# Patient Record
Sex: FEMALE | Race: BLACK OR AFRICAN AMERICAN | Employment: OTHER | URBAN - METROPOLITAN AREA
[De-identification: names, ages, dates, MRNs, and addresses within clinical notes are randomized per-mention and may not be internally consistent; named-entity substitution may affect disease eponyms.]

---

## 2017-12-18 ENCOUNTER — HOSPITAL ENCOUNTER (OUTPATIENT)
Dept: MAMMOGRAPHY | Age: 67
Discharge: HOME | End: 2017-12-18
Attending: FAMILY MEDICINE
Payer: MEDICARE

## 2017-12-18 DIAGNOSIS — Z12.31 VISIT FOR SCREENING MAMMOGRAM: ICD-10-CM

## 2017-12-18 PROCEDURE — 77063 BREAST TOMOSYNTHESIS BI: CPT

## 2018-11-27 ENCOUNTER — HOSPITAL ENCOUNTER (OUTPATIENT)
Age: 68
Discharge: HOME | End: 2018-11-27
Attending: FAMILY MEDICINE
Payer: MEDICARE

## 2018-11-27 DIAGNOSIS — R43.2 AGEUSIA: ICD-10-CM

## 2018-11-27 PROCEDURE — 70551 MRI BRAIN STEM W/O DYE: CPT

## 2019-01-09 ENCOUNTER — HOSPITAL ENCOUNTER (OUTPATIENT)
Dept: BONE DENSITY | Age: 69
Discharge: HOME | End: 2019-01-09
Attending: FAMILY MEDICINE
Payer: MEDICARE

## 2019-01-09 ENCOUNTER — HOSPITAL ENCOUNTER (OUTPATIENT)
Dept: MAMMOGRAPHY | Age: 69
Discharge: HOME | End: 2019-01-09
Attending: FAMILY MEDICINE
Payer: MEDICARE

## 2019-01-09 DIAGNOSIS — Z12.31 ENCOUNTER FOR SCREENING MAMMOGRAM FOR BREAST CANCER: ICD-10-CM

## 2019-01-09 DIAGNOSIS — E21.3 HYPERPARATHYROIDISM (HCC): ICD-10-CM

## 2019-01-09 PROCEDURE — 77080 DXA BONE DENSITY AXIAL: CPT

## 2019-01-09 PROCEDURE — 77063 BREAST TOMOSYNTHESIS BI: CPT

## 2020-01-10 ENCOUNTER — HOSPITAL ENCOUNTER (OUTPATIENT)
Dept: MAMMOGRAPHY | Age: 70
Discharge: HOME | End: 2020-01-10
Attending: FAMILY MEDICINE
Payer: MEDICARE

## 2020-01-10 DIAGNOSIS — Z12.31 VISIT FOR SCREENING MAMMOGRAM: ICD-10-CM

## 2020-01-10 PROCEDURE — 77063 BREAST TOMOSYNTHESIS BI: CPT

## 2020-10-23 ENCOUNTER — OFFICE VISIT (OUTPATIENT)
Dept: ORTHOPEDIC SURGERY | Age: 70
End: 2020-10-23
Payer: MEDICARE

## 2020-10-23 VITALS
BODY MASS INDEX: 30.7 KG/M2 | OXYGEN SATURATION: 99 % | TEMPERATURE: 97.2 F | DIASTOLIC BLOOD PRESSURE: 76 MMHG | WEIGHT: 191 LBS | HEIGHT: 66 IN | SYSTOLIC BLOOD PRESSURE: 145 MMHG | HEART RATE: 94 BPM | RESPIRATION RATE: 16 BRPM

## 2020-10-23 DIAGNOSIS — M25.652 DECREASED RANGE OF LEFT HIP MOVEMENT: ICD-10-CM

## 2020-10-23 DIAGNOSIS — M79.605 PAIN OF LEFT LOWER EXTREMITY: Primary | ICD-10-CM

## 2020-10-23 DIAGNOSIS — M16.12 ARTHRITIS OF LEFT HIP: ICD-10-CM

## 2020-10-23 DIAGNOSIS — M70.62 TROCHANTERIC BURSITIS, LEFT HIP: ICD-10-CM

## 2020-10-23 PROCEDURE — G8417 CALC BMI ABV UP PARAM F/U: HCPCS | Performed by: PHYSICIAN ASSISTANT

## 2020-10-23 PROCEDURE — 73502 X-RAY EXAM HIP UNI 2-3 VIEWS: CPT | Performed by: PHYSICIAN ASSISTANT

## 2020-10-23 PROCEDURE — G8536 NO DOC ELDER MAL SCRN: HCPCS | Performed by: PHYSICIAN ASSISTANT

## 2020-10-23 PROCEDURE — 1101F PT FALLS ASSESS-DOCD LE1/YR: CPT | Performed by: PHYSICIAN ASSISTANT

## 2020-10-23 PROCEDURE — 99203 OFFICE O/P NEW LOW 30 MIN: CPT | Performed by: PHYSICIAN ASSISTANT

## 2020-10-23 PROCEDURE — 1090F PRES/ABSN URINE INCON ASSESS: CPT | Performed by: PHYSICIAN ASSISTANT

## 2020-10-23 PROCEDURE — G8427 DOCREV CUR MEDS BY ELIG CLIN: HCPCS | Performed by: PHYSICIAN ASSISTANT

## 2020-10-23 PROCEDURE — G9711 PT HX TOT COL OR COLON CA: HCPCS | Performed by: PHYSICIAN ASSISTANT

## 2020-10-23 PROCEDURE — G8432 DEP SCR NOT DOC, RNG: HCPCS | Performed by: PHYSICIAN ASSISTANT

## 2020-10-23 PROCEDURE — G9899 SCRN MAM PERF RSLTS DOC: HCPCS | Performed by: PHYSICIAN ASSISTANT

## 2020-10-23 PROCEDURE — 20610 DRAIN/INJ JOINT/BURSA W/O US: CPT | Performed by: PHYSICIAN ASSISTANT

## 2020-10-23 PROCEDURE — G8399 PT W/DXA RESULTS DOCUMENT: HCPCS | Performed by: PHYSICIAN ASSISTANT

## 2020-10-23 RX ORDER — METFORMIN HYDROCHLORIDE 500 MG/1
500 TABLET, EXTENDED RELEASE ORAL
COMMUNITY
Start: 2020-10-05

## 2020-10-23 RX ORDER — TRIAMCINOLONE ACETONIDE 40 MG/ML
40 INJECTION, SUSPENSION INTRA-ARTICULAR; INTRAMUSCULAR ONCE
Qty: 1 ML | Refills: 0
Start: 2020-10-23 | End: 2020-10-23

## 2020-10-23 RX ORDER — LOSARTAN POTASSIUM 100 MG/1
TABLET ORAL
COMMUNITY
Start: 2020-09-28 | End: 2021-01-24

## 2020-11-04 ENCOUNTER — HOSPITAL ENCOUNTER (OUTPATIENT)
Dept: GENERAL RADIOLOGY | Age: 70
Discharge: HOME | End: 2020-11-04
Attending: PHYSICIAN ASSISTANT
Payer: MEDICARE

## 2020-11-04 PROCEDURE — 74011000636 HC RX REV CODE- 636: Performed by: PHYSICIAN ASSISTANT

## 2020-11-04 PROCEDURE — 74011250636 HC RX REV CODE- 250/636: Performed by: PHYSICIAN ASSISTANT

## 2020-11-04 PROCEDURE — 77002 NEEDLE LOCALIZATION BY XRAY: CPT

## 2020-11-04 PROCEDURE — 74011000250 HC RX REV CODE- 250: Performed by: PHYSICIAN ASSISTANT

## 2020-11-04 RX ORDER — METHYLPREDNISOLONE ACETATE 40 MG/ML
40 INJECTION, SUSPENSION INTRA-ARTICULAR; INTRALESIONAL; INTRAMUSCULAR; SOFT TISSUE
Status: COMPLETED | OUTPATIENT
Start: 2020-11-04 | End: 2020-11-04

## 2020-11-04 RX ORDER — LIDOCAINE HYDROCHLORIDE 10 MG/ML
5 INJECTION, SOLUTION EPIDURAL; INFILTRATION; INTRACAUDAL; PERINEURAL
Status: COMPLETED | OUTPATIENT
Start: 2020-11-04 | End: 2020-11-04

## 2020-11-04 RX ADMIN — METHYLPREDNISOLONE ACETATE 40 MG: 40 INJECTION, SUSPENSION INTRA-ARTICULAR; INTRALESIONAL; INTRAMUSCULAR; SOFT TISSUE at 10:00

## 2020-11-04 RX ADMIN — IOPAMIDOL 2 ML: 408 INJECTION, SOLUTION INTRATHECAL at 10:00

## 2020-11-04 RX ADMIN — LIDOCAINE HYDROCHLORIDE 5 ML: 10 INJECTION, SOLUTION EPIDURAL; INFILTRATION; INTRACAUDAL; PERINEURAL at 10:00

## 2020-11-04 NOTE — PROCEDURES
RADIOLOGY POST PROCEDURE NOTE     November 4, 2020       10:06 AM     Preoperative Diagnosis:  Left hip pain    Postoperative Diagnosis:  Same. :  Melvin Borges PA-C under direct supervision    Assistant:  Eyad Hairston PA-C    Type of Anesthesia: 1% plain lidocaine    Procedure/Description:  Left hip injection    Findings: Successful left hip injection     Estimated blood Loss:  Minimal    Specimen Removed:   None. Blood transfusions:  None. Implants:  None.     Complications: None    Condition: Stable    Discharge Plan:  discharge home     Melvin Borges PA-C

## 2020-11-11 ENCOUNTER — OFFICE VISIT (OUTPATIENT)
Dept: ORTHOPEDIC SURGERY | Age: 70
End: 2020-11-11
Payer: MEDICARE

## 2020-11-11 VITALS
DIASTOLIC BLOOD PRESSURE: 73 MMHG | BODY MASS INDEX: 30.7 KG/M2 | OXYGEN SATURATION: 96 % | HEIGHT: 66 IN | SYSTOLIC BLOOD PRESSURE: 105 MMHG | TEMPERATURE: 97.4 F | WEIGHT: 191 LBS | HEART RATE: 113 BPM | RESPIRATION RATE: 16 BRPM

## 2020-11-11 DIAGNOSIS — M79.605 PAIN OF LEFT LOWER EXTREMITY: Primary | ICD-10-CM

## 2020-11-11 DIAGNOSIS — M25.652 DECREASED RANGE OF LEFT HIP MOVEMENT: ICD-10-CM

## 2020-11-11 DIAGNOSIS — M70.62 TROCHANTERIC BURSITIS, LEFT HIP: ICD-10-CM

## 2020-11-11 DIAGNOSIS — M16.12 ARTHRITIS OF LEFT HIP: ICD-10-CM

## 2020-11-11 PROCEDURE — G9899 SCRN MAM PERF RSLTS DOC: HCPCS | Performed by: PHYSICIAN ASSISTANT

## 2020-11-11 PROCEDURE — G8417 CALC BMI ABV UP PARAM F/U: HCPCS | Performed by: PHYSICIAN ASSISTANT

## 2020-11-11 PROCEDURE — 1090F PRES/ABSN URINE INCON ASSESS: CPT | Performed by: PHYSICIAN ASSISTANT

## 2020-11-11 PROCEDURE — G9711 PT HX TOT COL OR COLON CA: HCPCS | Performed by: PHYSICIAN ASSISTANT

## 2020-11-11 PROCEDURE — 1101F PT FALLS ASSESS-DOCD LE1/YR: CPT | Performed by: PHYSICIAN ASSISTANT

## 2020-11-11 PROCEDURE — G8432 DEP SCR NOT DOC, RNG: HCPCS | Performed by: PHYSICIAN ASSISTANT

## 2020-11-11 PROCEDURE — G8399 PT W/DXA RESULTS DOCUMENT: HCPCS | Performed by: PHYSICIAN ASSISTANT

## 2020-11-11 PROCEDURE — 99212 OFFICE O/P EST SF 10 MIN: CPT | Performed by: PHYSICIAN ASSISTANT

## 2020-11-11 PROCEDURE — G8536 NO DOC ELDER MAL SCRN: HCPCS | Performed by: PHYSICIAN ASSISTANT

## 2020-11-11 PROCEDURE — G8427 DOCREV CUR MEDS BY ELIG CLIN: HCPCS | Performed by: PHYSICIAN ASSISTANT

## 2020-11-11 NOTE — PROGRESS NOTES
Ms. Lisa Love returned to the office with her daughter today following for left hip pain. She was treated outpatient in the [facility] at [facility] for trochanteric bursitis and also found to have organic findings of severe osteoarthritis of the left hip. She was ordered a intra-articular cortisone injection which was completed late in October 2020. Unfortunately she received no relief from the injection. She did however receive generally good relief from the trochanteric injection performed in the office. She would like to proceed with a total hip replacement soon as possible. Her weight is stable at 191 pounds carrying a BMI of 30.83. Unfortunately she is an insulin-dependent diabetic and currently carries an HG A1c value of 8.4. She has been trying to work on her values regarding the insulin and diet aggressively with her PCP. Unfortunately she does not meet criteria for safely performing a hip replacement under the guidelines administered by our total joint replacement surgeons. She was counseled on the importance of lowering her A1c for general cardiovascular and health benefits. Today all of her questions answered to her satisfaction. She declined a referral to a orthopedic surgeon in a different area [facility] or [place]. She will therefore return to our office if and when she can reduce her A1c to below 7.5.

## 2021-01-19 ENCOUNTER — HOSPITAL ENCOUNTER (INPATIENT)
Age: 71
LOS: 5 days | Discharge: HOME HEALTH | DRG: 637 | End: 2021-01-24
Attending: EMERGENCY MEDICINE | Admitting: INTERNAL MEDICINE
Payer: MEDICARE

## 2021-01-19 ENCOUNTER — APPOINTMENT (OUTPATIENT)
Dept: GENERAL RADIOLOGY | Age: 71
DRG: 637 | End: 2021-01-19
Attending: EMERGENCY MEDICINE
Payer: MEDICARE

## 2021-01-19 ENCOUNTER — APPOINTMENT (OUTPATIENT)
Dept: CT IMAGING | Age: 71
DRG: 637 | End: 2021-01-19
Attending: EMERGENCY MEDICINE
Payer: MEDICARE

## 2021-01-19 DIAGNOSIS — N17.9 AKI (ACUTE KIDNEY INJURY) (HCC): ICD-10-CM

## 2021-01-19 DIAGNOSIS — E11.10 DIABETIC KETOACIDOSIS WITHOUT COMA ASSOCIATED WITH TYPE 2 DIABETES MELLITUS (HCC): ICD-10-CM

## 2021-01-19 DIAGNOSIS — E87.20 LACTIC ACIDOSIS: ICD-10-CM

## 2021-01-19 DIAGNOSIS — R77.8 ELEVATED TROPONIN: ICD-10-CM

## 2021-01-19 DIAGNOSIS — E87.5 HYPERKALEMIA: ICD-10-CM

## 2021-01-19 DIAGNOSIS — E11.65 POORLY CONTROLLED TYPE 2 DIABETES MELLITUS (HCC): ICD-10-CM

## 2021-01-19 LAB
ADMINISTERED INITIALS, ADMINIT: NORMAL
ALBUMIN SERPL-MCNC: 3.7 G/DL (ref 3.4–5)
ALBUMIN/GLOB SERPL: 0.9 {RATIO} (ref 0.8–1.7)
ALP SERPL-CCNC: 111 U/L (ref 45–117)
ALT SERPL-CCNC: 21 U/L (ref 13–56)
ANION GAP SERPL CALC-SCNC: 12 MMOL/L (ref 3–18)
ANION GAP SERPL CALC-SCNC: 25 MMOL/L (ref 3–18)
ANION GAP SERPL CALC-SCNC: 25 MMOL/L (ref 3–18)
APPEARANCE UR: ABNORMAL
ARTERIAL PATENCY WRIST A: ABNORMAL
AST SERPL-CCNC: 8 U/L (ref 10–38)
ATRIAL RATE: 100 BPM
B PERT DNA SPEC QL NAA+PROBE: NOT DETECTED
BACTERIA URNS QL MICRO: ABNORMAL /HPF
BASE DEFICIT BLDV-SCNC: 5 MMOL/L
BASOPHILS # BLD: 0 K/UL (ref 0–0.06)
BASOPHILS NFR BLD: 0 % (ref 0–3)
BDY SITE: ABNORMAL
BILIRUB SERPL-MCNC: 0.5 MG/DL (ref 0.2–1)
BILIRUB UR QL: NEGATIVE
BODY TEMPERATURE: 37
BORDETELLA PARAPERTUSSIS PCR, BORPAR: NOT DETECTED
BUN SERPL-MCNC: 39 MG/DL (ref 7–18)
BUN SERPL-MCNC: 43 MG/DL (ref 7–18)
BUN SERPL-MCNC: 49 MG/DL (ref 7–18)
BUN/CREAT SERPL: 25 (ref 12–20)
C PNEUM DNA SPEC QL NAA+PROBE: NOT DETECTED
CA-I BLD-MCNC: 1.2 MMOL/L (ref 1.12–1.32)
CALCIUM SERPL-MCNC: 8.3 MG/DL (ref 8.5–10.1)
CALCIUM SERPL-MCNC: 8.5 MG/DL (ref 8.5–10.1)
CALCIUM SERPL-MCNC: 9.2 MG/DL (ref 8.5–10.1)
CALCULATED P AXIS, ECG09: 85 DEGREES
CALCULATED R AXIS, ECG10: 93 DEGREES
CALCULATED T AXIS, ECG11: 84 DEGREES
CHLORIDE SERPL-SCNC: 103 MMOL/L (ref 100–111)
CHLORIDE SERPL-SCNC: 108 MMOL/L (ref 100–111)
CHLORIDE SERPL-SCNC: 92 MMOL/L (ref 100–111)
CK MB CFR SERPL CALC: 2.3 % (ref 0–4)
CK MB SERPL-MCNC: 1.5 NG/ML (ref 5–25)
CK SERPL-CCNC: 64 U/L (ref 26–192)
CO2 SERPL-SCNC: 20 MMOL/L (ref 21–32)
CO2 SERPL-SCNC: 7 MMOL/L (ref 21–32)
CO2 SERPL-SCNC: 9 MMOL/L (ref 21–32)
COLOR UR: YELLOW
CREAT SERPL-MCNC: 1.54 MG/DL (ref 0.6–1.3)
CREAT SERPL-MCNC: 1.71 MG/DL (ref 0.6–1.3)
CREAT SERPL-MCNC: 1.95 MG/DL (ref 0.6–1.3)
D50 ADMINISTERED, D50ADM: 0 ML
D50 ORDER, D50ORD: 0 ML
DIAGNOSIS, 93000: NORMAL
DIFFERENTIAL METHOD BLD: ABNORMAL
EOSINOPHIL # BLD: 0.2 K/UL (ref 0–0.4)
EOSINOPHIL NFR BLD: 1 % (ref 0–5)
EPITH CASTS URNS QL MICRO: ABNORMAL /LPF (ref 0–5)
ERYTHROCYTE [DISTWIDTH] IN BLOOD BY AUTOMATED COUNT: 15.5 % (ref 11.6–14.5)
EST. AVERAGE GLUCOSE BLD GHB EST-MCNC: 235 MG/DL
FLUAV H1 2009 PAND RNA SPEC QL NAA+PROBE: NOT DETECTED
FLUAV H1 RNA SPEC QL NAA+PROBE: NOT DETECTED
FLUAV H3 RNA SPEC QL NAA+PROBE: NOT DETECTED
FLUAV SUBTYP SPEC NAA+PROBE: NOT DETECTED
FLUBV RNA SPEC QL NAA+PROBE: NOT DETECTED
GAS FLOW.O2 O2 DELIVERY SYS: ABNORMAL L/MIN
GLOBULIN SER CALC-MCNC: 4.2 G/DL (ref 2–4)
GLUCOSE BLD STRIP.AUTO-MCNC: 136 MG/DL (ref 70–110)
GLUCOSE BLD STRIP.AUTO-MCNC: 147 MG/DL (ref 70–110)
GLUCOSE BLD STRIP.AUTO-MCNC: 195 MG/DL (ref 74–106)
GLUCOSE BLD STRIP.AUTO-MCNC: 259 MG/DL (ref 70–110)
GLUCOSE BLD STRIP.AUTO-MCNC: 313 MG/DL (ref 70–110)
GLUCOSE BLD STRIP.AUTO-MCNC: 415 MG/DL (ref 70–110)
GLUCOSE BLD STRIP.AUTO-MCNC: 515 MG/DL (ref 70–110)
GLUCOSE BLD STRIP.AUTO-MCNC: 575 MG/DL (ref 70–110)
GLUCOSE BLD STRIP.AUTO-MCNC: >600 MG/DL (ref 70–110)
GLUCOSE BLD STRIP.AUTO-MCNC: >600 MG/DL (ref 70–110)
GLUCOSE SERPL-MCNC: 189 MG/DL (ref 74–99)
GLUCOSE SERPL-MCNC: 604 MG/DL (ref 74–99)
GLUCOSE SERPL-MCNC: 964 MG/DL (ref 74–99)
GLUCOSE UR STRIP.AUTO-MCNC: >1000 MG/DL
GLUCOSE, GLC: 136 MG/DL
GLUCOSE, GLC: 147 MG/DL
GLUCOSE, GLC: 195 MG/DL
GLUCOSE, GLC: 259 MG/DL
GLUCOSE, GLC: 313 MG/DL
GLUCOSE, GLC: 415 MG/DL
GLUCOSE, GLC: 515 MG/DL
GLUCOSE, GLC: 575 MG/DL
GLUCOSE, GLC: 601 MG/DL
HADV DNA SPEC QL NAA+PROBE: NOT DETECTED
HBA1C MFR BLD: 9.8 % (ref 4.2–5.6)
HCO3 BLDV-SCNC: 20.8 MMOL/L (ref 23–28)
HCOV 229E RNA SPEC QL NAA+PROBE: NOT DETECTED
HCOV HKU1 RNA SPEC QL NAA+PROBE: NOT DETECTED
HCOV NL63 RNA SPEC QL NAA+PROBE: NOT DETECTED
HCOV OC43 RNA SPEC QL NAA+PROBE: NOT DETECTED
HCT VFR BLD AUTO: 36.4 % (ref 35–45)
HCT VFR BLD CALC: 27 % (ref 36–49)
HGB BLD-MCNC: 11 G/DL (ref 12–16)
HGB BLD-MCNC: 9.2 G/DL (ref 12–16)
HGB UR QL STRIP: ABNORMAL
HIGH TARGET, HITG: 180 MG/DL
HMPV RNA SPEC QL NAA+PROBE: NOT DETECTED
HPIV1 RNA SPEC QL NAA+PROBE: NOT DETECTED
HPIV2 RNA SPEC QL NAA+PROBE: NOT DETECTED
HPIV3 RNA SPEC QL NAA+PROBE: NOT DETECTED
HPIV4 RNA SPEC QL NAA+PROBE: NOT DETECTED
INSULIN ADMINSTERED, INSADM: 10.7 UNITS/HOUR
INSULIN ADMINSTERED, INSADM: 10.8 UNITS/HOUR
INSULIN ADMINSTERED, INSADM: 15.5 UNITS/HOUR
INSULIN ADMINSTERED, INSADM: 18.2 UNITS/HOUR
INSULIN ADMINSTERED, INSADM: 2.3 UNITS/HOUR
INSULIN ADMINSTERED, INSADM: 3.5 UNITS/HOUR
INSULIN ADMINSTERED, INSADM: 5.1 UNITS/HOUR
INSULIN ADMINSTERED, INSADM: 5.4 UNITS/HOUR
INSULIN ADMINSTERED, INSADM: 6 UNITS/HOUR
INSULIN ORDER, INSORD: 10.7 UNITS/HOUR
INSULIN ORDER, INSORD: 10.8 UNITS/HOUR
INSULIN ORDER, INSORD: 15.5 UNITS/HOUR
INSULIN ORDER, INSORD: 18.2 UNITS/HOUR
INSULIN ORDER, INSORD: 2.3 UNITS/HOUR
INSULIN ORDER, INSORD: 3.5 UNITS/HOUR
INSULIN ORDER, INSORD: 5.1 UNITS/HOUR
INSULIN ORDER, INSORD: 5.4 UNITS/HOUR
INSULIN ORDER, INSORD: 6 UNITS/HOUR
KETONES UR QL STRIP.AUTO: 80 MG/DL
LACTATE BLD-SCNC: 4.64 MMOL/L (ref 0.4–2)
LACTATE BLD-SCNC: 4.64 MMOL/L (ref 0.4–2)
LACTATE SERPL-SCNC: 2.7 MMOL/L (ref 0.4–2)
LEUKOCYTE ESTERASE UR QL STRIP.AUTO: NEGATIVE
LIPASE SERPL-CCNC: 1000 U/L (ref 73–393)
LOW TARGET, LOT: 140 MG/DL
LYMPHOCYTES # BLD: 0.9 K/UL (ref 0.8–3.5)
LYMPHOCYTES NFR BLD: 5 % (ref 20–51)
M PNEUMO DNA SPEC QL NAA+PROBE: NOT DETECTED
MAGNESIUM SERPL-MCNC: 1.6 MG/DL (ref 1.6–2.6)
MAGNESIUM SERPL-MCNC: 2.1 MG/DL (ref 1.6–2.6)
MCH RBC QN AUTO: 27.4 PG (ref 24–34)
MCHC RBC AUTO-ENTMCNC: 30.2 G/DL (ref 31–37)
MCV RBC AUTO: 90.5 FL (ref 74–97)
MINUTES UNTIL NEXT BG, NBG: 60 MIN
MONOCYTES # BLD: 0.4 K/UL (ref 0–1)
MONOCYTES NFR BLD: 2 % (ref 2–9)
MULTIPLIER, MUL: 0.02
MULTIPLIER, MUL: 0.02
MULTIPLIER, MUL: 0.03
MULTIPLIER, MUL: 0.04
NEUTS SEG # BLD: 16.4 K/UL (ref 1.8–8)
NEUTS SEG NFR BLD: 92 % (ref 42–75)
NITRITE UR QL STRIP.AUTO: NEGATIVE
ORDER INITIALS, ORDINIT: NORMAL
P-R INTERVAL, ECG05: 170 MS
PCO2 BLDV: 38.4 MMHG (ref 41–51)
PH BLDV: 6.99 [PH] (ref 7.32–7.42)
PH BLDV: 7.34 [PH] (ref 7.32–7.42)
PH UR STRIP: 5 [PH] (ref 5–8)
PHOSPHATE SERPL-MCNC: 2.4 MG/DL (ref 2.5–4.9)
PHOSPHATE SERPL-MCNC: 6.3 MG/DL (ref 2.5–4.9)
PLATELET # BLD AUTO: 430 K/UL (ref 135–420)
PLATELET COMMENTS,PCOM: ABNORMAL
PMV BLD AUTO: 9.7 FL (ref 9.2–11.8)
PO2 BLDV: 18 MMHG (ref 25–40)
POTASSIUM BLD-SCNC: 5.1 MMOL/L (ref 3.5–5.5)
POTASSIUM SERPL-SCNC: 5.1 MMOL/L (ref 3.5–5.5)
POTASSIUM SERPL-SCNC: 5.1 MMOL/L (ref 3.5–5.5)
POTASSIUM SERPL-SCNC: 8 MMOL/L (ref 3.5–5.5)
PROCALCITONIN SERPL-MCNC: 4.36 NG/ML
PROT SERPL-MCNC: 7.9 G/DL (ref 6.4–8.2)
PROT UR STRIP-MCNC: ABNORMAL MG/DL
Q-T INTERVAL, ECG07: 368 MS
QRS DURATION, ECG06: 114 MS
QTC CALCULATION (BEZET), ECG08: 474 MS
RBC # BLD AUTO: 4.02 M/UL (ref 4.2–5.3)
RBC #/AREA URNS HPF: ABNORMAL /HPF (ref 0–5)
RBC MORPH BLD: ABNORMAL
RBC MORPH BLD: ABNORMAL
RSV RNA SPEC QL NAA+PROBE: NOT DETECTED
RV+EV RNA SPEC QL NAA+PROBE: NOT DETECTED
SAO2 % BLDV: 24 % (ref 65–88)
SARS-COV-2 PCR, COVPCR: NOT DETECTED
SERVICE CMNT-IMP: ABNORMAL
SODIUM BLD-SCNC: 137 MMOL/L (ref 136–145)
SODIUM SERPL-SCNC: 124 MMOL/L (ref 136–145)
SODIUM SERPL-SCNC: 137 MMOL/L (ref 136–145)
SODIUM SERPL-SCNC: 140 MMOL/L (ref 136–145)
SP GR UR REFRACTOMETRY: 1.02 (ref 1–1.03)
SPECIMEN TYPE: ABNORMAL
TROPONIN I SERPL-MCNC: 0.18 NG/ML (ref 0–0.04)
TSH SERPL DL<=0.05 MIU/L-ACNC: 3.91 UIU/ML (ref 0.36–3.74)
UROBILINOGEN UR QL STRIP.AUTO: 0.2 EU/DL (ref 0.2–1)
VENTRICULAR RATE, ECG03: 100 BPM
WBC # BLD AUTO: 17.9 K/UL (ref 4.6–13.2)
WBC URNS QL MICRO: ABNORMAL /HPF (ref 0–4)

## 2021-01-19 PROCEDURE — 83690 ASSAY OF LIPASE: CPT

## 2021-01-19 PROCEDURE — 93005 ELECTROCARDIOGRAM TRACING: CPT

## 2021-01-19 PROCEDURE — 81001 URINALYSIS AUTO W/SCOPE: CPT

## 2021-01-19 PROCEDURE — 65610000006 HC RM INTENSIVE CARE

## 2021-01-19 PROCEDURE — 96375 TX/PRO/DX INJ NEW DRUG ADDON: CPT

## 2021-01-19 PROCEDURE — 85025 COMPLETE CBC W/AUTO DIFF WBC: CPT

## 2021-01-19 PROCEDURE — 80048 BASIC METABOLIC PNL TOTAL CA: CPT

## 2021-01-19 PROCEDURE — 82962 GLUCOSE BLOOD TEST: CPT

## 2021-01-19 PROCEDURE — 71045 X-RAY EXAM CHEST 1 VIEW: CPT

## 2021-01-19 PROCEDURE — 82553 CREATINE MB FRACTION: CPT

## 2021-01-19 PROCEDURE — 74011250636 HC RX REV CODE- 250/636

## 2021-01-19 PROCEDURE — 82800 BLOOD PH: CPT

## 2021-01-19 PROCEDURE — 74011636637 HC RX REV CODE- 636/637: Performed by: EMERGENCY MEDICINE

## 2021-01-19 PROCEDURE — 83605 ASSAY OF LACTIC ACID: CPT

## 2021-01-19 PROCEDURE — 87040 BLOOD CULTURE FOR BACTERIA: CPT

## 2021-01-19 PROCEDURE — 96368 THER/DIAG CONCURRENT INF: CPT

## 2021-01-19 PROCEDURE — 83735 ASSAY OF MAGNESIUM: CPT

## 2021-01-19 PROCEDURE — 74011000258 HC RX REV CODE- 258: Performed by: EMERGENCY MEDICINE

## 2021-01-19 PROCEDURE — 87426 SARSCOV CORONAVIRUS AG IA: CPT

## 2021-01-19 PROCEDURE — 74011000250 HC RX REV CODE- 250: Performed by: EMERGENCY MEDICINE

## 2021-01-19 PROCEDURE — 84145 PROCALCITONIN (PCT): CPT

## 2021-01-19 PROCEDURE — 84100 ASSAY OF PHOSPHORUS: CPT

## 2021-01-19 PROCEDURE — 96365 THER/PROPH/DIAG IV INF INIT: CPT

## 2021-01-19 PROCEDURE — 99291 CRITICAL CARE FIRST HOUR: CPT | Performed by: INTERNAL MEDICINE

## 2021-01-19 PROCEDURE — 96376 TX/PRO/DX INJ SAME DRUG ADON: CPT

## 2021-01-19 PROCEDURE — 74011250637 HC RX REV CODE- 250/637: Performed by: STUDENT IN AN ORGANIZED HEALTH CARE EDUCATION/TRAINING PROGRAM

## 2021-01-19 PROCEDURE — 84295 ASSAY OF SERUM SODIUM: CPT

## 2021-01-19 PROCEDURE — 83036 HEMOGLOBIN GLYCOSYLATED A1C: CPT

## 2021-01-19 PROCEDURE — 99285 EMERGENCY DEPT VISIT HI MDM: CPT

## 2021-01-19 PROCEDURE — 74011250636 HC RX REV CODE- 250/636: Performed by: PHYSICIAN ASSISTANT

## 2021-01-19 PROCEDURE — 74011250636 HC RX REV CODE- 250/636: Performed by: EMERGENCY MEDICINE

## 2021-01-19 PROCEDURE — 82803 BLOOD GASES ANY COMBINATION: CPT

## 2021-01-19 PROCEDURE — 84443 ASSAY THYROID STIM HORMONE: CPT

## 2021-01-19 PROCEDURE — 80053 COMPREHEN METABOLIC PANEL: CPT

## 2021-01-19 PROCEDURE — 71250 CT THORAX DX C-: CPT

## 2021-01-19 PROCEDURE — U0003 INFECTIOUS AGENT DETECTION BY NUCLEIC ACID (DNA OR RNA); SEVERE ACUTE RESPIRATORY SYNDROME CORONAVIRUS 2 (SARS-COV-2) (CORONAVIRUS DISEASE [COVID-19]), AMPLIFIED PROBE TECHNIQUE, MAKING USE OF HIGH THROUGHPUT TECHNOLOGIES AS DESCRIBED BY CMS-2020-01-R: HCPCS

## 2021-01-19 RX ORDER — ONDANSETRON 2 MG/ML
4 INJECTION INTRAMUSCULAR; INTRAVENOUS
Status: COMPLETED | OUTPATIENT
Start: 2021-01-19 | End: 2021-01-19

## 2021-01-19 RX ORDER — SODIUM CHLORIDE 0.9 % (FLUSH) 0.9 %
5-40 SYRINGE (ML) INJECTION AS NEEDED
Status: DISCONTINUED | OUTPATIENT
Start: 2021-01-19 | End: 2021-01-24 | Stop reason: HOSPADM

## 2021-01-19 RX ORDER — DEXTROSE 50 % IN WATER (D50W) INTRAVENOUS SYRINGE
25-50 AS NEEDED
Status: DISCONTINUED | OUTPATIENT
Start: 2021-01-19 | End: 2021-01-24 | Stop reason: HOSPADM

## 2021-01-19 RX ORDER — SODIUM CHLORIDE 0.9 % (FLUSH) 0.9 %
5-40 SYRINGE (ML) INJECTION EVERY 8 HOURS
Status: DISCONTINUED | OUTPATIENT
Start: 2021-01-19 | End: 2021-01-24 | Stop reason: HOSPADM

## 2021-01-19 RX ORDER — MAGNESIUM SULFATE HEPTAHYDRATE 40 MG/ML
2 INJECTION, SOLUTION INTRAVENOUS ONCE
Status: COMPLETED | OUTPATIENT
Start: 2021-01-19 | End: 2021-01-19

## 2021-01-19 RX ORDER — PROMETHAZINE HYDROCHLORIDE 25 MG/1
12.5 TABLET ORAL
Status: DISCONTINUED | OUTPATIENT
Start: 2021-01-19 | End: 2021-01-24 | Stop reason: HOSPADM

## 2021-01-19 RX ORDER — LEVOTHYROXINE SODIUM 100 UG/1
100 TABLET ORAL
Status: DISCONTINUED | OUTPATIENT
Start: 2021-01-20 | End: 2021-01-24 | Stop reason: HOSPADM

## 2021-01-19 RX ORDER — ONDANSETRON 2 MG/ML
4 INJECTION INTRAMUSCULAR; INTRAVENOUS
Status: DISCONTINUED | OUTPATIENT
Start: 2021-01-19 | End: 2021-01-24 | Stop reason: HOSPADM

## 2021-01-19 RX ORDER — POLYETHYLENE GLYCOL 3350 17 G/17G
17 POWDER, FOR SOLUTION ORAL DAILY PRN
Status: DISCONTINUED | OUTPATIENT
Start: 2021-01-19 | End: 2021-01-24 | Stop reason: HOSPADM

## 2021-01-19 RX ORDER — CALCIUM GLUCONATE 94 MG/ML
INJECTION, SOLUTION INTRAVENOUS
Status: COMPLETED
Start: 2021-01-19 | End: 2021-01-19

## 2021-01-19 RX ORDER — CALCIUM GLUCONATE 94 MG/ML
1 INJECTION, SOLUTION INTRAVENOUS ONCE
Status: COMPLETED | OUTPATIENT
Start: 2021-01-19 | End: 2021-01-19

## 2021-01-19 RX ORDER — ACETAMINOPHEN 325 MG/1
650 TABLET ORAL
Status: DISCONTINUED | OUTPATIENT
Start: 2021-01-19 | End: 2021-01-21

## 2021-01-19 RX ORDER — ENOXAPARIN SODIUM 100 MG/ML
30 INJECTION SUBCUTANEOUS DAILY
Status: DISCONTINUED | OUTPATIENT
Start: 2021-01-20 | End: 2021-01-20

## 2021-01-19 RX ORDER — SODIUM BICARBONATE 1 MEQ/ML
50 SYRINGE (ML) INTRAVENOUS
Status: COMPLETED | OUTPATIENT
Start: 2021-01-19 | End: 2021-01-19

## 2021-01-19 RX ORDER — VANCOMYCIN 1.75 GRAM/500 ML IN 0.9 % SODIUM CHLORIDE INTRAVENOUS
1750 ONCE
Status: COMPLETED | OUTPATIENT
Start: 2021-01-19 | End: 2021-01-19

## 2021-01-19 RX ORDER — AMLODIPINE BESYLATE 5 MG/1
5 TABLET ORAL DAILY
Status: DISCONTINUED | OUTPATIENT
Start: 2021-01-20 | End: 2021-01-24 | Stop reason: HOSPADM

## 2021-01-19 RX ORDER — ACETAMINOPHEN 650 MG/1
650 SUPPOSITORY RECTAL
Status: DISCONTINUED | OUTPATIENT
Start: 2021-01-19 | End: 2021-01-21

## 2021-01-19 RX ORDER — MAGNESIUM SULFATE 100 %
4 CRYSTALS MISCELLANEOUS AS NEEDED
Status: DISCONTINUED | OUTPATIENT
Start: 2021-01-19 | End: 2021-01-24 | Stop reason: HOSPADM

## 2021-01-19 RX ORDER — AZITHROMYCIN 250 MG/1
500 TABLET, FILM COATED ORAL DAILY
Status: DISCONTINUED | OUTPATIENT
Start: 2021-01-19 | End: 2021-01-23

## 2021-01-19 RX ADMIN — VANCOMYCIN HYDROCHLORIDE 1750 MG: 10 INJECTION, POWDER, LYOPHILIZED, FOR SOLUTION INTRAVENOUS at 14:32

## 2021-01-19 RX ADMIN — SODIUM CHLORIDE 1000 ML: 900 INJECTION, SOLUTION INTRAVENOUS at 14:13

## 2021-01-19 RX ADMIN — SODIUM CHLORIDE 1000 ML: 9 INJECTION, SOLUTION INTRAVENOUS at 13:43

## 2021-01-19 RX ADMIN — SODIUM BICARBONATE 50 MEQ: 84 INJECTION INTRAVENOUS at 14:31

## 2021-01-19 RX ADMIN — ONDANSETRON 4 MG: 2 INJECTION INTRAMUSCULAR; INTRAVENOUS at 13:36

## 2021-01-19 RX ADMIN — MAGNESIUM SULFATE HEPTAHYDRATE 2 G: 40 INJECTION, SOLUTION INTRAVENOUS at 20:25

## 2021-01-19 RX ADMIN — SODIUM CHLORIDE 10.8 UNITS/HR: 9 INJECTION, SOLUTION INTRAVENOUS at 15:36

## 2021-01-19 RX ADMIN — INSULIN HUMAN 10 UNITS: 100 INJECTION, SOLUTION PARENTERAL at 14:30

## 2021-01-19 RX ADMIN — CALCIUM GLUCONATE 1 G: 98 INJECTION, SOLUTION INTRAVENOUS at 14:54

## 2021-01-19 RX ADMIN — AZITHROMYCIN MONOHYDRATE 500 MG: 250 TABLET ORAL at 17:49

## 2021-01-19 RX ADMIN — Medication 20 ML: at 22:00

## 2021-01-19 RX ADMIN — SODIUM BICARBONATE: 84 INJECTION, SOLUTION INTRAVENOUS at 16:04

## 2021-01-19 RX ADMIN — SODIUM CHLORIDE 1000 ML: 900 INJECTION, SOLUTION INTRAVENOUS at 16:06

## 2021-01-19 RX ADMIN — CALCIUM GLUCONATE 1 G: 98 INJECTION, SOLUTION INTRAVENOUS at 14:29

## 2021-01-19 RX ADMIN — SODIUM BICARBONATE 50 MEQ: 84 INJECTION INTRAVENOUS at 14:32

## 2021-01-19 RX ADMIN — CALCIUM GLUCONATE 1 G: 98 INJECTION, SOLUTION INTRAVENOUS at 14:36

## 2021-01-19 RX ADMIN — PIPERACILLIN SODIUM AND TAZOBACTAM SODIUM 4.5 G: 4; .5 INJECTION, POWDER, LYOPHILIZED, FOR SOLUTION INTRAVENOUS at 14:30

## 2021-01-19 NOTE — H&P
[facility]  Pulmonary, Critical Care, and Sleep Medicine    Name: Mona Rubalcava MRN: [id number]   : 1950 Hospital: [facility]   Date: 2021        Critical Care History and Physical       IMPRESSION:   · DKA, pH 6.99  · Elevated Lipase  · Abnormal Imaging of Pancreas on CT: MRI recommended- Cystic process- noted on prior imaging  · Hyperkalemia (K 8)  · Lactic acidosis (POC 4.64)  · BRENDA (creatinine 1.95)  · DM- Insulin requiring  · HTN  · Hx of Colon Cancer, colon resection 2 years prior  · Chronic hip pain- Left Osteonecrosis with secondary oseoarthritis  · Old, L4 Compression Fracture     Patient Active Problem List   Diagnosis Code    Hyperkalemia E87.5    DKA (diabetic ketoacidoses) ([facility] [address] E11.10        RECOMMENDATIONS:   Neuro: Monitor mental status. Pulm: Supplemental O2 via NC, titrate flow for goal SPO2> 90%. aspiration precautions, Keep HOB >30 degrees. Start azithromycin daily. Testing for Covid. CVS continue home amlodipine. Hold losartan. Monitor CVP, Check cardiac panel  Fluids: Given 4L NS, switch to 250mL/hr until glucose <200 then switch to D5 0.45 NS  GI: SUP, Trend LFTs, Zofran PRN for N/V, Diet/NPO. CT Chest/Abd/Pelvis ordered due to hx of Colon cancer  Renal:  Trend Renal indices, Strict Is/Os, Brown placed. Renal consulted. Hem/Onc: Daily CBC. Monitor for s/o active bleeding. I/D:Trend WBCs and temperature curve. Endocrine: Continue home levothyroxine. DKA Glucose hourly monitoring, started insulin drip, switch to SC Lantus when glucose <285  Metabolic: Every 4 hours BMP, mag, phos. Trend lytes, replace as needed. Musc/Skin: no acute issues  DNR  Discussed w/ attending physician      Best practice :  Glycemic control  IHI ICU bundles: Brown Bundle Followed    Stress ulcer prophylaxis. None   DVT prophylaxis. Lovenox  Need for Lines, brown assessed. Palliative care evaluation pending. Subjective/History:      This patient has been seen and evaluated at the request of Dr. Valorie Ruano for DKA. 01/19/21    Patient is a 65 y.o. female with a past medical history of diabetes, hypertension, hyperlipidemia, Hypothyroid, colon cancer status post chemo 2 years ago presenting with nausea cough yesterday. She reports cough began yesterday, nonproductive. She has also been feeling nauseous and vomited 2 times today, nonbloody, nonbilious. She denies abdominal pain. No fevers, no chills. She also reports she has been peeing more often. She reports taking her insulin regimen as instructed, but does not remember the doses. She reports no other issues at this time. In the ER, she presented with a pH of 6.99 and potassium of 8. POC lactic acid 4.64. Her glucose was 964, anion gap of 25. Her urinalysis showed glucose greater than 1000, ketones of 80. She received 4 L normal saline, calcium gluconate, and 1 dose of vancomycin. Initial EKG showed peaked T waves, resolved on repeat EKG. chest x-ray showed no acute abnormalities. Point of contact: Jamshid Cuellar (daughter): [phone]      Past Medical History:   Diagnosis Date    Colon cancer ([facility] [address]     Diabetes ([facility] [address]     Hypertension     Hypothyroidism         Past Surgical History:   Procedure Laterality Date    COLONOSCOPY N/A 12/30/2016    COLONOSCOPY. SURVEILLANCE /c Hot Snared Polypectomy /c EndoClip x3 performed by Yvrose Angeles MD at [address] [place] [address] HX COLECTOMY      HX HYSTERECTOMY          Prior to Admission medications    Medication Sig Start Date End Date Taking? Authorizing Provider   losartan (COZAAR) 100 mg tablet  9/28/20   Provider, Historical   metFORMIN ER (GLUCOPHAGE XR) 500 mg tablet  10/5/20   Provider, Historical   gabapentin (NEURONTIN) 300 mg capsule Take 300 mg by mouth daily. Provider, Historical   Cholecalciferol, Vitamin D3, 50,000 unit cap Take 1 Cap by mouth every seven (7) days.     Provider, Historical   insulin regular (NOVOLIN R, HUMULIN R) 100 unit/mL injection by SubCUTAneous route Before breakfast, lunch, and dinner. Sliding scale    Vaughn Billings MD   insulin glargine (LANTUS) 100 unit/mL injection 30 Units by SubCUTAneous route nightly. Vaughn Billings MD   amLODIPine (NORVASC) 5 mg tablet Take 5 mg by mouth daily. Vaughn Billings MD   alendronate (FOSAMAX) 10 mg tablet Take 70 mg by mouth every seven (7) days. Vaugnh Billings MD   aspirin 81 mg chewable tablet Take 81 mg by mouth daily. Vaughn Billings MD   levothyroxine (SYNTHROID) 112 mcg tablet Take 100 mcg by mouth Daily (before breakfast). Vaughn Billings MD       Current Facility-Administered Medications   Medication Dose Route Frequency    vancomycin (VANCOCIN) 1750 mg in  ml infusion  1,750 mg IntraVENous ONCE    insulin regular (MYXREDLIN, NOVOLIN, HUMULIN) 100 units/100 ml NS infusion (premix)  0-50 Units/hr IntraVENous TITRATE    sodium bicarbonate (8.4%) 150 mEq in sterile water 1,000 mL infusion   IntraVENous CONTINUOUS    calcium gluconate 100 mg/mL (10%) injection        sodium chloride (NS) flush 5-40 mL  5-40 mL IntraVENous Q8H    [START ON 1/20/2021] enoxaparin (LOVENOX) injection 40 mg  40 mg SubCUTAneous DAILY    <<Enter Weight into [facility] for Renal Dosing and Monitoring of Medications>>  1 Each Other ONCE       No Known Allergies     Social History     Tobacco Use    Smoking status: Former Smoker    Smokeless tobacco: Never Used   Substance Use Topics    Alcohol use: Yes     Comment: occasionally        Family History   Problem Relation Age of Onset    Diabetes Mother     Cancer Father         colon          Review of Systems:  No fevers or chills  No sob or wheezing  No chest pain or palpitations  No abd pain. No headaches or numbness or tingling.       Objective:   Vital Signs:    Visit Vitals  BP (!) 158/46 (BP 1 Location: Right arm, BP Patient Position: At rest)   Pulse (!) 112   Temp 98.1 °F (36.7 °C)   Resp 27   Ht 5' 5.98\" (1.676 m)   Wt 78 kg (172 lb)   SpO2 100%   BMI 27.77 kg/m²       O2 Device: Room air       Temp (24hrs), Av.1 °F (36.7 °C), Min:98.1 °F (36.7 °C), Max:98.1 °F (36.7 °C)       Intake/Output:   Last shift:      No intake/output data recorded. Last 3 shifts: No intake/output data recorded. No intake or output data in the 24 hours ending 21 1554    Ventilator Settings:  Mode Rate Tidal Volume Pressure FiO2 PEEP                    Peak airway pressure:      Minute ventilation:        ARDS network Guidelines:   Lung protective strategy and Plateau  Pressure goal < 30 cm H2O goals  Oxygenation Goals PaO2 55-80 mm Hg or SaO2 88-95%  PH goal 7.30-7.45    VAP bundle:  Reviewed. Webb tube to suction at 20-30 cm Hg. Maintain Webb tube with 5-10ml air every 4 hours  Routine oral care every 4 hours  Elevation of head > 45 degree  Daily sedation holiday and SBT evaluation starting at 6.00am.    Physical Exam:     General:  Alert, cooperative, moderate distress. Head:  Normocephalic, without obvious abnormality, atraumatic. Eyes:  Conjunctivae/corneas clear. PERRL,   Nose: Nares normal. Septum midline. Mucosa normal. No drainage or sinus tenderness. Throat:  Dry mucous membranes. Neck: Supple, symmetrical, trachea midline, no adenopathy, thyroid: no enlargment/tenderness/nodules, no carotid bruit and no JVD. Back:   Symmetric, no curvature. ROM normal.   Lungs:   Clear to auscultation bilaterally. Chest wall:  No tenderness or deformity. Heart:  Regular rate and rhythm, S1, S2 normal, no murmur, click, rub or gallop. Abdomen:   Soft, non-tender. Bowel sounds normal. No masses,  No organomegaly. Extremities: Extremities normal, atraumatic, no cyanosis or edema. +/- restraints    Pulses: 2+ and symmetric all extremities.    Skin: Skin color, texture, turgor normal. No rashes or lesions   Lymph nodes:  Cervical, supraclavicular, and axillary nodes normal.   Neurologic: Grossly nonfocal     Devices:  · Lines: 2 peripheral IV  · Chandler: Chandler catheter    Data:     Recent Results (from the past 24 hour(s))   GLUCOSE, POC    Collection Time: 01/19/21  1:16 PM   Result Value Ref Range    Glucose (POC) >600 (HH) 70 - 110 mg/dL   GLUCOSE, POC    Collection Time: 01/19/21  1:16 PM   Result Value Ref Range    Glucose (POC) >600 (HH) 70 - 110 mg/dL   CBC WITH AUTOMATED DIFF    Collection Time: 01/19/21  1:17 PM   Result Value Ref Range    WBC 17.9 (H) 4.6 - 13.2 K/uL    RBC 4.02 (L) 4.20 - 5.30 M/uL    HGB 11.0 (L) 12.0 - 16.0 g/dL    HCT 36.4 35.0 - 45.0 %    MCV 90.5 74.0 - 97.0 FL    MCH 27.4 24.0 - 34.0 PG    MCHC 30.2 (L) 31.0 - 37.0 g/dL    RDW 15.5 (H) 11.6 - 14.5 %    PLATELET 533 (H) 171 - 420 K/uL    MPV 9.7 9.2 - 11.8 FL    NEUTROPHILS 92 (H) 42 - 75 %    LYMPHOCYTES 5 (L) 20 - 51 %    MONOCYTES 2 2 - 9 %    EOSINOPHILS 1 0 - 5 %    BASOPHILS 0 0 - 3 %    ABS. NEUTROPHILS 16.4 (H) 1.8 - 8.0 K/UL    ABS. LYMPHOCYTES 0.9 0.8 - 3.5 K/UL    ABS. MONOCYTES 0.4 0 - 1.0 K/UL    ABS. EOSINOPHILS 0.2 0.0 - 0.4 K/UL    ABS. BASOPHILS 0.0 0.0 - 0.06 K/UL    DF MANUAL      PLATELET COMMENTS Increased Platelets      RBC COMMENTS ANISOCYTOSIS  1+        RBC COMMENTS GUSTAVO CELLS  1+       METABOLIC PANEL, COMPREHENSIVE    Collection Time: 01/19/21  1:17 PM   Result Value Ref Range    Sodium 124 (L) 136 - 145 mmol/L    Potassium 8.0 (HH) 3.5 - 5.5 mmol/L    Chloride 92 (L) 100 - 111 mmol/L    CO2 7 (LL) 21 - 32 mmol/L    Anion gap 25 (H) 3.0 - 18 mmol/L    Glucose 964 (HH) 74 - 99 mg/dL    BUN 49 (H) 7.0 - 18 MG/DL    Creatinine 1.95 (H) 0.6 - 1.3 MG/DL    BUN/Creatinine ratio 25 (H) 12 - 20      GFR est AA 31 (L) >60 ml/min/1.73m2    GFR est non-AA 25 (L) >60 ml/min/1.73m2    Calcium 9.2 8.5 - 10.1 MG/DL    Bilirubin, total 0.5 0.2 - 1.0 MG/DL    ALT (SGPT) 21 13 - 56 U/L    AST (SGOT) 8 (L) 10 - 38 U/L    Alk.  phosphatase 111 45 - 117 U/L    Protein, total 7.9 6.4 - 8.2 g/dL    Albumin 3.7 3.4 - 5.0 g/dL    Globulin 4.2 (H) 2.0 - 4.0 g/dL    A-G Ratio 0.9 0.8 - 1.7     PH, VENOUS    Collection Time: 01/19/21  1:17 PM   Result Value Ref Range    VENOUS PH 6.99 (LL) 7.32 - 7.42     HEMOGLOBIN A1C WITH EAG    Collection Time: 01/19/21  1:17 PM   Result Value Ref Range    Hemoglobin A1c 9.8 (H) 4.2 - 5.6 %    Est. average glucose 235 mg/dL   EKG, 12 LEAD, INITIAL    Collection Time: 01/19/21  1:57 PM   Result Value Ref Range    Ventricular Rate 100 BPM    Atrial Rate 100 BPM    P-R Interval 170 ms    QRS Duration 114 ms    Q-T Interval 368 ms    QTC Calculation (Bezet) 474 ms    Calculated P Axis 85 degrees    Calculated R Axis 93 degrees    Calculated T Axis 84 degrees    Diagnosis       Normal sinus rhythm  Rightward axis  Borderline ECG  No previous ECGs available  Confirmed by Sherren Che MD, --- ([id number]) on 1/19/2021 3:28:51 PM     POC LACTIC ACID    Collection Time: 01/19/21  2:26 PM   Result Value Ref Range    Lactic Acid (POC) 4.64 (HH) 0.40 - 2.00 mmol/L   URINALYSIS W/ RFLX MICROSCOPIC    Collection Time: 01/19/21  3:09 PM   Result Value Ref Range    Color YELLOW      Appearance CLOUDY      Specific gravity 1.024 1.005 - 1.030      pH (UA) 5.0 5.0 - 8.0      Protein TRACE (A) NEG mg/dL    Glucose >1,000 (A) NEG mg/dL    Ketone 80 (A) NEG mg/dL    Bilirubin Negative NEG      Blood TRACE (A) NEG      Urobilinogen 0.2 0.2 - 1.0 EU/dL    Nitrites Negative NEG      Leukocyte Esterase Negative NEG     URINE MICROSCOPIC ONLY    Collection Time: 01/19/21  3:09 PM   Result Value Ref Range    WBC 1 to 3 0 - 4 /hpf    RBC 0 to 2 0 - 5 /hpf    Epithelial cells 2+ 0 - 5 /lpf    Bacteria 1+ (A) NEG /hpf   GLUCOSTABILIZER    Collection Time: 01/19/21  3:35 PM   Result Value Ref Range    Glucose 601 mg/dL    Insulin order 10.8 units/hour    Insulin adminstered 10.8 units/hour    Multiplier 0.020     Low target 140 mg/dL    High target 180 mg/dL    D50 order 0.0 ml    D50 administered 0.00 ml    Minutes until next BG 60 min    Order initials cmb     Administered initials cmb            No results for input(s): FIO2I, IFO2, HCO3I, IHCO3, HCOPOC, PCO2I, PCOPOC, IPHI, PHI, PHPOC, PO2I, PO2POC in the last 72 hours. No lab exists for component: IPOC2     Lab Results   Component Value Date/Time    Hemoglobin A1c 9.8 (H) 01/19/2021 01:17 PM         Telemetry:normal sinus rhythm    Imaging:  I have personally reviewed the patients radiographs and have reviewed the reports:    XR Results (most recent):  Results from Hospital Encounter encounter on 01/19/21   XR CHEST PORT    Narrative EXAM: XR CHEST PORT    INDICATION: 65 years Female. cough. ADDITIONAL HISTORY: None. TECHNIQUE: Frontal view of the chest.    COMPARISON: Chest radiograph 2/19/2013    FINDINGS:    Multiple lines overlie the chest.    The cardiac silhouette is within normal limits in size. No confluent consolidation is appreciated. There is no evidence of pleural effusion. There is no evidence of pneumothorax. No acute osseous abnormality appreciated. Impression No radiographic evidence of acute cardiopulmonary process. CT Results (most recent):  Results from Hospital Encounter encounter on 06/08/15   CT CHEST ABD PELV W CONT    Narrative CT CHEST,  ABDOMEN AND PELVIS WITH ENHANCEMENT    CPT CODE: 20893, [id number] & [id number]    INDICATION: Elevated CEA. TECHNIQUE: 5 mm collimation axial images obtained from the thoracic inlet to the  level of the pubic symphysis following the uneventful administration of oral and  80 cc Isovue 300 nonionic intravenous contrast. Coronal and sagittal  reconstructions were obtained for additional assessment of regional anatomy and  to decrease radiation dose. COMPARISON: No prior CT chest. CT abdomen and pelvis with contrast 11/3/14. FINDINGS:    CHEST FINDINGS:  No mass or acute infiltrate. Mild atelectasis or scar at the lingula segment  left upper lobe and right middle lobe. Small bleb or pneumatocele in the lingula  segment of left upper lobe.     No mediastinal, axillary or hilar adenopathy. No pleural or pericardial  effusion. No aortic aneurysm or dissection. Thyroid is unremarkable. ABDOMEN FINDINGS:     No liver mass or ductal dilation. Gallbladder, spleen, adrenal glands and  kidneys are within normal limits. Small hypodensity at the pancreatic neck  measures 5-6 mm and is unchanged, probably a small cyst. The pancreas is  otherwise unremarkable. No aortic aneurysm. No aortic aneurysm or dissection. Portal vein is patent. No  adenopathy or free fluid. Small umbilical stable fatty ventral wall hernia with  new adjacent supraumbilical incisional fatty hernia. PELVIS FINDINGS:     Bladder is unremarkable. Hysterectomy. No free fluid or adenopathy. Oral  contrast reaches the rectum without obstruction. Left hemicolectomy. Anastomotic  region is within normal limits. No bowel distention or definable mass. No suspicious bone lesions in the chest, abdomen or pelvis. Stable L4 Schmorl's  node and other mild degenerative change in the spine and hips. Impression IMPRESSION:  1. Status post left hemicolectomy. No current CT evidence of metastases. 2. Stable subcentimeter pancreatic neck cystic lesion with differential  including small epithelial cyst, small indolent cystic neoplasm such a  sidebranch IPMN, or residual pseudocyst. Continued attention on followup. Currently no suspicious features. 3. New small fatty supraumbilical incisional hernia, with pre-existing small  fatty umbilical hernia. Total of     min critical care time spent at bedside during the course of care providing evaluation,management and care decisions and ordering appropriate treatment related to critical care problems exclusive of procedures.   The reason for providing this level of medical care for this critically ill patient was due a critical illness that impaired one or more vital organ systems such that there was a high probability of imminent or life threatening deterioration in the patients condition. This care involved high complexity decision making to assess, manipulate, and support vital system functions, to treat this degree vital organ system failure and to prevent further life threatening deterioration of the patients condition. Anais Vasquez  PGY-1   [facility]   Critical Care Rotation   January 19, 2021, 3:54 PM   Pulmonary, Critical Care Medicine  [facility]         [facility] Staff Addendum     I have independently evaluated the patient and reviewed the patient's chart. I have discussed the findings and care plan with ICU Care Team. Care Plan reviewed on ICU milti-D rounds. I agree with the above evaluation, assessment and recommendations along with the following comments and observations. Please also review my orders. Patient seen and evaluated in the ICU upon arrival. Patient admitted for DKA with presenting pH of 6.9. and hyperkalemia 8. Peak T-waves on initial EKG in ED. Patient given Bicarb: 2 amps, Insulin- 10 units and 2L NS- IV fluids in the ED and 1 amp of Calcium. Patient is making urine. Has brown in place. Currently on Bicarb and insulin drip. Potassium improved to 5.1. Repeat EKG in ED noted decreased t-wave amplitude. Troponin slightly elevated. Will need to trend. Infectious workup ongoing  BioFire negative., COVID-19 negative. Currently on broad spectrum antibiotics. OK to D/C droplet plus isolation. No clear source for trigger. She does have what appears to be a pancreatic cystic process. Lipase is elevated (which could be from DKA) and she does not have abdominal pain with palpation. A1C nearly 10     Lactic acid elevated at 4.64 in the ED - will need to trend    Patient is not a good historian. She states she lives alone and that her daughter comes over to help her. She is slow with answers and word finding but her answers are appropriate.  She states her PCP recently changed her insulin and that she takes insulin 3 times daily. She is unable to tell me how she measures or how many units she takes. It looks like she is also taking metformin. She denies subjective or objective fevers. She does endorse nausea with emesis. No hemoptysis or hematemesis. She states that she has chronic constipation and has not had a BM for 3-4 days and that she has a colonoscopy 3 years ago. She does have a reported history of colon cancer. Clinical workup ongoing. No IVP requirement at this time. Complex decision making was made in the evaluation and management plans during this consultation. More than 50% of time was spent in counseling and coordination of care including review of data and discussion with other team members. Further recommendations and therapies pending clinical course.     Critical Care and time spent coordinating care: physical exam, chart review, documentation, discussion with care team,  minus procedure time: 55 min    Nery Beck DO, [facility]  Pulmonary, Sleep and Critical Care Medicine  7:21 PM

## 2021-01-19 NOTE — ED PROVIDER NOTES
EMERGENCY DEPARTMENT HISTORY AND PHYSICAL EXAM    1:13 PM  Date: 1/19/2021  Patient Name: Elena Jordan    History of Presenting Illness     Chief Complaint   Patient presents with    Blood sugar problem        History Provided By: patient     HPI: Elena Jordan is a 65 y.o. female with history of diabetes, hypertension, hyperlipidemia colon cancer status post chemo 2 years ago presents with weakness, nausea some episodes of vomiting since yesterday. Patient also has some cough but no shortness of breath. Denies any fever or chills. Patient took Tylenol this morning. Patient denies any dysuria. Denies any abdominal pain, denies any chest pain. Glucometer was reading high in route. Patient states that she has been taking her insulin intermittently did not take it yesterday. Анна Beckman PCP: Mounika Chapman MD    Past History     Past Medical History:  Past Medical History:   Diagnosis Date    [facility] ([facility] [address] [facility]     Diabetes ([facility] [facility]     Hypertension     Hypothyroidism        Past Surgical History:  Past Surgical History:   Procedure Laterality Date    COLONOSCOPY N/A 12/30/2016    COLONOSCOPY. SURVEILLANCE /c Hot Snared Polypectomy /c EndoClip x3 performed by Tiana Denney MD at [facility] ENDOSCOPY    HX COLECTOMY      HX HYSTERECTOMY         Family History:  Family History   Problem Relation Age of Onset    Diabetes Mother     Cancer Father         colon       Social History:  Social History     Tobacco Use    Smoking status: Former Smoker    Smokeless tobacco: Never Used   Substance Use Topics    Alcohol use: Yes     Comment: occasionally    Drug use: No       Allergies:  No Known Allergies    Review of Systems   Review of Systems   Constitutional: Negative for activity change, appetite change and chills. HENT: Negative for congestion, ear discharge, ear pain and sore throat. Eyes: Negative for photophobia and pain. Respiratory: Negative for cough and choking.     Cardiovascular: Negative for palpitations and leg swelling. Gastrointestinal: Positive for nausea and vomiting. Negative for anal bleeding and rectal pain. Endocrine: Negative for polydipsia and polyuria. Genitourinary: Negative for genital sores and urgency. Musculoskeletal: Negative for arthralgias and myalgias. Neurological: Negative for dizziness, seizures and speech difficulty. Psychiatric/Behavioral: Negative for hallucinations, self-injury and suicidal ideas. Physical Exam     Patient Vitals for the past 12 hrs:   Temp Pulse Resp BP SpO2   01/19/21 1430 -- (!) 112 27 -- 100 %   01/19/21 1425 -- (!) 113 25 -- 100 %   01/19/21 1420 -- (!) 107 26 -- 100 %   01/19/21 1415 -- (!) 103 23 -- 100 %   01/19/21 1306 98.1 °F (36.7 °C) (!) 106 24 (!) 158/46 100 %       Physical Exam  Vitals signs and nursing note reviewed. Constitutional:       Appearance: She is well-developed. HENT:      Head: Normocephalic and atraumatic. Eyes:      General:         Right eye: No discharge. Left eye: No discharge. Neck:      Musculoskeletal: Normal range of motion and neck supple. Cardiovascular:      Rate and Rhythm: Normal rate and regular rhythm. Heart sounds: Normal heart sounds. No murmur. Pulmonary:      Effort: Pulmonary effort is normal. No respiratory distress. Breath sounds: Normal breath sounds. No stridor. No wheezing or rales. Chest:      Chest wall: No tenderness. Abdominal:      General: Bowel sounds are normal. There is no distension. Palpations: Abdomen is soft. Tenderness: There is no abdominal tenderness. There is no guarding or rebound. Musculoskeletal: Normal range of motion. Skin:     General: Skin is warm and dry. Neurological:      Mental Status: She is alert and oriented to person, place, and time.          Diagnostic Study Results     Labs -  Recent Results (from the past 12 hour(s))   GLUCOSE, POC    Collection Time: 01/19/21  1:16 PM   Result Value Ref Range    Glucose (POC) >600 (HH) 70 - 110 mg/dL   GLUCOSE, POC    Collection Time: 01/19/21  1:16 PM   Result Value Ref Range    Glucose (POC) >600 (HH) 70 - 110 mg/dL   CBC WITH AUTOMATED DIFF    Collection Time: 01/19/21  1:17 PM   Result Value Ref Range    WBC 17.9 (H) 4.6 - 13.2 K/uL    RBC 4.02 (L) 4.20 - 5.30 M/uL    HGB 11.0 (L) 12.0 - 16.0 g/dL    HCT 36.4 35.0 - 45.0 %    MCV 90.5 74.0 - 97.0 FL    MCH 27.4 24.0 - 34.0 PG    MCHC 30.2 (L) 31.0 - 37.0 g/dL    RDW 15.5 (H) 11.6 - 14.5 %    PLATELET 170 (H) 741 - 420 K/uL    MPV 9.7 9.2 - 11.8 FL    NEUTROPHILS 92 (H) 42 - 75 %    LYMPHOCYTES 5 (L) 20 - 51 %    MONOCYTES 2 2 - 9 %    EOSINOPHILS 1 0 - 5 %    BASOPHILS 0 0 - 3 %    ABS. NEUTROPHILS 16.4 (H) 1.8 - 8.0 K/UL    ABS. LYMPHOCYTES 0.9 0.8 - 3.5 K/UL    ABS. MONOCYTES 0.4 0 - 1.0 K/UL    ABS. EOSINOPHILS 0.2 0.0 - 0.4 K/UL    ABS. BASOPHILS 0.0 0.0 - 0.06 K/UL    DF MANUAL      PLATELET COMMENTS Increased Platelets      RBC COMMENTS ANISOCYTOSIS  1+        RBC COMMENTS GUSTAVO CELLS  1+       METABOLIC PANEL, COMPREHENSIVE    Collection Time: 01/19/21  1:17 PM   Result Value Ref Range    Sodium 124 (L) 136 - 145 mmol/L    Potassium 8.0 (HH) 3.5 - 5.5 mmol/L    Chloride 92 (L) 100 - 111 mmol/L    CO2 7 (LL) 21 - 32 mmol/L    Anion gap 25 (H) 3.0 - 18 mmol/L    Glucose 964 (HH) 74 - 99 mg/dL    BUN 49 (H) 7.0 - 18 MG/DL    Creatinine 1.95 (H) 0.6 - 1.3 MG/DL    BUN/Creatinine ratio 25 (H) 12 - 20      GFR est AA 31 (L) >60 ml/min/1.73m2    GFR est non-AA 25 (L) >60 ml/min/1.73m2    Calcium 9.2 8.5 - 10.1 MG/DL    Bilirubin, total 0.5 0.2 - 1.0 MG/DL    ALT (SGPT) 21 13 - 56 U/L    AST (SGOT) 8 (L) 10 - 38 U/L    Alk.  phosphatase 111 45 - 117 U/L    Protein, total 7.9 6.4 - 8.2 g/dL    Albumin 3.7 3.4 - 5.0 g/dL    Globulin 4.2 (H) 2.0 - 4.0 g/dL    A-G Ratio 0.9 0.8 - 1.7     PH, VENOUS    Collection Time: 01/19/21  1:17 PM   Result Value Ref Range    VENOUS PH 6.99 (LL) 7.32 - 7.42     HEMOGLOBIN A1C WITH EAG Collection Time: 01/19/21  1:17 PM   Result Value Ref Range    Hemoglobin A1c 9.8 (H) 4.2 - 5.6 %    Est. average glucose 235 mg/dL   EKG, 12 LEAD, INITIAL    Collection Time: 01/19/21  1:57 PM   Result Value Ref Range    Ventricular Rate 100 BPM    Atrial Rate 100 BPM    P-R Interval 170 ms    QRS Duration 114 ms    Q-T Interval 368 ms    QTC Calculation (Bezet) 474 ms    Calculated P Axis 85 degrees    Calculated R Axis 93 degrees    Calculated T Axis 84 degrees    Diagnosis       Normal sinus rhythm  Rightward axis  Borderline ECG  No previous ECGs available  Confirmed by Mariia Daniels MD, --- ([id number]) on 1/19/2021 3:28:51 PM     POC LACTIC ACID    Collection Time: 01/19/21  2:26 PM   Result Value Ref Range    Lactic Acid (POC) 4.64 (HH) 0.40 - 2.00 mmol/L   URINALYSIS W/ RFLX MICROSCOPIC    Collection Time: 01/19/21  3:09 PM   Result Value Ref Range    Color YELLOW      Appearance CLOUDY      Specific gravity 1.024 1.005 - 1.030      pH (UA) 5.0 5.0 - 8.0      Protein TRACE (A) NEG mg/dL    Glucose >1,000 (A) NEG mg/dL    Ketone 80 (A) NEG mg/dL    Bilirubin Negative NEG      Blood TRACE (A) NEG      Urobilinogen 0.2 0.2 - 1.0 EU/dL    Nitrites Negative NEG      Leukocyte Esterase Negative NEG     URINE MICROSCOPIC ONLY    Collection Time: 01/19/21  3:09 PM   Result Value Ref Range    WBC 1 to 3 0 - 4 /hpf    RBC 0 to 2 0 - 5 /hpf    Epithelial cells 2+ 0 - 5 /lpf    Bacteria 1+ (A) NEG /hpf   GLUCOSTABILIZER    Collection Time: 01/19/21  3:35 PM   Result Value Ref Range    Glucose 601 mg/dL    Insulin order 10.8 units/hour    Insulin adminstered 10.8 units/hour    Multiplier 0.020     Low target 140 mg/dL    High target 180 mg/dL    D50 order 0.0 ml    D50 administered 0.00 ml    Minutes until next BG 60 min    Order initials cmb     Administered initials cmb        Radiologic Studies -   Xr Chest Port    Result Date: 1/19/2021  No radiographic evidence of acute cardiopulmonary process.         Medical Decision Making     ED Course: Progress Notes, Reevaluation, and Consults:    1:13 PM Initial assessment performed. The patients presenting problems have been discussed, and they/their family are in agreement with the care plan formulated and outlined with them. I have encouraged them to ask questions as they arise throughout their visit. Provider Notes (Medical Decision Making):   Patient noncompliant with insulin treatment presents in DKA  Patient started on hydration on arrival.  Concern for DKA  We will check venous PH  Performed an infectious work-up  Anion gap 25  PH: 6.99  Potassium:8  Already receiving 1 L of IV fluids from EMS. Ordered 2 more L of fluids  Patient received calcium gluconate, sodium bicarb, 10 units of insulin for hyperkalemia. Patient will Continue receiving IV fluids and will be put on the glucose stabilizer  Spoke to Dr Deborah Abreu who also recommends bicarbonate drip  Dr. Coby Key will see patient   Discussed with ICU attending Dr. Nitesh Walker  Recommended respiratory panel, Chandler catheter, repeat EKG  Once patient finishes second liter of fluids we will start insulin infusion  Repeat EKG no hyperacute T waves, QRS 80, 122 sinus tachycardia  Patient has elevated lactic and white cell count, received 1 dose of empiric antibiotics  However. Lactic and white cell count could be secondary to stress reaction as well as vomiting  Patient to get CT chest abdomen pelvis to check for any source of infection  Patient will be admitted to ICU    Critical care:  CRITICAL CARE:    I have spent 70 minutes of critical care time involved in lab review, consultations with specialist, family decision-making, and documentation. This time does not include separately billable procedures. During this entire length of time I was immediately available to the patient. Critical Care:   The reason for providing this level of medical care for this critically ill patient was due a critical illness that impaired one or more vital organ systems such that there was a high probability of imminent or life threatening deterioration in the patients condition. This care involved high complexity decision making to assess, manipulate, and support vital system functions, to treat this degreee vital organ system failure and to prevent further life threatening deterioration of the patients condition. Vital Signs-Reviewed the patient's vital signs. Reviewed pt's pulse ox reading. EKG: Interpreted by the EP. Rate: 100   Rhythm: NSR   Interpretation:no ST changes   QRS: 114, peaked T waves    Records Reviewed:  old medical records (Time of Review: 1:13 PM)  -I am the first provider for this patient.  -I reviewed the vital signs, available nursing notes, past medical history, past surgical history, family history and social history.     Current Facility-Administered Medications   Medication Dose Route Frequency Provider Last Rate Last Admin    vancomycin (VANCOCIN) 1750 mg in  ml infusion  1,750 mg IntraVENous Sangeeta Kee  mL/hr at 01/19/21 1432 1,750 mg at 01/19/21 1432    glucose chewable tablet 16 g  4 Tab Oral PRN Sierra Bear MD        glucagon (GLUCAGEN) injection 1 mg  1 mg IntraMUSCular PRN Sierra Bear MD        dextrose (D50W) injection syrg 12.5-25 g  25-50 mL IntraVENous PRN Sierra Bear MD        insulin regular (Jena Apley, HUMULIN) 100 units/100 ml NS infusion (premix)  0-50 Units/hr IntraVENous TITRATE Sierra Bear MD 10.8 mL/hr at 01/19/21 1536 10.8 Units/hr at 01/19/21 1536    sodium bicarbonate (8.4%) 150 mEq in sterile water 1,000 mL infusion   IntraVENous CONTINUOUS Sierra Bear MD        calcium gluconate 100 mg/mL (10%) injection             sodium chloride (NS) flush 5-40 mL  5-40 mL IntraVENous Q8H Aydee Colbert H, DO        sodium chloride (NS) flush 5-40 mL  5-40 mL IntraVENous PRN Aydee Colbert H, DO        acetaminophen (TYLENOL) tablet 650 mg  650 mg Oral Q6H PRN Verla Keep H, DO        Or    acetaminophen (TYLENOL) suppository 650 mg  650 mg Rectal Q6H PRN Filemon, Bajwa H, DO        polyethylene glycol (MIRALAX) packet 17 g  17 g Oral DAILY PRN Filemon, Bajwa H, DO        promethazine (PHENERGAN) tablet 12.5 mg  12.5 mg Oral Q6H PRN Filemon, Bajwa H, DO        Or    ondansetron [facility]) injection 4 mg  4 mg IntraVENous Q6H PRN Filemon, Bajwa H, DO        [START ON 1/20/2021] enoxaparin (LOVENOX) injection 40 mg  40 mg SubCUTAneous DAILY Filemon, Bajwa H, DO        <<Enter Weight into [facility] for Renal Dosing and Monitoring of Medications>>  1 Each Other ONCE Zo Portillo,          Current Outpatient Medications   Medication Sig Dispense Refill    losartan (COZAAR) 100 mg tablet       metFORMIN ER (GLUCOPHAGE XR) 500 mg tablet       gabapentin (NEURONTIN) 300 mg capsule Take 300 mg by mouth daily.  Cholecalciferol, Vitamin D3, 50,000 unit cap Take 1 Cap by mouth every seven (7) days.  insulin regular (NOVOLIN R, HUMULIN R) 100 unit/mL injection by SubCUTAneous route Before breakfast, lunch, and dinner. Sliding scale      insulin glargine (LANTUS) 100 unit/mL injection 30 Units by SubCUTAneous route nightly.  amLODIPine (NORVASC) 5 mg tablet Take 5 mg by mouth daily.  alendronate (FOSAMAX) 10 mg tablet Take 70 mg by mouth every seven (7) days.  aspirin 81 mg chewable tablet Take 81 mg by mouth daily.  levothyroxine (SYNTHROID) 112 mcg tablet Take 100 mcg by mouth Daily (before breakfast). Clinical Impression     Clinical Impression: No diagnosis found. Disposition: admit    This note was dictated utilizing voice recognition software which may lead to typographical errors. I apologize in advance if the situation occurs. If questions arise please do not hesitate to contact me or call our department.     Malik Nathan MD  1:13 PM

## 2021-01-19 NOTE — PROGRESS NOTES
Consult received from Dr. Tari Guerra, chart reviewed. Agree with management of hyperkalemia and dka. Will repeat K, it typically improved with insulin drip and improvement in acidosis. Will see in am, please call if any questions.

## 2021-01-19 NOTE — DIABETES MGMT
Glycemic Control Plan of Care    T2DM current A1c of 9.8% (1/19/2021). 1/19: Pending assessment of home diabetes management and educational needs. Patient is currently in Droplet Plus isolation. Home diabetes medications: Unverified  Metformin  mg  Novolog scale insulin 3 times daily before meals  Lantus insulin 30 units daily at bedtime    Patient was admitted on 1/19/2021 with report of weakness, vomiting, and elevated blood sugars. Noted the following assessment:  DKA  Hyperkalemia    1/19: Placed on regular insulin drip via GlucoStabilizer. IVF: NaBicarb in sterile water at 125 ml/hr cont infusion    Glycemic recommendation(s):  1.) follow GlucoStabilzer protocol  2.) follow criteria for transition to SC insulin:  Patient should NOT be transitioned off insulin drip until the following criteria are met:  1. No increase greater than 0.02 in the insulin sensitivity factor (i.e. multiplier) for at least 4 hours  2. Blood glucose within the target range for at least 4 hours  3. For DKA patient - Anion gap is less than or equal to 12 mmol/L for 2 consecutive BMP's  4. For DKA patient - CO2 within normal limits (21-32 mmol/L) within 8 hours  5. Do NOT discontinue drip until physician has evaluated patient and given order for basal insulin. 6. Basal, meal time, and/or correctional insulin to be ordered from [address] [place] [address] SET. Continue insulin drip for two hours after first basal insulin dose given. Glycemic assessment:    Results for Can Madison (MRN [id number]) as of 1/19/2021 15:49   Ref.  Range 1/19/2021 13:17   Potassium Latest Ref Range: 3.5 - 5.5 mmol/L 8.0 (HH)   Chloride Latest Ref Range: 100 - 111 mmol/L 92 (L)   CO2 Latest Ref Range: 21 - 32 mmol/L 7 (LL)   Anion gap Latest Ref Range: 3.0 - 18 mmol/L 25 (H)   Glucose Latest Ref Range: 74 - 99 mg/dL 964 (HH)       POC BG 1/19 at time of review: > 600, >600  Lab BG 1/19: 964        Current A1C: 9.8% (1/19/2021) which is equivalent to estimated average blood glucose of 235 mg/dL during the past 2-3 months    Current hospital diabetes medications:  Regular insulin drip via GlucoStabilizer. Drip rate at time of review: 10.8 units/hr    Total daily dose insulin requirement previous day: N/A. Patient admitted on 1/19/2021    Diet: NPO    Goals:  Blood glucose will be within target range of  mg/dL by 1/22/2021    Education:  1/19: Pending assessment of home diabetes management and educational needs.   ___  Refer to Diabetes Education Record  ___  Education not indicated at this time    Stevie Dillard RN [facility]  Pager: [phone]

## 2021-01-20 LAB
ADMINISTERED INITIALS, ADMINIT: NORMAL
ANION GAP SERPL CALC-SCNC: 10 MMOL/L (ref 3–18)
ANION GAP SERPL CALC-SCNC: 8 MMOL/L (ref 3–18)
ANION GAP SERPL CALC-SCNC: 9 MMOL/L (ref 3–18)
ANION GAP SERPL CALC-SCNC: 9 MMOL/L (ref 3–18)
ATRIAL RATE: 122 BPM
BASOPHILS # BLD: 0 K/UL (ref 0–0.06)
BASOPHILS NFR BLD: 0 % (ref 0–3)
BUN SERPL-MCNC: 22 MG/DL (ref 7–18)
BUN SERPL-MCNC: 28 MG/DL (ref 7–18)
BUN SERPL-MCNC: 28 MG/DL (ref 7–18)
BUN SERPL-MCNC: 33 MG/DL (ref 7–18)
BUN/CREAT SERPL: 17 (ref 12–20)
BUN/CREAT SERPL: 23 (ref 12–20)
BUN/CREAT SERPL: 23 (ref 12–20)
BUN/CREAT SERPL: 24 (ref 12–20)
CALCIUM SERPL-MCNC: 8.1 MG/DL (ref 8.5–10.1)
CALCIUM SERPL-MCNC: 8.2 MG/DL (ref 8.5–10.1)
CALCIUM SERPL-MCNC: 8.2 MG/DL (ref 8.5–10.1)
CALCIUM SERPL-MCNC: 8.5 MG/DL (ref 8.5–10.1)
CALCULATED P AXIS, ECG09: 85 DEGREES
CALCULATED R AXIS, ECG10: 91 DEGREES
CALCULATED T AXIS, ECG11: 59 DEGREES
CHLORIDE SERPL-SCNC: 106 MMOL/L (ref 100–111)
CHLORIDE SERPL-SCNC: 106 MMOL/L (ref 100–111)
CHLORIDE SERPL-SCNC: 107 MMOL/L (ref 100–111)
CHLORIDE SERPL-SCNC: 108 MMOL/L (ref 100–111)
CK MB CFR SERPL CALC: ABNORMAL % (ref 0–4)
CK MB SERPL-MCNC: <1 NG/ML (ref 5–25)
CK SERPL-CCNC: 96 U/L (ref 26–192)
CO2 SERPL-SCNC: 21 MMOL/L (ref 21–32)
CO2 SERPL-SCNC: 22 MMOL/L (ref 21–32)
CO2 SERPL-SCNC: 24 MMOL/L (ref 21–32)
CO2 SERPL-SCNC: 24 MMOL/L (ref 21–32)
CREAT SERPL-MCNC: 1.21 MG/DL (ref 0.6–1.3)
CREAT SERPL-MCNC: 1.23 MG/DL (ref 0.6–1.3)
CREAT SERPL-MCNC: 1.27 MG/DL (ref 0.6–1.3)
CREAT SERPL-MCNC: 1.37 MG/DL (ref 0.6–1.3)
CREAT UR-MCNC: 63 MG/DL (ref 30–125)
D50 ADMINISTERED, D50ADM: 0 ML
D50 ORDER, D50ORD: 0 ML
DIAGNOSIS, 93000: NORMAL
DIFFERENTIAL METHOD BLD: ABNORMAL
EOSINOPHIL # BLD: 0 K/UL (ref 0–0.4)
EOSINOPHIL NFR BLD: 0 % (ref 0–5)
ERYTHROCYTE [DISTWIDTH] IN BLOOD BY AUTOMATED COUNT: 14.8 % (ref 11.6–14.5)
EST. AVERAGE GLUCOSE BLD GHB EST-MCNC: 226 MG/DL
GLUCOSE BLD STRIP.AUTO-MCNC: 108 MG/DL (ref 70–110)
GLUCOSE BLD STRIP.AUTO-MCNC: 126 MG/DL (ref 70–110)
GLUCOSE BLD STRIP.AUTO-MCNC: 134 MG/DL (ref 70–110)
GLUCOSE BLD STRIP.AUTO-MCNC: 137 MG/DL (ref 70–110)
GLUCOSE BLD STRIP.AUTO-MCNC: 145 MG/DL (ref 70–110)
GLUCOSE BLD STRIP.AUTO-MCNC: 147 MG/DL (ref 70–110)
GLUCOSE BLD STRIP.AUTO-MCNC: 178 MG/DL (ref 70–110)
GLUCOSE BLD STRIP.AUTO-MCNC: 184 MG/DL (ref 70–110)
GLUCOSE BLD STRIP.AUTO-MCNC: 184 MG/DL (ref 70–110)
GLUCOSE BLD STRIP.AUTO-MCNC: 193 MG/DL (ref 70–110)
GLUCOSE BLD STRIP.AUTO-MCNC: 197 MG/DL (ref 70–110)
GLUCOSE BLD STRIP.AUTO-MCNC: 228 MG/DL (ref 70–110)
GLUCOSE BLD STRIP.AUTO-MCNC: 236 MG/DL (ref 70–110)
GLUCOSE BLD STRIP.AUTO-MCNC: 294 MG/DL (ref 70–110)
GLUCOSE SERPL-MCNC: 102 MG/DL (ref 74–99)
GLUCOSE SERPL-MCNC: 115 MG/DL (ref 74–99)
GLUCOSE SERPL-MCNC: 147 MG/DL (ref 74–99)
GLUCOSE SERPL-MCNC: 234 MG/DL (ref 74–99)
GLUCOSE, GLC: 108 MG/DL
GLUCOSE, GLC: 126 MG/DL
GLUCOSE, GLC: 134 MG/DL
GLUCOSE, GLC: 137 MG/DL
GLUCOSE, GLC: 145 MG/DL
GLUCOSE, GLC: 147 MG/DL
GLUCOSE, GLC: 178 MG/DL
GLUCOSE, GLC: 184 MG/DL
GLUCOSE, GLC: 184 MG/DL
GLUCOSE, GLC: 193 MG/DL
GLUCOSE, GLC: 228 MG/DL
GLUCOSE, GLC: 294 MG/DL
HBA1C MFR BLD: 9.5 % (ref 4.2–5.6)
HCT VFR BLD AUTO: 27.7 % (ref 35–45)
HGB BLD-MCNC: 9.2 G/DL (ref 12–16)
HIGH TARGET, HITG: 180 MG/DL
INSULIN ADMINSTERED, INSADM: 0.7 UNITS/HOUR
INSULIN ADMINSTERED, INSADM: 1 UNITS/HOUR
INSULIN ADMINSTERED, INSADM: 1.2 UNITS/HOUR
INSULIN ADMINSTERED, INSADM: 1.5 UNITS/HOUR
INSULIN ADMINSTERED, INSADM: 1.7 UNITS/HOUR
INSULIN ADMINSTERED, INSADM: 2.2 UNITS/HOUR
INSULIN ADMINSTERED, INSADM: 2.6 UNITS/HOUR
INSULIN ADMINSTERED, INSADM: 3.4 UNITS/HOUR
INSULIN ADMINSTERED, INSADM: 3.7 UNITS/HOUR
INSULIN ADMINSTERED, INSADM: 3.7 UNITS/HOUR
INSULIN ADMINSTERED, INSADM: 5.3 UNITS/HOUR
INSULIN ADMINSTERED, INSADM: 9.4 UNITS/HOUR
INSULIN ORDER, INSORD: 0.7 UNITS/HOUR
INSULIN ORDER, INSORD: 1 UNITS/HOUR
INSULIN ORDER, INSORD: 1.2 UNITS/HOUR
INSULIN ORDER, INSORD: 1.5 UNITS/HOUR
INSULIN ORDER, INSORD: 1.7 UNITS/HOUR
INSULIN ORDER, INSORD: 2.2 UNITS/HOUR
INSULIN ORDER, INSORD: 2.6 UNITS/HOUR
INSULIN ORDER, INSORD: 3.4 UNITS/HOUR
INSULIN ORDER, INSORD: 3.7 UNITS/HOUR
INSULIN ORDER, INSORD: 3.7 UNITS/HOUR
INSULIN ORDER, INSORD: 5.3 UNITS/HOUR
INSULIN ORDER, INSORD: 9.4 UNITS/HOUR
LACTATE SERPL-SCNC: 0.8 MMOL/L (ref 0.4–2)
LIPASE SERPL-CCNC: 1118 U/L (ref 73–393)
LOW TARGET, LOT: 140 MG/DL
LYMPHOCYTES # BLD: 1.7 K/UL (ref 0.8–3.5)
LYMPHOCYTES NFR BLD: 10 % (ref 20–51)
MAGNESIUM SERPL-MCNC: 1.9 MG/DL (ref 1.6–2.6)
MAGNESIUM SERPL-MCNC: 2 MG/DL (ref 1.6–2.6)
MAGNESIUM SERPL-MCNC: 2.4 MG/DL (ref 1.6–2.6)
MAGNESIUM SERPL-MCNC: 2.4 MG/DL (ref 1.6–2.6)
MCH RBC QN AUTO: 26.9 PG (ref 24–34)
MCHC RBC AUTO-ENTMCNC: 33.2 G/DL (ref 31–37)
MCV RBC AUTO: 81 FL (ref 74–97)
MINUTES UNTIL NEXT BG, NBG: 60 MIN
MONOCYTES # BLD: 1.9 K/UL (ref 0–1)
MONOCYTES NFR BLD: 11 % (ref 2–9)
MULTIPLIER, MUL: 0.01
MULTIPLIER, MUL: 0.01
MULTIPLIER, MUL: 0.02
MULTIPLIER, MUL: 0.03
MULTIPLIER, MUL: 0.04
MULTIPLIER, MUL: 0.04
NEUTS SEG # BLD: 13.6 K/UL (ref 1.8–8)
NEUTS SEG NFR BLD: 79 % (ref 42–75)
ORDER INITIALS, ORDINIT: NORMAL
P-R INTERVAL, ECG05: 142 MS
PHOSPHATE SERPL-MCNC: 2 MG/DL (ref 2.5–4.9)
PHOSPHATE SERPL-MCNC: 2.2 MG/DL (ref 2.5–4.9)
PHOSPHATE SERPL-MCNC: 2.2 MG/DL (ref 2.5–4.9)
PHOSPHATE SERPL-MCNC: 2.4 MG/DL (ref 2.5–4.9)
PHOSPHATE SERPL-MCNC: 2.5 MG/DL (ref 2.5–4.9)
PLATELET # BLD AUTO: 307 K/UL (ref 135–420)
PLATELET COMMENTS,PCOM: ABNORMAL
PMV BLD AUTO: 9.5 FL (ref 9.2–11.8)
POTASSIUM SERPL-SCNC: 3.9 MMOL/L (ref 3.5–5.5)
POTASSIUM SERPL-SCNC: 4.1 MMOL/L (ref 3.5–5.5)
POTASSIUM SERPL-SCNC: 5.6 MMOL/L (ref 3.5–5.5)
POTASSIUM SERPL-SCNC: 5.9 MMOL/L (ref 3.5–5.5)
PROT UR-MCNC: 35 MG/DL
Q-T INTERVAL, ECG07: 320 MS
QRS DURATION, ECG06: 80 MS
QTC CALCULATION (BEZET), ECG08: 456 MS
RBC # BLD AUTO: 3.42 M/UL (ref 4.2–5.3)
RBC MORPH BLD: ABNORMAL
SARS-COV-2, COV2NT: NOT DETECTED
SODIUM SERPL-SCNC: 136 MMOL/L (ref 136–145)
SODIUM SERPL-SCNC: 138 MMOL/L (ref 136–145)
SODIUM SERPL-SCNC: 138 MMOL/L (ref 136–145)
SODIUM SERPL-SCNC: 142 MMOL/L (ref 136–145)
TROPONIN I SERPL-MCNC: 0.15 NG/ML (ref 0–0.04)
VENTRICULAR RATE, ECG03: 122 BPM
WBC # BLD AUTO: 17.2 K/UL (ref 4.6–13.2)

## 2021-01-20 PROCEDURE — 83735 ASSAY OF MAGNESIUM: CPT

## 2021-01-20 PROCEDURE — 85025 COMPLETE CBC W/AUTO DIFF WBC: CPT

## 2021-01-20 PROCEDURE — 74011000258 HC RX REV CODE- 258: Performed by: PHYSICIAN ASSISTANT

## 2021-01-20 PROCEDURE — 74011250636 HC RX REV CODE- 250/636: Performed by: STUDENT IN AN ORGANIZED HEALTH CARE EDUCATION/TRAINING PROGRAM

## 2021-01-20 PROCEDURE — 99291 CRITICAL CARE FIRST HOUR: CPT | Performed by: INTERNAL MEDICINE

## 2021-01-20 PROCEDURE — 74011250636 HC RX REV CODE- 250/636: Performed by: PHYSICIAN ASSISTANT

## 2021-01-20 PROCEDURE — 84100 ASSAY OF PHOSPHORUS: CPT

## 2021-01-20 PROCEDURE — 83605 ASSAY OF LACTIC ACID: CPT

## 2021-01-20 PROCEDURE — 65270000029 HC RM PRIVATE

## 2021-01-20 PROCEDURE — 74011250637 HC RX REV CODE- 250/637: Performed by: STUDENT IN AN ORGANIZED HEALTH CARE EDUCATION/TRAINING PROGRAM

## 2021-01-20 PROCEDURE — 74011636637 HC RX REV CODE- 636/637: Performed by: EMERGENCY MEDICINE

## 2021-01-20 PROCEDURE — 74011000258 HC RX REV CODE- 258: Performed by: EMERGENCY MEDICINE

## 2021-01-20 PROCEDURE — 2709999900 HC NON-CHARGEABLE SUPPLY

## 2021-01-20 PROCEDURE — 83036 HEMOGLOBIN GLYCOSYLATED A1C: CPT

## 2021-01-20 PROCEDURE — 99232 SBSQ HOSP IP/OBS MODERATE 35: CPT | Performed by: HOSPITALIST

## 2021-01-20 PROCEDURE — 80048 BASIC METABOLIC PNL TOTAL CA: CPT

## 2021-01-20 PROCEDURE — 74011000250 HC RX REV CODE- 250: Performed by: STUDENT IN AN ORGANIZED HEALTH CARE EDUCATION/TRAINING PROGRAM

## 2021-01-20 PROCEDURE — 36415 COLL VENOUS BLD VENIPUNCTURE: CPT

## 2021-01-20 PROCEDURE — 83690 ASSAY OF LIPASE: CPT

## 2021-01-20 PROCEDURE — 82570 ASSAY OF URINE CREATININE: CPT

## 2021-01-20 PROCEDURE — 82550 ASSAY OF CK (CPK): CPT

## 2021-01-20 PROCEDURE — 74011636637 HC RX REV CODE- 636/637: Performed by: PHYSICIAN ASSISTANT

## 2021-01-20 PROCEDURE — 82962 GLUCOSE BLOOD TEST: CPT

## 2021-01-20 PROCEDURE — 84156 ASSAY OF PROTEIN URINE: CPT

## 2021-01-20 RX ORDER — INSULIN GLARGINE 100 [IU]/ML
30 INJECTION, SOLUTION SUBCUTANEOUS DAILY
Status: DISCONTINUED | OUTPATIENT
Start: 2021-01-21 | End: 2021-01-20

## 2021-01-20 RX ORDER — DEXTROSE MONOHYDRATE AND SODIUM CHLORIDE 5; .45 G/100ML; G/100ML
100 INJECTION, SOLUTION INTRAVENOUS CONTINUOUS
Status: DISCONTINUED | OUTPATIENT
Start: 2021-01-20 | End: 2021-01-20

## 2021-01-20 RX ORDER — ENOXAPARIN SODIUM 100 MG/ML
40 INJECTION SUBCUTANEOUS DAILY
Status: DISCONTINUED | OUTPATIENT
Start: 2021-01-21 | End: 2021-01-24 | Stop reason: HOSPADM

## 2021-01-20 RX ORDER — FAMOTIDINE 20 MG/1
20 TABLET, FILM COATED ORAL DAILY
Status: DISCONTINUED | OUTPATIENT
Start: 2021-01-21 | End: 2021-01-24 | Stop reason: HOSPADM

## 2021-01-20 RX ORDER — MAGNESIUM SULFATE HEPTAHYDRATE 40 MG/ML
2 INJECTION, SOLUTION INTRAVENOUS ONCE
Status: COMPLETED | OUTPATIENT
Start: 2021-01-20 | End: 2021-01-20

## 2021-01-20 RX ORDER — INSULIN GLARGINE 100 [IU]/ML
30 INJECTION, SOLUTION SUBCUTANEOUS DAILY
Status: DISCONTINUED | OUTPATIENT
Start: 2021-01-20 | End: 2021-01-22

## 2021-01-20 RX ORDER — INSULIN LISPRO 100 [IU]/ML
INJECTION, SOLUTION INTRAVENOUS; SUBCUTANEOUS
Status: DISCONTINUED | OUTPATIENT
Start: 2021-01-20 | End: 2021-01-22

## 2021-01-20 RX ORDER — DEXTROSE 50 % IN WATER (D50W) INTRAVENOUS SYRINGE
25-50 AS NEEDED
Status: DISCONTINUED | OUTPATIENT
Start: 2021-01-20 | End: 2021-01-20

## 2021-01-20 RX ORDER — MAGNESIUM SULFATE 100 %
4 CRYSTALS MISCELLANEOUS AS NEEDED
Status: DISCONTINUED | OUTPATIENT
Start: 2021-01-20 | End: 2021-01-20

## 2021-01-20 RX ADMIN — INSULIN LISPRO 4 UNITS: 100 INJECTION, SOLUTION INTRAVENOUS; SUBCUTANEOUS at 16:52

## 2021-01-20 RX ADMIN — Medication 20 ML: at 06:00

## 2021-01-20 RX ADMIN — MAGNESIUM SULFATE HEPTAHYDRATE 2 G: 40 INJECTION, SOLUTION INTRAVENOUS at 03:01

## 2021-01-20 RX ADMIN — AZITHROMYCIN MONOHYDRATE 500 MG: 250 TABLET ORAL at 08:48

## 2021-01-20 RX ADMIN — INSULIN LISPRO 2 UNITS: 100 INJECTION, SOLUTION INTRAVENOUS; SUBCUTANEOUS at 23:23

## 2021-01-20 RX ADMIN — SODIUM CHLORIDE 5.3 UNITS/HR: 9 INJECTION, SOLUTION INTRAVENOUS at 05:53

## 2021-01-20 RX ADMIN — SODIUM PHOSPHATE, MONOBASIC, MONOHYDRATE 20 MMOL: 276; 142 INJECTION, SOLUTION INTRAVENOUS at 12:41

## 2021-01-20 RX ADMIN — Medication 10 ML: at 23:19

## 2021-01-20 RX ADMIN — AMLODIPINE BESYLATE 5 MG: 5 TABLET ORAL at 08:48

## 2021-01-20 RX ADMIN — INSULIN GLARGINE 30 UNITS: 100 INJECTION, SOLUTION SUBCUTANEOUS at 12:34

## 2021-01-20 RX ADMIN — Medication 10 ML: at 14:00

## 2021-01-20 RX ADMIN — LEVOTHYROXINE SODIUM 100 MCG: 100 TABLET ORAL at 08:48

## 2021-01-20 RX ADMIN — ENOXAPARIN SODIUM 30 MG: 30 INJECTION SUBCUTANEOUS at 08:49

## 2021-01-20 RX ADMIN — DEXTROSE MONOHYDRATE AND SODIUM CHLORIDE 100 ML/HR: 5; .45 INJECTION, SOLUTION INTRAVENOUS at 00:27

## 2021-01-20 NOTE — PROGRESS NOTES
PCCM Update:    Discussed patient's code status to verify her choices. She states she would not want CPR or ACLS if her heart were to stop. She states that she also would not want to be intubated. Decisions reflected in the chart w/ DNR order.     Peyman Huizar PA-C   01/19/21   Pulmonary, Critical Care Medicine  [facility]      ICU Staff  Above reviewed and noted    Doug Pope DO, [facility]    [facility]  Pulmonary, Critical Care, and Sleep Medicine

## 2021-01-20 NOTE — DIABETES MGMT
GLYCEMIC CONTROL PLAN OF CARE     Assessment/Recommendations:  Blood glucose currently 178 mg/dl  GlucoStabilizer insulin gtt infusing at 1.2 units/hr  Receiving D5 1/2 NS at 100 ml/hr  Drip has been stable for only 2 hours. Anion gap 8. CO2 24. When ready to transition off insulin gtt, recommend starting lantus insulin at 20 units daily along with corrective insulin coverage and lowering or discontinuing IVF with dextrose when pt eating. Will continue inpatient monitoring. Most recent blood glucose values:      Results for Miles Fair (MRN [id number]) as of 1/20/2021 10:15   Ref. Range 1/20/2021 05:48 1/20/2021 06:50 1/20/2021 07:59 1/20/2021 08:55 1/20/2021 10:00   GLUCOSE,FAST - POC Latest Ref Range: 70 - 110 mg/dL 193 (H) 134 (H) 137 (H) 126 (H) 178 (H)     Current A1C of 9.8 % is equivalent to average blood glucose of 235 mg/dl over the past 2-3 months. Current hospital diabetes medications:     GlucoStabilizer insulin gtt      Previous day's insulin requirements:   GlucoStabilizer insulin gtt - approx. 78 units    Home diabetes medications:  Per pta med list, need to verify with pt. Metformin  mg  Novolog scale insulin 3 times daily before meals  Lantus insulin 30 units daily at bedtime  Diet:    Currently NPO  Education:  ____Refer to Diabetes Education Record             ____Education not indicated at this time  Covid-19 rule out. Decreased mental status currently.     [place] [employer] [facility]

## 2021-01-20 NOTE — PROGRESS NOTES
Reason for Admission:  DKA (diabetic ketoacidoses) ([facility] [address] [E11.10]  Hyperkalemia [E87.5]                 RUR Score:   15           Plan for utilizing home health:   No orders at this time. Likelihood of Readmission:   LOW                         Transition of Care Plan:              Initial assessment completed with aime Isabel ([phone]) via phone. Cognitive status of patient:  Patient is alert oriented but slow to respond. Face sheet information confirmed:  yes. The patient designates Brigette Clark, daughter ([phone]) to participate in her discharge plan and to receive any needed information. This patient lives alone in a [facility] and daughter lives nearby. Patient is not  able to navigate steps as needed. Prior to hospitalization, patient was considered to be independent with ADLs/IADLS : no . If not independent,  patient needs assist with : dressing, bathing, food preparation, cooking, toileting and grooming. Patient's daughter is patient's caregiver. Patient has a current ACP document on file: no     The patient's daughter, Magali Olivares,  will be available to transport patient home upon discharge. The patient already has [facility] chair, and [facility] medical equipment available in the home. Patient is not currently active with home health. Patient has not stayed in a skilled nursing facility or rehab. This patient is on dialysis :no     Freedom of choice signed: no,     Currently, the discharge plan is home with family assist vs Home with [address] [place] [address] [facility]    Patient would benefit from PT/OT evaluation to assist with discharge planning. Patient is with [facility]. Per Brigette Clark, daughter, they are working with [facility] for personal care services. CM will continue monitor and assist with discharge planning.     The patient's daughter stated that she can obtain her medications from the pharmacy, and take her medications as directed with assistance. Patient's current insurance is [facility]       Care Management Interventions  PCP Verified by CM: Yes  Mode of Transport at Discharge: Self  Transition of Care Consult (CM Consult): Discharge Planning  Discharge Durable Medical Equipment: No  Physical Therapy Consult: No  Occupational Therapy Consult: No  Speech Therapy Consult: No  Current Support Network: Lives Alone  Confirm Follow Up Transport: Family  Discharge Location  Discharge Placement: (home with family assistance vs Home with home health)        MELISSA SalazarN, RN  Pager # [phone]  Care Manager

## 2021-01-20 NOTE — PROGRESS NOTES
[facility]. Pulmonary, Critical Care, and Sleep Medicine    Name: Daxa Ballard MRN: [id number]   : 1950 Hospital: [address] [place] [address] Dr   Date: 2021  Admission Date: 2021     Chart and notes reviewed. Data reviewed. I have evaluated all findings. [x]I have reviewed the flowsheet and previous days notes. []The patient is unable to give any meaningful history or review of systems because the patient is:  []Intubated []Sedated   []Unresponsive      []The patient is critically ill on      []Mechanical ventilation []Pressors   []BiPAP []         Interval HPI:  Patient is a 65 y.o. female with a past medical history of diabetes, hypertension, hyperlipidemia, Hypothyroid, colon cancer status post chemo 2 years ago presenting with nausea and cough. She reports cough began , nonproductive. She has also been feeling nauseous and vomited 2 times on , nonbloody, nonbilious. She reports taking her insulin regimen as instructed, but does not remember the doses. She reports no other issues at this time. Patient seen and evaluated in the ICU upon arrival. Patient admitted for DKA with presenting pH of 6.9. and hyperkalemia 8. Peak T-waves on initial EKG in ED. Patient given Bicarb: 2 amps, Insulin- 10 units and 2L NS- IV fluids in the ED and 1 amp of Calcium. Patient is making urine, with brown in place. Started on Bicarb and insulin drip. Potassium improved to 5.1. Repeat EKG in ED noted decreased t-wave amplitude. Troponin slightly elevated to 0.18, waiting for repeat.     Infectious workup ongoing  BioFire negative., COVID-19 negative. Currently on broad spectrum antibiotics. No clear source for trigger. She does have what appears to be a pancreatic cystic process. Lipase is elevated to 1000 (which could be from DKA) and she does not have abdominal pain with palpation.   A1C nearly 10.      Lactic acid elevated at 4.64 in the ED, trended down to 0. 8    Anion gap decreased from 25 to 8. Potassium at 4.1 this AM.         Point of contact: Gris Jordan (daughter): [phone]    Subjective 21  Hospital Day: 2  Overnight events: No acute events overnight  Mentation/Activity: Alert  Respiratory/ Secretions: Stable  Hemodynamics: Stable  Urine output, bowel: Stable  Diet: NPO                Review of Systems:  No fevers or chills  No sob or wheezing  No chest pain or palpitations  No abd pain. No headaches or numbness or tingling.       Events and notes from last 24 hours reviewed. Care plan discussed on multidisciplinary rounds. Patient Active Problem List   Diagnosis Code    Hyperkalemia E87.5    DKA (diabetic ketoacidoses) ([facility] [facility] E11.10       Vital Signs:  Visit Vitals  /64   Pulse 85   Temp 99.3 °F (37.4 °C)   Resp 18   Ht 5' 6\" (1.676 m)   Wt 82.1 kg (181 lb)   SpO2 98%   BMI 29.21 kg/m²       O2 Device: Room air       Temp (24hrs), Av.4 °F (36.9 °C), Min:97.8 °F (36.6 °C), Max:99.3 °F (37.4 °C)       Intake/Output:   Last shift:      No intake/output data recorded.   Last 3 shifts: 1901 -  0700  In: 1918.2 [I.V.:1918.2]  Out: 1300 [Urine:1300]    Intake/Output Summary (Last 24 hours) at 2021 0738  Last data filed at 2021 0700  Gross per 24 hour   Intake 1918.22 ml   Output 1300 ml   Net 618.22 ml        Ventilator Settings:                Current Facility-Administered Medications   Medication Dose Route Frequency    dextrose 5 % - 0.45% NaCl infusion  100 mL/hr IntraVENous CONTINUOUS    insulin regular (MYXREDLIN, NOVOLIN, HUMULIN) 100 units/100 ml NS infusion (premix)  0-50 Units/hr IntraVENous TITRATE    sodium chloride (NS) flush 5-40 mL  5-40 mL IntraVENous Q8H    enoxaparin (LOVENOX) injection 30 mg  30 mg SubCUTAneous DAILY    amLODIPine (NORVASC) tablet 5 mg  5 mg Oral DAILY    levothyroxine (SYNTHROID) tablet 100 mcg  100 mcg Oral ACB    azithromycin (ZITHROMAX) tablet 500 mg  500 mg Oral DAILY Telemetry: []Sinus []A-flutter []Paced    []A-fib []Multiple PVCs                  Physical Exam:       General: No acute distress  HEENT:  Anicteric sclerae; pink palpebral conjunctivae; mucosa moist  Resp:  Symmetrical chest expansion, no accessory muscle use; good airway entry; no rales/ wheezing/ rhonchi noted  CV:  S1, S2 present; regular rate and rhythm  GI:  Abdomen soft, non-tender; (+) active bowel sounds  Extremities:  +2 pulses on all extremities; no edema/ cyanosis   Skin:  Warm; no rashes/ lesions noted, normal turgor/cap refill   Neurologic:  Non-focal  Devices:  PIV      DATA:  MAR reviewed and pertinent medications noted or modified as needed    Labs:  Recent Labs     01/19/21  1317   WBC 17.9*   HGB 11.0*   HCT 36.4   *     Recent Labs     01/20/21  0615 01/20/21  0150 01/19/21  2156 01/19/21  1317 01/19/21  1317    138 140   < > 124*   K 5.9* 5.6* 5.1   < > 8.0*    107 108   < > 92*   CO2 21 22 20*   < > 7*   * 234* 189*   < > 964*   BUN 28* 33* 39*   < > 49*   CREA 1.23 1.37* 1.54*   < > 1.95*   CA 8.1* 8.2* 8.5   < > 9.2   MG 2.4 1.9 2.1   < >  --    PHOS 2.4* 2.2* 2.4*   < >  --    ALB  --   --   --   --  3.7   ALT  --   --   --   --  21    < > = values in this interval not displayed. No results for input(s): PH, PCO2, PO2, HCO3, FIO2 in the last 72 hours. No results for input(s): FIO2I, IFO2, HCO3I, IHCO3, HCOPOC, PCO2I, PCOPOC, IPHI, PHI, PHPOC, PO2I, PO2POC in the last 72 hours. No lab exists for component: IPOC2    Imaging:  [x]   I have personally reviewed the patients radiographs and reports  XR Results (most recent):  CXR Results  (Last 48 hours)               01/19/21 1330  XR CHEST PORT Final result    Impression:  No radiographic evidence of acute cardiopulmonary process. Narrative:  EXAM: XR CHEST PORT       INDICATION: 65 years Female. cough. ADDITIONAL HISTORY: None.        TECHNIQUE: Frontal view of the chest.       COMPARISON: Chest radiograph 2/19/2013       FINDINGS:       Multiple lines overlie the chest.       The cardiac silhouette is within normal limits in size. No confluent consolidation is appreciated. There is no evidence of pleural effusion. There is no evidence of pneumothorax. No acute osseous abnormality appreciated. CXR 1/19  IMPRESSION  No radiographic evidence of acute cardiopulmonary process. CT Results (most recent):  CT CHEST ABD PELVIS 1/19  IMPRESSION  1. No acute pathology appreciated. 2.  Cystic process in the pancreas better appreciated on prior imaging. However,  follow-up MRI with and without contrast is recommended for better delineation. 3.  Left hip osteonecrosis with severe secondary osteoarthritis. 4.  Old L4 compression fracture. 5.  Small umbilical fat hernia      IMPRESSION:   · DKA, pH 6.99- improved  · Elevated Lipase- improved  · Abnormal Imaging of Pancreas on CT: MRI recommended- Cystic process- noted on prior imaging  · Hyperkalemia (K :8 upon admisison)- resolved  · Lactic acidosis (POC 4.64)- resolved  · BRENDA (creatinine 1.95)  · DM- Insulin requiring  · HTN  · Hx of Colon Cancer, colon resection 2 years prior  · Chronic hip pain- Left Osteonecrosis with secondary oseoarthritis  · Old, L4 Compression Fracture     Patient Active Problem List   Diagnosis Code    Hyperkalemia E87.5    DKA (diabetic ketoacidoses) ([address] E11.10        RECOMMENDATIONS:   Neuro: Monitor mental status. Pulm: Supplemental O2 via NC, titrate flow for goal SPO2> 90%. aspiration precautions, Keep HOB >30 degrees. Start azithromycin daily. Testing for Covid. CVS continue home amlodipine. Hold losartan. Monitor CVP, Check cardiac panel  Fluids: Now on D5 0.45 NS at 100ml/hr  GI: SUP, Trend LFTs, Zofran PRN for N/V, Diet/NPO. CT Chest/Abd/Pelvis ordered due to hx of Colon cancer  Renal: Trend Renal indices, Strict Is/Os, Chandler placed. Renal consulted. Hem/Onc: Daily CBC. Monitor for s/o active bleeding. I/D: Trend WBCs and temperature curve. Endocrine: Continue home levothyroxine. On insulin drip, switched to SC Lantus 30U daily. Previous notes indicate patient was on lantus 30 at bedtime and 5 with meals. Will re-evaluate with help of diabetic educator. Metabolic: Every 4 hours BMP, mag, phos. Trend lytes, replace as needed. Phos at 2, repleted this AM  Musc/Skin: No acute issues   DNR  Discussed in interdisciplinary rounds     Best practice :    Glycemic control  IHI ICU bundles: Brown Bundle Followed    Stress ulcer prophylaxis. None   DVT prophylaxis. Lovenox  Need for Lines, brown assessed. Palliative care evaluation pending. High complexity decision making was performed during this consultation and evaluation. [x]       Pt is at high risk for further organ failure and dysfunction. Critical care time spent:    minutes with patient exclusive of procedures. Shae Hathaway DO PGY-1   [facility]   Critical Care Rotation   January 20, 2021, 7:38 AM   Pulmonary, Critical Care Medicine  [facility]         [facility] Staff Addendum     I have independently evaluated the patient and reviewed the patient's chart. I have discussed the findings and care plan with ICU Care Team. Care Plan reviewed on ICU milti-D rounds. I agree with the above evaluation, assessment and recommendations along with the following comments and observations. Please also review my orders. The patient is sitting up in bed in ICU at time of my evaluation. She is calm and appears more alert and interactive. She is able to speak more fluidly today. She is pleasant and answers questions appropriately. She reports that she feels much better but is not back to her baseline. No underlying precipitating cause other than poor compliance noted. She does have some possible cystic process on her pancreas which was noted on CT imaging. Unclear if this has clinical significance but further imaging workup recommended. AG closed overnight. Severe hyperkalemia and elevated lactate resolved. Elevated lipase improving and no clinical findings to suggest pancreatitits    The patient was started on Lantus earlier today and she is tolerating PO. Insulin drip stopped several hours ago. Patient denies any nausea, emesis, abdominal pain, chest pain, dyspnea. She does report feeling weekl    She states that she is able to obtain her insulin but she did miss some doses but did not elaborate why. She states she does use a sliding scale. At this time the patient does not require ongoing ICU care. Will transfer off ICU service to Hospitalist Service      Complex decision making was made in the evaluation and management plans during this consultation. More than 50% of time was spent in counseling and coordination of care including review of data and discussion with other team members. Further recommendations and therapies pending clinical course.     Critical Care and time spent coordinating care: physical exam, chart review, documentation, discussion with care team,  minus procedure time: 30 min    Doreen Holliday DO, FCCP  Pulmonary, Sleep and Critical Care Medicine  8:21 PM

## 2021-01-20 NOTE — ROUTINE PROCESS
Spoke to daughter again. Updated on patient's mental status which appears improved from last night. Gave recent vitals and labs. She was grateful.

## 2021-01-20 NOTE — PROGRESS NOTES
Consult Note    Consult requested by: Sebastián DO Steffanie    ADMIT DATE: 1/19/2021    CONSULT DATE: January 20, 2021           Admission diagnosis: DKA  Reason for Nephrology Consultation: BRENDA      Assessment and plan:    #1 acute kidney injury on chronic kidney disease stage III (baseline around 1.1) etiology prerenal azotemia in the setting of DKA   #2 Severe symptomatic hyperkalemia, with EKG changes, improved, continue to hold losartan  #3 acidemia with anion gap metabolic acidosis due to DKA  #4 lactic acidosis   #5 CKD 3 with proteinuria due to diabetes mellitus which is poorly controlled, hemoglobin A1c of 9.5  #6 hypertension, hold losartan  #7 elevated lipase, CT evidence of cystic disease in pancreas, further evaluation recommended  #8 history of colon cancer    Plan:    #1 agree with continuing D5 half NS  until tolerating diet   #2 anion gap appears to be closed and acidosis resolved  #3 renal functions appear to have improved back to her baseline  #4 patient counseled regarding ischemic control also chronic kidney disease with proteinuria, quantify proteinuria  #5 continue to hold losartan  #6 avoid nephrotoxins IV contrast    Please call with questions,    Bryan Alicea MD FASN  Cell [phone]  Pager: [phone]    HPI:   Patient is a 64-year-old female with past medical history of diabetes, hypertension, dyslipidemia, hypothyroidism, history of colon cancer s/p chemotherapy couple of years ago who presented with cough, nausea vomiting over the last few days. On presentation patient was noted to be normotensive, tachycardic, afebrile, saturating 99% on room air. Patient was noted to be in DKA with a pH of 6.9, hyperkalemia with potassium of 8, noted to have peaked T waves on the EKG. Was given 2 A of bicarb in the ED started on bicarb drip due to severe hyperkalemia in the setting of DKA. Patient also received calcium gluconate. Chandler was placed and urine output has been good. Volume resuscitation was done and patient's potassium improved and EKG changes improved. Overnight patient appeared to make very good urine output, creatinine down to 1.2. Also patient's acidosis appears to have resolved and anion gap is closed. Now on D5 half NS insulin drip per PCCM. Past Medical History:   Diagnosis Date    Colon cancer ([facility]     Diabetes ([facility]     Hypertension     Hypothyroidism       Past Surgical History:   Procedure Laterality Date    COLONOSCOPY N/A 12/30/2016    COLONOSCOPY.  SURVEILLANCE /c Hot Snared Polypectomy /c EndoClip x3 performed by Remigio Klinefelter, [facility] at [facility] ENDOSCOPY    HX COLECTOMY      HX HYSTERECTOMY         Social History     Socioeconomic History    Marital status: SINGLE     Spouse name: Not on file    Number of children: Not on file    Years of education: Not on file    Highest education level: Not on file   Occupational History    Not on file   Social Needs    Financial resource strain: Not on file    Food insecurity     Worry: Not on file     Inability: Not on file    Transportation needs     Medical: Not on file     Non-medical: Not on file   Tobacco Use    Smoking status: Former Smoker    Smokeless tobacco: Never Used   Substance and Sexual Activity    Alcohol use: Yes     Comment: occasionally    Drug use: No    Sexual activity: Not on file   Lifestyle    Physical activity     Days per week: Not on file     Minutes per session: Not on file    Stress: Not on file   Relationships    Social connections     Talks on phone: Not on file     Gets together: Not on file     Attends Sikh service: Not on file     Active member of club or organization: Not on file     Attends meetings of clubs or organizations: Not on file     Relationship status: Not on file    Intimate partner violence     Fear of current or ex partner: Not on file     Emotionally abused: Not on file     Physically abused: Not on file     Forced sexual activity: Not on file Other Topics Concern    Not on file   Social History Narrative    Not on file       Family History   Problem Relation Age of Onset    Diabetes Mother     Cancer Father         colon     No Known Allergies     Home Medications:     Medications Prior to Admission   Medication Sig    losartan (COZAAR) 100 mg tablet     metFORMIN ER (GLUCOPHAGE XR) 500 mg tablet     gabapentin (NEURONTIN) 300 mg capsule Take 300 mg by mouth daily.  Cholecalciferol, Vitamin D3, 50,000 unit cap Take 1 Cap by mouth every seven (7) days.  insulin regular (NOVOLIN R, HUMULIN R) 100 unit/mL injection by SubCUTAneous route Before breakfast, lunch, and dinner. Sliding scale    insulin glargine (LANTUS) 100 unit/mL injection 30 Units by SubCUTAneous route nightly.  amLODIPine (NORVASC) 5 mg tablet Take 5 mg by mouth daily.  alendronate (FOSAMAX) 10 mg tablet Take 70 mg by mouth every seven (7) days.  aspirin 81 mg chewable tablet Take 81 mg by mouth daily.  levothyroxine (SYNTHROID) 112 mcg tablet Take 100 mcg by mouth Daily (before breakfast).        Current Inpatient Medications:     Current Facility-Administered Medications   Medication Dose Route Frequency    dextrose 5 % - 0.45% NaCl infusion  100 mL/hr IntraVENous CONTINUOUS    sodium phosphate 20 mmol in 0.9% sodium chloride 250 mL infusion  20 mmol IntraVENous ONCE    glucose chewable tablet 16 g  4 Tab Oral PRN    glucagon (GLUCAGEN) injection 1 mg  1 mg IntraMUSCular PRN    dextrose (D50W) injection syrg 12.5-25 g  25-50 mL IntraVENous PRN    insulin regular (MYXREDLIN, NOVOLIN, HUMULIN) 100 units/100 ml NS infusion (premix)  0-50 Units/hr IntraVENous TITRATE    sodium chloride (NS) flush 5-40 mL  5-40 mL IntraVENous Q8H    sodium chloride (NS) flush 5-40 mL  5-40 mL IntraVENous PRN    acetaminophen (TYLENOL) tablet 650 mg  650 mg Oral Q6H PRN    Or    acetaminophen (TYLENOL) suppository 650 mg  650 mg Rectal Q6H PRN    polyethylene glycol (MIRALAX) packet 17 g  17 g Oral DAILY PRN    promethazine (PHENERGAN) tablet 12.5 mg  12.5 mg Oral Q6H PRN    Or    ondansetron (ZOFRAN) injection 4 mg  4 mg IntraVENous Q6H PRN    enoxaparin (LOVENOX) injection 30 mg  30 mg SubCUTAneous DAILY    amLODIPine (NORVASC) tablet 5 mg  5 mg Oral DAILY    levothyroxine (SYNTHROID) tablet 100 mcg  100 mcg Oral ACB    azithromycin (ZITHROMAX) tablet 500 mg  500 mg Oral DAILY       Review of Systems:   No fever or chills. No sore throat. No cough or hemoptysis. No shortness of breath or chest pain. No orthopnea or paroxysmal nocturnal dyspnea. No constipation or diarrhea. No dysuria,No ankle swelling, no joint paints. No muscle aches. No skin changes. No dizziness or lightheadedness. No headaches. Physical Assessment:     Vitals:    01/20/21 0500 01/20/21 0600 01/20/21 0700 01/20/21 0800   BP: (!) 132/56 139/81 136/64 (!) 136/59   Pulse: 88 99 85 81   Resp: 19 22 18 18   Temp:    98.9 °F (37.2 °C)   SpO2: 98% 96% 98% 99%   Weight:       Height:         Last 3 Recorded Weights in this Encounter    01/19/21 1306 01/19/21 1830   Weight: 78 kg (172 lb) 82.1 kg (181 lb)     Admission weight: Weight: 78 kg (172 lb) (01/19/21 1306)      Intake/Output Summary (Last 24 hours) at 1/20/2021 1055  Last data filed at 1/20/2021 0700  Gross per 24 hour   Intake 1918.22 ml   Output 1300 ml   Net 618.22 ml       Patient is in no apparent distress. HEENT: mmm  Lungs: good air entry, clear to auscultation bilaterally  Cardiovascular system: S1, S2, regular rate and rhythm. Abdomen: soft, non tender, non distended. Positive bowel sounds. Extremities: no clubbing, cyanosis or edema. Neurologic: Alert, oriented time three.     Data Review:    Labs: Results:       Chemistry Recent Labs     01/20/21  0722 01/20/21  0615 01/20/21  0150 01/19/21  1317 01/19/21  1317   * 147* 234*   < > 964*    136 138   < > 124*   K 4.1 5.9* 5.6*   < > 8.0*    106 107   < > 92* CO2 24 21 22   < > 7*   BUN 28* 28* 33*   < > 49*   CREA 1.21 1.23 1.37*   < > 1.95*   CA 8.5 8.1* 8.2*   < > 9.2   AGAP 8 9 9   < > 25*   BUCR 23* 23* 24*   < > 25*   AP  --   --   --   --  111   TP  --   --   --   --  7.9   ALB  --   --   --   --  3.7   GLOB  --   --   --   --  4.2*   AGRAT  --   --   --   --  0.9   PHOS 2.0* 2.4* 2.2*   < >  --     < > = values in this interval not displayed. CBC w/Diff Recent Labs     01/20/21  0722 01/19/21  1317   WBC 17.2* 17.9*   RBC 3.42* 4.02*   HGB 9.2* 11.0*   HCT 27.7* 36.4    430*   GRANS 79* 92*   LYMPH 10* 5*   EOS 0 1         Iron/Ferritin No results for input(s): IRON in the last 72 hours. No lab exists for component: TIBCCALC   PTH/VIT D No results for input(s): PTH in the last 72 hours.     No lab exists for component: VITD           Illene Brunner, MD  1/20/2021  10:55 AM      January 20, 2021

## 2021-01-20 NOTE — PROGRESS NOTES
Spoke to daughter, she is very concerned of patient's ability to care for self at home due to decrease in mental status and mobility. Gave update on vitals and blood sugar and lab results.

## 2021-01-20 NOTE — PROGRESS NOTES
Bedside and Verbal shift change report given to Otilio Marie RN (oncoming nurse) by Marshall Hood RN (offgoing nurse). Report included the following information SBAR, Kardex, Intake/Output, MAR, Recent Results and Cardiac Rhythm NSR.

## 2021-01-20 NOTE — PROGRESS NOTES
[facility] Hospitalist Group  Progress Note    Patient: Amara Maddox Age: 65 y.o. : 1950 MR#: [id number] SSN: [id number]  Date: 2021   ICU admission  DKA, elevated lipase, pH 6.99, hyperkalemia 8, BRENDA, lactic acidosis. Nephrology consulted in ER with recommendation to manage hyperkalemia and DKA. Insulin infusion. Monitor potassium and renal function. Diabetes management consulted for medication recommendations and education. BioFire negative and COVID-19 negative  Subjective:   Alert and oriented 2-3. No complaints at this time. Aware of why she is here in the hospital for \"high blood sugars. \"       Assessment/Plan:   1. DKA  2. Hyperkalemia. Improved    3. Acidemia with anion gap metabolic acidosis due to DKA  4. Lactic acidosis  5. BRENDA on CKD III with proteinuria due to DMT2. Etiology prerenal azotemia in setting of DKA. Improved. 6. Elevated lipase. 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 Leukocytosis   8. NSTEMI  9. HTN  10. DMT2. A1c 9.5  11. OA   12. Chronic hip pain, osteonecrosis of the left femoral head via CT A/P. Old L4 compression fracture. 13. Hypothyroid  14. History of colon cancer s/p chemo    Plan  1. Transfer to telemetry  2. Cardiology consulted from ICU for elevated troponin. Monitor troponin. 3. Continue PO azithromycin for leukocytosis. Antibiotic therapy initiated in ICU. Follow cultures. Monitor WBC  4. Nephrology recommendations IVF, monitor renal function and metabolic panel, hold losartan. Avoid nephrotoxins and IV contrast.   5. Monitor lipase  6. GI consult? Added to the treatment team. Please follow up in am. Cystic process of pancrease on CT A/P? Need for MRI? 7. Pepcid and Lovenox  8.  POC glucose, SSI, lantus    Additional Notes:       Case discussed with:  [x]Patient  []Family  [x]Nursing  []Case Management  DVT Prophylaxis:  []Lovenox  []Hep SQ  []SCDs  []Coumadin   []On Heparin gtt    Objective:   VS:   Visit Vitals  BP (!) 140/60 (BP 1 Location: Left arm, BP Patient Position: At rest)   Pulse 92   Temp 98.6 °F (37 °C)   Resp 20   Ht 5' 6\" (1.676 m)   Wt 82.1 kg (181 lb)   SpO2 98%   BMI 29.21 kg/m²      Tmax/24hrs: Temp (24hrs), Av.6 °F (37 °C), Min:97.8 °F (36.6 °C), Max:99.3 °F (37.4 °C)      Intake/Output Summary (Last 24 hours) at 2021 1657  Last data filed at 2021 1600  Gross per 24 hour   Intake 1918.22 ml   Output 2000 ml   Net -81.78 ml       General:  Alert, NAD  Cardiovascular:  RRR  Pulmonary:  LSC throughout; respiratory effort WNL  GI:  +BS in all four quadrants, soft, non-tender  Extremities:  No edema; 2+ dorsalis pedis pulses bilaterally  Neuro: alert and oriented 2-3        Labs:    Recent Results (from the past 24 hour(s))   GLUCOSE, POC    Collection Time: 21  5:54 PM   Result Value Ref Range    Glucose (POC) 515 (HH) 70 - 110 mg/dL   GLUCOSTABILIZER    Collection Time: 21  5:54 PM   Result Value Ref Range    Glucose 515 mg/dL    Insulin order 18.2 units/hour    Insulin adminstered 18.2 units/hour    Multiplier 0.040     Low target 140 mg/dL    High target 180 mg/dL    D50 order 0.0 ml    D50 administered 0.00 ml    Minutes until next BG 60 min    Order initials cmb     Administered initials cmb    RESPIRATORY VIRUS PANEL W/COVID-19, PCR    Collection Time: 21  5:59 PM    Specimen: Nasopharyngeal   Result Value Ref Range    Adenovirus Not detected NOTD      Coronavirus 229E Not detected NOTD      Coronavirus HKU1 Not detected NOTD      Coronavirus CVNL63 Not detected NOTD      Coronavirus OC43 Not detected NOTD      Metapneumovirus Not detected NOTD      Rhinovirus and Enterovirus Not detected NOTD      Influenza A Not detected NOTD      Influenza A, subtype H1 Not detected NOTD      Influenza A, subtype H3 Not detected NOTD      INFLUENZA A H1N1 PCR Not detected NOTD      Influenza B Not detected NOTD      Parainfluenza 1 Not detected NOTD      Parainfluenza 2 Not detected NOTD    Parainfluenza 3 Not detected NOTD      Parainfluenza virus 4 Not detected NOTD      RSV by PCR Not detected NOTD      B. parapertussis, PCR Not detected NOTD      Bordetella pertussis - PCR Not detected NOTD      Chlamydophila pneumoniae DNA, QL, PCR Not detected NOTD      Mycoplasma pneumoniae DNA, QL, PCR Not detected NOTD      SARS-CoV-2, PCR Not detected NOTD     GLUCOSE, POC    Collection Time: 01/19/21  6:31 PM   Result Value Ref Range    Glucose (POC) 415 (HH) 70 - 110 mg/dL   GLUCOSTABILIZER    Collection Time: 01/19/21  6:32 PM   Result Value Ref Range    Glucose 415 mg/dL    Insulin order 10.7 units/hour    Insulin adminstered 10.7 units/hour    Multiplier 0.030     Low target 140 mg/dL    High target 180 mg/dL    D50 order 0.0 ml    D50 administered 0.00 ml    Minutes until next BG 60 min    Order initials CEB     Administered initials CEB    GLUCOSE, POC    Collection Time: 01/19/21  7:36 PM   Result Value Ref Range    Glucose (POC) 313 (H) 70 - 110 mg/dL   GLUCOSTABILIZER    Collection Time: 01/19/21  7:36 PM   Result Value Ref Range    Glucose 313 mg/dL    Insulin order 5.1 units/hour    Insulin adminstered 5.1 units/hour    Multiplier 0.020     Low target 140 mg/dL    High target 180 mg/dL    D50 order 0.0 ml    D50 administered 0.00 ml    Minutes until next BG 60 min    Order initials ceb     Administered initials ceb    GLUCOSE, POC    Collection Time: 01/19/21  8:30 PM   Result Value Ref Range    Glucose (POC) 259 (H) 70 - 110 mg/dL   GLUCOSTABILIZER    Collection Time: 01/19/21  8:31 PM   Result Value Ref Range    Glucose 259 mg/dL    Insulin order 6.0 units/hour    Insulin adminstered 6.0 units/hour    Multiplier 0.030     Low target 140 mg/dL    High target 180 mg/dL    D50 order 0.0 ml    D50 administered 0.00 ml    Minutes until next BG 60 min    Order initials cm     Administered initials cm    POC CG8I ISTAT, VENOUS    Collection Time: 01/19/21  9:26 PM   Result Value Ref Range    Device: ROOM AIR      pH, venous (POC) 7.34 7.32 - 7.42      pCO2, venous (POC) 38.4 (L) 41 - 51 MMHG    pO2, venous (POC) 18 (L) 25 - 40 mmHg    HCO3, venous (POC) 20.8 (L) 23.0 - 28.0 MMOL/L    sO2, venous (POC) 24 (L) 65 - 88 %    Base deficit, venous (POC) 5 mmol/L    Allens test (POC) N/A      Site OTHER      Patient temp. 37.0      Specimen type (POC) VENOUS BLOOD      Sodium,  136 - 145 MMOL/L    Potassium, POC 5.1 3.5 - 5.5 MMOL/L    Glucose,  (H) 74 - 106 MG/DL    Calcium, ionized (POC) 1.20 1. 12 - 1.32 mmol/L    Hematocrit, POC 27 (L) 36 - 49 %    Hemoglobin, POC 9.2 (L) 12 - 16 G/DL    Performed by Montserrat Jones    Collection Time: 01/19/21  9:32 PM   Result Value Ref Range    Glucose 195 mg/dL    Insulin order 5.4 units/hour    Insulin adminstered 5.4 units/hour    Multiplier 0.040     Low target 140 mg/dL    High target 180 mg/dL    D50 order 0.0 ml    D50 administered 0.00 ml    Minutes until next BG 60 min    Order initials cm     Administered initials cm    LACTIC ACID    Collection Time: 01/19/21  9:56 PM   Result Value Ref Range    Lactic acid 2.7 (HH) 0.4 - 2.0 MMOL/L   PHOSPHORUS    Collection Time: 01/19/21  9:56 PM   Result Value Ref Range    Phosphorus 2.4 (L) 2.5 - 4.9 MG/DL   METABOLIC PANEL, BASIC    Collection Time: 01/19/21  9:56 PM   Result Value Ref Range    Sodium 140 136 - 145 mmol/L    Potassium 5.1 3.5 - 5.5 mmol/L    Chloride 108 100 - 111 mmol/L    CO2 20 (L) 21 - 32 mmol/L    Anion gap 12 3.0 - 18 mmol/L    Glucose 189 (H) 74 - 99 mg/dL    BUN 39 (H) 7.0 - 18 MG/DL    Creatinine 1.54 (H) 0.6 - 1.3 MG/DL    BUN/Creatinine ratio 25 (H) 12 - 20      GFR est AA 40 (L) >60 ml/min/1.73m2    GFR est non-AA 33 (L) >60 ml/min/1.73m2    Calcium 8.5 8.5 - 10.1 MG/DL   MAGNESIUM    Collection Time: 01/19/21  9:56 PM   Result Value Ref Range    Magnesium 2.1 1.6 - 2.6 mg/dL   GLUCOSE, POC    Collection Time: 01/19/21 10:32 PM   Result Value Ref Range    Glucose (POC) 147 (H) 70 - 110 mg/dL   GLUCOSTABILIZER    Collection Time: 01/19/21 10:34 PM   Result Value Ref Range    Glucose 147 mg/dL    Insulin order 3.5 units/hour    Insulin adminstered 3.5 units/hour    Multiplier 0.040     Low target 140 mg/dL    High target 180 mg/dL    D50 order 0.0 ml    D50 administered 0.00 ml    Minutes until next BG 60 min    Order initials cm     Administered initials cm    GLUCOSE, POC    Collection Time: 01/19/21 11:43 PM   Result Value Ref Range    Glucose (POC) 136 (H) 70 - 110 mg/dL   GLUCOSTABILIZER    Collection Time: 01/19/21 11:45 PM   Result Value Ref Range    Glucose 136 mg/dL    Insulin order 2.3 units/hour    Insulin adminstered 2.3 units/hour    Multiplier 0.030     Low target 140 mg/dL    High target 180 mg/dL    D50 order 0.0 ml    D50 administered 0.00 ml    Minutes until next BG 60 min    Order initials cm     Administered initials cm    GLUCOSE, POC    Collection Time: 01/20/21 12:45 AM   Result Value Ref Range    Glucose (POC) 108 70 - 110 mg/dL   GLUCOSTABILIZER    Collection Time: 01/20/21 12:46 AM   Result Value Ref Range    Glucose 108 mg/dL    Insulin order 1.0 units/hour    Insulin adminstered 1.0 units/hour    Multiplier 0.020     Low target 140 mg/dL    High target 180 mg/dL    D50 order 0.0 ml    D50 administered 0.00 ml    Minutes until next BG 60 min    Order initials cm     Administered initials cm    LACTIC ACID    Collection Time: 01/20/21  1:50 AM   Result Value Ref Range    Lactic acid 0.8 0.4 - 2.0 MMOL/L   PHOSPHORUS    Collection Time: 01/20/21  1:50 AM   Result Value Ref Range    Phosphorus 2.2 (L) 2.5 - 4.9 MG/DL   METABOLIC PANEL, BASIC    Collection Time: 01/20/21  1:50 AM   Result Value Ref Range    Sodium 138 136 - 145 mmol/L    Potassium 5.6 (H) 3.5 - 5.5 mmol/L    Chloride 107 100 - 111 mmol/L    CO2 22 21 - 32 mmol/L    Anion gap 9 3.0 - 18 mmol/L    Glucose 234 (H) 74 - 99 mg/dL    BUN 33 (H) 7.0 - 18 MG/DL    Creatinine 1.37 (H) 0.6 - 1.3 MG/DL BUN/Creatinine ratio 24 (H) 12 - 20      GFR est AA 46 (L) >60 ml/min/1.73m2    GFR est non-AA 38 (L) >60 ml/min/1.73m2    Calcium 8.2 (L) 8.5 - 10.1 MG/DL   MAGNESIUM    Collection Time: 01/20/21  1:50 AM   Result Value Ref Range    Magnesium 1.9 1.6 - 2.6 mg/dL   GLUCOSE, POC    Collection Time: 01/20/21  1:51 AM   Result Value Ref Range    Glucose (POC) 147 (H) 70 - 110 mg/dL   GLUCOSTABILIZER    Collection Time: 01/20/21  1:53 AM   Result Value Ref Range    Glucose 147 mg/dL    Insulin order 1.7 units/hour    Insulin adminstered 1.7 units/hour    Multiplier 0.020     Low target 140 mg/dL    High target 180 mg/dL    D50 order 0.0 ml    D50 administered 0.00 ml    Minutes until next BG 60 min    Order initials cm     Administered initials cm    GLUCOSE, POC    Collection Time: 01/20/21  2:55 AM   Result Value Ref Range    Glucose (POC) 184 (H) 70 - 110 mg/dL   GLUCOSTABILIZER    Collection Time: 01/20/21  2:56 AM   Result Value Ref Range    Glucose 184 mg/dL    Insulin order 3.7 units/hour    Insulin adminstered 3.7 units/hour    Multiplier 0.030     Low target 140 mg/dL    High target 180 mg/dL    D50 order 0.0 ml    D50 administered 0.00 ml    Minutes until next BG 60 min    Order initials cm     Administered initials cm    GLUCOSE, POC    Collection Time: 01/20/21  4:18 AM   Result Value Ref Range    Glucose (POC) 145 (H) 70 - 110 mg/dL   GLUCOSTABILIZER    Collection Time: 01/20/21  4:18 AM   Result Value Ref Range    Glucose 145 mg/dL    Insulin order 2.6 units/hour    Insulin adminstered 2.6 units/hour    Multiplier 0.030     Low target 140 mg/dL    High target 180 mg/dL    D50 order 0.0 ml    D50 administered 0.00 ml    Minutes until next BG 60 min    Order initials cm     Administered initials cm    GLUCOSE, POC    Collection Time: 01/20/21  5:48 AM   Result Value Ref Range    Glucose (POC) 193 (H) 70 - 110 mg/dL   GLUCOSTABILIZER    Collection Time: 01/20/21  5:48 AM   Result Value Ref Range    Glucose 193 mg/dL    Insulin order 5.3 units/hour    Insulin adminstered 5.3 units/hour    Multiplier 0.040     Low target 140 mg/dL    High target 180 mg/dL    D50 order 0.0 ml    D50 administered 0.00 ml    Minutes until next BG 60 min    Order initials cm     Administered initials cm    PHOSPHORUS    Collection Time: 01/20/21  6:15 AM   Result Value Ref Range    Phosphorus 2.4 (L) 2.5 - 4.9 MG/DL   METABOLIC PANEL, BASIC    Collection Time: 01/20/21  6:15 AM   Result Value Ref Range    Sodium 136 136 - 145 mmol/L    Potassium 5.9 (H) 3.5 - 5.5 mmol/L    Chloride 106 100 - 111 mmol/L    CO2 21 21 - 32 mmol/L    Anion gap 9 3.0 - 18 mmol/L    Glucose 147 (H) 74 - 99 mg/dL    BUN 28 (H) 7.0 - 18 MG/DL    Creatinine 1.23 0.6 - 1.3 MG/DL    BUN/Creatinine ratio 23 (H) 12 - 20      GFR est AA 52 (L) >60 ml/min/1.73m2    GFR est non-AA 43 (L) >60 ml/min/1.73m2    Calcium 8.1 (L) 8.5 - 10.1 MG/DL   MAGNESIUM    Collection Time: 01/20/21  6:15 AM   Result Value Ref Range    Magnesium 2.4 1.6 - 2.6 mg/dL   GLUCOSE, POC    Collection Time: 01/20/21  6:50 AM   Result Value Ref Range    Glucose (POC) 134 (H) 70 - 110 mg/dL   GLUCOSTABILIZER    Collection Time: 01/20/21  6:51 AM   Result Value Ref Range    Glucose 134 mg/dL    Insulin order 2.2 units/hour    Insulin adminstered 2.2 units/hour    Multiplier 0.030     Low target 140 mg/dL    High target 180 mg/dL    D50 order 0.0 ml    D50 administered 0.00 ml    Minutes until next BG 60 min    Order initials cm     Administered initials cm    CBC WITH AUTOMATED DIFF    Collection Time: 01/20/21  7:22 AM   Result Value Ref Range    WBC 17.2 (H) 4.6 - 13.2 K/uL    RBC 3.42 (L) 4.20 - 5.30 M/uL    HGB 9.2 (L) 12.0 - 16.0 g/dL    HCT 27.7 (L) 35.0 - 45.0 %    MCV 81.0 74.0 - 97.0 FL    MCH 26.9 24.0 - 34.0 PG    MCHC 33.2 31.0 - 37.0 g/dL    RDW 14.8 (H) 11.6 - 14.5 %    PLATELET 830 100 - 905 K/uL    MPV 9.5 9.2 - 11.8 FL    NEUTROPHILS 79 (H) 42 - 75 %    LYMPHOCYTES 10 (L) 20 - 51 % MONOCYTES 11 (H) 2 - 9 %    EOSINOPHILS 0 0 - 5 %    BASOPHILS 0 0 - 3 %    ABS. NEUTROPHILS 13.6 (H) 1.8 - 8.0 K/UL    ABS. LYMPHOCYTES 1.7 0.8 - 3.5 K/UL    ABS. MONOCYTES 1.9 (H) 0 - 1.0 K/UL    ABS. EOSINOPHILS 0.0 0.0 - 0.4 K/UL    ABS.  BASOPHILS 0.0 0.0 - 0.06 K/UL    DF MANUAL      PLATELET COMMENTS ADEQUATE PLATELETS      RBC COMMENTS ANISOCYTOSIS  1+        RBC COMMENTS HYPOCHROMIA  2+        RBC COMMENTS ELLIPTOCYTES 1+  TEARDROP CELLS  1+      PHOSPHORUS    Collection Time: 01/20/21  7:22 AM   Result Value Ref Range    Phosphorus 2.0 (L) 2.5 - 4.9 MG/DL   METABOLIC PANEL, BASIC    Collection Time: 01/20/21  7:22 AM   Result Value Ref Range    Sodium 138 136 - 145 mmol/L    Potassium 4.1 3.5 - 5.5 mmol/L    Chloride 106 100 - 111 mmol/L    CO2 24 21 - 32 mmol/L    Anion gap 8 3.0 - 18 mmol/L    Glucose 115 (H) 74 - 99 mg/dL    BUN 28 (H) 7.0 - 18 MG/DL    Creatinine 1.21 0.6 - 1.3 MG/DL    BUN/Creatinine ratio 23 (H) 12 - 20      GFR est AA 53 (L) >60 ml/min/1.73m2    GFR est non-AA 44 (L) >60 ml/min/1.73m2    Calcium 8.5 8.5 - 10.1 MG/DL   MAGNESIUM    Collection Time: 01/20/21  7:22 AM   Result Value Ref Range    Magnesium 2.4 1.6 - 2.6 mg/dL   HEMOGLOBIN A1C WITH EAG    Collection Time: 01/20/21  7:22 AM   Result Value Ref Range    Hemoglobin A1c 9.5 (H) 4.2 - 5.6 %    Est. average glucose 226 mg/dL   GLUCOSE, POC    Collection Time: 01/20/21  7:59 AM   Result Value Ref Range    Glucose (POC) 137 (H) 70 - 110 mg/dL   GLUCOSTABILIZER    Collection Time: 01/20/21  8:00 AM   Result Value Ref Range    Glucose 137 mg/dL    Insulin order 1.5 units/hour    Insulin adminstered 1.5 units/hour    Multiplier 0.020     Low target 140 mg/dL    High target 180 mg/dL    D50 order 0.0 ml    D50 administered 0.00 ml    Minutes until next BG 60 min    Order initials ceb     Administered initials ceb    GLUCOSE, POC    Collection Time: 01/20/21  8:55 AM   Result Value Ref Range    Glucose (POC) 126 (H) 70 - 110 mg/dL GLUCOSTABILIZER    Collection Time: 01/20/21  8:56 AM   Result Value Ref Range    Glucose 126 mg/dL    Insulin order 0.7 units/hour    Insulin adminstered 0.7 units/hour    Multiplier 0.010     Low target 140 mg/dL    High target 180 mg/dL    D50 order 0.0 ml    D50 administered 0.00 ml    Minutes until next BG 60 min    Order initials ceb     Administered initials ceb    GLUCOSE, POC    Collection Time: 01/20/21 10:00 AM   Result Value Ref Range    Glucose (POC) 178 (H) 70 - 110 mg/dL   GLUCOSTABILIZER    Collection Time: 01/20/21 10:00 AM   Result Value Ref Range    Glucose 178 mg/dL    Insulin order 1.2 units/hour    Insulin adminstered 1.2 units/hour    Multiplier 0.010     Low target 140 mg/dL    High target 180 mg/dL    D50 order 0.0 ml    D50 administered 0.00 ml    Minutes until next BG 60 min    Order initials ah     Administered initials ah    GLUCOSE, POC    Collection Time: 01/20/21 11:28 AM   Result Value Ref Range    Glucose (POC) 228 (H) 70 - 110 mg/dL   GLUCOSTABILIZER    Collection Time: 01/20/21 11:28 AM   Result Value Ref Range    Glucose 228 mg/dL    Insulin order 3.4 units/hour    Insulin adminstered 3.4 units/hour    Multiplier 0.020     Low target 140 mg/dL    High target 180 mg/dL    D50 order 0.0 ml    D50 administered 0.00 ml    Minutes until next BG 60 min    Order initials ceb     Administered initials ceb    GLUCOSE, POC    Collection Time: 01/20/21 12:27 PM   Result Value Ref Range    Glucose (POC) 184 (H) 70 - 110 mg/dL   GLUCOSTABILIZER    Collection Time: 01/20/21 12:33 PM   Result Value Ref Range    Glucose 184 mg/dL    Insulin order 3.7 units/hour    Insulin adminstered 3.7 units/hour    Multiplier 0.030     Low target 140 mg/dL    High target 180 mg/dL    D50 order 0.0 ml    D50 administered 0.00 ml    Minutes until next BG 60 min    Order initials ceb     Administered initials ceb    GLUCOSE, POC    Collection Time: 01/20/21  1:29 PM   Result Value Ref Range    Glucose (POC) 294 (H) 70 - 110 mg/dL   GLUCOSTABILIZER    Collection Time: 01/20/21  1:30 PM   Result Value Ref Range    Glucose 294 mg/dL    Insulin order 9.4 units/hour    Insulin adminstered 9.4 units/hour    Multiplier 0.040     Low target 140 mg/dL    High target 180 mg/dL    D50 order 0.0 ml    D50 administered 0.00 ml    Minutes until next BG 60 min    Order initials ceb     Administered initials ceb    CARDIAC PANEL,(CK, CKMB & TROPONIN)    Collection Time: 01/20/21  2:47 PM   Result Value Ref Range    CK - MB <1.0 <3.6 ng/ml    CK-MB Index  0.0 - 4.0 %     CALCULATION NOT PERFORMED WHEN RESULT IS BELOW LINEAR LIMIT    CK 96 26 - 192 U/L    Troponin-I, QT 0.15 (H) 0.0 - 0.045 NG/ML   PHOSPHORUS    Collection Time: 01/20/21  2:47 PM   Result Value Ref Range    Phosphorus 2.5 2.5 - 4.9 MG/DL   METABOLIC PANEL, BASIC    Collection Time: 01/20/21  2:47 PM   Result Value Ref Range    Sodium 142 136 - 145 mmol/L    Potassium 3.9 3.5 - 5.5 mmol/L    Chloride 108 100 - 111 mmol/L    CO2 24 21 - 32 mmol/L    Anion gap 10 3.0 - 18 mmol/L    Glucose 102 (H) 74 - 99 mg/dL    BUN 22 (H) 7.0 - 18 MG/DL    Creatinine 1.27 0.6 - 1.3 MG/DL    BUN/Creatinine ratio 17 12 - 20      GFR est AA 50 (L) >60 ml/min/1.73m2    GFR est non-AA 41 (L) >60 ml/min/1.73m2    Calcium 8.2 (L) 8.5 - 10.1 MG/DL   MAGNESIUM    Collection Time: 01/20/21  2:47 PM   Result Value Ref Range    Magnesium 2.0 1.6 - 2.6 mg/dL   GLUCOSE, POC    Collection Time: 01/20/21  4:48 PM   Result Value Ref Range    Glucose (POC) 236 (H) 70 - 110 mg/dL       Signed By: Josh Ruff NP     January 20, 2021

## 2021-01-21 ENCOUNTER — APPOINTMENT (OUTPATIENT)
Dept: NON INVASIVE DIAGNOSTICS | Age: 71
DRG: 637 | End: 2021-01-21
Attending: NURSE PRACTITIONER
Payer: MEDICARE

## 2021-01-21 LAB
ANION GAP SERPL CALC-SCNC: 8 MMOL/L (ref 3–18)
ATRIAL RATE: 89 BPM
BASOPHILS # BLD: 0 K/UL (ref 0–0.1)
BASOPHILS NFR BLD: 0 % (ref 0–2)
BUN SERPL-MCNC: 13 MG/DL (ref 7–18)
BUN/CREAT SERPL: 15 (ref 12–20)
CALCIUM SERPL-MCNC: 8.4 MG/DL (ref 8.5–10.1)
CALCULATED P AXIS, ECG09: 67 DEGREES
CALCULATED R AXIS, ECG10: 57 DEGREES
CALCULATED T AXIS, ECG11: 55 DEGREES
CHLORIDE SERPL-SCNC: 106 MMOL/L (ref 100–111)
CK MB CFR SERPL CALC: 1.1 % (ref 0–4)
CK MB CFR SERPL CALC: ABNORMAL % (ref 0–4)
CK MB CFR SERPL CALC: ABNORMAL % (ref 0–4)
CK MB SERPL-MCNC: 1.1 NG/ML (ref 5–25)
CK MB SERPL-MCNC: <1 NG/ML (ref 5–25)
CK MB SERPL-MCNC: <1 NG/ML (ref 5–25)
CK SERPL-CCNC: 84 U/L (ref 26–192)
CK SERPL-CCNC: 94 U/L (ref 26–192)
CK SERPL-CCNC: 99 U/L (ref 26–192)
CO2 SERPL-SCNC: 24 MMOL/L (ref 21–32)
CREAT SERPL-MCNC: 0.89 MG/DL (ref 0.6–1.3)
DIAGNOSIS, 93000: NORMAL
DIFFERENTIAL METHOD BLD: ABNORMAL
EOSINOPHIL # BLD: 0 K/UL (ref 0–0.4)
EOSINOPHIL NFR BLD: 0 % (ref 0–5)
ERYTHROCYTE [DISTWIDTH] IN BLOOD BY AUTOMATED COUNT: 15.5 % (ref 11.6–14.5)
GLUCOSE BLD STRIP.AUTO-MCNC: 241 MG/DL (ref 70–110)
GLUCOSE BLD STRIP.AUTO-MCNC: 297 MG/DL (ref 70–110)
GLUCOSE BLD STRIP.AUTO-MCNC: 334 MG/DL (ref 70–110)
GLUCOSE BLD STRIP.AUTO-MCNC: 400 MG/DL (ref 70–110)
GLUCOSE BLD STRIP.AUTO-MCNC: 405 MG/DL (ref 70–110)
GLUCOSE BLD STRIP.AUTO-MCNC: 84 MG/DL (ref 70–110)
GLUCOSE SERPL-MCNC: 180 MG/DL (ref 74–99)
HCT VFR BLD AUTO: 28.2 % (ref 35–45)
HGB BLD-MCNC: 9.5 G/DL (ref 12–16)
LIPASE SERPL-CCNC: 664 U/L (ref 73–393)
LYMPHOCYTES # BLD: 1.4 K/UL (ref 0.9–3.6)
LYMPHOCYTES NFR BLD: 16 % (ref 21–52)
MAGNESIUM SERPL-MCNC: 1.8 MG/DL (ref 1.6–2.6)
MCH RBC QN AUTO: 27.9 PG (ref 24–34)
MCHC RBC AUTO-ENTMCNC: 33.7 G/DL (ref 31–37)
MCV RBC AUTO: 82.9 FL (ref 74–97)
MONOCYTES # BLD: 0.7 K/UL (ref 0.05–1.2)
MONOCYTES NFR BLD: 9 % (ref 3–10)
NEUTS SEG # BLD: 6.5 K/UL (ref 1.8–8)
NEUTS SEG NFR BLD: 75 % (ref 40–73)
P-R INTERVAL, ECG05: 134 MS
PHOSPHATE SERPL-MCNC: 2 MG/DL (ref 2.5–4.9)
PLATELET # BLD AUTO: 272 K/UL (ref 135–420)
PMV BLD AUTO: 9.2 FL (ref 9.2–11.8)
POTASSIUM SERPL-SCNC: 3.8 MMOL/L (ref 3.5–5.5)
Q-T INTERVAL, ECG07: 380 MS
QRS DURATION, ECG06: 80 MS
QTC CALCULATION (BEZET), ECG08: 462 MS
RBC # BLD AUTO: 3.4 M/UL (ref 4.2–5.3)
SODIUM SERPL-SCNC: 138 MMOL/L (ref 136–145)
TROPONIN I SERPL-MCNC: 0.07 NG/ML (ref 0–0.04)
TROPONIN I SERPL-MCNC: 0.08 NG/ML (ref 0–0.04)
TROPONIN I SERPL-MCNC: 0.11 NG/ML (ref 0–0.04)
VENTRICULAR RATE, ECG03: 89 BPM
WBC # BLD AUTO: 8.7 K/UL (ref 4.6–13.2)

## 2021-01-21 PROCEDURE — 93306 TTE W/DOPPLER COMPLETE: CPT | Performed by: INTERNAL MEDICINE

## 2021-01-21 PROCEDURE — 93005 ELECTROCARDIOGRAM TRACING: CPT

## 2021-01-21 PROCEDURE — 80048 BASIC METABOLIC PNL TOTAL CA: CPT

## 2021-01-21 PROCEDURE — 99223 1ST HOSP IP/OBS HIGH 75: CPT | Performed by: INTERNAL MEDICINE

## 2021-01-21 PROCEDURE — 65270000029 HC RM PRIVATE

## 2021-01-21 PROCEDURE — 82550 ASSAY OF CK (CPK): CPT

## 2021-01-21 PROCEDURE — 77030038269 HC DRN EXT URIN PURWCK BARD -A

## 2021-01-21 PROCEDURE — 85025 COMPLETE CBC W/AUTO DIFF WBC: CPT

## 2021-01-21 PROCEDURE — 74011250637 HC RX REV CODE- 250/637: Performed by: NURSE PRACTITIONER

## 2021-01-21 PROCEDURE — 99232 SBSQ HOSP IP/OBS MODERATE 35: CPT | Performed by: INTERNAL MEDICINE

## 2021-01-21 PROCEDURE — 74011250636 HC RX REV CODE- 250/636: Performed by: INTERNAL MEDICINE

## 2021-01-21 PROCEDURE — 82962 GLUCOSE BLOOD TEST: CPT

## 2021-01-21 PROCEDURE — 93306 TTE W/DOPPLER COMPLETE: CPT

## 2021-01-21 PROCEDURE — 36415 COLL VENOUS BLD VENIPUNCTURE: CPT

## 2021-01-21 PROCEDURE — 83690 ASSAY OF LIPASE: CPT

## 2021-01-21 PROCEDURE — 74011250637 HC RX REV CODE- 250/637: Performed by: STUDENT IN AN ORGANIZED HEALTH CARE EDUCATION/TRAINING PROGRAM

## 2021-01-21 PROCEDURE — 74011636637 HC RX REV CODE- 636/637: Performed by: PHYSICIAN ASSISTANT

## 2021-01-21 PROCEDURE — 83735 ASSAY OF MAGNESIUM: CPT

## 2021-01-21 PROCEDURE — 2709999900 HC NON-CHARGEABLE SUPPLY

## 2021-01-21 PROCEDURE — 84100 ASSAY OF PHOSPHORUS: CPT

## 2021-01-21 PROCEDURE — 74011636637 HC RX REV CODE- 636/637: Performed by: INTERNAL MEDICINE

## 2021-01-21 RX ORDER — ACETAMINOPHEN 500 MG
500 TABLET ORAL
Status: DISCONTINUED | OUTPATIENT
Start: 2021-01-21 | End: 2021-01-24 | Stop reason: HOSPADM

## 2021-01-21 RX ADMIN — INSULIN GLARGINE 30 UNITS: 100 INJECTION, SOLUTION SUBCUTANEOUS at 09:07

## 2021-01-21 RX ADMIN — ENOXAPARIN SODIUM 40 MG: 40 INJECTION SUBCUTANEOUS at 09:07

## 2021-01-21 RX ADMIN — LEVOTHYROXINE SODIUM 100 MCG: 100 TABLET ORAL at 09:07

## 2021-01-21 RX ADMIN — Medication 10 ML: at 14:00

## 2021-01-21 RX ADMIN — AZITHROMYCIN MONOHYDRATE 500 MG: 250 TABLET ORAL at 09:07

## 2021-01-21 RX ADMIN — Medication 10 ML: at 06:17

## 2021-01-21 RX ADMIN — INSULIN LISPRO 15 UNITS: 100 INJECTION, SOLUTION INTRAVENOUS; SUBCUTANEOUS at 11:54

## 2021-01-21 RX ADMIN — AMLODIPINE BESYLATE 5 MG: 5 TABLET ORAL at 09:07

## 2021-01-21 RX ADMIN — INSULIN LISPRO 12 UNITS: 100 INJECTION, SOLUTION INTRAVENOUS; SUBCUTANEOUS at 23:21

## 2021-01-21 RX ADMIN — FAMOTIDINE 20 MG: 20 TABLET, FILM COATED ORAL at 09:07

## 2021-01-21 RX ADMIN — Medication 10 ML: at 23:20

## 2021-01-21 NOTE — PROGRESS NOTES
Informed Dr. Vilma Ernandez pt will need pt/ot consult.       MELISSA BritoN RN  Care Management  Pager: [phone]

## 2021-01-21 NOTE — PROGRESS NOTES
Received report from GIOVANNA Sol. Assuming care of patient. Made aware during report that patient is transferring to another unit in the next half hour.

## 2021-01-21 NOTE — PROGRESS NOTES
conducted an initial consultation and Spiritual Assessment for July Nugent, who is a 65 y.o.,female. Patient's Primary Language is: [place]. According to the patient's EMR Nondenominational Affiliation is: Clay Mcknight.     The reason the Patient came to the hospital is:   Patient Active Problem List    Diagnosis Date Noted    Hyperkalemia 01/19/2021    [facility] 01/19/[facility]21        The  provided the following Interventions:  Initiated a relationship of care and support. The following outcomes where achieved:  Patient expressed gratitude for 's visit. Assessment:  There are no spiritual or Moravian issues which require intervention at this time. Plan:  Chaplains will continue to follow and will provide pastoral care on an as needed/requested basis.     [facility]   [phone]

## 2021-01-21 NOTE — PROGRESS NOTES
Notified by MRI dept that patient is unable to have MRI today due to the machine issues.  Since order is not stat, patient will have MRI completed tomorrow when machine is functional.

## 2021-01-21 NOTE — DIABETES MGMT
Diabetes Patient/Family Education Record  Factors That  May Influence Patients Ability  to Learn or  Comply with Recommendations   []   Language barrier    []   Cultural needs   []   Motivation    []   Cognitive limitation    []   Physical   [x]   Education    []   Physiological factors   []   Hearing/vision/speaking impairment   []   Episcopal beliefs    []   Financial factors   []  Other:   []  No factors identified at this time. Person Instructed:   [x]   Patient   []   Family   []  Other     Preference for Learning:   [x]   Verbal   []   Written   []  Demonstration     Level of Comprehension & Competence:    []  Good                                      [x] Fair                                     []  Poor                             []  Needs Reinforcement   [x]  Teachback completed    Education Component:   [x]  Medication management, including how to administer insulin (if appropriate) and potential medication interactions   · At first states she takes lantus 10 units at bedtime, but later said her doctor changed it to 2 units at bedtime. · She also takes regular insulin 10 units with meals. · Uses an insulin pen for her lantus, and vial/syringe method for her mealtime insulin. · Reviewed hospital insulin protocol with pt and pt states understanding. []  Nutritional management -obtain usual meal pattern   []  Exercise   [x]  Signs, symptoms, and treatment of hyperglycemia and hypoglycemia  · Complains of morning lows \"65\" mg/dl. Encouraged pt to eat a bedtime snack. She says she informed her physician and he adjusted her bedtime insulin. [x] Prevention, recognition and treatment of hyperglycemia and hypoglycemia   [x]  Importance of blood glucose monitoring and how to obtain a blood glucose meter   · Has a glucometer and supplies at home.   · Checks her BG 4-5 times daily   []  Instruction on use of the blood glucose meter   [x]  Discuss the importance of HbA1C monitoring · A1C of 9.5% equivalent to an average blood glucose of 226 mg/dl over the past 2-3 months. · Pt states it was 8 something at her last doctors visit. [x]  Sick day guidelines   [x]  Proper use and disposal of lancets, needles, syringes or insulin pens (if appropriate)   [x]  Potential long-term complications (retinopathy, kidney disease, neuropathy, foot care)   [x] Information about whom to contact in case of emergency or for more information    [x]  Goal:  Patient/family will demonstrate understanding of Diabetes Self Management Skills by: (date) __1/26/2021_____  Plan for post-discharge education or self-management support:    [] Outpatient class schedule provided            [] Patient Declined    [] Scheduled for outpatient classes (date) _______  Verify:  Does patient understand how diabetes medications work? yes  Does patient know what their most recent A1c is? Yes. Reviewed with pt. Does patient monitor glucose at home? Yes. Does patient have difficulty obtaining diabetes medications or testing supplies? No issues voiced.

## 2021-01-21 NOTE — PROGRESS NOTES
[facility] Hospitalist Group  Progress Note    Patient: Trey Johnson Age: 65 y.o. : 1950 MR#: [id number] SSN: [id number]  Date: 2021   ICU admission  DKA, elevated lipase, pH 6.99, hyperkalemia 8, BRENDA, lactic acidosis. Nephrology consulted in ER with recommendation to manage hyperkalemia and DKA. Insulin infusion. Monitor potassium and renal function. Diabetes management consulted for medication recommendations and education. BioFire negative and COVID-19 negative  Subjective:   Alert and oriented 2-3. No complaints at this time. Aware of why she is here in the hospital for \"high blood sugars. \"       Assessment/Plan:   1. DKA  2. Hyperkalemia. Improved    3. Acidemia with anion gap metabolic acidosis due to DKA  4. Lactic acidosis  5. BRENDA on CKD III with proteinuria due to DMT2. Etiology prerenal azotemia in setting of DKA. Improved. 6. Elevated lipase. 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 Leukocytosis   8. NSTEMI  9. HTN  10. DMT2. A1c 9.5  11. OA   12. Chronic hip pain, osteonecrosis of the left femoral head via CT A/P. Old L4 compression fracture. 13. Hypothyroid  14.  History of colon cancer s/p chemo    Plan  Patient since transfer out of ICU is doing very well except for chronic pain of left hip area which she needs a surgery according to her for arthritis    BRENDA prerenal with hyperkalemia resolved continue to monitor follow renal recommendation    Pancreatic cyst with elevation of lipase-seen by gastroenterology probably need more evaluation    DNR and DNI        Case discussed with:  [x]Patient  []Family  [x]Nursing  []Case Management  DVT Prophylaxis:  []Lovenox  []Hep SQ  []SCDs  []Coumadin   []On Heparin gtt    Objective:   VS:   Visit Vitals  /78   Pulse 91   Temp 98.4 °F (36.9 °C)   Resp 20   Ht 5' 6\" (1.676 m)   Wt 82.1 kg (181 lb)   SpO2 98%   BMI 29.21 kg/m²      Tmax/24hrs: Temp (24hrs), Av.2 °F (36.8 °C), Min:96.8 °F (36 °C), Max:99.3 °F (37.4 °C)      Intake/Output Summary (Last 24 hours) at 1/21/2021 1734  Last data filed at 1/21/2021 0617  Gross per 24 hour   Intake --   Output 1200 ml   Net -1200 ml       General:  Alert, NAD  Cardiovascular:  RRR  Pulmonary:  LSC throughout; respiratory effort WNL  GI:  +BS in all four quadrants, soft, non-tender  Extremities:  No edema; 2+ dorsalis pedis pulses bilaterally  Neuro: alert and oriented 2-3        Labs:    Recent Results (from the past 24 hour(s))   PHOSPHORUS    Collection Time: 01/20/21  8:07 PM   Result Value Ref Range    Phosphorus 2.2 (L) 2.5 - 4.9 MG/DL   LIPASE    Collection Time: 01/20/21  8:27 PM   Result Value Ref Range    Lipase 1,118 (H) 73 - 393 U/L   GLUCOSE, POC    Collection Time: 01/20/21 11:22 PM   Result Value Ref Range    Glucose (POC) 197 (H) 70 - 110 mg/dL   CARDIAC PANEL,(CK, CKMB & TROPONIN)    Collection Time: 01/20/21 11:37 PM   Result Value Ref Range    CK - MB 1.1 <3.6 ng/ml    CK-MB Index 1.1 0.0 - 4.0 %    CK 99 26 - 192 U/L    Troponin-I, QT 0.11 (H) 0.0 - 0.045 NG/ML   CBC WITH AUTOMATED DIFF    Collection Time: 01/21/21  5:12 AM   Result Value Ref Range    WBC 8.7 4.6 - 13.2 K/uL    RBC 3.40 (L) 4.20 - 5.30 M/uL    HGB 9.5 (L) 12.0 - 16.0 g/dL    HCT 28.2 (L) 35.0 - 45.0 %    MCV 82.9 74.0 - 97.0 FL    MCH 27.9 24.0 - 34.0 PG    MCHC 33.7 31.0 - 37.0 g/dL    RDW 15.5 (H) 11.6 - 14.5 %    PLATELET 956 279 - 446 K/uL    MPV 9.2 9.2 - 11.8 FL    NEUTROPHILS 75 (H) 40 - 73 %    LYMPHOCYTES 16 (L) 21 - 52 %    MONOCYTES 9 3 - 10 %    EOSINOPHILS 0 0 - 5 %    BASOPHILS 0 0 - 2 %    ABS. NEUTROPHILS 6.5 1.8 - 8.0 K/UL    ABS. LYMPHOCYTES 1.4 0.9 - 3.6 K/UL    ABS. MONOCYTES 0.7 0.05 - 1.2 K/UL    ABS. EOSINOPHILS 0.0 0.0 - 0.4 K/UL    ABS.  BASOPHILS 0.0 0.0 - 0.1 K/UL    DF AUTOMATED     METABOLIC PANEL, BASIC    Collection Time: 01/21/21  5:12 AM   Result Value Ref Range    Sodium 138 136 - 145 mmol/L    Potassium 3.8 3.5 - 5.5 mmol/L    Chloride 106 100 - 111 mmol/L    CO2 24 21 - 32 mmol/L    Anion gap 8 3.0 - 18 mmol/L    Glucose 180 (H) 74 - 99 mg/dL    BUN 13 7.0 - 18 MG/DL    Creatinine 0.89 0.6 - 1.3 MG/DL    BUN/Creatinine ratio 15 12 - 20      GFR est AA >60 >60 ml/min/1.73m2    GFR est non-AA >60 >60 ml/min/1.73m2    Calcium 8.4 (L) 8.5 - 10.1 MG/DL   MAGNESIUM    Collection Time: 01/21/21  5:12 AM   Result Value Ref Range    Magnesium 1.8 1.6 - 2.6 mg/dL   PHOSPHORUS    Collection Time: 01/21/21  5:12 AM   Result Value Ref Range    Phosphorus 2.0 (L) 2.5 - 4.9 MG/DL   LIPASE    Collection Time: 01/21/21  5:12 AM   Result Value Ref Range    Lipase 664 (H) 73 - 393 U/L   CARDIAC PANEL,(CK, CKMB & TROPONIN)    Collection Time: 01/21/21  5:12 AM   Result Value Ref Range    CK - MB <1.0 <3.6 ng/ml    CK-MB Index  0.0 - 4.0 %     CALCULATION NOT PERFORMED WHEN RESULT IS BELOW LINEAR LIMIT    CK 94 26 - 192 U/L    Troponin-I, QT 0.08 (H) 0.0 - 0.045 NG/ML   GLUCOSE, POC    Collection Time: 01/21/21  8:33 AM   Result Value Ref Range    Glucose (POC) 297 (H) 70 - 110 mg/dL   CARDIAC PANEL,(CK, CKMB & TROPONIN)    Collection Time: 01/21/21 10:24 AM   Result Value Ref Range    CK - MB <1.0 <3.6 ng/ml    CK-MB Index  0.0 - 4.0 %     CALCULATION NOT PERFORMED WHEN RESULT IS BELOW LINEAR LIMIT    CK 84 26 - 192 U/L    Troponin-I, QT 0.07 (H) 0.0 - 0.045 NG/ML   GLUCOSE, POC    Collection Time: 01/21/21 11:31 AM   Result Value Ref Range    Glucose (POC) 405 (HH) 70 - 110 mg/dL   GLUCOSE, POC    Collection Time: 01/21/21 11:33 AM   Result Value Ref Range    Glucose (POC) 400 (H) 70 - 110 mg/dL   GLUCOSE, POC    Collection Time: 01/21/21  1:33 PM   Result Value Ref Range    Glucose (POC) 241 (H) 70 - 110 mg/dL   ECHO ADULT COMPLETE    Collection Time: 01/21/21  3:15 PM   Result Value Ref Range    IVSd 1.04 (A) 0.6 - 0.9 cm    LVIDd 4.37 3.9 - 5.3 cm    LVIDs 2.90 cm    LVOT d 2.12 cm    LVPWd 0.95 (A) 0.6 - 0.9 cm    LA Volume 45.51 22 - 52 mL    LA Area 4C 14.45 cm2    LA Vol 2C 49.87 22 - 52 mL    LA Vol 4C 29.06 22 - 52 mL    Ao Root D 3.14 cm    LV Mass .2 67 - 162 g    LV Mass AL Index 75.7 43 - 95 g/m2    LA Vol Index 23.74 16 - 28 ml/m2    LA Vol Index 26.01 16 - 28 ml/m2    LA Vol Index 15.16 16 - 28 ml/m2   GLUCOSE, POC    Collection Time: 01/21/21  3:28 PM   Result Value Ref Range    Glucose (POC) 84 70 - 110 mg/dL   EKG, 12 LEAD, SUBSEQUENT    Collection Time: 01/21/21  3:34 PM   Result Value Ref Range    Ventricular Rate 89 BPM    Atrial Rate 89 BPM    P-R Interval 134 ms    QRS Duration 80 ms    Q-T Interval 380 ms    QTC Calculation (Bezet) 462 ms    Calculated P Axis 67 degrees    Calculated R Axis 57 degrees    Calculated T Axis 55 degrees    Diagnosis       Normal sinus rhythm  Normal ECG  When compared with ECG of 19-JAN-2021 15:13,  Nonspecific T wave abnormality no longer evident in Lateral leads         Signed By: Tony Stringer MD     January 21, 2021

## 2021-01-21 NOTE — PROGRESS NOTES
In Patient Progress note      Admit Date: 1/19/2021    Impression:     #1 Acute kidney injury on chronic kidney disease stage III (baseline around 1.1) etiology prerenal azotemia  Resolved   #2 Severe symptomatic hyperkalemia,with EKG changes, improved, continue to hold losartan  #3 acidemia with anion gap metabolic acidosis due to DKA  #4 lactic acidosis resolved   #5 CKD 3 with proteinuria due to diabetes mellitus which is poorly controlled, hemoglobin A1c of 9.5  #6 hypertension, hold losartan  #7 elevated lipase, CT evidence of cystic disease in pancreas, further evaluation recommended  #8 history of colon cancer     Plan:     #1 ok to d/c IVF  #2 diabetic management per IM  #3 subnephrotic proteinuria , needs renal follow up  #4 patient counseled regarding chronic kidney disease with proteinuria  #5 continue to hold losartan for now , initiate outpatient   #6 avoid nephrotoxins IV contrast    Nephrology will sign off      Please call with questions,     Izabel Ellington MD FASN  Cell [phone]  Pager: [phone]      Subjective:     - No acute over night events. - respiratory - stable  - hemodynamics - stable, no pressrs  - UOP-good   - Nutrition - ok    Objective:     Visit Vitals  BP (!) 143/86   Pulse 87   Temp 96.8 °F (36 °C)   Resp 20   Ht 5' 6\" (1.676 m)   Wt 82.1 kg (181 lb)   SpO2 98%   BMI 29.21 kg/m²         Intake/Output Summary (Last 24 hours) at 1/21/2021 1049  Last data filed at 1/21/2021 0617  Gross per 24 hour   Intake 279.84 ml   Output 1900 ml   Net -1620.16 ml       Physical Exam:     Patient is in no apparent distress. HEENT: mmm  Lungs: good air entry, clear to auscultation bilaterally  Cardiovascular system: S1, S2, regular rate and rhythm. Abdomen: soft, non tender, non distended. Positive bowel sounds. Extremities: no clubbing, cyanosis or edema. Neurologic: Alert, oriented time three.       Data Review:    Recent Labs     01/21/21  0512   WBC 8.7   RBC 3.40*   HCT 28.2*   MCV 82.9   MCH 27.9   MCHC 33.7   RDW 15.5*     Recent Labs     01/21/21  0512 01/20/21 2007 01/20/21  1447 01/20/21  0722 01/20/21  0615 01/20/21  0150 01/19/21  2156 01/19/21  1635 01/19/21  1317   BUN 13  --  22* 28* 28* 33* 39* 43* 49*   CREA 0.89  --  1.27 1.21 1.23 1.37* 1.54* 1.71* 1.95*   CA 8.4*  --  8.2* 8.5 8.1* 8.2* 8.5 8.3* 9.2   ALB  --   --   --   --   --   --   --   --  3.7   K 3.8  --  3.9 4.1 5.9* 5.6* 5.1 5.1 8.0*     --  142 138 136 138 140 137 124*     --  108 106 106 107 108 103 92*   CO2 24  --  24 24 21 22 20* 9* 7*   PHOS 2.0* 2.2* 2.5 2.0* 2.4* 2.2* 2.4* 6.3*  --    *  --  102* 115* 147* 234* 189* 604* 964*       Mohit Bloom MD

## 2021-01-21 NOTE — DIABETES MGMT
GLYCEMIC CONTROL PLAN OF CARE     Assessment/Recommendations:  Fasting lab glucose this am 180 mg/dl. Could be better, but pt states she has a history of morning hypoglycemia at home and her doctor recently decreased her bedtime lantus to 2 units. Noted pt having elevated postprandial blood sugars. Takes meal time insulin at home. Recommend adding lispro 5 units 3 times daily with meals. Continue corrective insulin coverage as needed  Advanced to very insulin resistant dosing. Will continue inpatient monitoring. Most recent blood glucose values:      Results for Ester Pedersen (MRN [id number]) as of 1/21/2021 13:26   Ref. Range 1/20/2021 16:48 1/20/2021 23:22 1/21/2021 08:33 1/21/2021 11:31 1/21/2021 11:33   GLUCOSE,FAST - POC Latest Ref Range: 70 - 110 mg/dL 236 (H) 197 (H) 297 (H) 405 (HH) 400 (H)     Current A1C of 9.8 % is equivalent to average blood glucose of 235 mg/dl over the past 2-3 months. Current hospital diabetes medications:   Lantus 30 units daily  Lispro corrective insulin coverage AC&HS    Previous day's insulin requirements:   Lantus 30 units  Lispro 6 units corrective insulin coverage  GlucoStabilizer insulin gtt approx. 38 units.   Home diabetes medications:    Metformin  mg  Novolog scale insulin 3 times daily before meals  Lantus insulin 30 units daily at bedtime  Diet:    DIET DIABETIC CONSISTENT CARB Regular  Education:  __X_Refer to Diabetes Education Record             ____Education not indicated at this time    Bharathi Tyler [facility] CDE  Ext [phone]

## 2021-01-21 NOTE — CONSULTS
[facility] - Consult Note    Date of  Admission: 1/19/2021  1:01 PM     Primary Care Physician:  Dain Clark MD     Plan:       1. Mildly elevated troponin- no chest pain no chest pressure no CHF symptoms. MI has been r/o. Recent ekg shows Nonspecific ST abnormality- obtain echo to assess lvf, rvf. Further recommendations per Dr. Devika Mcknight  2. DKA- on admission resolved  3. Hypertension- fairly controlled on present medications- monitor   4. Hyperkalemia- likely due to #2  with K+ of 8 on admission- improved  5. Hx of Colon cancer (2014) s/p hemicolectomy and chemo  6. Chronic Hip pain-Left Osteonecrosis with secondary oseoarthritis  7. BRENDA- renal indices improved. Renal following    8. Elevated lipase. 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 Will consider stress test based on echo findings. '       XR Results (most recent):  Results from Hospital Encounter encounter on 01/19/21   XR CHEST PORT    Narrative EXAM: XR CHEST PORT    INDICATION: 65 years Female. cough. ADDITIONAL HISTORY: None. TECHNIQUE: Frontal view of the chest.    COMPARISON: Chest radiograph 2/19/2013    FINDINGS:    Multiple lines overlie the chest.    The cardiac silhouette is within normal limits in size. No confluent consolidation is appreciated. There is no evidence of pleural effusion. There is no evidence of pneumothorax. No acute osseous abnormality appreciated. Impression No radiographic evidence of acute cardiopulmonary process. Assessment:     Hospital Problems  Date Reviewed: 12/30/2016          Codes Class Noted POA    Hyperkalemia ICD-10-CM: E87.5  ICD-9-CM: 276.7  1/19/2021 Unknown        DKA (diabetic ketoacidoses) ([facility] ICD-10-CM: E11.10  ICD-9-CM: 250.12  1/19/2021 Unknown                   History of Present Illness:          This is a 65 y.o. female admitted for DKA (diabetic ketoacidoses) ([facility] [E11.10] Hyperkalemia [E87.5]. Patient with PMHx of hypertension, diabetes,  hyperlipidemia, Hypothyroid, colon cancer status post chemo 2 years ago. Patient presented to the ED with nausea cough. She reported  cough was nonproductive. She has also been feeling nauseous and vomited 2 times, nonbloody, nonbilious. She denies abdominal pain. No fevers, no chills. She also reports she has been peeing more often. She reports taking her insulin regimen as instructed, but does not remember the doses. She reports no other issues at this time. In the ER, she presented with a pH of 6.99 and potassium of 8. POC lactic acid 4.64. Her glucose was 964, anion gap of 25. Her urinalysis showed glucose greater than 1000, ketones of 80. She received 4 L normal saline, calcium gluconate, and 1 dose of vancomycin. Initial EKG showed peaked T waves, resolved on repeat EKG. chest x-ray showed no acute abnormalities. Patient seen today she is resting comfortable  no distress noted no c/o voiced except for hip pain.  Denies any prior CHF or CAD history            Past Medical History:     Past Medical History:   Diagnosis Date    Colon cancer ([facility]     Diabetes ([facility]     Hypertension     Hypothyroidism          Social History:     Social History     Socioeconomic History    Marital status: SINGLE     Spouse name: Not on file    Number of children: Not on file    Years of education: Not on file    Highest education level: Not on file   Tobacco Use    Smoking status: Former Smoker    Smokeless tobacco: Never Used   Substance and Sexual Activity    Alcohol use: Yes     Comment: occasionally    Drug use: No        Family History:     Family History   Problem Relation Age of Onset    Diabetes Mother     Cancer Father         colon        Medications:   No Known Allergies     Current Facility-Administered Medications   Medication Dose Route Frequency    insulin lispro (HUMALOG) injection SubCUTAneous AC&HS    insulin glargine (LANTUS) injection 30 Units  30 Units SubCUTAneous DAILY    enoxaparin (LOVENOX) injection 40 mg  40 mg SubCUTAneous DAILY    famotidine (PEPCID) tablet 20 mg  20 mg Oral DAILY    glucose chewable tablet 16 g  4 Tab Oral PRN    glucagon (GLUCAGEN) injection 1 mg  1 mg IntraMUSCular PRN    dextrose (D50W) injection syrg 12.5-25 g  25-50 mL IntraVENous PRN    sodium chloride (NS) flush 5-40 mL  5-40 mL IntraVENous Q8H    sodium chloride (NS) flush 5-40 mL  5-40 mL IntraVENous PRN    acetaminophen (TYLENOL) tablet 650 mg  650 mg Oral Q6H PRN    Or    acetaminophen (TYLENOL) suppository 650 mg  650 mg Rectal Q6H PRN    polyethylene glycol (MIRALAX) packet 17 g  17 g Oral DAILY PRN    promethazine (PHENERGAN) tablet 12.5 mg  12.5 mg Oral Q6H PRN    Or    ondansetron (ZOFRAN) injection 4 mg  4 mg IntraVENous Q6H PRN    amLODIPine (NORVASC) tablet 5 mg  5 mg Oral DAILY    levothyroxine (SYNTHROID) tablet 100 mcg  100 mcg Oral ACB    azithromycin (ZITHROMAX) tablet 500 mg  500 mg Oral DAILY        Review Of Systems:         Constitutional: No fever, no chills, no weight loss, no night sweats   HEENT: No epistaxis, no nasal drainage, no difficulty in swallowing, no redness in eyes  Respiratory:  negative for cough, sputum, hemoptysis, pleurisy/chest pain, wheezing, dyspnea on exertion or emphysema  Cardiovascular: no chest pain, no chest pressure, no dyspnea, no pnd, no claudication no palpitations, no chronic leg edema, no syncope  Gastrointestinal: abd pain,  vomiting,  nausea  Genitourinary: No urinary symptoms or hematuria  Integument/breast: No ulcers or rashes  Musculoskeletal: hip pain  Neurological: No focal weakness, no seizures, no headaches  Behvioral/Psych: No anxiety, no depression         Physical Exam:     Visit Vitals  BP (!) 143/86   Pulse 87   Temp 96.8 °F (36 °C)   Resp 20   Ht 5' 6\" (1.676 m)   Wt 82.1 kg (181 lb)   SpO2 98%   BMI 29.21 kg/m²     BP Readings from Last 3 Encounters:   01/21/21 (!) 143/86   11/11/20 105/73   10/23/20 (!) 145/76     Pulse Readings from Last 3 Encounters:   01/21/21 87   11/11/20 (!) 113   10/23/20 94     Wt Readings from Last 3 Encounters:   01/19/21 82.1 kg (181 lb)   11/11/20 86.6 kg (191 lb)   10/23/20 86.6 kg (191 lb)       General:  alert, cooperative, no distress, appears stated age, moderately obese  Skin: Warm and dry, acyanotic, normal color. Head: Normocephalic, atraumatic. Eyes: Sclerae anicteric, conjunctivae without injection. Neck:  nontender, no nuchal rigidity, no masses, no stridor, no carotid bruit, no JVD  Lungs:  clear to auscultation bilaterally  Heart:  regular rate and rhythm, S1, S2 normal, no murmur, click, rub or gallop  Abdomen:  abdomen is soft without significant tenderness, masses, organomegaly or guarding  Extremities:  extremities normal, atraumatic, no cyanosis or edema. Peripheral pulses present    Neurological: grossly intact. No focal abnormalities, moves all extremities well. Psychiatric Affect: The patient is awake, alert and oriented x3. Yanira Pandey is interactive and appropriate.    Data Review:     Recent Results (from the past 48 hour(s))   GLUCOSE, POC    Collection Time: 01/19/21  1:16 PM   Result Value Ref Range    Glucose (POC) >600 (HH) 70 - 110 mg/dL   GLUCOSE, POC    Collection Time: 01/19/21  1:16 PM   Result Value Ref Range    Glucose (POC) >600 (HH) 70 - 110 mg/dL   CBC WITH AUTOMATED DIFF    Collection Time: 01/19/21  1:17 PM   Result Value Ref Range    WBC 17.9 (H) 4.6 - 13.2 K/uL    RBC 4.02 (L) 4.20 - 5.30 M/uL    HGB 11.0 (L) 12.0 - 16.0 g/dL    HCT 36.4 35.0 - 45.0 %    MCV 90.5 74.0 - 97.0 FL    MCH 27.4 24.0 - 34.0 PG    MCHC 30.2 (L) 31.0 - 37.0 g/dL    RDW 15.5 (H) 11.6 - 14.5 %    PLATELET 947 (H) 851 - 420 K/uL    MPV 9.7 9.2 - 11.8 FL    NEUTROPHILS 92 (H) 42 - 75 %    LYMPHOCYTES 5 (L) 20 - 51 %    MONOCYTES 2 2 - 9 %    EOSINOPHILS 1 0 - 5 %    BASOPHILS 0 0 - 3 %    ABS. NEUTROPHILS 16.4 (H) 1.8 - 8.0 K/UL    ABS. LYMPHOCYTES 0.9 0.8 - 3.5 K/UL    ABS. MONOCYTES 0.4 0 - 1.0 K/UL    ABS. EOSINOPHILS 0.2 0.0 - 0.4 K/UL    ABS. BASOPHILS 0.0 0.0 - 0.06 K/UL    DF MANUAL      PLATELET COMMENTS Increased Platelets      RBC COMMENTS ANISOCYTOSIS  1+        RBC COMMENTS GUSTAVO CELLS  1+       METABOLIC PANEL, COMPREHENSIVE    Collection Time: 01/19/21  1:17 PM   Result Value Ref Range    Sodium 124 (L) 136 - 145 mmol/L    Potassium 8.0 (HH) 3.5 - 5.5 mmol/L    Chloride 92 (L) 100 - 111 mmol/L    CO2 7 (LL) 21 - 32 mmol/L    Anion gap 25 (H) 3.0 - 18 mmol/L    Glucose 964 (HH) 74 - 99 mg/dL    BUN 49 (H) 7.0 - 18 MG/DL    Creatinine 1.95 (H) 0.6 - 1.3 MG/DL    BUN/Creatinine ratio 25 (H) 12 - 20      GFR est AA 31 (L) >60 ml/min/1.73m2    GFR est non-AA 25 (L) >60 ml/min/1.73m2    Calcium 9.2 8.5 - 10.1 MG/DL    Bilirubin, total 0.5 0.2 - 1.0 MG/DL    ALT (SGPT) 21 13 - 56 U/L    AST (SGOT) 8 (L) 10 - 38 U/L    Alk.  phosphatase 111 45 - 117 U/L    Protein, total 7.9 6.4 - 8.2 g/dL    Albumin 3.7 3.4 - 5.0 g/dL    Globulin 4.2 (H) 2.0 - 4.0 g/dL    A-G Ratio 0.9 0.8 - 1.7     PH, VENOUS    Collection Time: 01/19/21  1:17 PM   Result Value Ref Range    VENOUS PH 6.99 (LL) 7.32 - 7.42     HEMOGLOBIN A1C WITH EAG    Collection Time: 01/19/21  1:17 PM   Result Value Ref Range    Hemoglobin A1c 9.8 (H) 4.2 - 5.6 %    Est. average glucose 235 mg/dL   PROCALCITONIN    Collection Time: 01/19/21  1:17 PM   Result Value Ref Range    Procalcitonin 4.36 ng/mL   EKG, 12 LEAD, INITIAL    Collection Time: 01/19/21  1:57 PM   Result Value Ref Range    Ventricular Rate 100 BPM    Atrial Rate 100 BPM    P-R Interval 170 ms    QRS Duration 114 ms    Q-T Interval 368 ms    QTC Calculation (Bezet) 474 ms    Calculated P Axis 85 degrees    Calculated R Axis 93 degrees    Calculated T Axis 84 degrees    Diagnosis       Normal sinus rhythm  Rightward axis  Borderline ECG  No previous ECGs available  Confirmed by Jimmie Salinas MD, --- ([id number]) on 1/19/2021 3:28:51 PM     NOVEL CORONAVIRUS (COVID-19)    Collection Time: 01/19/21  2:00 PM   Result Value Ref Range    SARS-CoV-2 Not Detected Not Detected     CULTURE, BLOOD    Collection Time: 01/19/21  2:00 PM    Specimen: Blood   Result Value Ref Range    Special Requests: NO SPECIAL REQUESTS      Culture result: NO GROWTH 2 DAYS     CULTURE, BLOOD    Collection Time: 01/19/21  2:16 PM    Specimen: Blood   Result Value Ref Range    Special Requests: NO SPECIAL REQUESTS      Culture result: NO GROWTH 2 DAYS     POC LACTIC ACID    Collection Time: 01/19/21  2:26 PM   Result Value Ref Range    Lactic Acid (POC) 4.64 (HH) 0.40 - 2.00 mmol/L   URINALYSIS W/ RFLX MICROSCOPIC    Collection Time: 01/19/21  3:09 PM   Result Value Ref Range    Color YELLOW      Appearance CLOUDY      Specific gravity 1.024 1.005 - 1.030      pH (UA) 5.0 5.0 - 8.0      Protein TRACE (A) NEG mg/dL    Glucose >1,000 (A) NEG mg/dL    Ketone 80 (A) NEG mg/dL    Bilirubin Negative NEG      Blood TRACE (A) NEG      Urobilinogen 0.2 0.2 - 1.0 EU/dL    Nitrites Negative NEG      Leukocyte Esterase Negative NEG     URINE MICROSCOPIC ONLY    Collection Time: 01/19/21  3:09 PM   Result Value Ref Range    WBC 1 to 3 0 - 4 /hpf    RBC 0 to 2 0 - 5 /hpf    Epithelial cells 2+ 0 - 5 /lpf    Bacteria 1+ (A) NEG /hpf   EKG, 12 LEAD, SUBSEQUENT    Collection Time: 01/19/21  3:13 PM   Result Value Ref Range    Ventricular Rate 122 BPM    Atrial Rate 122 BPM    P-R Interval 142 ms    QRS Duration 80 ms    Q-T Interval 320 ms    QTC Calculation (Bezet) 456 ms    Calculated P Axis 85 degrees    Calculated R Axis 91 degrees    Calculated T Axis 59 degrees    Diagnosis       Sinus tachycardia  Rightward axis  Nonspecific ST abnormality  Abnormal ECG  When compared with ECG of 19-JAN-2021 13:57,  ST now depressed in Lateral leads    Nonspecific T wave abnormality now evident in Lateral leads  Confirmed by Kendall Vaughn M.D., [id number] ([id number]) on 1/20/2021 8:34:08 AM     GLUCOSTABILIZER    Collection Time: 01/19/21  3:35 PM   Result Value Ref Range    Glucose 601 mg/dL    Insulin order 10.8 units/hour    Insulin adminstered 10.8 units/hour    Multiplier 0.020     Low target 140 mg/dL    High target 180 mg/dL    D50 order 0.0 ml    D50 administered 0.00 ml    Minutes until next BG 60 min    Order initials cmb     Administered initials cmb    CARDIAC PANEL,(CK, CKMB & TROPONIN)    Collection Time: 01/19/21  4:35 PM   Result Value Ref Range    CK - MB 1.5 <3.6 ng/ml    CK-MB Index 2.3 0.0 - 4.0 %    CK 64 26 - 192 U/L    Troponin-I, QT 0.18 (H) 0.0 - 2.827 NG/ML   METABOLIC PANEL, BASIC    Collection Time: 01/19/21  4:35 PM   Result Value Ref Range    Sodium 137 136 - 145 mmol/L    Potassium 5.1 3.5 - 5.5 mmol/L    Chloride 103 100 - 111 mmol/L    CO2 9 (LL) 21 - 32 mmol/L    Anion gap 25 (H) 3.0 - 18 mmol/L    Glucose 604 (HH) 74 - 99 mg/dL    BUN 43 (H) 7.0 - 18 MG/DL    Creatinine 1.71 (H) 0.6 - 1.3 MG/DL    BUN/Creatinine ratio 25 (H) 12 - 20      GFR est AA 36 (L) >60 ml/min/1.73m2    GFR est non-AA 29 (L) >60 ml/min/1.73m2    Calcium 8.3 (L) 8.5 - 10.1 MG/DL   LIPASE    Collection Time: 01/19/21  4:35 PM   Result Value Ref Range    Lipase 1,000 (H) 73 - 393 U/L   MAGNESIUM    Collection Time: 01/19/21  4:35 PM   Result Value Ref Range    Magnesium 1.6 1.6 - 2.6 mg/dL   PHOSPHORUS    Collection Time: 01/19/21  4:35 PM   Result Value Ref Range    Phosphorus 6.3 (H) 2.5 - 4.9 MG/DL   TSH 3RD GENERATION    Collection Time: 01/19/21  4:35 PM   Result Value Ref Range    TSH 3.91 (H) 0.36 - 3.74 uIU/mL   GLUCOSE, POC    Collection Time: 01/19/21  4:39 PM   Result Value Ref Range    Glucose (POC) 575 (HH) 70 - 110 mg/dL   POC LACTIC ACID    Collection Time: 01/19/21  4:41 PM   Result Value Ref Range    Lactic Acid (POC) 4.64 (HH) 0.40 - 2.00 mmol/L   GLUCOSTABILIZER    Collection Time: 01/19/21  4:41 PM   Result Value Ref Range Glucose 575 mg/dL    Insulin order 15.5 units/hour    Insulin adminstered 15.5 units/hour    Multiplier 0.030     Low target 140 mg/dL    High target 180 mg/dL    D50 order 0.0 ml    D50 administered 0.00 ml    Minutes until next BG 60 min    Order initials cmb     Administered initials cmb    GLUCOSE, POC    Collection Time: 01/19/21  5:54 PM   Result Value Ref Range    Glucose (POC) 515 (HH) 70 - 110 mg/dL   GLUCOSTABILIZER    Collection Time: 01/19/21  5:54 PM   Result Value Ref Range    Glucose 515 mg/dL    Insulin order 18.2 units/hour    Insulin adminstered 18.2 units/hour    Multiplier 0.040     Low target 140 mg/dL    High target 180 mg/dL    D50 order 0.0 ml    D50 administered 0.00 ml    Minutes until next BG 60 min    Order initials cmb     Administered initials cmb    RESPIRATORY VIRUS PANEL W/COVID-19, PCR    Collection Time: 01/19/21  5:59 PM    Specimen: Nasopharyngeal   Result Value Ref Range    Adenovirus Not detected NOTD      Coronavirus 229E Not detected NOTD      Coronavirus HKU1 Not detected NOTD      Coronavirus CVNL63 Not detected NOTD      Coronavirus OC43 Not detected NOTD      Metapneumovirus Not detected NOTD      Rhinovirus and Enterovirus Not detected NOTD      Influenza A Not detected NOTD      Influenza A, subtype H1 Not detected NOTD      Influenza A, subtype H3 Not detected NOTD      INFLUENZA A H1N1 PCR Not detected NOTD      Influenza B Not detected NOTD      Parainfluenza 1 Not detected NOTD      Parainfluenza 2 Not detected NOTD      Parainfluenza 3 Not detected NOTD      Parainfluenza virus 4 Not detected NOTD      RSV by PCR Not detected NOTD      B. parapertussis, PCR Not detected NOTD      Bordetella pertussis - PCR Not detected NOTD      Chlamydophila pneumoniae DNA, QL, PCR Not detected NOTD      Mycoplasma pneumoniae DNA, QL, PCR Not detected NOTD      SARS-CoV-2, PCR Not detected NOTD     GLUCOSE, POC    Collection Time: 01/19/21  6:31 PM   Result Value Ref Range     Glucose (POC) 415 (HH) 70 - 110 mg/dL   GLUCOSTABILIZER    Collection Time: 01/19/21  6:32 PM   Result Value Ref Range    Glucose 415 mg/dL    Insulin order 10.7 units/hour    Insulin adminstered 10.7 units/hour    Multiplier 0.030     Low target 140 mg/dL    High target 180 mg/dL    D50 order 0.0 ml    D50 administered 0.00 ml    Minutes until next BG 60 min    Order initials CEB     Administered initials CEB    GLUCOSE, POC    Collection Time: 01/19/21  7:36 PM   Result Value Ref Range    Glucose (POC) 313 (H) 70 - 110 mg/dL   GLUCOSTABILIZER    Collection Time: 01/19/21  7:36 PM   Result Value Ref Range    Glucose 313 mg/dL    Insulin order 5.1 units/hour    Insulin adminstered 5.1 units/hour    Multiplier 0.020     Low target 140 mg/dL    High target 180 mg/dL    D50 order 0.0 ml    D50 administered 0.00 ml    Minutes until next BG 60 min    Order initials ceb     Administered initials ceb    GLUCOSE, POC    Collection Time: 01/19/21  8:30 PM   Result Value Ref Range    Glucose (POC) 259 (H) 70 - 110 mg/dL   GLUCOSTABILIZER    Collection Time: 01/19/21  8:31 PM   Result Value Ref Range    Glucose 259 mg/dL    Insulin order 6.0 units/hour    Insulin adminstered 6.0 units/hour    Multiplier 0.030     Low target 140 mg/dL    High target 180 mg/dL    D50 order 0.0 ml    D50 administered 0.00 ml    Minutes until next BG 60 min    Order initials cm     Administered initials cm    POC CG8I ISTAT, VENOUS    Collection Time: 01/19/21  9:26 PM   Result Value Ref Range    Device: ROOM AIR      pH, venous (POC) 7.34 7.32 - 7.42      pCO2, venous (POC) 38.4 (L) 41 - 51 MMHG    pO2, venous (POC) 18 (L) 25 - 40 mmHg    HCO3, venous (POC) 20.8 (L) 23.0 - 28.0 MMOL/L    sO2, venous (POC) 24 (L) 65 - 88 %    Base deficit, venous (POC) 5 mmol/L    Allens test (POC) N/A      Site OTHER      Patient temp.  37.0      Specimen type (POC) VENOUS BLOOD      Sodium,  136 - 145 MMOL/L    Potassium, POC 5.1 3.5 - 5.5 MMOL/L Glucose,  (H) 74 - 106 MG/DL    Calcium, ionized (POC) 1.20 1. 12 - 1.32 mmol/L    Hematocrit, POC 27 (L) 36 - 49 %    Hemoglobin, POC 9.2 (L) 12 - 16 G/DL    Performed by Montserrat Jones    Collection Time: 01/19/21  9:32 PM   Result Value Ref Range    Glucose 195 mg/dL    Insulin order 5.4 units/hour    Insulin adminstered 5.4 units/hour    Multiplier 0.040     Low target 140 mg/dL    High target 180 mg/dL    D50 order 0.0 ml    D50 administered 0.00 ml    Minutes until next BG 60 min    Order initials cm     Administered initials cm    LACTIC ACID    Collection Time: 01/19/21  9:56 PM   Result Value Ref Range    Lactic acid 2.7 (HH) 0.4 - 2.0 MMOL/L   PHOSPHORUS    Collection Time: 01/19/21  9:56 PM   Result Value Ref Range    Phosphorus 2.4 (L) 2.5 - 4.9 MG/DL   METABOLIC PANEL, BASIC    Collection Time: 01/19/21  9:56 PM   Result Value Ref Range    Sodium 140 136 - 145 mmol/L    Potassium 5.1 3.5 - 5.5 mmol/L    Chloride 108 100 - 111 mmol/L    CO2 20 (L) 21 - 32 mmol/L    Anion gap 12 3.0 - 18 mmol/L    Glucose 189 (H) 74 - 99 mg/dL    BUN 39 (H) 7.0 - 18 MG/DL    Creatinine 1.54 (H) 0.6 - 1.3 MG/DL    BUN/Creatinine ratio 25 (H) 12 - 20      GFR est AA 40 (L) >60 ml/min/1.73m2    GFR est non-AA 33 (L) >60 ml/min/1.73m2    Calcium 8.5 8.5 - 10.1 MG/DL   MAGNESIUM    Collection Time: 01/19/21  9:56 PM   Result Value Ref Range    Magnesium 2.1 1.6 - 2.6 mg/dL   GLUCOSE, POC    Collection Time: 01/19/21 10:32 PM   Result Value Ref Range    Glucose (POC) 147 (H) 70 - 110 mg/dL   GLUCOSTABILIZER    Collection Time: 01/19/21 10:34 PM   Result Value Ref Range    Glucose 147 mg/dL    Insulin order 3.5 units/hour    Insulin adminstered 3.5 units/hour    Multiplier 0.040     Low target 140 mg/dL    High target 180 mg/dL    D50 order 0.0 ml    D50 administered 0.00 ml    Minutes until next BG 60 min    Order initials cm     Administered initials cm    GLUCOSE, POC    Collection Time: 01/19/21 11:43 PM   Result Value Ref Range    Glucose (POC) 136 (H) 70 - 110 mg/dL   GLUCOSTABILIZER    Collection Time: 01/19/21 11:45 PM   Result Value Ref Range    Glucose 136 mg/dL    Insulin order 2.3 units/hour    Insulin adminstered 2.3 units/hour    Multiplier 0.030     Low target 140 mg/dL    High target 180 mg/dL    D50 order 0.0 ml    D50 administered 0.00 ml    Minutes until next BG 60 min    Order initials cm     Administered initials cm    GLUCOSE, POC    Collection Time: 01/20/21 12:45 AM   Result Value Ref Range    Glucose (POC) 108 70 - 110 mg/dL   GLUCOSTABILIZER    Collection Time: 01/20/21 12:46 AM   Result Value Ref Range    Glucose 108 mg/dL    Insulin order 1.0 units/hour    Insulin adminstered 1.0 units/hour    Multiplier 0.020     Low target 140 mg/dL    High target 180 mg/dL    D50 order 0.0 ml    D50 administered 0.00 ml    Minutes until next BG 60 min    Order initials cm     Administered initials cm    LACTIC ACID    Collection Time: 01/20/21  1:50 AM   Result Value Ref Range    Lactic acid 0.8 0.4 - 2.0 MMOL/L   PHOSPHORUS    Collection Time: 01/20/21  1:50 AM   Result Value Ref Range    Phosphorus 2.2 (L) 2.5 - 4.9 MG/DL   METABOLIC PANEL, BASIC    Collection Time: 01/20/21  1:50 AM   Result Value Ref Range    Sodium 138 136 - 145 mmol/L    Potassium 5.6 (H) 3.5 - 5.5 mmol/L    Chloride 107 100 - 111 mmol/L    CO2 22 21 - 32 mmol/L    Anion gap 9 3.0 - 18 mmol/L    Glucose 234 (H) 74 - 99 mg/dL    BUN 33 (H) 7.0 - 18 MG/DL    Creatinine 1.37 (H) 0.6 - 1.3 MG/DL    BUN/Creatinine ratio 24 (H) 12 - 20      GFR est AA 46 (L) >60 ml/min/1.73m2    GFR est non-AA 38 (L) >60 ml/min/1.73m2    Calcium 8.2 (L) 8.5 - 10.1 MG/DL   MAGNESIUM    Collection Time: 01/20/21  1:50 AM   Result Value Ref Range    Magnesium 1.9 1.6 - 2.6 mg/dL   GLUCOSE, POC    Collection Time: 01/20/21  1:51 AM   Result Value Ref Range    Glucose (POC) 147 (H) 70 - 110 mg/dL   GLUCOSTABILIZER    Collection Time: 01/20/21  1:53 AM Result Value Ref Range    Glucose 147 mg/dL    Insulin order 1.7 units/hour    Insulin adminstered 1.7 units/hour    Multiplier 0.020     Low target 140 mg/dL    High target 180 mg/dL    D50 order 0.0 ml    D50 administered 0.00 ml    Minutes until next BG 60 min    Order initials cm     Administered initials cm    GLUCOSE, POC    Collection Time: 01/20/21  2:55 AM   Result Value Ref Range    Glucose (POC) 184 (H) 70 - 110 mg/dL   GLUCOSTABILIZER    Collection Time: 01/20/21  2:56 AM   Result Value Ref Range    Glucose 184 mg/dL    Insulin order 3.7 units/hour    Insulin adminstered 3.7 units/hour    Multiplier 0.030     Low target 140 mg/dL    High target 180 mg/dL    D50 order 0.0 ml    D50 administered 0.00 ml    Minutes until next BG 60 min    Order initials cm     Administered initials cm    GLUCOSE, POC    Collection Time: 01/20/21  4:18 AM   Result Value Ref Range    Glucose (POC) 145 (H) 70 - 110 mg/dL   GLUCOSTABILIZER    Collection Time: 01/20/21  4:18 AM   Result Value Ref Range    Glucose 145 mg/dL    Insulin order 2.6 units/hour    Insulin adminstered 2.6 units/hour    Multiplier 0.030     Low target 140 mg/dL    High target 180 mg/dL    D50 order 0.0 ml    D50 administered 0.00 ml    Minutes until next BG 60 min    Order initials cm     Administered initials cm    GLUCOSE, POC    Collection Time: 01/20/21  5:48 AM   Result Value Ref Range    Glucose (POC) 193 (H) 70 - 110 mg/dL   GLUCOSTABILIZER    Collection Time: 01/20/21  5:48 AM   Result Value Ref Range    Glucose 193 mg/dL    Insulin order 5.3 units/hour    Insulin adminstered 5.3 units/hour    Multiplier 0.040     Low target 140 mg/dL    High target 180 mg/dL    D50 order 0.0 ml    D50 administered 0.00 ml    Minutes until next BG 60 min    Order initials cm     Administered initials cm    PHOSPHORUS    Collection Time: 01/20/21  6:15 AM   Result Value Ref Range    Phosphorus 2.4 (L) 2.5 - 4.9 MG/DL   METABOLIC PANEL, BASIC    Collection Time: 01/20/21  6:15 AM   Result Value Ref Range    Sodium 136 136 - 145 mmol/L    Potassium 5.9 (H) 3.5 - 5.5 mmol/L    Chloride 106 100 - 111 mmol/L    CO2 21 21 - 32 mmol/L    Anion gap 9 3.0 - 18 mmol/L    Glucose 147 (H) 74 - 99 mg/dL    BUN 28 (H) 7.0 - 18 MG/DL    Creatinine 1.23 0.6 - 1.3 MG/DL    BUN/Creatinine ratio 23 (H) 12 - 20      GFR est AA 52 (L) >60 ml/min/1.73m2    GFR est non-AA 43 (L) >60 ml/min/1.73m2    Calcium 8.1 (L) 8.5 - 10.1 MG/DL   MAGNESIUM    Collection Time: 01/20/21  6:15 AM   Result Value Ref Range    Magnesium 2.4 1.6 - 2.6 mg/dL   GLUCOSE, POC    Collection Time: 01/20/21  6:50 AM   Result Value Ref Range    Glucose (POC) 134 (H) 70 - 110 mg/dL   GLUCOSTABILIZER    Collection Time: 01/20/21  6:51 AM   Result Value Ref Range    Glucose 134 mg/dL    Insulin order 2.2 units/hour    Insulin adminstered 2.2 units/hour    Multiplier 0.030     Low target 140 mg/dL    High target 180 mg/dL    D50 order 0.0 ml    D50 administered 0.00 ml    Minutes until next BG 60 min    Order initials cm     Administered initials cm    CBC WITH AUTOMATED DIFF    Collection Time: 01/20/21  7:22 AM   Result Value Ref Range    WBC 17.2 (H) 4.6 - 13.2 K/uL    RBC 3.42 (L) 4.20 - 5.30 M/uL    HGB 9.2 (L) 12.0 - 16.0 g/dL    HCT 27.7 (L) 35.0 - 45.0 %    MCV 81.0 74.0 - 97.0 FL    MCH 26.9 24.0 - 34.0 PG    MCHC 33.2 31.0 - 37.0 g/dL    RDW 14.8 (H) 11.6 - 14.5 %    PLATELET 786 043 - 155 K/uL    MPV 9.5 9.2 - 11.8 FL    NEUTROPHILS 79 (H) 42 - 75 %    LYMPHOCYTES 10 (L) 20 - 51 %    MONOCYTES 11 (H) 2 - 9 %    EOSINOPHILS 0 0 - 5 %    BASOPHILS 0 0 - 3 %    ABS. NEUTROPHILS 13.6 (H) 1.8 - 8.0 K/UL    ABS. LYMPHOCYTES 1.7 0.8 - 3.5 K/UL    ABS. MONOCYTES 1.9 (H) 0 - 1.0 K/UL    ABS. EOSINOPHILS 0.0 0.0 - 0.4 K/UL    ABS.  BASOPHILS 0.0 0.0 - 0.06 K/UL    DF MANUAL      PLATELET COMMENTS ADEQUATE PLATELETS      RBC COMMENTS ANISOCYTOSIS  1+        RBC COMMENTS HYPOCHROMIA  2+        RBC COMMENTS ELLIPTOCYTES 1+  TEARDROP CELLS  1+      PHOSPHORUS    Collection Time: 01/20/21  7:22 AM   Result Value Ref Range    Phosphorus 2.0 (L) 2.5 - 4.9 MG/DL   METABOLIC PANEL, BASIC    Collection Time: 01/20/21  7:22 AM   Result Value Ref Range    Sodium 138 136 - 145 mmol/L    Potassium 4.1 3.5 - 5.5 mmol/L    Chloride 106 100 - 111 mmol/L    CO2 24 21 - 32 mmol/L    Anion gap 8 3.0 - 18 mmol/L    Glucose 115 (H) 74 - 99 mg/dL    BUN 28 (H) 7.0 - 18 MG/DL    Creatinine 1.21 0.6 - 1.3 MG/DL    BUN/Creatinine ratio 23 (H) 12 - 20      GFR est AA 53 (L) >60 ml/min/1.73m2    GFR est non-AA 44 (L) >60 ml/min/1.73m2    Calcium 8.5 8.5 - 10.1 MG/DL   MAGNESIUM    Collection Time: 01/20/21  7:22 AM   Result Value Ref Range    Magnesium 2.4 1.6 - 2.6 mg/dL   HEMOGLOBIN A1C WITH EAG    Collection Time: 01/20/21  7:22 AM   Result Value Ref Range    Hemoglobin A1c 9.5 (H) 4.2 - 5.6 %    Est. average glucose 226 mg/dL   GLUCOSE, POC    Collection Time: 01/20/21  7:59 AM   Result Value Ref Range    Glucose (POC) 137 (H) 70 - 110 mg/dL   GLUCOSTABILIZER    Collection Time: 01/20/21  8:00 AM   Result Value Ref Range    Glucose 137 mg/dL    Insulin order 1.5 units/hour    Insulin adminstered 1.5 units/hour    Multiplier 0.020     Low target 140 mg/dL    High target 180 mg/dL    D50 order 0.0 ml    D50 administered 0.00 ml    Minutes until next BG 60 min    Order initials ceb     Administered initials ceb    GLUCOSE, POC    Collection Time: 01/20/21  8:55 AM   Result Value Ref Range    Glucose (POC) 126 (H) 70 - 110 mg/dL   GLUCOSTABILIZER    Collection Time: 01/20/21  8:56 AM   Result Value Ref Range    Glucose 126 mg/dL    Insulin order 0.7 units/hour    Insulin adminstered 0.7 units/hour    Multiplier 0.010     Low target 140 mg/dL    High target 180 mg/dL    D50 order 0.0 ml    D50 administered 0.00 ml    Minutes until next BG 60 min    Order initials ceb     Administered initials ceb    GLUCOSE, POC    Collection Time: 01/20/21 10:00 AM Result Value Ref Range    Glucose (POC) 178 (H) 70 - 110 mg/dL   GLUCOSTABILIZER    Collection Time: 01/20/21 10:00 AM   Result Value Ref Range    Glucose 178 mg/dL    Insulin order 1.2 units/hour    Insulin adminstered 1.2 units/hour    Multiplier 0.010     Low target 140 mg/dL    High target 180 mg/dL    D50 order 0.0 ml    D50 administered 0.00 ml    Minutes until next BG 60 min    Order initials ah     Administered initials     GLUCOSE, POC    Collection Time: 01/20/21 11:28 AM   Result Value Ref Range    Glucose (POC) 228 (H) 70 - 110 mg/dL   GLUCOSTABILIZER    Collection Time: 01/20/21 11:28 AM   Result Value Ref Range    Glucose 228 mg/dL    Insulin order 3.4 units/hour    Insulin adminstered 3.4 units/hour    Multiplier 0.020     Low target 140 mg/dL    High target 180 mg/dL    D50 order 0.0 ml    D50 administered 0.00 ml    Minutes until next BG 60 min    Order initials ceb     Administered initials ceb    GLUCOSE, POC    Collection Time: 01/20/21 12:27 PM   Result Value Ref Range    Glucose (POC) 184 (H) 70 - 110 mg/dL   GLUCOSTABILIZER    Collection Time: 01/20/21 12:33 PM   Result Value Ref Range    Glucose 184 mg/dL    Insulin order 3.7 units/hour    Insulin adminstered 3.7 units/hour    Multiplier 0.030     Low target 140 mg/dL    High target 180 mg/dL    D50 order 0.0 ml    D50 administered 0.00 ml    Minutes until next BG 60 min    Order initials ceb     Administered initials ceb    GLUCOSE, POC    Collection Time: 01/20/21  1:29 PM   Result Value Ref Range    Glucose (POC) 294 (H) 70 - 110 mg/dL   GLUCOSTABILIZER    Collection Time: 01/20/21  1:30 PM   Result Value Ref Range    Glucose 294 mg/dL    Insulin order 9.4 units/hour    Insulin adminstered 9.4 units/hour    Multiplier 0.040     Low target 140 mg/dL    High target 180 mg/dL    D50 order 0.0 ml    D50 administered 0.00 ml    Minutes until next BG 60 min    Order initials ceb     Administered initials ceb    CARDIAC PANEL,(CK, CKMB & TROPONIN)    Collection Time: 01/20/21  2:47 PM   Result Value Ref Range    CK - MB <1.0 <3.6 ng/ml    CK-MB Index  0.0 - 4.0 %     CALCULATION NOT PERFORMED WHEN RESULT IS BELOW LINEAR LIMIT    CK 96 26 - 192 U/L    Troponin-I, QT 0.15 (H) 0.0 - 0.045 NG/ML   PHOSPHORUS    Collection Time: 01/20/21  2:47 PM   Result Value Ref Range    Phosphorus 2.5 2.5 - 4.9 MG/DL   METABOLIC PANEL, BASIC    Collection Time: 01/20/21  2:47 PM   Result Value Ref Range    Sodium 142 136 - 145 mmol/L    Potassium 3.9 3.5 - 5.5 mmol/L    Chloride 108 100 - 111 mmol/L    CO2 24 21 - 32 mmol/L    Anion gap 10 3.0 - 18 mmol/L    Glucose 102 (H) 74 - 99 mg/dL    BUN 22 (H) 7.0 - 18 MG/DL    Creatinine 1.27 0.6 - 1.3 MG/DL    BUN/Creatinine ratio 17 12 - 20      GFR est AA 50 (L) >60 ml/min/1.73m2    GFR est non-AA 41 (L) >60 ml/min/1.73m2    Calcium 8.2 (L) 8.5 - 10.1 MG/DL   MAGNESIUM    Collection Time: 01/20/21  2:47 PM   Result Value Ref Range    Magnesium 2.0 1.6 - 2.6 mg/dL   GLUCOSE, POC    Collection Time: 01/20/21  4:48 PM   Result Value Ref Range    Glucose (POC) 236 (H) 70 - 110 mg/dL   PROTEIN URINE, RANDOM    Collection Time: 01/20/21  5:20 PM   Result Value Ref Range    Protein, urine random 35 (H) <11.9 mg/dL   CREATININE, UR, RANDOM    Collection Time: 01/20/21  5:20 PM   Result Value Ref Range    Creatinine, urine 63.00 30 - 125 mg/dL   PHOSPHORUS    Collection Time: 01/20/21  8:07 PM   Result Value Ref Range    Phosphorus 2.2 (L) 2.5 - 4.9 MG/DL   LIPASE    Collection Time: 01/20/21  8:27 PM   Result Value Ref Range    Lipase 1,118 (H) 73 - 393 U/L   GLUCOSE, POC    Collection Time: 01/20/21 11:22 PM   Result Value Ref Range    Glucose (POC) 197 (H) 70 - 110 mg/dL   CARDIAC PANEL,(CK, CKMB & TROPONIN)    Collection Time: 01/20/21 11:37 PM   Result Value Ref Range    CK - MB 1.1 <3.6 ng/ml    CK-MB Index 1.1 0.0 - 4.0 %    CK 99 26 - 192 U/L    Troponin-I, QT 0.11 (H) 0.0 - 0.045 NG/ML   CBC WITH AUTOMATED DIFF Collection Time: 01/21/21  5:12 AM   Result Value Ref Range    WBC 8.7 4.6 - 13.2 K/uL    RBC 3.40 (L) 4.20 - 5.30 M/uL    HGB 9.5 (L) 12.0 - 16.0 g/dL    HCT 28.2 (L) 35.0 - 45.0 %    MCV 82.9 74.0 - 97.0 FL    MCH 27.9 24.0 - 34.0 PG    MCHC 33.7 31.0 - 37.0 g/dL    RDW 15.5 (H) 11.6 - 14.5 %    PLATELET 676 817 - 677 K/uL    MPV 9.2 9.2 - 11.8 FL    NEUTROPHILS 75 (H) 40 - 73 %    LYMPHOCYTES 16 (L) 21 - 52 %    MONOCYTES 9 3 - 10 %    EOSINOPHILS 0 0 - 5 %    BASOPHILS 0 0 - 2 %    ABS. NEUTROPHILS 6.5 1.8 - 8.0 K/UL    ABS. LYMPHOCYTES 1.4 0.9 - 3.6 K/UL    ABS. MONOCYTES 0.7 0.05 - 1.2 K/UL    ABS. EOSINOPHILS 0.0 0.0 - 0.4 K/UL    ABS.  BASOPHILS 0.0 0.0 - 0.1 K/UL    DF AUTOMATED     METABOLIC PANEL, BASIC    Collection Time: 01/21/21  5:12 AM   Result Value Ref Range    Sodium 138 136 - 145 mmol/L    Potassium 3.8 3.5 - 5.5 mmol/L    Chloride 106 100 - 111 mmol/L    CO2 24 21 - 32 mmol/L    Anion gap 8 3.0 - 18 mmol/L    Glucose 180 (H) 74 - 99 mg/dL    BUN 13 7.0 - 18 MG/DL    Creatinine 0.89 0.6 - 1.3 MG/DL    BUN/Creatinine ratio 15 12 - 20      GFR est AA >60 >60 ml/min/1.73m2    GFR est non-AA >60 >60 ml/min/1.73m2    Calcium 8.4 (L) 8.5 - 10.1 MG/DL   MAGNESIUM    Collection Time: 01/21/21  5:12 AM   Result Value Ref Range    Magnesium 1.8 1.6 - 2.6 mg/dL   PHOSPHORUS    Collection Time: 01/21/21  5:12 AM   Result Value Ref Range    Phosphorus 2.0 (L) 2.5 - 4.9 MG/DL   LIPASE    Collection Time: 01/21/21  5:12 AM   Result Value Ref Range    Lipase 664 (H) 73 - 393 U/L   CARDIAC PANEL,(CK, CKMB & TROPONIN)    Collection Time: 01/21/21  5:12 AM   Result Value Ref Range    CK - MB <1.0 <3.6 ng/ml    CK-MB Index  0.0 - 4.0 %     CALCULATION NOT PERFORMED WHEN RESULT IS BELOW LINEAR LIMIT    CK 94 26 - 192 U/L    Troponin-I, QT 0.08 (H) 0.0 - 0.045 NG/ML   GLUCOSE, POC    Collection Time: 01/21/21  8:33 AM   Result Value Ref Range    Glucose (POC) 297 (H) 70 - 110 mg/dL         Intake/Output Summary (Last 24 hours) at 1/21/2021 1037  Last data filed at 1/21/2021 0617  Gross per 24 hour   Intake 279.84 ml   Output 1900 ml   Net -1620.16 ml       Cardiographics:       EKG Results     Procedure 720 Value Units Date/Time    EKG, 12 LEAD, SUBSEQUENT [[id number]] Collected: 01/19/21 1513    Order Status: Completed Updated: 01/20/21 0834     Ventricular Rate 122 BPM      Atrial Rate 122 BPM      P-R Interval 142 ms      QRS Duration 80 ms      Q-T Interval 320 ms      QTC Calculation (Bezet) 456 ms      Calculated P Axis 85 degrees      Calculated R Axis 91 degrees      Calculated T Axis 59 degrees      Diagnosis --     Sinus tachycardia  Rightward axis  Nonspecific ST abnormality  Abnormal ECG  When compared with ECG of 19-JAN-2021 13:57,  ST now depressed in Lateral leads    Nonspecific T wave abnormality now evident in Lateral leads  Confirmed by Anabela Hernandez M.D., [address] [place] [address] ([id number]) on 1/20/2021 8:34:08 AM      EKG, 12 LEAD, INITIAL [[id number]] Collected: 01/19/21 1357    Order Status: Completed Updated: 01/19/21 1529     Ventricular Rate 100 BPM      Atrial Rate 100 BPM      P-R Interval 170 ms      QRS Duration 114 ms      Q-T Interval 368 ms      QTC Calculation (Bezet) 474 ms      Calculated P Axis 85 degrees      Calculated R Axis 93 degrees      Calculated T Axis 84 degrees      Diagnosis --     Normal sinus rhythm  Rightward axis  Borderline ECG  No previous ECGs available  Confirmed by Thu Singleton MD, --- ([id number]) on 1/19/2021 3:28:51 PM      EKG, 12 LEAD, INITIAL [[id number]]     Order Status: Canceled                Signed By: Fady KUMAR Phone [phone] supervised    January 21, 2021      I have independently evaluated and examined the patient. All relevant labs and testing data's are reviewed. Care plan discussed and updated after review.     Cooper Bueno MD

## 2021-01-21 NOTE — PROGRESS NOTES
Problem: Diabetes Self-Management  Goal: *Disease process and treatment process  Description: Define diabetes and identify own type of diabetes; list 3 options for treating diabetes. 1/21/2021 0212 by Bijan Crum RN  Outcome: Progressing Towards Goal  1/21/2021 0212 by Bijan Crum RN  Outcome: Progressing Towards Goal  Goal: *Incorporating nutritional management into lifestyle  Description: Describe effect of type, amount and timing of food on blood glucose; list 3 methods for planning meals. 1/21/2021 0212 by Bijan Crum RN  Outcome: Progressing Towards Goal  1/21/2021 0212 by Bijan Crum RN  Outcome: Progressing Towards Goal  Goal: *Incorporating physical activity into lifestyle  Description: State effect of exercise on blood glucose levels. 1/21/2021 0212 by Bijan Crum RN  Outcome: Progressing Towards Goal  1/21/2021 0212 by Bijan Crum RN  Outcome: Progressing Towards Goal  Goal: *Developing strategies to promote health/change behavior  Description: Define the ABC's of diabetes; identify appropriate screenings, schedule and personal plan for screenings. 1/21/2021 0212 by Bijan Crum RN  Outcome: Progressing Towards Goal  1/21/2021 0212 by Bijan Crum RN  Outcome: Progressing Towards Goal  Goal: *Using medications safely  Description: State effect of diabetes medications on diabetes; name diabetes medication taking, action and side effects. 1/21/2021 0212 by Bijan Crum RN  Outcome: Progressing Towards Goal  1/21/2021 0212 by Bijan Crum RN  Outcome: Progressing Towards Goal  Goal: *Monitoring blood glucose, interpreting and using results  Description: Identify recommended blood glucose targets  and personal targets.   1/21/2021 0212 by Bijan Crum RN  Outcome: Progressing Towards Goal  1/21/2021 0212 by Bijan Crum RN  Outcome: Progressing Towards Goal  Goal: *Prevention, detection, treatment of acute complications  Description: List symptoms of hyper- and hypoglycemia; describe how to treat low blood sugar and actions for lowering  high blood glucose level.  1/21/2021 0212 by Dain Burr RN  Outcome: Progressing Towards Goal  1/21/2021 0212 by Dain Burr RN  Outcome: Progressing Towards Goal  Goal: *Prevention, detection and treatment of chronic complications  Description: Define the natural course of diabetes and describe the relationship of blood glucose levels to long term complications of diabetes.   1/21/2021 0212 by Dain Burr RN  Outcome: Progressing Towards Goal  1/21/2021 0212 by Dain Burr RN  Outcome: Progressing Towards Goal  Goal: *Developing strategies to address psychosocial issues  Description: Describe feelings about living with diabetes; identify support needed and support network  1/21/2021 0212 by Dain Burr RN  Outcome: Progressing Towards Goal  1/21/2021 0212 by Dain Burr RN  Outcome: Progressing Towards Goal  Goal: *Insulin pump training  1/21/2021 0212 by Dain Burr RN  Outcome: Progressing Towards Goal  1/21/2021 0212 by Dain Burr RN  Outcome: Progressing Towards Goal  Goal: *Sick day guidelines  1/21/2021 0212 by Dain Burr RN  Outcome: Progressing Towards Goal  1/21/2021 0212 by Dain Burr RN  Outcome: Progressing Towards Goal  Goal: *Patient Specific Goal (EDIT GOAL, INSERT TEXT)  1/21/2021 0212 by Dain Burr RN  Outcome: Progressing Towards Goal  1/21/2021 0212 by Dain Burr RN  Outcome: Progressing Towards Goal

## 2021-01-21 NOTE — PROGRESS NOTES
Patient is Aox4 with complaints of L hip pain. Patient refused PRN Tylenol and requested Tylenol Arthritis instead. Hospitalist notified. AM assessment completed. Bed in lowest locked position, call light within reach, side rails x3. Problem: Diabetes Self-Management  Goal: *Disease process and treatment process  Description: Define diabetes and identify own type of diabetes; list 3 options for treating diabetes. Outcome: Progressing Towards Goal  Goal: *Incorporating nutritional management into lifestyle  Description: Describe effect of type, amount and timing of food on blood glucose; list 3 methods for planning meals. Outcome: Progressing Towards Goal  Goal: *Incorporating physical activity into lifestyle  Description: State effect of exercise on blood glucose levels. Outcome: Progressing Towards Goal  Goal: *Developing strategies to promote health/change behavior  Description: Define the ABC's of diabetes; identify appropriate screenings, schedule and personal plan for screenings. Outcome: Progressing Towards Goal  Goal: *Using medications safely  Description: State effect of diabetes medications on diabetes; name diabetes medication taking, action and side effects. Outcome: Progressing Towards Goal  Goal: *Monitoring blood glucose, interpreting and using results  Description: Identify recommended blood glucose targets  and personal targets. Outcome: Progressing Towards Goal  Goal: *Prevention, detection, treatment of acute complications  Description: List symptoms of hyper- and hypoglycemia; describe how to treat low blood sugar and actions for lowering  high blood glucose level. Outcome: Progressing Towards Goal  Goal: *Prevention, detection and treatment of chronic complications  Description: Define the natural course of diabetes and describe the relationship of blood glucose levels to long term complications of diabetes.   Outcome: Progressing Towards Goal  Goal: *Developing strategies to address psychosocial issues  Description: Describe feelings about living with diabetes; identify support needed and support network  Outcome: Progressing Towards Goal  Goal: *Insulin pump training  Outcome: Progressing Towards Goal  Goal: *Sick day guidelines  Outcome: Progressing Towards Goal  Goal: *Patient Specific Goal (EDIT GOAL, INSERT TEXT)  Outcome: Progressing Towards Goal     Problem: Patient Education: Go to Patient Education Activity  Goal: Patient/Family Education  Outcome: Progressing Towards Goal     Problem: Falls - Risk of  Goal: *Absence of Falls  Description: Document Lu Fall Risk and appropriate interventions in the flowsheet. Outcome: Progressing Towards Goal  Note: Fall Risk Interventions:  Mobility Interventions: Utilize walker, cane, or other assistive device, Patient to call before getting OOB    Mentation Interventions: Adequate sleep, hydration, pain control, Bed/chair exit alarm    Medication Interventions: Patient to call before getting OOB, Teach patient to arise slowly, Bed/chair exit alarm    Elimination Interventions: Call light in reach, Bed/chair exit alarm, Patient to call for help with toileting needs              Problem: Patient Education: Go to Patient Education Activity  Goal: Patient/Family Education  Outcome: Progressing Towards Goal     Problem: Pressure Injury - Risk of  Goal: *Prevention of pressure injury  Description: Document Sid Scale and appropriate interventions in the flowsheet. Outcome: Progressing Towards Goal  Note: Pressure Injury Interventions:       Moisture Interventions: Absorbent underpads, Internal/External urinary devices, Apply protective barrier, creams and emollients    Activity Interventions: Increase time out of bed, PT/OT evaluation    Mobility Interventions: Assess need for specialty bed, Turn and reposition approx.  every two hours(pillow and wedges), Float heels    Nutrition Interventions: Document food/fluid/supplement intake, Offer support with meals,snacks and hydration    Friction and Shear Interventions: Apply protective barrier, creams and emollients, Foam dressings/transparent film/skin sealants, Lift sheet, Transferring/repositioning devices                Problem: Patient Education: Go to Patient Education Activity  Goal: Patient/Family Education  Outcome: Progressing Towards Goal

## 2021-01-21 NOTE — PROGRESS NOTES
MRI Screening form needs to be filled out and faxed to [phone] MRI can be scheduled. If unable to obtain information from pt, [facility] needs to be contacted.  If pt is claustro or will need pain meds, please have ordered in advance in order to facilitate exam.

## 2021-01-21 NOTE — CONSULTS
[email or web address]  [phone]    GASTROENTEROLOGY CONSULT      Impression:   1. Pancreatic cyst- 5-6mm cyst noted on CT in 2015- it was apparently unchanged from a previous imaging study that we do not have access to. No suspicious features at that time. She has not had f/u imaging until now- recent CT was unable to fully evaluate the area due to motion/artifact. Will get MRI abd/pancreas  2. H/O pancreatitis (2015)- per patient reports- states the CT done at that time was during hospitalization for pancreatitis though no associated hospital stay is noted with the dates of that imaging- it appears the imaging was performed for her colon cancer evaluation. States she has had one previous episode of pancreatitis in 2015.   3. Elevated lipase- improving, no epigastric pain or pancreatic inflammation noted on imaging- which if imaging at onset of symptoms can be normal- doubtful this is acute pancreatitis. 4. DKA, improved  5. Nausea and vomiting; resolved  6. H/o Colon cancer (2014) s/p hemicolectomy and chemo- last colonoscopy for surveillance was in 2019 by Dr. Anamaria Napoles  7. CKD stage III  8. HTN  9. Chronic anemia, likely multifactorial; no evidence of GI bleeding  10. Pancreatic atrophy- incidental finding on imaging- will test for exocrine pancreatic insufficiency. Plan:     1. Given the time frame of the presence of the pancreatic cyst this is likely benign. Since current imaging cannot fully evaluate we will get MRI and determine if she requires EUS or future surveillance. 2. Will get fecal elastase and fecal fats to check for EPI  3. Medical management per primary team    Chief Complaint: pancreatic cyst      HPI:  Sagrario Bar is a 65 y.o. female who I am being asked to see in consultation for an opinion regarding the above. Patient was admitted for N/V, diabetic ketoacidosis and elevated lipase. Imaging noted pancreatic cyst which was not fully evaluated due to motion/artifact.    She is noted have a pancreatic cyst (5-6mm) with suspicious findings on a 2015 CT scan. She had not had f/u imaging until this hospital admission. Patient denies previous knowledge of pancreatic cyst.  She reports one episode and hospitalization for pancreatitis in 2015 but I cannot find the records of this in [facility] or care everywhere. She states with this hospitalization she was noted to have AMS prior to arrival and had 2-3 days of intermittent N/V prior to admission- she states there was no hematemesis and she would be able to eat well and have 2-3 episodes per day randomly. She denies abdominal pain, heartburn/dysphagia. She is constipated at baseline and has one BM every few days while on stool softeners TID. Denies bloating or oily stools. She has h/o colon cancer in 2014/2015 and required surgery and chemotherapy. Her last colonoscopy was in 2019 by Dr. Stephen Leonard with normal findings- repeat recommended in 5 years (2024). She denies previous EGD- was scheduled for one a few years ago with Dr. Chica Elias- she cannot recall reason but this was ultimately cancelled. CT scan 1/19/21:   MPRESSION  1. No acute pathology appreciated.     2. Cystic process in the pancreas better appreciated on prior imaging. However, follow-up MRI with and without contrast is recommended for better delineation.     3.  Left hip osteonecrosis with severe secondary osteoarthritis.     4. Old L4 compression fracture.     5.  Small umbilical fat hernia    PMH:   Past Medical History:   Diagnosis Date    [facility]     Diabetes [facility]     Hypertension     Hypothyroidism        PSH:   Past Surgical History:   Procedure Laterality Date    COLONOSCOPY N/A 12/30/2016    COLONOSCOPY.  SURVEILLANCE /c Hot Snared Polypectomy /c EndoClip x3 performed by Tana Villa MD at [facility] ENDOSCOPY    HX COLECTOMY      HX HYSTERECTOMY         Social HX:   Social History     Socioeconomic History    Marital status: SINGLE     Spouse name: Not on file    Number of children: Not on file    Years of education: Not on file    Highest education level: Not on file   Occupational History    Not on file   Social Needs    Financial resource strain: Not on file    Food insecurity     Worry: Not on file     Inability: Not on file    Transportation needs     Medical: Not on file     Non-medical: Not on file   Tobacco Use    Smoking status: Former Smoker    Smokeless tobacco: Never Used   Substance and Sexual Activity    Alcohol use: Yes     Comment: occasionally    Drug use: No    Sexual activity: Not on file   Lifestyle    Physical activity     Days per week: Not on file     Minutes per session: Not on file    Stress: Not on file   Relationships    Social connections     Talks on phone: Not on file     Gets together: Not on file     Attends Sabianist service: Not on file     Active member of club or organization: Not on file     Attends meetings of clubs or organizations: Not on file     Relationship status: Not on file    Intimate partner violence     Fear of current or ex partner: Not on file     Emotionally abused: Not on file     Physically abused: Not on file     Forced sexual activity: Not on file   Other Topics Concern    Not on file   Social History Narrative    Not on file       FHX:   Family History   Problem Relation Age of Onset    Diabetes Mother     Cancer Father         colon       Allergy:   No Known Allergies    Patient Active Problem List   Diagnosis Code    Hyperkalemia E87.5    [facility]acidoses) [facility] E11.10       Home Medications:     Medications Prior to Admission   Medication Sig    losartan (COZAAR) 100 mg tablet     metFORMIN ER (GLUCOPHAGE XR) 500 mg tablet     gabapentin (NEURONTIN) 100 mg capsule Take 100 mg by mouth three (3) times daily.  Cholecalciferol, Vitamin D3, 50,000 unit cap Take 1 Cap by mouth every seven (7) days.     insulin regular (NOVOLIN R, HUMULIN R) 100 unit/mL injection by SubCUTAneous route Before breakfast, lunch, and dinner. Sliding scale    insulin glargine (LANTUS) 100 unit/mL injection 30 Units by SubCUTAneous route nightly.  amLODIPine (NORVASC) 5 mg tablet Take 5 mg by mouth daily.  alendronate (FOSAMAX) 10 mg tablet Take 70 mg by mouth every seven (7) days.  aspirin 81 mg chewable tablet Take 81 mg by mouth daily.  levothyroxine (SYNTHROID) 112 mcg tablet Take 100 mcg by mouth Daily (before breakfast). Review of Systems:     Constitutional: No fevers, chills, weight loss, fatigue. Skin: No rashes, pruritis, jaundice, ulcerations, erythema. HENT: No headaches, nosebleeds, sinus pressure, rhinorrhea, sore throat. Eyes: No visual changes, blurred vision, eye pain, photophobia, jaundice. Cardiovascular: No chest pain, heart palpitations. Respiratory: No cough, SOB, wheezing, chest discomfort, orthopnea. Gastrointestinal: See HPI   Genitourinary: No dysuria, bleeding, discharge, pyuria. Musculoskeletal: No weakness, arthralgias, wasting. Endo: No sweats. Heme: No bruising, easy bleeding. Allergies: As noted. Neurological: Cranial nerves intact. Alert and oriented. Gait not assessed. Psychiatric:  No anxiety, depression, hallucinations. Visit Vitals  BP (!) 143/86   Pulse 87   Temp 96.8 °F (36 °C)   Resp 20   Ht 5' 6\" (1.676 m)   Wt 82.1 kg (181 lb)   SpO2 98%   BMI 29.21 kg/m²       Physical Assessment:     constitutional: appearance: well developed, well nourished, normal habitus, no deformities, in no acute distress. skin: inspection: no rashes, ulcers, icterus or other lesions; no clubbing or telangiectasias. palpation: no induration or subcutaneos nodules. eyes: inspection: normal conjunctivae and lids; no jaundice pupils: normal  ENMT: mouth: normal oral mucosa,lips and gums; good dentition. oropharynx: normal tongue, hard and soft palate; posterior pharynx without erithema, exudate or lesions.    neck: thyroid: normal size, consistency and position; no masses or tenderness. respiratory: effort: normal chest excursion; no intercostal retraction or accessory muscle use. cardiovascular: abdominal aorta: normal size and position; no bruits. palpation: PMI of normal size and position; normal rhythm; no thrill or murmurs. abdominal: abdomen: normal consistency; no tenderness or masses. hernias: no hernias appreciated. liver: not palpable. spleen: not palpable. rectal: hemoccult/guaiac: not performed. musculoskeletal: digits and nails: no clubbing, cyanosis, petechiae or other inflammatory conditions. head and neck: normal range of motion; no pain, crepitation or contracture. spine/ribs/pelvis: normal range of motion; no pain, deformity or contracture. neurologic: cranial nerves: II-XII normal. Pupils intact. psychiatric: judgement/insight: within normal limits. memory: within normal limits for recent and remote events. mood and affect: no evidence of depression, anxiety or agitation. orientation: oriented to time, space and person. Basic Metabolic Profile   Recent Labs     01/21/21  0512      K 3.8      CO2 24   BUN 13   *   CA 8.4*   MG 1.8   PHOS 2.0*         CBC w/Diff    Recent Labs     01/21/21  0512   WBC 8.7   RBC 3.40*   HGB 9.5*   HCT 28.2*   MCV 82.9   MCH 27.9   MCHC 33.7   RDW 15.5*       Recent Labs     01/21/21  0512   GRANS 75*   LYMPH 16*   EOS 0        Hepatic Function   Recent Labs     01/21/21  0512 01/19/21  1317 01/19/21  1317   ALB  --   --  3.7   TP  --   --  7.9   TBILI  --   --  0.5   AP  --   --  111   LPSE 664*   < >  --     < > = values in this interval not displayed. Coags   No results for input(s): PTP, INR, APTT, INREXT in the last 72 hours. Marco A Pompa NP. [facility] 1949, [address] [facility]  Cell: [phone]  [email or web address]

## 2021-01-21 NOTE — PROGRESS NOTES
Bedside and Verbal shift change report given to [id number] [facility] (oncoming nurse) by Lizzeth Gasca (offgoing nurse). Report included the following information SBAR, Kardex, Intake/Output, MAR and Recent Results.

## 2021-01-22 ENCOUNTER — APPOINTMENT (OUTPATIENT)
Dept: MRI IMAGING | Age: 71
DRG: 637 | End: 2021-01-22
Attending: NURSE PRACTITIONER
Payer: MEDICARE

## 2021-01-22 ENCOUNTER — APPOINTMENT (OUTPATIENT)
Dept: NON INVASIVE DIAGNOSTICS | Age: 71
DRG: 637 | End: 2021-01-22
Attending: NURSE PRACTITIONER
Payer: MEDICARE

## 2021-01-22 LAB
ANION GAP SERPL CALC-SCNC: 8 MMOL/L (ref 3–18)
BASOPHILS # BLD: 0 K/UL (ref 0–0.1)
BASOPHILS NFR BLD: 0 % (ref 0–2)
BUN SERPL-MCNC: 14 MG/DL (ref 7–18)
BUN/CREAT SERPL: 16 (ref 12–20)
CALCIUM SERPL-MCNC: 8.6 MG/DL (ref 8.5–10.1)
CHLORIDE SERPL-SCNC: 107 MMOL/L (ref 100–111)
CO2 SERPL-SCNC: 27 MMOL/L (ref 21–32)
CREAT SERPL-MCNC: 0.9 MG/DL (ref 0.6–1.3)
DIFFERENTIAL METHOD BLD: ABNORMAL
ECHO AO ROOT DIAM: 3.14 CM
ECHO LA AREA 4C: 14.45 CM2
ECHO LA VOL 2C: 49.87 ML (ref 22–52)
ECHO LA VOL 4C: 29.06 ML (ref 22–52)
ECHO LA VOL BP: 45.51 ML (ref 22–52)
ECHO LA VOL/BSA BIPLANE: 23.74 ML/M2 (ref 16–28)
ECHO LA VOLUME INDEX A2C: 26.01 ML/M2 (ref 16–28)
ECHO LA VOLUME INDEX A4C: 15.16 ML/M2 (ref 16–28)
ECHO LV INTERNAL DIMENSION DIASTOLIC: 4.37 CM (ref 3.9–5.3)
ECHO LV INTERNAL DIMENSION SYSTOLIC: 2.9 CM
ECHO LV IVSD: 1.04 CM (ref 0.6–0.9)
ECHO LV MASS 2D: 145.2 G (ref 67–162)
ECHO LV MASS INDEX 2D: 75.7 G/M2 (ref 43–95)
ECHO LV POSTERIOR WALL DIASTOLIC: 0.95 CM (ref 0.6–0.9)
ECHO LVOT DIAM: 2.12 CM
EOSINOPHIL # BLD: 0 K/UL (ref 0–0.4)
EOSINOPHIL NFR BLD: 1 % (ref 0–5)
ERYTHROCYTE [DISTWIDTH] IN BLOOD BY AUTOMATED COUNT: 15.3 % (ref 11.6–14.5)
GLUCOSE BLD STRIP.AUTO-MCNC: 222 MG/DL (ref 70–110)
GLUCOSE BLD STRIP.AUTO-MCNC: 239 MG/DL (ref 70–110)
GLUCOSE BLD STRIP.AUTO-MCNC: 246 MG/DL (ref 70–110)
GLUCOSE BLD STRIP.AUTO-MCNC: 263 MG/DL (ref 70–110)
GLUCOSE BLD STRIP.AUTO-MCNC: 374 MG/DL (ref 70–110)
GLUCOSE BLD STRIP.AUTO-MCNC: 401 MG/DL (ref 70–110)
GLUCOSE BLD STRIP.AUTO-MCNC: 412 MG/DL (ref 70–110)
GLUCOSE BLD STRIP.AUTO-MCNC: 412 MG/DL (ref 70–110)
GLUCOSE BLD STRIP.AUTO-MCNC: 61 MG/DL (ref 70–110)
GLUCOSE SERPL-MCNC: 40 MG/DL (ref 74–99)
HCT VFR BLD AUTO: 30.6 % (ref 35–45)
HGB BLD-MCNC: 9.6 G/DL (ref 12–16)
LIPASE SERPL-CCNC: 426 U/L (ref 73–393)
LYMPHOCYTES # BLD: 1.8 K/UL (ref 0.9–3.6)
LYMPHOCYTES NFR BLD: 30 % (ref 21–52)
MAGNESIUM SERPL-MCNC: 1.7 MG/DL (ref 1.6–2.6)
MCH RBC QN AUTO: 26 PG (ref 24–34)
MCHC RBC AUTO-ENTMCNC: 31.4 G/DL (ref 31–37)
MCV RBC AUTO: 82.9 FL (ref 74–97)
MONOCYTES # BLD: 0.6 K/UL (ref 0.05–1.2)
MONOCYTES NFR BLD: 10 % (ref 3–10)
NEUTS SEG # BLD: 3.6 K/UL (ref 1.8–8)
NEUTS SEG NFR BLD: 59 % (ref 40–73)
PHOSPHATE SERPL-MCNC: 2 MG/DL (ref 2.5–4.9)
PLATELET # BLD AUTO: 263 K/UL (ref 135–420)
PMV BLD AUTO: 9.4 FL (ref 9.2–11.8)
POTASSIUM SERPL-SCNC: 3.5 MMOL/L (ref 3.5–5.5)
RBC # BLD AUTO: 3.69 M/UL (ref 4.2–5.3)
SODIUM SERPL-SCNC: 142 MMOL/L (ref 136–145)
STRESS BASELINE DIAS BP: 85 MMHG
STRESS BASELINE HR: 71 BPM
STRESS BASELINE SYS BP: 138 MMHG
STRESS ESTIMATED WORKLOAD: 1 METS
STRESS EXERCISE DUR MIN: NORMAL
STRESS PEAK DIAS BP: 85 MMHG
STRESS PEAK SYS BP: 138 MMHG
STRESS PERCENT HR ACHIEVED: 70 %
STRESS POST PEAK HR: 104 BPM
STRESS RATE PRESSURE PRODUCT: NORMAL BPM*MMHG
STRESS ST DEPRESSION: 0 MM
STRESS ST ELEVATION: 0 MM
STRESS TARGET HR: 148 BPM
WBC # BLD AUTO: 6.1 K/UL (ref 4.6–13.2)

## 2021-01-22 PROCEDURE — 74011250636 HC RX REV CODE- 250/636: Performed by: HOSPITALIST

## 2021-01-22 PROCEDURE — 93016 CV STRESS TEST SUPVJ ONLY: CPT | Performed by: INTERNAL MEDICINE

## 2021-01-22 PROCEDURE — 74011250637 HC RX REV CODE- 250/637: Performed by: STUDENT IN AN ORGANIZED HEALTH CARE EDUCATION/TRAINING PROGRAM

## 2021-01-22 PROCEDURE — 74183 MRI ABD W/O CNTR FLWD CNTR: CPT

## 2021-01-22 PROCEDURE — 74011250637 HC RX REV CODE- 250/637: Performed by: NURSE PRACTITIONER

## 2021-01-22 PROCEDURE — 83735 ASSAY OF MAGNESIUM: CPT

## 2021-01-22 PROCEDURE — 99232 SBSQ HOSP IP/OBS MODERATE 35: CPT | Performed by: INTERNAL MEDICINE

## 2021-01-22 PROCEDURE — 78452 HT MUSCLE IMAGE SPECT MULT: CPT | Performed by: INTERNAL MEDICINE

## 2021-01-22 PROCEDURE — 93018 CV STRESS TEST I&R ONLY: CPT | Performed by: INTERNAL MEDICINE

## 2021-01-22 PROCEDURE — 74011250636 HC RX REV CODE- 250/636: Performed by: NURSE PRACTITIONER

## 2021-01-22 PROCEDURE — 74011250636 HC RX REV CODE- 250/636: Performed by: INTERNAL MEDICINE

## 2021-01-22 PROCEDURE — 74011250637 HC RX REV CODE- 250/637: Performed by: INTERNAL MEDICINE

## 2021-01-22 PROCEDURE — 74011000250 HC RX REV CODE- 250: Performed by: EMERGENCY MEDICINE

## 2021-01-22 PROCEDURE — A9577 INJ MULTIHANCE: HCPCS | Performed by: HOSPITALIST

## 2021-01-22 PROCEDURE — 99232 SBSQ HOSP IP/OBS MODERATE 35: CPT | Performed by: HOSPITALIST

## 2021-01-22 PROCEDURE — 83690 ASSAY OF LIPASE: CPT

## 2021-01-22 PROCEDURE — 36415 COLL VENOUS BLD VENIPUNCTURE: CPT

## 2021-01-22 PROCEDURE — 77030038269 HC DRN EXT URIN PURWCK BARD -A

## 2021-01-22 PROCEDURE — 80048 BASIC METABOLIC PNL TOTAL CA: CPT

## 2021-01-22 PROCEDURE — 65270000029 HC RM PRIVATE

## 2021-01-22 PROCEDURE — 85025 COMPLETE CBC W/AUTO DIFF WBC: CPT

## 2021-01-22 PROCEDURE — 74011636637 HC RX REV CODE- 636/637: Performed by: HOSPITALIST

## 2021-01-22 PROCEDURE — 84100 ASSAY OF PHOSPHORUS: CPT

## 2021-01-22 PROCEDURE — 2709999900 HC NON-CHARGEABLE SUPPLY

## 2021-01-22 PROCEDURE — 82962 GLUCOSE BLOOD TEST: CPT

## 2021-01-22 RX ORDER — INSULIN GLARGINE 100 [IU]/ML
20 INJECTION, SOLUTION SUBCUTANEOUS
Status: DISCONTINUED | OUTPATIENT
Start: 2021-01-23 | End: 2021-01-23

## 2021-01-22 RX ORDER — SODIUM CHLORIDE 9 MG/ML
250 INJECTION, SOLUTION INTRAVENOUS ONCE
Status: COMPLETED | OUTPATIENT
Start: 2021-01-22 | End: 2021-01-22

## 2021-01-22 RX ORDER — INSULIN LISPRO 100 [IU]/ML
5 INJECTION, SOLUTION INTRAVENOUS; SUBCUTANEOUS ONCE
Status: COMPLETED | OUTPATIENT
Start: 2021-01-22 | End: 2021-01-22

## 2021-01-22 RX ORDER — INSULIN GLARGINE 100 [IU]/ML
20 INJECTION, SOLUTION SUBCUTANEOUS DAILY
Status: DISCONTINUED | OUTPATIENT
Start: 2021-01-22 | End: 2021-01-22

## 2021-01-22 RX ORDER — INSULIN LISPRO 100 [IU]/ML
INJECTION, SOLUTION INTRAVENOUS; SUBCUTANEOUS
Status: DISCONTINUED | OUTPATIENT
Start: 2021-01-22 | End: 2021-01-24 | Stop reason: HOSPADM

## 2021-01-22 RX ADMIN — Medication 10 ML: at 22:32

## 2021-01-22 RX ADMIN — DEXTROSE MONOHYDRATE 25 G: 25 INJECTION, SOLUTION INTRAVENOUS at 04:57

## 2021-01-22 RX ADMIN — SODIUM CHLORIDE 250 ML: 900 INJECTION, SOLUTION INTRAVENOUS at 10:45

## 2021-01-22 RX ADMIN — REGADENOSON 0.4 MG: 0.08 INJECTION, SOLUTION INTRAVENOUS at 10:45

## 2021-01-22 RX ADMIN — Medication 10 ML: at 15:24

## 2021-01-22 RX ADMIN — FAMOTIDINE 20 MG: 20 TABLET, FILM COATED ORAL at 09:20

## 2021-01-22 RX ADMIN — Medication 10 ML: at 06:03

## 2021-01-22 RX ADMIN — ACETAMINOPHEN 500 MG: 500 TABLET ORAL at 05:29

## 2021-01-22 RX ADMIN — ACETAMINOPHEN 500 MG: 500 TABLET ORAL at 18:33

## 2021-01-22 RX ADMIN — AMLODIPINE BESYLATE 5 MG: 5 TABLET ORAL at 09:20

## 2021-01-22 RX ADMIN — INSULIN LISPRO 5 UNITS: 100 INJECTION, SOLUTION INTRAVENOUS; SUBCUTANEOUS at 15:42

## 2021-01-22 RX ADMIN — AZITHROMYCIN MONOHYDRATE 500 MG: 250 TABLET ORAL at 09:20

## 2021-01-22 RX ADMIN — ENOXAPARIN SODIUM 40 MG: 40 INJECTION SUBCUTANEOUS at 09:20

## 2021-01-22 RX ADMIN — GADOBENATE DIMEGLUMINE 16 ML: 529 INJECTION, SOLUTION INTRAVENOUS at 17:08

## 2021-01-22 RX ADMIN — LEVOTHYROXINE SODIUM 100 MCG: 100 TABLET ORAL at 07:46

## 2021-01-22 NOTE — PROGRESS NOTES
Spoke with pt concerning dispo and she stated she prefers to return home with home health. She stated her daughter Pepe Caban is living with her. Called pt's daughter Ms Jenna Torres and she confirmed that she will be with pt at home. She stated she is waiting to hear from [address] [place] [address] to approve personal care aid for pt. She verbally signed Freedom of Choice for [place] [address]. [id number]:   Per pt's daughter Juan Luis Voss, pt will need Medicaid application.   Sent email to Bernard Brown of [facility] to contact pt's daughter    McculloughMELISSA SladeN RN  Care Management  Pager: [phone]

## 2021-01-22 NOTE — ROUTINE PROCESS
Critical serum glucose value called of 40. Accucheck  61. 1 amp D50 pushed. Juice provided. Pt asymptomatic, alert and verbal.    0524-Accucheck 222. Provider notified. Humalog sliding scale changed to standard normal sensitivity.

## 2021-01-22 NOTE — PROGRESS NOTES
Bedside shift change report given to Eloisa May (oncoming nurse) by Fernanda Thompson RN (offgoing nurse). Report included the following information SBAR, Kardex, MAR and Recent Results.

## 2021-01-22 NOTE — PROGRESS NOTES
*ATTENTION:  This note has been created by a medical student for educational purposes only. Please do not refer to the content of this note for clinical decision-making, billing, or other purposes. Please see attending physicians note to obtain clinical information on this patient. *       Student Progress Note  Please refer to attendings daily rounding note for full details      Patient: Brian Gil MRN: [id number]  CSN: [id number]    YOB: 1950  Age: 65 y.o. Sex: female    DOA: 1/19/2021 LOS:  LOS: 3 days          Subjective:   Patient is feeling fine this morning with no new complaints. She was experiencing left hip pain this morning, but received Tylenol which relieved that pain. She has an occasional dry cough. She denies chest pain, SOB, or abdominal pain. Objective:      Visit Vitals  BP (!) 142/79   Pulse 73   Temp 97.8 °F (36.6 °C)   Resp 18   Ht 5' 6\" (1.676 m)   Wt 82.1 kg (181 lb)   SpO2 98%   BMI 29.21 kg/m²         Physical Exam:  Gen: Patient is sitting up in bed in no acute distress. HEENT: Normocephalic, atraumatic. EOMI, anicteric sclera. Neck supple, no JVD. CV: RRR, normal S1, S2. No murmurs, rubs or gallops. Resp: CTA, no wheezing or rhonchi. Abd: Soft, non-distended. Normoactive bowel sounds. No tenderness to palpation. Extrem: No pedal edema. Full ROM. Skin: Warm, dry. No erythema. I have reviewed the patient's Labs and Radiology studies. CXR 1/19: No radiographic evidence of acute cardiopulmonary process. CT Chest/ABD/Pelvis 1/19:  No acute pathology appreciated. Cystic process in the pancreas better appreciated on prior imaging. However, follow-up MRI with and without contrast is recommended for better delineation. Left hip osteonecrosis with severe secondary osteoarthritis. Old L4 compression fracture.   Small umbilical fat hernia    EKG 1/21:   Normal sinus rhythm   Normal ECG   When compared with ECG of 19-JAN-2021 15:13, Nonspecific T wave abnormality no longer evident in Lateral leads     ECHO: LV: Estimated LVEF is 55 - 60%. Visually measured ejection fraction. Normal cavity size, wall thickness and systolic function (ejection fraction normal). Wall motion: normal. Inconclusive left ventricular diastolic function. Nuclear Stress test: pending     Assessment:   DKA on admission - resolved    Hyperkalemia - resolved. K 8 on admission, now 3.5    Acidemia with AG metabolic acidosis due to DKA   pH 6.99, anion gap 25 on admission  Anion gap closed, now 8. Lactic acidosis - resolved   2.7 on admission. 0.8 on 1/20     BRENDA on CKD III - resolved   Proteinuria due to T2DM  Prerenal due to DKA   Cr now 0.9; 1.95 on admission     Elevated Lipase  CT evidence of cystic process in the pancreas     Leukocytosis - resolved   WBC now 6.1     Elevated troponin - likely due to demand ischemia. MI ruled out by cardiology. Echo LVEF 55-60%, normal cavity size, wall thickness and systolic function. NST pending. HTN - controlled on current medications   T2DM, A1c 9.5  Chronic anemia, multifactorial   Osteoarthritis  Chronic hip pain - osteonecrosis of L femoral head   Hypothyroid     H/o colon cancer s/p hemicolectomy and chemo   Last colonoscopy 2019        Plan:   Cardiology, gastroenterology and nephrology on board. Continue azithromycin 500 mg tablet PO daily. Continue to hold losartan per nephrology. Avoid nephrotoxins. MRI abdomen/pancreas today. Test for exocrine pancreatic insufficieny per GI - waiting for fecal elastase/fat results. Nuclear stress test today, pending. Continue Norvasc 5 mg tablet PO daily. Continue Lantus 20 unit SQ injection daily. Continue Humalog SQ injection before meals and nightly. Continue diabetic diet, monitor blood glucose. Continue synthroid 100 mcg tablet PO daily before breakfast.   DVT prophylaxis - Lovenox 40 mg SQ daily.        Merritt Fore  1/22/2021  9:52 AM  *ATTENTION:  This note has been created by a medical student for educational purposes only. Please do not refer to the content of this note for clinical decision-making, billing, or other purposes. Please see attending physicians note to obtain clinical information on this patient. *

## 2021-01-22 NOTE — PROGRESS NOTES
[facility]      CARDIOLOGY PROGRESS NOTE  RECS:      1. Mildly elevated troponin-likely demand ischemia-  Recent ekg shows Nonspecific ST abnormality- will obtain NST today to r/o CAD. Follow up echo to assess lfv,rvf. 2. DKA- on admission resolved  3. Hypertension- fairly controlled on present medications- monitor   4. Hyperkalemia- likely due to #2  with K+ of 8 on admission- improved  5. Hx of Colon cancer (2014) s/p hemicolectomy and chemo  6. Chronic Hip pain-Left Osteonecrosis with secondary oseoarthritis  7. BRENDA- renal indices improved. Renal following    8. Elevated lipase. CT evidence of cystic disease in pancrease- levels improving- treatment per GI and medical team       No ischemia on stress test.  Continue supportive care  Can proceed to hip replacement if needed with low cardiac risk. ASSESSMENT:  Hospital Problems  Date Reviewed: 12/30/2016          Codes Class Noted POA    Hyperkalemia ICD-10-CM: E87.5  ICD-9-CM: 276.7  1/19/2021 Unknown        [facility]acidoses) [facility] ICD-10-CM: E11.10  ICD-9-CM: 250.12  1/19/2021 Unknown                SUBJECTIVE:  No CP  No SOB  No c/o voiced    OBJECTIVE:    VS:   Visit Vitals  /85   Pulse 73   Temp 97.8 °F (36.6 °C)   Resp 18   Ht 5' 6\" (1.676 m)   Wt 82.1 kg (181 lb)   SpO2 98%   BMI 29.21 kg/m²       No intake or output data in the 24 hours ending 01/22/21 1115  TELE: normal sinus rhythm    General: alert, well developed, pleasant and in no apparent distress  HENT: Normocephalic, atraumatic. Normal external eye.   Neck :  no bruit, no JVD  Cardiac:  regular rate and rhythm, S1, S2 normal, no murmur, click, rub or gallop  Lungs: clear to auscultation bilaterally  Abdomen: Soft, nontender, no masses  Extremities:  No c/c/e, peripheral pulses present      Labs: Results:       Chemistry Recent Labs     01/22/21  0211 01/21/21  0512 01/20/21  1447 01/19/21  1317 01/19/21  1317   GLU 40* 180* 102*   < > 964*    138 142   < > 124*   K 3.5 3.8 3.9   < > 8.0*    106 108   < > 92*   CO2 27 24 24   < > 7*   BUN 14 13 22*   < > 49*   CREA 0.90 0.89 1.27   < > 1.95*   CA 8.6 8.4* 8.2*   < > 9.2   AGAP 8 8 10   < > 25*   BUCR 16 15 17   < > 25*   AP  --   --   --   --  111   TP  --   --   --   --  7.9   ALB  --   --   --   --  3.7   GLOB  --   --   --   --  4.2*   AGRAT  --   --   --   --  0.9    < > = values in this interval not displayed. CBC w/Diff Recent Labs     01/22/21  0211 01/21/21  0512 01/20/21  0722   WBC 6.1 8.7 17.2*   RBC 3.69* 3.40* 3.42*   HGB 9.6* 9.5* 9.2*   HCT 30.6* 28.2* 27.7*    272 307   GRANS 59 75* 79*   LYMPH 30 16* 10*   EOS 1 0 0      Cardiac Enzymes Recent Labs     01/21/21  1024 01/21/21  0512   CPK 84 94   CKND1 CALCULATION NOT PERFORMED WHEN RESULT IS BELOW LINEAR LIMIT CALCULATION NOT PERFORMED WHEN RESULT IS BELOW LINEAR LIMIT      Coagulation No results for input(s): PTP, INR, APTT, INREXT in the last 72 hours. Lipid Panel Lab Results   Component Value Date/Time    Cholesterol, total 299 (H) 11/03/2011 09:26 AM    HDL Cholesterol 204 (H) 11/03/2011 09:26 AM    LDL, calculated 86.6 11/03/2011 09:26 AM    VLDL, calculated 8.4 11/03/2011 09:26 AM    Triglyceride 42 11/03/2011 09:26 AM    CHOL/HDL Ratio 1.5 11/03/2011 09:26 AM      BNP No results for input(s): BNPP in the last 72 hours. Liver Enzymes Recent Labs     01/19/21  1317   TP 7.9   ALB 3.7         Thyroid Studies Lab Results   Component Value Date/Time    T4, Total 2.6 (L) 11/03/2011 09:26 AM    T3 Uptake 27 08/18/2011 02:11 PM    TSH 3.91 (H) 01/19/2021 04:35 PM              Sydnie KUMAR supervised Carol:[phone]    I have independently evaluated and examined the patient. All relevant labs and testing data's are reviewed. Care plan discussed and updated after review.     Cooper Bueno MD    1

## 2021-01-22 NOTE — PROGRESS NOTES
[facility] Hospitalist Group  Progress Note    Patient: Turner Vargas Age: 65 y.o. : 1950 MR#: [id number] [id number]: [id number]  Date: 2021   ICU admission  DKA, elevated lipase, pH 6.99, hyperkalemia 8, BRENDA, lactic acidosis. Nephrology consulted in ER with recommendation to manage hyperkalemia and DKA. Insulin infusion. Monitor potassium and renal function. Diabetes management consulted for medication recommendations and education. BioFire negative and COVID-19 negative    Subjective:   Pt seen & evaluated , lying in bed , NAD   Underwent Nuc stress test today , awaiting reports     Assessment/Plan:   1. DKA  2. Hyperkalemia. Improved    3. Acidemia with anion gap metabolic acidosis due to DKA  4. Lactic acidosis  5. BRENDA on CKD III with proteinuria due to DMT2. Etiology prerenal azotemia in setting of DKA. Improved. 6. Elevated lipase. CT evidence of cystic disease in pancreas  7. Leukocytosis   8. NSTEMI  9. HTN  10. DMT2. A1c 9.5  11. OA   12. Chronic hip pain, osteonecrosis of the left femoral head via CT A/P. Old L4 compression fracture. 13. Hypothyroid  14. History of colon cancer s/p chemo    Plan  DKA - resolved   T2DM - episode of low BS noted yesterday - Diabetic management on board   Pseudocyst pancreas - MRI Abd per GI pending   Elevated Trop - underwent Nuc stress test , Normal per cardiology.   Continue current management , will follow   DNR and DNI        Case discussed with:  [x]Patient  []Family  [x]Nursing  []Case Management  DVT Prophylaxis:  []Lovenox  []Hep SQ  []SCDs  []Coumadin   []On Heparin gtt    Objective:   VS:   Visit Vitals  /84   Pulse (!) 103   Temp 97.4 °F (36.3 °C)   Resp 18   Ht 5' 6\" (1.676 m)   Wt 82.1 kg (181 lb)   SpO2 98%   BMI 29.21 kg/m²      Tmax/24hrs: Temp (24hrs), Av.9 °F (36.6 °C), Min:97.4 °F (36.3 °C), Max:98.4 °F (36.9 °C)    No intake or output data in the 24 hours ending 21 1444    General:  Alert, NAD  Cardiovascular:  RRR  Pulmonary:  LSC throughout; respiratory effort WNL  GI:  +BS in all four quadrants, soft, non-tender  Extremities:  No edema; 2+ dorsalis pedis pulses bilaterally  Neuro: alert and oriented 2-3        Labs:    Recent Results (from the past 24 hour(s))   ECHO ADULT COMPLETE    Collection Time: 01/21/21  3:15 PM   Result Value Ref Range    IVSd 1.04 (A) 0.6 - 0.9 cm    LVIDd 4.37 3.9 - 5.3 cm    LVIDs 2.90 cm    LVOT d 2.12 cm    LVPWd 0.95 (A) 0.6 - 0.9 cm    LA Volume 45.51 22 - 52 mL    LA Area 4C 14.45 cm2    LA Vol 2C 49.87 22 - 52 mL    LA Vol 4C 29.06 22 - 52 mL    Ao Root D 3.14 cm    LV Mass .2 67 - 162 g    LV Mass AL Index 75.7 43 - 95 g/m2    LA Vol Index 23.74 16 - 28 ml/m2    LA Vol Index 26.01 16 - 28 ml/m2    LA Vol Index 15.16 16 - 28 ml/m2   GLUCOSE, POC    Collection Time: 01/21/21  3:28 PM   Result Value Ref Range    Glucose (POC) 84 70 - 110 mg/dL   EKG, 12 LEAD, SUBSEQUENT    Collection Time: 01/21/21  3:34 PM   Result Value Ref Range    Ventricular Rate 89 BPM    Atrial Rate 89 BPM    P-R Interval 134 ms    QRS Duration 80 ms    Q-T Interval 380 ms    QTC Calculation (Bezet) 462 ms    Calculated P Axis 67 degrees    Calculated R Axis 57 degrees    Calculated T Axis 55 degrees    Diagnosis       Normal sinus rhythm  Normal ECG  When compared with ECG of 19-JAN-2021 15:13,  Nonspecific T wave abnormality no longer evident in Lateral leads  Confirmed by Roz Kocher, MD, Delmer Shepard ([id number]) on 1/21/2021 5:57:10 PM     GLUCOSE, POC    Collection Time: 01/21/21  9:18 PM   Result Value Ref Range    Glucose (POC) 334 (H) 70 - 110 mg/dL   CBC WITH AUTOMATED DIFF    Collection Time: 01/22/21  2:11 AM   Result Value Ref Range    WBC 6.1 4.6 - 13.2 K/uL    RBC 3.69 (L) 4.20 - 5.30 M/uL    HGB 9.6 (L) 12.0 - 16.0 g/dL    HCT 30.6 (L) 35.0 - 45.0 %    MCV 82.9 74.0 - 97.0 FL    MCH 26.0 24.0 - 34.0 PG    MCHC 31.4 31.0 - 37.0 g/dL    RDW 15.3 (H) 11.6 - 14.5 %    PLATELET 471 558 - 114 K/uL    MPV 9.4 9.2 - 11.8 FL    NEUTROPHILS 59 40 - 73 %    LYMPHOCYTES 30 21 - 52 %    MONOCYTES 10 3 - 10 %    EOSINOPHILS 1 0 - 5 %    BASOPHILS 0 0 - 2 %    ABS. NEUTROPHILS 3.6 1.8 - 8.0 K/UL    ABS. LYMPHOCYTES 1.8 0.9 - 3.6 K/UL    ABS. MONOCYTES 0.6 0.05 - 1.2 K/UL    ABS. EOSINOPHILS 0.0 0.0 - 0.4 K/UL    ABS.  BASOPHILS 0.0 0.0 - 0.1 K/UL    DF AUTOMATED     METABOLIC PANEL, BASIC    Collection Time: 01/22/21  2:11 AM   Result Value Ref Range    Sodium 142 136 - 145 mmol/L    Potassium 3.5 3.5 - 5.5 mmol/L    Chloride 107 100 - 111 mmol/L    CO2 27 21 - 32 mmol/L    Anion gap 8 3.0 - 18 mmol/L    Glucose 40 (LL) 74 - 99 mg/dL    BUN 14 7.0 - 18 MG/DL    Creatinine 0.90 0.6 - 1.3 MG/DL    BUN/Creatinine ratio 16 12 - 20      GFR est AA >60 >60 ml/min/1.73m2    GFR est non-AA >60 >60 ml/min/1.73m2    Calcium 8.6 8.5 - 10.1 MG/DL   MAGNESIUM    Collection Time: 01/22/21  2:11 AM   Result Value Ref Range    Magnesium 1.7 1.6 - 2.6 mg/dL   PHOSPHORUS    Collection Time: 01/22/21  2:11 AM   Result Value Ref Range    Phosphorus 2.0 (L) 2.5 - 4.9 MG/DL   LIPASE    Collection Time: 01/22/21  2:11 AM   Result Value Ref Range    Lipase 426 (H) 73 - 393 U/L   GLUCOSE, POC    Collection Time: 01/22/21  4:52 AM   Result Value Ref Range    Glucose (POC) 61 (L) 70 - 110 mg/dL   GLUCOSE, POC    Collection Time: 01/22/21  5:23 AM   Result Value Ref Range    Glucose (POC) 222 (H) 70 - 110 mg/dL   GLUCOSE, POC    Collection Time: 01/22/21  7:48 AM   Result Value Ref Range    Glucose (POC) 239 (H) 70 - 110 mg/dL   GLUCOSE, POC    Collection Time: 01/22/21  8:12 AM   Result Value Ref Range    Glucose (POC) 246 (H) 70 - 110 mg/dL   NUCLEAR CARDIAC STRESS TEST    Collection Time: 01/22/21 12:20 PM   Result Value Ref Range    Target  bpm    Exercise duration time 00:04:00     Stress Base Systolic  mmHg    Stress Base Diastolic BP 85 mmHg    Post peak  BPM    Baseline HR 71 BPM    Estimated workload 1.0 METS Baseline  mmHg    Percent HR 70 %    Stress Base Diastolic BP 85 mmHg    Stress Rate Pressure Product 14,352 BPM*mmHg   GLUCOSE, POC    Collection Time: 01/22/21  1:06 PM   Result Value Ref Range    Glucose (POC) 412 (HH) 70 - 110 mg/dL   GLUCOSE, POC    Collection Time: 01/22/21  1:08 PM   Result Value Ref Range    Glucose (POC) 374 (H) 70 - 110 mg/dL       Signed By: Marcia Norman MD     January 22, 2021

## 2021-01-22 NOTE — PROGRESS NOTES
Patient was given 10.0 milliCuries of 99mTc-Sestamibi for the resting images. Patient was also given 30.0 milliCuries of 99mTc-Sestamibi for the stress images. Injected with 0.4mg Lexiscan.         Patient's armband was left on and sent back to room

## 2021-01-22 NOTE — PROGRESS NOTES
[email or web address]  [phone]    Gastroenterology follow up-Progress note    Impression:  1. Pancreatic cyst- 5-6mm cyst noted on CT in 2015- it was apparently unchanged from a previous imaging study that we do not have access to. No suspicious features at that time. She has not had f/u imaging until now- recent CT was unable to fully evaluate the area due to motion/artifact. Will get MRI abd/pancreas  2. H/O pancreatitis (2015)- per patient reports- states the CT done at that time was during hospitalization for pancreatitis though no associated hospital stay is noted with the dates of that imaging- it appears the imaging was performed for her colon cancer evaluation. States she has had one previous episode of pancreatitis in 2015.   3. Elevated lipase- improving, no epigastric pain or pancreatic inflammation noted on imaging- which if imaging at onset of symptoms can be normal- doubtful this is acute pancreatitis. 4. DKA, improved  5. Nausea and vomiting; resolved  6. H/o Colon cancer (2014) s/p hemicolectomy and chemo- last colonoscopy for surveillance was in 2019 by Dr. Estephania Pereira  7. CKD stage III  8. HTN  9. Chronic anemia, likely multifactorial; no evidence of GI bleeding  10. Pancreatic atrophy- incidental finding on imaging- will test for exocrine pancreatic insufficiency. Plan:  1. Given the time frame of the presence of the pancreatic cyst this is likely benign. Since current imaging cannot fully evaluate we will get MRI and determine if she requires EUS or future surveillance. Await MRI- has not yet been performed  2. Await fecal elastase/fecal fat results; if these are not obtained as inpatient this can be easily obtained as an outpatient  3. Medical management per primary team    Chief Complaint: pancreatic cyst      Subjective:  No complaints. Feels great. No abd pain, N/V. No BMs yet. ROS: Denies any fevers, chills, rash.      Eyes: conjunctiva normal, EOM normal   Neck: ROM normal, supple and trachea normal   Cardiovascular: heart normal, intact distal pulses, normal rate and regular rhythm   Pulmonary/Chest Wall: respiratory effort normal   Abdominal: appearance normal, bowel sounds normal and soft, non-acute, non-tender     Patient Active Problem List   Diagnosis Code    Hyperkalemia E87.5    DKA (diabetic ketoacidoses) (HCC) E11.10         Visit Vitals  BP (!) 142/79   Pulse 73   Temp 97.8 °F (36.6 °C)   Resp 18   Ht 5' 6\" (1.676 m)   Wt 82.1 kg (181 lb)   SpO2 98%   BMI 29.21 kg/m²         No intake or output data in the 24 hours ending 01/22/21 0931    CBC w/Diff    Lab Results   Component Value Date/Time    WBC 6.1 01/22/2021 02:11 AM    RBC 3.69 (L) 01/22/2021 02:11 AM    HGB 9.6 (L) 01/22/2021 02:11 AM    HCT 30.6 (L) 01/22/2021 02:11 AM    MCV 82.9 01/22/2021 02:11 AM    MCH 26.0 01/22/2021 02:11 AM    MCHC 31.4 01/22/2021 02:11 AM    RDW 15.3 (H) 01/22/2021 02:11 AM     01/22/2021 02:11 AM    Lab Results   Component Value Date/Time    GRANS 59 01/22/2021 02:11 AM    LYMPH 30 01/22/2021 02:11 AM    EOS 1 01/22/2021 02:11 AM    BANDS 0 08/18/2011 01:54 PM    BASOS 0 01/22/2021 02:11 AM    MYELO 0 08/18/2011 01:54 PM    METAS 0 08/18/2011 01:54 PM    PRO 0 08/18/2011 01:54 PM    BLAST 0 08/18/2011 01:54 PM      Basic Metabolic Profile   Recent Labs     01/22/21  0211      K 3.5      CO2 27   BUN 14   CA 8.6   MG 1.7   PHOS 2.0*        Hepatic Function    Lab Results   Component Value Date/Time    ALB 3.7 01/19/2021 01:17 PM    TP 7.9 01/19/2021 01:17 PM     01/19/2021 01:17 PM    No results found for: TBIL       Coags   No results for input(s): PTP, INR, APTT, INREXT in the last 72 hours. Jaqueline Santos NP    [facility]. [email or web address]  Phone: [phone]  Pager: [phone]

## 2021-01-22 NOTE — DIABETES MGMT
Glycemic Control Plan of Care    Recommend decreasing lantus to 20 units daily - order obtained     Currently NPO for stress test -  Had overnight low BG -  Lab 40 mg/dl - she is prone to morning hypoglycemia  BG currently 246 mg/dl   Will give lantus 20 units today and follow   When diet resumes, may need additional mealtime lispro - add HS snack      Your A1C  was   Lab Results   Component Value Date/Time    Hemoglobin A1c 9.5 (H) 01/20/2021 07:22 AM     Current hospital diabetes medications:   Lantus 20 units daily  corrective lispro, very insulin resistant, 4 times daily   TDD previous day =  57 units  Lantus 30 units  Lispro 27 units   Home diabetes medications:    Metformin  mg  Novolog scale insulin 3 times daily before meals  Lantus insulin 30 units daily at bedtime      June Jon MPH RN CDE  Pager [phone]

## 2021-01-23 LAB
ANION GAP SERPL CALC-SCNC: 6 MMOL/L (ref 3–18)
BASOPHILS # BLD: 0 K/UL (ref 0–0.1)
BASOPHILS NFR BLD: 1 % (ref 0–2)
BUN SERPL-MCNC: 12 MG/DL (ref 7–18)
BUN/CREAT SERPL: 13 (ref 12–20)
CALCIUM SERPL-MCNC: 8.7 MG/DL (ref 8.5–10.1)
CHLORIDE SERPL-SCNC: 104 MMOL/L (ref 100–111)
CO2 SERPL-SCNC: 28 MMOL/L (ref 21–32)
CREAT SERPL-MCNC: 0.94 MG/DL (ref 0.6–1.3)
DIFFERENTIAL METHOD BLD: ABNORMAL
EOSINOPHIL # BLD: 0.1 K/UL (ref 0–0.4)
EOSINOPHIL NFR BLD: 2 % (ref 0–5)
ERYTHROCYTE [DISTWIDTH] IN BLOOD BY AUTOMATED COUNT: 15.1 % (ref 11.6–14.5)
GLUCOSE BLD STRIP.AUTO-MCNC: 116 MG/DL (ref 70–110)
GLUCOSE BLD STRIP.AUTO-MCNC: 199 MG/DL (ref 70–110)
GLUCOSE BLD STRIP.AUTO-MCNC: 269 MG/DL (ref 70–110)
GLUCOSE BLD STRIP.AUTO-MCNC: 385 MG/DL (ref 70–110)
GLUCOSE BLD STRIP.AUTO-MCNC: 401 MG/DL (ref 70–110)
GLUCOSE BLD STRIP.AUTO-MCNC: 426 MG/DL (ref 70–110)
GLUCOSE BLD STRIP.AUTO-MCNC: 467 MG/DL (ref 70–110)
GLUCOSE SERPL-MCNC: 262 MG/DL (ref 74–99)
HCT VFR BLD AUTO: 31.4 % (ref 35–45)
HGB BLD-MCNC: 10 G/DL (ref 12–16)
LIPASE SERPL-CCNC: 289 U/L (ref 73–393)
LYMPHOCYTES # BLD: 1.5 K/UL (ref 0.9–3.6)
LYMPHOCYTES NFR BLD: 30 % (ref 21–52)
MAGNESIUM SERPL-MCNC: 1.6 MG/DL (ref 1.6–2.6)
MCH RBC QN AUTO: 26.4 PG (ref 24–34)
MCHC RBC AUTO-ENTMCNC: 31.8 G/DL (ref 31–37)
MCV RBC AUTO: 82.8 FL (ref 74–97)
MONOCYTES # BLD: 0.4 K/UL (ref 0.05–1.2)
MONOCYTES NFR BLD: 8 % (ref 3–10)
NEUTS SEG # BLD: 2.9 K/UL (ref 1.8–8)
NEUTS SEG NFR BLD: 59 % (ref 40–73)
PHOSPHATE SERPL-MCNC: 2.3 MG/DL (ref 2.5–4.9)
PLATELET # BLD AUTO: 245 K/UL (ref 135–420)
PMV BLD AUTO: 9.4 FL (ref 9.2–11.8)
POTASSIUM SERPL-SCNC: 4 MMOL/L (ref 3.5–5.5)
RBC # BLD AUTO: 3.79 M/UL (ref 4.2–5.3)
SODIUM SERPL-SCNC: 138 MMOL/L (ref 136–145)
WBC # BLD AUTO: 4.9 K/UL (ref 4.6–13.2)

## 2021-01-23 PROCEDURE — 82962 GLUCOSE BLOOD TEST: CPT

## 2021-01-23 PROCEDURE — 83735 ASSAY OF MAGNESIUM: CPT

## 2021-01-23 PROCEDURE — 36415 COLL VENOUS BLD VENIPUNCTURE: CPT

## 2021-01-23 PROCEDURE — 74011250637 HC RX REV CODE- 250/637: Performed by: INTERNAL MEDICINE

## 2021-01-23 PROCEDURE — 74011636637 HC RX REV CODE- 636/637: Performed by: HOSPITALIST

## 2021-01-23 PROCEDURE — 83690 ASSAY OF LIPASE: CPT

## 2021-01-23 PROCEDURE — 84100 ASSAY OF PHOSPHORUS: CPT

## 2021-01-23 PROCEDURE — 74011250637 HC RX REV CODE- 250/637: Performed by: STUDENT IN AN ORGANIZED HEALTH CARE EDUCATION/TRAINING PROGRAM

## 2021-01-23 PROCEDURE — 85025 COMPLETE CBC W/AUTO DIFF WBC: CPT

## 2021-01-23 PROCEDURE — 74011250636 HC RX REV CODE- 250/636: Performed by: INTERNAL MEDICINE

## 2021-01-23 PROCEDURE — 80048 BASIC METABOLIC PNL TOTAL CA: CPT

## 2021-01-23 PROCEDURE — 99232 SBSQ HOSP IP/OBS MODERATE 35: CPT | Performed by: HOSPITALIST

## 2021-01-23 PROCEDURE — 74011250637 HC RX REV CODE- 250/637: Performed by: NURSE PRACTITIONER

## 2021-01-23 PROCEDURE — 65270000029 HC RM PRIVATE

## 2021-01-23 RX ORDER — INSULIN LISPRO 100 [IU]/ML
10 INJECTION, SOLUTION INTRAVENOUS; SUBCUTANEOUS ONCE
Status: COMPLETED | OUTPATIENT
Start: 2021-01-23 | End: 2021-01-23

## 2021-01-23 RX ORDER — INSULIN GLARGINE 100 [IU]/ML
10 INJECTION, SOLUTION SUBCUTANEOUS EVERY 12 HOURS
Status: DISCONTINUED | OUTPATIENT
Start: 2021-01-23 | End: 2021-01-24

## 2021-01-23 RX ORDER — INSULIN GLARGINE 100 [IU]/ML
10 INJECTION, SOLUTION SUBCUTANEOUS
Status: COMPLETED | OUTPATIENT
Start: 2021-01-23 | End: 2021-01-23

## 2021-01-23 RX ADMIN — Medication 10 ML: at 06:35

## 2021-01-23 RX ADMIN — INSULIN LISPRO 10 UNITS: 100 INJECTION, SOLUTION INTRAVENOUS; SUBCUTANEOUS at 10:37

## 2021-01-23 RX ADMIN — Medication 10 ML: at 15:30

## 2021-01-23 RX ADMIN — AZITHROMYCIN MONOHYDRATE 500 MG: 250 TABLET ORAL at 09:57

## 2021-01-23 RX ADMIN — LEVOTHYROXINE SODIUM 100 MCG: 100 TABLET ORAL at 09:57

## 2021-01-23 RX ADMIN — AMLODIPINE BESYLATE 5 MG: 5 TABLET ORAL at 09:57

## 2021-01-23 RX ADMIN — Medication 10 ML: at 21:46

## 2021-01-23 RX ADMIN — ACETAMINOPHEN 500 MG: 500 TABLET ORAL at 10:05

## 2021-01-23 RX ADMIN — ENOXAPARIN SODIUM 40 MG: 40 INJECTION SUBCUTANEOUS at 09:57

## 2021-01-23 RX ADMIN — INSULIN LISPRO 15 UNITS: 100 INJECTION, SOLUTION INTRAVENOUS; SUBCUTANEOUS at 21:45

## 2021-01-23 RX ADMIN — INSULIN LISPRO 9 UNITS: 100 INJECTION, SOLUTION INTRAVENOUS; SUBCUTANEOUS at 12:07

## 2021-01-23 RX ADMIN — INSULIN GLARGINE 10 UNITS: 100 INJECTION, SOLUTION SUBCUTANEOUS at 21:45

## 2021-01-23 RX ADMIN — INSULIN LISPRO 15 UNITS: 100 INJECTION, SOLUTION INTRAVENOUS; SUBCUTANEOUS at 08:37

## 2021-01-23 RX ADMIN — INSULIN GLARGINE 10 UNITS: 100 INJECTION, SOLUTION SUBCUTANEOUS at 08:37

## 2021-01-23 RX ADMIN — FAMOTIDINE 20 MG: 20 TABLET, FILM COATED ORAL at 09:57

## 2021-01-23 NOTE — PROGRESS NOTES
Bedside shift change report given to King Renteria (oncoming nurse) by [facility], RN (offgoing nurse). Report included the following information SBAR, Kardex, MAR and Recent Results.

## 2021-01-23 NOTE — PROGRESS NOTES
[facility] Hospitalist Group  Progress Note    Patient: Radha Benson Age: 65 y.o. : 1950 MR#: [id number] SSN: [id number]  Date: 2021   ICU admission  DKA, elevated lipase, pH 6.99, hyperkalemia 8, BRENDA, lactic acidosis. Nephrology consulted in ER with recommendation to manage hyperkalemia and DKA. Insulin infusion. Monitor potassium and renal function. Diabetes management consulted for medication recommendations and education. BioFire negative and COVID-19 negative    Subjective:   Pt seen & evaluated , lying in bed , NAD   Underwent Nuc stress test today , awaiting reports     Assessment/Plan:   1. DKA  2. Hyperkalemia. Improved    3. Acidemia with anion gap metabolic acidosis due to DKA  4. Lactic acidosis  5. BRENDA on CKD III with proteinuria due to DMT2. Etiology prerenal azotemia in setting of DKA. Improved. 6. Elevated lipase. CT evidence of cystic disease in pancreas  7. Leukocytosis   8. NSTEMI  9. HTN  10. DMT2. A1c 9.5  11. OA   12. Chronic hip pain, osteonecrosis of the left femoral head via CT A/P. Old L4 compression fracture. 13. Hypothyroid  14. History of colon cancer s/p chemo    Plan  DKA - resolved   T2DM - episode of low BS noted yesterday - Diabetic management on board - BS elevated today , will switch to Lantus BID & continue to monitor BS   Pseudocyst pancreas - MRI Abd per GI pending results   D/c Zithromax   Elevated Trop - underwent Nuc stress test , Normal per cardiology.   Continue current management , will follow   DNR and DNI    Anticipate discharge home on Monday if able to control sugars better     Case discussed with:  [x]Patient  []Family  [x]Nursing  []Case Management  DVT Prophylaxis:  []Lovenox  []Hep SQ  []SCDs  []Coumadin   []On Heparin gtt    Objective:   VS:   Visit Vitals  BP (!) 147/78   Pulse 93   Temp 97.5 °F (36.4 °C)   Resp 20   Ht 5' 6\" (1.676 m)   Wt 82.1 kg (181 lb)   SpO2 97%   BMI 29.21 kg/m²      Tmax/24hrs: Temp (24hrs), Av.8 °F (36.6 °C), Min:97.4 °F (36.3 °C), Max:98.3 °F (36.8 °C)    No intake or output data in the 24 hours ending 21 1115    General:  Alert, NAD  Cardiovascular:  RRR  Pulmonary:  LSC throughout; respiratory effort WNL  GI:  +BS in all four quadrants, soft, non-tender  Extremities:  No edema; 2+ dorsalis pedis pulses bilaterally  Neuro: alert and oriented 2-3        Labs:    Recent Results (from the past 24 hour(s))   NUCLEAR CARDIAC STRESS TEST    Collection Time: 21 12:20 PM   Result Value Ref Range    Target  bpm    Exercise duration time 00:04:00     Stress Base Systolic  mmHg    Stress Base Diastolic BP 85 mmHg    Post peak  BPM    Baseline HR 71 BPM    Estimated workload 1.0 METS    Baseline  mmHg    Percent HR 70 %    Stress Base Diastolic BP 85 mmHg    Stress Rate Pressure Product 14,352 BPM*mmHg    ST Elevation (mm) 0 mm    ST Depression (mm) 0 mm   GLUCOSE, POC    Collection Time: 21  1:06 PM   Result Value Ref Range    Glucose (POC) 412 (HH) 70 - 110 mg/dL   GLUCOSE, POC    Collection Time: 21  1:08 PM   Result Value Ref Range    Glucose (POC) 374 (H) 70 - 110 mg/dL   GLUCOSE, POC    Collection Time: 21  3:13 PM   Result Value Ref Range    Glucose (POC) 401 (HH) 70 - 110 mg/dL   GLUCOSE, POC    Collection Time: 21  3:15 PM   Result Value Ref Range    Glucose (POC) 412 (HH) 70 - 110 mg/dL   GLUCOSE, POC    Collection Time: 21  8:56 PM   Result Value Ref Range    Glucose (POC) 263 (H) 70 - 110 mg/dL   CBC WITH AUTOMATED DIFF    Collection Time: 21  3:20 AM   Result Value Ref Range    WBC 4.9 4.6 - 13.2 K/uL    RBC 3.79 (L) 4.20 - 5.30 M/uL    HGB 10.0 (L) 12.0 - 16.0 g/dL    HCT 31.4 (L) 35.0 - 45.0 %    MCV 82.8 74.0 - 97.0 FL    MCH 26.4 24.0 - 34.0 PG    MCHC 31.8 31.0 - 37.0 g/dL    RDW 15.1 (H) 11.6 - 14.5 %    PLATELET 108 567 - 182 K/uL    MPV 9.4 9.2 - 11.8 FL    NEUTROPHILS 59 40 - 73 %    LYMPHOCYTES 30 21 - 52 %    MONOCYTES 8 3 - 10 %    EOSINOPHILS 2 0 - 5 %    BASOPHILS 1 0 - 2 %    ABS. NEUTROPHILS 2.9 1.8 - 8.0 K/UL    ABS. LYMPHOCYTES 1.5 0.9 - 3.6 K/UL    ABS. MONOCYTES 0.4 0.05 - 1.2 K/UL    ABS. EOSINOPHILS 0.1 0.0 - 0.4 K/UL    ABS.  BASOPHILS 0.0 0.0 - 0.1 K/UL    DF AUTOMATED     METABOLIC PANEL, BASIC    Collection Time: 01/23/21  3:20 AM   Result Value Ref Range    Sodium 138 136 - 145 mmol/L    Potassium 4.0 3.5 - 5.5 mmol/L    Chloride 104 100 - 111 mmol/L    CO2 28 21 - 32 mmol/L    Anion gap 6 3.0 - 18 mmol/L    Glucose 262 (H) 74 - 99 mg/dL    BUN 12 7.0 - 18 MG/DL    Creatinine 0.94 0.6 - 1.3 MG/DL    BUN/Creatinine ratio 13 12 - 20      GFR est AA >60 >60 ml/min/1.73m2    GFR est non-AA 59 (L) >60 ml/min/1.73m2    Calcium 8.7 8.5 - 10.1 MG/DL   MAGNESIUM    Collection Time: 01/23/21  3:20 AM   Result Value Ref Range    Magnesium 1.6 1.6 - 2.6 mg/dL   PHOSPHORUS    Collection Time: 01/23/21  3:20 AM   Result Value Ref Range    Phosphorus 2.3 (L) 2.5 - 4.9 MG/DL   LIPASE    Collection Time: 01/23/21  3:20 AM   Result Value Ref Range    Lipase 289 73 - 393 U/L   GLUCOSE, POC    Collection Time: 01/23/21  8:08 AM   Result Value Ref Range    Glucose (POC) 467 (HH) 70 - 110 mg/dL   GLUCOSE, POC    Collection Time: 01/23/21  8:09 AM   Result Value Ref Range    Glucose (POC) 426 (HH) 70 - 110 mg/dL   GLUCOSE, POC    Collection Time: 01/23/21  9:59 AM   Result Value Ref Range    Glucose (POC) 401 (HH) 70 - 110 mg/dL       Signed By: Claudio Madrid MD     January 23, 2021

## 2021-01-23 NOTE — PROGRESS NOTES
Paged attending provider in regards to pt /[phone] rechk. Advised provider tht per pt did not want to take insulin last night due to fear of bottoming out. Per provider will give patient 15 humalog now and also 10 units lantus now. Will continue to monitor bg .

## 2021-01-24 VITALS
DIASTOLIC BLOOD PRESSURE: 84 MMHG | RESPIRATION RATE: 18 BRPM | HEART RATE: 100 BPM | TEMPERATURE: 97.8 F | OXYGEN SATURATION: 99 % | HEIGHT: 66 IN | SYSTOLIC BLOOD PRESSURE: 137 MMHG | BODY MASS INDEX: 29.09 KG/M2 | WEIGHT: 181 LBS

## 2021-01-24 LAB
ANION GAP SERPL CALC-SCNC: 8 MMOL/L (ref 3–18)
BASOPHILS # BLD: 0 K/UL (ref 0–0.1)
BASOPHILS NFR BLD: 0 % (ref 0–2)
BUN SERPL-MCNC: 19 MG/DL (ref 7–18)
BUN/CREAT SERPL: 18 (ref 12–20)
CALCIUM SERPL-MCNC: 8.6 MG/DL (ref 8.5–10.1)
CHLORIDE SERPL-SCNC: 107 MMOL/L (ref 100–111)
CO2 SERPL-SCNC: 25 MMOL/L (ref 21–32)
CREAT SERPL-MCNC: 1.03 MG/DL (ref 0.6–1.3)
DIFFERENTIAL METHOD BLD: ABNORMAL
EOSINOPHIL # BLD: 0.1 K/UL (ref 0–0.4)
EOSINOPHIL NFR BLD: 2 % (ref 0–5)
ERYTHROCYTE [DISTWIDTH] IN BLOOD BY AUTOMATED COUNT: 15.3 % (ref 11.6–14.5)
GLUCOSE BLD STRIP.AUTO-MCNC: 150 MG/DL (ref 70–110)
GLUCOSE BLD STRIP.AUTO-MCNC: 155 MG/DL (ref 70–110)
GLUCOSE BLD STRIP.AUTO-MCNC: 313 MG/DL (ref 70–110)
GLUCOSE SERPL-MCNC: 70 MG/DL (ref 74–99)
HCT VFR BLD AUTO: 31.1 % (ref 35–45)
HGB BLD-MCNC: 10.2 G/DL (ref 12–16)
LIPASE SERPL-CCNC: 315 U/L (ref 73–393)
LYMPHOCYTES # BLD: 1.3 K/UL (ref 0.9–3.6)
LYMPHOCYTES NFR BLD: 25 % (ref 21–52)
MAGNESIUM SERPL-MCNC: 1.7 MG/DL (ref 1.6–2.6)
MCH RBC QN AUTO: 27.1 PG (ref 24–34)
MCHC RBC AUTO-ENTMCNC: 32.8 G/DL (ref 31–37)
MCV RBC AUTO: 82.7 FL (ref 74–97)
MONOCYTES # BLD: 0.5 K/UL (ref 0.05–1.2)
MONOCYTES NFR BLD: 10 % (ref 3–10)
NEUTS SEG # BLD: 3.4 K/UL (ref 1.8–8)
NEUTS SEG NFR BLD: 63 % (ref 40–73)
PHOSPHATE SERPL-MCNC: 3.4 MG/DL (ref 2.5–4.9)
PLATELET # BLD AUTO: 278 K/UL (ref 135–420)
PMV BLD AUTO: 10 FL (ref 9.2–11.8)
POTASSIUM SERPL-SCNC: 4 MMOL/L (ref 3.5–5.5)
RBC # BLD AUTO: 3.76 M/UL (ref 4.2–5.3)
SODIUM SERPL-SCNC: 140 MMOL/L (ref 136–145)
WBC # BLD AUTO: 5.4 K/UL (ref 4.6–13.2)

## 2021-01-24 PROCEDURE — 74011250637 HC RX REV CODE- 250/637: Performed by: STUDENT IN AN ORGANIZED HEALTH CARE EDUCATION/TRAINING PROGRAM

## 2021-01-24 PROCEDURE — 85025 COMPLETE CBC W/AUTO DIFF WBC: CPT

## 2021-01-24 PROCEDURE — 82962 GLUCOSE BLOOD TEST: CPT

## 2021-01-24 PROCEDURE — 83690 ASSAY OF LIPASE: CPT

## 2021-01-24 PROCEDURE — 84100 ASSAY OF PHOSPHORUS: CPT

## 2021-01-24 PROCEDURE — 83735 ASSAY OF MAGNESIUM: CPT

## 2021-01-24 PROCEDURE — 74011250637 HC RX REV CODE- 250/637: Performed by: NURSE PRACTITIONER

## 2021-01-24 PROCEDURE — 99239 HOSP IP/OBS DSCHRG MGMT >30: CPT | Performed by: HOSPITALIST

## 2021-01-24 PROCEDURE — 74011636637 HC RX REV CODE- 636/637: Performed by: HOSPITALIST

## 2021-01-24 PROCEDURE — 36415 COLL VENOUS BLD VENIPUNCTURE: CPT

## 2021-01-24 PROCEDURE — 80048 BASIC METABOLIC PNL TOTAL CA: CPT

## 2021-01-24 PROCEDURE — 74011250636 HC RX REV CODE- 250/636: Performed by: INTERNAL MEDICINE

## 2021-01-24 PROCEDURE — 74011250637 HC RX REV CODE- 250/637: Performed by: INTERNAL MEDICINE

## 2021-01-24 RX ORDER — INSULIN GLARGINE 100 [IU]/ML
15 INJECTION, SOLUTION SUBCUTANEOUS EVERY 12 HOURS
Status: DISCONTINUED | OUTPATIENT
Start: 2021-01-24 | End: 2021-01-24 | Stop reason: HOSPADM

## 2021-01-24 RX ORDER — INSULIN GLARGINE 100 [IU]/ML
INJECTION, SOLUTION SUBCUTANEOUS
Qty: 1 VIAL | Refills: 0 | Status: ON HOLD | OUTPATIENT
Start: 2021-01-24 | End: 2022-06-20 | Stop reason: SDUPTHER

## 2021-01-24 RX ADMIN — ACETAMINOPHEN 500 MG: 500 TABLET ORAL at 09:41

## 2021-01-24 RX ADMIN — INSULIN LISPRO 3 UNITS: 100 INJECTION, SOLUTION INTRAVENOUS; SUBCUTANEOUS at 09:22

## 2021-01-24 RX ADMIN — ENOXAPARIN SODIUM 40 MG: 40 INJECTION SUBCUTANEOUS at 09:21

## 2021-01-24 RX ADMIN — INSULIN GLARGINE 10 UNITS: 100 INJECTION, SOLUTION SUBCUTANEOUS at 09:26

## 2021-01-24 RX ADMIN — LEVOTHYROXINE SODIUM 100 MCG: 100 TABLET ORAL at 09:21

## 2021-01-24 RX ADMIN — INSULIN LISPRO 12 UNITS: 100 INJECTION, SOLUTION INTRAVENOUS; SUBCUTANEOUS at 12:44

## 2021-01-24 RX ADMIN — AMLODIPINE BESYLATE 5 MG: 5 TABLET ORAL at 09:21

## 2021-01-24 RX ADMIN — INSULIN LISPRO 3 UNITS: 100 INJECTION, SOLUTION INTRAVENOUS; SUBCUTANEOUS at 16:20

## 2021-01-24 RX ADMIN — FAMOTIDINE 20 MG: 20 TABLET, FILM COATED ORAL at 09:21

## 2021-01-24 RX ADMIN — Medication 10 ML: at 06:16

## 2021-01-24 NOTE — DISCHARGE SUMMARY
Discharge Summary    Patient: Elissa Aguirre MRN: [id number]  CSN: [id number]    YOB: 1950  Age: 65 y.o. Sex: female    DOA: 1/19/2021 LOS:  LOS: 5 days   Discharge Date:      Admission Diagnoses: DKA (diabetic ketoacidoses) ([facility] [E11.10]  Hyperkalemia [E87.5]    Discharge Diagnoses:    DKA  Acute pancreatitis  BRENDA on CKD 3  Elevated troponin-likely demand ischemia  History of type 2 diabetes  History of hypertension  History of hypothyroidism  History of chronic hip pain with osteonecrosis of the left femoral head      Discharge Condition: Stable    Consults: Cardiology and Gastroenterology    PHYSICAL EXAM  Visit Vitals  /73   Pulse 97   Temp 97.4 °F (36.3 °C)   Resp 20   Ht 5' 6\" (1.676 m)   Wt 82.1 kg (181 lb)   SpO2 98%   BMI 29.21 kg/m²       General: Alert, cooperative, no acute distress    HEENT: PERRLA, EOMI. Anicteric sclerae. Lungs:  CTA Bilaterally. No Wheezing/Rhonchi/Rales. Heart:  Regular rate and Rhythm. Abdomen: Soft, Non distended, Non tender. + Bowel sounds. Extremities: No edema/ cyanosis/ clubbing  Psych:   Good insight. Not anxious or agitated. Neurologic:  AA oriented X 3. Moves all extremities. HPI:  This patient has been seen and evaluated at the request of Dr. Vanesa Bhatti for DKA. 01/19/21     Patient is a 65 y.o. female with a past medical history of diabetes, hypertension, hyperlipidemia, Hypothyroid, colon cancer status post chemo 2 years ago presenting with nausea cough yesterday. She reports cough began yesterday, nonproductive. She has also been feeling nauseous and vomited 2 times today, nonbloody, nonbilious. She denies abdominal pain. No fevers, no chills. She also reports she has been peeing more often. She reports taking her insulin regimen as instructed, but does not remember the doses. She reports no other issues at this time. In the ER, she presented with a pH of 6.99 and potassium of 8.   POC lactic acid 4.64. Her glucose was 964, anion gap of 25. Her urinalysis showed glucose greater than 1000, ketones of 80. She received 4 L normal saline, calcium gluconate, and 1 dose of vancomycin. Initial EKG showed peaked T waves, resolved on repeat EKG. chest x-ray showed no acute abnormalities.     Point of contact: Scott Boyce (daughter): [phone]          Hospital Course:   64-year-old female with a past medical history of diabetes, hypertension hyperlipidemia, hypothyroidism, colon cancer status post chemotherapy 2 years ago presents to the emergency room secondary to nausea vomiting and was noted to be in DKA. Patient admitted to the ICU started on IV insulin GTT protocol, IVF, blood sugars closely monitored. Patient quickly improved in the ICU and was soon taken off the insulin drip and IVF. She was started on a diet which she has tolerated well. She was also noted to have elevated lipase with CT evidence of cystic disease in the pancreas.,  GI consulted and patient underwent an MRI-results attached below. Patient was also noted to have an elevated troponin-cardiology consulted, patient underwent a nuclear stress test which was noted to be negative. Patient is a brittle diabetic and has had fluctuating blood sugars while she was on the telemetry unit, diabetic management involved with her care. Patient is currently being discharged on 15 units of Lantus twice a day with insulin sliding scale splint advised to closely follow-up with her primary care physician and GI within 2 to 3 weeks. I spoke with the patient's daughter prior to discharge and she verbalized understanding of the current discharge plan. Imaging:  XR Results (most recent):  Results from Hospital Encounter encounter on 01/19/21   XR CHEST PORT    Narrative EXAM: XR CHEST PORT    INDICATION: 65 years Female. cough. ADDITIONAL HISTORY: None.     TECHNIQUE: Frontal view of the chest.    COMPARISON: Chest radiograph 2/19/2013    FINDINGS:    Multiple lines overlie the chest.    The cardiac silhouette is within normal limits in size. No confluent consolidation is appreciated. There is no evidence of pleural effusion. There is no evidence of pneumothorax. No acute osseous abnormality appreciated. Impression No radiographic evidence of acute cardiopulmonary process. CT Results (most recent):  Results from Hospital Encounter encounter on 01/19/21   CT CHEST ABD PELV WO CONT    Narrative EXAM: CT of the Chest, Abdomen and Pelvis without contrast    INDICATION:  sepsis of unknown origin . Weakness, vomiting, hyperglycemia. TECHNIQUE: CT of the chest, abdomen and pelvis without contrast. Sagittal and  coronal reformations obtained. All CT scans at this facility are performed using dose optimization technique as  appropriate to a performed exam, to include automated exposure control,  adjustment of the mA and/or kV according to patient size (including appropriate  matching for site specific examination) or use of iterative reconstruction  technique. IV contrast: None. Enteric contrast: None    COMPARISON: 6/8/2015. FINDINGS:  Limitations: Evaluation of the solid organs and pulmonary arteries are limited  due to the lack of IV contrast.    Thyroid: Unremarkable. Mediastinum: No mediastinal adenopathy. No central fluid collection. No  mediastinal mass. Heart: Unremarkable. Pericardium: Unremarkable. Coronary arteries: Mild coronary artery disease is noted predominantly involving  the LAD. Thoracic aorta: No evidence of aneurysm. Pulmonary arteries: Due to the lack of IV contrast, evaluation of the pulmonary  arteries is limited. Trachea and bronchi: Unremarkable    Pleura: No pneumothorax. No pleural effusion identified. Lungs: There is minimal linear atelectasis in the lingula.  A 2 mm density is  noted in the right upper lobe near the major fissure in the likely anterior  segment. No consolidation identified. Axilla/Chest Wall: The axilla are unremarkable. Peritoneum: No free air identified. No free fluid appreciated. No fluid  collections identified. Liver: Unremarkable    Biliary/gallbladder: No intrahepatic or extra hepatic biliary duct dilatation  identified. The gallbladder is unremarkable. No pericholecystic inflammation. Spleen: Unremarkable    Pancreas: The pancreas is mildly atrophic. A previously described hypodensity in  the neck is present but due to adjacent motion and structures, further  evaluation is limited. : The adrenal glands are unremarkable. No urolithiasis appreciated. No  perinephric fat stranding identified. No evidence of hydroureteronephrosis. The bladder is decompressed by Chandler catheter. The uterus is surgically absent. GI: The stomach is distended. No inflammatory changes appreciated. The small  bowel is without evidence of obstruction. No dilated loops appreciated. No small  bowel wall thickening identified. The mesentery is without evidence of  adenopathy or inflammation. The appendix is likely surgically absent. There is  a ileocolonic anastomosis. Changes consistent with prior partial colectomy. No  acute process. Abdominal aorta and retroperitoneum: The abdominal aorta is without evidence of  aneurysm. No periaortic adenopathy identified. No retroperitoneal fluid  collection appreciated. Abdominal wall/Inguinal region: Small umbilical fat hernia is noted. No  complication identified. Musculoskeletal: Moderate compression fracture of L4 is noted this is unchanged. Mild multilevel degenerative changes are present. Osteonecrosis of the left  femoral head with severe secondary osteoarthritis is noted. This is new since  2015. Moderate degenerative changes of the right hip are noted. Impression 1. No acute pathology appreciated. 2.  Cystic process in the pancreas better appreciated on prior imaging. However,  follow-up MRI with and without contrast is recommended for better delineation. 3.  Left hip osteonecrosis with severe secondary osteoarthritis. 4.  Old L4 compression fracture. 5.  Small umbilical fat hernia       01/19/21   ECHO ADULT COMPLETE 01/22/2021 1/22/2021    Narrative · LV: Estimated LVEF is 55 - 60%. Visually measured ejection fraction. Normal cavity size, wall thickness and systolic function (ejection   fraction normal). Wall motion: normal. Inconclusive left ventricular   diastolic function. Signed by: Courtney Vaughn MD        MRI abdomen without contrast  IMPRESSION     . Cystic structure in the pancreas as described most consistent with a benign  serous cystadenoma. Differential would include a simple pancreatic cyst or  pseudocyst from prior pancreatitis, these are felt to be less likely.     Bilateral benign renal cysts     Small hiatal hernia    Procedures:   None        Discharge Medications:     Current Discharge Medication List      CONTINUE these medications which have CHANGED    Details   insulin glargine (LANTUS) 100 unit/mL injection Inject 15 units SQ BID  Qty: 1 Vial, Refills: 0         CONTINUE these medications which have NOT CHANGED    Details   metFORMIN ER (GLUCOPHAGE XR) 500 mg tablet       gabapentin (NEURONTIN) 100 mg capsule Take 100 mg by mouth three (3) times daily. Cholecalciferol, Vitamin D3, 50,000 unit cap Take 1 Cap by mouth every seven (7) days. insulin regular (NOVOLIN R, HUMULIN R) 100 unit/mL injection by SubCUTAneous route Before breakfast, lunch, and dinner. Sliding scale      amLODIPine (NORVASC) 5 mg tablet Take 5 mg by mouth daily. alendronate (FOSAMAX) 10 mg tablet Take 70 mg by mouth every seven (7) days. aspirin 81 mg chewable tablet Take 81 mg by mouth daily. levothyroxine (SYNTHROID) 112 mcg tablet Take 100 mcg by mouth Daily (before breakfast).          STOP taking these medications       losartan (COZAAR) 100 mg tablet Comments:   Reason for Stopping:              Current Facility-Administered Medications:     insulin glargine (LANTUS) injection 15 Units, 15 Units, SubCUTAneous, Q12H, Maryana Allan MD    insulin lispro (HUMALOG) injection, , SubCUTAneous, AC&HS, Maryana Allan MD, 12 Units at 01/24/21 1244    acetaminophen (TYLENOL) tablet 500 mg, 500 mg, Oral, Q6H PRN, Araceli Manning MD, 500 mg at 01/24/21 0941    enoxaparin (LOVENOX) injection 40 mg, 40 mg, SubCUTAneous, DAILY, Emelia Portillo, DO, 40 mg at 01/24/21 8905    famotidine (PEPCID) tablet 20 mg, 20 mg, Oral, DAILY, Isabel Cat NP, 20 mg at 01/24/21 1054    glucose chewable tablet 16 g, 4 Tab, Oral, PRN, Louana Koyanagi, MD    glucagon (GLUCAGEN) injection 1 mg, 1 mg, IntraMUSCular, PRN, Louana Koyanagi, MD    dextrose (D50W) injection syrg 12.5-25 g, 25-50 mL, IntraVENous, PRN, Louana Koyanagi, MD, 25 g at 01/22/21 0457    sodium chloride (NS) flush 5-40 mL, 5-40 mL, IntraVENous, Q8H, Filemon, Bajwa H, DO, 10 mL at 01/24/21 0616    sodium chloride (NS) flush 5-40 mL, 5-40 mL, IntraVENous, PRN, Filemon, Bajwa H, DO    polyethylene glycol (MIRALAX) packet 17 g, 17 g, Oral, DAILY PRN, Filemon, Bajwa H, DO    promethazine (PHENERGAN) tablet 12.5 mg, 12.5 mg, Oral, Q6H PRN **OR** ondansetron (ZOFRAN) injection 4 mg, 4 mg, IntraVENous, Q6H PRN, Filemon, Bajwa H, DO    amLODIPine (NORVASC) tablet 5 mg, 5 mg, Oral, DAILY, Filemon, Bajwa H, DO, 5 mg at 01/24/21 2391    levothyroxine (SYNTHROID) tablet 100 mcg, 100 mcg, Oral, JOSHUA, Aydee Colbert, , 100 mcg at 01/24/21 3062  · It is important that you take the medication exactly as they are prescribed. · Keep your medication in the bottles provided by the pharmacist and keep a list of the medication names, dosages, and times to be taken in your wallet. · Do not take other medications without consulting your doctor. Discharge To:  Home health    Minutes spent on discharge: 55 minutes spent coordinating this discharge (review instructions/follow-up, prescriptions, preparing report for sign off)    Gentry Ridley MD  1/24/2021 1:37 PM

## 2021-01-24 NOTE — ROUTINE PROCESS
Bedside and Verbal shift change report given to Radha Dhillon RN (oncoming nurse) by Rj Olvera (offgoing nurse). Report included the following information SBAR, Kardex, MAR and Recent Results.     SITUATION:  Code Status: DNR  Reason for Admission: DKA (diabetic ketoacidoses) ([facility] [E11.10]  Hyperkalemia [E87.5]  Hospital day: 5  Problem List:       Hospital Problems  Date Reviewed: 12/30/2016          Codes Class Noted POA    Hyperkalemia ICD-10-CM: E87.5  ICD-9-CM: 276.7  1/19/2021 Unknown        DKA (diabetic ketoacidoses) [facility]) ICD-10-CM: E11.10  ICD-9-CM: 250.12  1/19/2021 Unknown              BACKGROUND:   Past Medical History:   Past Medical History:   Diagnosis Date    Colon cancer ([facility]     Diabetes ([facility]     Hypertension     Hypothyroidism       Patient taking anticoagulants yes    Patient has a defibrillator: no    History of shots YES for example, flu, pneumonia, tetanus   Isolation History NO for example, MRSA, CDiff    ASSESSMENT:  Changes in Assessment Throughout Shift: None  Significant Changes in 24 hours (for example, RR/code, fall)  Patient has Central Line: no   Patient has Chandler Cath: no   Mobility Issues  PT  IV Patency  OR Checklist  Pending Tests    Last Vitals:  Vitals w/ MEWS Score (last day)     Date/Time MEWS Score Pulse Resp Temp BP Level of Consciousness SpO2    01/24/21 0412  1  90  20  97 °F (36.1 °C)  139/83  Alert  99 %    01/24/21 0029  1  93  20  97.9 °F (36.6 °C)  119/74  Alert  97 %    01/23/21 2016  1  88  20  96.8 °F (36 °C)  104/62  Alert  97 %    01/23/21 1525  2  91  20  97.5 °F (36.4 °C)  96/65  Alert  98 %    01/23/21 1139  1  87  18  97.9 °F (36.6 °C)  116/71  Alert  98 %    01/23/21 0801  1  93  20  97.5 °F (36.4 °C)  (!) 147/78  Alert  97 %    01/23/21 0404  1  69  18  98.3 °F (36.8 °C)  131/88  Alert  97 %            PAIN    Pain Assessment    Pain Intensity 1: 0 (01/24/21 6816)    Pain Location 1: Hip    Pain Intervention(s) 1: Declines    Patient Stated Pain Goal: 0  Intervention effective: N/A  Time of last intervention: N/A Reassessment Completed: yes   Other actions taken for pain: Distraction    Last 3 Weights:  Last 3 Recorded Weights in this Encounter    01/21/21 1438 01/22/21 0904 01/22/21 1107   Weight: 82.1 kg (181 lb) 82.1 kg (181 lb) 82.1 kg (181 lb)   Weight change:     INTAKE/OUPUT    Current Shift: No intake/output data recorded. Last three shifts: 01/22 1901 - 01/24 0700  In: -   Out: 200 [Urine:200]    RECOMMENDATIONS AND DISCHARGE PLANNING  Patient needs and requests: Education    Pending tests/procedures: Labs     Discharge plan for patient: Home    Discharge planning Needs or Barriers: None    Estimated Discharge Date: 1/29/2021 Posted on Whiteboard in Patients Room: yes       \"HEALS\" SAFETY CHECK  A safety check occurred in the patient's room between off going nurse and oncoming nurse listed above. The safety check included the below items:    H  High Alert Medications Verify all high alert medication drips (heparin, PCA, etc.)  E  Equipment Suction is set up for ALL patients (with osiel)  Red plugs utilized for all equipment (IV pumps, etc.)  WOWs wiped down at end of shift. Room stocked with oxygen, suction, and other unit-specific supplies  A  Alarms Bed alarm is set for fall risk patients  Ensure chair alarm is in place and activated if patient is up in a chair  L  Lines Check IV for any infiltration  Chandler bag is empty if patient has a Chandler   Tubing and IV bags are labeled  S  Safety  Room is clean, patient is clean, and equipment is clean. Hallways are clear from equipment besides carts. Fall bracelet on for fall risk patients  Ensure room is clear and free of clutter  Suction is set up for ALL patients (with osiel)  Hallways are clear from equipment besides carts.    Isolation precautions followed, supplies available outside room, sign posted    Mariam Gomes

## 2021-01-24 NOTE — PROGRESS NOTES
Discharge order noted for today. Pt has been accepted to [facility]. Spoke with patient via telephone and is agreeable to the transition plan today. Transport has been arranged through patient's daughter. Patient's  home health  orders have been forwarded to [facility] via [place].   Discharge information has been documented on the AVS.       Matthew Duran RN  Case Management [phone]

## 2021-01-24 NOTE — PROGRESS NOTES
Problem: Diabetes Self-Management  Goal: *Disease process and treatment process  Description: Define diabetes and identify own type of diabetes; list 3 options for treating diabetes. Outcome: Progressing Towards Goal  Goal: *Incorporating nutritional management into lifestyle  Description: Describe effect of type, amount and timing of food on blood glucose; list 3 methods for planning meals. Outcome: Progressing Towards Goal  Goal: *Incorporating physical activity into lifestyle  Description: State effect of exercise on blood glucose levels. Outcome: Progressing Towards Goal  Goal: *Developing strategies to promote health/change behavior  Description: Define the ABC's of diabetes; identify appropriate screenings, schedule and personal plan for screenings. Outcome: Progressing Towards Goal  Goal: *Using medications safely  Description: State effect of diabetes medications on diabetes; name diabetes medication taking, action and side effects. Outcome: Progressing Towards Goal  Goal: *Monitoring blood glucose, interpreting and using results  Description: Identify recommended blood glucose targets  and personal targets. Outcome: Progressing Towards Goal  Goal: *Prevention, detection, treatment of acute complications  Description: List symptoms of hyper- and hypoglycemia; describe how to treat low blood sugar and actions for lowering  high blood glucose level. Outcome: Progressing Towards Goal  Goal: *Prevention, detection and treatment of chronic complications  Description: Define the natural course of diabetes and describe the relationship of blood glucose levels to long term complications of diabetes.   Outcome: Progressing Towards Goal  Goal: *Developing strategies to address psychosocial issues  Description: Describe feelings about living with diabetes; identify support needed and support network  Outcome: Progressing Towards Goal  Goal: *Sick day guidelines  Outcome: Progressing Towards Goal  Goal: *Patient Specific Goal (EDIT GOAL, INSERT TEXT)  Outcome: Progressing Towards Goal     Problem: Patient Education: Go to Patient Education Activity  Goal: Patient/Family Education  Outcome: Progressing Towards Goal     Problem: Falls - Risk of  Goal: *Absence of Falls  Description: Document Hal Sterling Fall Risk and appropriate interventions in the flowsheet. Outcome: Progressing Towards Goal  Note: Fall Risk Interventions:  Mobility Interventions: Bed/chair exit alarm, Patient to call before getting OOB, Utilize walker, cane, or other assistive device    Mentation Interventions: Adequate sleep, hydration, pain control, Bed/chair exit alarm    Medication Interventions: Bed/chair exit alarm, Patient to call before getting OOB, Teach patient to arise slowly    Elimination Interventions: Bed/chair exit alarm, Call light in reach, Elevated toilet seat, Patient to call for help with toileting needs, Stay With Me (per policy), Toilet paper/wipes in reach, Toileting schedule/hourly rounds              Problem: Patient Education: Go to Patient Education Activity  Goal: Patient/Family Education  Outcome: Progressing Towards Goal     Problem: Pressure Injury - Risk of  Goal: *Prevention of pressure injury  Description: Document Sid Scale and appropriate interventions in the flowsheet.   Outcome: Progressing Towards Goal  Note: Pressure Injury Interventions:       Moisture Interventions: Absorbent underpads, Assess need for specialty bed, Maintain skin hydration (lotion/cream), Minimize layers, Moisture barrier, Offer toileting Q_hr    Activity Interventions: Assess need for specialty bed, Increase time out of bed, Pressure redistribution bed/mattress(bed type)    Mobility Interventions: Assess need for specialty bed, Float heels, HOB 30 degrees or less, Pressure redistribution bed/mattress (bed type)    Nutrition Interventions: Document food/fluid/supplement intake    Friction and Shear Interventions: Apply protective barrier, creams and emollients, HOB 30 degrees or less, Minimize layers                Problem: Patient Education: Go to Patient Education Activity  Goal: Patient/Family Education  Outcome: Progressing Towards Goal     Problem: General Medical Care Plan  Goal: *Vital signs within specified parameters  Outcome: Progressing Towards Goal  Goal: *Labs within defined limits  Outcome: Progressing Towards Goal  Goal: *Absence of infection signs and symptoms  Outcome: Progressing Towards Goal  Goal: *Optimal pain control at patient's stated goal  Outcome: Progressing Towards Goal  Goal: *Skin integrity maintained  Outcome: Progressing Towards Goal  Goal: *Fluid volume balance  Outcome: Progressing Towards Goal  Goal: *Optimize nutritional status  Outcome: Progressing Towards Goal  Goal: *Anxiety reduced or absent  Outcome: Progressing Towards Goal  Goal: *Progressive mobility and function (eg: ADL's)  Outcome: Progressing Towards Goal     Problem: Patient Education: Go to Patient Education Activity  Goal: Patient/Family Education  Outcome: Progressing Towards Goal     Problem: Hypertension  Goal: *Blood pressure within specified parameters  Outcome: Progressing Towards Goal  Goal: *Fluid volume balance  Outcome: Progressing Towards Goal  Goal: *Labs within defined limits  Outcome: Progressing Towards Goal     Problem: Patient Education: Go to Patient Education Activity  Goal: Patient/Family Education  Outcome: Progressing Towards Goal

## 2021-01-24 NOTE — PROGRESS NOTES
CM uploaded Home health order and clinicals to [facility] and sent booking request to [facility], patient is a [facility] patient. CM called and spoke to Lester Bone answering service for [facility], and patient information given, with CM phone number for a return call.      ABHISHEK received a phone call back from Annel Dong with [facility], she said patient information was given to Intake Nurse, they will follow up in the am.         Can Neil, RN  Case Management [phone]

## 2021-01-25 LAB
BACTERIA SPEC CULT: NORMAL
BACTERIA SPEC CULT: NORMAL
SERVICE CMNT-IMP: NORMAL
SERVICE CMNT-IMP: NORMAL

## 2021-01-25 NOTE — PROGRESS NOTES
Patient has transitional care follow up with [facility] Dr. Ericka Philip on 1/25/2021 at 9:50 am, [facility]  will call patient with  time.

## 2021-01-28 NOTE — PROGRESS NOTES
Physician Progress Note      PATIENT:               Amrita Estevez  CSN #:                  [id number]  :                       1950  ADMIT DATE:       2021 1:01 PM  [address] [place] DATE:        2021 6:23 PM  RESPONDING  PROVIDER #:        Kalpana Lr MD          QUERY TEXT:    Patient admitted with DKA, noted to have acute pancreatitis on discharge summary with CT evidence of cystic disease in pancreas, per GI doubt acute pancreatitis. If possible, please document in progress notes and discharge summary if you are evaluating and/or treating any of the following: The medical record reflects the following:  Risk Factors: 65 y.o. female  Clinical Indicators: Per  progress note-Elevated lipase. CT evidence of cystic disease in pancreas  Per  GI-no epigastric pain or pancreatic inflammation noted on imaging- which if imaging at onset of symptoms can be normal- doubtful this is acute pancreatitis, pancreatic cyst per MRI is benign and possibly unrelated to her current problems-She possibly also has exocrine pancreatic [id number] can be done as an outpatient if needed. Treatment: GI consulted, MRI    Thank you,  Destiny Fried RN, [facility]  Options provided:  -- Acute pancreatitis  -- Acute pancreatitis ruled out  -- Other - I will add my own diagnosis  -- Disagree - Not applicable / Not valid  -- Disagree - Clinically unable to determine / Unknown  -- Refer to Clinical Documentation Reviewer    PROVIDER RESPONSE TEXT:    This patient has acute pancreatitis.     Query created by: Viji Marrero on 2021 1:32 PM      Electronically signed by:  Kalpana Lr MD 2021 8:10 AM

## 2021-03-24 ENCOUNTER — OFFICE VISIT (OUTPATIENT)
Dept: ORTHOPEDIC SURGERY | Age: 71
End: 2021-03-24
Payer: MEDICARE

## 2021-03-24 VITALS — HEART RATE: 100 BPM | OXYGEN SATURATION: 99 % | TEMPERATURE: 100 F | RESPIRATION RATE: 16 BRPM

## 2021-03-24 DIAGNOSIS — M25.652 DECREASED RANGE OF LEFT HIP MOVEMENT: ICD-10-CM

## 2021-03-24 DIAGNOSIS — M16.12 ARTHRITIS OF LEFT HIP: Primary | ICD-10-CM

## 2021-03-24 DIAGNOSIS — M70.62 TROCHANTERIC BURSITIS, LEFT HIP: ICD-10-CM

## 2021-03-24 DIAGNOSIS — M79.605 PAIN OF LEFT LOWER EXTREMITY: ICD-10-CM

## 2021-03-24 PROCEDURE — G8536 NO DOC ELDER MAL SCRN: HCPCS | Performed by: PHYSICIAN ASSISTANT

## 2021-03-24 PROCEDURE — G9899 SCRN MAM PERF RSLTS DOC: HCPCS | Performed by: PHYSICIAN ASSISTANT

## 2021-03-24 PROCEDURE — 1101F PT FALLS ASSESS-DOCD LE1/YR: CPT | Performed by: PHYSICIAN ASSISTANT

## 2021-03-24 PROCEDURE — G8417 CALC BMI ABV UP PARAM F/U: HCPCS | Performed by: PHYSICIAN ASSISTANT

## 2021-03-24 PROCEDURE — G8428 CUR MEDS NOT DOCUMENT: HCPCS | Performed by: PHYSICIAN ASSISTANT

## 2021-03-24 PROCEDURE — G8399 PT W/DXA RESULTS DOCUMENT: HCPCS | Performed by: PHYSICIAN ASSISTANT

## 2021-03-24 PROCEDURE — G8432 DEP SCR NOT DOC, RNG: HCPCS | Performed by: PHYSICIAN ASSISTANT

## 2021-03-24 PROCEDURE — 1090F PRES/ABSN URINE INCON ASSESS: CPT | Performed by: PHYSICIAN ASSISTANT

## 2021-03-24 PROCEDURE — G9711 PT HX TOT COL OR COLON CA: HCPCS | Performed by: PHYSICIAN ASSISTANT

## 2021-03-24 PROCEDURE — 99212 OFFICE O/P EST SF 10 MIN: CPT | Performed by: PHYSICIAN ASSISTANT

## 2021-03-24 NOTE — PROGRESS NOTES
María Elena Olvera returns the office complaining of worsening left hip pain. She has osteoarthritis underlying of the left hip and would benefit with a primary hip replacement. We have attempted to treat her symptoms of trochanteric bursitis and left hip intra-articular collapse with cortisone to no avail. She has been working on her A1c and despite a previous finding of 8.9 she is recently had numbers that ranged from 7.5-8.4. She is home testing her A1c as well as having a nurse draw. She has a test scheduled today with [facility] care for a serum A1c level. Today we discussed alternatives to care to include but not limited to continued conservative management of her hip arthritis versus a referral outside [place] [address] system. Criteria for [facility] regarding A1c presurgical decision making varies from surgeon to surgeon, however the baseline number that [facility] is recommending is 7.5 A1c. Patient today indicated she would like a referral to a physician outside of the current [facility]) system. I recommended Dr. Jorge Mchugh from [facility]. Formal referral made for Dr. Jorge Mchugh. Today all of Mrs. Andres's questions were answered to her satisfaction. Follow-up as needed.

## 2021-07-08 LAB
CREATININE, EXTERNAL: 1.01
HBA1C MFR BLD HPLC: 9.5 %
LDL-C, EXTERNAL: 59

## 2022-02-04 ENCOUNTER — TRANSCRIBE ORDER (OUTPATIENT)
Dept: SCHEDULING | Age: 72
End: 2022-02-04

## 2022-02-04 DIAGNOSIS — Z12.31 VISIT FOR SCREENING MAMMOGRAM: Primary | ICD-10-CM

## 2022-03-20 PROBLEM — E87.5 HYPERKALEMIA: Status: ACTIVE | Noted: 2021-01-19

## 2022-06-11 ENCOUNTER — HOSPITAL ENCOUNTER (INPATIENT)
Age: 72
LOS: 19 days | Discharge: HOME HEALTH | DRG: 871 | End: 2022-06-30
Attending: STUDENT IN AN ORGANIZED HEALTH CARE EDUCATION/TRAINING PROGRAM | Admitting: STUDENT IN AN ORGANIZED HEALTH CARE EDUCATION/TRAINING PROGRAM
Payer: MEDICARE

## 2022-06-11 ENCOUNTER — APPOINTMENT (OUTPATIENT)
Dept: GENERAL RADIOLOGY | Age: 72
DRG: 871 | End: 2022-06-11
Attending: STUDENT IN AN ORGANIZED HEALTH CARE EDUCATION/TRAINING PROGRAM
Payer: MEDICARE

## 2022-06-11 ENCOUNTER — APPOINTMENT (OUTPATIENT)
Dept: CT IMAGING | Age: 72
DRG: 871 | End: 2022-06-11
Attending: STUDENT IN AN ORGANIZED HEALTH CARE EDUCATION/TRAINING PROGRAM
Payer: MEDICARE

## 2022-06-11 DIAGNOSIS — G93.41 ACUTE METABOLIC ENCEPHALOPATHY: ICD-10-CM

## 2022-06-11 DIAGNOSIS — E87.29 HIGH ANION GAP METABOLIC ACIDOSIS: ICD-10-CM

## 2022-06-11 DIAGNOSIS — R77.8 ELEVATED TROPONIN: ICD-10-CM

## 2022-06-11 DIAGNOSIS — E11.00 HYPEROSMOLAR HYPERGLYCEMIC STATE (HHS) (HCC): Primary | ICD-10-CM

## 2022-06-11 DIAGNOSIS — E11.11 DIABETIC KETOACIDOSIS WITH COMA ASSOCIATED WITH TYPE 2 DIABETES MELLITUS (HCC): ICD-10-CM

## 2022-06-11 DIAGNOSIS — R57.1 HYPOVOLEMIC SHOCK (HCC): ICD-10-CM

## 2022-06-11 DIAGNOSIS — N17.9 ACUTE RENAL FAILURE, UNSPECIFIED ACUTE RENAL FAILURE TYPE (HCC): ICD-10-CM

## 2022-06-11 DIAGNOSIS — R57.0 CARDIOGENIC SHOCK (HCC): ICD-10-CM

## 2022-06-11 DIAGNOSIS — E87.5 ACUTE HYPERKALEMIA: ICD-10-CM

## 2022-06-11 DIAGNOSIS — I42.9 CARDIOMYOPATHY, UNSPECIFIED TYPE (HCC): ICD-10-CM

## 2022-06-11 DIAGNOSIS — N17.9 AKI (ACUTE KIDNEY INJURY) (HCC): ICD-10-CM

## 2022-06-11 PROBLEM — E11.10 DKA (DIABETIC KETOACIDOSIS) (HCC): Status: ACTIVE | Noted: 2022-06-11

## 2022-06-11 LAB
ADMINISTERED INITIALS, ADMINIT: NORMAL
ALBUMIN SERPL-MCNC: 3 G/DL (ref 3.4–5)
ALBUMIN/GLOB SERPL: 0.9 {RATIO} (ref 0.8–1.7)
ALP SERPL-CCNC: 78 U/L (ref 45–117)
ALT SERPL-CCNC: 23 U/L (ref 13–56)
AMPHET UR QL SCN: NEGATIVE
ANION GAP SERPL CALC-SCNC: 28 MMOL/L (ref 3–18)
ANION GAP SERPL CALC-SCNC: 33 MMOL/L (ref 3–18)
APPEARANCE UR: CLEAR
ARTERIAL PATENCY WRIST A: ABNORMAL
AST SERPL-CCNC: 29 U/L (ref 10–38)
B PERT DNA SPEC QL NAA+PROBE: NOT DETECTED
BACTERIA URNS QL MICRO: NEGATIVE /HPF
BARBITURATES UR QL SCN: NEGATIVE
BASE DEFICIT BLD-SCNC: 30 MMOL/L
BASE DEFICIT BLDV-SCNC: 25.3 MMOL/L
BASOPHILS # BLD: 0 K/UL (ref 0–0.1)
BASOPHILS NFR BLD: 0 % (ref 0–2)
BDY SITE: ABNORMAL
BENZODIAZ UR QL: NEGATIVE
BILIRUB SERPL-MCNC: 0.5 MG/DL (ref 0.2–1)
BILIRUB UR QL: NEGATIVE
BNP SERPL-MCNC: 2244 PG/ML (ref 0–900)
BORDETELLA PARAPERTUSSIS PCR, BORPAR: NOT DETECTED
BUN SERPL-MCNC: 60 MG/DL (ref 7–18)
BUN SERPL-MCNC: 73 MG/DL (ref 7–18)
BUN/CREAT SERPL: 19 (ref 12–20)
BUN/CREAT SERPL: 19 (ref 12–20)
C PNEUM DNA SPEC QL NAA+PROBE: NOT DETECTED
CA-I BLD-MCNC: 1.11 MMOL/L (ref 1.12–1.32)
CALCIUM SERPL-MCNC: 8.3 MG/DL (ref 8.5–10.1)
CALCIUM SERPL-MCNC: 8.4 MG/DL (ref 8.5–10.1)
CANNABINOIDS UR QL SCN: NEGATIVE
CHLORIDE BLD-SCNC: 107 MMOL/L (ref 98–107)
CHLORIDE SERPL-SCNC: 105 MMOL/L (ref 100–111)
CHLORIDE SERPL-SCNC: 95 MMOL/L (ref 100–111)
CO2 BLD-SCNC: <5 MMOL/L (ref 19–24)
CO2 SERPL-SCNC: 4 MMOL/L (ref 21–32)
CO2 SERPL-SCNC: 7 MMOL/L (ref 21–32)
COCAINE UR QL SCN: NEGATIVE
COLOR UR: YELLOW
CREAT BLD-MCNC: 4.22 MG/DL (ref 0.6–1.3)
CREAT SERPL-MCNC: 3.13 MG/DL (ref 0.6–1.3)
CREAT SERPL-MCNC: 3.89 MG/DL (ref 0.6–1.3)
D50 ADMINISTERED, D50ADM: 0 ML
D50 ORDER, D50ORD: 0 ML
DIFFERENTIAL METHOD BLD: ABNORMAL
EOSINOPHIL # BLD: 0 K/UL (ref 0–0.4)
EOSINOPHIL NFR BLD: 0 % (ref 0–5)
EPITH CASTS URNS QL MICRO: NORMAL /LPF (ref 0–5)
ERYTHROCYTE [DISTWIDTH] IN BLOOD BY AUTOMATED COUNT: 14.3 % (ref 11.6–14.5)
EST. AVERAGE GLUCOSE BLD GHB EST-MCNC: 212 MG/DL
ETHANOL SERPL-MCNC: <3 MG/DL (ref 0–3)
FLUAV H1 2009 PAND RNA SPEC QL NAA+PROBE: NOT DETECTED
FLUAV H1 RNA SPEC QL NAA+PROBE: NOT DETECTED
FLUAV H3 RNA SPEC QL NAA+PROBE: NOT DETECTED
FLUAV SUBTYP SPEC NAA+PROBE: NOT DETECTED
FLUBV RNA SPEC QL NAA+PROBE: NOT DETECTED
GAS FLOW.O2 O2 DELIVERY SYS: ABNORMAL L/MIN
GAS FLOW.O2 SETTING OXYMISER: 8 BPM
GLOBULIN SER CALC-MCNC: 3.4 G/DL (ref 2–4)
GLUCOSE BLD STRIP.AUTO-MCNC: >600 MG/DL (ref 70–110)
GLUCOSE BLD-MCNC: >700 MG/DL (ref 65–100)
GLUCOSE SERPL-MCNC: 1263 MG/DL (ref 74–99)
GLUCOSE SERPL-MCNC: 796 MG/DL (ref 74–99)
GLUCOSE UR STRIP.AUTO-MCNC: >1000 MG/DL
GLUCOSE, GLC: 601 MG/DL
GLUCOSE, GLC: 602 MG/DL
GLUCOSE, GLC: 602 MG/DL
HADV DNA SPEC QL NAA+PROBE: NOT DETECTED
HBA1C MFR BLD: 9 % (ref 4.2–5.6)
HCO3 BLD-SCNC: 4 MMOL/L (ref 22–26)
HCO3 BLDV-SCNC: 5.4 MMOL/L (ref 23–28)
HCOV 229E RNA SPEC QL NAA+PROBE: NOT DETECTED
HCOV HKU1 RNA SPEC QL NAA+PROBE: NOT DETECTED
HCOV NL63 RNA SPEC QL NAA+PROBE: NOT DETECTED
HCOV OC43 RNA SPEC QL NAA+PROBE: NOT DETECTED
HCT VFR BLD AUTO: 36.6 % (ref 35–45)
HDSCOM,HDSCOM: NORMAL
HGB BLD-MCNC: 9.8 G/DL (ref 12–16)
HGB UR QL STRIP: NEGATIVE
HIGH TARGET, HITG: 180 MG/DL
HMPV RNA SPEC QL NAA+PROBE: NOT DETECTED
HPIV1 RNA SPEC QL NAA+PROBE: NOT DETECTED
HPIV2 RNA SPEC QL NAA+PROBE: NOT DETECTED
HPIV3 RNA SPEC QL NAA+PROBE: NOT DETECTED
HPIV4 RNA SPEC QL NAA+PROBE: NOT DETECTED
IMM GRANULOCYTES # BLD AUTO: 0 K/UL
IMM GRANULOCYTES NFR BLD AUTO: 0 %
INSULIN ADMINSTERED, INSADM: 10.8 UNITS/HOUR
INSULIN ADMINSTERED, INSADM: 16.2 UNITS/HOUR
INSULIN ADMINSTERED, INSADM: 21.7 UNITS/HOUR
INSULIN ADMINSTERED, INSADM: 27.1 UNITS/HOUR
INSULIN ADMINSTERED, INSADM: 32.5 UNITS/HOUR
INSULIN ADMINSTERED, INSADM: 37.9 UNITS/HOUR
INSULIN ADMINSTERED, INSADM: 43.3 UNITS/HOUR
INSULIN ADMINSTERED, INSADM: 48.7 UNITS/HOUR
INSULIN ORDER, INSORD: 10.8 UNITS/HOUR
INSULIN ORDER, INSORD: 16.2 UNITS/HOUR
INSULIN ORDER, INSORD: 21.7 UNITS/HOUR
INSULIN ORDER, INSORD: 27.1 UNITS/HOUR
INSULIN ORDER, INSORD: 32.5 UNITS/HOUR
INSULIN ORDER, INSORD: 37.9 UNITS/HOUR
INSULIN ORDER, INSORD: 43.3 UNITS/HOUR
INSULIN ORDER, INSORD: 48.7 UNITS/HOUR
KETONES UR QL STRIP.AUTO: 80 MG/DL
LACTATE BLD-SCNC: 3.7 MMOL/L (ref 0.4–2)
LACTATE SERPL-SCNC: 2.7 MMOL/L (ref 0.4–2)
LEUKOCYTE ESTERASE UR QL STRIP.AUTO: NEGATIVE
LIPASE SERPL-CCNC: 235 U/L (ref 73–393)
LOW TARGET, LOT: 140 MG/DL
LYMPHOCYTES # BLD: 2.6 K/UL (ref 0.9–3.6)
LYMPHOCYTES NFR BLD: 7 % (ref 21–52)
M PNEUMO DNA SPEC QL NAA+PROBE: NOT DETECTED
MAGNESIUM SERPL-MCNC: 1.3 MG/DL (ref 1.6–2.6)
MAGNESIUM SERPL-MCNC: 2.4 MG/DL (ref 1.6–2.6)
MCH RBC QN AUTO: 26.7 PG (ref 24–34)
MCHC RBC AUTO-ENTMCNC: 26.8 G/DL (ref 31–37)
MCV RBC AUTO: 99.7 FL (ref 78–100)
METHADONE UR QL: NEGATIVE
MINUTES UNTIL NEXT BG, NBG: 60 MIN
MONOCYTES # BLD: 2.2 K/UL (ref 0.05–1.2)
MONOCYTES NFR BLD: 6 % (ref 3–10)
MULTIPLIER, MUL: 0.02
MULTIPLIER, MUL: 0.03
MULTIPLIER, MUL: 0.04
MULTIPLIER, MUL: 0.05
MULTIPLIER, MUL: 0.06
MULTIPLIER, MUL: 0.07
MULTIPLIER, MUL: 0.08
MULTIPLIER, MUL: 0.09
NEUTS SEG # BLD: 32.5 K/UL (ref 1.8–8)
NEUTS SEG NFR BLD: 87 % (ref 40–73)
NITRITE UR QL STRIP.AUTO: NEGATIVE
NRBC # BLD: 0 K/UL (ref 0–0.01)
NRBC BLD-RTO: 0 PER 100 WBC
O2/TOTAL GAS SETTING VFR VENT: 40 %
OPIATES UR QL: NEGATIVE
ORDER INITIALS, ORDINIT: NORMAL
PCO2 BLDV: 24.9 MMHG (ref 41–51)
PCO2 BLDV: 26.9 MMHG (ref 41–51)
PCP UR QL: NEGATIVE
PEEP RESPIRATORY: 5 CMH2O
PH BLDV: 6.78 [PH] (ref 7.32–7.42)
PH BLDV: 6.94 [PH] (ref 7.32–7.42)
PH UR STRIP: 5 [PH] (ref 5–8)
PHOSPHATE SERPL-MCNC: 12.9 MG/DL (ref 2.5–4.9)
PHOSPHATE SERPL-MCNC: 6.6 MG/DL (ref 2.5–4.9)
PLATELET # BLD AUTO: 365 K/UL (ref 135–420)
PLATELET COMMENTS,PCOM: ABNORMAL
PMV BLD AUTO: 10.9 FL (ref 9.2–11.8)
PO2 BLDV: 156 MMHG (ref 25–40)
PO2 BLDV: 38 MMHG (ref 25–40)
POTASSIUM BLD-SCNC: 8.1 MMOL/L (ref 3.5–5.1)
POTASSIUM SERPL-SCNC: 4.1 MMOL/L (ref 3.5–5.5)
POTASSIUM SERPL-SCNC: 7.9 MMOL/L (ref 3.5–5.5)
PRESSURE SUPPORT SETTING VENT: 10 CMH2O
PROCALCITONIN SERPL-MCNC: 16.82 NG/ML
PROT SERPL-MCNC: 6.4 G/DL (ref 6.4–8.2)
PROT UR STRIP-MCNC: 30 MG/DL
RBC # BLD AUTO: 3.67 M/UL (ref 4.2–5.3)
RBC #/AREA URNS HPF: NORMAL /HPF (ref 0–5)
RBC MORPH BLD: ABNORMAL
RBC MORPH BLD: ABNORMAL
RSV RNA SPEC QL NAA+PROBE: NOT DETECTED
RV+EV RNA SPEC QL NAA+PROBE: NOT DETECTED
SAO2 % BLD: 96 %
SAO2 % BLDV: 42.4 % (ref 65–88)
SARS-COV-2 PCR, COVPCR: NOT DETECTED
SERVICE CMNT-IMP: ABNORMAL
SODIUM BLD-SCNC: 127 MMOL/L (ref 136–145)
SODIUM SERPL-SCNC: 132 MMOL/L (ref 136–145)
SODIUM SERPL-SCNC: 140 MMOL/L (ref 136–145)
SP GR UR REFRACTOMETRY: 1.02 (ref 1–1.03)
SPECIMEN SITE: ABNORMAL
SPECIMEN TYPE: ABNORMAL
TOTAL RESP. RATE, ITRR: 25
TROPONIN-HIGH SENSITIVITY: 305 NG/L (ref 0–54)
TSH SERPL DL<=0.05 MIU/L-ACNC: 0.17 UIU/ML (ref 0.36–3.74)
UROBILINOGEN UR QL STRIP.AUTO: 0.2 EU/DL (ref 0.2–1)
VENTILATION MODE VENT: ABNORMAL
VT SETTING VENT: 895 ML
WBC # BLD AUTO: 37.3 K/UL (ref 4.6–13.2)
WBC URNS QL MICRO: NORMAL /HPF (ref 0–4)

## 2022-06-11 PROCEDURE — 84484 ASSAY OF TROPONIN QUANT: CPT

## 2022-06-11 PROCEDURE — 36556 INSERT NON-TUNNEL CV CATH: CPT | Performed by: PHYSICIAN ASSISTANT

## 2022-06-11 PROCEDURE — 80307 DRUG TEST PRSMV CHEM ANLYZR: CPT

## 2022-06-11 PROCEDURE — 80053 COMPREHEN METABOLIC PANEL: CPT

## 2022-06-11 PROCEDURE — 83735 ASSAY OF MAGNESIUM: CPT

## 2022-06-11 PROCEDURE — 84145 PROCALCITONIN (PCT): CPT

## 2022-06-11 PROCEDURE — 74011000258 HC RX REV CODE- 258: Performed by: STUDENT IN AN ORGANIZED HEALTH CARE EDUCATION/TRAINING PROGRAM

## 2022-06-11 PROCEDURE — 83036 HEMOGLOBIN GLYCOSYLATED A1C: CPT

## 2022-06-11 PROCEDURE — 94660 CPAP INITIATION&MGMT: CPT

## 2022-06-11 PROCEDURE — 74011636637 HC RX REV CODE- 636/637: Performed by: STUDENT IN AN ORGANIZED HEALTH CARE EDUCATION/TRAINING PROGRAM

## 2022-06-11 PROCEDURE — 99291 CRITICAL CARE FIRST HOUR: CPT | Performed by: STUDENT IN AN ORGANIZED HEALTH CARE EDUCATION/TRAINING PROGRAM

## 2022-06-11 PROCEDURE — 74011000250 HC RX REV CODE- 250

## 2022-06-11 PROCEDURE — 83880 ASSAY OF NATRIURETIC PEPTIDE: CPT

## 2022-06-11 PROCEDURE — 85025 COMPLETE CBC W/AUTO DIFF WBC: CPT

## 2022-06-11 PROCEDURE — 83605 ASSAY OF LACTIC ACID: CPT

## 2022-06-11 PROCEDURE — 74011250636 HC RX REV CODE- 250/636: Performed by: STUDENT IN AN ORGANIZED HEALTH CARE EDUCATION/TRAINING PROGRAM

## 2022-06-11 PROCEDURE — 74011250636 HC RX REV CODE- 250/636: Performed by: REGISTERED NURSE

## 2022-06-11 PROCEDURE — 82077 ASSAY SPEC XCP UR&BREATH IA: CPT

## 2022-06-11 PROCEDURE — 02HV33Z INSERTION OF INFUSION DEVICE INTO SUPERIOR VENA CAVA, PERCUTANEOUS APPROACH: ICD-10-PCS | Performed by: PHYSICIAN ASSISTANT

## 2022-06-11 PROCEDURE — 99292 CRITICAL CARE ADDL 30 MIN: CPT | Performed by: STUDENT IN AN ORGANIZED HEALTH CARE EDUCATION/TRAINING PROGRAM

## 2022-06-11 PROCEDURE — 5A09357 ASSISTANCE WITH RESPIRATORY VENTILATION, LESS THAN 24 CONSECUTIVE HOURS, CONTINUOUS POSITIVE AIRWAY PRESSURE: ICD-10-PCS | Performed by: PHYSICIAN ASSISTANT

## 2022-06-11 PROCEDURE — 77010033678 HC OXYGEN DAILY

## 2022-06-11 PROCEDURE — 82947 ASSAY GLUCOSE BLOOD QUANT: CPT

## 2022-06-11 PROCEDURE — 82803 BLOOD GASES ANY COMBINATION: CPT

## 2022-06-11 PROCEDURE — 74011000258 HC RX REV CODE- 258

## 2022-06-11 PROCEDURE — 82962 GLUCOSE BLOOD TEST: CPT

## 2022-06-11 PROCEDURE — 99285 EMERGENCY DEPT VISIT HI MDM: CPT

## 2022-06-11 PROCEDURE — 94762 N-INVAS EAR/PLS OXIMTRY CONT: CPT

## 2022-06-11 PROCEDURE — 96375 TX/PRO/DX INJ NEW DRUG ADDON: CPT

## 2022-06-11 PROCEDURE — 96374 THER/PROPH/DIAG INJ IV PUSH: CPT

## 2022-06-11 PROCEDURE — 84443 ASSAY THYROID STIM HORMONE: CPT

## 2022-06-11 PROCEDURE — 36415 COLL VENOUS BLD VENIPUNCTURE: CPT

## 2022-06-11 PROCEDURE — 84100 ASSAY OF PHOSPHORUS: CPT

## 2022-06-11 PROCEDURE — 87040 BLOOD CULTURE FOR BACTERIA: CPT

## 2022-06-11 PROCEDURE — 0202U NFCT DS 22 TRGT SARS-COV-2: CPT

## 2022-06-11 PROCEDURE — 74011000250 HC RX REV CODE- 250: Performed by: REGISTERED NURSE

## 2022-06-11 PROCEDURE — 83690 ASSAY OF LIPASE: CPT

## 2022-06-11 PROCEDURE — 65610000006 HC RM INTENSIVE CARE

## 2022-06-11 PROCEDURE — 93005 ELECTROCARDIOGRAM TRACING: CPT

## 2022-06-11 PROCEDURE — 74011000250 HC RX REV CODE- 250: Performed by: STUDENT IN AN ORGANIZED HEALTH CARE EDUCATION/TRAINING PROGRAM

## 2022-06-11 PROCEDURE — 51702 INSERT TEMP BLADDER CATH: CPT

## 2022-06-11 PROCEDURE — 71045 X-RAY EXAM CHEST 1 VIEW: CPT

## 2022-06-11 PROCEDURE — 81001 URINALYSIS AUTO W/SCOPE: CPT

## 2022-06-11 RX ORDER — METRONIDAZOLE 500 MG/100ML
500 INJECTION, SOLUTION INTRAVENOUS EVERY 8 HOURS
Status: DISCONTINUED | OUTPATIENT
Start: 2022-06-11 | End: 2022-06-13 | Stop reason: ALTCHOICE

## 2022-06-11 RX ORDER — VANCOMYCIN 1.75 GRAM/500 ML IN 0.9 % SODIUM CHLORIDE INTRAVENOUS
1750
Status: DISCONTINUED | OUTPATIENT
Start: 2022-06-11 | End: 2022-06-11

## 2022-06-11 RX ORDER — NOREPINEPHRINE BITARTRATE/D5W 8 MG/250ML
.5-5 PLASTIC BAG, INJECTION (ML) INTRAVENOUS
Status: DISCONTINUED | OUTPATIENT
Start: 2022-06-11 | End: 2022-06-15

## 2022-06-11 RX ORDER — SODIUM BICARBONATE 1 MEQ/ML
50 SYRINGE (ML) INTRAVENOUS ONCE
Status: COMPLETED | OUTPATIENT
Start: 2022-06-11 | End: 2022-06-11

## 2022-06-11 RX ORDER — MAGNESIUM SULFATE 100 %
4 CRYSTALS MISCELLANEOUS AS NEEDED
Status: DISCONTINUED | OUTPATIENT
Start: 2022-06-11 | End: 2022-06-13

## 2022-06-11 RX ORDER — CALCIUM GLUCONATE 94 MG/ML
2 INJECTION, SOLUTION INTRAVENOUS ONCE
Status: COMPLETED | OUTPATIENT
Start: 2022-06-11 | End: 2022-06-11

## 2022-06-11 RX ORDER — ONDANSETRON 2 MG/ML
4 INJECTION INTRAMUSCULAR; INTRAVENOUS
Status: DISCONTINUED | OUTPATIENT
Start: 2022-06-11 | End: 2022-06-30 | Stop reason: HOSPADM

## 2022-06-11 RX ORDER — SODIUM CHLORIDE 0.9 % (FLUSH) 0.9 %
5-40 SYRINGE (ML) INJECTION AS NEEDED
Status: DISCONTINUED | OUTPATIENT
Start: 2022-06-11 | End: 2022-06-30 | Stop reason: HOSPADM

## 2022-06-11 RX ORDER — VANCOMYCIN 2 GRAM/500 ML IN 0.9 % SODIUM CHLORIDE INTRAVENOUS
2000
Status: COMPLETED | OUTPATIENT
Start: 2022-06-11 | End: 2022-06-11

## 2022-06-11 RX ORDER — MAGNESIUM SULFATE HEPTAHYDRATE 40 MG/ML
4 INJECTION, SOLUTION INTRAVENOUS ONCE
Status: COMPLETED | OUTPATIENT
Start: 2022-06-11 | End: 2022-06-12

## 2022-06-11 RX ORDER — DEXTROSE MONOHYDRATE 100 MG/ML
0-250 INJECTION, SOLUTION INTRAVENOUS AS NEEDED
Status: DISCONTINUED | OUTPATIENT
Start: 2022-06-11 | End: 2022-06-13

## 2022-06-11 RX ORDER — ACETAMINOPHEN 650 MG/1
650 SUPPOSITORY RECTAL
Status: DISCONTINUED | OUTPATIENT
Start: 2022-06-11 | End: 2022-06-30 | Stop reason: HOSPADM

## 2022-06-11 RX ORDER — DEXMEDETOMIDINE HYDROCHLORIDE 4 UG/ML
.1-1.5 INJECTION, SOLUTION INTRAVENOUS
Status: DISCONTINUED | OUTPATIENT
Start: 2022-06-11 | End: 2022-06-13

## 2022-06-11 RX ORDER — NOREPINEPHRINE BITARTRATE/D5W 8 MG/250ML
PLASTIC BAG, INJECTION (ML) INTRAVENOUS
Status: COMPLETED
Start: 2022-06-11 | End: 2022-06-11

## 2022-06-11 RX ORDER — SODIUM CHLORIDE 0.9 % (FLUSH) 0.9 %
5-40 SYRINGE (ML) INJECTION EVERY 8 HOURS
Status: DISCONTINUED | OUTPATIENT
Start: 2022-06-11 | End: 2022-06-30 | Stop reason: HOSPADM

## 2022-06-11 RX ORDER — ACETAMINOPHEN 325 MG/1
650 TABLET ORAL
Status: DISCONTINUED | OUTPATIENT
Start: 2022-06-11 | End: 2022-06-30 | Stop reason: HOSPADM

## 2022-06-11 RX ORDER — HEPARIN SODIUM 5000 [USP'U]/ML
5000 INJECTION, SOLUTION INTRAVENOUS; SUBCUTANEOUS EVERY 8 HOURS
Status: DISCONTINUED | OUTPATIENT
Start: 2022-06-11 | End: 2022-06-12

## 2022-06-11 RX ORDER — ONDANSETRON 4 MG/1
4 TABLET, ORALLY DISINTEGRATING ORAL
Status: DISCONTINUED | OUTPATIENT
Start: 2022-06-11 | End: 2022-06-30 | Stop reason: HOSPADM

## 2022-06-11 RX ADMIN — NOREPINEPHRINE BITARTRATE 10 MCG/MIN: 1 INJECTION, SOLUTION, CONCENTRATE INTRAVENOUS at 14:57

## 2022-06-11 RX ADMIN — Medication 37.9 UNITS/HR: at 21:35

## 2022-06-11 RX ADMIN — CALCIUM GLUCONATE 2 G: 98 INJECTION, SOLUTION INTRAVENOUS at 15:09

## 2022-06-11 RX ADMIN — SODIUM CHLORIDE, SODIUM LACTATE, POTASSIUM CHLORIDE, AND CALCIUM CHLORIDE 1000 ML: 600; 310; 30; 20 INJECTION, SOLUTION INTRAVENOUS at 14:55

## 2022-06-11 RX ADMIN — CEFEPIME HYDROCHLORIDE 2 G: 2 INJECTION, POWDER, FOR SOLUTION INTRAVENOUS at 15:09

## 2022-06-11 RX ADMIN — SODIUM CHLORIDE, SODIUM LACTATE, POTASSIUM CHLORIDE, AND CALCIUM CHLORIDE 1000 ML: 600; 310; 30; 20 INJECTION, SOLUTION INTRAVENOUS at 16:15

## 2022-06-11 RX ADMIN — DEXMEDETOMIDINE HYDROCHLORIDE 0.5 MCG/KG/HR: 4 INJECTION, SOLUTION INTRAVENOUS at 22:52

## 2022-06-11 RX ADMIN — METRONIDAZOLE 500 MG: 500 INJECTION, SOLUTION INTRAVENOUS at 17:06

## 2022-06-11 RX ADMIN — HEPARIN SODIUM 5000 UNITS: 5000 INJECTION INTRAVENOUS; SUBCUTANEOUS at 22:09

## 2022-06-11 RX ADMIN — SODIUM CHLORIDE, SODIUM LACTATE, POTASSIUM CHLORIDE, AND CALCIUM CHLORIDE 1000 ML: 600; 310; 30; 20 INJECTION, SOLUTION INTRAVENOUS at 21:56

## 2022-06-11 RX ADMIN — MAGNESIUM SULFATE IN WATER 4 G: 40 INJECTION, SOLUTION INTRAVENOUS at 22:09

## 2022-06-11 RX ADMIN — Medication 48.7 UNITS/HR: at 23:40

## 2022-06-11 RX ADMIN — FAMOTIDINE 20 MG: 10 INJECTION INTRAVENOUS at 18:15

## 2022-06-11 RX ADMIN — SODIUM CHLORIDE, PRESERVATIVE FREE 10 ML: 5 INJECTION INTRAVENOUS at 22:00

## 2022-06-11 RX ADMIN — HEPARIN SODIUM 5000 UNITS: 5000 INJECTION INTRAVENOUS; SUBCUTANEOUS at 18:15

## 2022-06-11 RX ADMIN — SODIUM CHLORIDE, SODIUM LACTATE, POTASSIUM CHLORIDE, AND CALCIUM CHLORIDE 1000 ML: 600; 310; 30; 20 INJECTION, SOLUTION INTRAVENOUS at 16:41

## 2022-06-11 RX ADMIN — SODIUM BICARBONATE 50 MEQ: 84 INJECTION, SOLUTION INTRAVENOUS at 14:53

## 2022-06-11 RX ADMIN — SODIUM CHLORIDE, PRESERVATIVE FREE 10 ML: 5 INJECTION INTRAVENOUS at 18:17

## 2022-06-11 RX ADMIN — SODIUM BICARBONATE: 84 INJECTION, SOLUTION INTRAVENOUS at 18:08

## 2022-06-11 RX ADMIN — Medication 10.8 UNITS/HR: at 16:08

## 2022-06-11 RX ADMIN — VANCOMYCIN HYDROCHLORIDE 2000 MG: 10 INJECTION, POWDER, LYOPHILIZED, FOR SOLUTION INTRAVENOUS at 18:17

## 2022-06-11 NOTE — PROCEDURES
[employer] Pulmonary Specialist  [employer] Procedure Note With Ultrasound Guidance    Indication: Inadequate venous access    Risks, benefits, alternatives explained and consent obtained. Time out performed. Patient positioned in Trendelenburg. Central line Bundle:  Full sterile barrier precautions used. 7-Step Sterility Protocol followed. (cap, mask sterile gown, sterile gloves, large sterile sheet, hand hygiene, 2% chlorhexidine for cutaneous antisepsis)  5 mL 1% Lidocaine placed at insertion site. Using ultrasound guidance,   Right femoral vein cannulated x 1 attempt(s) utilizing the modified Seldinger technique. Position of guidewire confirmed in vein using ultrasound prior to dilating. Dilation completed once successfully. Guidewire was removed. Central venous catheter was placed without difficulty. no immediate complications encountered. Good blood return on all 3 ports. Catheter secured & Biopatch applied. Sterile Tegaderm placed. CXR pending. First assist: None.     Racquel Cohen PA-C  06/11/22  Pulmonary, Critical Care Medicine  [facility]

## 2022-06-11 NOTE — PROGRESS NOTES
[facility] Pharmacokinetic Monitoring Service - Vancomycin    Smitha Roy is a 66 y.o. female starting on vancomycin therapy for Sepsis. Pharmacy consulted by Dr Shashi Meza DO for monitoring and adjustment. Target Concentration: Dose by levels based on renal insufficiency   Additional Antimicrobials: Cefepime, Flagyl    Pertinent Laboratory Values: Wt Readings from Last 1 Encounters:   06/11/22 82.1 kg (181 lb)     Temp Readings from Last 1 Encounters:   06/11/22 97.5 °F (36.4 °C)     No components found for: PROCAL  Recent Labs     06/11/22  1415   WBC 37.3*       Pertinent Cultures:  Culture Date Source Results   06/11 Blood In progress   MRSA Nasal Swab: not ordered. Order placed by pharmacy. .    Plan:  Concentration-guided dosing due to renal impairment   Start vancomycin 2000 mg x 1  Vancomycin concentration ordered for 06/12 @ 0400  Pharmacy will continue to monitor patient and adjust therapy as indicated    Thank you for the consult,  SVETLANA Carson  6/11/2022 7:23 PM

## 2022-06-11 NOTE — ED PROVIDER NOTES
Patient 64-year-old female history of poorly controlled diabetes, hypertension, hypothyroidism, and colon cancer. Patient presents today with primary complaint of altered mental status, patient has a last known well yesterday evening no socially found by family members unresponsive at home. EMS was summoned to the scene upon their arrival patient was breathing rapidly but unresponsive with a blood sugar that read high on their meter. Family informed EMS that patient was rarely compliant with her diabetic regimen. Patient had BVM assisted ventilations in route to the emergency department but did not require intubation. Patient was noted to be hypotensive upon EMS arrival with a systolic blood pressure of 81 mmHg and patient had an IV and was receiving IV fluids. Due to patient's altered mental status and critical presentation history and review of systems is limited what was provided by EMS and available on chart review. Past Medical History:   Diagnosis Date    [facility]     Diabetes ([facility]     Hypertension     Hypothyroidism        Past Surgical History:   Procedure Laterality Date    COLONOSCOPY N/A 12/30/2016    COLONOSCOPY.  SURVEILLANCE /c Hot Snared Polypectomy /c EndoClip x3 performed by Yvrose Angeles [facility] at [facility] ENDOSCOPY    HX HYSTERECTOMY      HX TOTAL COLECTOMY           Family History:   Problem Relation Age of Onset    Diabetes Mother     Cancer Father         colon       Social History     Socioeconomic History    Marital status: SINGLE     Spouse name: Not on file    Number of children: Not on file    Years of education: Not on file    Highest education level: Not on file   Occupational History    Not on file   Tobacco Use    Smoking status: Former Smoker    Smokeless tobacco: Never Used   Substance and Sexual Activity    Alcohol use: Yes     Comment: occasionally    Drug use: No    Sexual activity: Not on file   Other Topics Concern    Not on file   Social History Narrative    Not on file     Social Determinants of Health     Financial Resource Strain:     Difficulty of Paying Living Expenses: Not on file   Food Insecurity:     Worried About Running Out of Food in the Last Year: Not on file    Albino of Food in the Last Year: Not on file   Transportation Needs:     Lack of Transportation (Medical): Not on file    Lack of Transportation (Non-Medical): Not on file   Physical Activity:     Days of Exercise per Week: Not on file    Minutes of Exercise per Session: Not on file   Stress:     Feeling of Stress : Not on file   Social Connections:     Frequency of Communication with Friends and Family: Not on file    Frequency of Social Gatherings with Friends and Family: Not on file    Attends Synagogue Services: Not on file    Active Member of [address] [place] [address] [employer] or Organizations: Not on file    Attends Club or Organization Meetings: Not on file    Marital Status: Not on file   Intimate Partner Violence:     Fear of Current or Ex-Partner: Not on file    Emotionally Abused: Not on file    Physically Abused: Not on file    Sexually Abused: Not on file   Housing Stability:     Unable to Pay for Housing in the Last Year: Not on file    Number of [place] in the Last Year: Not on file    Unstable Housing in the Last Year: Not on file         ALLERGIES: Patient has no known allergies. Review of Systems   Unable to perform ROS: Unstable vital signs       Vitals:    06/11/22 1415 06/11/22 1440   BP: (!) 81/46    Pulse: 96    Resp: 14    Temp: 97.5 °F (36.4 °C)    SpO2: 98% 100%   Weight: 82.1 kg (181 lb)    Height: 5' 6\" (1.676 m)             Physical Exam  Constitutional:       General: She is in acute distress. Appearance: She is not diaphoretic. HENT:      Head: Normocephalic. Eyes:      General: No scleral icterus. Pupils: Pupils are equal, round, and reactive to light.  Pupils are equal.   Cardiovascular:      Rate and Rhythm: Normal rate and regular rhythm. Pulmonary:      Effort: Respiratory distress present. Comments: Demonstrates Kussmaul respirations  Abdominal:      General: There is no distension. Tenderness: There is no abdominal tenderness. There is no guarding. Musculoskeletal:         General: No swelling or tenderness. Cervical back: No rigidity. Lymphadenopathy:      Cervical: No cervical adenopathy. Skin:     Capillary Refill: Capillary refill takes less than 2 seconds. Neurological:      Mental Status: She is unresponsive. GCS: GCS eye subscore is 3. GCS verbal subscore is 1. GCS motor subscore is 1. Comments: Opens eyes to voice, otherwise unresponsive          MDM  Number of Diagnoses or Management Options  Diagnosis management comments: Patient presents in extremis with signs and symptoms consistent with severe DKA versus HHS other considerations include sepsis, intracranial abnormality to include intracranial hemorrhage, other metabolic derangement,'s other electrolyte abnormality, or overdose. Patient required significant fluid resuscitation at presentation and starting of vasopressors which improved patient's blood pressure and also improved her mental status. Patient placed on BiPAP to aid her breathing as the risk of intubating a patient with a severe metabolic acidosis is very high and will attempt to stabilize her without mechanical ventilation. We will start patient on insulin, start broad-spectrum antibiotics and anticipate admission to the ICU. Given patient's profound metabolic acidosis we will also start patient on bicarb infusion and bolus. Patient also noted to be significantly hyperkalemic with an elevated creatinine so we will also start treatment for presumed hyperkalemia in the form of calcium.        Amount and/or Complexity of Data Reviewed  Clinical lab tests: reviewed  Tests in the radiology section of CPT®: reviewed  Tests in the medicine section of CPT®: reviewed           Critical Care  Performed by: Zhanna Albarran MD  Authorized by: Zhanna Albarran MD     Critical care provider statement:     Critical care time (minutes):  90    Critical care time was exclusive of:  Separately billable procedures and treating other patients and teaching time    Critical care was necessary to treat or prevent imminent or life-threatening deterioration of the following conditions:  CNS failure or compromise, dehydration, respiratory failure, renal failure and shock    Critical care was time spent personally by me on the following activities:  Development of treatment plan with patient or surrogate, discussions with consultants, evaluation of patient's response to treatment, examination of patient, interpretation of cardiac output measurements, obtaining history from patient or surrogate, review of old charts, re-evaluation of patient's condition, pulse oximetry, ordering and review of radiographic studies, ordering and review of laboratory studies and ordering and performing treatments and interventions    I assumed direction of critical care for this patient from another provider in my specialty: no

## 2022-06-11 NOTE — PROGRESS NOTES
Pharmacy Note     Cefepime 1 gm q12h x 3 mins ordered for treatment of Sepsis. Per [address] [place] [address], this order will be changed to 1 gm q12h infused over 4 hours. Estimated Creatinine Clearance: Estimated Creatinine Clearance: 13.9 mL/min (A) (based on SCr of 3.89 mg/dL (H)). Dialysis Status, BRENDA, CKD: BRENDA   BMI:  Body mass index is 29.21 kg/m². Rationale for Adjustment:  [facility] B-Lactam extended infusion policy    Pharmacy will continue to monitor and adjust dose as necessary. Please call with any questions.     Thank you,  Nemo Santamaria, PHARMD

## 2022-06-11 NOTE — ED TRIAGE NOTES
Patient came  in via rescue, patient responds to voice, skin warm and dry, patient placed on monitor, SR noted at this time, Dr Say Moralez at bedside, blood pressure low, 2L NS started on pressure bag, per rescue blood sugar is reading HIGH

## 2022-06-11 NOTE — H&P
[address] [place] [address] [facility]  Pulmonary, Critical Care, and Sleep Medicine    Name: Jameson Fulton MRN: [id number]   : 1950 Hospital: [facility]   Date: 2022        Critical Care History and Physical      IMPRESSION:   · Diabetic ketoacidosis with pH of 6.78  · High anion gap metabolic acidosis without appropriate respiratory compensation (without secondary metabolic disturbance) - likely secondary to DKA and lactic acidosis +/- azotemia  · Hyperkalemia of 7.9 without EKG changes  · Mixed shock, likely combination of septic + hypovolemic shock  · Acute toxic metabolic encephalopathy  · Acute hypoxemic/hypercapneic respiratory failure on BiPAP secondary to encephalopathy  · Acute kidney injury (baseline SCr previously around 1.0)   · Profound leukocytosis of 37.3, pending peripheral smear, likely secondary to hemoconcentration + likely sepsis, unclear source, UA without e/o infection, pending CT C/A/P  · Uncontrolled diabetes mellitus (A1c 9.0%)  · History of colon cancer s/p total colectomy and prior chemotherapy  · Chronic normocytic anemia (baseline around 10)     Patient Active Problem List   Diagnosis Code    Hyperkalemia E87.5    DKA (diabetic ketoacidoses) E11.10    DKA (diabetic ketoacidosis) [facility] E11.10        RECOMMENDATIONS:   · Resp: BiPAP support for now, monitoring closely for aspiration (patietnt DNI). Titrate FiO2/supplemental O2 for SpO2 >92%. · I/D: Afebrile, but with significant leukocytosis. Sepsis bundle per hospital protocol. F/u BCx, UCx, Sputum Cx. Lactic acid ordered to trend Q4hrs till normalized. ABX: Vancomycin, cefepime, Flagyl. Deescalate ABX once cultures finalize. Obtain CT C/A/P to assess for sepsis source. · Hem/Onc: Daily CBC. SQH for VTE ppx. · CVS: Actively titrate vasopressors for MAP >65mmHg. Check troponin and trend q4h to peak.     · Metabolic: BMP Y2H for now; deescalate as able; monitor e-lytes; bicarb gtt pending improvement in pH, HCO3  · Renal: Trend renal indices, Brown for strict I/O   · Endocrine: DKA protocol with glucose q1h. Check TSH level. · GI: SUP. Trend LFTs. Zofran PRN for N/V   · Musc/Skin: No acute issues, wound care PRN  · Neuro: Obtain CT head. Minimize sedating medications. · Fluids: IVF resuscitation with IVFs per DKA protocol and bicarb gtt  · Procedures: R femoral triple lumen placed for vasopressors  · Code Status: DNR/DNI - Patient's daughter Darin Morgan states that her mom has expressed to her that she would not want resuscitation with CPR or to be placed on the ventilator. She believes her mom has an advanced directive stating this but is unsure where this is located. Best Practice:  · Sepsis Bundle per Hospital Protocol  · Glycemic control; avoid Hypoglycemia  · IHI ICU Bundles:  ·  Central Line Bundle Followed     · Mech Vent patients/ Pulmonary pts:   · VAP bundle, Aim to keep peak plateau pressure 53-74WP H2O  · Aspiration Precautions - HOB >30'  · Daily sedation holiday as indicated  · SBT as tolerated/appropriate  · Stress ulcer prophylaxis: Pepcid   · DVT prophylaxis: SQH  · Need for Lines, brown assessed. · Restraints not needed at this time. Subjective/History: This patient has been seen and evaluated at the request of Dr. Farshad Moya for DKA. 06/11/22    Patient is a 66 y.o. female with pmh of diabetes mellitus, hypertension, hyperlipidemia, hypothyroidism, and colon cancer s/p total colectomy and chemotherapy who presented today via EMS for altered mental status. Per patient's daughter Darin Morgan, patient has been nonadherent to checking her blood glucose levels, and noticed on review of her mother's glucometer that there were several high and low values. She states that her mother did complain of feeling unwell yesterday with nausea and vomiting. She visits her mom for a couple of hours daily, but patient currently lives alone.      On arrival to the ER, patient was unresponsive and hypotensive. Glucose was found to be >1200; ABG showed a pH of 6.78, pCO2 26.4; BMP bicarb of 4; K 7.9. She was given 6L IVFs, 1 amp of bicarb, 2g calcium gluconate. She remained hypotensive with MAPs in the 50s despite IVFs, and so she was started on Levophed gtt via peripheral IV. UOP in the ER approx 500mL. The patient is critically ill and can not provide additional history due to encephalopathy. Past Medical History:   Diagnosis Date    [facility]     Diabetes ([facility]     Hypertension     Hypothyroidism         Past Surgical History:   Procedure Laterality Date    COLONOSCOPY N/A 12/30/2016    COLONOSCOPY. SURVEILLANCE /c Hot Snared Polypectomy /c EndoClip x3 performed by Solange Barrera MD at [address] [place] [address] HX HYSTERECTOMY      HX TOTAL COLECTOMY          Prior to Admission medications    Medication Sig Start Date End Date Taking? Authorizing Provider   insulin glargine (LANTUS) 100 unit/mL injection Inject 15 units SQ BID 1/24/21   Gretta Griffin MD   metFORMIN ER (GLUCOPHAGE XR) 500 mg tablet  10/5/20   Provider, Historical   gabapentin (NEURONTIN) 100 mg capsule Take 100 mg by mouth three (3) times daily. Provider, Historical   Cholecalciferol, Vitamin D3, 50,000 unit cap Take 1 Cap by mouth every seven (7) days. Provider, Historical   insulin regular (NOVOLIN R, HUMULIN R) 100 unit/mL injection by SubCUTAneous route Before breakfast, lunch, and dinner. Sliding scale    Vaughn Billings MD   amLODIPine (NORVASC) 5 mg tablet Take 5 mg by mouth daily. Vaughn Billings MD   alendronate (FOSAMAX) 10 mg tablet Take 70 mg by mouth every seven (7) days. Vaughn Billings MD   aspirin 81 mg chewable tablet Take 81 mg by mouth daily. Vaughn Billings MD   levothyroxine (SYNTHROID) 112 mcg tablet Take 100 mcg by mouth Daily (before breakfast).     Vaughn Billings MD       Current Facility-Administered Medications   Medication Dose Route Frequency    NOREPINephrine (LEVOPHED) 8 mg in 5% dextrose 250mL (32 mcg/mL) infusion  0.5-50 mcg/min IntraVENous TITRATE    sodium bicarbonate (8.4%) 150 mEq in sterile water 1,000 mL infusion   IntraVENous CONTINUOUS    lactated ringers bolus infusion 1,000 mL  1,000 mL IntraVENous ONCE    vancomycin (VANCOCIN) 2000 mg in  ml infusion  2,000 mg IntraVENous NOW    insulin regular (MYXREDLIN, NOVOLIN, HUMULIN) 100 units/100 ml NS infusion (premix)  0-50 Units/hr IntraVENous TITRATE    sodium chloride (NS) flush 5-40 mL  5-40 mL IntraVENous Q8H    heparin (porcine) injection 5,000 Units  5,000 Units SubCUTAneous Q8H    famotidine (PF) (PEPCID) 20 mg in 0.9% sodium chloride 10 mL injection  20 mg IntraVENous Q24H    cefepime (MAXIPIME) 1 g in sterile water (preservative free) 10 mL IV syringe  1 g IntraVENous Q12H    metroNIDAZOLE (FLAGYL) IVPB premix 500 mg  500 mg IntraVENous Q8H       No Known Allergies     Social History     Tobacco Use    Smoking status: Former Smoker    Smokeless tobacco: Never Used   Substance Use Topics    Alcohol use: Yes     Comment: occasionally        Family History   Problem Relation Age of Onset    Diabetes Mother     Cancer Father         colon          Review of Systems:  Review of systems not obtained due to patient factors. Objective:   Vital Signs:    Visit Vitals  BP (!) 81/46 (BP 1 Location: Left upper arm, BP Patient Position: Lying)   Pulse 96   Temp 97.5 °F (36.4 °C)   Resp 14   Ht 5' 6\" (1.676 m)   Wt 82.1 kg (181 lb)   SpO2 100%   BMI 29.21 kg/m²       O2 Device: BIPAP       Temp (24hrs), Av.5 °F (36.4 °C), Min:97.5 °F (36.4 °C), Max:97.5 °F (36.4 °C)       Intake/Output:   Last shift:      No intake/output data recorded. Last 3 shifts: No intake/output data recorded. No intake or output data in the 24 hours ending 22 1708    Physical Exam:     General:  BiPAP in place, responds \"no\" to all questioning   Head:  Normocephalic, without obvious abnormality, atraumatic.    Eyes: Conjunctivae/corneas clear. Tightly closes eyes when trying to obtain pupillary exam   Nose: Nares appear normal through mask. Septum midline. Mucosa dry. Throat: BiPAP in place. Trachea midline. Neck: Supple, symmetrical, no adenopathy, no JVD. Lungs:   Symmetrical chest rise; CTAB though decreased in the bases; no wheezes/rhonchi/rales noted. Heart:  RRR, S1, S2 normal, no m/r/g. POCUS with hyperdynamic LV, normal RV size and grossly normal function, IVC with >50% collapse with resp variation despite BiPAP in place   Abdomen:   Soft, non-distended. Hypoactive bowel sounds. Grimaces to deep palpation in all quadrants. No hepatomegaly. Extremities: Extremities normal, atraumatic, no cyanosis or edema. Pulses: 2+ and symmetric all extremities. Skin: Skin dry. No rashes or lesions   Neurologic: Grossly nonfocal   Devices: Chandler in place       Data:     Recent Results (from the past 24 hour(s))   CBC WITH AUTOMATED DIFF    Collection Time: 06/11/22  2:15 PM   Result Value Ref Range    WBC 37.3 (H) 4.6 - 13.2 K/uL    RBC 3.67 (L) 4.20 - 5.30 M/uL    HGB 9.8 (L) 12.0 - 16.0 g/dL    HCT 36.6 35.0 - 45.0 %    MCV 99.7 78.0 - 100.0 FL    MCH 26.7 24.0 - 34.0 PG    MCHC 26.8 (L) 31.0 - 37.0 g/dL    RDW 14.3 11.6 - 14.5 %    PLATELET 478 293 - 458 K/uL    MPV 10.9 9.2 - 11.8 FL    NRBC 0.0 0  WBC    ABSOLUTE NRBC 0.00 0.00 - 0.01 K/uL    NEUTROPHILS 87 (H) 40 - 73 %    LYMPHOCYTES 7 (L) 21 - 52 %    MONOCYTES 6 3 - 10 %    EOSINOPHILS 0 0 - 5 %    BASOPHILS 0 0 - 2 %    IMMATURE GRANULOCYTES 0 %    ABS. NEUTROPHILS 32.5 (H) 1.8 - 8.0 K/UL    ABS. LYMPHOCYTES 2.6 0.9 - 3.6 K/UL    ABS. MONOCYTES 2.2 (H) 0.05 - 1.2 K/UL    ABS. EOSINOPHILS 0.0 0.0 - 0.4 K/UL    ABS. BASOPHILS 0.0 0.0 - 0.1 K/UL    ABS. IMM.  GRANS. 0.0 K/UL    DF MANUAL      PLATELET COMMENTS ADEQUATE PLATELETS      RBC COMMENTS GUSTAVO CELLS  1+        RBC COMMENTS SCHISTOCYTES  FEW       METABOLIC PANEL, COMPREHENSIVE    Collection Time: 06/11/22  2:15 PM   Result Value Ref Range    Sodium 132 (L) 136 - 145 mmol/L    Potassium 7.9 (HH) 3.5 - 5.5 mmol/L    Chloride 95 (L) 100 - 111 mmol/L    CO2 4 (LL) 21 - 32 mmol/L    Anion gap 33 (H) 3.0 - 18 mmol/L    Glucose 1,263 (HH) 74 - 99 mg/dL    BUN 73 (H) 7.0 - 18 MG/DL    Creatinine 3.89 (H) 0.6 - 1.3 MG/DL    BUN/Creatinine ratio 19 12 - 20      GFR est AA 14 (L) >60 ml/min/1.73m2    GFR est non-AA 11 (L) >60 ml/min/1.73m2    Calcium 8.4 (L) 8.5 - 10.1 MG/DL    Bilirubin, total 0.5 0.2 - 1.0 MG/DL    ALT (SGPT) 23 13 - 56 U/L    AST (SGOT) 29 10 - 38 U/L    Alk.  phosphatase 78 45 - 117 U/L    Protein, total 6.4 6.4 - 8.2 g/dL    Albumin 3.0 (L) 3.4 - 5.0 g/dL    Globulin 3.4 2.0 - 4.0 g/dL    A-G Ratio 0.9 0.8 - 1.7     MAGNESIUM    Collection Time: 06/11/22  2:15 PM   Result Value Ref Range    Magnesium 2.4 1.6 - 2.6 mg/dL   PHOSPHORUS    Collection Time: 06/11/22  2:15 PM   Result Value Ref Range    Phosphorus 12.9 (H) 2.5 - 4.9 MG/DL   ETHYL ALCOHOL    Collection Time: 06/11/22  2:15 PM   Result Value Ref Range    ALCOHOL(ETHYL),SERUM <3 0 - 3 MG/DL   HEMOGLOBIN A1C WITH EAG    Collection Time: 06/11/22  2:15 PM   Result Value Ref Range    Hemoglobin A1c 9.0 (H) 4.2 - 5.6 %    Est. average glucose 212 mg/dL   LIPASE    Collection Time: 06/11/22  2:15 PM   Result Value Ref Range    Lipase 235 73 - 393 U/L   URINALYSIS W/ RFLX MICROSCOPIC    Collection Time: 06/11/22  2:20 PM   Result Value Ref Range    Color YELLOW      Appearance CLEAR      Specific gravity 1.019 1.005 - 1.030      pH (UA) 5.0 5.0 - 8.0      Protein 30 (A) NEG mg/dL    Glucose >1,000 (A) NEG mg/dL    Ketone 80 (A) NEG mg/dL    Bilirubin Negative NEG      Blood Negative NEG      Urobilinogen 0.2 0.2 - 1.0 EU/dL    Nitrites Negative NEG      Leukocyte Esterase Negative NEG     URINE MICROSCOPIC ONLY    Collection Time: 06/11/22  2:20 PM   Result Value Ref Range    WBC 0 to 1 0 - 4 /hpf    RBC NONE 0 - 5 /hpf    Epithelial cells 2+ 0 - 5 /lpf    Bacteria Negative NEG /hpf   BLOOD GAS,CHEM8,LACTIC ACID POC    Collection Time: 06/11/22  2:33 PM   Result Value Ref Range    Calcium, ionized (POC) 1.11 (L) 1.12 - 1.32 mmol/L    Base deficit (POC) 30.0 mmol/L    HCO3 (POC) 4.0 (L) 22 - 26 MMOL/L    CO2, POC <5 (LL) 19 - 24 MMOL/L    O2 SAT 96 %    Sample source VENOUS BLOOD      Performed by Marcela Brittny Traveler     Sodium (POC) 127 (L) 136 - 145 mmol/L    Potassium (POC) 8.1 (HH) 3.5 - 5.1 mmol/L    Glucose (POC) >700 (HH) 65 - 100 mg/dL    Creatinine (POC) 4.22 (H) 0.6 - 1.3 mg/dL    Lactic Acid (POC) 3.70 (HH) 0.40 - 2.00 mmol/L    Chloride (POC) 107 98 - 107 mmol/L    GFRAA, POC 13 (L) >60 ml/min/1.73m2    GFRNA, POC 10 (L) >60 ml/min/1.73m2    pH, venous (POC) 6.78 (LL) 7.32 - 7.42      pCO2, venous (POC) 26.9 (L) 41 - 51 MMHG    pO2, venous (POC) 156 (H) 25 - 40 mmHg   EKG, 12 LEAD, INITIAL    Collection Time: 06/11/22  2:50 PM   Result Value Ref Range    Ventricular Rate 102 BPM    Atrial Rate 102 BPM    P-R Interval 144 ms    QRS Duration 78 ms    Q-T Interval 348 ms    QTC Calculation (Bezet) 453 ms    Calculated P Axis 77 degrees    Calculated R Axis 68 degrees    Calculated T Axis 75 degrees    Diagnosis       Sinus tachycardia  Otherwise normal ECG  When compared with ECG of 22-JAN-2021 10:32,  No significant change was found     GLUCOSTABILIZER    Collection Time: 06/11/22  3:46 PM   Result Value Ref Range    Glucose 601 mg/dL    Insulin order 10.8 units/hour    Insulin adminstered 10.8 units/hour    Multiplier 0.020     Low target 140 mg/dL    High target 180 mg/dL    D50 order 0.0 ml    D50 administered 0.00 ml    Minutes until next BG 60 min    Order initials AL     Administered initials AL    GLUCOSE, POC    Collection Time: 06/11/22  5:01 PM   Result Value Ref Range    Glucose (POC) >600 (HH) 70 - 110 mg/dL   GLUCOSTABILIZER    Collection Time: 06/11/22  5:03 PM   Result Value Ref Range    Glucose 601 mg/dL    Insulin order 16.2 units/hour    Insulin adminstered 16.2 units/hour    Multiplier 0.030     Low target 140 mg/dL    High target 180 mg/dL    D50 order 0.0 ml    D50 administered 0.00 ml    Minutes until next BG 60 min    Order initials AL     Administered initials AL            Recent Labs     06/11/22  1433   HCO3I 4.0*       Telemetry:normal sinus rhythm    Imaging:  I have personally reviewed the patients radiographs and have reviewed the reports:    CXR [date]:  Results from Hospital Encounter encounter on 06/11/22    XR CHEST PORT    Narrative  CHEST PORTABLE 1455 hours    COMPARISON: 19 January 2021. INDICATIONS: Altered mental status. FINDINGS:    Portable single view chest demonstrates:    Lungs: Clear. Cardiac Silhouette And Mediastinal Contours: Normal.    Pleural Spaces: No pneumothorax or pleural effusion evident. Bones And Soft Tissues: Unremarkable for age. Impression  No active or acute cardiopulmonary process is evident. CT CHEST/ABD/PELV/HEAD [date]:  Results from Hospital Encounter encounter on 01/19/21    CT CHEST ABD PELV WO CONT    Narrative  EXAM: CT of the Chest, Abdomen and Pelvis without contrast    INDICATION:  sepsis of unknown origin . Weakness, vomiting, hyperglycemia. TECHNIQUE: CT of the chest, abdomen and pelvis without contrast. Sagittal and  coronal reformations obtained. All CT scans at this facility are performed using dose optimization technique as  appropriate to a performed exam, to include automated exposure control,  adjustment of the mA and/or kV according to patient size (including appropriate  matching for site specific examination) or use of iterative reconstruction  technique. IV contrast: None. Enteric contrast: None    COMPARISON: 6/8/2015. FINDINGS:  Limitations: Evaluation of the solid organs and pulmonary arteries are limited  due to the lack of IV contrast.    Thyroid: Unremarkable. Mediastinum: No mediastinal adenopathy.  No central fluid collection. No  mediastinal mass. Heart: Unremarkable. Pericardium: Unremarkable. Coronary arteries: Mild coronary artery disease is noted predominantly involving  the LAD. Thoracic aorta: No evidence of aneurysm. Pulmonary arteries: Due to the lack of IV contrast, evaluation of the pulmonary  arteries is limited. Trachea and bronchi: Unremarkable    Pleura: No pneumothorax. No pleural effusion identified. Lungs: There is minimal linear atelectasis in the lingula. A 2 mm density is  noted in the right upper lobe near the major fissure in the likely anterior  segment. No consolidation identified. Axilla/Chest Wall: The axilla are unremarkable. Peritoneum: No free air identified. No free fluid appreciated. No fluid  collections identified. Liver: Unremarkable    Biliary/gallbladder: No intrahepatic or extra hepatic biliary duct dilatation  identified. The gallbladder is unremarkable. No pericholecystic inflammation. Spleen: Unremarkable    Pancreas: The pancreas is mildly atrophic. A previously described hypodensity in  the neck is present but due to adjacent motion and structures, further  evaluation is limited. : The adrenal glands are unremarkable. No urolithiasis appreciated. No  perinephric fat stranding identified. No evidence of hydroureteronephrosis. The bladder is decompressed by Chandler catheter. The uterus is surgically absent. GI: The stomach is distended. No inflammatory changes appreciated. The small  bowel is without evidence of obstruction. No dilated loops appreciated. No small  bowel wall thickening identified. The mesentery is without evidence of  adenopathy or inflammation. The appendix is likely surgically absent. There is  a ileocolonic anastomosis. Changes consistent with prior partial colectomy. No  acute process. Abdominal aorta and retroperitoneum: The abdominal aorta is without evidence of  aneurysm. No periaortic adenopathy identified.  No retroperitoneal fluid  collection appreciated. Abdominal wall/Inguinal region: Small umbilical fat hernia is noted. No  complication identified. Musculoskeletal: Moderate compression fracture of L4 is noted this is unchanged. Mild multilevel degenerative changes are present. Osteonecrosis of the left  femoral head with severe secondary osteoarthritis is noted. This is new since  2015. Moderate degenerative changes of the right hip are noted. Impression  1. No acute pathology appreciated. 2.  Cystic process in the pancreas better appreciated on prior imaging. However,  follow-up MRI with and without contrast is recommended for better delineation. 3.  Left hip osteonecrosis with severe secondary osteoarthritis. 4.  Old L4 compression fracture. 5.  Small umbilical fat hernia     01/19/21    ECHO ADULT COMPLETE 01/22/2021 1/22/2021    Interpretation Summary  · LV: Estimated LVEF is 55 - 60%. Visually measured ejection fraction. Normal cavity size, wall thickness and systolic function (ejection fraction normal). Wall motion: normal. Inconclusive left ventricular diastolic function. Signed by: Rosie Miller MD on 1/22/2021 11:32 AM                 Total of  75   min critical care time spent at bedside during the course of care providing evaluation,management and care decisions and ordering appropriate treatment related to critical care problems exclusive of procedures. The reason for providing this level of medical care for this critically ill patient was due a critical illness that impaired one or more vital organ systems such that there was a high probability of imminent or life threatening deterioration in the patients condition. This care involved high complexity decision making to assess, manipulate, and support vital system functions, to treat this degree vital organ system failure and to prevent further life threatening deterioration of the patients condition.     James Millard DO [place]    06/11/22    Pulmonary [address] [facility]

## 2022-06-11 NOTE — PROGRESS NOTES
Reason for Renal Dosing:  Per Renal Dosing Policy    Patient clinical status and labs ordered/reviewed. Pt Weight Weight: 82.1 kg (181 lb)   Serum Creatinine Lab Results   Component Value Date/Time    Creatinine 3.89 (H) 06/11/2022 02:15 PM    Creatinine, POC 1.3 06/08/2015 11:01 AM    Creatinine (POC) 4.22 (H) 06/11/2022 02:33 PM       Creatinine Clearance Estimated Creatinine Clearance: 13.9 mL/min (A) (based on SCr of 3.89 mg/dL (H)). BUN Lab Results   Component Value Date/Time    BUN 73 (H) 06/11/2022 02:15 PM       WBC Lab Results   Component Value Date/Time    WBC 37.3 (H) 06/11/2022 02:15 PM      Temperature Temp: 97.5 °F (36.4 °C)   HR Pulse (Heart Rate): 96     BP BP: (!) 81/46           Drug type: Non-Antibiotic      Drug/dose: for naïve patient was initiated at: Famotidine 20 mg IV daily    Continue to monitor.     Signed Sindhu Ogden PHARMD  Date 6/11/2022  Time 4:24 PM

## 2022-06-11 NOTE — ED NOTES
TRANSFER - OUT REPORT:    Verbal report given to [facility] RN on Emmett Zamora  being transferred to ICU 3 for routine progression of care       Report consisted of patients Situation, Background, Assessment and   Recommendations(SBAR). Information from the following report(s) SBAR, Kardex, Intake/Output, MAR and Cardiac Rhythm NSR was reviewed with the receiving nurse. Lines:   Triple Lumen 06/11/22 Proximal;Right Femoral (Active)       Peripheral IV 06/11/22 Right Antecubital (Active)   Site Assessment Clean, dry, & intact 06/11/22 1415   Phlebitis Assessment 0 06/11/22 1415   Infiltration Assessment 0 06/11/22 1415   Dressing Status Clean, dry, & intact 06/11/22 1415   Dressing Type Transparent 06/11/22 1415       Peripheral IV 06/11/22 Left Antecubital (Active)   Site Assessment Clean, dry, & intact 06/11/22 1400   Phlebitis Assessment 0 06/11/22 1400   Infiltration Assessment 0 06/11/22 1400   Dressing Status Clean, dry, & intact 06/11/22 1400   Dressing Type Transparent 06/11/22 1400        Opportunity for questions and clarification was provided.       Patient transported with:   Monitor  O2 @ 15 liters  Registered Nurse

## 2022-06-11 NOTE — CONSULTS
Consult Note  . Consult requested by: Delfino Shirley DO    ADMIT DATE: 6/11/2022    CONSULT DATE: June 11, 2022           Admission diagnosis:  AMS   Reason for Nephrology Consultation: BRENDA    Assessment and plan:    #1 acute kidney injury, baseline creatinine 1.03.,  Etiology secondary to prerenal azotemia versus ischemic ATN in the setting of hypotension/septic shock/DKA  #2 severe hyperkalemia without EKG changes, secondary to DKA and BRENDA  #3 acidemia with severe metabolic acidosis secondary to DKA  #4 diabetic ketoacidosis  #5 altered mental status due to metabolic encephalopathy and DKA  #6 hypotension/shock secondary to sepsis? PNA   #7 leukocytosis    Plan:    #1 strict intake output, Chandler catheter in place  #2 medically manage hyperkalemia with calcium gluconate, insulin drip, Lokelma able to give through the NG, bicarb gtt  #3 check renal panel every 4 hours  #4 fluid resuscitate with LR per DKA protocol  #5 urine studies ordered  #6 check CK levels  #7 check renal ultrasound  #8 renally dose antibiotics for EGFR of less than 30  #9 avoid IV contrast    Please call with questions,    Monique Bangura MD [facility]  Cell [phone]  Pager: [phone]    HPI:   Patient is a 75-year-old female with history of hypertension, diabetes mellitus, hypothyroidism and history of colon cancer presented with altered mental status. She was found unresponsive by family members at home. EMS was called blood sugars read high, patient appeared to be breathing rapidly, unresponsive. On presentation patient was hypotensive into 68G systolic, labs were done which showed a white count of 30 7K, platelets of 693L, sodium of 132, potassium 7.9, BUN of 73 creatinine of 3.89, CO2 of 4 albumin of 3.0. Blood gas showed a pH of 6.78, PCO2 of 26 0.9, PO2 of 156, bicarb of 4.0.  UA showed 80 of ketones, > 1000 glucose.   Nephrology consulted for hyper kalemia and BRENDA       Past Medical History:   Diagnosis Date    Colon cancer ([facility]     Diabetes ([facility]     Hypertension     Hypothyroidism       Past Surgical History:   Procedure Laterality Date    COLONOSCOPY N/A 12/30/2016    COLONOSCOPY. SURVEILLANCE /c Hot Snared Polypectomy /c EndoClip x3 performed by Harish Lowe MD at [facility] ENDOSCOPY    HX HYSTERECTOMY      HX TOTAL COLECTOMY         Social History     Socioeconomic History    Marital status: SINGLE     Spouse name: Not on file    Number of children: Not on file    Years of education: Not on file    Highest education level: Not on file   Occupational History    Not on file   Tobacco Use    Smoking status: Former Smoker    Smokeless tobacco: Never Used   Substance and Sexual Activity    Alcohol use: Yes     Comment: occasionally    Drug use: No    Sexual activity: Not on file   Other Topics Concern    Not on file   Social History Narrative    Not on file     Social Determinants of Health     Financial Resource Strain:     Difficulty of Paying Living Expenses: Not on file   Food Insecurity:     Worried About [address] [employer] [address] [employer] in the Last Year: Not on file    Albino of Food in the Last Year: Not on file   Transportation Needs:     Lack of Transportation (Medical): Not on file    Lack of Transportation (Non-Medical):  Not on file   Physical Activity:     Days of Exercise per Week: Not on file    Minutes of Exercise per Session: Not on file   Stress:     Feeling of Stress : Not on file   Social Connections:     Frequency of Communication with Friends and Family: Not on file    Frequency of Social Gatherings with Friends and Family: Not on file    Attends Christian Services: Not on file    Active Member of Clubs or Organizations: Not on file    Attends Club or Organization Meetings: Not on file    Marital Status: Not on file   Intimate Partner Violence:     Fear of Current or Ex-Partner: Not on file    Emotionally Abused: Not on file    Physically Abused: Not on file    Sexually Abused: Not on file   Housing Stability:     Unable to Pay for Housing in the Last Year: Not on file    Number of Places Lived in the Last Year: Not on file    Unstable Housing in the Last Year: Not on file       Family History   Problem Relation Age of Onset    Diabetes Mother     Cancer Father         colon     No Known Allergies     Home Medications:   (Not in a hospital admission)      Current Inpatient Medications:     Current Facility-Administered Medications   Medication Dose Route Frequency    glucose chewable tablet 16 g  4 Tablet Oral PRN    glucagon (GLUCAGEN) injection 1 mg  1 mg IntraMUSCular PRN    dextrose 10% infusion 0-250 mL  0-250 mL IntraVENous PRN    NOREPINephrine (LEVOPHED) 8 mg in 5% dextrose 250mL (32 mcg/mL) infusion  0.5-50 mcg/min IntraVENous TITRATE    sodium bicarbonate (8.4%) 150 mEq in sterile water 1,000 mL infusion   IntraVENous CONTINUOUS    lactated ringers bolus infusion 1,000 mL  1,000 mL IntraVENous ONCE    vancomycin (VANCOCIN) 2000 mg in  ml infusion  2,000 mg IntraVENous NOW    insulin regular (MYXREDLIN, NOVOLIN, HUMULIN) 100 units/100 ml NS infusion (premix)  0-50 Units/hr IntraVENous TITRATE    sodium chloride (NS) flush 5-40 mL  5-40 mL IntraVENous Q8H    sodium chloride (NS) flush 5-40 mL  5-40 mL IntraVENous PRN    acetaminophen (TYLENOL) tablet 650 mg  650 mg Oral Q6H PRN    Or    acetaminophen (TYLENOL) suppository 650 mg  650 mg Rectal Q6H PRN    ondansetron (ZOFRAN ODT) tablet 4 mg  4 mg Oral Q8H PRN    Or    ondansetron (ZOFRAN) injection 4 mg  4 mg IntraVENous Q6H PRN    heparin (porcine) injection 5,000 Units  5,000 Units SubCUTAneous Q8H    famotidine (PF) (PEPCID) 20 mg in 0.9% sodium chloride 10 mL injection  20 mg IntraVENous Q24H     Current Outpatient Medications   Medication Sig    insulin glargine (LANTUS) 100 unit/mL injection Inject 15 units SQ BID    metFORMIN ER (GLUCOPHAGE XR) 500 mg tablet     gabapentin (NEURONTIN) 100 mg capsule Take 100 mg by mouth three (3) times daily.  Cholecalciferol, Vitamin D3, 50,000 unit cap Take 1 Cap by mouth every seven (7) days.  insulin regular (NOVOLIN R, HUMULIN R) 100 unit/mL injection by SubCUTAneous route Before breakfast, lunch, and dinner. Sliding scale    amLODIPine (NORVASC) 5 mg tablet Take 5 mg by mouth daily.  alendronate (FOSAMAX) 10 mg tablet Take 70 mg by mouth every seven (7) days.  aspirin 81 mg chewable tablet Take 81 mg by mouth daily.  levothyroxine (SYNTHROID) 112 mcg tablet Take 100 mcg by mouth Daily (before breakfast). Review of Systems:   Unable to obtain as on BPAP    Physical Assessment:     Vitals:    06/11/22 1415 06/11/22 1440   BP: (!) 81/46    Pulse: 96    Resp: 14    Temp: 97.5 °F (36.4 °C)    SpO2: 98% 100%   Weight: 82.1 kg (181 lb)    Height: 5' 6\" (1.676 m)      Last 3 Recorded Weights in this Encounter    06/11/22 1415   Weight: 82.1 kg (181 lb)     Admission weight: Weight: 82.1 kg (181 lb) (06/11/22 1415)    No intake or output data in the 24 hours ending 06/11/22 1647    On BIPAP  HEENT:mmm  Neck: no cervical lymphadenopathy or thyromegaly. Lungs: good air entry, clear to auscultation bilaterally. Cardiovascular system: S1, S2, regular rate and rhythm. Abdomen: soft, non tender, non distended. Extremities: no clubbing, cyanosis or edema. Integumentary: skin is grossly intact. Data Review:    Labs: Results:       Chemistry Recent Labs     06/11/22 1415   GLU 1,263*   *   K 7.9*   CL 95*   CO2 4*   BUN 73*   CREA 3.89*   CA 8.4*   AGAP 33*   BUCR 19   AP 78   TP 6.4   ALB 3.0*   GLOB 3.4   AGRAT 0.9   PHOS 12.9*         CBC w/Diff Recent Labs     06/11/22 1415   WBC 37.3*   RBC 3.67*   HGB 9.8*   HCT 36.6      GRANS 87*   LYMPH 7*   EOS 0         Iron/Ferritin No results for input(s): IRON in the last 72 hours. No lab exists for component: TIBCCALC   PTH/VIT D No results for input(s): PTH in the last 72 hours.     No lab exists for component: MORGAN Cantu MD  6/11/2022  4:47 PM      June 11, 2022

## 2022-06-12 ENCOUNTER — APPOINTMENT (OUTPATIENT)
Dept: CT IMAGING | Age: 72
DRG: 871 | End: 2022-06-12
Attending: STUDENT IN AN ORGANIZED HEALTH CARE EDUCATION/TRAINING PROGRAM
Payer: MEDICARE

## 2022-06-12 ENCOUNTER — APPOINTMENT (OUTPATIENT)
Dept: NON INVASIVE DIAGNOSTICS | Age: 72
DRG: 871 | End: 2022-06-12
Attending: PHYSICIAN ASSISTANT
Payer: MEDICARE

## 2022-06-12 ENCOUNTER — APPOINTMENT (OUTPATIENT)
Dept: ULTRASOUND IMAGING | Age: 72
DRG: 871 | End: 2022-06-12
Attending: REGISTERED NURSE
Payer: MEDICARE

## 2022-06-12 PROBLEM — E03.9 ACQUIRED HYPOTHYROIDISM: Status: ACTIVE | Noted: 2022-06-12

## 2022-06-12 PROBLEM — R77.8 ELEVATED TROPONIN: Status: ACTIVE | Noted: 2022-06-12

## 2022-06-12 PROBLEM — Z85.038 HISTORY OF COLON CANCER: Status: ACTIVE | Noted: 2022-06-12

## 2022-06-12 PROBLEM — R79.89 ELEVATED TROPONIN: Status: ACTIVE | Noted: 2022-06-12

## 2022-06-12 PROBLEM — N17.9 AKI (ACUTE KIDNEY INJURY) (HCC): Status: ACTIVE | Noted: 2022-06-12

## 2022-06-12 PROBLEM — I10 PRIMARY HYPERTENSION: Status: ACTIVE | Noted: 2022-06-12

## 2022-06-12 LAB
ADMINISTERED INITIALS, ADMINIT: NORMAL
ANION GAP SERPL CALC-SCNC: 13 MMOL/L (ref 3–18)
ANION GAP SERPL CALC-SCNC: 22 MMOL/L (ref 3–18)
ANION GAP SERPL CALC-SCNC: 8 MMOL/L (ref 3–18)
ANION GAP SERPL CALC-SCNC: 8 MMOL/L (ref 3–18)
ANION GAP SERPL CALC-SCNC: 9 MMOL/L (ref 3–18)
APTT PPP: 40.9 SEC (ref 23–36.4)
APTT PPP: 71.1 SEC (ref 23–36.4)
ARTERIAL PATENCY WRIST A: ABNORMAL
BASE DEFICIT BLDV-SCNC: 10.6 MMOL/L
BASOPHILS # BLD: 0 K/UL (ref 0–0.1)
BASOPHILS NFR BLD: 0 % (ref 0–2)
BDY SITE: ABNORMAL
BUN SERPL-MCNC: 52 MG/DL (ref 7–18)
BUN SERPL-MCNC: 53 MG/DL (ref 7–18)
BUN SERPL-MCNC: 53 MG/DL (ref 7–18)
BUN SERPL-MCNC: 59 MG/DL (ref 7–18)
BUN SERPL-MCNC: 60 MG/DL (ref 7–18)
BUN/CREAT SERPL: 19 (ref 12–20)
BUN/CREAT SERPL: 20 (ref 12–20)
BUN/CREAT SERPL: 20 (ref 12–20)
BUN/CREAT SERPL: 22 (ref 12–20)
BUN/CREAT SERPL: 22 (ref 12–20)
CALCIUM SERPL-MCNC: 7.8 MG/DL (ref 8.5–10.1)
CALCIUM SERPL-MCNC: 7.9 MG/DL (ref 8.5–10.1)
CALCIUM SERPL-MCNC: 7.9 MG/DL (ref 8.5–10.1)
CALCIUM SERPL-MCNC: 8 MG/DL (ref 8.5–10.1)
CALCIUM SERPL-MCNC: 8.5 MG/DL (ref 8.5–10.1)
CHLORIDE SERPL-SCNC: 105 MMOL/L (ref 100–111)
CHLORIDE SERPL-SCNC: 111 MMOL/L (ref 100–111)
CHLORIDE SERPL-SCNC: 111 MMOL/L (ref 100–111)
CHLORIDE SERPL-SCNC: 112 MMOL/L (ref 100–111)
CHLORIDE SERPL-SCNC: 113 MMOL/L (ref 100–111)
CK SERPL-CCNC: 902 U/L (ref 26–192)
CO2 SERPL-SCNC: 14 MMOL/L (ref 21–32)
CO2 SERPL-SCNC: 20 MMOL/L (ref 21–32)
CO2 SERPL-SCNC: 22 MMOL/L (ref 21–32)
CO2 SERPL-SCNC: 23 MMOL/L (ref 21–32)
CO2 SERPL-SCNC: 23 MMOL/L (ref 21–32)
CREAT SERPL-MCNC: 2.35 MG/DL (ref 0.6–1.3)
CREAT SERPL-MCNC: 2.4 MG/DL (ref 0.6–1.3)
CREAT SERPL-MCNC: 2.71 MG/DL (ref 0.6–1.3)
CREAT SERPL-MCNC: 3 MG/DL (ref 0.6–1.3)
CREAT SERPL-MCNC: 3.19 MG/DL (ref 0.6–1.3)
D50 ADMINISTERED, D50ADM: 0 ML
D50 ADMINISTERED, D50ADM: 14 ML
D50 ADMINISTERED, D50ADM: 22 ML
D50 ORDER, D50ORD: 0 ML
D50 ORDER, D50ORD: 14 ML
D50 ORDER, D50ORD: 22 ML
DIFFERENTIAL METHOD BLD: ABNORMAL
ECHO EST RA PRESSURE: 15 MMHG
ECHO LV FRACTIONAL SHORTENING: 28 % (ref 28–44)
ECHO LV INTERNAL DIMENSION DIASTOLE INDEX: 2.03 CM/M2
ECHO LV INTERNAL DIMENSION DIASTOLIC: 3.9 CM (ref 3.9–5.3)
ECHO LV INTERNAL DIMENSION SYSTOLIC INDEX: 1.46 CM/M2
ECHO LV INTERNAL DIMENSION SYSTOLIC: 2.8 CM
ECHO LV IVSD: 1.2 CM (ref 0.6–0.9)
ECHO LV MASS 2D: 149.5 G (ref 67–162)
ECHO LV MASS INDEX 2D: 77.9 G/M2 (ref 43–95)
ECHO LV POSTERIOR WALL DIASTOLIC: 1.1 CM (ref 0.6–0.9)
ECHO LV RELATIVE WALL THICKNESS RATIO: 0.56
ECHO RIGHT VENTRICULAR SYSTOLIC PRESSURE (RVSP): 30 MMHG
ECHO RV TAPSE: 1.3 CM (ref 1.7–?)
ECHO TV REGURGITANT MAX VELOCITY: 1.96 M/S
ECHO TV REGURGITANT PEAK GRADIENT: 15 MMHG
EOSINOPHIL # BLD: 0 K/UL (ref 0–0.4)
EOSINOPHIL NFR BLD: 0 % (ref 0–5)
ERYTHROCYTE [DISTWIDTH] IN BLOOD BY AUTOMATED COUNT: 13.9 % (ref 11.6–14.5)
GAS FLOW.O2 O2 DELIVERY SYS: ABNORMAL L/MIN
GAS FLOW.O2 SETTING OXYMISER: 8 BPM
GLUCOSE BLD STRIP.AUTO-MCNC: 110 MG/DL (ref 70–110)
GLUCOSE BLD STRIP.AUTO-MCNC: 114 MG/DL (ref 70–110)
GLUCOSE BLD STRIP.AUTO-MCNC: 123 MG/DL (ref 70–110)
GLUCOSE BLD STRIP.AUTO-MCNC: 161 MG/DL (ref 70–110)
GLUCOSE BLD STRIP.AUTO-MCNC: 194 MG/DL (ref 70–110)
GLUCOSE BLD STRIP.AUTO-MCNC: 203 MG/DL (ref 70–110)
GLUCOSE BLD STRIP.AUTO-MCNC: 232 MG/DL (ref 70–110)
GLUCOSE BLD STRIP.AUTO-MCNC: 268 MG/DL (ref 70–110)
GLUCOSE BLD STRIP.AUTO-MCNC: 310 MG/DL (ref 70–110)
GLUCOSE BLD STRIP.AUTO-MCNC: 339 MG/DL (ref 70–110)
GLUCOSE BLD STRIP.AUTO-MCNC: 372 MG/DL (ref 70–110)
GLUCOSE BLD STRIP.AUTO-MCNC: 431 MG/DL (ref 70–110)
GLUCOSE BLD STRIP.AUTO-MCNC: 45 MG/DL (ref 70–110)
GLUCOSE BLD STRIP.AUTO-MCNC: 460 MG/DL (ref 70–110)
GLUCOSE BLD STRIP.AUTO-MCNC: 509 MG/DL (ref 70–110)
GLUCOSE BLD STRIP.AUTO-MCNC: 57 MG/DL (ref 70–110)
GLUCOSE BLD STRIP.AUTO-MCNC: 64 MG/DL (ref 70–110)
GLUCOSE BLD STRIP.AUTO-MCNC: 84 MG/DL (ref 70–110)
GLUCOSE BLD STRIP.AUTO-MCNC: 90 MG/DL (ref 70–110)
GLUCOSE BLD STRIP.AUTO-MCNC: 92 MG/DL (ref 70–110)
GLUCOSE BLD STRIP.AUTO-MCNC: 97 MG/DL (ref 70–110)
GLUCOSE BLD STRIP.AUTO-MCNC: >600 MG/DL (ref 70–110)
GLUCOSE BLD STRIP.AUTO-MCNC: >600 MG/DL (ref 70–110)
GLUCOSE SERPL-MCNC: 101 MG/DL (ref 74–99)
GLUCOSE SERPL-MCNC: 189 MG/DL (ref 74–99)
GLUCOSE SERPL-MCNC: 196 MG/DL (ref 74–99)
GLUCOSE SERPL-MCNC: 552 MG/DL (ref 74–99)
GLUCOSE SERPL-MCNC: 56 MG/DL (ref 74–99)
GLUCOSE, GLC: 110 MG/DL
GLUCOSE, GLC: 114 MG/DL
GLUCOSE, GLC: 123 MG/DL
GLUCOSE, GLC: 161 MG/DL
GLUCOSE, GLC: 194 MG/DL
GLUCOSE, GLC: 203 MG/DL
GLUCOSE, GLC: 232 MG/DL
GLUCOSE, GLC: 268 MG/DL
GLUCOSE, GLC: 310 MG/DL
GLUCOSE, GLC: 339 MG/DL
GLUCOSE, GLC: 372 MG/DL
GLUCOSE, GLC: 431 MG/DL
GLUCOSE, GLC: 45 MG/DL
GLUCOSE, GLC: 460 MG/DL
GLUCOSE, GLC: 509 MG/DL
GLUCOSE, GLC: 601 MG/DL
GLUCOSE, GLC: 601 MG/DL
GLUCOSE, GLC: 64 MG/DL
GLUCOSE, GLC: 84 MG/DL
GLUCOSE, GLC: 90 MG/DL
GLUCOSE, GLC: 92 MG/DL
GLUCOSE, GLC: 97 MG/DL
HCO3 BLDV-SCNC: 15.8 MMOL/L (ref 23–28)
HCT VFR BLD AUTO: 24.5 % (ref 35–45)
HGB BLD-MCNC: 7.9 G/DL (ref 12–16)
HIGH TARGET, HITG: 180 MG/DL
IMM GRANULOCYTES # BLD AUTO: 0 K/UL
IMM GRANULOCYTES NFR BLD AUTO: 0 %
INR PPP: 1.2 (ref 0.8–1.2)
INSULIN ADMINSTERED, INSADM: 0 UNITS/HOUR
INSULIN ADMINSTERED, INSADM: 0.4 UNITS/HOUR
INSULIN ADMINSTERED, INSADM: 0.5 UNITS/HOUR
INSULIN ADMINSTERED, INSADM: 1.4 UNITS/HOUR
INSULIN ADMINSTERED, INSADM: 1.5 UNITS/HOUR
INSULIN ADMINSTERED, INSADM: 18.2 UNITS/HOUR
INSULIN ADMINSTERED, INSADM: 2.7 UNITS/HOUR
INSULIN ADMINSTERED, INSADM: 2.8 UNITS/HOUR
INSULIN ADMINSTERED, INSADM: 24.1 UNITS/HOUR
INSULIN ADMINSTERED, INSADM: 29.2 UNITS/HOUR
INSULIN ADMINSTERED, INSADM: 33.3 UNITS/HOUR
INSULIN ADMINSTERED, INSADM: 37.5 UNITS/HOUR
INSULIN ADMINSTERED, INSADM: 39.1 UNITS/HOUR
INSULIN ADMINSTERED, INSADM: 4.6 UNITS/HOUR
INSULIN ADMINSTERED, INSADM: 40.6 UNITS/HOUR
INSULIN ADMINSTERED, INSADM: 44 UNITS/HOUR
INSULIN ADMINSTERED, INSADM: 44.5 UNITS/HOUR
INSULIN ADMINSTERED, INSADM: 44.9 UNITS/HOUR
INSULIN ADMINSTERED, INSADM: 54.1 UNITS/HOUR
INSULIN ADMINSTERED, INSADM: 59.5 UNITS/HOUR
INSULIN ORDER, INSORD: 0 UNITS/HOUR
INSULIN ORDER, INSORD: 0.4 UNITS/HOUR
INSULIN ORDER, INSORD: 0.5 UNITS/HOUR
INSULIN ORDER, INSORD: 1.4 UNITS/HOUR
INSULIN ORDER, INSORD: 1.5 UNITS/HOUR
INSULIN ORDER, INSORD: 18.2 UNITS/HOUR
INSULIN ORDER, INSORD: 2.7 UNITS/HOUR
INSULIN ORDER, INSORD: 2.8 UNITS/HOUR
INSULIN ORDER, INSORD: 24.1 UNITS/HOUR
INSULIN ORDER, INSORD: 29.2 UNITS/HOUR
INSULIN ORDER, INSORD: 33.3 UNITS/HOUR
INSULIN ORDER, INSORD: 37.5 UNITS/HOUR
INSULIN ORDER, INSORD: 39.1 UNITS/HOUR
INSULIN ORDER, INSORD: 4.6 UNITS/HOUR
INSULIN ORDER, INSORD: 40.6 UNITS/HOUR
INSULIN ORDER, INSORD: 44 UNITS/HOUR
INSULIN ORDER, INSORD: 44.5 UNITS/HOUR
INSULIN ORDER, INSORD: 44.9 UNITS/HOUR
INSULIN ORDER, INSORD: 54.1 UNITS/HOUR
INSULIN ORDER, INSORD: 59.5 UNITS/HOUR
LACTATE SERPL-SCNC: 1.7 MMOL/L (ref 0.4–2)
LACTATE SERPL-SCNC: 2.4 MMOL/L (ref 0.4–2)
LACTATE SERPL-SCNC: 3.3 MMOL/L (ref 0.4–2)
LACTATE SERPL-SCNC: 3.4 MMOL/L (ref 0.4–2)
LACTATE SERPL-SCNC: 4.1 MMOL/L (ref 0.4–2)
LOW TARGET, LOT: 140 MG/DL
LYMPHOCYTES # BLD: 1.7 K/UL (ref 0.9–3.6)
LYMPHOCYTES NFR BLD: 6 % (ref 21–52)
MAGNESIUM SERPL-MCNC: 1.8 MG/DL (ref 1.6–2.6)
MAGNESIUM SERPL-MCNC: 1.8 MG/DL (ref 1.6–2.6)
MAGNESIUM SERPL-MCNC: 1.9 MG/DL (ref 1.6–2.6)
MAGNESIUM SERPL-MCNC: 2 MG/DL (ref 1.6–2.6)
MAGNESIUM SERPL-MCNC: 2.6 MG/DL (ref 1.6–2.6)
MCH RBC QN AUTO: 26.5 PG (ref 24–34)
MCHC RBC AUTO-ENTMCNC: 32.2 G/DL (ref 31–37)
MCV RBC AUTO: 82.2 FL (ref 78–100)
MINUTES UNTIL NEXT BG, NBG: 15 MIN
MINUTES UNTIL NEXT BG, NBG: 15 MIN
MINUTES UNTIL NEXT BG, NBG: 60 MIN
MONOCYTES # BLD: 0.9 K/UL (ref 0.05–1.2)
MONOCYTES NFR BLD: 3 % (ref 3–10)
MULTIPLIER, MUL: 0
MULTIPLIER, MUL: 0.01
MULTIPLIER, MUL: 0.01
MULTIPLIER, MUL: 0.02
MULTIPLIER, MUL: 0.03
MULTIPLIER, MUL: 0.04
MULTIPLIER, MUL: 0.07
MULTIPLIER, MUL: 0.09
MULTIPLIER, MUL: 0.1
MULTIPLIER, MUL: 0.1
MULTIPLIER, MUL: 0.11
MULTIPLIER, MUL: 0.11
MULTIPLIER, MUL: 0.12
MULTIPLIER, MUL: 0.12
MULTIPLIER, MUL: 0.13
MULTIPLIER, MUL: 0.14
MULTIPLIER, MUL: 0.14
MULTIPLIER, MUL: 0.15
MULTIPLIER, MUL: 0.16
MULTIPLIER, MUL: 0.17
MULTIPLIER, MUL: 0.18
MULTIPLIER, MUL: 0.18
NEUTS SEG # BLD: 26.3 K/UL (ref 1.8–8)
NEUTS SEG NFR BLD: 91 % (ref 40–73)
NRBC # BLD: 0 K/UL (ref 0–0.01)
NRBC BLD-RTO: 0 PER 100 WBC
O2/TOTAL GAS SETTING VFR VENT: 30 %
ORDER INITIALS, ORDINIT: NORMAL
PCO2 BLDV: 36.1 MMHG (ref 41–51)
PEEP RESPIRATORY: 5 CMH2O
PH BLDV: 7.25 [PH] (ref 7.32–7.42)
PHOSPHATE SERPL-MCNC: 0.8 MG/DL (ref 2.5–4.9)
PHOSPHATE SERPL-MCNC: 1.3 MG/DL (ref 2.5–4.9)
PHOSPHATE SERPL-MCNC: 2.3 MG/DL (ref 2.5–4.9)
PHOSPHATE SERPL-MCNC: 3.3 MG/DL (ref 2.5–4.9)
PHOSPHATE SERPL-MCNC: 3.6 MG/DL (ref 2.5–4.9)
PLATELET # BLD AUTO: 213 K/UL (ref 135–420)
PLATELET COMMENTS,PCOM: ABNORMAL
PMV BLD AUTO: 11.1 FL (ref 9.2–11.8)
PO2 BLDV: 37 MMHG (ref 25–40)
POTASSIUM SERPL-SCNC: 3.1 MMOL/L (ref 3.5–5.5)
POTASSIUM SERPL-SCNC: 3.6 MMOL/L (ref 3.5–5.5)
POTASSIUM SERPL-SCNC: 3.7 MMOL/L (ref 3.5–5.5)
POTASSIUM SERPL-SCNC: 4.4 MMOL/L (ref 3.5–5.5)
POTASSIUM SERPL-SCNC: 4.7 MMOL/L (ref 3.5–5.5)
PRESSURE SUPPORT SETTING VENT: 10 CMH2O
PROTHROMBIN TIME: 15.7 SEC (ref 11.5–15.2)
RBC # BLD AUTO: 2.98 M/UL (ref 4.2–5.3)
RBC MORPH BLD: ABNORMAL
RBC MORPH BLD: ABNORMAL
SAO2 % BLDV: 61.9 % (ref 65–88)
SERVICE CMNT-IMP: ABNORMAL
SODIUM SERPL-SCNC: 141 MMOL/L (ref 136–145)
SODIUM SERPL-SCNC: 141 MMOL/L (ref 136–145)
SODIUM SERPL-SCNC: 143 MMOL/L (ref 136–145)
SODIUM SERPL-SCNC: 144 MMOL/L (ref 136–145)
SODIUM SERPL-SCNC: 145 MMOL/L (ref 136–145)
SPECIMEN TYPE: ABNORMAL
T3FREE SERPL-MCNC: 1.1 PG/ML (ref 2.18–3.98)
T4 FREE SERPL-MCNC: 1.3 NG/DL (ref 0.7–1.5)
TOTAL RESP. RATE, ITRR: 23
TROPONIN-HIGH SENSITIVITY: 493 NG/L (ref 0–54)
TROPONIN-HIGH SENSITIVITY: 5410 NG/L (ref 0–54)
VANCOMYCIN SERPL-MCNC: 2.5 UG/ML (ref 5–40)
VANCOMYCIN SERPL-MCNC: 25.2 UG/ML (ref 5–40)
VENTILATION MODE VENT: ABNORMAL
VT SETTING VENT: 487 ML
WBC # BLD AUTO: 28.9 K/UL (ref 4.6–13.2)

## 2022-06-12 PROCEDURE — 84481 FREE ASSAY (FT-3): CPT

## 2022-06-12 PROCEDURE — 74011000250 HC RX REV CODE- 250: Performed by: PHYSICIAN ASSISTANT

## 2022-06-12 PROCEDURE — 94660 CPAP INITIATION&MGMT: CPT

## 2022-06-12 PROCEDURE — 84484 ASSAY OF TROPONIN QUANT: CPT

## 2022-06-12 PROCEDURE — 82803 BLOOD GASES ANY COMBINATION: CPT

## 2022-06-12 PROCEDURE — 74011000250 HC RX REV CODE- 250: Performed by: STUDENT IN AN ORGANIZED HEALTH CARE EDUCATION/TRAINING PROGRAM

## 2022-06-12 PROCEDURE — 99223 1ST HOSP IP/OBS HIGH 75: CPT | Performed by: INTERNAL MEDICINE

## 2022-06-12 PROCEDURE — 85025 COMPLETE CBC W/AUTO DIFF WBC: CPT

## 2022-06-12 PROCEDURE — 84100 ASSAY OF PHOSPHORUS: CPT

## 2022-06-12 PROCEDURE — 71250 CT THORAX DX C-: CPT

## 2022-06-12 PROCEDURE — 74011000258 HC RX REV CODE- 258: Performed by: STUDENT IN AN ORGANIZED HEALTH CARE EDUCATION/TRAINING PROGRAM

## 2022-06-12 PROCEDURE — 82550 ASSAY OF CK (CPK): CPT

## 2022-06-12 PROCEDURE — 80048 BASIC METABOLIC PNL TOTAL CA: CPT

## 2022-06-12 PROCEDURE — 83735 ASSAY OF MAGNESIUM: CPT

## 2022-06-12 PROCEDURE — 65610000006 HC RM INTENSIVE CARE

## 2022-06-12 PROCEDURE — 94762 N-INVAS EAR/PLS OXIMTRY CONT: CPT

## 2022-06-12 PROCEDURE — 76770 US EXAM ABDO BACK WALL COMP: CPT

## 2022-06-12 PROCEDURE — 85610 PROTHROMBIN TIME: CPT

## 2022-06-12 PROCEDURE — 82330 ASSAY OF CALCIUM: CPT

## 2022-06-12 PROCEDURE — 74011250636 HC RX REV CODE- 250/636: Performed by: STUDENT IN AN ORGANIZED HEALTH CARE EDUCATION/TRAINING PROGRAM

## 2022-06-12 PROCEDURE — 77010033678 HC OXYGEN DAILY

## 2022-06-12 PROCEDURE — 80202 ASSAY OF VANCOMYCIN: CPT

## 2022-06-12 PROCEDURE — P9045 ALBUMIN (HUMAN), 5%, 250 ML: HCPCS | Performed by: PHYSICIAN ASSISTANT

## 2022-06-12 PROCEDURE — APPSS30 APP SPLIT SHARED TIME 16-30 MINUTES: Performed by: PHYSICIAN ASSISTANT

## 2022-06-12 PROCEDURE — 85730 THROMBOPLASTIN TIME PARTIAL: CPT

## 2022-06-12 PROCEDURE — 70450 CT HEAD/BRAIN W/O DYE: CPT

## 2022-06-12 PROCEDURE — 84439 ASSAY OF FREE THYROXINE: CPT

## 2022-06-12 PROCEDURE — 83605 ASSAY OF LACTIC ACID: CPT

## 2022-06-12 PROCEDURE — 74011636637 HC RX REV CODE- 636/637: Performed by: STUDENT IN AN ORGANIZED HEALTH CARE EDUCATION/TRAINING PROGRAM

## 2022-06-12 PROCEDURE — 74011250636 HC RX REV CODE- 250/636: Performed by: REGISTERED NURSE

## 2022-06-12 PROCEDURE — 74011000258 HC RX REV CODE- 258: Performed by: PHYSICIAN ASSISTANT

## 2022-06-12 PROCEDURE — 74011250636 HC RX REV CODE- 250/636: Performed by: PHYSICIAN ASSISTANT

## 2022-06-12 PROCEDURE — 74011000250 HC RX REV CODE- 250: Performed by: REGISTERED NURSE

## 2022-06-12 PROCEDURE — 93005 ELECTROCARDIOGRAM TRACING: CPT

## 2022-06-12 PROCEDURE — 93308 TTE F-UP OR LMTD: CPT

## 2022-06-12 PROCEDURE — 82962 GLUCOSE BLOOD TEST: CPT

## 2022-06-12 RX ORDER — SODIUM CHLORIDE 450 MG/100ML
125 INJECTION, SOLUTION INTRAVENOUS CONTINUOUS
Status: DISCONTINUED | OUTPATIENT
Start: 2022-06-12 | End: 2022-06-12

## 2022-06-12 RX ORDER — DEXTROSE MONOHYDRATE 100 MG/ML
1000 INJECTION, SOLUTION INTRAVENOUS CONTINUOUS
Status: DISCONTINUED | OUTPATIENT
Start: 2022-06-12 | End: 2022-06-12

## 2022-06-12 RX ORDER — CALCIUM GLUCONATE 94 MG/ML
1 INJECTION, SOLUTION INTRAVENOUS ONCE
Status: DISCONTINUED | OUTPATIENT
Start: 2022-06-12 | End: 2022-06-12

## 2022-06-12 RX ORDER — POTASSIUM CHLORIDE 29.8 MG/ML
20 INJECTION INTRAVENOUS
Status: COMPLETED | OUTPATIENT
Start: 2022-06-12 | End: 2022-06-12

## 2022-06-12 RX ORDER — HYDROCORTISONE SODIUM SUCCINATE 100 MG/2ML
50 INJECTION, POWDER, FOR SOLUTION INTRAMUSCULAR; INTRAVENOUS EVERY 6 HOURS
Status: DISCONTINUED | OUTPATIENT
Start: 2022-06-12 | End: 2022-06-14

## 2022-06-12 RX ORDER — HEPARIN SODIUM 1000 [USP'U]/ML
3000 INJECTION, SOLUTION INTRAVENOUS; SUBCUTANEOUS ONCE
Status: COMPLETED | OUTPATIENT
Start: 2022-06-12 | End: 2022-06-12

## 2022-06-12 RX ORDER — DEXTROSE MONOHYDRATE 50 MG/ML
25 INJECTION, SOLUTION INTRAVENOUS CONTINUOUS
Status: DISCONTINUED | OUTPATIENT
Start: 2022-06-12 | End: 2022-06-12

## 2022-06-12 RX ORDER — VANCOMYCIN 2 GRAM/500 ML IN 0.9 % SODIUM CHLORIDE INTRAVENOUS
2000 ONCE
Status: COMPLETED | OUTPATIENT
Start: 2022-06-12 | End: 2022-06-12

## 2022-06-12 RX ORDER — CALCIUM GLUCONATE 20 MG/ML
1 INJECTION, SOLUTION INTRAVENOUS ONCE
Status: COMPLETED | OUTPATIENT
Start: 2022-06-12 | End: 2022-06-12

## 2022-06-12 RX ORDER — INSULIN GLARGINE 100 [IU]/ML
10 INJECTION, SOLUTION SUBCUTANEOUS ONCE
Status: COMPLETED | OUTPATIENT
Start: 2022-06-12 | End: 2022-06-13

## 2022-06-12 RX ORDER — MAGNESIUM SULFATE 1 G/100ML
1 INJECTION INTRAVENOUS
Status: COMPLETED | OUTPATIENT
Start: 2022-06-13 | End: 2022-06-13

## 2022-06-12 RX ORDER — ALBUMIN HUMAN 50 G/1000ML
25 SOLUTION INTRAVENOUS ONCE
Status: COMPLETED | OUTPATIENT
Start: 2022-06-12 | End: 2022-06-12

## 2022-06-12 RX ADMIN — METRONIDAZOLE 500 MG: 500 INJECTION, SOLUTION INTRAVENOUS at 09:20

## 2022-06-12 RX ADMIN — Medication 40.6 UNITS/HR: at 06:34

## 2022-06-12 RX ADMIN — SODIUM CHLORIDE, PRESERVATIVE FREE 10 ML: 5 INJECTION INTRAVENOUS at 13:42

## 2022-06-12 RX ADMIN — DEXTROSE MONOHYDRATE 100 ML: 100 INJECTION, SOLUTION INTRAVENOUS at 17:55

## 2022-06-12 RX ADMIN — Medication 59.5 UNITS/HR: at 02:04

## 2022-06-12 RX ADMIN — CEFEPIME HYDROCHLORIDE 1 G: 1 INJECTION, POWDER, FOR SOLUTION INTRAMUSCULAR; INTRAVENOUS at 14:29

## 2022-06-12 RX ADMIN — POTASSIUM CHLORIDE 20 MEQ: 29.8 INJECTION INTRAVENOUS at 04:19

## 2022-06-12 RX ADMIN — HEPARIN SODIUM 3000 UNITS: 1000 INJECTION INTRAVENOUS; SUBCUTANEOUS at 12:36

## 2022-06-12 RX ADMIN — NOREPINEPHRINE BITARTRATE 18 MCG/MIN: 1 INJECTION, SOLUTION, CONCENTRATE INTRAVENOUS at 13:42

## 2022-06-12 RX ADMIN — POTASSIUM CHLORIDE 20 MEQ: 29.8 INJECTION INTRAVENOUS at 08:40

## 2022-06-12 RX ADMIN — HYDROCORTISONE SODIUM SUCCINATE 50 MG: 100 INJECTION, POWDER, FOR SOLUTION INTRAMUSCULAR; INTRAVENOUS at 19:07

## 2022-06-12 RX ADMIN — NOREPINEPHRINE BITARTRATE 16 MCG/MIN: 1 INJECTION, SOLUTION, CONCENTRATE INTRAVENOUS at 23:09

## 2022-06-12 RX ADMIN — POTASSIUM CHLORIDE 20 MEQ: 29.8 INJECTION INTRAVENOUS at 07:07

## 2022-06-12 RX ADMIN — POTASSIUM PHOSPHATE, MONOBASIC AND POTASSIUM PHOSPHATE, DIBASIC: 224; 236 INJECTION, SOLUTION, CONCENTRATE INTRAVENOUS at 14:30

## 2022-06-12 RX ADMIN — NOREPINEPHRINE BITARTRATE 1 MCG/MIN: 1 INJECTION, SOLUTION, CONCENTRATE INTRAVENOUS at 04:18

## 2022-06-12 RX ADMIN — CALCIUM GLUCONATE 1000 MG: 20 INJECTION, SOLUTION INTRAVENOUS at 13:58

## 2022-06-12 RX ADMIN — ALBUMIN (HUMAN) 25 G: 12.5 INJECTION, SOLUTION INTRAVENOUS at 12:52

## 2022-06-12 RX ADMIN — VANCOMYCIN HYDROCHLORIDE 2000 MG: 10 INJECTION, POWDER, LYOPHILIZED, FOR SOLUTION INTRAVENOUS at 09:21

## 2022-06-12 RX ADMIN — DEXTROSE MONOHYDRATE 75 ML: 100 INJECTION, SOLUTION INTRAVENOUS at 16:26

## 2022-06-12 RX ADMIN — Medication 33.3 UNITS/HR: at 09:17

## 2022-06-12 RX ADMIN — HYDROCORTISONE SODIUM SUCCINATE 50 MG: 100 INJECTION, POWDER, FOR SOLUTION INTRAMUSCULAR; INTRAVENOUS at 11:06

## 2022-06-12 RX ADMIN — DEXTROSE MONOHYDRATE 25 ML/HR: 50 INJECTION, SOLUTION INTRAVENOUS at 16:48

## 2022-06-12 RX ADMIN — SODIUM CHLORIDE, PRESERVATIVE FREE 10 ML: 5 INJECTION INTRAVENOUS at 06:00

## 2022-06-12 RX ADMIN — SODIUM CHLORIDE, PRESERVATIVE FREE 10 ML: 5 INJECTION INTRAVENOUS at 22:00

## 2022-06-12 RX ADMIN — METRONIDAZOLE 500 MG: 500 INJECTION, SOLUTION INTRAVENOUS at 17:06

## 2022-06-12 RX ADMIN — VASOPRESSIN 0.03 UNITS/MIN: 20 INJECTION INTRAVENOUS at 11:00

## 2022-06-12 RX ADMIN — SODIUM PHOSPHATE, MONOBASIC, MONOHYDRATE AND SODIUM PHOSPHATE, DIBASIC, ANHYDROUS 3 MMOL: 276; 142 INJECTION, SOLUTION INTRAVENOUS at 09:20

## 2022-06-12 RX ADMIN — POTASSIUM CHLORIDE 20 MEQ: 29.8 INJECTION INTRAVENOUS at 06:05

## 2022-06-12 RX ADMIN — CEFEPIME HYDROCHLORIDE 1 G: 1 INJECTION, POWDER, FOR SOLUTION INTRAMUSCULAR; INTRAVENOUS at 02:14

## 2022-06-12 RX ADMIN — FAMOTIDINE 20 MG: 10 INJECTION INTRAVENOUS at 19:07

## 2022-06-12 RX ADMIN — METRONIDAZOLE 500 MG: 500 INJECTION, SOLUTION INTRAVENOUS at 01:49

## 2022-06-12 RX ADMIN — DEXTROSE MONOHYDRATE 1000 ML: 100 INJECTION, SOLUTION INTRAVENOUS at 18:05

## 2022-06-12 RX ADMIN — Medication 44 UNITS/HR: at 03:59

## 2022-06-12 RX ADMIN — DEXMEDETOMIDINE HYDROCHLORIDE 0.4 MCG/KG/HR: 4 INJECTION, SOLUTION INTRAVENOUS at 11:30

## 2022-06-12 RX ADMIN — SODIUM CHLORIDE 125 ML/HR: 4.5 INJECTION, SOLUTION INTRAVENOUS at 04:46

## 2022-06-12 RX ADMIN — HEPARIN SODIUM 12 UNITS/KG/HR: 1000 INJECTION INTRAVENOUS; SUBCUTANEOUS at 04:23

## 2022-06-12 RX ADMIN — SODIUM CHLORIDE 125 ML/HR: 4.5 INJECTION, SOLUTION INTRAVENOUS at 14:30

## 2022-06-12 NOTE — CONSULTS
CARDIOLOGY CONSULT    Patient: Lj Elena  MR #: [id number]      Reason for consult: Elevated troponin    Assessment/Plan:     Hospital Problems  Date Reviewed: 6/12/2022          Codes Class Noted POA    Elevated troponin, troponins have been 305 followed by  493. Additional values pending. Electrocardiogram demonstrates only sinus tachycardia and is otherwise a normal tracing. The patient has several noncardiac possibilities for elevated troponin including DKA and possible rhabdo versus. (CK pending). Further recommendations to follow. Will order echocardiogram.  Echo done 1/21 demonstrated EF of 55 to 60%. ICD-10-CM: R77.8  ICD-9-CM: 790.6  6/12/2022 Yes        Primary hypertension, present /60 mmHg ICD-10-CM: I10  ICD-9-CM: 401.9  6/12/2022 Unknown        Acquired hypothyroidism, TSH 0.17, free T3 pending ICD-10-CM: E03.9  ICD-9-CM: 244.9  6/12/2022 Unknown        History of colon cancer ICD-10-CM: Z85.038  ICD-9-CM: V10.05  6/12/2022 Unknown        BRENDA (acute kidney injury) ([facility] ICD-10-CM: N17.9  ICD-9-CM: 584.9  6/12/2022 Yes        DKA (diabetic ketoacidosis) ([facility] ICD-10-CM: E11.10  ICD-9-CM: 250.12  6/11/2022 Yes        Hyperkalemia, actively being treated. ICD-10-CM: E87.5  ICD-9-CM: 276.7  1/19/2021 Yes              History of Present Illness  Lj Elena is a 66 y.o. hypertensive diabetic woman admitted after she was found to be unresponsive at home. Patient's daughter realized that she had not heard from the patient after an expected period of time and found her in her home unresponsive. CPR was started. Since that time, the patient has been found to have DKA with severe metabolic acidosis. She also has BRENDA and is being seen by nephrology. The patient had a nuclear stress test and echocardiogram done in January 2021. Echocardiogram demonstrate ejection fraction of 58%. There was no significant valvular pathology.   The nuclear stress test was normal with no evidence of prior scarring or ongoing ischemia. The ejection fraction was 58%. The patient does not alert to voice. She does alert to pain. No further history is available at this point. Past Medical History  Past Medical History:   Diagnosis Date    [facility] ([facility]     Diabetes ([facility]     Hypertension     Hypothyroidism        Past Surgical History  Social History     Socioeconomic History    Marital status: SINGLE     Spouse name: Not on file    Number of children: Not on file    Years of education: Not on file    Highest education level: Not on file   Occupational History    Not on file   Tobacco Use    Smoking status: Former Smoker    Smokeless tobacco: Never Used   Substance and Sexual Activity    Alcohol use: Yes     Comment: occasionally    Drug use: No    Sexual activity: Not on file   Other Topics Concern    Not on file   Social History Narrative    Not on file     Social Determinants of Health     Financial Resource Strain:     Difficulty of Paying Living Expenses: Not on file   Food Insecurity:     Worried About [address] in the Last Year: Not on file    [address] in the Last Year: Not on file   Transportation Needs:     Lack of Transportation (Medical): Not on file    Lack of Transportation (Non-Medical):  Not on file   Physical Activity:     Days of Exercise per Week: Not on file    Minutes of Exercise per Session: Not on file   Stress:     Feeling of Stress : Not on file   Social Connections:     Frequency of Communication with Friends and Family: Not on file    Frequency of Social Gatherings with Friends and Family: Not on file    Attends Restorationist Services: Not on file    Active Member of Clubs or Organizations: Not on file    Attends Club or Organization Meetings: Not on file    Marital Status: Not on file   Intimate Partner Violence:     Fear of Current or Ex-Partner: Not on file    Emotionally Abused: Not on file    Physically Abused: Not on file  Sexually Abused: Not on file   Housing Stability:     Unable to Pay for Housing in the Last Year: Not on file    Number of Places Lived in the Last Year: Not on file    Unstable Housing in the Last Year: Not on file         Meds  Current Facility-Administered Medications   Medication Dose Route Frequency    heparin 25,000 units in  mL infusion  12-25 Units/kg/hr IntraVENous TITRATE    0.45% sodium chloride infusion  125 mL/hr IntraVENous CONTINUOUS    hydrocortisone Sod Succ (PF) (SOLU-CORTEF) injection 50 mg  50 mg IntraVENous Q6H    vasopressin (VASOSTRICT) 20 Units in 0.9% sodium chloride 100 mL infusion  0-0.03 Units/min IntraVENous TITRATE    glucose chewable tablet 16 g  4 Tablet Oral PRN    glucagon (GLUCAGEN) injection 1 mg  1 mg IntraMUSCular PRN    dextrose 10% infusion 0-250 mL  0-250 mL IntraVENous PRN    NOREPINephrine (LEVOPHED) 8 mg in 5% dextrose 250mL (32 mcg/mL) infusion  0.5-50 mcg/min IntraVENous TITRATE    insulin regular (MYXREDLIN, NOVOLIN, HUMULIN) 100 units/100 ml NS infusion (premix)  0-50 Units/hr IntraVENous TITRATE    sodium chloride (NS) flush 5-40 mL  5-40 mL IntraVENous Q8H    sodium chloride (NS) flush 5-40 mL  5-40 mL IntraVENous PRN    acetaminophen (TYLENOL) tablet 650 mg  650 mg Oral Q6H PRN    Or    acetaminophen (TYLENOL) suppository 650 mg  650 mg Rectal Q6H PRN    ondansetron (ZOFRAN ODT) tablet 4 mg  4 mg Oral Q8H PRN    Or    ondansetron (ZOFRAN) injection 4 mg  4 mg IntraVENous Q6H PRN    famotidine (PF) (PEPCID) 20 mg in 0.9% sodium chloride 10 mL injection  20 mg IntraVENous Q24H    metroNIDAZOLE (FLAGYL) IVPB premix 500 mg  500 mg IntraVENous Q8H    cefepime (MAXIPIME) 1 g in sterile water (preservative free) 10 mL IV syringe  1 g IntraVENous Q12H    VANCOMYCIN INFORMATION NOTE   Other Rx Dosing/Monitoring    dexmedeTOMidine in 0.9 % NaCl (PRECEDEX) 400 mcg/100 mL (4 mcg/mL) infusion soln  0.1-1.5 mcg/kg/hr IntraVENous TITRATE Allergies  No Known Allergies    Social History  Social History     Socioeconomic History    Marital status: SINGLE     Spouse name: Not on file    Number of children: Not on file    Years of education: Not on file    Highest education level: Not on file   Occupational History    Not on file   Tobacco Use    Smoking status: Former Smoker    Smokeless tobacco: Never Used   Substance and Sexual Activity    Alcohol use: Yes     Comment: occasionally    Drug use: No    Sexual activity: Not on file   Other Topics Concern    Not on file   Social History Narrative    Not on file     Social Determinants of Health     Financial Resource Strain:     Difficulty of Paying Living Expenses: Not on file   Food Insecurity:     Worried About [address] [employer] in the Last Year: Not on file    Alb[employer] Food in the Last Year: Not on file   Transportation Needs:     Lack of Transportation (Medical): Not on file    Lack of Transportation (Non-Medical):  Not on file   Physical Activity:     Days of Exercise per Week: Not on file    Minutes of Exercise per Session: Not on file   Stress:     Feeling of Stress : Not on file   Social Connections:     Frequency of Communication with Friends and Family: Not on file    Frequency of Social Gatherings with Friends and Family: Not on file    Attends Confucianist Services: Not on file    Active Member of [address] [place] [address] [employer] or Organizations: Not on file    Attends Club or Organization Meetings: Not on file    Marital Status: Not on file   Intimate Partner Violence:     Fear of Current or Ex-Partner: Not on file    Emotionally Abused: Not on file    Physically Abused: Not on file    Sexually Abused: Not on file   Housing Stability:     Unable to Pay for Housing in the Last Year: Not on file    Number of [place] in the Last Year: Not on file    Unstable Housing in the Last Year: Not on file       Family History     Family History   Problem Relation Age of Onset    Diabetes Mother     Cancer Father         colon         Review of systems    Unobtainable      Physical Exam  Visit Vitals  /60   Pulse 91   Temp 97.1 °F (36.2 °C)   Resp 18   Ht 5' 6\" (1.676 m)   Wt 82.1 kg (181 lb)   SpO2 100%   BMI 29.21 kg/m²       Turner Vargas is who is evaluated painful stimuli but not to command. She is moaning at times per nursing staff. Head is normocephalic and atraumatic. . Trachea appears midline with no noted thyromegaly. Carotids are full without definite bruits. There is no JVD. Tori Graven Chest is clear to auscultation bilaterally. Cardiac auscultation reveals regular rate and rhythm without significant murmur. Abdomen is soft. Extremities are without significant edema. Dorsalis pedis and posterior tibial pulses are  palpable. . Skin is warm and dry. Diagnostic Tests EKG: Sinus tachycardia. Otherwise normal ECG. Labs:   Recent Labs     06/12/22 0226 06/11/22  1415   WBC 28.9* 37.3*   HGB 7.9* 9.8*   HCT 24.5* 36.6    365     Recent Labs     06/12/22 0226 06/11/22 2051 06/11/22  1415    140 132*   K 3.1* 4.1 7.9*    105 95*   CO2 14* 7* 4*   * 796* 1,263*   BUN 60* 60* 73*   CREA 3.19* 3.13* 3.89*   CA 8.0* 8.3* 8.4*   MG 2.6 1.3* 2.4   PHOS 2.3* 6.6* 12.9*   ALB  --   --  3.0*   ALT  --   --  23   INR 1.2  --   --        No results for input(s): TROIQ, CPK, CKMB in the last 72 hours. Last Lipid:    Lab Results   Component Value Date/Time    Cholesterol, total 299 (H) 11/03/2011 09:26 AM    HDL Cholesterol 204 (H) 11/03/2011 09:26 AM    LDL, calculated 86.6 11/03/2011 09:26 AM    Triglyceride 42 11/03/2011 09:26 AM    CHOL/HDL Ratio 1.5 11/03/2011 09:26 AM           We appreciate the opportunity to see Turner Vargas with you. We will follow along.     Santos Reza MD  6/12/2022

## 2022-06-12 NOTE — PROGRESS NOTES
06/12/22 0028   Oxygen Therapy   O2 Sat (%) 100 %   Pulse via Oximetry 87 beats per minute   O2 Device BIPAP   Skin Assessment Clean, dry, & intact   Skin Protection for O2 Device No   FIO2 (%) 30 %  (Decreased from 40%)

## 2022-06-12 NOTE — PROGRESS NOTES
Patient arrived via stretcher from ER. RT and RNs at bedside. Placed patient on charted Bipap settings.

## 2022-06-12 NOTE — PROGRESS NOTES
2048: VBG ran            pH  6.94/ pCO2 24.9/ pO2 37.5/ cHCO3 5.4/ BE -25.3    Results reported to [facility]

## 2022-06-12 NOTE — PROGRESS NOTES
In Patient Progress note      Admit Date: 6/11/2022    Impression:     #1 acute kidney injury, baseline creatinine 1.03.,  Etiology secondary to  ischemic ATN in the setting of hypotension/septic shock/DKA --> slowly improving  #2 severe hyperkalemia without EKG changes, secondary to DKA and BRENDA--> resolved  #3 acidemia with severe metabolic acidosis secondary to DKA  #4 diabetic ketoacidosis and lactic acidoses  #5 altered mental status due to metabolic encephalopathy and DKA  #6 hypotension/shock secondary to sepsis? PNA   #7 leukocytosis     Plan:     #1 strict intake output, Chandler catheter in place  #2 keep MAP > 65   #3 check renal panel every 6 hours  #4 fluid resuscitate per DKA protocol  #5 sepsis evaluation per primary team   #6 renally dose antibiotics for EGFR of less than 30  #9 avoid IV contrast     Discussed with Dr Juliano Rosenbaum and nursing     Please call with questions,     Jeison Jaquez MD [facility]  Cell [phone]  Pager: [phone]  Subjective:     - respiratory - stable  - hemodynamics - stable, no pressrs  - UOP-improving  - Nutrition -poor    Objective:     Visit Vitals  BP (!) 115/57   Pulse 89   Temp 97.3 °F (36.3 °C)   Resp 22   Ht 5' 6\" (1.676 m)   Wt 82.1 kg (181 lb)   SpO2 100%   BMI 29.21 kg/m²         Intake/Output Summary (Last 24 hours) at 6/12/2022 1529  Last data filed at 6/12/2022 1135  Gross per 24 hour   Intake --   Output 1825 ml   Net -1825 ml       Physical Exam:     HEENT:mmm  Neck: no cervical lymphadenopathy or thyromegaly. Lungs: good air entry, clear to auscultation bilaterally. Cardiovascular system: S1, S2, regular rate and rhythm. Abdomen: soft, non tender, non distended. Extremities: no clubbing, cyanosis or edema. Integumentary: skin is grossly intact.        Data Review:    Recent Labs     06/12/22 0226   WBC 28.9*   RBC 2.98*   HCT 24.5*   MCV 82.2   MCH 26.5   MCHC 32.2   RDW 13.9     Recent Labs     06/12/22  1110 06/12/22  0226 06/11/22 2051 06/11/22  1415   BUN 59* 60* 60* 73*   CREA 3.00* 3.19* 3.13* 3.89*   CA 7.9* 8.0* 8.3* 8.4*   ALB  --   --   --  3.0*   K 3.6 3.1* 4.1 7.9*    141 140 132*   * 105 105 95*   CO2 23 14* 7* 4*   PHOS 0.8* 2.3* 6.6* 12.9*   * 552* 796* 1,263*       Katt Georges MD

## 2022-06-12 NOTE — PROGRESS NOTES
visited with the family of Lj Elena, who is a 66 y.o.,female who were visiting her bedside. The  provided the following Interventions:  Initiated a relationship of care and support through supportive listening and prayers offered on (the patient's) their mother's behalf. Also relayed their question to Ms. Pastoroper's nurse. Plan:  Chaplains will continue to follow and will provide pastoral care on an as needed/requested basis.  recommends bedside caregivers page  on duty if patient shows signs of acute spiritual or emotional distress.       [address] [place]   [phone]

## 2022-06-12 NOTE — PROGRESS NOTES
[facility] Pharmacokinetic Monitoring Service - Vancomycin    Consulting Provider: Aster Crawley DO   Indication: Sepsis of Unknown Etiology  Target Concentration: Dosing based on anticipated concentration <15 mg/L due to renal impairment/insufficiency  Day of Therapy: 2  Additional Antimicrobials: Cefepime, Metronidazole    Pertinent Laboratory Values:   Temp: 97.1 °F (36.2 °C)  Weight: 82.1 kg (181 lb)  Recent Labs     06/12/22  0226 06/11/22  2051 06/11/22  1415 06/11/22  1415   CREA 3.19* 3.13*   < > 3.89*   BUN 60* 60*   < > 73*   WBC 28.9*  --   --  37.3*   PCT  --  16.82  --   --     < > = values in this interval not displayed. Estimated Creatinine Clearance: 17 mL/min (A) (based on SCr of 3.19 mg/dL (H)). Pertinent Cultures:  Culture Date Source Results   6/11 blood  NGTD   MRSA Nasal Swab: N/A.  Non-respiratory infection    Assessment:  Date/Time Current Dose Concentration Timing of Concentration (h) AUC   6/12 @ 0226 2000 mg x 1 2.5 8 hour post dose    Note: Serum concentrations collected for AUC dosing may appear elevated if collected in close proximity to the dose administered, this is not necessarily an indication of toxicity    Plan:  Concentration-guided dosing due to renal impairment  2000 mg x 1 today  Renal labs as indicated   Vancomycin concentration ordered for  6/12 @ 1800  Pharmacy will continue to monitor patient and adjust therapy as indicated    Thank you for the consult,  SVETLANA Izquierdo  6/12/2022

## 2022-06-12 NOTE — PROGRESS NOTES
[address] [place] [address] [facility]. Pulmonary, Critical Care, and Sleep Medicine    Name: Radha Benson MRN: [id number]   : 1950 Hospital: [address] [place] [address] Dr   Date: 2022  Admission Date: 2022     Chart and notes reviewed. Data reviewed. I have evaluated all findings. [x]I have reviewed the flowsheet and previous days notes. []The patient is unable to give any meaningful history or review of systems because the patient is:  []Intubated []Sedated   []Unresponsive      [x]The patient is critically ill on      []Mechanical ventilation [x]Pressors   [x]BiPAP []         Interval HPI:  This patient has been seen and evaluated at the request of Dr. Pat Brown for DKA. 22     Patient is a 66 y.o. female with pmh of diabetes mellitus, hypertension, hyperlipidemia, hypothyroidism, and colon cancer s/p total colectomy and chemotherapy who presented today via EMS for altered mental status. Per patient's daughter Skyla Lara, patient has been nonadherent to checking her blood glucose levels, and noticed on review of her mother's glucometer that there were several high and low values. She states that her mother did complain of feeling unwell yesterday with nausea and vomiting. She visits her mom for a couple of hours daily, but patient currently lives alone.      On arrival to the ER, patient was unresponsive and hypotensive. Glucose was found to be >1200; ABG showed a pH of 6.78, pCO2 26.4; BMP bicarb of 4; K 7.9. She was given 6L IVFs, 1 amp of bicarb, 2g calcium gluconate. She remained hypotensive with MAPs in the 50s despite IVFs, and so she was started on Levophed gtt via peripheral IV. UOP in the ER approx 500mL.        Subjective 22  Hospital Day:2  Vent Day:0  Overnight events: Elevated BNP and troponins on heparin drip pressor requirements increasing lactic acidosis increasing adding stress dose steroids and vasopressin  Mentation/Activity: Lethargic  Respiratory/ Secretions: Stable  Hemodynamics: Stable  Urine output, bowel: Urine output adequate no bowel movement  Diet: N.p.o.   need for procedures: CT scan              ROS:Review of systems not obtained due to patient factors. Events and notes from last 24 hours reviewed. Patient Active Problem List   Diagnosis Code    Hyperkalemia E87.5    DKA (diabetic ketoacidoses) E11.10    DKA (diabetic ketoacidosis) ([facility]) E11.10       Vital Signs:  Visit Vitals  /60   Pulse 91   Temp 97.1 °F (36.2 °C)   Resp 18   Ht 5' 6\" (1.676 m)   Wt 82.1 kg (181 lb)   SpO2 100%   BMI 29.21 kg/m²       O2 Device: BIPAP       Temp (24hrs), Av.3 °F (36.3 °C), Min:97.1 °F (36.2 °C), Max:97.5 °F (36.4 °C)       Intake/Output:   Last shift:      No intake/output data recorded.   Last 3 shifts: 06/10 1901 -  0700  In: -   Out: 1450 [Urine:1450]    Intake/Output Summary (Last 24 hours) at 2022 0739  Last data filed at 2022 0600  Gross per 24 hour   Intake --   Output 1450 ml   Net -1450 ml          Current Facility-Administered Medications   Medication Dose Route Frequency    potassium chloride 20 mEq in 50 ml IVPB  20 mEq IntraVENous Q1H    heparin 25,000 units in  mL infusion  12-25 Units/kg/hr IntraVENous TITRATE    0.45% sodium chloride infusion  125 mL/hr IntraVENous CONTINUOUS    vancomycin (VANCOCIN) 2000 mg in  ml infusion  2,000 mg IntraVENous ONCE    NOREPINephrine (LEVOPHED) 8 mg in 5% dextrose 250mL (32 mcg/mL) infusion  0.5-50 mcg/min IntraVENous TITRATE    insulin regular (MYXREDLIN, NOVOLIN, HUMULIN) 100 units/100 ml NS infusion (premix)  0-50 Units/hr IntraVENous TITRATE    sodium chloride (NS) flush 5-40 mL  5-40 mL IntraVENous Q8H    famotidine (PF) (PEPCID) 20 mg in 0.9% sodium chloride 10 mL injection  20 mg IntraVENous Q24H    metroNIDAZOLE (FLAGYL) IVPB premix 500 mg  500 mg IntraVENous Q8H    cefepime (MAXIPIME) 1 g in sterile water (preservative free) 10 mL IV syringe  1 g IntraVENous Q12H    VANCOMYCIN INFORMATION NOTE   Other Rx Dosing/Monitoring    dexmedeTOMidine in 0.9 % NaCl (PRECEDEX) 400 mcg/100 mL (4 mcg/mL) infusion soln  0.1-1.5 mcg/kg/hr IntraVENous TITRATE         Telemetry: []Sinus []A-flutter []Paced    []A-fib []Multiple PVCs                  Physical Exam:      General: Remains lethargic respond to verbal and tactile stimuli oriented x2-3;   HEENT:  Anicteric sclerae; pink palpebral conjunctivae; mucosa moist  Resp:  Symmetrical chest expansion, no accessory muscle use; good airway entry; no rales/ wheezing/ rhonchi noted  CV:  S1, S2 present; regular rate and rhythm  GI:  Abdomen soft, non-tender; hypoactive bowel sounds  Extremities:  +2 pulses on all extremities; no edema/ cyanosis/ clubbing noted   Skin:  Warm; no rashes/ lesions noted, normal turgor/cap refill   Neurologic:  Non-focal  Devices: Chandler catheter right femoral central line BiPAP      DATA:  MAR reviewed and pertinent medications noted or modified as needed    Labs:  Recent Labs     06/12/22 0226 06/11/22  1415   WBC 28.9* 37.3*   HGB 7.9* 9.8*   HCT 24.5* 36.6    365     Recent Labs     06/12/22  0226 06/11/22 2051 06/11/22  1415    140 132*   K 3.1* 4.1 7.9*    105 95*   CO2 14* 7* 4*   * 796* 1,263*   BUN 60* 60* 73*   CREA 3.19* 3.13* 3.89*   CA 8.0* 8.3* 8.4*   MG 2.6 1.3* 2.4   PHOS 2.3* 6.6* 12.9*   ALB  --   --  3.0*   ALT  --   --  23   INR 1.2  --   --      No results for input(s): PH, PCO2, PO2, HCO3, FIO2 in the last 72 hours. Recent Labs     06/12/22  0426 06/11/22 2048 06/11/22  1433   FIO2I 30 40  --    HCO3I  --   --  4.0*       Imaging:  [x]   I have personally reviewed the patients radiographs and reports  XR Results (most recent):  XR Results (most recent):  Results from Hospital Encounter encounter on 06/11/22    XR CHEST PORT    Narrative  CHEST PORTABLE 1455 hours    COMPARISON: 19 January 2021. INDICATIONS: Altered mental status.     FINDINGS:    Portable single view chest demonstrates:    Lungs: Clear. Cardiac Silhouette And Mediastinal Contours: Normal.    Pleural Spaces: No pneumothorax or pleural effusion evident. Bones And Soft Tissues: Unremarkable for age. Impression  No active or acute cardiopulmonary process is evident. CT Results (most recent):  Results from Hospital Encounter encounter on 01/19/21    CT CHEST ABD PELV WO CONT    Narrative  EXAM: CT of the Chest, Abdomen and Pelvis without contrast    INDICATION:  sepsis of unknown origin . Weakness, vomiting, hyperglycemia. TECHNIQUE: CT of the chest, abdomen and pelvis without contrast. Sagittal and  coronal reformations obtained. All CT scans at this facility are performed using dose optimization technique as  appropriate to a performed exam, to include automated exposure control,  adjustment of the mA and/or kV according to patient size (including appropriate  matching for site specific examination) or use of iterative reconstruction  technique. IV contrast: None. Enteric contrast: None    COMPARISON: 6/8/2015. FINDINGS:  Limitations: Evaluation of the solid organs and pulmonary arteries are limited  due to the lack of IV contrast.    Thyroid: Unremarkable. Mediastinum: No mediastinal adenopathy. No central fluid collection. No  mediastinal mass. Heart: Unremarkable. Pericardium: Unremarkable. Coronary arteries: Mild coronary artery disease is noted predominantly involving  the LAD. Thoracic aorta: No evidence of aneurysm. Pulmonary arteries: Due to the lack of IV contrast, evaluation of the pulmonary  arteries is limited. Trachea and bronchi: Unremarkable    Pleura: No pneumothorax. No pleural effusion identified. Lungs: There is minimal linear atelectasis in the lingula. A 2 mm density is  noted in the right upper lobe near the major fissure in the likely anterior  segment. No consolidation identified.     Axilla/Chest Wall: The axilla are unremarkable. Peritoneum: No free air identified. No free fluid appreciated. No fluid  collections identified. Liver: Unremarkable    Biliary/gallbladder: No intrahepatic or extra hepatic biliary duct dilatation  identified. The gallbladder is unremarkable. No pericholecystic inflammation. Spleen: Unremarkable    Pancreas: The pancreas is mildly atrophic. A previously described hypodensity in  the neck is present but due to adjacent motion and structures, further  evaluation is limited. : The adrenal glands are unremarkable. No urolithiasis appreciated. No  perinephric fat stranding identified. No evidence of hydroureteronephrosis. The bladder is decompressed by Chandler catheter. The uterus is surgically absent. GI: The stomach is distended. No inflammatory changes appreciated. The small  bowel is without evidence of obstruction. No dilated loops appreciated. No small  bowel wall thickening identified. The mesentery is without evidence of  adenopathy or inflammation. The appendix is likely surgically absent. There is  a ileocolonic anastomosis. Changes consistent with prior partial colectomy. No  acute process. Abdominal aorta and retroperitoneum: The abdominal aorta is without evidence of  aneurysm. No periaortic adenopathy identified. No retroperitoneal fluid  collection appreciated. Abdominal wall/Inguinal region: Small umbilical fat hernia is noted. No  complication identified. Musculoskeletal: Moderate compression fracture of L4 is noted this is unchanged. Mild multilevel degenerative changes are present. Osteonecrosis of the left  femoral head with severe secondary osteoarthritis is noted. This is new since  2015. Moderate degenerative changes of the right hip are noted. Impression  1. No acute pathology appreciated. 2.  Cystic process in the pancreas better appreciated on prior imaging.  However,  follow-up MRI with and without contrast is recommended for better delineation. 3.  Left hip osteonecrosis with severe secondary osteoarthritis. 4.  Old L4 compression fracture. 5.  Small umbilical fat hernia       01/19/21    ECHO ADULT COMPLETE 01/22/2021 1/22/2021    Interpretation Summary  · LV: Estimated LVEF is 55 - 60%. Visually measured ejection fraction. Normal cavity size, wall thickness and systolic function (ejection fraction normal). Wall motion: normal. Inconclusive left ventricular diastolic function. Signed by: Griffin Gomez MD on 1/22/2021 11:32 AM       IMPRESSION:   · Diabetic ketoacidosis with pH of 6.78  · High anion gap metabolic acidosis without appropriate respiratory compensation (without secondary metabolic disturbance) - likely secondary to DKA and lactic acidosis +/- azotemia  · Hyperkalemia of 7.9 without EKG changes now hypo  · Mixed shock, likely combination of septic + hypovolemic shock  · Lactic acidosis  · Electrolyte derangement  · Acute toxic metabolic encephalopathy  · Acute hypoxemic/hypercapneic respiratory failure on BiPAP secondary to encephalopathy  · Acute kidney injury (baseline SCr previously around 1.0)   · Profound leukocytosis of 37.3, pending peripheral smear, likely secondary to hemoconcentration + likely sepsis, unclear source, UA without e/o infection, pending CT C/A/P  · Uncontrolled diabetes mellitus (A1c 9.0%)  · History of colon cancer s/p total colectomy and prior chemotherapy  · Chronic normocytic anemia (baseline around 10)  · Elevated troponin ACS versus demand ischemia  · Acute heart failure      Patient Active Problem List   Diagnosis Code    Hyperkalemia E87.5    DKA (diabetic ketoacidoses) E11.10    [facility]osis[facility] E11.10        RECOMMENDATIONS:   Neuro: Precedex drip for agitation watch neuro status. Pulm: Aspiration precautions, HOB>30'. Discontinue BiPAP transition to nasal cannula  CVS:Monitor HD, MAP goal >65.   Wean pressors troponins trending up BNP elevated will obtain echo and cardiology consult patient placed on heparin drip  GI: NPO. SUP  Renal: Trend Cr, UOP. Brown nephrology consulted  Hem/Onc: Trend H/H, monitor for s/o active bleeding. Daily CBC. I/D: Continue broad-spectrum antibiotics CT scan still pending trend WBCs and temperature curve. We will start stress dose steroids for septic shock add vasopressin  Metabolic: Every 4 hour BMP, mag, phos. Trend lytes and replace per protocol. Replacing potassium and phosphorus this morning  Endocrine: Glucomander insulin drip till maintaining anion gap acidosis avoid hypoglycemia. Musc/Skin: No acute issues  DNR  Discussed in interdisciplinary rounds     Best practice :    Glycemic control  IHI ICU bundles:   Central Line Bundle Followed  and Brown Bundle Followed    Stress ulcer prophylaxis. Pepcid  DVT prophylaxis. Heparin gtt  Need for Lines, brown assessed. Palliative care evaluation. Restraints need. This care involved high complexity decision making to assess, manipulate, and support vital system functions, to treat this degreee vital organ system failure and to prevent further life threatening deterioration of the patients condition  The services I provided to this patient were to treat and/or prevent clinically significant deterioration that could result in the failure of one or more body systems and/or organ systems due to respiratory distress, hypoxia, cardiac dysrhythmia.        Alec Riojas PA-C   06/12/22  Pulmonary, Critical Care Medicine  [address] [place] [address] [facility]

## 2022-06-13 ENCOUNTER — APPOINTMENT (OUTPATIENT)
Dept: NUCLEAR MEDICINE | Age: 72
DRG: 871 | End: 2022-06-13
Attending: STUDENT IN AN ORGANIZED HEALTH CARE EDUCATION/TRAINING PROGRAM
Payer: MEDICARE

## 2022-06-13 LAB
ANION GAP SERPL CALC-SCNC: 14 MMOL/L (ref 3–18)
APTT PPP: 41.3 SEC (ref 23–36.4)
APTT PPP: 66.3 SEC (ref 23–36.4)
ATRIAL RATE: 102 BPM
ATRIAL RATE: 104 BPM
BACTERIA SPEC CULT: NORMAL
BACTERIA SPEC CULT: NORMAL
BASOPHILS # BLD: 0 K/UL (ref 0–0.1)
BASOPHILS NFR BLD: 0 % (ref 0–2)
BUN SERPL-MCNC: 51 MG/DL (ref 7–18)
BUN/CREAT SERPL: 23 (ref 12–20)
CA-I SERPL-SCNC: 1.08 MMOL/L (ref 1.15–1.33)
CA-I SERPL-SCNC: 1.17 MMOL/L (ref 1.15–1.33)
CALCIUM SERPL-MCNC: 7.9 MG/DL (ref 8.5–10.1)
CALCULATED P AXIS, ECG09: 77 DEGREES
CALCULATED P AXIS, ECG09: 79 DEGREES
CALCULATED R AXIS, ECG10: 56 DEGREES
CALCULATED R AXIS, ECG10: 68 DEGREES
CALCULATED T AXIS, ECG11: 75 DEGREES
CALCULATED T AXIS, ECG11: 75 DEGREES
CHLORIDE SERPL-SCNC: 106 MMOL/L (ref 100–111)
CK SERPL-CCNC: 477 U/L (ref 26–192)
CO2 SERPL-SCNC: 19 MMOL/L (ref 21–32)
CREAT SERPL-MCNC: 2.24 MG/DL (ref 0.6–1.3)
DIAGNOSIS, 93000: NORMAL
DIAGNOSIS, 93000: NORMAL
DIFFERENTIAL METHOD BLD: ABNORMAL
EOSINOPHIL # BLD: 0 K/UL (ref 0–0.4)
EOSINOPHIL NFR BLD: 0 % (ref 0–5)
ERYTHROCYTE [DISTWIDTH] IN BLOOD BY AUTOMATED COUNT: 14.4 % (ref 11.6–14.5)
GLUCOSE BLD STRIP.AUTO-MCNC: 196 MG/DL (ref 70–110)
GLUCOSE BLD STRIP.AUTO-MCNC: 325 MG/DL (ref 70–110)
GLUCOSE BLD STRIP.AUTO-MCNC: 349 MG/DL (ref 70–110)
GLUCOSE BLD STRIP.AUTO-MCNC: 371 MG/DL (ref 70–110)
GLUCOSE SERPL-MCNC: 292 MG/DL (ref 74–99)
HCT VFR BLD AUTO: 26.3 % (ref 35–45)
HGB BLD-MCNC: 8.9 G/DL (ref 12–16)
IMM GRANULOCYTES # BLD AUTO: 0 K/UL
IMM GRANULOCYTES NFR BLD AUTO: 0 %
LYMPHOCYTES # BLD: 1.3 K/UL (ref 0.9–3.6)
LYMPHOCYTES NFR BLD: 4 % (ref 21–52)
MAGNESIUM SERPL-MCNC: 2.5 MG/DL (ref 1.6–2.6)
MCH RBC QN AUTO: 27 PG (ref 24–34)
MCHC RBC AUTO-ENTMCNC: 33.8 G/DL (ref 31–37)
MCV RBC AUTO: 79.7 FL (ref 78–100)
METAMYELOCYTES NFR BLD MANUAL: 2 %
MONOCYTES # BLD: 0 K/UL (ref 0.05–1.2)
MONOCYTES NFR BLD: 0 % (ref 3–10)
NEUTS BAND NFR BLD MANUAL: 12 % (ref 0–5)
NEUTS SEG # BLD: 30.4 K/UL (ref 1.8–8)
NEUTS SEG NFR BLD: 82 % (ref 40–73)
NRBC # BLD: 0.05 K/UL (ref 0–0.01)
NRBC BLD-RTO: 0.2 PER 100 WBC
P-R INTERVAL, ECG05: 128 MS
P-R INTERVAL, ECG05: 144 MS
PERIPHERAL SMEAR,PSM: NORMAL
PHOSPHATE SERPL-MCNC: 3 MG/DL (ref 2.5–4.9)
PLATELET # BLD AUTO: 211 K/UL (ref 135–420)
PLATELET COMMENTS,PCOM: ABNORMAL
PMV BLD AUTO: 11.4 FL (ref 9.2–11.8)
POTASSIUM SERPL-SCNC: 4.7 MMOL/L (ref 3.5–5.5)
Q-T INTERVAL, ECG07: 348 MS
Q-T INTERVAL, ECG07: 368 MS
QRS DURATION, ECG06: 78 MS
QRS DURATION, ECG06: 80 MS
QTC CALCULATION (BEZET), ECG08: 453 MS
QTC CALCULATION (BEZET), ECG08: 483 MS
RBC # BLD AUTO: 3.3 M/UL (ref 4.2–5.3)
RBC MORPH BLD: ABNORMAL
SERVICE CMNT-IMP: NORMAL
SODIUM SERPL-SCNC: 139 MMOL/L (ref 136–145)
TROPONIN-HIGH SENSITIVITY: 2979 NG/L (ref 0–54)
TROPONIN-HIGH SENSITIVITY: 3778 NG/L (ref 0–54)
TROPONIN-HIGH SENSITIVITY: 5482 NG/L (ref 0–54)
VANCOMYCIN SERPL-MCNC: 17.6 UG/ML (ref 5–40)
VENTRICULAR RATE, ECG03: 102 BPM
VENTRICULAR RATE, ECG03: 104 BPM
WBC # BLD AUTO: 32.3 K/UL (ref 4.6–13.2)

## 2022-06-13 PROCEDURE — 74011250636 HC RX REV CODE- 250/636: Performed by: REGISTERED NURSE

## 2022-06-13 PROCEDURE — 36415 COLL VENOUS BLD VENIPUNCTURE: CPT

## 2022-06-13 PROCEDURE — 74011000258 HC RX REV CODE- 258: Performed by: STUDENT IN AN ORGANIZED HEALTH CARE EDUCATION/TRAINING PROGRAM

## 2022-06-13 PROCEDURE — 74011000258 HC RX REV CODE- 258: Performed by: INTERNAL MEDICINE

## 2022-06-13 PROCEDURE — 82962 GLUCOSE BLOOD TEST: CPT

## 2022-06-13 PROCEDURE — 80048 BASIC METABOLIC PNL TOTAL CA: CPT

## 2022-06-13 PROCEDURE — 84100 ASSAY OF PHOSPHORUS: CPT

## 2022-06-13 PROCEDURE — 74011250636 HC RX REV CODE- 250/636: Performed by: PHYSICIAN ASSISTANT

## 2022-06-13 PROCEDURE — 94762 N-INVAS EAR/PLS OXIMTRY CONT: CPT

## 2022-06-13 PROCEDURE — 99232 SBSQ HOSP IP/OBS MODERATE 35: CPT | Performed by: INTERNAL MEDICINE

## 2022-06-13 PROCEDURE — 65610000006 HC RM INTENSIVE CARE

## 2022-06-13 PROCEDURE — 82330 ASSAY OF CALCIUM: CPT

## 2022-06-13 PROCEDURE — 80202 ASSAY OF VANCOMYCIN: CPT

## 2022-06-13 PROCEDURE — 83735 ASSAY OF MAGNESIUM: CPT

## 2022-06-13 PROCEDURE — 74011250636 HC RX REV CODE- 250/636: Performed by: INTERNAL MEDICINE

## 2022-06-13 PROCEDURE — 85730 THROMBOPLASTIN TIME PARTIAL: CPT

## 2022-06-13 PROCEDURE — 74011000250 HC RX REV CODE- 250: Performed by: STUDENT IN AN ORGANIZED HEALTH CARE EDUCATION/TRAINING PROGRAM

## 2022-06-13 PROCEDURE — 84484 ASSAY OF TROPONIN QUANT: CPT

## 2022-06-13 PROCEDURE — 85025 COMPLETE CBC W/AUTO DIFF WBC: CPT

## 2022-06-13 PROCEDURE — 78226 HEPATOBILIARY SYSTEM IMAGING: CPT

## 2022-06-13 PROCEDURE — 82550 ASSAY OF CK (CPK): CPT

## 2022-06-13 PROCEDURE — 74011000250 HC RX REV CODE- 250: Performed by: INTERNAL MEDICINE

## 2022-06-13 PROCEDURE — 74011250636 HC RX REV CODE- 250/636: Performed by: STUDENT IN AN ORGANIZED HEALTH CARE EDUCATION/TRAINING PROGRAM

## 2022-06-13 PROCEDURE — 74011636637 HC RX REV CODE- 636/637: Performed by: REGISTERED NURSE

## 2022-06-13 PROCEDURE — 77010033678 HC OXYGEN DAILY

## 2022-06-13 RX ORDER — HEPARIN SODIUM 1000 [USP'U]/ML
5000 INJECTION, SOLUTION INTRAVENOUS; SUBCUTANEOUS ONCE
Status: DISCONTINUED | OUTPATIENT
Start: 2022-06-14 | End: 2022-06-14

## 2022-06-13 RX ORDER — CALCIUM GLUCONATE 20 MG/ML
1 INJECTION, SOLUTION INTRAVENOUS
Status: COMPLETED | OUTPATIENT
Start: 2022-06-13 | End: 2022-06-13

## 2022-06-13 RX ORDER — INSULIN GLARGINE 100 [IU]/ML
15 INJECTION, SOLUTION SUBCUTANEOUS DAILY
Status: DISCONTINUED | OUTPATIENT
Start: 2022-06-13 | End: 2022-06-14

## 2022-06-13 RX ORDER — SODIUM CHLORIDE 450 MG/100ML
75 INJECTION, SOLUTION INTRAVENOUS CONTINUOUS
Status: DISCONTINUED | OUTPATIENT
Start: 2022-06-13 | End: 2022-06-15

## 2022-06-13 RX ORDER — INSULIN LISPRO 100 [IU]/ML
INJECTION, SOLUTION INTRAVENOUS; SUBCUTANEOUS EVERY 6 HOURS
Status: DISCONTINUED | OUTPATIENT
Start: 2022-06-13 | End: 2022-06-16

## 2022-06-13 RX ORDER — KIT FOR THE PREPARATION OF TECHNETIUM TC 99M MEBROFENIN 45 MG/10ML
5.8 INJECTION, POWDER, LYOPHILIZED, FOR SOLUTION INTRAVENOUS
Status: COMPLETED | OUTPATIENT
Start: 2022-06-13 | End: 2022-06-13

## 2022-06-13 RX ORDER — LEVOTHYROXINE SODIUM 112 UG/1
112 TABLET ORAL
Status: DISCONTINUED | OUTPATIENT
Start: 2022-06-14 | End: 2022-06-30 | Stop reason: HOSPADM

## 2022-06-13 RX ORDER — HEPARIN SODIUM 1000 [USP'U]/ML
5000 INJECTION, SOLUTION INTRAVENOUS; SUBCUTANEOUS ONCE
Status: COMPLETED | OUTPATIENT
Start: 2022-06-13 | End: 2022-06-13

## 2022-06-13 RX ORDER — DEXTROSE MONOHYDRATE 100 MG/ML
0-250 INJECTION, SOLUTION INTRAVENOUS AS NEEDED
Status: DISCONTINUED | OUTPATIENT
Start: 2022-06-13 | End: 2022-06-30 | Stop reason: HOSPADM

## 2022-06-13 RX ORDER — VANCOMYCIN HYDROCHLORIDE
1250 ONCE
Status: COMPLETED | OUTPATIENT
Start: 2022-06-13 | End: 2022-06-13

## 2022-06-13 RX ORDER — MAGNESIUM SULFATE 100 %
16 CRYSTALS MISCELLANEOUS AS NEEDED
Status: DISCONTINUED | OUTPATIENT
Start: 2022-06-13 | End: 2022-06-30 | Stop reason: HOSPADM

## 2022-06-13 RX ORDER — INSULIN GLARGINE 100 [IU]/ML
5 INJECTION, SOLUTION SUBCUTANEOUS ONCE
Status: COMPLETED | OUTPATIENT
Start: 2022-06-13 | End: 2022-06-13

## 2022-06-13 RX ADMIN — KIT FOR THE PREPARATION OF TECHNETIUM TC 99M MEBROFENIN 5.8 MILLICURIE: 45 INJECTION, POWDER, LYOPHILIZED, FOR SOLUTION INTRAVENOUS at 15:00

## 2022-06-13 RX ADMIN — Medication 15 UNITS: at 18:56

## 2022-06-13 RX ADMIN — SODIUM CHLORIDE, PRESERVATIVE FREE 10 ML: 5 INJECTION INTRAVENOUS at 22:00

## 2022-06-13 RX ADMIN — CALCIUM GLUCONATE 1000 MG: 20 INJECTION, SOLUTION INTRAVENOUS at 03:26

## 2022-06-13 RX ADMIN — SODIUM CHLORIDE, PRESERVATIVE FREE 10 ML: 5 INJECTION INTRAVENOUS at 13:04

## 2022-06-13 RX ADMIN — HEPARIN SODIUM 5000 UNITS: 1000 INJECTION INTRAVENOUS; SUBCUTANEOUS at 06:56

## 2022-06-13 RX ADMIN — Medication 12 UNITS: at 18:57

## 2022-06-13 RX ADMIN — METRONIDAZOLE 500 MG: 500 INJECTION, SOLUTION INTRAVENOUS at 18:57

## 2022-06-13 RX ADMIN — METRONIDAZOLE 500 MG: 500 INJECTION, SOLUTION INTRAVENOUS at 01:38

## 2022-06-13 RX ADMIN — METRONIDAZOLE 500 MG: 500 INJECTION, SOLUTION INTRAVENOUS at 09:58

## 2022-06-13 RX ADMIN — SODIUM CHLORIDE 75 ML/HR: 4.5 INJECTION, SOLUTION INTRAVENOUS at 13:29

## 2022-06-13 RX ADMIN — Medication 5 UNITS: at 06:27

## 2022-06-13 RX ADMIN — CALCIUM GLUCONATE 1000 MG: 20 INJECTION, SOLUTION INTRAVENOUS at 04:00

## 2022-06-13 RX ADMIN — CEFEPIME HYDROCHLORIDE 1 G: 1 INJECTION, POWDER, FOR SOLUTION INTRAMUSCULAR; INTRAVENOUS at 03:26

## 2022-06-13 RX ADMIN — CEFEPIME HYDROCHLORIDE 1 G: 1 INJECTION, POWDER, FOR SOLUTION INTRAMUSCULAR; INTRAVENOUS at 18:55

## 2022-06-13 RX ADMIN — INSULIN GLARGINE 10 UNITS: 100 INJECTION, SOLUTION SUBCUTANEOUS at 00:37

## 2022-06-13 RX ADMIN — VANCOMYCIN HYDROCHLORIDE 1250 MG: 10 INJECTION, POWDER, LYOPHILIZED, FOR SOLUTION INTRAVENOUS at 09:58

## 2022-06-13 RX ADMIN — MAGNESIUM SULFATE HEPTAHYDRATE 1 G: 1 INJECTION, SOLUTION INTRAVENOUS at 02:00

## 2022-06-13 RX ADMIN — MAGNESIUM SULFATE HEPTAHYDRATE 1 G: 1 INJECTION, SOLUTION INTRAVENOUS at 01:38

## 2022-06-13 RX ADMIN — NOREPINEPHRINE BITARTRATE 8 MCG/MIN: 1 INJECTION, SOLUTION, CONCENTRATE INTRAVENOUS at 09:14

## 2022-06-13 RX ADMIN — SODIUM CHLORIDE, PRESERVATIVE FREE 10 ML: 5 INJECTION INTRAVENOUS at 06:00

## 2022-06-13 RX ADMIN — Medication 3 UNITS: at 01:00

## 2022-06-13 RX ADMIN — VASOPRESSIN 0.04 UNITS/MIN: 20 INJECTION INTRAVENOUS at 12:35

## 2022-06-13 RX ADMIN — HYDROCORTISONE SODIUM SUCCINATE 50 MG: 100 INJECTION, POWDER, FOR SOLUTION INTRAMUSCULAR; INTRAVENOUS at 12:31

## 2022-06-13 RX ADMIN — PIPERACILLIN AND TAZOBACTAM 4.5 G: 4; .5 INJECTION, POWDER, FOR SOLUTION INTRAVENOUS at 21:51

## 2022-06-13 RX ADMIN — Medication 12 UNITS: at 06:00

## 2022-06-13 RX ADMIN — MAGNESIUM SULFATE HEPTAHYDRATE 1 G: 1 INJECTION, SOLUTION INTRAVENOUS at 00:37

## 2022-06-13 RX ADMIN — HYDROCORTISONE SODIUM SUCCINATE 50 MG: 100 INJECTION, POWDER, FOR SOLUTION INTRAMUSCULAR; INTRAVENOUS at 06:21

## 2022-06-13 RX ADMIN — HYDROCORTISONE SODIUM SUCCINATE 50 MG: 100 INJECTION, POWDER, FOR SOLUTION INTRAMUSCULAR; INTRAVENOUS at 00:00

## 2022-06-13 RX ADMIN — HYDROCORTISONE SODIUM SUCCINATE 50 MG: 100 INJECTION, POWDER, FOR SOLUTION INTRAMUSCULAR; INTRAVENOUS at 18:56

## 2022-06-13 RX ADMIN — FAMOTIDINE 20 MG: 10 INJECTION INTRAVENOUS at 18:56

## 2022-06-13 NOTE — DIABETES MGMT
Glycemic Control: Pt with 4 glucose readings [phone]. She has required 16 units of regular insulin from insulin drip, 15 units of Lantus and 15 units of corrective lispro in the last 24 hours. (TDD 46 units insulin). Recommend increasing Lantus to 18 units q 24 hrs.  Alissa THAKKAR BC-ADM        Lab Results   Component Value Date/Time    Hemoglobin A1c 9.0 (H) 06/11/2022 02:15 PM    Hemoglobin A1c, External 9.5 07/08/2021 12:00 AM   equivalent  to ave Blood Glucose of 210 mg/dl for 2-3 months prior to admission  Recent Glucose Results:   Lab Results   Component Value Date/Time     (H) 06/13/2022 04:00 AM     (H) 06/12/2022 10:42 PM     (H) 06/12/2022 06:15 PM    GLUCPOC 371 (H) 06/13/2022 11:33 AM    GLUCPOC 325 (H) 06/13/2022 06:13 AM    GLUCPOC 196 (H) 06/13/2022 12:27 AM

## 2022-06-13 NOTE — PROGRESS NOTES
attended the interdisciplinary rounds for Brian Gil, who is a 66 y.o.,female. Patients Primary Language is: [place]. According to the patients EMR Rastafarian Affiliation is: [facility].     The reason the Patient came to the hospital is:   Patient Active Problem List    Diagnosis Date Noted    Elevated troponin 06/12/2022    Primary hypertension 06/12/2022    Acquired hypothyroidism 06/12/2022    History of colon cancer 06/12/2022    BRENDA (acute kidney injury) ([facility] [address] 06/12/2022    DKA (diabetic ketoacidosis) ([facility] [address] 06/11/2022    Hyperkalemia 01/19/2021    DKA (diabetic ketoacidoses) 01/19/2021      Plan:  Chaplains will continue to follow and will provide pastoral care on an as needed/requested basis.  recommends bedside caregivers page  on duty if patient shows signs of acute spiritual or emotional distress.     [address] [facility]  Board Certified [address] [place] [address]   [phone]

## 2022-06-13 NOTE — CONSULTS
Infectious Disease Consultation Note        Reason: septic shock    Current abx Prior abx   Cefepime, vancomycin, metronidazole since 6/11/22      Lines:       Assessment :  66 y.o. female with pmh of type 2 diabetes mellitus, hypertension, hyperlipidemia, hypothyroidism, and colon cancer s/p total colectomy and chemotherapy who presented to [facility]  via EMS on 6/11/22 for altered mental status. Highly complex clinical picture. Clinical presentation is concerning for sepsis (POA) due to undiagnosed gallbladder pathology, ? Acute cholecystitis. No other source of infection identified on today's exam.     H/o left hip TKR- currently no evidence of infection left hip. Will monitor for this possibility    Worsening leukocytosis- likely due to sepsis, steroids    Acute kidney injury- likely due to sepsis, volume depletion    DKA    Altered mentation- likely metabolic encephalopathy    Recommendations:    1. Discontinue cefepime, metronidazole, vancomycin. Start Zosyn-adjust dose per changing renal function  2. Follow-up results of ultrasound gallbladder. Recommend HIDA scan  3. Titrate pressors as tolerated  4. Agree with IV hydration  5. Taper steroids per ICU team  6. Monitor renal function, cbc, clinically      Thank you for consultation request. Above plan was discussed in details with family at bedside and dr Adriana Albrecht. Please call me if any further questions or concerns. Will continue to participate in the care of this patient. HPI:    66 y.o. female with pmh of diabetes mellitus, hypertension, hyperlipidemia, hypothyroidism, and colon cancer s/p total colectomy and chemotherapy who presented to [facility]  via EMS on 6/11/22 for altered mental status. I obtained history by talking to daughter at bedside, review of records. She last talked to the patient on 6/10/22. She was alert. Complained for some nausea and vomiting. Patient lives alone at home.  On 6/11/22, she was found unresponsive at home  On arrival to the ER, patient was unresponsive and hypotensive. Glucose was found to be >1200; ABG showed a pH of 6.78, pCO2 26.4; BMP bicarb of 4; K 7.9. She was given 6L IVFs, 1 amp of bicarb, 2g calcium gluconate. She remained hypotensive with MAPs in the 50s despite IVFs, and so she was started on Levophed. There was concern for sepsis. She was started on broad-spectrum antibiotics. No definitive source of infection identified so far. I have been consulted for further recommendations. Patient was not very communicative at the time of my evaluation. Detailed review of system not feasible at this time            Past Medical History:   Diagnosis Date    [facility] ([facility] [address] [facility]     Diabetes ([facility] [address] [facility]     Hypertension     Hypothyroidism        Past Surgical History:   Procedure Laterality Date    COLONOSCOPY N/A 12/30/2016    COLONOSCOPY. SURVEILLANCE /c Hot Snared Polypectomy /c EndoClip x3 performed by Karyn Aragon MD at [facility] ENDOSCOPY    HX HYSTERECTOMY      HX TOTAL COLECTOMY         home Medication List       Details   insulin glargine (LANTUS) 100 unit/mL injection Inject 15 units SQ BID  Qty: 1 Vial, Refills: 0      metFORMIN ER (GLUCOPHAGE XR) 500 mg tablet       gabapentin (NEURONTIN) 100 mg capsule Take 100 mg by mouth three (3) times daily. Cholecalciferol, Vitamin D3, 50,000 unit cap Take 1 Cap by mouth every seven (7) days. insulin regular (NOVOLIN R, HUMULIN R) 100 unit/mL injection by SubCUTAneous route Before breakfast, lunch, and dinner. Sliding scale      amLODIPine (NORVASC) 5 mg tablet Take 5 mg by mouth daily. alendronate (FOSAMAX) 10 mg tablet Take 70 mg by mouth every seven (7) days. aspirin 81 mg chewable tablet Take 81 mg by mouth daily. levothyroxine (SYNTHROID) 112 mcg tablet Take 100 mcg by mouth Daily (before breakfast).              Current Facility-Administered Medications   Medication Dose Route Frequency    insulin lispro (HUMALOG) injection   SubCUTAneous Q6H    glucose chewable tablet 16 g  16 g Oral PRN    glucagon (GLUCAGEN) injection 1 mg  1 mg IntraMUSCular PRN    dextrose 10% infusion 0-250 mL  0-250 mL IntraVENous PRN    insulin glargine (LANTUS) injection 15 Units  15 Units SubCUTAneous DAILY    [START ON 6/14/2022] levothyroxine (SYNTHROID) tablet 112 mcg  112 mcg Oral 6am    vasopressin (VASOSTRICT) 20 Units in 0.9% sodium chloride 100 mL infusion  0.04 Units/min IntraVENous CONTINUOUS    0.45% sodium chloride infusion  75 mL/hr IntraVENous CONTINUOUS    heparin 25,000 units in  mL infusion  12-25 Units/kg/hr IntraVENous TITRATE    hydrocortisone Sod Succ (PF) (SOLU-CORTEF) injection 50 mg  50 mg IntraVENous Q6H    cefepime (MAXIPIME) 1 g in 0.9% sodium chloride (MBP/ADV) 50 mL MBP  1 g IntraVENous Q12H    NOREPINephrine (LEVOPHED) 8 mg in 5% dextrose 250mL (32 mcg/mL) infusion  0.5-50 mcg/min IntraVENous TITRATE    sodium chloride (NS) flush 5-40 mL  5-40 mL IntraVENous Q8H    sodium chloride (NS) flush 5-40 mL  5-40 mL IntraVENous PRN    acetaminophen (TYLENOL) tablet 650 mg  650 mg Oral Q6H PRN    Or    acetaminophen (TYLENOL) suppository 650 mg  650 mg Rectal Q6H PRN    ondansetron (ZOFRAN ODT) tablet 4 mg  4 mg Oral Q8H PRN    Or    ondansetron (ZOFRAN) injection 4 mg  4 mg IntraVENous Q6H PRN    famotidine (PF) (PEPCID) 20 mg in 0.9% sodium chloride 10 mL injection  20 mg IntraVENous Q24H    metroNIDAZOLE (FLAGYL) IVPB premix 500 mg  500 mg IntraVENous Q8H    VANCOMYCIN INFORMATION NOTE   Other Rx Dosing/Monitoring    dexmedeTOMidine in 0.9 % NaCl (PRECEDEX) 400 mcg/100 mL (4 mcg/mL) infusion soln  0.1-1.5 mcg/kg/hr IntraVENous TITRATE       Allergies: Patient has no known allergies.     Family History   Problem Relation Age of Onset    Diabetes Mother     Cancer Father         colon     Social History     Socioeconomic History    Marital status: SINGLE     Spouse name: Not on file    Number of children: Not on file    Years of education: Not on file    Highest education level: Not on file   Occupational History    Not on file   Tobacco Use    Smoking status: Former Smoker    Smokeless tobacco: Never Used   Substance and Sexual Activity    Alcohol use: Yes     Comment: occasionally    Drug use: No    Sexual activity: Not on file   Other Topics Concern    Not on file   Social History Narrative    Not on file     Social Determinants of Health     Financial Resource Strain:     Difficulty of Paying Living Expenses: Not on file   Food Insecurity:     Worried About Running Out of Food in the Last Year: Not on file    Albino of Food in the Last Year: Not on file   Transportation Needs:     Lack of Transportation (Medical): Not on file    Lack of Transportation (Non-Medical):  Not on file   Physical Activity:     Days of Exercise per Week: Not on file    Minutes of Exercise per Session: Not on file   Stress:     Feeling of Stress : Not on file   Social Connections:     Frequency of Communication with Friends and Family: Not on file    Frequency of Social Gatherings with Friends and Family: Not on file    Attends [employer] Services: Not on file    Active Member of [address] [place] [address] or Organizations: Not on file    Attends Club or Organization Meetings: Not on file    Marital Status: Not on file   Intimate Partner Violence:     Fear of Current or Ex-Partner: Not on file    Emotionally Abused: Not on file    Physically Abused: Not on file    Sexually Abused: Not on file   Housing Stability:     Unable to Pay for Housing in the Last Year: Not on file    Number of [place] in the Last Year: Not on file    Unstable Housing in the Last Year: Not on file     Social History     Tobacco Use   Smoking Status Former Smoker   Smokeless Tobacco Never Used        Temp (24hrs), Av.4 °F (36.9 °C), Min:98.3 °F (36.8 °C), Max:98.4 °F (36.9 °C)    Visit Vitals  /73   Pulse 90   Temp 98.4 °F (36.9 °C)   Resp 21   Ht 5' 6\" (1.676 m) Wt 82.1 kg (181 lb)   SpO2 100%   BMI 29.21 kg/m²       ROS: unable to obtain due to patient factors    Physical Exam:    General: Well developed, well nourished female laying on the bed, eyes open, non communicative, in no acute distress. HEENT:  Normocephalic, atraumatic,  EOMI, no scleral icterus or pallor; no conjunctival hemmohage;  nasal and oral mucous are moist and without evidence of lesions. Neck supple, no bruits. Lymph Nodes:   not examined   Lungs:   non-labored, bilateral chest movements equal, no audible wheezes   Heart:  RRR, s1 and s2; no rubs or gallops, no edema, + pedal pulses   Abdomen:  soft, non-distended, active bowel sounds, no hepatomegaly, no splenomegaly. ? tenderness   Genitourinary:  deferred   Extremities:   no clubbing, cyanosis; no joint effusions or swelling; pain on movements of all extremities; muscle mass appropriate for age   Neurologic:  No gross focal sensory abnormalities; 5/5 muscle strength to upper and lower extremities. Speech appropriate. Cranial nerves intact                        Skin:  No rash or ulcers noted   Back:  no spinal or paraspinal muscle tenderness or rigidity, no CVA tenderness     Psychiatric:  Unable to assess         Labs: Results:   Chemistry Recent Labs     06/13/22  0400 06/12/22  2242 06/12/22  1815 06/11/22  2051 06/11/22  1415   * 196* 101*   < > 1,263*    141 143   < > 132*   K 4.7 4.7 4.4   < > 7.9*    111 111   < > 95*   CO2 19* 22 23   < > 4*   BUN 51* 52* 53*   < > 73*   CREA 2.24* 2.35* 2.40*   < > 3.89*   CA 7.9* 7.8* 7.9*   < > 8.4*   AGAP 14 8 9   < > 33*   BUCR 23* 22* 22*   < > 19   AP  --   --   --   --  78   TP  --   --   --   --  6.4   ALB  --   --   --   --  3.0*   GLOB  --   --   --   --  3.4   AGRAT  --   --   --   --  0.9    < > = values in this interval not displayed.       CBC w/Diff Recent Labs     06/13/22  0400 06/12/22  0226 06/11/22  1415   WBC 32.3* 28.9* 37.3*   RBC 3.30* 2.98* 3.67*   HGB 8.9* 7. 9* 9.8*   HCT 26.3* 24.5* 36.6    213 365   GRANS 82* 91* 87*   LYMPH 4* 6* 7*   EOS 0 0 0      Microbiology Recent Labs     06/11/22  2150 06/11/22  1120   CULT NO GROWTH 2 DAYS  NO GROWTH 2 DAYS MRSA NOT PRESENT  Screening of patient nares for MRSA is for surveillance purposes and, if positive, to facilitate isolation considerations in high risk settings. It is not intended for automatic decolonization interventions per se as regimens are not sufficiently effective to warrant routine use. RADIOLOGY:    All available imaging studies/reports in [facility] for this admission were reviewed      Disclaimer: Sections of this note are dictated utilizing voice recognition software, which may have resulted in some phonetic based errors in grammar and contents. Even though attempts were made to correct all the mistakes, some may have been missed, and remained in the body of the document. If questions arise, please contact our department.     Dr. Diane Weston, Infectious Disease Specialist  [phone]  June 13, 2022  7:08 PM

## 2022-06-13 NOTE — PROGRESS NOTES
[address] [place] [address] [facility]. Pulmonary, Critical Care, and Sleep Medicine    Name: Agustina Thompson MRN: [id number]   : 1950 Hospital: [address] [place] [address] Dr   Date: 2022  Admission Date: 2022     Chart and notes reviewed. Data reviewed. I have evaluated all findings. [x]I have reviewed the flowsheet and previous days notes. []The patient is unable to give any meaningful history or review of systems because the patient is:  []Intubated []Sedated   [x]Unresponsive      [x]The patient is critically ill on      []Mechanical ventilation [x]Pressors   []BiPAP []       Interval HPI:  This patient has been seen and evaluated at the request of Dr. Binta Fortune for DKA. 22     Patient is a 66 y.o. female with pmh of diabetes mellitus, hypertension, hyperlipidemia, hypothyroidism, and colon cancer s/p total colectomy and chemotherapy who was admitted for AMS, hypercarbic, hypoxemic respiratory failure. Subjective 22  Hospital Day: 3  Vent Day:0  Overnight events: Elevated BNP and troponins on heparin drip pressor requirements increasing lactic acidosis increasing adding stress dose steroids and vasopressin  Mentation/Activity: Lethargic  Respiratory/ Secretions: Stable  Hemodynamics: Stable  Urine output, bowel: Urine output adequate no bowel movement  Diet: NPO, pending swallow study  need for procedures: CT scan              ROS:Review of systems not obtained due to patient factors. Events and notes from last 24 hours reviewed.      Patient Active Problem List   Diagnosis Code    Hyperkalemia E87.5    DKA (diabetic ketoacidoses) E11.10    DKA (diabetic ketoacidosis) ([facility] [address] E11.10    Elevated troponin R77.8    Primary hypertension I10    Acquired hypothyroidism E03.9    History of colon cancer Z85.038    BRENDA (acute kidney injury) ([facility] [address] N17.9     Vital Signs:  Visit Vitals  BP (!) 138/55   Pulse 73   Temp 97.2 °F (36.2 °C)   Resp 18   Ht 5' 6\" (1.676 m)   Wt 82.1 kg (181 lb)   SpO2 100%   BMI 29.21 kg/m²       O2 Device: Nasal cannula   O2 Flow Rate (L/min): 3 l/min   Temp (24hrs), Av.3 °F (36.3 °C), Min:97.2 °F (36.2 °C), Max:97.3 °F (36.3 °C)       Intake/Output:   Last shift:      No intake/output data recorded.   Last 3 shifts:  1901 -  0700  In: 3891.5 [I.V.:3891.5]  Out: 8910 [Urine:3450]    Intake/Output Summary (Last 24 hours) at 2022 0813  Last data filed at 2022 0446  Gross per 24 hour   Intake 3891.47 ml   Output 2000 ml   Net 1891.47 ml      Current Facility-Administered Medications   Medication Dose Route Frequency    insulin lispro (HUMALOG) injection   SubCUTAneous Q6H    insulin glargine (LANTUS) injection 15 Units  15 Units SubCUTAneous DAILY    heparin 25,000 units in  mL infusion  12-25 Units/kg/hr IntraVENous TITRATE    hydrocortisone Sod Succ (PF) (SOLU-CORTEF) injection 50 mg  50 mg IntraVENous Q6H    vasopressin (VASOSTRICT) 20 Units in 0.9% sodium chloride 100 mL infusion  0-0.03 Units/min IntraVENous TITRATE    cefepime (MAXIPIME) 1 g in 0.9% sodium chloride (MBP/ADV) 50 mL MBP  1 g IntraVENous Q12H    NOREPINephrine (LEVOPHED) 8 mg in 5% dextrose 250mL (32 mcg/mL) infusion  0.5-50 mcg/min IntraVENous TITRATE    sodium chloride (NS) flush 5-40 mL  5-40 mL IntraVENous Q8H    famotidine (PF) (PEPCID) 20 mg in 0.9% sodium chloride 10 mL injection  20 mg IntraVENous Q24H    metroNIDAZOLE (FLAGYL) IVPB premix 500 mg  500 mg IntraVENous Q8H    VANCOMYCIN INFORMATION NOTE   Other Rx Dosing/Monitoring    dexmedeTOMidine in 0.9 % NaCl (PRECEDEX) 400 mcg/100 mL (4 mcg/mL) infusion soln  0.1-1.5 mcg/kg/hr IntraVENous TITRATE       Telemetry: [x]Sinus []A-flutter []Paced    []A-fib []Multiple PVCs                  Physical Exam:     General: Remains lethargic, response to verbal and tactile stimuli oriented,   HEENT:  Anicteric sclerae; pink palpebral conjunctivae; mucosa moist  Resp:  Symmetrical chest expansion, no accessory muscle use; good airway entry; no rales/ wheezing/ rhonchi noted  CV:  S1, S2 present; regular rate and rhythm  GI:  Abdomen soft, non-tender; hypoactive bowel sounds  Extremities:  +2 pulses on all extremities; no edema/ cyanosis/ clubbing noted   Skin:  Warm; no rashes/ lesions noted, normal turgor/cap refill   Neurologic:  Non-focal    DATA:  MAR reviewed and pertinent medications noted or modified as needed    Labs:  Recent Labs     06/13/22  0400 06/12/22  0226 06/11/22  1415   WBC 32.3* 28.9* 37.3*   HGB 8.9* 7.9* 9.8*   HCT 26.3* 24.5* 36.6    213 365     Recent Labs     06/13/22  0400 06/12/22  2242 06/12/22  1815 06/12/22  1110 06/12/22 0226 06/11/22 2051 06/11/22  1415    141 143   < > 141   < > 132*   K 4.7 4.7 4.4   < > 3.1*   < > 7.9*    111 111   < > 105   < > 95*   CO2 19* 22 23   < > 14*   < > 4*   * 196* 101*   < > 552*   < > 1,263*   BUN 51* 52* 53*   < > 60*   < > 73*   CREA 2.24* 2.35* 2.40*   < > 3.19*   < > 3.89*   CA 7.9* 7.8* 7.9*   < > 8.0*   < > 8.4*   MG 2.5 1.8 1.9   < > 2.6   < > 2.4   PHOS 3.0 3.3 3.6   < > 2.3*   < > 12.9*   ALB  --   --   --   --   --   --  3.0*   ALT  --   --   --   --   --   --  23   INR  --   --   --   --  1.2  --   --     < > = values in this interval not displayed. No results for input(s): PH, PCO2, PO2, HCO3, FIO2 in the last 72 hours. Recent Labs     06/12/22  0426 06/11/22 2048 06/11/22  1433   FIO2I 30 40  --    HCO3I  --   --  4.0*       Imaging:  [x]   I have personally reviewed the patients radiographs and reports  XR Results (most recent):  Results from Hospital Encounter encounter on 06/11/22    XR CHEST PORT    Narrative  CHEST PORTABLE 1455 hours    COMPARISON: 19 January 2021. INDICATIONS: Altered mental status. FINDINGS:    Portable single view chest demonstrates:    Lungs: Clear. Cardiac Silhouette And Mediastinal Contours: Normal.    Pleural Spaces: No pneumothorax or pleural effusion evident.     Bones And Soft Tissues: Unremarkable for age. Impression  No active or acute cardiopulmonary process is evident. CT Results (most recent):  Results from Hospital Encounter encounter on 06/11/22    CT CHEST ABD PELV WO CONT    Narrative  EXAM: CT CHEST ABD PELV WO CONT    CLINICAL INDICATION/HISTORY : AMS, WBC 37K, undifferentiated shock, DKA. COMPARISON: CT chest January 19, 2021. CT chest abdomen and pelvis June 8, 2015    TECHNIQUE: Helical scan of the chest, abdomen and pelvis were obtained from the  thoracic inlet to the symphysis without IV contrast administration and without  oral contrast.  All Ct scans at this facility are performed using dose optimization of technique  as appropriate to a performed exam, to include automated exposure control,  adjustment of the mA and/or kV according to patient size (including appropriate  matching for site specific examinations), or use of iterative reconstruction  technique. FINDINGS: Limited by motion. CT of the chest:    Lungs:  Mild dependent groundglass opacities posterior upper lobes and dependent  opacities at the lung bases. New patchy groundglass opacities in the medial  upper lobes bilaterally adjacent to the mediastinum. Pneumatocele in the lingula  similar to prior. Thyroid:  Unremarkable    Heart and great vessels: Mild atherosclerotic vascular calcification including  coronary artery calcification. Main pulmonary artery is 3.1 cm-borderline  enlarged. Lymph nodes:  No adenopathy    Pleural spaces:  Trace bilateral pleural effusions      Bones:  Unremarkable for age        CT of the abdomen/pelvis:    Liver:  Hepatomegaly. Liver measures 20 cm in length    Gallbladder:  Gallbladder sludge    Spleen:  Unremarkable    Adrenal Glands:  Unremarkable    Pancreas: Unremarkable    Kidneys:  Unremarkable    Stomach, small bowel, colon:  Partial colectomy.     Lymph nodes:  No adenopathy    Aorta:  Unremarkable    Bladder:  Not well seen due to beam Antonio artifact from left hip arthroplasty  and nondistention. Chandler catheter present    Peritoneal spaces:  Small amount of induration adjacent to the gallbladder. Very  small fatty umbilical hernia    Pelvic structures: Hysterectomy. Right-sided femoral venous catheter    Bones:  Osteopenia. Lumbar spondylosis. Left hip arthroplasty. Moderate superior  endplate compression deformity at L4 with chronic appearance. Moderate lumbar  spondylosis. Lack of oral and intravenous contrast limits sensitivity. Impression  1. Sludge in the gallbladder. Small amount of nonspecific mesenteric induration  near the gallbladder. As clinically indicated, could perform right upper  quadrant ultrasound. 2.  Hepatomegaly. 3.  Patchy groundglass opacities in the medial upper lobes bilaterally, likely  infectious or inflammatory. Other scattered areas of atelectasis. Trace  bilateral pleural effusions.      01/19/21    IMPRESSION:   · Nstemi-on heparin GTT  · Acute heart failure , EF 30-35%  · Acute hypoxemic/hypercapneic respiratory failure  ·            s/p BiPAP secondary to encephalopathy  · Acute toxic metabolic encephalopathy  · Diabetic ketoacidosis with pH of 6.78, resolved  · High anion gap metabolic acidosis without appropriate respiratory compensation (without secondary metabolic disturbance) - likely secondary to DKA and lactic acidosis +/- azotemia  · Hyperkalemia of 7.9 without EKG changes now hypo  · Mixed shock, likely combination of septic + hypovolemic shock  · Lactic acidosis, now resolved  · Electrolyte derangement  · Acute kidney injury (baseline SCr previously around 1.0)   · Profound leukocytosis of 37.3, pending peripheral smear, likely secondary to hemoconcentration + likely sepsis, unclear source  · Uncontrolled diabetes mellitus (A1c 9.0%)  · History of colon cancer s/p total colectomy and prior chemotherapy  · Chronic normocytic anemia (baseline around 10)     Patient Active Problem List   Diagnosis Code    Hyperkalemia E87.5    DKA (diabetic ketoacidoses) E11.10    DKA (diabetic ketoacidosis) (HCC) E11.10    Elevated troponin R77.8    Primary hypertension I10    Acquired hypothyroidism E03.9    History of colon cancer Z85.038    BRENDA (acute kidney injury) ([facility] [address] N17.9      RECOMMENDATIONS:   Neuro: Precedex drip for agitation watch neuro status, d/c today for evaluation  Pulm: Aspiration precautions, HOB>30'. Discontinue BiPAP transition to nasal cannula  CVS:Monitor HD, MAP goal >65. Wean pressors troponins trending up BNP elevated will obtain echo and cardiology consult patient placed on heparin drip  GI: NPO. SUP, Right Upper quadrant US  Renal: Trend Cr, UOP. Brown nephrology consulted  Hem/Onc: Trend H/H, monitor for s/o active bleeding. Daily CBC. I/D: Continue broad-spectrum antibiotics CT scan still pending trend WBCs and temperature curve. Metabolic: Every 4 hour BMP, mag, phos. Trend lytes and replace per protocol. Replacing potassium and phosphorus this morning  Endocrine: Glucomander insulin drip till maintaining anion gap acidosis avoid hypoglycemia. Hypothyroid  Musc/Skin: No acute issues  DNR  Discussed in interdisciplinary rounds     Best practice :  Glycemic control  IHI ICU bundles:   Central Line Bundle Followed  and Brown Bundle Followed    Stress ulcer prophylaxis. Pepcid  DVT prophylaxis. Heparin gtt  Need for Lines, brown assessed. Palliative care evaluation. Restraints need. This care involved high complexity decision making to assess, manipulate, and support vital system functions, to treat this degreee vital organ system failure and to prevent further life threatening deterioration of the patients condition  The services I provided to this patient were to treat and/or prevent clinically significant deterioration that could result in the failure of one or more body systems and/or organ systems due to respiratory distress, hypoxia, cardiac dysrhythmia. Ravinder Garcia MD   06/13/22   [facility] Critical Care Fellow    Pulmonary / Critical Care Physician Addendum:    Chart and note reviewed. Data reviewed. Seen on rounds earlier today. I have independently evaluated and examined the patient. I agree with the exam, assessment and plans outlined by resident/fellow. In brief, my findings, evaluation and recommendations are as stated below:    Patient alert this morning and able to follow commands. Oriented x1-2 at this time. Repat TTE with LVEF of 30-35% and reduced RV function. She remains on vasopressor support with norepinephrine/vasopressing (will increase to 0.04). Continuing on Heparin ggt given NSTEMI; Cardiology following. Still on solu-cortef at stress dosing. Abx - cefepime/flagyl/vanco; ID consulted. Source of sepsis remains unknown although suspect intra-abdominal. Will obtain HIDA scan. Will resume Synthroid at home dosing. Lantus increased for better glycemic control. Continue to monitor & replete lytes. Nephrology consulted. Rest of details and diagnostic/treatment plans per resident/fellow  note. I have personally reviewed all pertinent data including labs, imaging and recommendations of treatment team providers. Aggregate critical care time was 31 minutes, of which >50% was provided by myself, which includes only time during which I was engaged in work directly related to the patient's care evaluation, management and care decisions, and ordering appropriate treatment related to critical care problems exclusive of procedures. This time was independent of other practitioners. The reason for providing this level of medical care for this critically ill patient was due a critical illness that impaired one or more vital organ systems such that there was a high probability of imminent or life threatening deterioration in the patients condition.  This care involved high complexity decision making to assess, manipulate, and support vital system functions, to treat this degree vital organ system failure and to prevent further life threatening deterioration of the patients condition.       Truong Jackson,   6/13/2022   Pulmonary, Critical Care Medicine  [facility]

## 2022-06-13 NOTE — PROGRESS NOTES
Problem: Falls - Risk of  Goal: *Absence of Falls  Description: Document Rhys Fowler Fall Risk and appropriate interventions in the flowsheet.   Outcome: Progressing Towards Goal  Note: Fall Risk Interventions:       Mentation Interventions: Adequate sleep, hydration, pain control,Bed/chair exit alarm,Evaluate medications/consider consulting pharmacy    Medication Interventions: Assess postural VS orthostatic hypotension,Bed/chair exit alarm    Elimination Interventions: Bed/chair exit alarm,Toileting schedule/hourly rounds              Problem: Patient Education: Go to Patient Education Activity  Goal: Patient/Family Education  Outcome: Progressing Towards Goal

## 2022-06-13 NOTE — PROGRESS NOTES
[address] [facility] Pharmacokinetic Monitoring Service - Vancomycin     Indication: sepsis  Target Concentration: Dosing based on anticipated concentration < 20 mg/L due to renal impairment/insufficiency  Day of Therapy: 3  Additional Antimicrobials: cefepime, metronidazole    Pertinent Laboratory Values: Wt Readings from Last 1 Encounters:   06/12/22 82.1 kg (181 lb)     Temp Readings from Last 1 Encounters:   06/12/22 97.2 °F (36.2 °C)     No components found for: PROCAL  Estimated Creatinine Clearance: 24.2 mL/min (A) (based on SCr of 2.24 mg/dL (H)). Recent Labs     06/13/22  0400 06/12/22  0226   WBC 32.3* 28.9*     Pertinent Cultures:  Culture Date Source Results   6/11 blood NGTD   MRSA Nasal Swab: not ordered. Order placed by pharmacy.  Awaiting results    Assessment:  Date/Time Current Dose Concentration (mg/L) Timing of Concentration (h) AUC   6/11 2,000 mg x1 - - -   6/12 2,000 mg x1 2.5  25.2 8  9 -   6/13 1,250 mg x1 17.6 18.5    Note: Serum concentrations collected for AUC dosing may appear elevated if collected in close proximity to the dose administered, this is not necessarily an indication of toxicity    Plan:  Dose by level  New dose: 1,250 mg x1  Ordered a level for 6/14 with AM labs  Pharmacy will continue to monitor patient and adjust therapy as indicated    Thank you for the consult,  SVETLANA Welch  6/13/2022

## 2022-06-13 NOTE — PROGRESS NOTES
Cardiology Progress Note    Admit Date: 6/11/2022  Attending Cardiologist: Dr. Patterson Dress:     Hospital Problems  Date Reviewed: 6/12/2022          Codes Class Noted POA    Elevated troponin ICD-10-CM: R77.8  ICD-9-CM: 790.6  6/12/2022 Yes        Primary hypertension ICD-10-CM: I10  ICD-9-CM: 401.9  6/12/2022 Unknown        Acquired hypothyroidism ICD-10-CM: E03.9  ICD-9-CM: 244.9  6/12/2022 Unknown        History of colon cancer ICD-10-CM: Z85.038  ICD-9-CM: V10.05  6/12/2022 Unknown        BRENDA (acute kidney injury) ([facility] [address] ICD-10-CM: N17.9  ICD-9-CM: 584.9  6/12/2022 Yes        DKA (diabetic ketoacidosis) ([facility] [address] ICD-10-CM: E11.10  ICD-9-CM: 250.12  6/11/2022 Yes        Hyperkalemia ICD-10-CM: E87.5  ICD-9-CM: 276.7  1/19/2021 Yes            -Elevated troponin- up to 493 from 305. EKG with NS tachycardia without any other changes. Possibly related to her DKA and her hypotension/shock. CK was normal. Echo pending.     -Shock- hypotension- remains on pressors with SBP at 107. Possible pneumonia.    -Acute diabetic ketoacidosis- discovered by EMS on arrival. CPR had been started by family members. -BRENDA- improving indices and this am 2.24. Renal managing and following.     -Hyperkalemia- being treated     -AMS- improved and likely secondary to her DKA on arrival.      No primary cardiologist.     Plan:     Addendum: Independently seen and evaluated. Agree with below. Would continue supportive measures for time being. Echocardiogram from 6/12/2022 shows EF down to 30-35%. Once she has recovered from her DKA, she will need further evaluation for cardiomyopathy.    -Patient making slow improvement but remaining on IV pressors   -Continue with pressor support and wean as able to keep MAP >65.  -Continue with fluid resucitation per primary team.   -Ongoing treatment for likely sepsis. -Will continue to monitor troponin, any symptoms.  -Continue with all other supportive care measures.      Subjective:     No new complaints. Denies any shortness of breath or chest pain.      Objective:      Patient Vitals for the past 8 hrs:   Pulse Resp BP SpO2   06/13/22 0400 73 18 (!) 138/55 100 %         Patient Vitals for the past 96 hrs:   Weight   06/12/22 1218 82.1 kg (181 lb)   06/11/22 1415 82.1 kg (181 lb)       TELE: normal sinus rhythm               Current Facility-Administered Medications   Medication Dose Route Frequency Last Admin    insulin lispro (HUMALOG) injection   SubCUTAneous Q6H 12 Units at 06/13/22 0600    glucose chewable tablet 16 g  16 g Oral PRN      glucagon (GLUCAGEN) injection 1 mg  1 mg IntraMUSCular PRN      dextrose 10% infusion 0-250 mL  0-250 mL IntraVENous PRN      insulin glargine (LANTUS) injection 15 Units  15 Units SubCUTAneous DAILY      vancomycin (VANCOCIN) 1250 mg in  ml infusion  1,250 mg IntraVENous ONCE      [START ON 6/14/2022] levothyroxine (SYNTHROID) tablet 112 mcg  112 mcg Oral 6am      heparin 25,000 units in  mL infusion  12-25 Units/kg/hr IntraVENous TITRATE 17 Units/kg/hr at 06/13/22 0637    hydrocortisone Sod Succ (PF) (SOLU-CORTEF) injection 50 mg  50 mg IntraVENous Q6H 50 mg at 06/13/22 1444    vasopressin (VASOSTRICT) 20 Units in 0.9% sodium chloride 100 mL infusion  0-0.03 Units/min IntraVENous TITRATE 0.03 Units/min at 06/12/22 1100    cefepime (MAXIPIME) 1 g in 0.9% sodium chloride (MBP/ADV) 50 mL MBP  1 g IntraVENous Q12H 1 g at 06/13/22 0326    NOREPINephrine (LEVOPHED) 8 mg in 5% dextrose 250mL (32 mcg/mL) infusion  0.5-50 mcg/min IntraVENous TITRATE 8 mcg/min at 06/13/22 0914    sodium chloride (NS) flush 5-40 mL  5-40 mL IntraVENous Q8H 10 mL at 06/13/22 0600    sodium chloride (NS) flush 5-40 mL  5-40 mL IntraVENous PRN      acetaminophen (TYLENOL) tablet 650 mg  650 mg Oral Q6H PRN      Or    acetaminophen (TYLENOL) suppository 650 mg  650 mg Rectal Q6H PRN      ondansetron (ZOFRAN ODT) tablet 4 mg  4 mg Oral Q8H PRN      Or    ondansetron (ZOFRAN) injection 4 mg  4 mg IntraVENous Q6H PRN      famotidine (PF) (PEPCID) 20 mg in 0.9% sodium chloride 10 mL injection  20 mg IntraVENous Q24H 20 mg at 06/12/22 1907    metroNIDAZOLE (FLAGYL) IVPB premix 500 mg  500 mg IntraVENous Q8H 500 mg at 06/13/22 0138    VANCOMYCIN INFORMATION NOTE   Other Rx Dosing/Monitoring      dexmedeTOMidine in 0.9 % NaCl (PRECEDEX) 400 mcg/100 mL (4 mcg/mL) infusion soln  0.1-1.5 mcg/kg/hr IntraVENous TITRATE Stopped at 06/13/22 0900         Intake/Output Summary (Last 24 hours) at 6/13/2022 1134  Last data filed at 6/13/2022 0446  Gross per 24 hour   Intake 3891.47 ml   Output 2000 ml   Net 1891.47 ml       Physical Exam:  General:  alert, cooperative, no distress, appears stated age  Neck:  nontender, no JVD  Lungs:  clear to auscultation bilaterally  Heart:  regular rate and rhythm, S1, S2 normal, no murmur, click, rub or gallop  Abdomen:  abdomen is soft without significant tenderness, masses, organomegaly or guarding  Extremities:  extremities normal, atraumatic, no cyanosis or edema    Visit Vitals  BP (!) 138/55   Pulse 73   Temp 97.2 °F (36.2 °C)   Resp 18   Ht 5' 6\" (1.676 m)   Wt 82.1 kg (181 lb)   SpO2 100%   BMI 29.21 kg/m²       Data Review:     Labs: Results:       Chemistry Recent Labs     06/13/22  0400 06/12/22  2242 06/12/22  1815 06/11/22  2051 06/11/22  1415   * 196* 101*   < > 1,263*    141 143   < > 132*   K 4.7 4.7 4.4   < > 7.9*    111 111   < > 95*   CO2 19* 22 23   < > 4*   BUN 51* 52* 53*   < > 73*   CREA 2.24* 2.35* 2.40*   < > 3.89*   CA 7.9* 7.8* 7.9*   < > 8.4*   MG 2.5 1.8 1.9   < > 2.4   PHOS 3.0 3.3 3.6   < > 12.9*   AGAP 14 8 9   < > 33*   BUCR 23* 22* 22*   < > 19   AP  --   --   --   --  78   TP  --   --   --   --  6.4   ALB  --   --   --   --  3.0*   GLOB  --   --   --   --  3.4   AGRAT  --   --   --   --  0.9    < > = values in this interval not displayed.       CBC w/Diff Recent Labs     06/13/22  4334 06/12/22 0226 06/11/22  1415   WBC 32.3* 28.9* 37.3*   RBC 3.30* 2.98* 3.67*   HGB 8.9* 7.9* 9.8*   HCT 26.3* 24.5* 36.6    213 365   GRANS 82* 91* 87*   LYMPH 4* 6* 7*   EOS 0 0 0      Cardiac Enzymes No results found for: CPK, CK, CKMMB, CKMB, RCK3, CKMBT, CKNDX, CKND1, ADAM, TROPT, TROIQ, BRYCE, TROPT, TNIPOC, BNP, BNPP   Coagulation Recent Labs     06/13/22  0530 06/12/22  1815 06/12/22  1110 06/12/22 0226   PTP  --   --   --  15.7*   INR  --   --   --  1.2   APTT 41.3* 71.1*   < >  --     < > = values in this interval not displayed.        Lipid Panel Lab Results   Component Value Date/Time    Cholesterol, total 299 (H) 11/03/2011 09:26 AM    HDL Cholesterol 204 (H) 11/03/2011 09:26 AM    LDL, calculated 86.6 11/03/2011 09:26 AM    VLDL, calculated 8.4 11/03/2011 09:26 AM    Triglyceride 42 11/03/2011 09:26 AM    CHOL/HDL Ratio 1.5 11/03/2011 09:26 AM      BNP No results found for: BNP, BNPP, XBNPT   Liver Enzymes Recent Labs     06/11/22  1415   TP 6.4   ALB 3.0*   AP 78      Thyroid Studies Lab Results   Component Value Date/Time    T4, Total 2.6 (L) 11/03/2011 09:26 AM    T3 Uptake 27 08/18/2011 02:11 PM    TSH 0.17 (L) 06/11/2022 08:51 PM          Signed By: YOLANDA Rangel     June 13, 2022

## 2022-06-14 ENCOUNTER — APPOINTMENT (OUTPATIENT)
Dept: VASCULAR SURGERY | Age: 72
DRG: 871 | End: 2022-06-14
Attending: PHYSICIAN ASSISTANT
Payer: MEDICARE

## 2022-06-14 ENCOUNTER — TELEPHONE (OUTPATIENT)
Dept: PHYSICAL THERAPY | Age: 72
End: 2022-06-14

## 2022-06-14 ENCOUNTER — APPOINTMENT (OUTPATIENT)
Dept: GENERAL RADIOLOGY | Age: 72
DRG: 871 | End: 2022-06-14
Attending: INTERNAL MEDICINE
Payer: MEDICARE

## 2022-06-14 ENCOUNTER — HOSPITAL ENCOUNTER (INPATIENT)
Dept: ULTRASOUND IMAGING | Age: 72
Discharge: HOME | DRG: 871 | End: 2022-06-14
Attending: NURSE PRACTITIONER
Payer: MEDICARE

## 2022-06-14 LAB
ALBUMIN SERPL-MCNC: 2.7 G/DL (ref 3.4–5)
ALBUMIN/GLOB SERPL: 1.1 {RATIO} (ref 0.8–1.7)
ALP SERPL-CCNC: 98 U/L (ref 45–117)
ALT SERPL-CCNC: 91 U/L (ref 13–56)
ANION GAP SERPL CALC-SCNC: 10 MMOL/L (ref 3–18)
ANION GAP SERPL CALC-SCNC: 7 MMOL/L (ref 3–18)
APTT PPP: 112.4 SEC (ref 23–36.4)
APTT PPP: 27.9 SEC (ref 23–36.4)
APTT PPP: 30.6 SEC (ref 23–36.4)
APTT PPP: 42.3 SEC (ref 23–36.4)
APTT PPP: >180 SEC (ref 23–36.4)
AST SERPL-CCNC: 53 U/L (ref 10–38)
BASOPHILS # BLD: 0 K/UL (ref 0–0.1)
BASOPHILS NFR BLD: 0 % (ref 0–2)
BILIRUB DIRECT SERPL-MCNC: 0.1 MG/DL (ref 0–0.2)
BILIRUB SERPL-MCNC: 0.4 MG/DL (ref 0.2–1)
BUN SERPL-MCNC: 30 MG/DL (ref 7–18)
BUN SERPL-MCNC: 35 MG/DL (ref 7–18)
BUN/CREAT SERPL: 18 (ref 12–20)
BUN/CREAT SERPL: 20 (ref 12–20)
CALCIUM SERPL-MCNC: 7.6 MG/DL (ref 8.5–10.1)
CALCIUM SERPL-MCNC: 7.9 MG/DL (ref 8.5–10.1)
CHLORIDE SERPL-SCNC: 107 MMOL/L (ref 100–111)
CHLORIDE SERPL-SCNC: 107 MMOL/L (ref 100–111)
CO2 SERPL-SCNC: 22 MMOL/L (ref 21–32)
CO2 SERPL-SCNC: 26 MMOL/L (ref 21–32)
CREAT SERPL-MCNC: 1.49 MG/DL (ref 0.6–1.3)
CREAT SERPL-MCNC: 1.91 MG/DL (ref 0.6–1.3)
DIFFERENTIAL METHOD BLD: ABNORMAL
EOSINOPHIL # BLD: 0 K/UL (ref 0–0.4)
EOSINOPHIL NFR BLD: 0 % (ref 0–5)
ERYTHROCYTE [DISTWIDTH] IN BLOOD BY AUTOMATED COUNT: 14.8 % (ref 11.6–14.5)
GLOBULIN SER CALC-MCNC: 2.5 G/DL (ref 2–4)
GLUCOSE BLD STRIP.AUTO-MCNC: 107 MG/DL (ref 70–110)
GLUCOSE BLD STRIP.AUTO-MCNC: 223 MG/DL (ref 70–110)
GLUCOSE BLD STRIP.AUTO-MCNC: 227 MG/DL (ref 70–110)
GLUCOSE BLD STRIP.AUTO-MCNC: 328 MG/DL (ref 70–110)
GLUCOSE SERPL-MCNC: 195 MG/DL (ref 74–99)
GLUCOSE SERPL-MCNC: 265 MG/DL (ref 74–99)
HCT VFR BLD AUTO: 23.6 % (ref 35–45)
HGB BLD-MCNC: 7.9 G/DL (ref 12–16)
IMM GRANULOCYTES # BLD AUTO: 0 K/UL
IMM GRANULOCYTES NFR BLD AUTO: 0 %
LYMPHOCYTES # BLD: 0.3 K/UL (ref 0.9–3.6)
LYMPHOCYTES NFR BLD: 1 % (ref 21–52)
MAGNESIUM SERPL-MCNC: 1.8 MG/DL (ref 1.6–2.6)
MCH RBC QN AUTO: 26.6 PG (ref 24–34)
MCHC RBC AUTO-ENTMCNC: 33.5 G/DL (ref 31–37)
MCV RBC AUTO: 79.5 FL (ref 78–100)
METAMYELOCYTES NFR BLD MANUAL: 1 %
MONOCYTES # BLD: 0 K/UL (ref 0.05–1.2)
MONOCYTES NFR BLD: 0 % (ref 3–10)
NEUTS BAND NFR BLD MANUAL: 11 % (ref 0–5)
NEUTS SEG # BLD: 28.3 K/UL (ref 1.8–8)
NEUTS SEG NFR BLD: 87 % (ref 40–73)
NRBC # BLD: 0.02 K/UL (ref 0–0.01)
NRBC BLD-RTO: 0.1 PER 100 WBC
PHOSPHATE SERPL-MCNC: 1.9 MG/DL (ref 2.5–4.9)
PLATELET # BLD AUTO: 155 K/UL (ref 135–420)
PLATELET COMMENTS,PCOM: ABNORMAL
PMV BLD AUTO: 11.4 FL (ref 9.2–11.8)
POTASSIUM SERPL-SCNC: 3.3 MMOL/L (ref 3.5–5.5)
POTASSIUM SERPL-SCNC: 3.7 MMOL/L (ref 3.5–5.5)
PROCALCITONIN SERPL-MCNC: 4.75 NG/ML
PROT SERPL-MCNC: 5.2 G/DL (ref 6.4–8.2)
RBC # BLD AUTO: 2.97 M/UL (ref 4.2–5.3)
RBC MORPH BLD: ABNORMAL
SODIUM SERPL-SCNC: 139 MMOL/L (ref 136–145)
SODIUM SERPL-SCNC: 140 MMOL/L (ref 136–145)
WBC # BLD AUTO: 28.9 K/UL (ref 4.6–13.2)

## 2022-06-14 PROCEDURE — 74011250636 HC RX REV CODE- 250/636: Performed by: REGISTERED NURSE

## 2022-06-14 PROCEDURE — 71045 X-RAY EXAM CHEST 1 VIEW: CPT

## 2022-06-14 PROCEDURE — 74011000258 HC RX REV CODE- 258: Performed by: STUDENT IN AN ORGANIZED HEALTH CARE EDUCATION/TRAINING PROGRAM

## 2022-06-14 PROCEDURE — 84145 PROCALCITONIN (PCT): CPT

## 2022-06-14 PROCEDURE — 80048 BASIC METABOLIC PNL TOTAL CA: CPT

## 2022-06-14 PROCEDURE — 77010033678 HC OXYGEN DAILY

## 2022-06-14 PROCEDURE — 92610 EVALUATE SWALLOWING FUNCTION: CPT

## 2022-06-14 PROCEDURE — 74011000258 HC RX REV CODE- 258: Performed by: INTERNAL MEDICINE

## 2022-06-14 PROCEDURE — 74011250636 HC RX REV CODE- 250/636: Performed by: PHYSICIAN ASSISTANT

## 2022-06-14 PROCEDURE — APPSS30 APP SPLIT SHARED TIME 16-30 MINUTES: Performed by: PHYSICIAN ASSISTANT

## 2022-06-14 PROCEDURE — 99232 SBSQ HOSP IP/OBS MODERATE 35: CPT | Performed by: INTERNAL MEDICINE

## 2022-06-14 PROCEDURE — 74011250636 HC RX REV CODE- 250/636: Performed by: INTERNAL MEDICINE

## 2022-06-14 PROCEDURE — 80076 HEPATIC FUNCTION PANEL: CPT

## 2022-06-14 PROCEDURE — 76705 ECHO EXAM OF ABDOMEN: CPT

## 2022-06-14 PROCEDURE — 85025 COMPLETE CBC W/AUTO DIFF WBC: CPT

## 2022-06-14 PROCEDURE — 85730 THROMBOPLASTIN TIME PARTIAL: CPT

## 2022-06-14 PROCEDURE — 74011636637 HC RX REV CODE- 636/637: Performed by: PHYSICIAN ASSISTANT

## 2022-06-14 PROCEDURE — 74011000250 HC RX REV CODE- 250: Performed by: INTERNAL MEDICINE

## 2022-06-14 PROCEDURE — 36415 COLL VENOUS BLD VENIPUNCTURE: CPT

## 2022-06-14 PROCEDURE — 74011250637 HC RX REV CODE- 250/637: Performed by: PHYSICIAN ASSISTANT

## 2022-06-14 PROCEDURE — 74011000250 HC RX REV CODE- 250: Performed by: PHYSICIAN ASSISTANT

## 2022-06-14 PROCEDURE — 80074 ACUTE HEPATITIS PANEL: CPT

## 2022-06-14 PROCEDURE — 99291 CRITICAL CARE FIRST HOUR: CPT | Performed by: INTERNAL MEDICINE

## 2022-06-14 PROCEDURE — 74011000250 HC RX REV CODE- 250: Performed by: STUDENT IN AN ORGANIZED HEALTH CARE EDUCATION/TRAINING PROGRAM

## 2022-06-14 PROCEDURE — 82962 GLUCOSE BLOOD TEST: CPT

## 2022-06-14 PROCEDURE — 74011250636 HC RX REV CODE- 250/636: Performed by: STUDENT IN AN ORGANIZED HEALTH CARE EDUCATION/TRAINING PROGRAM

## 2022-06-14 PROCEDURE — 93970 EXTREMITY STUDY: CPT

## 2022-06-14 PROCEDURE — 65610000006 HC RM INTENSIVE CARE

## 2022-06-14 PROCEDURE — 83735 ASSAY OF MAGNESIUM: CPT

## 2022-06-14 PROCEDURE — 74011636637 HC RX REV CODE- 636/637: Performed by: REGISTERED NURSE

## 2022-06-14 PROCEDURE — 94762 N-INVAS EAR/PLS OXIMTRY CONT: CPT

## 2022-06-14 PROCEDURE — 87040 BLOOD CULTURE FOR BACTERIA: CPT

## 2022-06-14 PROCEDURE — 84100 ASSAY OF PHOSPHORUS: CPT

## 2022-06-14 PROCEDURE — 74011250637 HC RX REV CODE- 250/637: Performed by: NURSE PRACTITIONER

## 2022-06-14 RX ORDER — INSULIN GLARGINE 100 [IU]/ML
30 INJECTION, SOLUTION SUBCUTANEOUS DAILY
Status: DISCONTINUED | OUTPATIENT
Start: 2022-06-15 | End: 2022-06-15

## 2022-06-14 RX ORDER — HYDROCORTISONE SODIUM SUCCINATE 100 MG/2ML
50 INJECTION, POWDER, FOR SOLUTION INTRAMUSCULAR; INTRAVENOUS EVERY 8 HOURS
Status: DISCONTINUED | OUTPATIENT
Start: 2022-06-14 | End: 2022-06-15

## 2022-06-14 RX ORDER — IPRATROPIUM BROMIDE AND ALBUTEROL SULFATE 2.5; .5 MG/3ML; MG/3ML
3 SOLUTION RESPIRATORY (INHALATION)
Status: DISCONTINUED | OUTPATIENT
Start: 2022-06-14 | End: 2022-06-30 | Stop reason: HOSPADM

## 2022-06-14 RX ORDER — INSULIN GLARGINE 100 [IU]/ML
15 INJECTION, SOLUTION SUBCUTANEOUS ONCE
Status: COMPLETED | OUTPATIENT
Start: 2022-06-14 | End: 2022-06-14

## 2022-06-14 RX ORDER — MAGNESIUM SULFATE HEPTAHYDRATE 40 MG/ML
2 INJECTION, SOLUTION INTRAVENOUS ONCE
Status: COMPLETED | OUTPATIENT
Start: 2022-06-14 | End: 2022-06-14

## 2022-06-14 RX ORDER — HEPARIN SODIUM 5000 [USP'U]/ML
3000 INJECTION, SOLUTION INTRAVENOUS; SUBCUTANEOUS ONCE
Status: DISCONTINUED | OUTPATIENT
Start: 2022-06-14 | End: 2022-06-14

## 2022-06-14 RX ORDER — BENZONATATE 100 MG/1
100 CAPSULE ORAL
Status: DISCONTINUED | OUTPATIENT
Start: 2022-06-14 | End: 2022-06-30 | Stop reason: HOSPADM

## 2022-06-14 RX ADMIN — SODIUM CHLORIDE, PRESERVATIVE FREE 10 ML: 5 INJECTION INTRAVENOUS at 06:00

## 2022-06-14 RX ADMIN — BENZONATATE 100 MG: 100 CAPSULE ORAL at 20:59

## 2022-06-14 RX ADMIN — FAMOTIDINE 20 MG: 10 INJECTION INTRAVENOUS at 17:21

## 2022-06-14 RX ADMIN — Medication 12 UNITS: at 00:00

## 2022-06-14 RX ADMIN — SODIUM CHLORIDE 75 ML/HR: 4.5 INJECTION, SOLUTION INTRAVENOUS at 06:10

## 2022-06-14 RX ADMIN — Medication 6 UNITS: at 06:32

## 2022-06-14 RX ADMIN — PIPERACILLIN SODIUM AND TAZOBACTAM SODIUM 3.38 G: 3; .375 INJECTION, POWDER, LYOPHILIZED, FOR SOLUTION INTRAVENOUS at 12:11

## 2022-06-14 RX ADMIN — HYDROCORTISONE SODIUM SUCCINATE 50 MG: 100 INJECTION, POWDER, FOR SOLUTION INTRAMUSCULAR; INTRAVENOUS at 06:18

## 2022-06-14 RX ADMIN — LEVOTHYROXINE SODIUM 112 MCG: 112 TABLET ORAL at 06:18

## 2022-06-14 RX ADMIN — Medication 15 UNITS: at 08:35

## 2022-06-14 RX ADMIN — MAGNESIUM SULFATE 2 G: 2 INJECTION INTRAVENOUS at 06:32

## 2022-06-14 RX ADMIN — HYDROCORTISONE SODIUM SUCCINATE 50 MG: 100 INJECTION, POWDER, FOR SOLUTION INTRAMUSCULAR; INTRAVENOUS at 12:11

## 2022-06-14 RX ADMIN — SODIUM CHLORIDE, PRESERVATIVE FREE 10 ML: 5 INJECTION INTRAVENOUS at 22:00

## 2022-06-14 RX ADMIN — Medication 6 UNITS: at 12:11

## 2022-06-14 RX ADMIN — Medication 15 UNITS: at 11:08

## 2022-06-14 RX ADMIN — HEPARIN SODIUM 16 UNITS/KG/HR: 1000 INJECTION INTRAVENOUS; SUBCUTANEOUS at 14:32

## 2022-06-14 RX ADMIN — PIPERACILLIN SODIUM AND TAZOBACTAM SODIUM 3.38 G: 3; .375 INJECTION, POWDER, LYOPHILIZED, FOR SOLUTION INTRAVENOUS at 04:47

## 2022-06-14 RX ADMIN — HYDROCORTISONE SODIUM SUCCINATE 50 MG: 100 INJECTION, POWDER, FOR SOLUTION INTRAMUSCULAR; INTRAVENOUS at 21:07

## 2022-06-14 RX ADMIN — PIPERACILLIN SODIUM AND TAZOBACTAM SODIUM 3.38 G: 3; .375 INJECTION, POWDER, LYOPHILIZED, FOR SOLUTION INTRAVENOUS at 20:18

## 2022-06-14 RX ADMIN — POTASSIUM PHOSPHATE, MONOBASIC AND POTASSIUM PHOSPHATE, DIBASIC: 224; 236 INJECTION, SOLUTION, CONCENTRATE INTRAVENOUS at 07:34

## 2022-06-14 RX ADMIN — SODIUM CHLORIDE, PRESERVATIVE FREE 10 ML: 5 INJECTION INTRAVENOUS at 14:24

## 2022-06-14 RX ADMIN — HYDROCORTISONE SODIUM SUCCINATE 50 MG: 100 INJECTION, POWDER, FOR SOLUTION INTRAMUSCULAR; INTRAVENOUS at 00:00

## 2022-06-14 NOTE — PROGRESS NOTES
Problem: Dysphagia (Adult)  Goal: *Acute Goals and Plan of Care (Insert Text)  Description: Patient will:  1. Tolerate PO trials with 0 s/s overt distress in 4/5 trials  2. Participate in training and education related to continued aspiration risk, diet recs and compensatory strategies     Recommend:   Regular diet with thin liquids  Meds as tolerated   Aspiration precautions  HOB >45 degrees during all intake and for at least 30 min after po   Small bites/sips, Slow rate of intake     Outcome: Progressing Towards Goal     SPEECH LANGUAGE PATHOLOGY BEDSIDE SWALLOW EVALUATION    Patient: Mejia Ness (25 y.o. female)  Date: 6/14/2022  Primary Diagnosis: DKA (diabetic ketoacidosis) ([facility] [address] [E11.10]  Precautions: Aspiration      PLOF: As per H&P    ASSESSMENT :  Based on the objective data described below, the patient presents with suspected min pharyngeal dysphagia. Seen with family members present at bedside. Pt alert and oriented to person only, requiring encouragement from family for participation. Oral mech exam unremarkable. Speech and voice within functional limits. Pt tolerating consecutive swallows of thin liquid + straw with timely swallow initiation, adequate laryngeal elevation upon observation, no overt s/sx aspiration and no changes to vitals. Positive oral prep phase noted. Easily fatigued suspect most closely related to mental status. Recommend regular diet with thin liquids with meds per pt preference, aspiration precautions in place. Will follow x1-2 visits to ensure continued diet tolerance. D/w pt, family and RN. Patient will benefit from skilled intervention to address the above impairments.   Patient's rehabilitation potential is considered to be Good  Factors which may influence rehabilitation potential include:   []            None noted  [x]            Mental ability/status  [x]            Medical condition  []            Home/family situation and support systems  [x] Safety awareness  []            Pain tolerance/management  []            Other:      PLAN :  Recommendations and Planned Interventions:  As above   Frequency/Duration: Patient will be followed by speech-language pathology x1-2 visits to address goals. Discharge Recommendations: To Be Determined     SUBJECTIVE:   Patient stated yeah    OBJECTIVE:     Past Medical History:   Diagnosis Date    Colon cancer ([facility] [address]     Diabetes ([facility] [address]     Hypertension     Hypothyroidism      Past Surgical History:   Procedure Laterality Date    COLONOSCOPY N/A 12/30/2016    COLONOSCOPY. SURVEILLANCE /c Hot Snared Polypectomy /c EndoClip x3 performed by Natalia Ch MD at [facility] ENDOSCOPY    HX HYSTERECTOMY      HX TOTAL COLECTOMY       Prior Level of Function/Home Situation:  Home Situation  Home Environment: Independent living  One/Two Story Residence: One story  Living Alone: Yes  Support Systems: Child(citlali)  Patient Expects to be Discharged to[de-identified] Home  Current DME Used/Available at Home: Glucometer  Diet prior to admission: regular/thin liquids   Current Diet:  NPO; recommend regular/thin liquids    Cognitive and Communication Status:  Neurologic State: Confused,Eyes open spontaneously  Orientation Level: Oriented to person  Cognition: Follows commands,Recognition of people/places,Poor safety awareness (intermittent)  Oral Assessment:  Oral Assessment  Labial: No impairment  Dentition: Natural;Intact  Oral Hygiene: Good  Lingual: No impairment  Velum: No impairment  Mandible: No impairment  P.O. Trials:  Patient Position: [facility] at [facility]  Vocal quality prior to P.O.: No impairment  Consistency Presented: Thin liquid; Solid  How Presented: Self-fed/presented;Cup/sip;Straw;Successive swallows  Bolus Acceptance: No impairment  Bolus Formation/Control: No impairment  Propulsion: No impairment  Oral Residue: None  Initiation of Swallow: No impairment  Laryngeal Elevation: Functional  Aspiration Signs/Symptoms: None  Pharyngeal Phase Characteristics: Easily fatigued   Effective Modifications: Small sips and bites  Cues for Modifications: Minimal  Oral Phase Severity: No impairment  Pharyngeal Phase Severity : Minimal    PAIN:  Start of Eval: not reported  End of Eval: not reported      After treatment:   []            Patient left in no apparent distress sitting up in chair  [x]            Patient left in no apparent distress in bed  [x]            Call bell left within reach  [x]            Nursing notified  [x]            Family present  []            Caregiver present  []            Bed alarm activated    COMMUNICATION/EDUCATION:   [x]            Aspiration precautions; swallow safety; compensatory techniques. []            Patient/family have participated as able in goal setting and plan of care. [x]            Patient/family agree to work toward stated goals and plan of care. []            Patient understands intent and goals of therapy; neutral about participation. []            Patient unable to participate in goal setting/plan of care; educ ongoing with interdisciplinary staff  []         Posted safety precautions in patient's room.     Thank you for this referral,  GILMA VerdugoS., [id number] [facility]  Speech-Language Pathologist

## 2022-06-14 NOTE — PROGRESS NOTES
Cardiology Progress Note    Admit Date: 6/11/2022  Attending Cardiologist: Dr. Claudia Gay  Patient seen and examined independently. The patient's echocardiogram done yesterday demonstrated an ejection fraction of only 30 to 35% with wall motion abnormalities as noted below. We will continue full anticoagulation with heparin. Will likely require ischemia evaluation prior to discharge. Agree with assessment and plan as noted below. Nick Longoria MD  Assessment:     -Septic shock, WBC ~30K with bandemia up to 12% on 6/13. S/p pressor support. · CT C/A/P 6/12/2022 with sludge int he gallbladder, small amount of nonspecific mesenteric induration near the gallbladder; hepatomegaly; patchy groundglass opacities in medial upper lobes bilaterally, likely infectious or inflammatory  -DKA  -BRENDA with hyperkalemia, Cr 3.89 with K 7.9 on presentation 6/11/2022, improving. K mildly low at 3.3 on 6/14/2022.  -Elevated troponin up to 5482 on 6/13/2022, suspect demand in setting of above  -Cardiomyopathy, Echo 6/12/2022 with EF 30-35% with akinetic basal anterolateral, mid anterolateral and apical lateral walls. Prior Echo 01/2021 with LVEF 55-60% with normal wall motion.  -Anemia, Hgb ~8  -Thyroid disorder, TSH 0.17 on 6/11 with FT4 1.3 and FT3 1.1  -Hx colon cancer s/p total colectomy and chemotherapy  -Hx HTN. -Hx HLD. No primary cardiologist, initial referral completed by Dr. Claudia Gay. Plan:     -Continue supportive care per PCCM team, appreciate assistance, Levophed being weaned today.  -Will consider ischemia evaluation once improvement in comorbidities/infectious process/BRENDA. -Continued on Heparin infusion, continue close monitoring of H/H, plt.  -Volume management per nephrology team, appreciate assistance.  -Antibiotics per ID team, appreciate assistance. Subjective:     No apparent complaints.     Objective:      Patient Vitals for the past 8 hrs:   Temp Pulse Resp BP SpO2   06/14/22 0837 98.8 °F (37.1 °C) -- -- -- --   06/14/22 0700 -- 79 23 129/63 100 %   06/14/22 0600 -- 87 22 123/66 99 %   06/14/22 0500 -- 84 24 137/71 100 %   06/14/22 0400 -- 80 23 126/83 100 %   06/14/22 0300 -- 80 22 131/70 100 %   06/14/22 0200 -- 79 22 133/70 100 %   06/14/22 0100 -- 80 21 (!) 129/93 100 %         Patient Vitals for the past 96 hrs:   Weight   06/12/22 1218 82.1 kg (181 lb)   06/11/22 1415 82.1 kg (181 lb)                Current Facility-Administered Medications   Medication Dose Route Frequency Last Admin    potassium phosphate 30 mmol in 0.9% sodium chloride 250 mL infusion   IntraVENous ONCE New Bag at 06/14/22 0734    insulin lispro (HUMALOG) injection   SubCUTAneous Q6H 6 Units at 06/14/22 5238    glucose chewable tablet 16 g  16 g Oral PRN      glucagon (GLUCAGEN) injection 1 mg  1 mg IntraMUSCular PRN      dextrose 10% infusion 0-250 mL  0-250 mL IntraVENous PRN      insulin glargine (LANTUS) injection 15 Units  15 Units SubCUTAneous DAILY 15 Units at 06/14/22 0835    levothyroxine (SYNTHROID) tablet 112 mcg  112 mcg Oral 6am 112 mcg at 06/14/22 0618    vasopressin (VASOSTRICT) 20 Units in 0.9% sodium chloride 100 mL infusion  0.04 Units/min IntraVENous CONTINUOUS      0.45% sodium chloride infusion  75 mL/hr IntraVENous CONTINUOUS 75 mL/hr at 06/14/22 0610    piperacillin-tazobactam (ZOSYN) 3.375 g in 0.9% sodium chloride (MBP/ADV) 100 mL MBP  3.375 g IntraVENous Q8H 3.375 g at 06/14/22 0447    heparin 25,000 units in  mL infusion  12-25 Units/kg/hr IntraVENous TITRATE 16 Units/kg/hr at 06/14/22 0430    hydrocortisone Sod Succ (PF) (SOLU-CORTEF) injection 50 mg  50 mg IntraVENous Q6H 50 mg at 06/14/22 0618    NOREPINephrine (LEVOPHED) 8 mg in 5% dextrose 250mL (32 mcg/mL) infusion  0.5-50 mcg/min IntraVENous TITRATE 4 mcg/min at 06/14/22 0843    sodium chloride (NS) flush 5-40 mL  5-40 mL IntraVENous Q8H 10 mL at 06/14/22 0600    sodium chloride (NS) flush 5-40 mL  5-40 mL IntraVENous PRN  acetaminophen (TYLENOL) tablet 650 mg  650 mg Oral Q6H PRN      Or    acetaminophen (TYLENOL) suppository 650 mg  650 mg Rectal Q6H PRN      ondansetron (ZOFRAN ODT) tablet 4 mg  4 mg Oral Q8H PRN      Or    ondansetron (ZOFRAN) injection 4 mg  4 mg IntraVENous Q6H PRN      famotidine (PF) (PEPCID) 20 mg in 0.9% sodium chloride 10 mL injection  20 mg IntraVENous Q24H 20 mg at 06/13/22 1856         Intake/Output Summary (Last 24 hours) at 6/14/2022 0845  Last data filed at 6/14/2022 0600  Gross per 24 hour   Intake 1505.26 ml   Output 3400 ml   Net -1894.74 ml       Physical Exam:  General:  alert, cooperative, no distress, appears stated age  Neck:  supple  Lungs:  clear to auscultation bilaterally to anterolateral lung fields  Heart:  regular rate and rhythm  Abdomen:  abdomen is soft without significant tenderness, masses, organomegaly or guarding  Extremities:  atraumatic, no significant pitting edema    Visit Vitals  /63   Pulse 79   Temp 98.8 °F (37.1 °C)   Resp 23   Ht 5' 6\" (1.676 m)   Wt 82.1 kg (181 lb)   SpO2 100%   BMI 29.21 kg/m²       Data Review:     Labs: Results:       Chemistry Recent Labs     06/14/22  0400 06/13/22  0400 06/12/22  2242 06/11/22  2051 06/11/22  1415   * 292* 196*   < > 1,263*    139 141   < > 132*   K 3.3* 4.7 4.7   < > 7.9*    106 111   < > 95*   CO2 22 19* 22   < > 4*   BUN 35* 51* 52*   < > 73*   CREA 1.91* 2.24* 2.35*   < > 3.89*   CA 7.9* 7.9* 7.8*   < > 8.4*   MG 1.8 2.5 1.8   < > 2.4   PHOS 1.9* 3.0 3.3   < > 12.9*   AGAP 10 14 8   < > 33*   BUCR 18 23* 22*   < > 19   AP  --   --   --   --  78   TP  --   --   --   --  6.4   ALB  --   --   --   --  3.0*   GLOB  --   --   --   --  3.4   AGRAT  --   --   --   --  0.9    < > = values in this interval not displayed.       CBC w/Diff Recent Labs     06/14/22  0400 06/13/22  0400 06/12/22  0226   WBC 28.9* 32.3* 28.9*   RBC 2.97* 3.30* 2.98*   HGB 7.9* 8.9* 7.9*   HCT 23.6* 26.3* 24.5*    211 213   GRANS 87* 82* 91*   LYMPH 1* 4* 6*   EOS 0 0 0      Cardiac Enzymes Lab Results   Component Value Date/Time     (H) 06/13/2022 12:49 PM      Coagulation Recent Labs     06/14/22  0400 06/14/22  0041 06/12/22  1110 06/12/22  0226   PTP  --   --   --  15.7*   INR  --   --   --  1.2   APTT 27.9 >180.0*   < >  --     < > = values in this interval not displayed.        Lipid Panel Lab Results   Component Value Date/Time    Cholesterol, total 299 (H) 11/03/2011 09:26 AM    HDL Cholesterol 204 (H) 11/03/2011 09:26 AM    LDL, calculated 86.6 11/03/2011 09:26 AM    VLDL, calculated 8.4 11/03/2011 09:26 AM    Triglyceride 42 11/03/2011 09:26 AM    CHOL/HDL Ratio 1.5 11/03/2011 09:26 AM      Liver Enzymes Recent Labs     06/11/22  1415   TP 6.4   ALB 3.0*   AP 78      Thyroid Studies Lab Results   Component Value Date/Time    T4, Total 2.6 (L) 11/03/2011 09:26 AM    T3 Uptake 27 08/18/2011 02:11 PM    TSH 0.17 (L) 06/11/2022 08:51 PM          Signed By: Carley Mcgarry PA-C     June 14, 2022

## 2022-06-14 NOTE — PROGRESS NOTES
In Patient Progress note      Admit Date: 6/11/2022    Impression:     #1 acute kidney injury, baseline creatinine 1.03.,  Etiology secondary to  ischemic ATN in the setting of hypotension/septic shock/DKA --> slowly improving  #2 severe hyperkalemia without EKG changes, secondary to DKA and BRENDA--> resolved  #3 acidemia with severe metabolic acidosis secondary to DKA--> improved   #4 diabetic ketoacidosis and lactic acidoses--> improved   #5 altered mental status due to metabolic enceph-alopathy and DKA  #6 hypotension/shock secondary to sepsis? PNA   #7 cardiomyopathy , EF 30-35%     Plan:     #1 strict intake output, Chandler catheter in place  #2 keep MAP > 65   #3 check renal panel daily   #4 1/2 NS @ 75 cc/hrs   #5 sepsis management per primary team  #6 renally dose antibiotics for EGFR of less than 30  #9 avoid IV contrast     Discussed with nursing and primary team     Please call with questions,     Chance Lorenzo MD FASN  Cell [phone]  Pager: [phone]  Subjective:     - awake , not answering much   - respiratory - stable  - hemodynamics - on pressors   - UOP-improving  - Nutrition -poor    Objective:     Visit Vitals  /73   Pulse 83   Temp 98.4 °F (36.9 °C)   Resp 21   Ht 5' 6\" (1.676 m)   Wt 82.1 kg (181 lb)   SpO2 100%   BMI 29.21 kg/m²         Intake/Output Summary (Last 24 hours) at 6/13/2022 2348  Last data filed at 6/13/2022 1900  Gross per 24 hour   Intake 1505.26 ml   Output 3100 ml   Net -1594.74 ml       Physical Exam:     HEENT:mmm  Neck: no cervical lymphadenopathy or thyromegaly. Lungs: good air entry, clear to auscultation bilaterally. Cardiovascular system: S1, S2, regular rate and rhythm. Abdomen: soft, non tender, non distended. Extremities: no clubbing, cyanosis or edema. Integumentary: skin is grossly intact.        Data Review:    Recent Labs     06/13/22  0400   WBC 32.3*   RBC 3.30*   HCT 26.3*   MCV 79.7   MCH 27.0   MCHC 33.8   RDW 14.4     Recent Labs 06/13/22  0400 06/12/22  2242 06/12/22  1815 06/12/22  1512 06/12/22  1110 06/12/22  0226 06/11/22 2051 06/11/22  1415   BUN 51* 52* 53* 53* 59* 60* 60* 73*   CREA 2.24* 2.35* 2.40* 2.71* 3.00* 3.19* 3.13* 3.89*   CA 7.9* 7.8* 7.9* 8.5 7.9* 8.0* 8.3* 8.4*   ALB  --   --   --   --   --   --   --  3.0*   K 4.7 4.7 4.4 3.7 3.6 3.1* 4.1 7.9*    141 143 145 144 141 140 132*    111 111 112* 113* 105 105 95*   CO2 19* 22 23 20* 23 14* 7* 4*   PHOS 3.0 3.3 3.6 1.3* 0.8* 2.3* 6.6* 12.9*   * 196* 101* 56* 189* 552* 796* 1,263*       Karen Flores MD

## 2022-06-14 NOTE — PROGRESS NOTES
Pharmacy Note     Zosyn 4.5 gm q8h was ordered for treatment of Intra-Abdominal Infection. Per [address] [place] [address], this order will be changed to 4.5 g x 1 over 30 mins, then 3.375gm q8h over 240 mins  Estimated Creatinine Clearance: Estimated Creatinine Clearance: 24.2 mL/min (A) (based on SCr of 2.24 mg/dL (H)). Dialysis Status, BRENDA, CKD: BRENDA     BMI:  Body mass index is 29.21 kg/m². Rationale for Adjustment:  [facility] B-Lactam extended infusion policy    Pharmacy will continue to monitor and adjust dose as necessary. Please call with any questions.     Thank you,  Marisol Henley, PHARMD

## 2022-06-14 NOTE — PROGRESS NOTES
[facility]. Pulmonary, Critical Care, and Sleep Medicine    Name: Lazarus Riis MRN: [id number]   : 1950 Hospital: [address] [place] [address] Dr   Date: 2022  Admission Date: 2022     Chart and notes reviewed. Data reviewed. I have evaluated all findings. [x]I have reviewed the flowsheet and previous days notes. []The patient is unable to give any meaningful history or review of systems because the patient is:  []Intubated []Sedated   []Unresponsive      [x]The patient is critically ill on      []Mechanical ventilation [x]Pressors   []BiPAP []         Interval HPI: Patient with a history of poorly controlled diabetes, hypertension, hypothyroidism, and colon cancer, arrived 22 via rescue with altered mental status; was unresponsive upon EMS arrival with hyperglycemia. Admitted to ICU with acute toxic metabolic encephalopathy, multifactorial shock (sepsis, hypovolemic, component of cardiogenic), diabetic ketoacidosis (pH on arrival 6.78), lactic acidosis, nonoliguric acute kidney injury, mixed respiratory failure, and electrolyte derangements (hyperkalemia). Acute kidney injury is slowly improving, hyperkalemia is resolved, acidemia with severe metabolic acidosis secondary to DKA is improved, diabetic ketoacidosis is improved. Subjective 22  Hospital Day: admitted on   Denies pain, shortness of breath. History was limited due to patient condition. ROS:Review of systems not obtained due to patient factors. Events and notes from last 24 hours reviewed.      Patient Active Problem List   Diagnosis Code    Hyperkalemia E87.5    DKA (diabetic ketoacidoses) E11.10    DKA (diabetic ketoacidosis) ([facility] [address] E11.10    Elevated troponin R77.8    Primary hypertension I10    Acquired hypothyroidism E03.9    History of colon cancer Z85.038    BRENDA (acute kidney injury) ([facility] [address] N17.9       Vital Signs:  Visit Vitals  /73   Pulse 83   Temp 98.4 °F (36.9 °C)   Resp 21   Ht 5' 6\" (1.676 m)   Wt 82.1 kg (181 lb)   SpO2 100%   BMI 29.21 kg/m²       O2 Device: Nasal cannula   O2 Flow Rate (L/min): 3 l/min   Temp (24hrs), Av.4 °F (36.9 °C), Min:98.3 °F (36.8 °C), Max:98.4 °F (36.9 °C)       Intake/Output:   Last shift:      No intake/output data recorded.   Last 3 shifts:  0701 -  1900  In: 5396.7 [I.V.:5396.7]  Out: 3900 [Urine:3900]    Intake/Output Summary (Last 24 hours) at 2022 0029  Last data filed at 2022 1900  Gross per 24 hour   Intake 1505.26 ml   Output 3100 ml   Net -1594.74 ml          Current Facility-Administered Medications   Medication Dose Route Frequency    insulin lispro (HUMALOG) injection   SubCUTAneous Q6H    insulin glargine (LANTUS) injection 15 Units  15 Units SubCUTAneous DAILY    levothyroxine (SYNTHROID) tablet 112 mcg  112 mcg Oral 6am    vasopressin (VASOSTRICT) 20 Units in 0.9% sodium chloride 100 mL infusion  0.04 Units/min IntraVENous CONTINUOUS    0.45% sodium chloride infusion  75 mL/hr IntraVENous CONTINUOUS    piperacillin-tazobactam (ZOSYN) 3.375 g in 0.9% sodium chloride (MBP/ADV) 100 mL MBP  3.375 g IntraVENous Q8H    heparin (porcine) 1,000 unit/mL injection 5,000 Units  5,000 Units IntraVENous ONCE    heparin 25,000 units in  mL infusion  12-25 Units/kg/hr IntraVENous TITRATE    hydrocortisone Sod Succ (PF) (SOLU-CORTEF) injection 50 mg  50 mg IntraVENous Q6H    NOREPINephrine (LEVOPHED) 8 mg in 5% dextrose 250mL (32 mcg/mL) infusion  0.5-50 mcg/min IntraVENous TITRATE    sodium chloride (NS) flush 5-40 mL  5-40 mL IntraVENous Q8H    famotidine (PF) (PEPCID) 20 mg in 0.9% sodium chloride 10 mL injection  20 mg IntraVENous Q24H         Telemetry: [x]Sinus []A-flutter []Paced    []A-fib []Multiple PVCs                  Physical Exam:      General: Flat affect, no distress, appears stated age  HEENT:  Anicteric sclerae; pink palpebral conjunctivae; mucosa moist  Resp:  Symmetrical chest expansion, no accessory muscle use; good airway entry; no rales/ wheezing/ rhonchi noted  CV:  S1, S2 present; regular rate and rhythm  GI:  Abdomen soft, non-tender; (+) active bowel sounds  Extremities:  no edema/ cyanosis/ clubbing noted   Skin:  Warm; no rashes/ lesions noted  Neurologic:  Non-focal  Devices:  Central line, brown catheter       DATA:  MAR reviewed and pertinent medications noted or modified as needed    Labs:  Recent Labs     06/13/22  0400 06/12/22  0226 06/11/22  1415   WBC 32.3* 28.9* 37.3*   HGB 8.9* 7.9* 9.8*   HCT 26.3* 24.5* 36.6    213 365     Recent Labs     06/13/22  0400 06/12/22  2242 06/12/22  1815 06/12/22  1110 06/12/22 0226 06/11/22 2051 06/11/22  1415    141 143   < > 141   < > 132*   K 4.7 4.7 4.4   < > 3.1*   < > 7.9*    111 111   < > 105   < > 95*   CO2 19* 22 23   < > 14*   < > 4*   * 196* 101*   < > 552*   < > 1,263*   BUN 51* 52* 53*   < > 60*   < > 73*   CREA 2.24* 2.35* 2.40*   < > 3.19*   < > 3.89*   CA 7.9* 7.8* 7.9*   < > 8.0*   < > 8.4*   MG 2.5 1.8 1.9   < > 2.6   < > 2.4   PHOS 3.0 3.3 3.6   < > 2.3*   < > 12.9*   ALB  --   --   --   --   --   --  3.0*   ALT  --   --   --   --   --   --  23   INR  --   --   --   --  1.2  --   --     < > = values in this interval not displayed. No results for input(s): PH, PCO2, PO2, HCO3, FIO2 in the last 72 hours. Recent Labs     06/12/22  0426 06/11/22 2048 06/11/22  1433   FIO2I 30 40  --    HCO3I  --   --  4.0*       Imaging:  [x]   I have personally reviewed the patients radiographs and reports  XR Results (most recent):  XR Results (most recent):  Results from Hospital Encounter encounter on 06/11/22    XR CHEST PORT    Narrative  CHEST PORTABLE 1455 hours    COMPARISON: 19 January 2021. INDICATIONS: Altered mental status. FINDINGS:    Portable single view chest demonstrates:    Lungs: Clear. Cardiac Silhouette And Mediastinal Contours: Normal.    Pleural Spaces:  No pneumothorax or pleural effusion evident. Bones And Soft Tissues: Unremarkable for age. Impression  No active or acute cardiopulmonary process is evident. CT Results (most recent):  Results from Hospital Encounter encounter on 06/11/22    CT CHEST ABD PELV WO CONT    Narrative  EXAM: CT CHEST ABD PELV WO CONT    CLINICAL INDICATION/HISTORY : AMS, WBC 37K, undifferentiated shock, DKA. COMPARISON: CT chest January 19, 2021. CT chest abdomen and pelvis June 8, 2015    TECHNIQUE: Helical scan of the chest, abdomen and pelvis were obtained from the  thoracic inlet to the symphysis without IV contrast administration and without  oral contrast.  All Ct scans at this facility are performed using dose optimization of technique  as appropriate to a performed exam, to include automated exposure control,  adjustment of the mA and/or kV according to patient size (including appropriate  matching for site specific examinations), or use of iterative reconstruction  technique. FINDINGS: Limited by motion. CT of the chest:    Lungs:  Mild dependent groundglass opacities posterior upper lobes and dependent  opacities at the lung bases. New patchy groundglass opacities in the medial  upper lobes bilaterally adjacent to the mediastinum. Pneumatocele in the lingula  similar to prior. Thyroid:  Unremarkable    Heart and great vessels: Mild atherosclerotic vascular calcification including  coronary artery calcification. Main pulmonary artery is 3.1 cm-borderline  enlarged. Lymph nodes:  No adenopathy    Pleural spaces:  Trace bilateral pleural effusions      Bones:  Unremarkable for age        CT of the abdomen/pelvis:    Liver:  Hepatomegaly. Liver measures 20 cm in length    Gallbladder:  Gallbladder sludge    Spleen:  Unremarkable    Adrenal Glands:  Unremarkable    Pancreas: Unremarkable    Kidneys:  Unremarkable    Stomach, small bowel, colon:  Partial colectomy.     Lymph nodes:  No adenopathy    Aorta: Unremarkable    Bladder:  Not well seen due to beam Antonio artifact from left hip arthroplasty  and nondistention. Chandler catheter present    Peritoneal spaces:  Small amount of induration adjacent to the gallbladder. Very  small fatty umbilical hernia    Pelvic structures: Hysterectomy. Right-sided femoral venous catheter    Bones:  Osteopenia. Lumbar spondylosis. Left hip arthroplasty. Moderate superior  endplate compression deformity at L4 with chronic appearance. Moderate lumbar  spondylosis. Lack of oral and intravenous contrast limits sensitivity. Impression  1. Sludge in the gallbladder. Small amount of nonspecific mesenteric induration  near the gallbladder. As clinically indicated, could perform right upper  quadrant ultrasound. 2.  Hepatomegaly. 3.  Patchy groundglass opacities in the medial upper lobes bilaterally, likely  infectious or inflammatory. Other scattered areas of atelectasis. Trace  bilateral pleural effusions. 06/11/22    ECHO ADULT FOLLOW-UP OR LIMITED 06/12/2022 6/12/2022    Interpretation Summary    Left Ventricle: Moderately reduced left ventricular systolic function with a visually estimated EF of 30 - 35%. Left ventricle size is normal. Mildly increased wall thickness. There are regional wall motion abnormalities.   Right Ventricle: Reduced systolic function.   Normal size right ventricle with reduced function.   Mild to moderdate tricuspid regurgitation with PASP of 30 mmHg.     Signed by: Narendra Read MD on 6/12/2022  3:52 PM       IMPRESSION:   · DKA with high anion gap  · Metabolic encephalopathy  · BRENDA  · Multifactorial shock (sepsis, hypovolemic, component of cardiogenic) - requiring vasopressors  · Respiratory failure -resolved   · NSTEMI- on heparin drip   · Sepsis with unknown etiology - ?CAP vs cholecystitis   · Cardiomyopathy, EF 30-35%     Patient Active Problem List   Diagnosis Code    Hyperkalemia E87.5    DKA (diabetic ketoacidoses) E11.10  DKA (diabetic ketoacidosis) (HCC) E11.10    Elevated troponin R77.8    Primary hypertension I10    Acquired hypothyroidism E03.9    History of colon cancer Z85.038    BRENDA (acute kidney injury) (HCC) N17.9        RECOMMENDATIONS:   Neuro: none, improving mental status  Pulm: Aspiration precautions, HOB>30'. CVS: Monitor HD, Heparin drip for nstemi, Levophed and Vasopressin MAP goal >65  GI: NPO. Renal: Trend Cr, UOP. Brown, nephrology following. Solu-Cortef injection 50 mg q6  Hem/Onc: Trend H/H, monitor for s/o active bleeding. Daily CBC. I/D:Trend WBCs and temperature curve. Started on Zosyn 6/13/22. Pending RUQ US. Metabolic: Daily BMP, mag, phos. Trend lytes and replace per protocol. Endocrine: Lantus 15, SSI, levothyroxine 112 mcg   Musc/Skin:wound care, PT/OT  DNR/DNI   Discussed in interdisciplinary rounds     Best practice :    Glycemic control  IHI ICU bundles:   Central Line Bundle Followed  and Brown Bundle Followed    [facility] patients-    na  Stress ulcer prophylaxis. Pepcid  DVT prophylaxis. Heparin gtt  Need for Lines, brown assessed. Palliative care evaluation. Restraints need. This care involved high complexity decision making to assess, manipulate, and support vital system functions, to treat this degreee vital organ system failure and to prevent further life threatening deterioration of the patients condition  The services I provided to this patient were to treat and/or prevent clinically significant deterioration that could result in the failure of one or more body systems and/or organ systems due to respiratory distress, hypoxia, cardiac dysrhythmia.    DREA time 20 minutes    Joaquim Napoles   06/14/22  Pulmonary, Critical Care Medicine  [facility]

## 2022-06-14 NOTE — PROGRESS NOTES
Comprehensive Nutrition Assessment    Type and Reason for Visit: Initial,NPO/clear liquid    Nutrition Recommendations/Plan:   1. Pending SLP evaluation, recommend 4 carb choice diet, consistency per SLP. 2. Continue IVF per MD.       Malnutrition Assessment:  Malnutrition Status: At risk for malnutrition (specify) (NPO pending SLP eval with altered mentation) (06/14/22 4881)      Nutrition History and Allergies:   Pertinent PMH:  colon cancer with total colectomy, DM, HTN. NKFA. Stable wt hx. Intake PTA is unknown. Nutrition Assessment:    DKA, off insulin drip. NPO, SLP consult to be placed for eval; diet pending eval.  Remains slightly confused. Nutrition Related Findings:    Last BM PTA. Ascites. Pertinent Medications: pepcid, lantus, SSI, synthroid, NS at 75 mL/hr. Wound Type: None    Current Nutrition Intake & Therapies:  Average Meal Intake: NPO  Average Supplement Intake: NPO  DIET NPO    Anthropometric Measures:  Height: 5' 6\" (167.6 cm)  Ideal Body Weight (IBW): 130 lbs (59 kg)  Admission Body Weight: 181 lb  Current Body Wt:  82.1 kg (181 lb), 139.2 % IBW. Not specified  Current BMI (kg/m2): 29.2  Usual Body Weight: 82.1 kg (181 lb)  % Weight Change (Calculated): 0  Weight Adjustment: No adjustment                 BMI Category: Overweight (BMI 25.0-29. 9)    Estimated Daily Nutrient Needs:  Energy Requirements Based On: Formula  Weight Used for Energy Requirements: Current  Energy (kcal/day): 0636-0032  Weight Used for Protein Requirements: Current  Protein (g/day): 66-82  Method Used for Fluid Requirements: 1 ml/kcal  Fluid (ml/day): 3831-9719    Nutrition Diagnosis:   · Inadequate oral intake related to cognitive or neurological impairment,acute injury/trauma as evidenced by NPO or clear liquid status due to medical condition      Nutrition Interventions:   Food and/or Nutrient Delivery: Start oral diet,IV fluid delivery     Coordination of Nutrition Care: Continue to monitor while inpatient  Plan of Care discussed with: interdisciplinary team    Goals:     Goals: Initiate PO diet,by next RD assessment       Nutrition Monitoring and Evaluation:      Food/Nutrient Intake Outcomes: Diet advancement/tolerance,Food and nutrient intake,IVF intake  Physical Signs/Symptoms Outcomes: Biochemical data,Chewing or swallowing,Meal time behavior,Hemodynamic status    Discharge Planning:    No discharge needs at this time    Alyson Noble, [address]  Contact: [phone]

## 2022-06-14 NOTE — DIABETES MGMT
Diabetes Patient/Family Education Record    Factors That May Influence Patients Ability to Learn or Comply with Recommendations   []   Language barrier    []   Cultural needs   []   Motivation    []   Cognitive limitation    []   Physical   [x]   Education    []   Physiological factors   []   Hearing/vision/speaking impairment   []   Church beliefs    []   Financial factors   []  Other:   []  No factors identified at this time. Person Instructed:   [x]   Patient   [x]   Family: daughter at bedside visiting. She plan to assist patient. []  Other     Preference for Learning:   [x]   Verbal   [x]   Written: diab educ packet; contents explained     []  Demonstration     Level of Comprehension & Competence:    [x]  Good                                      [] Fair                                     []  Poor                             []  Needs Reinforcement   [x]  Teach back completed    Education Component:   [x]  Medication management, including how to administer insulin (if appropriate) and potential medication interactions: Patient reported that she takes the following diabetes medications at home:  Lantus pen insulin daily every night  Novolog pen sliding scale before meals: breakfast, lunch and dinner      [x]  Nutritional management - [x] Obtained usual meal pattern   []   Basic carbohydrate counting  [x]  Plate method  [x]  Limit concentrated sweets and avoid sweetened beverages  [x]  Portion control  [x]    Avoid skipping meals     []  Exercise   [x]  Signs, symptoms, and treatment of hyperglycemia and hypoglycemia: Patient takes prescribed diabetes medications and follows recommendations regarding portion control of carbs to prevent high blood sugar. [x] Prevention, recognition and treatment of hyperglycemia and hypoglycemia: Discussed low blood sugar less than 70 in detail.  Low blood sugar problem is rare and she knows the symptoms and how to treat to above 70. Patient has glucose tablets and knows to take 3-4 tablets to help raise low blood sugar to above 70. [x]  Importance of blood glucose monitoring  [x] Blood Glucose targets   []   Provided patient with blood glucose meter  [x]  Has glucometer and supplies at home   []  Instruction on use of the blood glucose meter and recommended monitoring schedule   [x]  Discuss the importance of HbA1C monitoring. Patient's A1c is 9.0% (6/11/2022). This is equivalent to average glucose of 212 mg/dl for the past 2-3 months.     []  Sick day guidelines   []  Proper use and disposal of lancets, needles, syringes or insulin pens (if appropriate)   []  Potential long-term complications (retinopathy, kidney disease, neuropathy, foot care)   [x] Information about whom to contact in case of emergency or for more information    [x]  Goal:  Patient/family will demonstrate understanding of Diabetes Self- Management Skills by: 6/14/2022  Plan for post-discharge education or self-management support:    [x] Outpatient class schedule provided            [] Patient Declined    [] Scheduled for outpatient classes (date) _______    [x] Written information provided  Verify: [x] Prior to admission Diabetes medications    Does patient understand how diabetes medications work? Yes. Does patient have difficulty obtaining diabetes medications and testing supplies?  No.    Aditi Thomas RN [facility]  Pager: [phone]

## 2022-06-14 NOTE — PROGRESS NOTES
Infectious Disease progress Note        Reason: septic shock    Current abx Prior abx     Zosyn since 6/13 Cefepime, vancomycin, metronidazole  6/11/22-6/13     Lines:       Assessment :  66 y.o. female with pmh of type 2 diabetes mellitus, hypertension, hyperlipidemia, hypothyroidism, and colon cancer s/p total colectomy and chemotherapy who presented to [facility]  via EMS on 6/11/22 for altered mental status. Highly complex clinical picture. Clinical presentation is concerning for sepsis (POA)- however, exact etiology remains unclear  D/D: cardiogenic/hypovolemic shock in setting of DKA versus septic shock due to occult infeciton, right sided heart failure     Concerns for pneumonia on Ct chest- ? Aspiration pneumonia superimposed on pulmonary congestion. Clinical presentation not c/w community acquired pneumonia. No significant hypoxia, SOB, cough    No definitive evidence of cholecystitis per HIDA scan. Gallbladder changes noted on ct scan could be due to perihepatic ascites as seen on US 6/14/22. H/o left hip TKR- currently no evidence of infection left hip. Will monitor for this possibility    Worsening leukocytosis- likely due to sepsis, steroids- r/o fungemia    Acute kidney injury- likely due to sepsis, volume depletion    DKA    Altered mentation- likely metabolic encephalopathy- gradually improving    Decreasing pressor requirement    Recommendations:    1.  continue Zosyn-adjust dose per changing renal function  2. F/u strep. Pneumoniae urinary antigen, legionella urinary antigen, hepatitis serology  3. Titrate pressors as tolerated  4. Agree with IV hydration  5. Taper steroids per ICU team  6. Obtain wbc scan to evaluate for occult infection, send fungitell  7. Monitor cbc, temp,clinically      Above plan was discussed in details with dr Aric Green. Please call me if any further questions or concerns. Will continue to participate in the care of this patient. HPI:    Feels better.  No cp, sob, abdominal pain.  Detailed review of system not feasible at this time            Past Medical History:   Diagnosis Date    [facility] ([facility] [address]     Diabetes ([facility] [address]     Hypertension     Hypothyroidism        Past Surgical History:   Procedure Laterality Date    COLONOSCOPY N/A 12/30/2016    COLONOSCOPY. SURVEILLANCE /c Hot Snared Polypectomy /c EndoClip x3 performed by Tiana Denney MD at [facility] ENDOSCOPY    HX HYSTERECTOMY      HX TOTAL COLECTOMY         home Medication List       Details   insulin glargine (LANTUS) 100 unit/mL injection Inject 15 units SQ BID  Qty: 1 Vial, Refills: 0      metFORMIN ER (GLUCOPHAGE XR) 500 mg tablet       gabapentin (NEURONTIN) 100 mg capsule Take 100 mg by mouth three (3) times daily. Cholecalciferol, Vitamin D3, 50,000 unit cap Take 1 Cap by mouth every seven (7) days. insulin regular (NOVOLIN R, HUMULIN R) 100 unit/mL injection by SubCUTAneous route Before breakfast, lunch, and dinner. Sliding scale      amLODIPine (NORVASC) 5 mg tablet Take 5 mg by mouth daily. alendronate (FOSAMAX) 10 mg tablet Take 70 mg by mouth every seven (7) days. aspirin 81 mg chewable tablet Take 81 mg by mouth daily. levothyroxine (SYNTHROID) 112 mcg tablet Take 100 mcg by mouth Daily (before breakfast).              Current Facility-Administered Medications   Medication Dose Route Frequency    potassium phosphate 30 mmol in 0.9% sodium chloride 250 mL infusion   IntraVENous ONCE    insulin lispro (HUMALOG) injection   SubCUTAneous Q6H    glucose chewable tablet 16 g  16 g Oral PRN    glucagon (GLUCAGEN) injection 1 mg  1 mg IntraMUSCular PRN    dextrose 10% infusion 0-250 mL  0-250 mL IntraVENous PRN    insulin glargine (LANTUS) injection 15 Units  15 Units SubCUTAneous DAILY    levothyroxine (SYNTHROID) tablet 112 mcg  112 mcg Oral 6am    vasopressin (VASOSTRICT) 20 Units in 0.9% sodium chloride 100 mL infusion  0.04 Units/min IntraVENous CONTINUOUS    0.45% sodium chloride infusion  75 mL/hr IntraVENous CONTINUOUS    piperacillin-tazobactam (ZOSYN) 3.375 g in 0.9% sodium chloride (MBP/ADV) 100 mL MBP  3.375 g IntraVENous Q8H    heparin 25,000 units in  mL infusion  12-25 Units/kg/hr IntraVENous TITRATE    hydrocortisone Sod Succ (PF) (SOLU-CORTEF) injection 50 mg  50 mg IntraVENous Q6H    NOREPINephrine (LEVOPHED) 8 mg in 5% dextrose 250mL (32 mcg/mL) infusion  0.5-50 mcg/min IntraVENous TITRATE    sodium chloride (NS) flush 5-40 mL  5-40 mL IntraVENous Q8H    sodium chloride (NS) flush 5-40 mL  5-40 mL IntraVENous PRN    acetaminophen (TYLENOL) tablet 650 mg  650 mg Oral Q6H PRN    Or    acetaminophen (TYLENOL) suppository 650 mg  650 mg Rectal Q6H PRN    ondansetron (ZOFRAN ODT) tablet 4 mg  4 mg Oral Q8H PRN    Or    ondansetron (ZOFRAN) injection 4 mg  4 mg IntraVENous Q6H PRN    famotidine (PF) (PEPCID) 20 mg in 0.9% sodium chloride 10 mL injection  20 mg IntraVENous Q24H       Allergies: Patient has no known allergies.     Family History   Problem Relation Age of Onset    Diabetes Mother     Cancer Father         colon     Social History     Socioeconomic History    Marital status: SINGLE     Spouse name: Not on file    Number of children: Not on file    Years of education: Not on file    Highest education level: Not on file   Occupational History    Not on file   Tobacco Use    Smoking status: Former Smoker    Smokeless tobacco: Never Used   Substance and Sexual Activity    Alcohol use: Yes     Comment: occasionally    Drug use: No    Sexual activity: Not on file   Other Topics Concern    Not on file   Social History Narrative    Not on file     Social Determinants of Health     Financial Resource Strain:     Difficulty of Paying Living Expenses: Not on file   Food Insecurity:     Worried About [address] in the Last Year: Not on file    Albino of Food in the Last Year: Not on file   Transportation Needs:     Lack of Transportation (Medical): Not on file    Lack of Transportation (Non-Medical): Not on file   Physical Activity:     Days of Exercise per Week: Not on file    Minutes of Exercise per Session: Not on file   Stress:     Feeling of Stress : Not on file   Social Connections:     Frequency of Communication with Friends and Family: Not on file    Frequency of Social Gatherings with Friends and Family: Not on file    Attends Gnosticist Services: Not on file    Active Member of [address] [place] [address] Guided Interventions or Organizations: Not on file    Attends Club or Organization Meetings: Not on file    Marital Status: Not on file   Intimate Partner Violence:     Fear of Current or Ex-Partner: Not on file    Emotionally Abused: Not on file    Physically Abused: Not on file    Sexually Abused: Not on file   Housing Stability:     Unable to Pay for Housing in the Last Year: Not on file    Number of [place] in the Last Year: Not on file    Unstable Housing in the Last Year: Not on file     Social History     Tobacco Use   Smoking Status Former Smoker   Smokeless Tobacco Never Used        Temp (24hrs), Av.5 °F (36.9 °C), Min:98.4 °F (36.9 °C), Max:98.8 °F (37.1 °C)    Visit Vitals  /63   Pulse 79   Temp 98.8 °F (37.1 °C)   Resp 23   Ht 5' 6\" (1.676 m)   Wt 82.1 kg (181 lb)   SpO2 100%   BMI 29.21 kg/m²       ROS: unable to obtain due to patient factors    Physical Exam:    General: Well developed, well nourished female laying on the bed, eyes open, more communicative, in no acute distress. HEENT:  Normocephalic, atraumatic,  EOMI, no scleral icterus or pallor; no conjunctival hemmohage;  nasal and oral mucous are moist and without evidence of lesions. Neck supple, no bruits. Lymph Nodes:   not examined   Lungs:   non-labored, bilateral chest movements equal, no audible wheezes   Heart:  RRR, s1 and s2; no rubs or gallops, no edema, + pedal pulses   Abdomen:  soft, non-distended, active bowel sounds, no hepatomegaly, no splenomegaly. ? tenderness   Genitourinary:  deferred   Extremities:   no clubbing, cyanosis; no joint effusions or swelling; pain on movements of all extremities; muscle mass appropriate for age   Neurologic:  No gross focal sensory abnormalities; 5/5 muscle strength to upper and lower extremities. Speech appropriate. Cranial nerves intact                        Skin:  No rash or ulcers noted   Back:  no spinal or paraspinal muscle tenderness or rigidity, no CVA tenderness     Psychiatric:  Unable to assess         Labs: Results:   Chemistry Recent Labs     06/14/22  0400 06/13/22  0400 06/12/22 2242 06/11/22 2051 06/11/22  1415   * 292* 196*   < > 1,263*    139 141   < > 132*   K 3.3* 4.7 4.7   < > 7.9*    106 111   < > 95*   CO2 22 19* 22   < > 4*   BUN 35* 51* 52*   < > 73*   CREA 1.91* 2.24* 2.35*   < > 3.89*   CA 7.9* 7.9* 7.8*   < > 8.4*   AGAP 10 14 8   < > 33*   BUCR 18 23* 22*   < > 19   AP  --   --   --   --  78   TP  --   --   --   --  6.4   ALB  --   --   --   --  3.0*   GLOB  --   --   --   --  3.4   AGRAT  --   --   --   --  0.9    < > = values in this interval not displayed. CBC w/Diff Recent Labs     06/14/22  0400 06/13/22  0400 06/12/22  0226   WBC 28.9* 32.3* 28.9*   RBC 2.97* 3.30* 2.98*   HGB 7.9* 8.9* 7.9*   HCT 23.6* 26.3* 24.5*    211 213   GRANS 87* 82* 91*   LYMPH 1* 4* 6*   EOS 0 0 0      Microbiology Recent Labs     06/11/22  2150 06/11/22  1120   CULT NO GROWTH 3 DAYS  NO GROWTH 3 DAYS MRSA NOT PRESENT  Screening of patient nares for MRSA is for surveillance purposes and, if positive, to facilitate isolation considerations in high risk settings. It is not intended for automatic decolonization interventions per se as regimens are not sufficiently effective to warrant routine use. RADIOLOGY:    All available imaging studies/reports in [facility] for this admission were reviewed  Total time spent >35 minutes.  High complexity decision making was performed during the evaluation of this patient at high risk for decompensation with multiple organ involvement     Above mentioned total time spent on reviewing the case/medical record/data/notes/EMR/patient examination/documentation/coordinating care with nurse/consultants, exclusive of procedures with complex decision making performed and > 50% time spent in face to face evaluation. Disclaimer: Sections of this note are dictated utilizing voice recognition software, which may have resulted in some phonetic based errors in grammar and contents. Even though attempts were made to correct all the mistakes, some may have been missed, and remained in the body of the document. If questions arise, please contact our department.     Dr. Agustín Arellano, Infectious Disease Specialist  [phone]  June 14, 2022  7:08 PM

## 2022-06-14 NOTE — PROGRESS NOTES
attended the interdisciplinary rounds for Tae Wayne, who is a 66 y.o.,female. Patients Primary Language is: [place]. According to the patients EMR Mormon Affiliation is: [place].     The reason the Patient came to the hospital is:   Patient Active Problem List    Diagnosis Date Noted    Elevated troponin 06/12/2022    Primary hypertension 06/12/2022    Acquired hypothyroidism 06/12/2022    History of colon cancer 06/12/2022    [facility]ENDA [facility]acute kidney injury) ([facility] [facility] 06/12/2022    DKA (diabetic ketoacidosis) ([facility] [facility] 06/11/2022    Hyperkalemia 01/19/2021    DKA (diabetic ketoacidoses) 01/19/2021      Plan:  Chaplains will continue to follow and will provide pastoral care on an as needed/requested basis.  recommends bedside caregivers page  on duty if patient shows signs of acute spiritual or emotional distress.     [address] [facility]  Board Certified [address] [place] [address]   [phone]

## 2022-06-14 NOTE — DIABETES MGMT
Diabetes/ Glycemic Control Plan of Care  Recommendations:  Recommend increasing Lantus to 30  units q 24 hrs plus 3 units lispro with meals (when eating 50% of meals)  and corrective lispro. Modified diet from speech recommendation of regular to 3 carbohydrate choices per meal and 2g Na. Assessment: Pt with 4 BG readings 227 to 349. DX:   1. Hyperosmolar hyperglycemic state (HHS) ([facility] [address]     2. Diabetic ketoacidosis with coma associated with type 2 diabetes mellitus ([facility] [address]     3. High anion gap metabolic acidosis     4. Acute metabolic encephalopathy     5. Hypovolemic shock ([facility] [address]     6. Acute hyperkalemia     7. Acute renal failure, unspecified acute renal failure type ([facility] [address]     8. Elevated troponin     9. Cardiomyopathy, unspecified type ([facility]        Fasting/ Morning blood glucose:    Glucose 265 (H) 06/14/2022 04:00 AM    Glucose (POC) 223 (H) 06/14/2022 11:37 AM   IV Fluids containing dextrose: n/a  Steroids:   Rx Glucocorticoids solu-cortef 50mg IV q 6hrs    Blood glucose values: Within target range (70-180mg/dL):  no  Current insulin orders: 15 units Lantus daily plus corrective lispro   Total Daily Dose previous 24 hours = 60 units (30 of Lantus plus 30 units of corrective lispro)  Current A1c:   Lab Results   Component Value Date/Time    Hemoglobin A1c 9.0 (H) 06/11/2022 02:15 PM    Hemoglobin A1c, External 9.5 07/08/2021 12:00 AM      equivalent  to ave Blood Glucose of 210  mg/dl for 2-3 months prior to admission  Adequate glycemic control PTA: no  Nutrition/Diet:   Active Orders   Diet    ADULT DIET Regular    Home diabetes medications:    insulin glargine (LANTUS) 100 unit/mL injection Inject 15 units SQ BID    metFORMIN ER (GLUCOPHAGE XR) 500 mg tablet     insulin regular (NOVOLIN R, HUMULIN R) 100 unit/mL injection by SubCUTAneous route Before breakfast, lunch, and dinner.  Sliding scale   Plan/Goals:   Blood glucose will be within target of 70 - 180 mg/dl within 72 hours  Reinforce dietary and medication compliance at home.       Education:  [x] Refer to Diabetes Education Record                       [] Education not indicated at this time     Isabel Cooper RD BC-ADM

## 2022-06-14 NOTE — PROGRESS NOTES
In Patient Progress note      Admit Date: 6/11/2022    Impression:     #1 acute kidney injury, baseline creatinine 1.03.,  Etiology secondary to  ischemic ATN in the setting of hypotension/septic shock/DKA --> improving  #2 severe hyperkalemia without EKG changes, secondary to DKA and BRENDA--> resolved  #3 acidemia with severe metabolic acidosis secondary to DKA--> improved   #4 diabetic ketoacidosis and lactic acidoses--> improved   #5 altered mental status due to metabolic enceph-alopathy and DKA  #6 hypotension/shock secondary to sepsis? PNA   #7 cardiomyopathy , EF 30-35%    Renal functions are improving  Still on minimal pressors Levophed and vasopressin,  Presently on IV fluids at 75 cc an hour, making good urine output  On 2 L oxygen, volume status slightly generous  Cardiomyopathy as above, will decrease IV fluids to 50 cc an hour  Once off pressors can discontinue IV fluids when able to take p.o.     Plan:     #1 strict intake output, okay to DC [place]  #2 keep MAP > 65   #3 check renal panel daily   #4 1/2 NS @50 cc/hrs   #5 sepsis management per primary team  #6 renally dose antibiotics for EGFR of less than 30  #9 avoid IV contrast     Discussed with nursing and primary team     Please call with questions,     Anh Donis MD [facility]  Cell [phone]  Pager: [phone]  Subjective:     - awake , partly oriented  - respiratory - stable  - hemodynamics - on pressors   - UOP-improving  - Nutrition -poor    Objective:     Visit Vitals  BP (!) 111/59   Pulse 96   Temp 98.1 °F (36.7 °C)   Resp 23   Ht 5' 6\" (1.676 m)   Wt 82.1 kg (181 lb)   SpO2 100%   BMI 29.21 kg/m²         Intake/Output Summary (Last 24 hours) at 6/14/2022 1633  Last data filed at 6/14/2022 1141  Gross per 24 hour   Intake 2360.72 ml   Output 3050 ml   Net -689.28 ml       Physical Exam:     HEENT:mmm  Neck: no cervical lymphadenopathy or thyromegaly. Lungs: good air entry, clear to auscultation bilaterally.    Cardiovascular system: S1, S2, regular rate and rhythm. Abdomen: soft, non tender, non distended. Extremities: no clubbing, cyanosis or edema. Integumentary: skin is grossly intact.        Data Review:    Recent Labs     06/14/22  0400   WBC 28.9*   RBC 2.97*   HCT 23.6*   MCV 79.5   MCH 26.6   MCHC 33.5   RDW 14.8*     Recent Labs     06/14/22  1433 06/14/22  0400 06/13/22  0400 06/12/22  2242 06/12/22  1815 06/12/22  1512 06/12/22  1110 06/12/22  0226 06/11/22 2051   BUN 30* 35* 51* 52* 53* 53* 59* 60* 60*   CREA 1.49* 1.91* 2.24* 2.35* 2.40* 2.71* 3.00* 3.19* 3.13*   CA 7.6* 7.9* 7.9* 7.8* 7.9* 8.5 7.9* 8.0* 8.3*   ALB  --  2.7*  --   --   --   --   --   --   --    K 3.7 3.3* 4.7 4.7 4.4 3.7 3.6 3.1* 4.1    139 139 141 143 145 144 141 140    107 106 111 111 112* 113* 105 105   CO2 26 22 19* 22 23 20* 23 14* 7*   PHOS  --  1.9* 3.0 3.3 3.6 1.3* 0.8* 2.3* 6.6*   * 265* 292* 196* 101* 56* 189* 552* 796*       Shaylee Ashley MD

## 2022-06-15 ENCOUNTER — APPOINTMENT (OUTPATIENT)
Dept: NUCLEAR MEDICINE | Age: 72
DRG: 871 | End: 2022-06-15
Attending: INTERNAL MEDICINE
Payer: MEDICARE

## 2022-06-15 LAB
ANION GAP SERPL CALC-SCNC: 6 MMOL/L (ref 3–18)
APTT PPP: 133 SEC (ref 23–36.4)
APTT PPP: 144.3 SEC (ref 23–36.4)
BASOPHILS # BLD: 0 K/UL (ref 0–0.1)
BASOPHILS NFR BLD: 0 % (ref 0–2)
BUN SERPL-MCNC: 30 MG/DL (ref 7–18)
BUN/CREAT SERPL: 22 (ref 12–20)
CALCIUM SERPL-MCNC: 7.6 MG/DL (ref 8.5–10.1)
CHLORIDE SERPL-SCNC: 107 MMOL/L (ref 100–111)
CO2 SERPL-SCNC: 27 MMOL/L (ref 21–32)
CREAT SERPL-MCNC: 1.39 MG/DL (ref 0.6–1.3)
DIFFERENTIAL METHOD BLD: ABNORMAL
EOSINOPHIL # BLD: 0 K/UL (ref 0–0.4)
EOSINOPHIL NFR BLD: 0 % (ref 0–5)
ERYTHROCYTE [DISTWIDTH] IN BLOOD BY AUTOMATED COUNT: 14.9 % (ref 11.6–14.5)
GLUCOSE BLD STRIP.AUTO-MCNC: 127 MG/DL (ref 70–110)
GLUCOSE BLD STRIP.AUTO-MCNC: 160 MG/DL (ref 70–110)
GLUCOSE BLD STRIP.AUTO-MCNC: 251 MG/DL (ref 70–110)
GLUCOSE BLD STRIP.AUTO-MCNC: 266 MG/DL (ref 70–110)
GLUCOSE SERPL-MCNC: 118 MG/DL (ref 74–99)
HAV IGM SER QL: NEGATIVE
HBV CORE IGM SER QL: NEGATIVE
HBV SURFACE AG SER QL: <0.1 INDEX
HBV SURFACE AG SER QL: NEGATIVE
HCT VFR BLD AUTO: 22.2 % (ref 35–45)
HCV AB SER IA-ACNC: 0.05 INDEX
HCV AB SERPL QL IA: NEGATIVE
HCV COMMENT,HCGAC: NORMAL
HEMOCCULT STL QL: POSITIVE
HGB BLD-MCNC: 7.4 G/DL (ref 12–16)
IMM GRANULOCYTES # BLD AUTO: 0.2 K/UL (ref 0–0.04)
IMM GRANULOCYTES NFR BLD AUTO: 1 % (ref 0–0.5)
LYMPHOCYTES # BLD: 0.8 K/UL (ref 0.9–3.6)
LYMPHOCYTES NFR BLD: 4 % (ref 21–52)
MAGNESIUM SERPL-MCNC: 2 MG/DL (ref 1.6–2.6)
MCH RBC QN AUTO: 26.7 PG (ref 24–34)
MCHC RBC AUTO-ENTMCNC: 33.3 G/DL (ref 31–37)
MCV RBC AUTO: 80.1 FL (ref 78–100)
MONOCYTES # BLD: 0.6 K/UL (ref 0.05–1.2)
MONOCYTES NFR BLD: 3 % (ref 3–10)
NEUTS SEG # BLD: 18.5 K/UL (ref 1.8–8)
NEUTS SEG NFR BLD: 92 % (ref 40–73)
NRBC # BLD: 0.03 K/UL (ref 0–0.01)
NRBC BLD-RTO: 0.1 PER 100 WBC
PHOSPHATE SERPL-MCNC: 2.4 MG/DL (ref 2.5–4.9)
PHOSPHATE SERPL-MCNC: 2.8 MG/DL (ref 2.5–4.9)
PLATELET # BLD AUTO: 114 K/UL (ref 135–420)
PMV BLD AUTO: 11.8 FL (ref 9.2–11.8)
POTASSIUM SERPL-SCNC: 3.4 MMOL/L (ref 3.5–5.5)
POTASSIUM SERPL-SCNC: 3.6 MMOL/L (ref 3.5–5.5)
PROCALCITONIN SERPL-MCNC: 2.61 NG/ML
RBC # BLD AUTO: 2.77 M/UL (ref 4.2–5.3)
SODIUM SERPL-SCNC: 140 MMOL/L (ref 136–145)
SP1: NORMAL
SP2: NORMAL
SP3: NORMAL
WBC # BLD AUTO: 20.1 K/UL (ref 4.6–13.2)

## 2022-06-15 PROCEDURE — 99291 CRITICAL CARE FIRST HOUR: CPT | Performed by: INTERNAL MEDICINE

## 2022-06-15 PROCEDURE — 83735 ASSAY OF MAGNESIUM: CPT

## 2022-06-15 PROCEDURE — 74011250637 HC RX REV CODE- 250/637: Performed by: REGISTERED NURSE

## 2022-06-15 PROCEDURE — 74011250636 HC RX REV CODE- 250/636: Performed by: REGISTERED NURSE

## 2022-06-15 PROCEDURE — 77030040392 HC DRSG OPTIFOAM MDII -A

## 2022-06-15 PROCEDURE — 74011636637 HC RX REV CODE- 636/637: Performed by: PHYSICIAN ASSISTANT

## 2022-06-15 PROCEDURE — 77010033678 HC OXYGEN DAILY

## 2022-06-15 PROCEDURE — A9572 INDIUM IN-111 PENTETREOTIDE: HCPCS

## 2022-06-15 PROCEDURE — 84100 ASSAY OF PHOSPHORUS: CPT

## 2022-06-15 PROCEDURE — 74011250636 HC RX REV CODE- 250/636: Performed by: INTERNAL MEDICINE

## 2022-06-15 PROCEDURE — 74011000250 HC RX REV CODE- 250: Performed by: STUDENT IN AN ORGANIZED HEALTH CARE EDUCATION/TRAINING PROGRAM

## 2022-06-15 PROCEDURE — 74011636637 HC RX REV CODE- 636/637: Performed by: REGISTERED NURSE

## 2022-06-15 PROCEDURE — 65610000006 HC RM INTENSIVE CARE

## 2022-06-15 PROCEDURE — 97530 THERAPEUTIC ACTIVITIES: CPT

## 2022-06-15 PROCEDURE — 74011000250 HC RX REV CODE- 250: Performed by: PHYSICIAN ASSISTANT

## 2022-06-15 PROCEDURE — 97535 SELF CARE MNGMENT TRAINING: CPT

## 2022-06-15 PROCEDURE — 74011250636 HC RX REV CODE- 250/636: Performed by: STUDENT IN AN ORGANIZED HEALTH CARE EDUCATION/TRAINING PROGRAM

## 2022-06-15 PROCEDURE — APPNB15 APP NON BILLABLE TIME 0-15 MINS: Performed by: PHYSICIAN ASSISTANT

## 2022-06-15 PROCEDURE — 85730 THROMBOPLASTIN TIME PARTIAL: CPT

## 2022-06-15 PROCEDURE — 97162 PT EVAL MOD COMPLEX 30 MIN: CPT

## 2022-06-15 PROCEDURE — 74011250637 HC RX REV CODE- 250/637: Performed by: NURSE PRACTITIONER

## 2022-06-15 PROCEDURE — 74011250636 HC RX REV CODE- 250/636: Performed by: PHYSICIAN ASSISTANT

## 2022-06-15 PROCEDURE — 97165 OT EVAL LOW COMPLEX 30 MIN: CPT

## 2022-06-15 PROCEDURE — 84145 PROCALCITONIN (PCT): CPT

## 2022-06-15 PROCEDURE — 82962 GLUCOSE BLOOD TEST: CPT

## 2022-06-15 PROCEDURE — 85025 COMPLETE CBC W/AUTO DIFF WBC: CPT

## 2022-06-15 PROCEDURE — 87449 NOS EACH ORGANISM AG IA: CPT

## 2022-06-15 PROCEDURE — 74011250637 HC RX REV CODE- 250/637: Performed by: STUDENT IN AN ORGANIZED HEALTH CARE EDUCATION/TRAINING PROGRAM

## 2022-06-15 PROCEDURE — 94762 N-INVAS EAR/PLS OXIMTRY CONT: CPT

## 2022-06-15 PROCEDURE — 74011000258 HC RX REV CODE- 258: Performed by: STUDENT IN AN ORGANIZED HEALTH CARE EDUCATION/TRAINING PROGRAM

## 2022-06-15 PROCEDURE — 74011000258 HC RX REV CODE- 258: Performed by: INTERNAL MEDICINE

## 2022-06-15 PROCEDURE — 84132 ASSAY OF SERUM POTASSIUM: CPT

## 2022-06-15 PROCEDURE — 2709999900 HC NON-CHARGEABLE SUPPLY

## 2022-06-15 PROCEDURE — 36415 COLL VENOUS BLD VENIPUNCTURE: CPT

## 2022-06-15 PROCEDURE — 82272 OCCULT BLD FECES 1-3 TESTS: CPT

## 2022-06-15 PROCEDURE — 99232 SBSQ HOSP IP/OBS MODERATE 35: CPT | Performed by: STUDENT IN AN ORGANIZED HEALTH CARE EDUCATION/TRAINING PROGRAM

## 2022-06-15 RX ORDER — INDIUM IN-111 OXYQUINOLINE 1 UG/ML
602 SOLUTION INTRAVENOUS ONCE
Status: COMPLETED | OUTPATIENT
Start: 2022-06-15 | End: 2022-06-15

## 2022-06-15 RX ORDER — MIDODRINE HYDROCHLORIDE 5 MG/1
5 TABLET ORAL 2 TIMES DAILY WITH MEALS
Status: DISCONTINUED | OUTPATIENT
Start: 2022-06-15 | End: 2022-06-15

## 2022-06-15 RX ORDER — DOCUSATE SODIUM 50 MG/5ML
100 LIQUID ORAL DAILY
Status: DISCONTINUED | OUTPATIENT
Start: 2022-06-16 | End: 2022-06-15

## 2022-06-15 RX ORDER — MIDODRINE HYDROCHLORIDE 5 MG/1
5 TABLET ORAL
Status: DISCONTINUED | OUTPATIENT
Start: 2022-06-15 | End: 2022-06-18

## 2022-06-15 RX ORDER — INSULIN GLARGINE 100 [IU]/ML
12 INJECTION, SOLUTION SUBCUTANEOUS DAILY
Status: DISCONTINUED | OUTPATIENT
Start: 2022-06-15 | End: 2022-06-18

## 2022-06-15 RX ORDER — POLYETHYLENE GLYCOL 3350 17 G/17G
17 POWDER, FOR SOLUTION ORAL DAILY
Status: DISCONTINUED | OUTPATIENT
Start: 2022-06-16 | End: 2022-06-15

## 2022-06-15 RX ORDER — POLYETHYLENE GLYCOL 3350 17 G/17G
17 POWDER, FOR SOLUTION ORAL DAILY
Status: DISCONTINUED | OUTPATIENT
Start: 2022-06-15 | End: 2022-06-30 | Stop reason: HOSPADM

## 2022-06-15 RX ORDER — INSULIN GLARGINE 100 [IU]/ML
12 INJECTION, SOLUTION SUBCUTANEOUS DAILY
Status: DISCONTINUED | OUTPATIENT
Start: 2022-06-16 | End: 2022-06-15

## 2022-06-15 RX ORDER — POTASSIUM CHLORIDE 29.8 MG/ML
20 INJECTION INTRAVENOUS ONCE
Status: COMPLETED | OUTPATIENT
Start: 2022-06-15 | End: 2022-06-15

## 2022-06-15 RX ORDER — DOCUSATE SODIUM 100 MG/1
100 CAPSULE, LIQUID FILLED ORAL DAILY
Status: DISCONTINUED | OUTPATIENT
Start: 2022-06-15 | End: 2022-06-30 | Stop reason: HOSPADM

## 2022-06-15 RX ADMIN — SODIUM CHLORIDE, PRESERVATIVE FREE 10 ML: 5 INJECTION INTRAVENOUS at 15:32

## 2022-06-15 RX ADMIN — PIPERACILLIN SODIUM AND TAZOBACTAM SODIUM 3.38 G: 3; .375 INJECTION, POWDER, LYOPHILIZED, FOR SOLUTION INTRAVENOUS at 11:21

## 2022-06-15 RX ADMIN — Medication 3 UNITS: at 05:45

## 2022-06-15 RX ADMIN — PIPERACILLIN SODIUM AND TAZOBACTAM SODIUM 3.38 G: 3; .375 INJECTION, POWDER, LYOPHILIZED, FOR SOLUTION INTRAVENOUS at 20:13

## 2022-06-15 RX ADMIN — HYDROCORTISONE SODIUM SUCCINATE 50 MG: 100 INJECTION, POWDER, FOR SOLUTION INTRAMUSCULAR; INTRAVENOUS at 05:45

## 2022-06-15 RX ADMIN — POTASSIUM BICARBONATE 40 MEQ: 782 TABLET, EFFERVESCENT ORAL at 21:01

## 2022-06-15 RX ADMIN — PIPERACILLIN SODIUM AND TAZOBACTAM SODIUM 3.38 G: 3; .375 INJECTION, POWDER, LYOPHILIZED, FOR SOLUTION INTRAVENOUS at 03:34

## 2022-06-15 RX ADMIN — Medication 9 UNITS: at 17:00

## 2022-06-15 RX ADMIN — SODIUM CHLORIDE, PRESERVATIVE FREE 10 ML: 5 INJECTION INTRAVENOUS at 06:00

## 2022-06-15 RX ADMIN — HEPARIN SODIUM 17 UNITS/KG/HR: 1000 INJECTION INTRAVENOUS; SUBCUTANEOUS at 10:14

## 2022-06-15 RX ADMIN — LEVOTHYROXINE SODIUM 112 MCG: 112 TABLET ORAL at 05:45

## 2022-06-15 RX ADMIN — MIDODRINE HYDROCHLORIDE 5 MG: 5 TABLET ORAL at 11:18

## 2022-06-15 RX ADMIN — Medication 9 UNITS: at 12:41

## 2022-06-15 RX ADMIN — SODIUM CHLORIDE, PRESERVATIVE FREE 10 ML: 5 INJECTION INTRAVENOUS at 21:55

## 2022-06-15 RX ADMIN — VASOPRESSIN 0.04 UNITS/MIN: 20 INJECTION INTRAVENOUS at 04:43

## 2022-06-15 RX ADMIN — POTASSIUM PHOSPHATE, MONOBASIC AND POTASSIUM PHOSPHATE, DIBASIC: 224; 236 INJECTION, SOLUTION, CONCENTRATE INTRAVENOUS at 11:12

## 2022-06-15 RX ADMIN — POTASSIUM CHLORIDE 20 MEQ: 29.8 INJECTION, SOLUTION INTRAVENOUS at 11:18

## 2022-06-15 RX ADMIN — IPRATROPIUM BROMIDE AND ALBUTEROL SULFATE 3 ML: .5; 2.5 SOLUTION RESPIRATORY (INHALATION) at 05:56

## 2022-06-15 RX ADMIN — MIDODRINE HYDROCHLORIDE 5 MG: 5 TABLET ORAL at 17:41

## 2022-06-15 RX ADMIN — INDIUM IN-111 OXYQUINOLINE 602 MICRO CURIE: 1 SOLUTION INTRAVENOUS at 14:00

## 2022-06-15 RX ADMIN — INSULIN GLARGINE 12 UNITS: 100 INJECTION, SOLUTION SUBCUTANEOUS at 11:33

## 2022-06-15 NOTE — PROGRESS NOTES
OT orders received and chart reviewed. Patient is currently with active complete bedrest orders. Please update patients' activity level when appropriate prior to OT evaluation. Thank you.     Matilda Hernandez MS, OTR/L

## 2022-06-15 NOTE — PROGRESS NOTES
attended the interdisciplinary rounds for Daxa Ballard, who is a 66 y.o.,female. Patients Primary Language is: [place]. According to the patients EMR Jainism Affiliation is: [facility].     The reason the Patient came to the hospital is:   Patient Active Problem List    Diagnosis Date Noted    Elevated troponin 06/12/2022    Primary hypertension 06/12/2022    Acquired hypothyroidism 06/12/2022    History of colon cancer 06/12/2022    [facility]    DKA [facility]di[facility]osis[facility] 06/11/2022    Hyperkalemia 01/19/2021    DKA (diabetic ketoacidoses) 01/19/2021      Plan:  Chaplains will continue to follow and will provide pastoral care on an as needed/requested basis.  recommends bedside caregivers page  on duty if patient shows signs of acute spiritual or emotional distress.     [address] [facility]  Board Certified [address] [place] [address]   [phone]

## 2022-06-15 NOTE — PROGRESS NOTES
CM spoke with patient's daughter Holli Mancera [phone], updated her that PT and OT are recommending Rehab. Patient's daughter requesting ARU and backup plan is SNF in [facility], but not [facility].   Patient's daughter requesting patient be screened for Medicaid, and said to have [employer] contact her for the Medicaid screening. Patient's daughter asking about her or her sister being a paid caregiver for her mother. CM let patient's daughter know that she will need to contact  about that. Patient has been Covid Vaccinated with a Booster shot, and patient owns her own home. CM spoke with Felisa Rosa with ARU, said they are out of network with [facility]. CM called [employer] and received voicemail, left message with Lavinia Craven, that patient needs a Medicaid screening and to contact patient's daughter for the screening, name and phone number given for patient's daughter. CM left extension is needed. CM called and spoke with patient's daughter Holli Mancera [phone], and updated her that ARU is out of network with [facility], and discharge plan for patient will need to be SNF at this time. Patient's daughter is agreeable to SNF discharge disposition at this time. CM uploaded patient to [facility], and sent booking requests to [facility], but not [facility] per patient's daughter.                Skinny Stephen, RN  Case Management [phone]

## 2022-06-15 NOTE — PROGRESS NOTES
Problem: Mobility Impaired (Adult and Pediatric)  Goal: *Acute Goals and Plan of Care (Insert Text)  Description: Physical Therapy Goals  Initiated 6/15/2022 and to be accomplished within 7 day(s)  1. Patient will move from supine to sit and sit to supine , scoot up and down, and roll side to side in bed with supervision/set-up. 2.  Patient will transfer from bed to chair and chair to bed with supervision/set-up using the least restrictive device. 3.  Patient will perform sit to stand with supervision/set-up. 4.  Patient will ambulate with supervision/set-up for 50 feet with the least restrictive device. 5.  Patient will ascend/descend 13 stairs with 1 handrail(s) with minimal assistance/contact guard assist.  6.  Patient will perform BLE therex as prescribed to tolerance     PLOF: Pt lives alone in an apt with 13 TOREY, has cane and walker for use if needed     Outcome: Progressing Towards Goal     PHYSICAL THERAPY EVALUATION    Patient: Elvia Chau (51 y.o. female)  Date: 6/15/2022  Primary Diagnosis: DKA (diabetic ketoacidosis) ([facility] [address] [E11.10]        Precautions:   Skin,Fall  WBAT  PLOF: see above     ASSESSMENT :  Based on the objective data described below, the patient presents with decreased functional mobility, activity tolerance from baseline. Pt seen with OT to maximize functional mobility and safety, bed level evaluation performed per nursing. Pt with full ROM to BLE, grossly 4/5 strength to BLE, sensation in tact. Pt able to roll at bed level with min A for changing of butch pads. Educated on role of PT and at this time will recommend rehab as pt lives alone however will updated recs as appropriate and functional mobility OOB is able to occur. Pt left in bed with all needs met and call bell within reach . Patient will benefit from skilled intervention to address the above impairments.   Patient's rehabilitation potential is considered to be Good  Factors which may influence rehabilitation potential include:   []         None noted  []         Mental ability/status  [x]         Medical condition  [x]         Home/family situation and support systems  []         Safety awareness  []         Pain tolerance/management  []         Other:      PLAN :  Recommendations and Planned Interventions:   [x]           Bed Mobility Training             [x]    Neuromuscular Re-Education  [x]           Transfer Training                   []    Orthotic/Prosthetic Training  [x]           Gait Training                          []    Modalities  [x]           Therapeutic Exercises           []    Edema Management/Control  [x]           Therapeutic Activities            [x]    Family Training/Education  [x]           Patient Education  []           Other (comment):    Frequency/Duration: Patient will be followed by physical therapy 1-2 times per day/3-5 days per week to address goals. Discharge Recommendations: Rehab  Further Equipment Recommendations for Discharge: rolling walker     SUBJECTIVE:   Patient stated I feel better.     OBJECTIVE DATA SUMMARY:     Past Medical History:   Diagnosis Date    Colon cancer ([facility] [address]     Diabetes ([facility] [address]     Hypertension     Hypothyroidism      Past Surgical History:   Procedure Laterality Date    COLONOSCOPY N/A 12/30/2016    COLONOSCOPY.  SURVEILLANCE /c Hot Snared Polypectomy /c EndoClip x3 performed by Remigio Klinefelter, MD at [address] [place] [address] HX HYSTERECTOMY      HX TOTAL COLECTOMY       Barriers to Learning/Limitations: yes;  physical  Compensate with: Visual Cues, Verbal Cues, and Tactile Cues  Home Situation:  Home Situation  Home Environment: Private residence  One/Two Story Residence: One story  Living Alone: Yes  Support Systems: Child(citlali)  Patient Expects to be Discharged to[de-identified] Home  Current DME Used/Available at Home: Cane, straight,Walker, rolling  Critical Behavior:  Neurologic State: Alert;Confused  Orientation Level: Oriented to person;Oriented to place  Cognition: Follows commands  Safety/Judgement: Awareness of environment; Fall prevention  Psychosocial  Patient Behaviors: Calm; Cooperative;Confused                 Strength:    Strength: Within functional limits                    Tone & Sensation:   Tone: Normal              Sensation: Intact               Range Of Motion:  AROM: Within functional limits           Functional Mobility:  Bed Mobility:  Rolling: Minimum assistance        Scooting: Maximum assistance;Assist x2  Transfers:  NT this date       Pain:  Pain level pre-treatment: 0/10   Pain level post-treatment: 0/10   Pain Intervention(s) : Medication (see MAR); Rest, Ice, Repositioning  Response to intervention: Nurse notified    Activity Tolerance:   Good    Please refer to the flowsheet for vital signs taken during this treatment. After treatment:   []         Patient left in no apparent distress sitting up in chair  [x]         Patient left in no apparent distress in bed  [x]         Call bell left within reach  [x]         Nursing notified  []         Caregiver present  []         Bed alarm activated  []         SCDs applied    COMMUNICATION/EDUCATION:   [x]         Role of Physical Therapy in the acute care setting. [x]         Fall prevention education was provided and the patient/caregiver indicated understanding. [x]         Patient/family have participated as able in goal setting and plan of care. [x]         Patient/family agree to work toward stated goals and plan of care. []         Patient understands intent and goals of therapy, but is neutral about his/her participation. []         Patient is unable to participate in goal setting/plan of care: ongoing with therapy staff.  []         Other:     Thank you for this referral.  Jacy Chelle   Time Calculation: 17 mins      Eval Complexity: History: MEDIUM  Complexity : 1-2 comorbidities / personal factors will impact the outcome/ POC Exam:MEDIUM Complexity : 3 Standardized tests and measures addressing body structure, function, activity limitation and / or participation in recreation  Presentation: MEDIUM Complexity : Evolving with changing characteristics  Clinical Decision Making:Medium Complexity    Overall Complexity:MEDIUM

## 2022-06-15 NOTE — PROGRESS NOTES
In Patient Progress note      Admit Date: 6/11/2022    Impression:     #1 acute kidney injury, baseline creatinine 1.03.,  Etiology secondary to  ischemic ATN in the setting of hypotension/septic shock/DKA --> improving  #2 severe hyperkalemia without EKG changes, secondary to DKA and BRENDA--> resolved  #3 acidemia with severe metabolic acidosis secondary to DKA--> improved   #4 diabetic ketoacidosis and lactic acidoses--> improved   #5 altered mental status due to metabolic enceph-alopathy and DKA  #6 hypotension/shock secondary to sepsis? PNA   #7 cardiomyopathy , EF 30-35%    Renal functions are improving  Off Levophed and should be off vasopressin this morning  IV fluids have been stopped  On 2 L oxygen, volume status looks good  More awake and alert this morning     Plan:  #1 strict intake output, okay to DC [place]  #2 keep MAP > 65   #3 check renal panel daily   #4 agree to stop IV fluids  #5 sepsis management per primary team  #6 renally dose antibiotics for EGFR of less than 30  #9 avoid IV contrast     Discussed with nursing and primary team     Please call with questions,     Brionna Robert MD FASN  Cell [phone]  Pager: [phone]  Subjective:     - awake , alert and oriented  - respiratory - stable  - hemodynamics - on pressors   - UOP-improving  - Nutrition -poor    Objective:     Visit Vitals  /60   Pulse 76   Temp 98.5 °F (36.9 °C)   Resp 16   Ht 5' 6\" (1.676 m)   Wt 82.1 kg (181 lb)   SpO2 100%   BMI 29.21 kg/m²         Intake/Output Summary (Last 24 hours) at 6/15/2022 1637  Last data filed at 6/15/2022 1630  Gross per 24 hour   Intake 400 ml   Output 1000 ml   Net -600 ml       Physical Exam:     HEENT:mmm  Neck: no cervical lymphadenopathy or thyromegaly. Lungs: good air entry, clear to auscultation bilaterally. Cardiovascular system: S1, S2, regular rate and rhythm. Abdomen: soft, non tender, non distended. Extremities: no clubbing, cyanosis or edema.    Integumentary: skin is grossly intact.        Data Review:    Recent Labs     06/15/22  0230   WBC 20.1*   RBC 2.77*   HCT 22.2*   MCV 80.1   MCH 26.7   MCHC 33.3   RDW 14.9*     Recent Labs     06/15/22  0230 06/14/22  1433 06/14/22  0400 06/13/22  0400 06/12/22  2242 06/12/22  1815   BUN 30* 30* 35* 51* 52* 53*   CREA 1.39* 1.49* 1.91* 2.24* 2.35* 2.40*   CA 7.6* 7.6* 7.9* 7.9* 7.8* 7.9*   ALB  --   --  2.7*  --   --   --    K 3.4* 3.7 3.3* 4.7 4.7 4.4    140 139 139 141 143    107 107 106 111 111   CO2 27 26 22 19* 22 23   PHOS 2.4*  --  1.9* 3.0 3.3 3.6   * 195* 265* 292* 196* 101*       Mariah Parish MD

## 2022-06-15 NOTE — PROGRESS NOTES
[facility]. Pulmonary, Critical Care, and Sleep Medicine    Name: Elissa Aguirre MRN: [id number]   : 1950 Hospital: [address] [place] [address] Dr   Date: 6/15/2022  Admission Date: 2022     Chart and notes reviewed. Data reviewed. I have evaluated all findings. [x]I have reviewed the flowsheet and previous days notes. []The patient is unable to give any meaningful history or review of systems because the patient is:  []Intubated []Sedated   []Unresponsive      [x]The patient is critically ill on      []Mechanical ventilation [x]Pressors   []BiPAP []         Interval HPI: Patient with a history of poorly controlled diabetes, hypertension, hypothyroidism, and colon cancer, arrived 22 via rescue with altered mental status; was unresponsive upon EMS arrival with hyperglycemia. Admitted to ICU with acute toxic metabolic encephalopathy, multifactorial shock (sepsis, hypovolemic, component of cardiogenic), diabetic ketoacidosis (pH on arrival 6.78), lactic acidosis, nonoliguric acute kidney injury, mixed respiratory failure, and electrolyte derangements (hyperkalemia). Acute kidney injury is slowly improving, hyperkalemia is resolved, acidemia with severe metabolic acidosis secondary to DKA is improved, diabetic ketoacidosis is improved. Subjective 06/15/22  Hospital Day: admitted on   Denies pain, shortness of breath. History was limited due to patient condition. ROS:Review of systems not obtained due to patient factors. Still limited communication. Events and notes from last 24 hours reviewed.      Patient Active Problem List   Diagnosis Code    Hyperkalemia E87.5    DKA (diabetic ketoacidoses) E11.10    DKA (diabetic ketoacidosis) ([address] E11.10    Elevated troponin R77.8    Primary hypertension I10    Acquired hypothyroidism E03.9    History of colon cancer Z85.038    BRENDA (acute kidney injury) ([address] N17.9       Vital Signs:  Visit Vitals  BP (!) 103/58   Pulse 71   Temp 97.9 °F (36.6 °C)   Resp 17   Ht 5' 6\" (1.676 m)   Wt 82.1 kg (181 lb)   SpO2 100%   BMI 29.21 kg/m²       O2 Device: Nasal cannula   O2 Flow Rate (L/min): 2 l/min   Temp (24hrs), Av.2 °F (36.8 °C), Min:97.9 °F (36.6 °C), Max:98.8 °F (37.1 °C)       Intake/Output:   Last shift:      No intake/output data recorded.   Last 3 shifts:  1901 - 06/15 0700  In: 2500.7 [I.V.:2500.7]  Out: 3000 [Urine:3000]    Intake/Output Summary (Last 24 hours) at 6/15/2022 0833  Last data filed at 6/15/2022 0600  Gross per 24 hour   Intake 1083.41 ml   Output 1250 ml   Net -166.59 ml          Current Facility-Administered Medications   Medication Dose Route Frequency    potassium phosphate 20 mmol in 0.9% sodium chloride 250 mL infusion   IntraVENous ONCE    insulin glargine (LANTUS) injection 30 Units  30 Units SubCUTAneous DAILY    hydrocortisone Sod Succ (PF) (SOLU-CORTEF) injection 50 mg  50 mg IntraVENous Q8H    insulin lispro (HUMALOG) injection   SubCUTAneous Q6H    levothyroxine (SYNTHROID) tablet 112 mcg  112 mcg Oral 6am    vasopressin (VASOSTRICT) 20 Units in 0.9% sodium chloride 100 mL infusion  0.04 Units/min IntraVENous CONTINUOUS    0.45% sodium chloride infusion  75 mL/hr IntraVENous CONTINUOUS    piperacillin-tazobactam (ZOSYN) 3.375 g in 0.9% sodium chloride (MBP/ADV) 100 mL MBP  3.375 g IntraVENous Q8H    heparin 25,000 units in  mL infusion  12-25 Units/kg/hr IntraVENous TITRATE    NOREPINephrine (LEVOPHED) 8 mg in 5% dextrose 250mL (32 mcg/mL) infusion  0.5-50 mcg/min IntraVENous TITRATE    sodium chloride (NS) flush 5-40 mL  5-40 mL IntraVENous Q8H    famotidine (PF) (PEPCID) 20 mg in 0.9% sodium chloride 10 mL injection  20 mg IntraVENous Q24H       Telemetry: [x]Sinus []A-flutter []Paced    []A-fib []Multiple PVCs                  Physical Exam:      General: Flat affect, no distress, appears stated age  HEENT:  Anicteric sclerae; pink palpebral conjunctivae; mucosa moist  Resp:  Symmetrical chest expansion, no accessory muscle use; good airway entry; no rales/ wheezing/ rhonchi noted  CV:  S1, S2 present; regular rate and rhythm  GI:  Abdomen soft, non-tender; (+) active bowel sounds  Extremities:  no edema/ cyanosis/ clubbing noted   Skin:  Warm; no rashes/ lesions noted  Neurologic:  Non-focal  Devices:  Central line, brown catheter     DATA:  MAR reviewed and pertinent medications noted or modified as needed    Labs:  Recent Labs     06/15/22  0230 06/14/22  0400 06/13/22  0400   WBC 20.1* 28.9* 32.3*   HGB 7.4* 7.9* 8.9*   HCT 22.2* 23.6* 26.3*   * 155 211     Recent Labs     06/15/22  0230 06/14/22  1433 06/14/22  0400 06/13/22  0400 06/13/22  0400    140 139   < > 139   K 3.4* 3.7 3.3*   < > 4.7    107 107   < > 106   CO2 27 26 22   < > 19*   * 195* 265*   < > 292*   BUN 30* 30* 35*   < > 51*   CREA 1.39* 1.49* 1.91*   < > 2.24*   CA 7.6* 7.6* 7.9*   < > 7.9*   MG 2.0  --  1.8  --  2.5   PHOS 2.4*  --  1.9*  --  3.0   ALB  --   --  2.7*  --   --    ALT  --   --  91*  --   --     < > = values in this interval not displayed. No results for input(s): PH, PCO2, PO2, HCO3, FIO2 in the last 72 hours. No results for input(s): FIO2I, IFO2, HCO3I, IHCO3, HCOPOC, PCO2I, PCOPOC, IPHI, PHI, PHPOC, PO2I, PO2POC in the last 72 hours. No lab exists for component: IPOC2    Imaging:  [x]   I have personally reviewed the patients radiographs and reports  XR Results (most recent):  XR Results (most recent):  Results from Hospital Encounter encounter on 06/11/22    XR CHEST PORT    Narrative  EXAM: XR CHEST PORT    Indications: evaluate for aspiration pneumonia    Comparison: Recent prior    Findings:  Rotated to the right  Lines/Tubes/Devices:  None  LUNGS: Clear  MEDIASTINUM: Unremarkable  BONES/SOFT TISSUES: No acute findings. Thoracic spondylosis. Shoulder region  arthritis. Impression  :    1. No acute cardiopulmonary disease.      CT Results (most recent):  Results from Hospital Encounter encounter on 06/11/22    CT CHEST ABD PELV WO CONT    Narrative  EXAM: CT CHEST ABD PELV WO CONT    CLINICAL INDICATION/HISTORY : AMS, WBC 37K, undifferentiated shock, DKA. COMPARISON: CT chest January 19, 2021. CT chest abdomen and pelvis June 8, 2015    TECHNIQUE: Helical scan of the chest, abdomen and pelvis were obtained from the  thoracic inlet to the symphysis without IV contrast administration and without  oral contrast.  All Ct scans at this facility are performed using dose optimization of technique  as appropriate to a performed exam, to include automated exposure control,  adjustment of the mA and/or kV according to patient size (including appropriate  matching for site specific examinations), or use of iterative reconstruction  technique. FINDINGS: Limited by motion. CT of the chest:    Lungs:  Mild dependent groundglass opacities posterior upper lobes and dependent  opacities at the lung bases. New patchy groundglass opacities in the medial  upper lobes bilaterally adjacent to the mediastinum. Pneumatocele in the lingula  similar to prior. Thyroid:  Unremarkable    Heart and great vessels: Mild atherosclerotic vascular calcification including  coronary artery calcification. Main pulmonary artery is 3.1 cm-borderline  enlarged. Lymph nodes:  No adenopathy    Pleural spaces:  Trace bilateral pleural effusions      Bones:  Unremarkable for age        CT of the abdomen/pelvis:    Liver:  Hepatomegaly. Liver measures 20 cm in length    Gallbladder:  Gallbladder sludge    Spleen:  Unremarkable    Adrenal Glands:  Unremarkable    Pancreas: Unremarkable    Kidneys:  Unremarkable    Stomach, small bowel, colon:  Partial colectomy. Lymph nodes:  No adenopathy    Aorta:  Unremarkable    Bladder:  Not well seen due to beam Antonio artifact from left hip arthroplasty  and nondistention.  Chandler catheter present    Peritoneal spaces:  Small amount of induration adjacent to the gallbladder. Very  small fatty umbilical hernia    Pelvic structures: Hysterectomy. Right-sided femoral venous catheter    Bones:  Osteopenia. Lumbar spondylosis. Left hip arthroplasty. Moderate superior  endplate compression deformity at L4 with chronic appearance. Moderate lumbar  spondylosis. Lack of oral and intravenous contrast limits sensitivity. Impression  1. Sludge in the gallbladder. Small amount of nonspecific mesenteric induration  near the gallbladder. As clinically indicated, could perform right upper  quadrant ultrasound. 2.  Hepatomegaly. 3.  Patchy groundglass opacities in the medial upper lobes bilaterally, likely  infectious or inflammatory. Other scattered areas of atelectasis. Trace  bilateral pleural effusions. 06/11/22    ECHO ADULT FOLLOW-UP OR LIMITED 06/12/2022 6/12/2022    Interpretation Summary    Left Ventricle: Moderately reduced left ventricular systolic function with a visually estimated EF of 30 - 35%. Left ventricle size is normal. Mildly increased wall thickness. There are regional wall motion abnormalities.   Right Ventricle: Reduced systolic function.   Normal size right ventricle with reduced function.   Mild to moderdate tricuspid regurgitation with PASP of 30 mmHg.     Signed by: Evelyne Mckeon MD on 6/12/2022  3:52 PM       IMPRESSION:   · DKA with high anion gap  · Metabolic encephalopathy  · BRENDA  · Multifactorial shock (sepsis, hypovolemic, component of cardiogenic) - requiring vasopressors  · Respiratory failure -resolved   · NSTEMI- on heparin drip- improving  · Sepsis with unknown etiology - ?CAP vs cholecystitis   · Cardiomyopathy, EF 30-35%     Patient Active Problem List   Diagnosis Code    Hyperkalemia E87.5    DKA (diabetic ketoacidoses) E11.10    DKA (diabetic ketoacidosis) ([facility] [address] E11.10    Elevated troponin R77.8    Primary hypertension I10    Acquired hypothyroidism E03.9    History of colon cancer Z85.038    BRENDA (acute kidney injury) ([facility] [address] N17.9      RECOMMENDATIONS:   Neuro: none, improving mental status  Pulm: Aspiration precautions, HOB>30'. CVS: Monitor HD, Heparin drip for nstemi, Levophed and Vasopressin MAP goal >65  Start Midodrine today  GI: NPO. Renal: Trend Cr, UOP. Brown, nephrology following. Solu-Cortef injection 50 mg q6, d/c  Hem/Onc: Trend H/H, monitor for s/o active bleeding. Daily CBC. I/D:Trend WBCs and temperature curve. Started on Zosyn 6/13/22. Pending RUQ US. Metabolic: Daily BMP, mag, phos. Trend lytes and replace per protocol. Endocrine: Lantus 15, SSI, levothyroxine 112 mcg   Musc/Skin:wound care, PT/OT  DNR/DNI   Discussed in interdisciplinary rounds     Best practice:    Glycemic control  IHI ICU bundles:   Central Line Bundle Followed  and Brown Bundle Followed    [facility] Vent patients-    na  Stress ulcer prophylaxis. Pepcid  DVT prophylaxis. Heparin gtt  Need for Lines, brown assessed. Palliative care evaluation. Restraints need. This care involved high complexity decision making to assess, manipulate, and support vital system functions, to treat this degreee vital organ system failure and to prevent further life threatening deterioration of the patients condition  The services I provided to this patient were to treat and/or prevent clinically significant deterioration that could result in the failure of one or more body systems and/or organ systems due to respiratory distress, hypoxia, cardiac dysrhythmia. Kerry Sewell MD   06/15/22   [facility] Critical Care Fellow    Pulmonary / Critical Care Physician Addendum:    Chart and note reviewed. Data reviewed. Seen on rounds earlier today. I have independently evaluated and examined the patient. I agree with the exam, assessment and plans outlined by resident/fellow. In brief, my findings, evaluation and recommendations are as stated below:    Patient more alert today and her confusion has improved. Now off norepinephrine & will d/c vasopressin today. If necessary, will start midodrine. Will d/c stress-dosed cortef given hyperglycemia. Lantus decreased to 12 units daily. Leukocytosis improving and remains afebrile Abx per ID; on Zosyn. ID planning for WBC scan. PVLs negative for LE DVT. PT/OT consulted. Will IVF and Pepcid. Will continue to  Monitor & replete lytes. Rest of details and diagnostic/treatment plans per resident/fellow note. I have personally reviewed all pertinent data including labs, imaging and recommendations of treatment team providers. Aggregate critical care time was 35 minutes, of which >50% was provided by myself, which includes only time during which I was engaged in work directly related to the patient's care evaluation, management and care decisions, and ordering appropriate treatment related to critical care problems exclusive of procedures. This time was independent of other practitioners. The reason for providing this level of medical care for this critically ill patient was due a critical illness that impaired one or more vital organ systems such that there was a high probability of imminent or life threatening deterioration in the patients condition. This care involved high complexity decision making to assess, manipulate, and support vital system functions, to treat this degree vital organ system failure and to prevent further life threatening deterioration of the patients condition.       Truong Jackson DO  6/15/2022   Pulmonary, Critical Care Medicine  [address] [place] [address] [facility]

## 2022-06-15 NOTE — PROGRESS NOTES
PT order received and chart reviewed. Patient currently has an active bedrest order. Please discontinue bedrest order for full participation in skilled PT evaluation/treatment. Will follow up as patient schedule allows.      Thank you for the referral.    Cely Munoz, PT, DPT

## 2022-06-15 NOTE — PROGRESS NOTES
Infectious Disease progress Note        Reason: septic shock    Current abx Prior abx     Zosyn since 6/13 Cefepime, vancomycin, metronidazole  6/11/22-6/13     Lines:       Assessment :  66 y.o. female with pmh of type 2 diabetes mellitus, hypertension, hyperlipidemia, hypothyroidism, and colon cancer s/p total colectomy and chemotherapy who presented to [facility]  via EMS on 6/11/22 for altered mental status. Highly complex clinical picture. Clinical presentation is concerning for sepsis (POA)- however, exact etiology remains unclear  D/D: cardiogenic/hypovolemic shock in setting of DKA versus septic shock due to occult infeciton, right sided heart failure     Concerns for pneumonia on Ct chest- ? Aspiration pneumonia superimposed on pulmonary congestion. Clinical presentation not c/w community acquired pneumonia. No significant hypoxia, SOB, cough    No definitive evidence of cholecystitis per HIDA scan. Gallbladder changes noted on ct scan could be due to perihepatic ascites as seen on US 6/14/22. H/o left hip TKR- currently no evidence of infection left hip. Will monitor for this possibility    Worsening leukocytosis- likely due to sepsis, steroids- r/o fungemia    Acute kidney injury- likely due to sepsis, volume depletion    DKA    Altered mentation- likely metabolic encephalopathy- gradually improving    Decreasing pressor requirement. improved mentation. improving leukocytosis    Recommendations:    1.  continue Zosyn-adjust dose per changing renal function  2. F/u fungitell  3. Titrate pressors as tolerated  4. Agree with IV hydration  5. Taper steroids per ICU team  6. Obtain wbc scan to evaluate for occult infection  7. Monitor cbc, temp,clinically      Above plan was discussed in details with dr Kina Del Rio. Please call me if any further questions or concerns. Will continue to participate in the care of this patient. HPI:    Feels better. No cp, sob, abdominal pain.   Resolved generalized aches            Past Medical History:   Diagnosis Date    [facility]     Diabetes ([facility] [facility]     Hypertension     Hypothyroidism        Past Surgical History:   Procedure Laterality Date    COLONOSCOPY N/A 12/30/2016    COLONOSCOPY. SURVEILLANCE /c Hot Snared Polypectomy /c EndoClip x3 performed by Natalia Ch MD at [facility] ENDOSCOPY    HX HYSTERECTOMY      HX TOTAL COLECTOMY         home Medication List       Details   insulin glargine (LANTUS) 100 unit/mL injection Inject 15 units SQ BID  Qty: 1 Vial, Refills: 0      metFORMIN ER (GLUCOPHAGE XR) 500 mg tablet       gabapentin (NEURONTIN) 100 mg capsule Take 100 mg by mouth three (3) times daily. Cholecalciferol, Vitamin D3, 50,000 unit cap Take 1 Cap by mouth every seven (7) days. insulin regular (NOVOLIN R, HUMULIN R) 100 unit/mL injection by SubCUTAneous route Before breakfast, lunch, and dinner. Sliding scale      amLODIPine (NORVASC) 5 mg tablet Take 5 mg by mouth daily. alendronate (FOSAMAX) 10 mg tablet Take 70 mg by mouth every seven (7) days. aspirin 81 mg chewable tablet Take 81 mg by mouth daily. levothyroxine (SYNTHROID) 112 mcg tablet Take 100 mcg by mouth Daily (before breakfast).              Current Facility-Administered Medications   Medication Dose Route Frequency    insulin glargine (LANTUS) injection 12 Units  12 Units SubCUTAneous DAILY    midodrine (PROAMATINE) tablet 5 mg  5 mg Oral TID WITH MEALS    polyethylene glycol (MIRALAX) packet 17 g  17 g Oral DAILY    docusate sodium (COLACE) capsule 100 mg  100 mg Oral DAILY    benzonatate (TESSALON) capsule 100 mg  100 mg Oral TID PRN    albuterol-ipratropium (DUO-NEB) 2.5 MG-0.5 MG/3 ML  3 mL Nebulization Q4H PRN    insulin lispro (HUMALOG) injection   SubCUTAneous Q6H    glucose chewable tablet 16 g  16 g Oral PRN    glucagon (GLUCAGEN) injection 1 mg  1 mg IntraMUSCular PRN    dextrose 10% infusion 0-250 mL  0-250 mL IntraVENous PRN    levothyroxine (SYNTHROID) tablet 112 mcg  112 mcg Oral 6am    vasopressin (VASOSTRICT) 20 Units in 0.9% sodium chloride 100 mL infusion  0.04 Units/min IntraVENous CONTINUOUS    piperacillin-tazobactam (ZOSYN) 3.375 g in 0.9% sodium chloride (MBP/ADV) 100 mL MBP  3.375 g IntraVENous Q8H    [Held by provider] heparin 25,000 units in  mL infusion  12-25 Units/kg/hr IntraVENous TITRATE    sodium chloride (NS) flush 5-40 mL  5-40 mL IntraVENous Q8H    sodium chloride (NS) flush 5-40 mL  5-40 mL IntraVENous PRN    acetaminophen (TYLENOL) tablet 650 mg  650 mg Oral Q6H PRN    Or    acetaminophen (TYLENOL) suppository 650 mg  650 mg Rectal Q6H PRN    ondansetron (ZOFRAN ODT) tablet 4 mg  4 mg Oral Q8H PRN    Or    ondansetron (ZOFRAN) injection 4 mg  4 mg IntraVENous Q6H PRN       Allergies: Patient has no known allergies. Family History   Problem Relation Age of Onset    Diabetes Mother     Cancer Father         colon     Social History     Socioeconomic History    Marital status: SINGLE     Spouse name: Not on file    Number of children: Not on file    Years of education: Not on file    Highest education level: Not on file   Occupational History    Not on file   Tobacco Use    Smoking status: Former Smoker    Smokeless tobacco: Never Used   Substance and Sexual Activity    Alcohol use: Yes     Comment: occasionally    Drug use: No    Sexual activity: Not on file   Other Topics Concern    Not on file   Social History Narrative    Not on file     Social Determinants of Health     Financial Resource Strain:     Difficulty of Paying Living Expenses: Not on file   Food Insecurity:     Worried About [address] in the Last Year: Not on file    Dougherty Camila in the Last Year: Not on file   Transportation Needs:     Lack of Transportation (Medical): Not on file    Lack of Transportation (Non-Medical):  Not on file   Physical Activity:     Days of Exercise per Week: Not on file    Minutes of Exercise per Session: Not on file   Stress:     Feeling of Stress : Not on file   Social Connections:     Frequency of Communication with Friends and Family: Not on file    Frequency of Social Gatherings with Friends and Family: Not on file    Attends [employer] Services: Not on file    Active Member of [address] [place] [address] or Organizations: Not on file    Attends Club or Organization Meetings: Not on file    Marital Status: Not on file   Intimate Partner Violence:     Fear of Current or Ex-Partner: Not on file    Emotionally Abused: Not on file    Physically Abused: Not on file    Sexually Abused: Not on file   Housing Stability:     Unable to Pay for Housing in the Last Year: Not on file    Number of [place] in the Last Year: Not on file    Unstable Housing in the Last Year: Not on file     Social History     Tobacco Use   Smoking Status Former Smoker   Smokeless Tobacco Never Used        Temp (24hrs), Av.2 °F (36.8 °C), Min:97.9 °F (36.6 °C), Max:98.5 °F (36.9 °C)    Visit Vitals  /60   Pulse 76   Temp 98.5 °F (36.9 °C)   Resp 16   Ht 5' 6\" (1.676 m)   Wt 82.1 kg (181 lb)   SpO2 100%   BMI 29.21 kg/m²       ROS: unable to obtain due to patient factors    Physical Exam:    General: Well developed, well nourished female laying on the bed, eyes open, more communicative, in no acute distress. HEENT:  Normocephalic, atraumatic,  EOMI, no scleral icterus or pallor; no conjunctival hemmohage;  nasal and oral mucous are moist and without evidence of lesions. Neck supple, no bruits. Lymph Nodes:   not examined   Lungs:   non-labored, bilateral chest movements equal, no audible wheezes   Heart:  RRR, s1 and s2; no rubs or gallops, no edema   Abdomen:  soft, non-distended, active bowel sounds, no hepatomegaly, no splenomegaly. no tenderness   Genitourinary:  deferred   Extremities:   no clubbing, cyanosis; no joint effusions or swelling; pain on movements of all extremities; muscle mass appropriate for age Neurologic:  No gross focal sensory abnormalities; 5/5 muscle strength to upper and lower extremities. Speech appropriate. Cranial nerves intact                        Skin:  No rash or ulcers noted   Back:  no spinal or paraspinal muscle tenderness or rigidity, no CVA tenderness     Psychiatric:  Unable to assess         Labs: Results:   Chemistry Recent Labs     06/15/22  0230 06/14/22  1433 06/14/22  0400   * 195* 265*    140 139   K 3.4* 3.7 3.3*    107 107   CO2 27 26 22   BUN 30* 30* 35*   CREA 1.39* 1.49* 1.91*   CA 7.6* 7.6* 7.9*   AGAP 6 7 10   BUCR 22* 20 18   AP  --   --  98   TP  --   --  5.2*   ALB  --   --  2.7*   GLOB  --   --  2.5   AGRAT  --   --  1.1      CBC w/Diff Recent Labs     06/15/22  0230 06/14/22  0400 06/13/22  0400   WBC 20.1* 28.9* 32.3*   RBC 2.77* 2.97* 3.30*   HGB 7.4* 7.9* 8.9*   HCT 22.2* 23.6* 26.3*   * 155 211   GRANS 92* 87* 82*   LYMPH 4* 1* 4*   EOS 0 0 0      Microbiology Recent Labs     06/14/22  1138   CULT NO GROWTH AFTER 18 HOURS          RADIOLOGY:    All available imaging studies/reports in [facility] for this admission were reviewed  Total time spent >35 minutes. High complexity decision making was performed during the evaluation of this patient at high risk for decompensation with multiple organ involvement     Above mentioned total time spent on reviewing the case/medical record/data/notes/EMR/patient examination/documentation/coordinating care with nurse/consultants, exclusive of procedures with complex decision making performed and > 50% time spent in face to face evaluation. Disclaimer: Sections of this note are dictated utilizing voice recognition software, which may have resulted in some phonetic based errors in grammar and contents. Even though attempts were made to correct all the mistakes, some may have been missed, and remained in the body of the document. If questions arise, please contact our department.     Dr. Shanna Hernandez Shannan Wall, Infectious Disease Specialist  [phone]  Elisa 15, 2022  7:08 PM

## 2022-06-15 NOTE — INTERDISCIPLINARY ROUNDS
[facility]  Pulmonary, Critical Care, and Sleep Medicine  Interdisciplinary and Ventilator Weaning Rounds    Patient discussed in morning walking rounds and interdisciplinary rounds. ICU day: 4    Overnight events:    No new acute overnight events   HD stable, remains on Vasopressin at shock dose   Tolerating diet   Mentation improved    Assessments and best practice:   Other pertinent drips  o Heparin and vasopressin   Lines noted  o Femoral CVL    Critical labs assessed  o Yes   Antibiotics  o Zosyn   Medications reviewed  o Yes   Pending imaging  o WBC scan   Pending send out labs  o No   Pending Procedures  o None   Glycemic control   Stress ulcer prophylaxis. o not indicated   DVT prophylaxis.   o Heparin gtt   Need for Lines, brown assessed.  o Yes   Restraint Reevaluation   o N/A      Family contact/MPOA: Daughter - Maryam Betancourt ([phone])  Family updated per RN    Palliative consult within 3 days of admission to ICU   Ethics Guidance: 21 days      Daily Plans:   D/C Vasopressin    Decrease Lantus to 12units qD   Add Midodrine   D/C Steroids, Pepcid and IVF   F/U WBC scan   Replacing e-lytes    DREA time 15 minutes        Regan Gamez PA-C  06/15/22  Pulmonary, Critical Care Medicine  [facility]

## 2022-06-15 NOTE — PROGRESS NOTES
Nutrition Note    Pt discussed during interdisciplinary rounds. Pt started on po diet per SLP on 6/14. Tolerating po diet with good meal intake per RN report. IVF were discontinued with diet was started. K and Phos low today; pt receiving replacement. No BM yet per chart documentation. Discussed with MD about starting bowel regimen; addressed by MD, colace and miralax ordered. Progressing towards nutrition goals. Nutrition Recommendations/Plan:   1. Continue current nutrition interventions. Monitor po intake of meals and diet tolerance.    2. Start bowel regimen - addressed by MD            Electronically signed by Katherine Huffman RD on 6/15/2022 at 2:31 PM    Contact: [phone]

## 2022-06-15 NOTE — DIABETES MGMT
Diabetes/ Glycemic Control Plan of Care  Recommendations:  Recommend decreasing Lantus to 12  units q 24 hrs plus 3 units lispro with meals (when eating 50% of meals)  and corrective lispro. Assessment: Pt with 4 BG readings [phone] this am . After ICU rounds BG increased to 266, may need Lantus increase. DX:   1. Hyperosmolar hyperglycemic state (HHS) ([address]     2. Diabetic ketoacidosis with coma associated with type 2 diabetes mellitus ([address]     3. High anion gap metabolic acidosis     4. Acute metabolic encephalopathy     5. Hypovolemic shock ([address]     6. Acute hyperkalemia     7. Acute renal failure, unspecified acute renal failure type ([facility] [address]     8. Elevated troponin     9. Cardiomyopathy, unspecified type ([facility] [address]     10. BRENDA (acute kidney injury) ([facility] [address]        Fasting/ Morning blood glucose:    Glucose 265 (H) 06/14/2022 04:00 AM    Glucose (POC) 223 (H) 06/14/2022 11:37 AM   IV Fluids containing dextrose: n/a  Steroids:   Rx Glucocorticoids discontinued     Blood glucose values: Within target range (70-180mg/dL):  no  Current insulin orders: 15 units Lantus daily plus corrective lispro   Total Daily Dose previous 24 hours = 39 units (30 of Lantus plus 9 units of corrective lispro)  Current A1c:   Lab Results   Component Value Date/Time    Hemoglobin A1c 9.0 (H) 06/11/2022 02:15 PM    Hemoglobin A1c, External 9.5 07/08/2021 12:00 AM      equivalent  to ave Blood Glucose of 210  mg/dl for 2-3 months prior to admission  Adequate glycemic control PTA: no  Nutrition/Diet:   Active Orders   Diet    ADULT DIET Regular; 3 carb choices (45 gm/meal); Low Sodium (2 gm)    Home diabetes medications:    insulin glargine (LANTUS) 100 unit/mL injection Inject 15 units SQ BID    metFORMIN ER (GLUCOPHAGE XR) 500 mg tablet     insulin regular (NOVOLIN R, HUMULIN R) 100 unit/mL injection by SubCUTAneous route Before breakfast, lunch, and dinner.  Sliding scale   Plan/Goals:   Blood glucose will be within target of 70 - 180 mg/dl within 72 hours  Reinforce dietary and medication compliance at home.       Education:  [x] Refer to Diabetes Education Record                       [] Education not indicated at this time     Jeremy Jacques RD BC-ADM

## 2022-06-15 NOTE — PROGRESS NOTES
Problem: Diabetes Self-Management  Goal: *Disease process and treatment process  Description: Define diabetes and identify own type of diabetes; list 3 options for treating diabetes. Outcome: Progressing Towards Goal  Goal: *Incorporating nutritional management into lifestyle  Description: Describe effect of type, amount and timing of food on blood glucose; list 3 methods for planning meals. Outcome: Progressing Towards Goal  Goal: *Incorporating physical activity into lifestyle  Description: State effect of exercise on blood glucose levels. Outcome: Progressing Towards Goal  Goal: *Developing strategies to promote health/change behavior  Description: Define the ABC's of diabetes; identify appropriate screenings, schedule and personal plan for screenings. Outcome: Progressing Towards Goal  Goal: *Using medications safely  Description: State effect of diabetes medications on diabetes; name diabetes medication taking, action and side effects. Outcome: Progressing Towards Goal  Goal: *Monitoring blood glucose, interpreting and using results  Description: Identify recommended blood glucose targets  and personal targets. Outcome: Progressing Towards Goal  Goal: *Prevention, detection, treatment of acute complications  Description: List symptoms of hyper- and hypoglycemia; describe how to treat low blood sugar and actions for lowering  high blood glucose level. Outcome: Progressing Towards Goal  Goal: *Prevention, detection and treatment of chronic complications  Description: Define the natural course of diabetes and describe the relationship of blood glucose levels to long term complications of diabetes.   Outcome: Progressing Towards Goal  Goal: *Developing strategies to address psychosocial issues  Description: Describe feelings about living with diabetes; identify support needed and support network  Outcome: Progressing Towards Goal  Goal: *Insulin pump training  Outcome: Progressing Towards Goal  Goal: *Sick day guidelines  Outcome: Progressing Towards Goal  Goal: *Patient Specific Goal (EDIT GOAL, INSERT TEXT)  Outcome: Progressing Towards Goal     Problem: Patient Education: Go to Patient Education Activity  Goal: Patient/Family Education  Outcome: Progressing Towards Goal     Problem: Falls - Risk of  Goal: *Absence of Falls  Description: Document Boubacar Navarro Fall Risk and appropriate interventions in the flowsheet.   Outcome: Progressing Towards Goal  Note: Fall Risk Interventions:       Mentation Interventions: Adequate sleep, hydration, pain control,Bed/chair exit alarm    Medication Interventions: Assess postural VS orthostatic hypotension,Bed/chair exit alarm    Elimination Interventions: Bed/chair exit alarm,Call light in reach              Problem: Patient Education: Go to Patient Education Activity  Goal: Patient/Family Education  Outcome: Progressing Towards Goal     Problem: Nutrition Deficit  Goal: *Optimize nutritional status  Outcome: Progressing Towards Goal     Problem: Patient Education: Go to Patient Education Activity  Goal: Patient/Family Education  Outcome: Progressing Towards Goal

## 2022-06-15 NOTE — PROGRESS NOTES
Problem: Self Care Deficits Care Plan (Adult)  Goal: *Acute Goals and Plan of Care (Insert Text)  Description: Occupational Therapy Goals  Initiated 6/15/2022 within 7 day(s). 1.  Patient will perform upper body dressing with supervision/set-up. 2.  Patient will perform grooming sitting EOB with supervision/set-up with Good balance. 3.  Patient will perform bathing with supervision/set-up. 4.  Patient will perform toilet transfers with minimal assistance/contact guard assist.  5.  Patient will perform all aspects of toileting with minimal assistance/contact guard assist.  6.  Patient will participate in upper extremity therapeutic exercise/activities with supervision/set-up for 8 minutes to improve endurance and UB strength needed for ADLs    7. Patient will utilize energy conservation techniques during functional activities with verbal cues. 8.  Patient will perform lower body dressing with minimal assistance. Prior Level of Function: Pt reports she lives alone and is usually independent with ADLs and functional mobility using cane mostly. Pt lives in 2nd floor apartment  Outcome: Progressing Towards Goal  OCCUPATIONAL THERAPY EVALUATION    Patient: Gurmeet Mandel (89 y.o. female)  Date: 6/15/2022  Primary Diagnosis: DKA (diabetic ketoacidosis) ([facility] [address] [E11.10]        Precautions:   Skin,Fall    ASSESSMENT :  Pt cleared to participate in OT evaluation by RN, who requested bed level activity only at this time due to pt is not medically stable. Upon entering room, pt received in bed, alert, and agreeable to OT eval/treatment. Pt seen in conjunction with PT to maximize safety of patient and staff members. Based on the objective data described below, the patient presents with decreased endurance and functional activity tolerance, generalized weakness, intermittent confusion, decreased functional mobility, limiting pt's participation and independence with ADLs.  Pt follows commands, however, demos decreased attention to task, benefiting from VCs to maintain attention on task. Pt required MiN A for bed mobility and Max A for darian-care supine. Pt encouraged to perform BUE BLE AROM exercises throughout a day to improve endurance and strength needed for ADLs. Patient will benefit from skilled intervention to address the above impairments. Patient's rehabilitation potential is considered to be Fair  Factors which may influence rehabilitation potential include:   []             None noted  []             Mental ability/status  [x]             Medical condition  [x]             Home/family situation and support systems  []             Safety awareness  []             Pain tolerance/management  []             Other:      PLAN :  Recommendations and Planned Interventions:   [x]               Self Care Training                  [x]      Therapeutic Activities  [x]               Functional Mobility Training   [x]      Cognitive Retraining  [x]               Therapeutic Exercises           [x]      Endurance Activities  [x]               Balance Training                    []      Neuromuscular Re-Education  []               Visual/Perceptual Training     [x]      Home Safety Training  [x]               Patient Education                   [x]      Family Training/Education  []               Other (comment):    Frequency/Duration: Patient will be followed by occupational therapy 1-2 times/day, 3-5 days/week to address goals. Discharge Recommendations: Rehab  Further Equipment Recommendations for Discharge: TBD     SUBJECTIVE:   Patient stated I live by myself.     OBJECTIVE DATA SUMMARY:     Past Medical History:   Diagnosis Date    Colon cancer ([facility] [address]     Diabetes ([facility] [address]     Hypertension     Hypothyroidism      Past Surgical History:   Procedure Laterality Date    COLONOSCOPY N/A 12/30/2016    COLONOSCOPY.  SURVEILLANCE /c Hot Snared Polypectomy /c EndoClip x3 performed by Jacinta Olmos MD at [facility] HX TOTAL COLECTOMY       Barriers to Learning/Limitations: None  Compensate with: visual, verbal, tactile, kinesthetic cues/model    Home Situation:   Home Situation  Home Environment: Private residence  One/Two Story Residence: One story  Living Alone: Yes  Support Systems: Child(citlali)  Patient Expects to be Discharged to[de-identified] Home  Current DME Used/Available at Home: [address], [place], straight,Walker, rolling  [x]  Right hand dominant   []  Left hand dominant    Cognitive/Behavioral Status:  Neurologic State: Alert;Confused  Orientation Level: Oriented to person;Oriented to place  Cognition: Follows commands  Safety/Judgement: Awareness of environment; Fall prevention    Skin: visible skin intact  Edema: none noted    Vision/Perceptual:       Able to track to all quadrants  Coordination: BUE  Coordination: Within functional limits     Strength: BUE  Strength: Within functional limits   Tone & Sensation: BUE  Tone: Normal  Sensation: Intact   Range of Motion: BUE  AROM: Within functional limits   Functional Mobility and Transfers for ADLs:  Bed Mobility:  Rolling: Minimum assistance   Scooting: Maximum assistance;Assist x2    ADL Assessment:   Feeding: Contact guard assistance  Oral Facial Hygiene/Grooming: Contact guard assistance  Bathing: Moderate assistance  Upper Body Dressing: Minimum assistance  Lower Body Dressing: Maximum assistance  Toileting: Maximum assistance   ADL Intervention:   Cognitive Retraining  Safety/Judgement: Awareness of environment; Fall prevention    Pain:  Pain level pre-treatment: not rated  Pain level post-treatment: not rated    Activity Tolerance:   Fair  Please refer to the flowsheet for vital signs taken during this treatment.   After treatment:   [] Patient left in no apparent distress sitting up in chair  [x] Patient left in no apparent distress in bed  [x] Call bell left within reach  [x] Nursing notified  [] Caregiver present  [x] Bed alarm activated    COMMUNICATION/EDUCATION:   [x] Role of Occupational Therapy in the acute care setting  [x] Home safety education was provided and the patient/caregiver indicated understanding. [x] Patient/family have participated as able in goal setting and plan of care. [] Patient/family agree to work toward stated goals and plan of care. [] Patient understands intent and goals of therapy, but is neutral about his/her participation. [] Patient is unable to participate in goal setting and plan of care. Thank you for this referral.  Etta Bryant OTR/L  Time Calculation: 17 mins    Eval Complexity: History: LOW Complexity : Brief history review ; Examination: MEDIUM Complexity : 3-5 performance deficits relating to physical, cognitive , or psychosocial skils that result in activity limitations and / or participation restrictions;    Decision Making:LOW Complexity : No comorbidities that affect functional and no verbal or physical assistance needed to complete eval tasks

## 2022-06-15 NOTE — PROGRESS NOTES
Reason for Admission:  DKA (diabetic ketoacidosis) ([facility] [address] [E11.10]                 RUR Score:    15%            Plan for utilizing home health:    No, not at this time. Discharge plan at this time is SNF. Likelihood of Readmission:   Moderate                         Do you (patient/family) have any concerns for transition/discharge?  no not at this time. Transition of Care Plan:         Initial assessment completed with relative(s), patient's daughter, Tom Morgan. Cognitive status of patient: Unable to assess at this time. Face sheet information confirmed:  yes. The patient's daughter Tom Morgan [phone] agrees to participate in her discharge plan and to receive any needed information. This patient lives alone,  in a single family home, with 13 steps to enter. Patient is not able to navigate steps as needed. Prior to hospitalization, patient was considered to be independent with ADLs/IADLS : no . If not independent,  patient needs assist with : bathing, food preparation and cooking. Patient has a current ACP document on file: no.      Healthcare Decision Maker:     Click here to complete [address] including selection of the Healthcare Decision Maker Relationship (ie \"Primary\")      Medical Transport will need to  be available to transport patient to SNF upon discharge. The patient already has Cane, Shower chair, Rolling Walker, Raised Toilet Seat,  medical equipment available in the home. Patient is not currently active with home health. Patient has stayed in a skilled nursing facility or rehab. Was  stay within last 60 days : no. This patient is on dialysis :no.      List of available SNF agencies were provided and reviewed with the patient prior to discharge.    Freedom of choice verbal consent given: yes, for [facility] and [facility], but not [facility].         Currently, the discharge plan is SNF.      The patient states that she can obtain her medications from the pharmacy, and take her medications as directed. Patient's current insurance is [employer] ([place] Care Management Interventions  PCP Verified by CM:  Yes  Mode of Transport at Discharge: BLS  Transition of Care Consult (CM Consult): SNF  Partner SNF: Yes  Discharge Durable Medical Equipment: No  Physical Therapy Consult: Yes  Occupational Therapy Consult: Yes  Speech Therapy Consult: Yes  Support Systems: Other (Comment) (Patient lives alone. )  Confirm Follow Up Transport: Family  The Plan for Transition of Care is Related to the Following Treatment Goals : Will Singh  The Patient and/or Patient Representative was Provided with a Choice of Provider and Agrees with the Discharge Plan?: Yes  Name of the Patient Representative Who was Provided with a Choice of Provider and Agrees with the Discharge Plan: Abhijit Coulter (Patient's daughter)  Freedom of Choice List was Provided with Basic Dialogue that Supports the Patient's Individualized Plan of Care/Goals, Treatment Preferences and Shares the Quality Data Associated with the Providers?: Yes  Discharge Location  Patient Expects to be Discharged to[de-identified] Skilled nursing facility        Eugenie Fernandez RN  Case Management [phone]

## 2022-06-15 NOTE — PROGRESS NOTES
UAI/LTSS [place] on [place] Medicaid [place], Status is Successfully Submitted, tracking # [id number].  uploaded UAI/LTSS to [facility] for SNF.            Tacho Muse RN  Case Management [phone]

## 2022-06-15 NOTE — PROGRESS NOTES
0730am Bedside shift report received from [address]. Check Heparin and Vasopressin IV gtt with offcoming RN  0930am Nuclear medicine rajesh blood at the bedside. 1115am Midodrine 5 mg po given  1300pm Daughters are at the bedside  1530pm Black tarry stool noted. Held Hep IV gtt as per Dr. Arianne Waldron  1730pm Pt refuses to eat dinner. 1815pm Daughters are at the bedside. 1900pm Bedside shift change report given to GIOVANNA Elizabeth by Efraín Sutherland RN. Report included the following information SBAR, Kardex, Intake/Output, MAR, Recent Results, Med Rec Status, Cardiac Rhythm NSR and Alarm Parameters .

## 2022-06-15 NOTE — PROGRESS NOTES
CM sent booking requests out to SNF in [facility], with message that patient needs SNF, has been Covid Vaccinated, to please let us know if they can accept. Patient will need Auth for SNF.              Elizabeth Stokes RN  Case Management [phone]

## 2022-06-16 ENCOUNTER — APPOINTMENT (OUTPATIENT)
Dept: NUCLEAR MEDICINE | Age: 72
DRG: 871 | End: 2022-06-16
Attending: INTERNAL MEDICINE
Payer: MEDICARE

## 2022-06-16 LAB
ANION GAP SERPL CALC-SCNC: 6 MMOL/L (ref 3–18)
BASOPHILS # BLD: 0 K/UL (ref 0–0.1)
BASOPHILS NFR BLD: 0 % (ref 0–2)
BUN SERPL-MCNC: 33 MG/DL (ref 7–18)
BUN/CREAT SERPL: 22 (ref 12–20)
CALCIUM SERPL-MCNC: 7.7 MG/DL (ref 8.5–10.1)
CHLORIDE SERPL-SCNC: 110 MMOL/L (ref 100–111)
CO2 SERPL-SCNC: 27 MMOL/L (ref 21–32)
CREAT SERPL-MCNC: 1.53 MG/DL (ref 0.6–1.3)
DIFFERENTIAL METHOD BLD: ABNORMAL
EOSINOPHIL # BLD: 0 K/UL (ref 0–0.4)
EOSINOPHIL NFR BLD: 0 % (ref 0–5)
ERYTHROCYTE [DISTWIDTH] IN BLOOD BY AUTOMATED COUNT: 15.2 % (ref 11.6–14.5)
GLUCOSE BLD STRIP.AUTO-MCNC: 107 MG/DL (ref 70–110)
GLUCOSE BLD STRIP.AUTO-MCNC: 109 MG/DL (ref 70–110)
GLUCOSE BLD STRIP.AUTO-MCNC: 129 MG/DL (ref 70–110)
GLUCOSE BLD STRIP.AUTO-MCNC: 130 MG/DL (ref 70–110)
GLUCOSE BLD STRIP.AUTO-MCNC: 181 MG/DL (ref 70–110)
GLUCOSE SERPL-MCNC: 92 MG/DL (ref 74–99)
HCT VFR BLD AUTO: 22.6 % (ref 35–45)
HGB BLD-MCNC: 7.4 G/DL (ref 12–16)
IMM GRANULOCYTES # BLD AUTO: 0.2 K/UL (ref 0–0.04)
IMM GRANULOCYTES NFR BLD AUTO: 1 % (ref 0–0.5)
LYMPHOCYTES # BLD: 2 K/UL (ref 0.9–3.6)
LYMPHOCYTES NFR BLD: 13 % (ref 21–52)
MAGNESIUM SERPL-MCNC: 2 MG/DL (ref 1.6–2.6)
MCH RBC QN AUTO: 26.7 PG (ref 24–34)
MCHC RBC AUTO-ENTMCNC: 32.7 G/DL (ref 31–37)
MCV RBC AUTO: 81.6 FL (ref 78–100)
MONOCYTES # BLD: 1 K/UL (ref 0.05–1.2)
MONOCYTES NFR BLD: 6 % (ref 3–10)
NEUTS SEG # BLD: 12.6 K/UL (ref 1.8–8)
NEUTS SEG NFR BLD: 79 % (ref 40–73)
NRBC # BLD: 0.05 K/UL (ref 0–0.01)
NRBC BLD-RTO: 0.3 PER 100 WBC
PHOSPHATE SERPL-MCNC: 2.4 MG/DL (ref 2.5–4.9)
PHOSPHATE SERPL-MCNC: 3.4 MG/DL (ref 2.5–4.9)
PLATELET # BLD AUTO: 116 K/UL (ref 135–420)
PMV BLD AUTO: 12.6 FL (ref 9.2–11.8)
POTASSIUM SERPL-SCNC: 3.5 MMOL/L (ref 3.5–5.5)
POTASSIUM SERPL-SCNC: 4.1 MMOL/L (ref 3.5–5.5)
RBC # BLD AUTO: 2.77 M/UL (ref 4.2–5.3)
SODIUM SERPL-SCNC: 143 MMOL/L (ref 136–145)
WBC # BLD AUTO: 15.9 K/UL (ref 4.6–13.2)

## 2022-06-16 PROCEDURE — 74011636637 HC RX REV CODE- 636/637: Performed by: NURSE PRACTITIONER

## 2022-06-16 PROCEDURE — 83735 ASSAY OF MAGNESIUM: CPT

## 2022-06-16 PROCEDURE — 74011000258 HC RX REV CODE- 258: Performed by: INTERNAL MEDICINE

## 2022-06-16 PROCEDURE — 74011000250 HC RX REV CODE- 250: Performed by: PHYSICIAN ASSISTANT

## 2022-06-16 PROCEDURE — 74011000250 HC RX REV CODE- 250: Performed by: STUDENT IN AN ORGANIZED HEALTH CARE EDUCATION/TRAINING PROGRAM

## 2022-06-16 PROCEDURE — APPSS45 APP SPLIT SHARED TIME 31-45 MINUTES: Performed by: NURSE PRACTITIONER

## 2022-06-16 PROCEDURE — 74011250636 HC RX REV CODE- 250/636: Performed by: INTERNAL MEDICINE

## 2022-06-16 PROCEDURE — 74011250636 HC RX REV CODE- 250/636: Performed by: PHYSICIAN ASSISTANT

## 2022-06-16 PROCEDURE — 36415 COLL VENOUS BLD VENIPUNCTURE: CPT

## 2022-06-16 PROCEDURE — 74011250636 HC RX REV CODE- 250/636: Performed by: STUDENT IN AN ORGANIZED HEALTH CARE EDUCATION/TRAINING PROGRAM

## 2022-06-16 PROCEDURE — 84100 ASSAY OF PHOSPHORUS: CPT

## 2022-06-16 PROCEDURE — 74011250637 HC RX REV CODE- 250/637: Performed by: NURSE PRACTITIONER

## 2022-06-16 PROCEDURE — 74011636637 HC RX REV CODE- 636/637: Performed by: PHYSICIAN ASSISTANT

## 2022-06-16 PROCEDURE — APPSS30 APP SPLIT SHARED TIME 16-30 MINUTES: Performed by: REGISTERED NURSE

## 2022-06-16 PROCEDURE — A9541 TC99M SULFUR COLLOID: HCPCS

## 2022-06-16 PROCEDURE — 94762 N-INVAS EAR/PLS OXIMTRY CONT: CPT

## 2022-06-16 PROCEDURE — C9113 INJ PANTOPRAZOLE SODIUM, VIA: HCPCS | Performed by: PHYSICIAN ASSISTANT

## 2022-06-16 PROCEDURE — 84132 ASSAY OF SERUM POTASSIUM: CPT

## 2022-06-16 PROCEDURE — 92526 ORAL FUNCTION THERAPY: CPT

## 2022-06-16 PROCEDURE — 99291 CRITICAL CARE FIRST HOUR: CPT | Performed by: INTERNAL MEDICINE

## 2022-06-16 PROCEDURE — 74011250637 HC RX REV CODE- 250/637: Performed by: STUDENT IN AN ORGANIZED HEALTH CARE EDUCATION/TRAINING PROGRAM

## 2022-06-16 PROCEDURE — 82962 GLUCOSE BLOOD TEST: CPT

## 2022-06-16 PROCEDURE — 97110 THERAPEUTIC EXERCISES: CPT

## 2022-06-16 PROCEDURE — 85025 COMPLETE CBC W/AUTO DIFF WBC: CPT

## 2022-06-16 PROCEDURE — 65270000046 HC RM TELEMETRY

## 2022-06-16 PROCEDURE — 74011000250 HC RX REV CODE- 250: Performed by: REGISTERED NURSE

## 2022-06-16 PROCEDURE — 2709999900 HC NON-CHARGEABLE SUPPLY

## 2022-06-16 PROCEDURE — 99232 SBSQ HOSP IP/OBS MODERATE 35: CPT | Performed by: HOSPITALIST

## 2022-06-16 PROCEDURE — 74011250636 HC RX REV CODE- 250/636: Performed by: REGISTERED NURSE

## 2022-06-16 PROCEDURE — 77010033678 HC OXYGEN DAILY

## 2022-06-16 RX ORDER — TECHNETIUM TC 99M SULFUR COLLOID 2 MG
10.9 KIT MISCELLANEOUS
Status: COMPLETED | OUTPATIENT
Start: 2022-06-16 | End: 2022-06-16

## 2022-06-16 RX ORDER — INSULIN LISPRO 100 [IU]/ML
INJECTION, SOLUTION INTRAVENOUS; SUBCUTANEOUS
Status: DISCONTINUED | OUTPATIENT
Start: 2022-06-16 | End: 2022-06-30 | Stop reason: HOSPADM

## 2022-06-16 RX ADMIN — POTASSIUM PHOSPHATE, MONOBASIC AND POTASSIUM PHOSPHATE, DIBASIC: 224; 236 INJECTION, SOLUTION, CONCENTRATE INTRAVENOUS at 08:20

## 2022-06-16 RX ADMIN — SODIUM CHLORIDE, PRESERVATIVE FREE 10 ML: 5 INJECTION INTRAVENOUS at 21:02

## 2022-06-16 RX ADMIN — MIDODRINE HYDROCHLORIDE 5 MG: 5 TABLET ORAL at 17:00

## 2022-06-16 RX ADMIN — PIPERACILLIN SODIUM AND TAZOBACTAM SODIUM 3.38 G: 3; .375 INJECTION, POWDER, LYOPHILIZED, FOR SOLUTION INTRAVENOUS at 03:44

## 2022-06-16 RX ADMIN — PIPERACILLIN SODIUM AND TAZOBACTAM SODIUM 3.38 G: 3; .375 INJECTION, POWDER, LYOPHILIZED, FOR SOLUTION INTRAVENOUS at 20:14

## 2022-06-16 RX ADMIN — MIDODRINE HYDROCHLORIDE 5 MG: 5 TABLET ORAL at 13:53

## 2022-06-16 RX ADMIN — SODIUM CHLORIDE, PRESERVATIVE FREE 10 ML: 5 INJECTION INTRAVENOUS at 16:45

## 2022-06-16 RX ADMIN — MIDODRINE HYDROCHLORIDE 5 MG: 5 TABLET ORAL at 08:30

## 2022-06-16 RX ADMIN — TECHNETIUM TC 99M SULFUR COLLOID 10.9 MILLICURIE: KIT at 14:00

## 2022-06-16 RX ADMIN — Medication 3 UNITS: at 22:00

## 2022-06-16 RX ADMIN — INSULIN GLARGINE 12 UNITS: 100 INJECTION, SOLUTION SUBCUTANEOUS at 08:32

## 2022-06-16 RX ADMIN — LEVOTHYROXINE SODIUM 112 MCG: 112 TABLET ORAL at 06:50

## 2022-06-16 RX ADMIN — ONDANSETRON 4 MG: 2 INJECTION INTRAMUSCULAR; INTRAVENOUS at 13:52

## 2022-06-16 RX ADMIN — SODIUM CHLORIDE 40 MG: 9 INJECTION INTRAMUSCULAR; INTRAVENOUS; SUBCUTANEOUS at 13:52

## 2022-06-16 RX ADMIN — PIPERACILLIN SODIUM AND TAZOBACTAM SODIUM 3.38 G: 3; .375 INJECTION, POWDER, LYOPHILIZED, FOR SOLUTION INTRAVENOUS at 13:53

## 2022-06-16 RX ADMIN — SODIUM CHLORIDE, PRESERVATIVE FREE 10 ML: 5 INJECTION INTRAVENOUS at 06:51

## 2022-06-16 RX ADMIN — SODIUM CHLORIDE 40 MG: 9 INJECTION INTRAMUSCULAR; INTRAVENOUS; SUBCUTANEOUS at 20:13

## 2022-06-16 NOTE — PROGRESS NOTES
[facility] Hospitalist Group  Progress Note    Patient: Melanie Abdi Age: 66 y.o. : 1950 MR#: [id number] SSN: [id number]  Date: 2022     Subjective:     Patient without complaints currently -states she did have some mild left-sided abdominal discomfort earlier today with BM. No recurrence of abdominal discomfort, no shortness of breath, chest pain, reports presence of nonproductive cough    Assessment/Plan:   1. Acute metabolic encephalopathy -in setting of DKA, shock, sepsis; improved  2. NSTEMI -previously on heparin drip for 48 hrs+ now on hold, continue ASA and statin  3. Multifactorial shock in setting of sepsis, hypovolemia, cardiogenic -off pressors, continue midodrine and wean as able; WBC improving  4. DKA with high anion gap in setting of type 2 diabetes - A1c 9.0, continue Lantus, mealtime insulin and SSI, diabetes educator follows  5. Sepsis, POA - ? Aspiration pneumonia, await WBC scan; LINDA Langford appreciated   6. Cardiomyopathy -echo 2022 with a EF of 30 to 35%  7. BRENDA -improving in setting of sepsis and DKA, nephrology appreciated, monitor off IV fluids; d/c brown and monitor   8. Acute Hypoxic Respiratory failure - progressively improving, wean as able   9. Electrolyte abnormalities (low potassium / phosphorus) - replace and monitor  10. Acute normocytic anemia - noted melena, GI consulted, PPI IV BID; heparin drip on hold  11. Disposition - PT/OT following, discussion of SNF on d/c, CM following.   Medicaid application has been completed per  notes    Discussed with patient regarding continued admission and tx as outlined    Family contact is Oziel Iglesiasallegra [phone]     Additional Notes:      Case discussed with:  [x]Patient  []Family  [x]Nursing  []Case Management  DVT Prophylaxis:  []Lovenox  []Hep SQ  [x]SCDs  []Coumadin / Eliquis or Xarelto   []On Heparin gtt    Objective:     VS:     Visit Vitals  /78   Pulse 71   Temp 98.1 °F (36.7 °C)   Resp 26   Ht 5' 6\" (1.676 m)   Wt 82.1 kg (181 lb)   SpO2 100%   BMI 29.21 kg/m²      Tmax/24hrs: Temp (24hrs), Av.1 °F (36.7 °C), Min:97.7 °F (36.5 °C), Max:98.5 °F (36.9 °C)      Intake/Output Summary (Last 24 hours) at 2022 1421  Last data filed at 2022 1353  Gross per 24 hour   Intake 1447.85 ml   Output 1945 ml   Net -497.15 ml       General:  Alert, NAD  HEENT: Oral mucosa moist  Cardiovascular:  RRR, Nl S1/S2  Pulmonary:  LSC throughout.  Normal resp effort  GI:  +BS in all four quadrants, soft, non-tender  Extremities:  No edema; 2+ dorsalis pedis pulses bilaterally  Neuro: alert and oriented x 3    Labs / micro / imaging :    Recent Results (from the past 24 hour(s))   OCCULT BLOOD, STOOL    Collection Time: 06/15/22  4:28 PM   Result Value Ref Range    Occult blood, stool Positive (A) NEG     GLUCOSE, POC    Collection Time: 06/15/22  4:56 PM   Result Value Ref Range    Glucose (POC) 251 (H) 70 - 110 mg/dL   POTASSIUM    Collection Time: 06/15/22  6:00 PM   Result Value Ref Range    Potassium 3.6 3.5 - 5.5 mmol/L   PHOSPHORUS    Collection Time: 06/15/22  6:00 PM   Result Value Ref Range    Phosphorus 2.8 2.5 - 4.9 MG/DL   GLUCOSE, POC    Collection Time: 22 12:22 AM   Result Value Ref Range    Glucose (POC) 107 70 - 110 mg/dL   PHOSPHORUS    Collection Time: 22  4:00 AM   Result Value Ref Range    Phosphorus 2.4 (L) 2.5 - 4.9 MG/DL   MAGNESIUM    Collection Time: 22  4:00 AM   Result Value Ref Range    Magnesium 2.0 1.6 - 2.6 mg/dL   CBC WITH AUTOMATED DIFF    Collection Time: 22  4:00 AM   Result Value Ref Range    WBC 15.9 (H) 4.6 - 13.2 K/uL    RBC 2.77 (L) 4.20 - 5.30 M/uL    HGB 7.4 (L) 12.0 - 16.0 g/dL    HCT 22.6 (L) 35.0 - 45.0 %    MCV 81.6 78.0 - 100.0 FL    MCH 26.7 24.0 - 34.0 PG    MCHC 32.7 31.0 - 37.0 g/dL    RDW 15.2 (H) 11.6 - 14.5 %    PLATELET 766 (L) 520 - 420 K/uL    MPV 12.6 (H) 9.2 - 11.8 FL    NRBC 0.3 (H) 0  WBC    ABSOLUTE NRBC 0.05 (H) 0.00 - 0.01 K/uL    NEUTROPHILS 79 (H) 40 - 73 %    LYMPHOCYTES 13 (L) 21 - 52 %    MONOCYTES 6 3 - 10 %    EOSINOPHILS 0 0 - 5 %    BASOPHILS 0 0 - 2 %    IMMATURE GRANULOCYTES 1 (H) 0.0 - 0.5 %    ABS. NEUTROPHILS 12.6 (H) 1.8 - 8.0 K/UL    ABS. LYMPHOCYTES 2.0 0.9 - 3.6 K/UL    ABS. MONOCYTES 1.0 0.05 - 1.2 K/UL    ABS. EOSINOPHILS 0.0 0.0 - 0.4 K/UL    ABS. BASOPHILS 0.0 0.0 - 0.1 K/UL    ABS. IMM.  GRANS. 0.2 (H) 0.00 - 0.04 K/UL    DF AUTOMATED     METABOLIC PANEL, BASIC    Collection Time: 06/16/22  4:00 AM   Result Value Ref Range    Sodium 143 136 - 145 mmol/L    Potassium 3.5 3.5 - 5.5 mmol/L    Chloride 110 100 - 111 mmol/L    CO2 27 21 - 32 mmol/L    Anion gap 6 3.0 - 18 mmol/L    Glucose 92 74 - 99 mg/dL    BUN 33 (H) 7.0 - 18 MG/DL    Creatinine 1.53 (H) 0.6 - 1.3 MG/DL    BUN/Creatinine ratio 22 (H) 12 - 20      GFR est AA 40 (L) >60 ml/min/1.73m2    GFR est non-AA 33 (L) >60 ml/min/1.73m2    Calcium 7.7 (L) 8.5 - 10.1 MG/DL   GLUCOSE, POC    Collection Time: 06/16/22  6:49 AM   Result Value Ref Range    Glucose (POC) 109 70 - 110 mg/dL   GLUCOSE, POC    Collection Time: 06/16/22  8:30 AM   Result Value Ref Range    Glucose (POC) 129 (H) 70 - 110 mg/dL   GLUCOSE, POC    Collection Time: 06/16/22 11:04 AM   Result Value Ref Range    Glucose (POC) 130 (H) 70 - 110 mg/dL       Results     Procedure Component Value Units Date/Time    CULTURE, BLOOD [[id number]] Collected: 06/14/22 1138    Order Status: Completed Specimen: Blood Updated: 06/16/22 0629     Special Requests: NO SPECIAL REQUESTS        Culture result: NO GROWTH 2 DAYS       STREP PNEUMO AG, URINE [[id number]]     Order Status: Canceled Specimen: Urine     LEGIONELLA PNEUMOPHILA AG, URINE [[id number]]     Order Status: Canceled Specimen: Urine     CULTURE, BLOOD [[id number]] Collected: 06/11/22 2150    Order Status: Completed Specimen: Blood Updated: 06/16/22 0640     Special Requests: NO SPECIAL REQUESTS        Culture result: NO GROWTH 5 DAYS       CULTURE, BLOOD [[id number]] Collected: 06/11/22 2150    Order Status: Completed Specimen: Blood Updated: 06/16/22 0640     Special Requests: NO SPECIAL REQUESTS        Culture result: NO GROWTH 5 DAYS       RESPIRATORY VIRUS PANEL W/COVID-19, PCR [[id number]] Collected: 06/11/22 2051    Order Status: Completed Specimen: Nasopharyngeal Updated: 06/11/22 2236     Adenovirus Not detected        Coronavirus 229E Not detected        Coronavirus HKU1 Not detected        Coronavirus CVNL63 Not detected        Coronavirus OC43 Not detected        SARS-CoV-2, PCR Not detected        Metapneumovirus Not detected        Rhinovirus and Enterovirus Not detected        Influenza A Not detected        Influenza A, subtype H1 Not detected        Influenza A, subtype H3 Not detected        INFLUENZA A H1N1 PCR Not detected        Influenza B Not detected        Parainfluenza 1 Not detected        Parainfluenza 2 Not detected        Parainfluenza 3 Not detected        Parainfluenza virus 4 Not detected        RSV by PCR Not detected        B. parapertussis, PCR Not detected        Bordetella pertussis - PCR Not detected        Chlamydophila pneumoniae DNA, QL, PCR Not detected        Mycoplasma pneumoniae DNA, QL, PCR Not detected       CULTURE, MRSA [[id number]] Collected: 06/11/22 1120    Order Status: Completed Specimen: Nasal from Nares Updated: 06/13/22 1330     Special Requests: NO SPECIAL REQUESTS        Culture result: MRSA NOT PRESENT               Screening of patient nares for MRSA is for surveillance purposes and, if positive, to facilitate isolation considerations in high risk settings. It is not intended for automatic decolonization interventions per se as regimens are not sufficiently effective to warrant routine use. US ABD LTD    Result Date: 6/14/2022  1. Hepatomegaly -Biliary tree unremarkable by sonography    XR CHEST PORT    Result Date: 6/14/2022  : 1. No acute cardiopulmonary disease. Signed By: Drew Avila NP     June 16, 2022

## 2022-06-16 NOTE — PROGRESS NOTES
Bedside turnover given to [employer]. SBAR,MAR,ED summary given with updates R/T the night, a chance to ask questions was given. PT is on the cardiac tele monitor. Bed is in the lowest position with the wheels locked. Call bell and personal effects  are on the bedside table within reach.

## 2022-06-16 NOTE — PROGRESS NOTES
0730am Bedside shift report received from [place] [address] Interdisciplinary rounds done  1130am Pt went down to Nuclear Medicine. 1414pm Back from [facility]. BP 88/41. Mididorine given. Admin 4 mg of Zofran IVp r.t nauseas. 1800pm d/c TLC at bedside  1830pm - K-4.1, Po4-3.4  1900pm Bedside shift change report given to Steven Richard RN by Riddhi Moon RN. Report included the following information SBAR, Kardex, Intake/Output, MAR, Recent Results, Med Rec Status, Cardiac Rhythm NSR and Alarm Parameters .

## 2022-06-16 NOTE — PROGRESS NOTES
Bedside turnover given from [employer]. SBAR,MAR,ED summary given with updates R/T the day, a chance to ask questions was given. PT is on the cardiac tele monitor. Bed is in the lowest position with the wheels locked. Call bell and personal effects  are on the bedside table within reach. Patient resting comfortably. Whiteboard updated.

## 2022-06-16 NOTE — PROGRESS NOTES
Problem: Self Care Deficits Care Plan (Adult)  Goal: *Acute Goals and Plan of Care (Insert Text)  Description: Occupational Therapy Goals  Initiated 6/15/2022 within 7 day(s). 1.  Patient will perform upper body dressing with supervision/set-up. 2.  Patient will perform grooming sitting EOB with supervision/set-up with Good balance. 3.  Patient will perform bathing with supervision/set-up. 4.  Patient will perform toilet transfers with minimal assistance/contact guard assist.  5.  Patient will perform all aspects of toileting with minimal assistance/contact guard assist.  6.  Patient will participate in upper extremity therapeutic exercise/activities with supervision/set-up for 8 minutes to improve endurance and UB strength needed for ADLs    7. Patient will utilize energy conservation techniques during functional activities with verbal cues. 8.  Patient will perform lower body dressing with minimal assistance. Prior Level of Function: Pt reports she lives alone and is usually independent with ADLs and functional mobility using cane mostly. Pt lives in 2nd floor apartment  Outcome: Progressing Towards Goal     OCCUPATIONAL THERAPY TREATMENT    Patient: Elena Jordan (16 y.o. female)  Date: 6/16/2022  Diagnosis: DKA (diabetic ketoacidosis) ([facility] [E11.10] <principal problem not specified>    Precautions: Skin,Fall      Chart, occupational therapy assessment, plan of care, and goals were reviewed. ASSESSMENT:  Upon entering the room, the patient was supine in bed, alert, and agreeable to participate in OT session. RN present, just finished getting patient cleaned up. Patient max assist for scooting towards HOB, no EOB/OOB activity performed d/t low BP (88/74) at rest. Patient stand by assist for grooming task, observed with dominant R shoulder AROM < L. Patient agreeable to therapeutic exercises to prevent edema and maintain function for ADLs. See chart below for exercises.  Patient left supine in bed, all needs met and call bell in reach. HR ranged 60 bpm - 69 bpm and O2 100%. Progression toward goals:  [x]          Improving appropriately and progressing toward goals  []          Improving slowly and progressing toward goals  []          Not making progress toward goals and plan of care will be adjusted     PLAN:  Patient continues to benefit from skilled intervention to address the above impairments. Continue treatment per established plan of care. Discharge Recommendations:  Rehab  Further Equipment Recommendations for Discharge:  TBD pending pt progress with therapy     SUBJECTIVE:   Patient stated my shoulder just started hurting when I came here    OBJECTIVE DATA SUMMARY:   Cognitive/Behavioral Status:  Neurologic State: Alert  Orientation Level: Oriented to person,Oriented to place  Cognition: Follows commands  Safety/Judgement: Awareness of environment,Fall prevention    Functional Mobility and Transfers for ADLs:   Bed Mobility:  Scooting: Maximum assistance (towards HOB observed with RN as entering room )     ADL Intervention:  Grooming  Grooming Assistance: Stand-by assistance  Brushing/Combing Hair: Stand-by assistance (R shoulder limited, reports new this admission)    UE Therapeutic Exercises:   Pt was able to perform    EXERCISE   Sets   Reps   Active Active Assist   Passive Self- assited ROM   Comments   Shoulder flexion 2  10  [x] [] [] []  limited R shoulder flexion   Shoulder extension 2  10  [x] [] [] []     Bicep curls  2 10  [x] [] [] []     Wrist flexion/extension  2  10 [x] [] [] []     Shoulder shrugs 2 15 [x] [] [] []     In preparation for self-care tasks this session. Pain:  Pain level pre-treatment: 0/10   Pain level post-treatment: 0/10  Pain Intervention(s): Medication (see MAR);  Rest, Ice, Repositioning   Response to intervention: Nurse notified, See doc flow    Activity Tolerance:    Fair at bed level      Please refer to the flowsheet for vital signs taken during this treatment. After treatment:   []  Patient left in no apparent distress sitting up in chair  [x]  Patient left in no apparent distress in bed  [x]  Call bell left within reach  [x]  Nursing notified  []  Caregiver present  []  Bed alarm activated    COMMUNICATION/EDUCATION:   [x] Role of Occupational Therapy in the acute care setting  [x] Home safety education was provided and the patient/caregiver indicated understanding. [x] Patient/family have participated as able in working towards goals and plan of care. [x] Patient/family agree to work toward stated goals and plan of care. [] Patient understands intent and goals of therapy, but is neutral about his/her participation. [] Patient is unable to participate in goal setting and plan of care.       Thank you for this referral.  Guadalupe Banks OTR/L  Time Calculation: 9 mins

## 2022-06-16 NOTE — PROGRESS NOTES
Infectious Disease progress Note        Reason: septic shock    Current abx Prior abx     Zosyn since 6/13 Cefepime, vancomycin, metronidazole  6/11/22-6/13     Lines:       Assessment :  66 y.o. female with pmh of type 2 diabetes mellitus, hypertension, hyperlipidemia, hypothyroidism, and colon cancer s/p total colectomy and chemotherapy who presented to [facility]  via EMS on 6/11/22 for altered mental status. Highly complex clinical picture. Clinical presentation is concerning for sepsis (POA)- however, exact etiology remains unclear  D/D: cardiogenic/hypovolemic shock in setting of DKA versus septic shock due to occult infeciton, right sided heart failure     Concerns for pneumonia on Ct chest- ? Aspiration pneumonia superimposed on pulmonary congestion. Clinical presentation not c/w community acquired pneumonia. No significant hypoxia, SOB, cough    No definitive evidence of cholecystitis per HIDA scan. Gallbladder changes noted on ct scan could be due to perihepatic ascites as seen on US 6/14/22. H/o left hip TKR- currently no evidence of infection left hip. Will monitor for this possibility    Worsening leukocytosis- likely due to sepsis, steroids- r/o fungemia    Acute kidney injury- likely due to sepsis, volume depletion    DKA    Altered mentation- likely metabolic encephalopathy- gradually improving    Decreasing pressor requirement. improved mentation. improving leukocytosis    Recommendations:    1.  continue Zosyn-adjust dose per changing renal function  2. F/u fungitell  3.  taper steroids per ICU team  4. Follow-up results of wbc scan to evaluate for occult infection  5. We will switch to oral antibiotics in a.m. if no significant infection identified on WBC scan      Above plan was discussed in details with dr Alexia Carpio. Please call me if any further questions or concerns. Will continue to participate in the care of this patient. HPI:    Feels better. No cp, sob, abdominal pain.   Resolved generalized aches            Past Medical History:   Diagnosis Date    [facility] ([facility]     Diabetes ([facility]     Hypertension     Hypothyroidism        Past Surgical History:   Procedure Laterality Date    COLONOSCOPY N/A 12/30/2016    COLONOSCOPY. SURVEILLANCE /c Hot Snared Polypectomy /c EndoClip x3 performed by Adrien Anderson MD at [facility] ENDOSCOPY    HX HYSTERECTOMY      HX TOTAL COLECTOMY         home Medication List       Details   insulin glargine (LANTUS) 100 unit/mL injection Inject 15 units SQ BID  Qty: 1 Vial, Refills: 0      metFORMIN ER (GLUCOPHAGE XR) 500 mg tablet       gabapentin (NEURONTIN) 100 mg capsule Take 100 mg by mouth three (3) times daily. Cholecalciferol, Vitamin D3, 50,000 unit cap Take 1 Cap by mouth every seven (7) days. insulin regular (NOVOLIN R, HUMULIN R) 100 unit/mL injection by SubCUTAneous route Before breakfast, lunch, and dinner. Sliding scale      amLODIPine (NORVASC) 5 mg tablet Take 5 mg by mouth daily. alendronate (FOSAMAX) 10 mg tablet Take 70 mg by mouth every seven (7) days. aspirin 81 mg chewable tablet Take 81 mg by mouth daily. levothyroxine (SYNTHROID) 112 mcg tablet Take 100 mcg by mouth Daily (before breakfast).              Current Facility-Administered Medications   Medication Dose Route Frequency    potassium phosphate 20 mmol in 0.9% sodium chloride 250 mL infusion   IntraVENous ONCE    insulin glargine (LANTUS) injection 12 Units  12 Units SubCUTAneous DAILY    midodrine (PROAMATINE) tablet 5 mg  5 mg Oral TID WITH MEALS    polyethylene glycol (MIRALAX) packet 17 g  17 g Oral DAILY    docusate sodium (COLACE) capsule 100 mg  100 mg Oral DAILY    benzonatate (TESSALON) capsule 100 mg  100 mg Oral TID PRN    albuterol-ipratropium (DUO-NEB) 2.5 MG-0.5 MG/3 ML  3 mL Nebulization Q4H PRN    insulin lispro (HUMALOG) injection   SubCUTAneous Q6H    glucose chewable tablet 16 g  16 g Oral PRN    glucagon (GLUCAGEN) injection 1 mg  1 mg IntraMUSCular PRN    dextrose 10% infusion 0-250 mL  0-250 mL IntraVENous PRN    levothyroxine (SYNTHROID) tablet 112 mcg  112 mcg Oral 6am    vasopressin (VASOSTRICT) 20 Units in 0.9% sodium chloride 100 mL infusion  0.04 Units/min IntraVENous CONTINUOUS    piperacillin-tazobactam (ZOSYN) 3.375 g in 0.9% sodium chloride (MBP/ADV) 100 mL MBP  3.375 g IntraVENous Q8H    [Held by provider] heparin 25,000 units in  mL infusion  12-25 Units/kg/hr IntraVENous TITRATE    sodium chloride (NS) flush 5-40 mL  5-40 mL IntraVENous Q8H    sodium chloride (NS) flush 5-40 mL  5-40 mL IntraVENous PRN    acetaminophen (TYLENOL) tablet 650 mg  650 mg Oral Q6H PRN    Or    acetaminophen (TYLENOL) suppository 650 mg  650 mg Rectal Q6H PRN    ondansetron (ZOFRAN ODT) tablet 4 mg  4 mg Oral Q8H PRN    Or    ondansetron (ZOFRAN) injection 4 mg  4 mg IntraVENous Q6H PRN       Allergies: Patient has no known allergies. Family History   Problem Relation Age of Onset    Diabetes Mother     Cancer Father         colon     Social History     Socioeconomic History    Marital status: SINGLE     Spouse name: Not on file    Number of children: Not on file    Years of education: Not on file    Highest education level: Not on file   Occupational History    Not on file   Tobacco Use    Smoking status: Former Smoker    Smokeless tobacco: Never Used   Substance and Sexual Activity    Alcohol use: Yes     Comment: occasionally    Drug use: No    Sexual activity: Not on file   Other Topics Concern    Not on file   Social History Narrative    Not on file     Social Determinants of Health     Financial Resource Strain:     Difficulty of Paying Living Expenses: Not on file   Food Insecurity:     Worried About [address] in the Last Year: Not on file    [address] in the Last Year: Not on file   Transportation Needs:     Lack of Transportation (Medical):  Not on file    Lack of Transportation (Non-Medical): Not on file   Physical Activity:     Days of Exercise per Week: Not on file    Minutes of Exercise per Session: Not on file   Stress:     Feeling of Stress : Not on file   Social Connections:     Frequency of Communication with Friends and Family: Not on file    Frequency of Social Gatherings with Friends and Family: Not on file    Attends [employer] Services: Not on file    Active Member of [address] [place] [address] or Organizations: Not on file    Attends Club or Organization Meetings: Not on file    Marital Status: Not on file   Intimate Partner Violence:     Fear of Current or Ex-Partner: Not on file    Emotionally Abused: Not on file    Physically Abused: Not on file    Sexually Abused: Not on file   Housing Stability:     Unable to Pay for Housing in the Last Year: Not on file    Number of [place] in the Last Year: Not on file    Unstable Housing in the Last Year: Not on file     Social History     Tobacco Use   Smoking Status Former Smoker   Smokeless Tobacco Never Used        Temp (24hrs), Av.1 °F (36.7 °C), Min:97.7 °F (36.5 °C), Max:98.5 °F (36.9 °C)    Visit Vitals  /63   Pulse 64   Temp 97.7 °F (36.5 °C)   Resp 16   Ht 5' 6\" (1.676 m)   Wt 82.1 kg (181 lb)   SpO2 100%   BMI 29.21 kg/m²       ROS: unable to obtain due to patient factors    Physical Exam:    General: Well developed, well nourished female laying on the bed, eyes open, more communicative, in no acute distress. HEENT:  Normocephalic, atraumatic,  EOMI, no scleral icterus or pallor; no conjunctival hemmohage;  nasal and oral mucous are moist and without evidence of lesions. Neck supple, no bruits. Lymph Nodes:   not examined   Lungs:   non-labored, bilateral chest movements equal, no audible wheezes   Heart:  RRR, s1 and s2; no rubs or gallops, no edema   Abdomen:  soft, non-distended, active bowel sounds, no hepatomegaly, no splenomegaly. no tenderness   Genitourinary:  deferred   Extremities:   no clubbing, cyanosis; no joint effusions or swelling; pain on movements of all extremities; muscle mass appropriate for age   Neurologic:  No gross focal sensory abnormalities; 5/5 muscle strength to upper and lower extremities. Speech appropriate. Cranial nerves intact                        Skin:  No rash or ulcers noted   Back:  no spinal or paraspinal muscle tenderness or rigidity, no CVA tenderness     Psychiatric:  Unable to assess         Labs: Results:   Chemistry Recent Labs     06/16/22  0400 06/15/22  1800 06/15/22  0230 06/14/22  1433 06/14/22  1433 06/14/22  0400 06/14/22  0400   GLU 92  --  118*  --  195*   < > 265*     --  140  --  140   < > 139   K 3.5 3.6 3.4*   < > 3.7   < > 3.3*     --  107  --  107   < > 107   CO2 27  --  27  --  26   < > 22   BUN 33*  --  30*  --  30*   < > 35*   CREA 1.53*  --  1.39*  --  1.49*   < > 1.91*   CA 7.7*  --  7.6*  --  7.6*   < > 7.9*   AGAP 6  --  6  --  7   < > 10   BUCR 22*  --  22*  --  20   < > 18   AP  --   --   --   --   --   --  98   TP  --   --   --   --   --   --  5.2*   ALB  --   --   --   --   --   --  2.7*   GLOB  --   --   --   --   --   --  2.5   AGRAT  --   --   --   --   --   --  1.1    < > = values in this interval not displayed. CBC w/Diff Recent Labs     06/16/22  0400 06/15/22  0230 06/14/22  0400   WBC 15.9* 20.1* 28.9*   RBC 2.77* 2.77* 2.97*   HGB 7.4* 7.4* 7.9*   HCT 22.6* 22.2* 23.6*   * 114* 155   GRANS 79* 92* 87*   LYMPH 13* 4* 1*   EOS 0 0 0      Microbiology Recent Labs     06/14/22  1138   CULT NO GROWTH 2 DAYS          RADIOLOGY:    All available imaging studies/reports in [facility] for this admission were reviewed    Disclaimer: Sections of this note are dictated utilizing voice recognition software, which may have resulted in some phonetic based errors in grammar and contents. Even though attempts were made to correct all the mistakes, some may have been missed, and remained in the body of the document.  If questions arise, please contact our department.     Dr. Jose A Pinon, Infectious Disease Specialist  [phone]  June 16, 2022  7:08 PM

## 2022-06-16 NOTE — DIABETES MGMT
Diabetes/ Glycemic Control Plan of Care  Recommendations:   Blood glucose this am 109 mg/dl  Noted steroids discontinued yesterday. Continue current insulin regimen as ordered. Will continue inpatient monitoring. Assessment:   DX:   1. Hyperosmolar hyperglycemic state (HHS) ([facility]     2. Diabetic ketoacidosis with coma associated with type 2 diabetes mellitus ([facility]     3. High anion gap metabolic acidosis     4. Acute metabolic encephalopathy     5. Hypovolemic shock ([facility]     6. Acute hyperkalemia     7. Acute renal failure, unspecified acute renal failure type ([facility]     8. Elevated troponin     9. Cardiomyopathy, unspecified type ([facility]     10. BRENDA (acute kidney injury) ([facility]     11. Cardiogenic shock (HCC)        Fasting/ Morning blood glucose:   Lab Results   Component Value Date/Time    Glucose 92 06/16/2022 04:00 AM    Glucose (POC) 129 (H) 06/16/2022 08:30 AM    Glucose (POC) 90 02/06/2019 02:13 PM    Glucose,  (H) 01/19/2021 09:26 PM     IV Fluids containing dextrose:   None   Steroids:   Discontinued. Last dose yesterday 6/15/22. Blood glucose values:       Results for Demetris Williamson (MRN [id number]) as of 6/16/2022 10:00   Ref. Range 6/15/2022 11:29 6/15/2022 16:56 6/16/2022 00:22 6/16/2022 06:49 6/16/2022 08:30   GLUCOSE,FAST - POC Latest Ref Range: 70 - 110 mg/dL 266 (H) 251 (H) 107 109 129 (H)     Within target range (70-180mg/dL):  yes   Current insulin orders:   Lantus 12 units daily  Lispro corrective insulin coverage  Total Daily Dose previous 24 hours = 33 units   Current A1c:   Lab Results   Component Value Date/Time    Hemoglobin A1c 9.0 (H) 06/11/2022 02:15 PM    Hemoglobin A1c, External 9.5 07/08/2021 12:00 AM      equivalent  to ave Blood Glucose of 212 mg/dl for 2-3 months prior to admission  Adequate glycemic control PTA:   No   Nutrition/Diet:   Active Orders   Diet    ADULT DIET Regular; 3 carb choices (45 gm/meal);  Low Sodium (2 gm)      Meal Intake:  Patient Vitals for the past 168 hrs:   % Diet Eaten   06/14/22 1841 0%     Supplement Intake:  No data found. Home diabetes medications:   Key Antihyperglycemic Medications             insulin glargine (LANTUS) 100 unit/mL injection Inject 15 units SQ BID    metFORMIN ER (GLUCOPHAGE XR) 500 mg tablet     insulin regular (NOVOLIN R, HUMULIN R) 100 unit/mL injection by SubCUTAneous route Before breakfast, lunch, and dinner. Sliding scale        Plan/Goals:   Blood glucose will be within target of 70 - 180 mg/dl within 72 hours  Reinforce dietary and medication compliance at home.          Education:  [x] Refer to Diabetes Education Record (6/14/22)                         [] Education not indicated at this time     Emma Holliday, [facility] CDE  Ext [phone]

## 2022-06-16 NOTE — INTERDISCIPLINARY ROUNDS
[facility]  Pulmonary, Critical Care, and Sleep Medicine  Interdisciplinary and Ventilator Weaning Rounds     Patient discussed in morning walking rounds and interdisciplinary rounds.     ICU day: 5     Overnight events:   · No new acute overnight events  · Off pressors as of 6/15     Assessments and best practice:  · Other pertinent drips  ? Heparin  · Lines noted  ? Femoral CVL   · Critical labs assessed  ? Yes  · Antibiotics  ? Zosyn  · Medications reviewed  ? Yes  · Pending imaging  ? WBC scan  · Pending send out labs  ? No  · Pending Procedures  ? None  · Glycemic control  · Stress ulcer prophylaxis. ? not indicated  · DVT prophylaxis. ? Heparin gtt  · Need for Lines, brown assessed. ? Yes  · Restraint Reevaluation   ?  N/A        Family contact/MPOA: Daughter - Sukhwinder Lopez ([phone])  Family updated per RN     Palliative consult within 3 days of admission to ICU   Ethics Guidance: 21 days        Daily Plans:  · WBC scan pending  · Remove R fem CVL  · GI consult  · Transfer to stepdown      DREA time 15 minutes           Pelon Aguilar PA-C  06/15/22  Pulmonary, Critical Care Medicine  [facility]

## 2022-06-16 NOTE — PROGRESS NOTES
attended the interdisciplinary rounds for Amara Maddox, who is a 66 y.o.,female. Patients Primary Language is: [place]. According to the patients EMR [employer] Affiliation is: [facility].     The reason the Patient came to the hospital is:   Patient Active Problem List    Diagnosis Date Noted    Elevated troponin 06/12/2022    Primary hypertension 06/12/2022    Acquired hypothyroidism 06/12/2022    History of colon cancer 06/12/2022    BRENDA (acute kidney injury) ([facility] 06/12/2022    DKA (diabetic ketoacidosis) ([facility] 06/11/2022    Hyperkalemia 01/19/2021    DKA (diabetic ketoacidoses) 01/19/2021      Plan:  Chaplains will continue to follow and will provide pastoral care on an as needed/requested basis.  recommends bedside caregivers page  on duty if patient shows signs of acute spiritual or emotional distress.     [address] [facility]  Board Certified [address] [place] [address]   [phone]

## 2022-06-16 NOTE — PROGRESS NOTES
[facility]. Pulmonary, Critical Care, and Sleep Medicine    Name: Daxa Ballard MRN: [id number]   : 1950 Hospital: [facility]   Date: 2022  Admission Date: 2022     Chart and notes reviewed. Data reviewed. I have evaluated all findings. [x]I have reviewed the flowsheet and previous days notes. []The patient is unable to give any meaningful history or review of systems because the patient is:  []Intubated []Sedated   []Unresponsive      []The patient is critically ill on      []Mechanical ventilation []Pressors   []BiPAP []         Interval HPI:  Patient with a history of poorly controlled diabetes, hypertension, hypothyroidism, and colon cancer, arrived 22 via rescue with altered mental status; was unresponsive upon EMS arrival with hyperglycemia. Admitted to ICU with acute toxic metabolic encephalopathy, multifactorial shock (sepsis, hypovolemic, component of cardiogenic), diabetic ketoacidosis (pH on arrival 6.78), lactic acidosis, nonoliguric acute kidney injury, mixed respiratory failure, and electrolyte derangements (hyperkalemia). Acute kidney injury is slowly improving, hyperkalemia is resolved, acidemia with severe metabolic acidosis secondary to DKA is improved, diabetic ketoacidosis is improved.     Subjective 22  Hospital Day:  Overnight events:No acute events  Mentation/Activity: alert, awake,oriented to self and place, follows commands  Respiratory/ Secretions:stable on nasal cannula  Hemodynamics:stable  Urine output, bowel: non-oliguric, 0.3 ml/kg/hr  Diet:tolerating diet  Need for procedures:WBC tag scan              ROS:Pertinent items are noted in HPI. Events and notes from last 24 hours reviewed.      Patient Active Problem List   Diagnosis Code    Hyperkalemia E87.5    DKA (diabetic ketoacidoses) E11.10    DKA (diabetic ketoacidosis) ([facility] [address] E11.10    Elevated troponin R77.8    Primary hypertension I10    Acquired hypothyroidism E03.9    History of colon cancer Z85.038    BRENDA (acute kidney injury) ([facility] [address] N17.9       Vital Signs:  Visit Vitals  /61   Pulse 63   Temp 98.1 °F (36.7 °C)   Resp 22   Ht 5' 6\" (1.676 m)   Wt 82.1 kg (181 lb)   SpO2 100%   BMI 29.21 kg/m²       O2 Device: Nasal cannula   O2 Flow Rate (L/min): 2 l/min   Temp (24hrs), Av.1 °F (36.7 °C), Min:97.9 °F (36.6 °C), Max:98.5 °F (36.9 °C)       Intake/Output:   Last shift:      06/15 1901 -  0700  In: 356.7 [I.V.:356.7]  Out: -   Last 3 shifts:  0701 - 06/15 1900  In: 3268.8 [I.V.:3268.8]  Out: 1800 [Urine:1800]    Intake/Output Summary (Last 24 hours) at 2022 0310  Last data filed at 6/15/2022 2100  Gross per 24 hour   Intake 1124.85 ml   Output 650 ml   Net 474.85 ml          Current Facility-Administered Medications   Medication Dose Route Frequency    insulin glargine (LANTUS) injection 12 Units  12 Units SubCUTAneous DAILY    midodrine (PROAMATINE) tablet 5 mg  5 mg Oral TID WITH MEALS    polyethylene glycol (MIRALAX) packet 17 g  17 g Oral DAILY    docusate sodium (COLACE) capsule 100 mg  100 mg Oral DAILY    insulin lispro (HUMALOG) injection   SubCUTAneous Q6H    levothyroxine (SYNTHROID) tablet 112 mcg  112 mcg Oral 6am    vasopressin (VASOSTRICT) 20 Units in 0.9% sodium chloride 100 mL infusion  0.04 Units/min IntraVENous CONTINUOUS    piperacillin-tazobactam (ZOSYN) 3.375 g in 0.9% sodium chloride (MBP/ADV) 100 mL MBP  3.375 g IntraVENous Q8H    [Held by provider] heparin 25,000 units in  mL infusion  12-25 Units/kg/hr IntraVENous TITRATE    sodium chloride (NS) flush 5-40 mL  5-40 mL IntraVENous Q8H         Telemetry: []Sinus []A-flutter []Paced    []A-fib []Multiple PVCs                  Physical Exam:      General: Flat affect, no distress, appears stated age  HEENT:  Anicteric sclerae; pink palpebral conjunctivae; mucosa moist  Resp:  Symmetrical chest expansion, no accessory muscle use; good airway entry; no rales/ wheezing/ rhonchi noted  CV:  S1, S2 present; regular rate and rhythm  GI:  Abdomen soft, non-tender; (+) active bowel sounds  Extremities:  no edema/ cyanosis/ clubbing noted   Skin:  Warm; no rashes/ lesions noted  Neurologic:  Non-focal  Devices:  Central line, brown catheter   DATA:  MAR reviewed and pertinent medications noted or modified as needed    Labs:  Recent Labs     06/15/22  0230 06/14/22  0400 06/13/22  0400   WBC 20.1* 28.9* 32.3*   HGB 7.4* 7.9* 8.9*   HCT 22.2* 23.6* 26.3*   * 155 211     Recent Labs     06/15/22  1800 06/15/22  0230 06/14/22  1433 06/14/22  0400 06/14/22  0400 06/13/22  0400 06/13/22  0400   NA  --  140 140  --  139   < > 139   K 3.6 3.4* 3.7   < > 3.3*   < > 4.7   CL  --  107 107  --  107   < > 106   CO2  --  27 26  --  22   < > 19*   GLU  --  118* 195*  --  265*   < > 292*   BUN  --  30* 30*  --  35*   < > 51*   CREA  --  1.39* 1.49*  --  1.91*   < > 2.24*   CA  --  7.6* 7.6*  --  7.9*   < > 7.9*   MG  --  2.0  --   --  1.8  --  2.5   PHOS 2.8 2.4*  --   --  1.9*   < > 3.0   ALB  --   --   --   --  2.7*  --   --    ALT  --   --   --   --  91*  --   --     < > = values in this interval not displayed. No results for input(s): PH, PCO2, PO2, HCO3, FIO2 in the last 72 hours. No results for input(s): FIO2I, IFO2, HCO3I, IHCO3, HCOPOC, PCO2I, PCOPOC, IPHI, PHI, PHPOC, PO2I, PO2POC in the last 72 hours. No lab exists for component: IPOC2    Imaging:  [x]   I have personally reviewed the patients radiographs and reports  XR Results (most recent):  XR Results (most recent):  Results from Hospital Encounter encounter on 06/11/22    XR CHEST PORT    Narrative  EXAM: XR CHEST PORT    Indications: evaluate for aspiration pneumonia    Comparison: Recent prior    Findings:  Rotated to the right  Lines/Tubes/Devices:  None  LUNGS: Clear  MEDIASTINUM: Unremarkable  BONES/SOFT TISSUES: No acute findings. Thoracic spondylosis.  Shoulder region  arthritis. Impression  :    1. No acute cardiopulmonary disease. CT Results (most recent):  Results from Hospital Encounter encounter on 06/11/22    CT CHEST ABD PELV WO CONT    Narrative  EXAM: CT CHEST ABD PELV WO CONT    CLINICAL INDICATION/HISTORY : AMS, WBC 37K, undifferentiated shock, DKA. COMPARISON: CT chest January 19, 2021. CT chest abdomen and pelvis June 8, 2015    TECHNIQUE: Helical scan of the chest, abdomen and pelvis were obtained from the  thoracic inlet to the symphysis without IV contrast administration and without  oral contrast.  All Ct scans at this facility are performed using dose optimization of technique  as appropriate to a performed exam, to include automated exposure control,  adjustment of the mA and/or kV according to patient size (including appropriate  matching for site specific examinations), or use of iterative reconstruction  technique. FINDINGS: Limited by motion. CT of the chest:    Lungs:  Mild dependent groundglass opacities posterior upper lobes and dependent  opacities at the lung bases. New patchy groundglass opacities in the medial  upper lobes bilaterally adjacent to the mediastinum. Pneumatocele in the lingula  similar to prior. Thyroid:  Unremarkable    Heart and great vessels: Mild atherosclerotic vascular calcification including  coronary artery calcification. Main pulmonary artery is 3.1 cm-borderline  enlarged. Lymph nodes:  No adenopathy    Pleural spaces:  Trace bilateral pleural effusions      Bones:  Unremarkable for age        CT of the abdomen/pelvis:    Liver:  Hepatomegaly. Liver measures 20 cm in length    Gallbladder:  Gallbladder sludge    Spleen:  Unremarkable    Adrenal Glands:  Unremarkable    Pancreas: Unremarkable    Kidneys:  Unremarkable    Stomach, small bowel, colon:  Partial colectomy.     Lymph nodes:  No adenopathy    Aorta:  Unremarkable    Bladder:  Not well seen due to beam Antonio artifact from left hip arthroplasty  and nondistention. Chandler catheter present    Peritoneal spaces:  Small amount of induration adjacent to the gallbladder. Very  small fatty umbilical hernia    Pelvic structures: Hysterectomy. Right-sided femoral venous catheter    Bones:  Osteopenia. Lumbar spondylosis. Left hip arthroplasty. Moderate superior  endplate compression deformity at L4 with chronic appearance. Moderate lumbar  spondylosis. Lack of oral and intravenous contrast limits sensitivity. Impression  1. Sludge in the gallbladder. Small amount of nonspecific mesenteric induration  near the gallbladder. As clinically indicated, could perform right upper  quadrant ultrasound. 2.  Hepatomegaly. 3.  Patchy groundglass opacities in the medial upper lobes bilaterally, likely  infectious or inflammatory. Other scattered areas of atelectasis. Trace  bilateral pleural effusions. 06/11/22    ECHO ADULT FOLLOW-UP OR LIMITED 06/12/2022 6/12/2022    Interpretation Summary    Left Ventricle: Moderately reduced left ventricular systolic function with a visually estimated EF of 30 - 35%. Left ventricle size is normal. Mildly increased wall thickness. There are regional wall motion abnormalities.   Right Ventricle: Reduced systolic function.   Normal size right ventricle with reduced function.   Mild to moderdate tricuspid regurgitation with PASP of 30 mmHg.     Signed by: Ryan Coello MD on 6/12/2022  3:52 PM       IMPRESSION:   · DKA with high anion gap  · Metabolic encephalopathy  · BRENDA  · Multifactorial shock (sepsis, hypovolemic, component of cardiogenic) - requiring vasopressors  · Respiratory failure -resolved   · NSTEMI- on heparin drip- improving  · Sepsis with unknown etiology - ?CAP vs cholecystitis   · Cardiomyopathy, EF 30-35%     Patient Active Problem List   Diagnosis Code    Hyperkalemia E87.5    DKA (diabetic ketoacidoses) E11.10    DKA (diabetic ketoacidosis) ([facility] [address] E11.10    Elevated troponin R77.8  Primary hypertension I10    Acquired hypothyroidism E03.9    History of colon cancer Z85.038    BRENDA (acute kidney injury) ([facility] [address] N17.9        RECOMMENDATIONS:   Neuro:neuro checks per routine, avoid sedatives  Pulm: Aspiration precautions, HOB>30'. CVS:Monitor HD, MAP goal >65, off vasopressors, continue Midodrine. Continue Heparin ggt. Will need ischemia work-up prior to discharge per cards. GI: Continue diet  Renal: Trend Cr, UOP. Brown. Nephrology folowing  Hem/Onc: Trend H/H, monitor for s/o active bleeding. Daily CBC. I/D:Trend WBCs and temperature curve. Started on Zosyn 6/13/22. Pending WBC tag scan  Metabolic: Daily BMP, mag, phos. Trend lytes and replace per protocol. Endocrine:Q6 glucoses. SSI. Avoid hypoglycemia. Lantus 12, SSI, levothyroxine 112 mcg   Musc/Skin: wound care, PT/OT/SLP  DNR  Discussed in interdisciplinary rounds     Best practice :    Glycemic control  IHI ICU bundles:   Central Line Bundle Followed  and Brown Bundle Followed    Stress ulcer prophylaxis. Pepcid  DVT prophylaxis. Heparin gtt  Need for Lines, brown assessed. Palliative care evaluation. This care involved high complexity decision making to assess, manipulate, and support vital system functions, to treat this degreee vital organ system failure and to prevent further life threatening deterioration of the patients condition  The services I provided to this patient were to treat and/or prevent clinically significant deterioration that could result in the failure of one or more body systems and/or organ systems due to respiratory distress, hypoxia, cardiac dysrhythmia.        Rich Sands NP   06/16/22  Pulmonary, Critical Care Medicine  [address] [place] [address] [facility]

## 2022-06-16 NOTE — PROGRESS NOTES
Problem: Dysphagia (Adult)  Goal: *Acute Goals and Plan of Care (Insert Text)  Description:     Patient will:  1. Tolerate PO trials with 0 s/s overt distress in 4/5 trials- met  2. Participate in training and education related to continued aspiration risk, diet recs and compensatory strategies - met    Recommend:   Regular diet with thin liquids  Meds as tolerated   Aspiration precautions  HOB >45 degrees during all intake and for at least 30 min after po   Small bites/sips, Slow rate of intake     Outcome: Resolved/Met   SPEECH LANGUAGE PATHOLOGY DYSPHAGIA TREATMENT & DISCHARGE    Patient: Agustina Thompson (74 y.o. female)  Date: 6/16/2022  Diagnosis: DKA [facility]diabetic [facility]osis[facility] [E11.10] <principal problem not specified>       Precautions: aspiration Skin,Fall  PLOF: As per H&P     ASSESSMENT:  Pt was seen at bedside for follow up dysphagia management. Pt tolerated reg solid, puree, and thin liquids +/- straw consecutive swallows without any overt s/sx of aspiration. Mastication was appropriate with timely a-p transit. Positive oral clearance observed across all trials. Pt safe for continuation of reg solid diet with thin liquids, aspiration precautions, oral care TID, and meds as tolerated. Pt with no concerns of dysphagia at this time. Will complete order set. Please re-consult if needed. Progression toward goals:  [x]         Goals met/approximated  []         Not making progress/Not appropriate - will d/c POC     PLAN:  Recommendations and Planned Interventions:  Maximum therapeutic gains met; safest, least restrictive diet achieved. D/C ST intervention at this time. Discharge Recommendations:  None     SUBJECTIVE:   Patient stated Thank you for stopping by.     OBJECTIVE:   Cognitive and Communication Status:  Neurologic State: Alert  Orientation Level: Oriented to person,Oriented to place,Oriented to situation  Cognition: Follows commands  Perception: Appears intact  Perseveration: No perseveration noted  Safety/Judgement: Awareness of environment,Fall prevention  Dysphagia Treatment:  Oral Assessment:  Oral Assessment  Labial: No impairment  Dentition: Natural,Intact  Oral Hygiene: Good  Lingual: No impairment  Velum: No impairment  Mandible: No impairment  P.O. Trials:   Patient Position: 45 at [facility]   Vocal quality prior to P.O.: No impairment   Consistency Presented:  Thin liquid,Solid   How Presented: Self-fed/presented,Cup/sip,Straw,Successive swallows   Bolus Acceptance: No impairment   Bolus Formation/Control: No impairment   Propulsion: No impairment   Oral Residue: None   Initiation of Swallow: No impairment   Laryngeal Elevation: Functional   Aspiration Signs/Symptoms: None   Pharyngeal Phase Characteristics: Easily fatigued    Effective Modifications: Small sips and bites   Cues for Modifications: Minimal       Oral Phase Severity: No impairment   Pharyngeal Phase Severity : Minimal       PAIN:  Start of Tx: 0  End of Tx: 0     After treatment:   []              Patient left in no apparent distress sitting up in chair  [x]              Patient left in no apparent distress in bed  [x]              Call bell left within reach  [x]              Nursing notified  []              Caregiver present  []              Bed alarm activated    COMMUNICATION/EDUCATION:   [x] Aspiration precautions; swallow safety; compensatory techniques  [x]        Patient/family able to participate in training and education   []  Patient unable to participate in education; education ongoing with staff  [] Patient understands goals/intent of therapy; neutral about participation     Thank you for this referral.    Harmony Burks M.S., CCC-SLP/L  Speech-Language Pathologist

## 2022-06-16 NOTE — PROGRESS NOTES
Cardiology Progress Note    Admit Date: 6/11/2022  Attending Cardiologist: Dr. Shaylee Colon    Assessment:     -Septic shock, WBC ~30K with bandemia up to 12% on 6/13. S/p pressor support. · CT C/A/P 6/12/2022 with sludge int he gallbladder, small amount of nonspecific mesenteric induration near the gallbladder; hepatomegaly; patchy groundglass opacities in medial upper lobes bilaterally, likely infectious or inflammatory  -DKA  -BRENDA with hyperkalemia, Cr 3.89 with K 7.9 on presentation 6/11/2022, improving. K mildly low at 3.3 on 6/14/2022.  -Elevated troponin up to 5482 on 6/13/2022, suspect demand in setting of above  -Cardiomyopathy, Echo 6/12/2022 with EF 30-35% with akinetic basal anterolateral, mid anterolateral and apical lateral walls. Prior Echo 01/2021 with LVEF 55-60% with normal wall motion.  -Anemia, Hgb ~8  -Thyroid disorder, TSH 0.17 on 6/11 with FT4 1.3 and FT3 1.1  -Hx colon cancer s/p total colectomy and chemotherapy  -Hx HTN. -Hx HLD. No primary cardiologist, initial referral completed by Dr. Shaylee Colon. Plan:     -Continue supportive care per PCCM team. Wean midodrine as able. -Will consider ischemia evaluation once improvement in comorbidities/infectious process/BRENDA given reduced LVEF and WMA noted on TTE. No GDMT for now until midodrine weaned off.   -Heparin gtt x 48hrs. Cont with ASA and statin.   -Antibiotics per ID team, appreciate assistance. Subjective:     Pressors coming off. On 2L oxygen.  More awake this AM.     Objective:      Patient Vitals for the past 8 hrs:   Temp Pulse Resp BP SpO2   06/15/22 2100 -- 63 22 115/61 100 %   06/15/22 2000 98.1 °F (36.7 °C) 73 22 113/61 100 %   06/15/22 1900 -- 66 22 (!) 114/59 100 %   06/15/22 1800 -- 71 23 123/69 100 %   06/15/22 1700 -- 77 23 99/86 100 %   06/15/22 1600 98.5 °F (36.9 °C) 76 16 109/60 100 %   06/15/22 1500 -- 62 18 (!) 97/50 100 %         Patient Vitals for the past 96 hrs:   Weight   06/12/22 1218 82.1 kg (181 lb) Current Facility-Administered Medications   Medication Dose Route Frequency Last Admin    insulin glargine (LANTUS) injection 12 Units  12 Units SubCUTAneous DAILY 12 Units at 06/15/22 1133    midodrine (PROAMATINE) tablet 5 mg  5 mg Oral TID WITH MEALS 5 mg at 06/15/22 1741    polyethylene glycol (MIRALAX) packet 17 g  17 g Oral DAILY      docusate sodium (COLACE) capsule 100 mg  100 mg Oral DAILY      benzonatate (TESSALON) capsule 100 mg  100 mg Oral TID  mg at 06/14/22 2059    albuterol-ipratropium (DUO-NEB) 2.5 MG-0.5 MG/3 ML  3 mL Nebulization Q4H PRN 3 mL at 06/15/22 0556    insulin lispro (HUMALOG) injection   SubCUTAneous Q6H 9 Units at 06/15/22 1700    glucose chewable tablet 16 g  16 g Oral PRN      glucagon (GLUCAGEN) injection 1 mg  1 mg IntraMUSCular PRN      dextrose 10% infusion 0-250 mL  0-250 mL IntraVENous PRN      levothyroxine (SYNTHROID) tablet 112 mcg  112 mcg Oral 6am 112 mcg at 06/15/22 0545    vasopressin (VASOSTRICT) 20 Units in 0.9% sodium chloride 100 mL infusion  0.04 Units/min IntraVENous CONTINUOUS Stopped at 06/15/22 1240    piperacillin-tazobactam (ZOSYN) 3.375 g in 0.9% sodium chloride (MBP/ADV) 100 mL MBP  3.375 g IntraVENous Q8H 3.375 g at 06/15/22 2013    [Held by provider] heparin 25,000 units in  mL infusion  12-25 Units/kg/hr IntraVENous TITRATE 15 Units/kg/hr at 06/15/22 1406    sodium chloride (NS) flush 5-40 mL  5-40 mL IntraVENous Q8H 10 mL at 06/15/22 1532    sodium chloride (NS) flush 5-40 mL  5-40 mL IntraVENous PRN      acetaminophen (TYLENOL) tablet 650 mg  650 mg Oral Q6H PRN      Or    acetaminophen (TYLENOL) suppository 650 mg  650 mg Rectal Q6H PRN      ondansetron (ZOFRAN ODT) tablet 4 mg  4 mg Oral Q8H PRN      Or    ondansetron (ZOFRAN) injection 4 mg  4 mg IntraVENous Q6H PRN           Intake/Output Summary (Last 24 hours) at 6/15/2022 2251  Last data filed at 6/15/2022 2100  Gross per 24 hour   Intake 1124.85 ml Output 650 ml   Net 474.85 ml       Physical Exam:  General:  alert, cooperative, no distress, appears stated age  Neck:  supple  Lungs:  clear to auscultation bilaterally to anterolateral lung fields  Heart:  regular rate and rhythm  Abdomen:  abdomen is soft without significant tenderness, masses, organomegaly or guarding  Extremities:  atraumatic, no significant pitting edema    Visit Vitals  /61   Pulse 63   Temp 98.1 °F (36.7 °C)   Resp 22   Ht 5' 6\" (1.676 m)   Wt 82.1 kg (181 lb)   SpO2 100%   BMI 29.21 kg/m²       Data Review:     Labs: Results:       Chemistry Recent Labs     06/15/22  1800 06/15/22  0230 06/14/22  1433 06/14/22  0400 06/14/22  0400 06/13/22 0400 06/13/22 0400   GLU  --  118* 195*  --  265*   < > 292*   NA  --  140 140  --  139   < > 139   K 3.6 3.4* 3.7   < > 3.3*   < > 4.7   CL  --  107 107  --  107   < > 106   CO2  --  27 26  --  22   < > 19*   BUN  --  30* 30*  --  35*   < > 51*   CREA  --  1.39* 1.49*  --  1.91*   < > 2.24*   CA  --  7.6* 7.6*  --  7.9*   < > 7.9*   MG  --  2.0  --   --  1.8  --  2.5   PHOS 2.8 2.4*  --   --  1.9*   < > 3.0   AGAP  --  6 7  --  10   < > 14   BUCR  --  22* 20  --  18   < > 23*   AP  --   --   --   --  98  --   --    TP  --   --   --   --  5.2*  --   --    ALB  --   --   --   --  2.7*  --   --    GLOB  --   --   --   --  2.5  --   --    AGRAT  --   --   --   --  1.1  --   --     < > = values in this interval not displayed.       CBC w/Diff Recent Labs     06/15/22  0230 06/14/22  0400 06/13/22  0400   WBC 20.1* 28.9* 32.3*   RBC 2.77* 2.97* 3.30*   HGB 7.4* 7.9* 8.9*   HCT 22.2* 23.6* 26.3*   * 155 211   GRANS 92* 87* 82*   LYMPH 4* 1* 4*   EOS 0 0 0      Cardiac Enzymes No results found for: CPK, CK, CKMMB, CKMB, RCK3, CKMBT, CKNDX, CKND1, ADAM, TROPT, TROIQ, BRYCE, TROPT, TNIPOC, BNP, BNPP   Coagulation Recent Labs     06/15/22  1107 06/15/22  0518   APTT 144.3* 133.0*       Lipid Panel Lab Results   Component Value Date/Time Cholesterol, total 299 (H) 11/03/2011 09:26 AM    HDL Cholesterol 204 (H) 11/03/2011 09:26 AM    LDL, calculated 86.6 11/03/2011 09:26 AM    VLDL, calculated 8.4 11/03/2011 09:26 AM    Triglyceride 42 11/03/2011 09:26 AM    CHOL/HDL Ratio 1.5 11/03/2011 09:26 AM      Liver Enzymes Recent Labs     06/14/22  0400   TP 5.2*   ALB 2.7*   AP 98      Thyroid Studies Lab Results   Component Value Date/Time    T4, Total 2.6 (L) 11/03/2011 09:26 AM    T3 Uptake 27 08/18/2011 02:11 PM    TSH 0.17 (L) 06/11/2022 08:51 PM          Signed By: Leanna Nichols MD     Elisa 15, 2022

## 2022-06-16 NOTE — PROGRESS NOTES
Problem: Diabetes Self-Management  Goal: *Disease process and treatment process  Description: Define diabetes and identify own type of diabetes; list 3 options for treating diabetes. Outcome: Progressing Towards Goal  Goal: *Incorporating nutritional management into lifestyle  Description: Describe effect of type, amount and timing of food on blood glucose; list 3 methods for planning meals. Outcome: Progressing Towards Goal  Goal: *Incorporating physical activity into lifestyle  Description: State effect of exercise on blood glucose levels. Outcome: Progressing Towards Goal  Goal: *Developing strategies to promote health/change behavior  Description: Define the ABC's of diabetes; identify appropriate screenings, schedule and personal plan for screenings. Outcome: Progressing Towards Goal  Goal: *Using medications safely  Description: State effect of diabetes medications on diabetes; name diabetes medication taking, action and side effects. Outcome: Progressing Towards Goal  Goal: *Monitoring blood glucose, interpreting and using results  Description: Identify recommended blood glucose targets  and personal targets. Outcome: Progressing Towards Goal  Goal: *Prevention, detection, treatment of acute complications  Description: List symptoms of hyper- and hypoglycemia; describe how to treat low blood sugar and actions for lowering  high blood glucose level. Outcome: Progressing Towards Goal  Goal: *Prevention, detection and treatment of chronic complications  Description: Define the natural course of diabetes and describe the relationship of blood glucose levels to long term complications of diabetes.   Outcome: Progressing Towards Goal  Goal: *Developing strategies to address psychosocial issues  Description: Describe feelings about living with diabetes; identify support needed and support network  Outcome: Progressing Towards Goal  Goal: *Insulin pump training  Outcome: Progressing Towards Goal  Goal: *Sick day guidelines  Outcome: Progressing Towards Goal  Goal: *Patient Specific Goal (EDIT GOAL, INSERT TEXT)  Outcome: Progressing Towards Goal     Problem: Patient Education: Go to Patient Education Activity  Goal: Patient/Family Education  Outcome: Progressing Towards Goal     Problem: Falls - Risk of  Goal: *Absence of Falls  Description: Document Marco A Robin Fall Risk and appropriate interventions in the flowsheet.   Outcome: Progressing Towards Goal  Note: Fall Risk Interventions:       Mentation Interventions: Bed/chair exit alarm,More frequent rounding,Reorient patient,Update white board,Adequate sleep, hydration, pain control    Medication Interventions: Bed/chair exit alarm    Elimination Interventions: Bed/chair exit alarm,Toileting schedule/hourly rounds,Patient to call for help with toileting needs              Problem: Patient Education: Go to Patient Education Activity  Goal: Patient/Family Education  Outcome: Progressing Towards Goal     Problem: Nutrition Deficit  Goal: *Optimize nutritional status  Outcome: Progressing Towards Goal     Problem: Patient Education: Go to Patient Education Activity  Goal: Patient/Family Education  Outcome: Progressing Towards Goal     Problem: Patient Education: Go to Patient Education Activity  Goal: Patient/Family Education  Outcome: Progressing Towards Goal     Problem: Patient Education: Go to Patient Education Activity  Goal: Patient/Family Education  Outcome: Progressing Towards Goal

## 2022-06-16 NOTE — PROGRESS NOTES
In Patient Progress note    Admit Date: 6/11/2022    Impression:     #1 acute kidney injury, baseline creatinine 1.03, Etiology secondary to  ischemic ATN in the setting of hypotension/  septic shock/DKA --> improving  #2 severe hyperkalemia without EKG changes, secondary to DKA and RBENDA--> resolved  #3 acidemia with severe metabolic acidosis secondary to DKA--> improved   #4 diabetic ketoacidosis and lactic acidoses--> improved   #5 altered mental status due to metabolic enceph-alopathy and DKA  #6 hypotension/shock secondary to sepsis? PNA   #7 cardiomyopathy , EF 30-35%, cards on board       Renal functions are improving  Off pressors, on low dose midodrine   IV fluids have been stopped  On 2 L oxygen, volume status looks good     Plan:  #1 strict intake output, encourage po intake   #2 keep MAP > 65   #3 check renal panel daily   #4 midodrine for now , will wean as tolerated   #5 sepsis management per primary team  #6 renally dose antibiotics for EGFR of less than 30     Discussed with nursing and primary team ,    Please call with questions,     Shikha Lockhart MD [facility]  Cell [phone]  Pager: [phone]  Subjective:     - awake , alert and oriented  - respiratory - stable  - hemodynamics - on pressors   - UOP-improving  - Nutrition -poor    Objective:     Visit Vitals  BP (!) 88/74   Pulse 71   Temp 98.1 °F (36.7 °C)   Resp 26   Ht 5' 6\" (1.676 m)   Wt 82.1 kg (181 lb)   SpO2 (!) 51%   BMI 29.21 kg/m²         Intake/Output Summary (Last 24 hours) at 6/16/2022 1514  Last data filed at 6/16/2022 1353  Gross per 24 hour   Intake 1447.85 ml   Output 1945 ml   Net -497.15 ml       Physical Exam:     HEENT:mmm  Neck: no cervical lymphadenopathy or thyromegaly. Lungs: good air entry, clear to auscultation bilaterally. Cardiovascular system: S1, S2, regular rate and rhythm. Abdomen: soft, non tender, non distended. Extremities: no clubbing, cyanosis or edema. Integumentary: skin is grossly intact. Data Review:    Recent Labs     06/16/22  0400   WBC 15.9*   RBC 2.77*   HCT 22.6*   MCV 81.6   MCH 26.7   MCHC 32.7   RDW 15.2*     Recent Labs     06/16/22  0400 06/15/22  1800 06/15/22  0230 06/14/22  1433 06/14/22  0400   BUN 33*  --  30* 30* 35*   CREA 1.53*  --  1.39* 1.49* 1.91*   CA 7.7*  --  7.6* 7.6* 7.9*   ALB  --   --   --   --  2.7*   K 3.5 3.6 3.4* 3.7 3.3*     --  140 140 139     --  107 107 107   CO2 27  --  27 26 22   PHOS 2.4* 2.8 2.4*  --  1.9*   GLU 92  --  118* 195* 265*       Sydnie Conrad MD

## 2022-06-16 NOTE — PROGRESS NOTES
Problem: Diabetes Self-Management  Goal: *Disease process and treatment process  Description: Define diabetes and identify own type of diabetes; list 3 options for treating diabetes. Outcome: Progressing Towards Goal  Goal: *Incorporating nutritional management into lifestyle  Description: Describe effect of type, amount and timing of food on blood glucose; list 3 methods for planning meals. Outcome: Progressing Towards Goal  Goal: *Incorporating physical activity into lifestyle  Description: State effect of exercise on blood glucose levels. Outcome: Progressing Towards Goal  Goal: *Developing strategies to promote health/change behavior  Description: Define the ABC's of diabetes; identify appropriate screenings, schedule and personal plan for screenings. Outcome: Progressing Towards Goal  Goal: *Using medications safely  Description: State effect of diabetes medications on diabetes; name diabetes medication taking, action and side effects. Outcome: Progressing Towards Goal  Goal: *Monitoring blood glucose, interpreting and using results  Description: Identify recommended blood glucose targets  and personal targets. Outcome: Progressing Towards Goal  Goal: *Prevention, detection, treatment of acute complications  Description: List symptoms of hyper- and hypoglycemia; describe how to treat low blood sugar and actions for lowering  high blood glucose level. Outcome: Progressing Towards Goal  Goal: *Prevention, detection and treatment of chronic complications  Description: Define the natural course of diabetes and describe the relationship of blood glucose levels to long term complications of diabetes.   Outcome: Progressing Towards Goal  Goal: *Developing strategies to address psychosocial issues  Description: Describe feelings about living with diabetes; identify support needed and support network  Outcome: Progressing Towards Goal  Goal: *Insulin pump training  Outcome: Progressing Towards Goal  Goal: *Sick day guidelines  Outcome: Progressing Towards Goal  Goal: *Patient Specific Goal (EDIT GOAL, INSERT TEXT)  Outcome: Progressing Towards Goal     Problem: Falls - Risk of  Goal: *Absence of Falls  Description: Document Lu Fall Risk and appropriate interventions in the flowsheet.   Outcome: Progressing Towards Goal  Note: Fall Risk Interventions:       Mentation Interventions: Bed/chair exit alarm,More frequent rounding,Reorient patient,Update white board,Adequate sleep, hydration, pain control    Medication Interventions: Bed/chair exit alarm    Elimination Interventions: Bed/chair exit alarm,Toileting schedule/hourly rounds,Patient to call for help with toileting needs              Problem: Nutrition Deficit  Goal: *Optimize nutritional status  Outcome: Progressing Towards Goal

## 2022-06-17 ENCOUNTER — ANESTHESIA EVENT (OUTPATIENT)
Dept: ENDOSCOPY | Age: 72
DRG: 871 | End: 2022-06-17
Payer: MEDICARE

## 2022-06-17 ENCOUNTER — ANESTHESIA (OUTPATIENT)
Dept: ENDOSCOPY | Age: 72
DRG: 871 | End: 2022-06-17
Payer: MEDICARE

## 2022-06-17 LAB
ALBUMIN SERPL-MCNC: 2.3 G/DL (ref 3.4–5)
ALBUMIN/GLOB SERPL: 1 {RATIO} (ref 0.8–1.7)
ALP SERPL-CCNC: 90 U/L (ref 45–117)
ALT SERPL-CCNC: 49 U/L (ref 13–56)
ANION GAP SERPL CALC-SCNC: 4 MMOL/L (ref 3–18)
AST SERPL-CCNC: 17 U/L (ref 10–38)
BACTERIA SPEC CULT: NORMAL
BACTERIA SPEC CULT: NORMAL
BASOPHILS # BLD: 0 K/UL (ref 0–0.1)
BASOPHILS NFR BLD: 0 % (ref 0–2)
BILIRUB SERPL-MCNC: 0.2 MG/DL (ref 0.2–1)
BUN SERPL-MCNC: 27 MG/DL (ref 7–18)
BUN/CREAT SERPL: 22 (ref 12–20)
CALCIUM SERPL-MCNC: 7.7 MG/DL (ref 8.5–10.1)
CHLORIDE SERPL-SCNC: 112 MMOL/L (ref 100–111)
CO2 SERPL-SCNC: 29 MMOL/L (ref 21–32)
COVID-19 RAPID TEST, COVR: NOT DETECTED
CREAT SERPL-MCNC: 1.25 MG/DL (ref 0.6–1.3)
DIFFERENTIAL METHOD BLD: ABNORMAL
EOSINOPHIL # BLD: 0.2 K/UL (ref 0–0.4)
EOSINOPHIL NFR BLD: 1 % (ref 0–5)
ERYTHROCYTE [DISTWIDTH] IN BLOOD BY AUTOMATED COUNT: 15.1 % (ref 11.6–14.5)
FERRITIN SERPL-MCNC: 61 NG/ML (ref 8–388)
FOLATE SERPL-MCNC: 3.8 NG/ML (ref 3.1–17.5)
GLOBULIN SER CALC-MCNC: 2.4 G/DL (ref 2–4)
GLUCOSE BLD STRIP.AUTO-MCNC: 102 MG/DL (ref 70–110)
GLUCOSE BLD STRIP.AUTO-MCNC: 106 MG/DL (ref 70–110)
GLUCOSE BLD STRIP.AUTO-MCNC: 138 MG/DL (ref 70–110)
GLUCOSE BLD STRIP.AUTO-MCNC: 189 MG/DL (ref 70–110)
GLUCOSE BLD STRIP.AUTO-MCNC: 244 MG/DL (ref 70–110)
GLUCOSE BLD STRIP.AUTO-MCNC: 51 MG/DL (ref 70–110)
GLUCOSE BLD STRIP.AUTO-MCNC: 94 MG/DL (ref 70–110)
GLUCOSE SERPL-MCNC: 49 MG/DL (ref 74–99)
HCT VFR BLD AUTO: 23 % (ref 35–45)
HGB BLD-MCNC: 7.2 G/DL (ref 12–16)
IMM GRANULOCYTES # BLD AUTO: 0.3 K/UL (ref 0–0.04)
IMM GRANULOCYTES NFR BLD AUTO: 3 % (ref 0–0.5)
IRON SATN MFR SERPL: 10 % (ref 20–50)
IRON SERPL-MCNC: 18 UG/DL (ref 50–175)
LYMPHOCYTES # BLD: 1.8 K/UL (ref 0.9–3.6)
LYMPHOCYTES NFR BLD: 15 % (ref 21–52)
MCH RBC QN AUTO: 26 PG (ref 24–34)
MCHC RBC AUTO-ENTMCNC: 31.3 G/DL (ref 31–37)
MCV RBC AUTO: 83 FL (ref 78–100)
MONOCYTES # BLD: 1.1 K/UL (ref 0.05–1.2)
MONOCYTES NFR BLD: 10 % (ref 3–10)
NEUTS SEG # BLD: 8.5 K/UL (ref 1.8–8)
NEUTS SEG NFR BLD: 71 % (ref 40–73)
NRBC # BLD: 0.04 K/UL (ref 0–0.01)
NRBC BLD-RTO: 0.3 PER 100 WBC
PHOSPHATE SERPL-MCNC: 2.8 MG/DL (ref 2.5–4.9)
PLATELET # BLD AUTO: 132 K/UL (ref 135–420)
PMV BLD AUTO: 12.9 FL (ref 9.2–11.8)
POTASSIUM SERPL-SCNC: 3.8 MMOL/L (ref 3.5–5.5)
PROT SERPL-MCNC: 4.7 G/DL (ref 6.4–8.2)
RBC # BLD AUTO: 2.77 M/UL (ref 4.2–5.3)
SERVICE CMNT-IMP: NORMAL
SERVICE CMNT-IMP: NORMAL
SODIUM SERPL-SCNC: 145 MMOL/L (ref 136–145)
SOURCE, COVRS: NORMAL
TIBC SERPL-MCNC: 185 UG/DL (ref 250–450)
VIT B12 SERPL-MCNC: 1248 PG/ML (ref 211–911)
WBC # BLD AUTO: 11.9 K/UL (ref 4.6–13.2)

## 2022-06-17 PROCEDURE — 74011250637 HC RX REV CODE- 250/637: Performed by: STUDENT IN AN ORGANIZED HEALTH CARE EDUCATION/TRAINING PROGRAM

## 2022-06-17 PROCEDURE — 88305 TISSUE EXAM BY PATHOLOGIST: CPT

## 2022-06-17 PROCEDURE — 0DB68ZX EXCISION OF STOMACH, VIA NATURAL OR ARTIFICIAL OPENING ENDOSCOPIC, DIAGNOSTIC: ICD-10-PCS | Performed by: STUDENT IN AN ORGANIZED HEALTH CARE EDUCATION/TRAINING PROGRAM

## 2022-06-17 PROCEDURE — 80053 COMPREHEN METABOLIC PANEL: CPT

## 2022-06-17 PROCEDURE — 76040000019: Performed by: STUDENT IN AN ORGANIZED HEALTH CARE EDUCATION/TRAINING PROGRAM

## 2022-06-17 PROCEDURE — 74011000250 HC RX REV CODE- 250: Performed by: STUDENT IN AN ORGANIZED HEALTH CARE EDUCATION/TRAINING PROGRAM

## 2022-06-17 PROCEDURE — 74011000250 HC RX REV CODE- 250: Performed by: PHYSICIAN ASSISTANT

## 2022-06-17 PROCEDURE — 74011250637 HC RX REV CODE- 250/637: Performed by: PHYSICIAN ASSISTANT

## 2022-06-17 PROCEDURE — 97530 THERAPEUTIC ACTIVITIES: CPT

## 2022-06-17 PROCEDURE — 74011000258 HC RX REV CODE- 258: Performed by: INTERNAL MEDICINE

## 2022-06-17 PROCEDURE — C9113 INJ PANTOPRAZOLE SODIUM, VIA: HCPCS | Performed by: PHYSICIAN ASSISTANT

## 2022-06-17 PROCEDURE — 88342 IMHCHEM/IMCYTCHM 1ST ANTB: CPT

## 2022-06-17 PROCEDURE — 77030019988 HC FCPS ENDOSC DISP BSC -B: Performed by: STUDENT IN AN ORGANIZED HEALTH CARE EDUCATION/TRAINING PROGRAM

## 2022-06-17 PROCEDURE — 85025 COMPLETE CBC W/AUTO DIFF WBC: CPT

## 2022-06-17 PROCEDURE — 74011250636 HC RX REV CODE- 250/636: Performed by: INTERNAL MEDICINE

## 2022-06-17 PROCEDURE — 82728 ASSAY OF FERRITIN: CPT

## 2022-06-17 PROCEDURE — 74011000250 HC RX REV CODE- 250: Performed by: HOSPITALIST

## 2022-06-17 PROCEDURE — 00731 ANES UPR GI NDSC PX NOS: CPT | Performed by: STUDENT IN AN ORGANIZED HEALTH CARE EDUCATION/TRAINING PROGRAM

## 2022-06-17 PROCEDURE — 2709999900 HC NON-CHARGEABLE SUPPLY: Performed by: STUDENT IN AN ORGANIZED HEALTH CARE EDUCATION/TRAINING PROGRAM

## 2022-06-17 PROCEDURE — 0DB88ZX EXCISION OF SMALL INTESTINE, VIA NATURAL OR ARTIFICIAL OPENING ENDOSCOPIC, DIAGNOSTIC: ICD-10-PCS | Performed by: STUDENT IN AN ORGANIZED HEALTH CARE EDUCATION/TRAINING PROGRAM

## 2022-06-17 PROCEDURE — 82607 VITAMIN B-12: CPT

## 2022-06-17 PROCEDURE — 77030010936 HC CLP LIG BSC -C: Performed by: STUDENT IN AN ORGANIZED HEALTH CARE EDUCATION/TRAINING PROGRAM

## 2022-06-17 PROCEDURE — 82962 GLUCOSE BLOOD TEST: CPT

## 2022-06-17 PROCEDURE — 74011250636 HC RX REV CODE- 250/636: Performed by: PHYSICIAN ASSISTANT

## 2022-06-17 PROCEDURE — 2709999900 HC NON-CHARGEABLE SUPPLY

## 2022-06-17 PROCEDURE — 94762 N-INVAS EAR/PLS OXIMTRY CONT: CPT

## 2022-06-17 PROCEDURE — 74011250636 HC RX REV CODE- 250/636: Performed by: NURSE ANESTHETIST, CERTIFIED REGISTERED

## 2022-06-17 PROCEDURE — 83540 ASSAY OF IRON: CPT

## 2022-06-17 PROCEDURE — 97535 SELF CARE MNGMENT TRAINING: CPT

## 2022-06-17 PROCEDURE — 84100 ASSAY OF PHOSPHORUS: CPT

## 2022-06-17 PROCEDURE — 99100 ANES PT EXTEME AGE<1 YR&>70: CPT | Performed by: STUDENT IN AN ORGANIZED HEALTH CARE EDUCATION/TRAINING PROGRAM

## 2022-06-17 PROCEDURE — 00731 ANES UPR GI NDSC PX NOS: CPT | Performed by: NURSE ANESTHETIST, CERTIFIED REGISTERED

## 2022-06-17 PROCEDURE — 87635 SARS-COV-2 COVID-19 AMP PRB: CPT

## 2022-06-17 PROCEDURE — 77030038269 HC DRN EXT URIN PURWCK BARD -A

## 2022-06-17 PROCEDURE — 99100 ANES PT EXTEME AGE<1 YR&>70: CPT | Performed by: NURSE ANESTHETIST, CERTIFIED REGISTERED

## 2022-06-17 PROCEDURE — 77030008565 HC TBNG SUC IRR ERBE -B: Performed by: STUDENT IN AN ORGANIZED HEALTH CARE EDUCATION/TRAINING PROGRAM

## 2022-06-17 PROCEDURE — 76060000031 HC ANESTHESIA FIRST 0.5 HR: Performed by: STUDENT IN AN ORGANIZED HEALTH CARE EDUCATION/TRAINING PROGRAM

## 2022-06-17 PROCEDURE — 74011636637 HC RX REV CODE- 636/637: Performed by: NURSE PRACTITIONER

## 2022-06-17 PROCEDURE — 36415 COLL VENOUS BLD VENIPUNCTURE: CPT

## 2022-06-17 PROCEDURE — 99232 SBSQ HOSP IP/OBS MODERATE 35: CPT | Performed by: INTERNAL MEDICINE

## 2022-06-17 PROCEDURE — 74011250637 HC RX REV CODE- 250/637: Performed by: INTERNAL MEDICINE

## 2022-06-17 PROCEDURE — 74011250636 HC RX REV CODE- 250/636: Performed by: STUDENT IN AN ORGANIZED HEALTH CARE EDUCATION/TRAINING PROGRAM

## 2022-06-17 PROCEDURE — 65660000004 HC RM CVT STEPDOWN

## 2022-06-17 PROCEDURE — 30233N1 TRANSFUSION OF NONAUTOLOGOUS RED BLOOD CELLS INTO PERIPHERAL VEIN, PERCUTANEOUS APPROACH: ICD-10-PCS | Performed by: STUDENT IN AN ORGANIZED HEALTH CARE EDUCATION/TRAINING PROGRAM

## 2022-06-17 PROCEDURE — 74011250637 HC RX REV CODE- 250/637: Performed by: NURSE PRACTITIONER

## 2022-06-17 RX ORDER — ONDANSETRON 2 MG/ML
4 INJECTION INTRAMUSCULAR; INTRAVENOUS ONCE
Status: CANCELLED | OUTPATIENT
Start: 2022-06-17 | End: 2022-06-17

## 2022-06-17 RX ORDER — ATORVASTATIN CALCIUM 40 MG/1
40 TABLET, FILM COATED ORAL
Status: DISCONTINUED | OUTPATIENT
Start: 2022-06-17 | End: 2022-06-30 | Stop reason: HOSPADM

## 2022-06-17 RX ORDER — DEXTROSE MONOHYDRATE 100 MG/ML
250 INJECTION, SOLUTION INTRAVENOUS ONCE
Status: COMPLETED | OUTPATIENT
Start: 2022-06-17 | End: 2022-06-17

## 2022-06-17 RX ORDER — SODIUM CHLORIDE, SODIUM LACTATE, POTASSIUM CHLORIDE, CALCIUM CHLORIDE 600; 310; 30; 20 MG/100ML; MG/100ML; MG/100ML; MG/100ML
75 INJECTION, SOLUTION INTRAVENOUS CONTINUOUS
Status: CANCELLED | OUTPATIENT
Start: 2022-06-17 | End: 2022-06-17

## 2022-06-17 RX ORDER — PROPOFOL 10 MG/ML
INJECTION, EMULSION INTRAVENOUS AS NEEDED
Status: DISCONTINUED | OUTPATIENT
Start: 2022-06-17 | End: 2022-06-17 | Stop reason: HOSPADM

## 2022-06-17 RX ORDER — CEFUROXIME AXETIL 250 MG/1
500 TABLET ORAL EVERY 12 HOURS
Status: COMPLETED | OUTPATIENT
Start: 2022-06-17 | End: 2022-06-19

## 2022-06-17 RX ORDER — MAGNESIUM SULFATE 100 %
4 CRYSTALS MISCELLANEOUS AS NEEDED
Status: CANCELLED | OUTPATIENT
Start: 2022-06-17

## 2022-06-17 RX ORDER — METRONIDAZOLE 500 MG/1
500 TABLET ORAL EVERY 12 HOURS
Status: COMPLETED | OUTPATIENT
Start: 2022-06-17 | End: 2022-06-19

## 2022-06-17 RX ORDER — DEXTROSE MONOHYDRATE 100 MG/ML
0-250 INJECTION, SOLUTION INTRAVENOUS AS NEEDED
Status: CANCELLED | OUTPATIENT
Start: 2022-06-17

## 2022-06-17 RX ORDER — SODIUM CHLORIDE 0.9 % (FLUSH) 0.9 %
5-40 SYRINGE (ML) INJECTION EVERY 8 HOURS
Status: CANCELLED | OUTPATIENT
Start: 2022-06-17

## 2022-06-17 RX ORDER — SODIUM CHLORIDE, SODIUM LACTATE, POTASSIUM CHLORIDE, CALCIUM CHLORIDE 600; 310; 30; 20 MG/100ML; MG/100ML; MG/100ML; MG/100ML
25 INJECTION, SOLUTION INTRAVENOUS CONTINUOUS
Status: DISCONTINUED | OUTPATIENT
Start: 2022-06-17 | End: 2022-06-17

## 2022-06-17 RX ORDER — SODIUM CHLORIDE 0.9 % (FLUSH) 0.9 %
5-40 SYRINGE (ML) INJECTION AS NEEDED
Status: CANCELLED | OUTPATIENT
Start: 2022-06-17

## 2022-06-17 RX ORDER — INSULIN LISPRO 100 [IU]/ML
INJECTION, SOLUTION INTRAVENOUS; SUBCUTANEOUS ONCE
Status: CANCELLED | OUTPATIENT
Start: 2022-06-17 | End: 2022-06-17

## 2022-06-17 RX ORDER — FENTANYL CITRATE 50 UG/ML
50 INJECTION, SOLUTION INTRAMUSCULAR; INTRAVENOUS AS NEEDED
Status: CANCELLED | OUTPATIENT
Start: 2022-06-17

## 2022-06-17 RX ADMIN — DEXTROSE MONOHYDRATE 250 ML: 100 INJECTION, SOLUTION INTRAVENOUS at 03:54

## 2022-06-17 RX ADMIN — SODIUM CHLORIDE, POTASSIUM CHLORIDE, SODIUM LACTATE AND CALCIUM CHLORIDE 25 ML/HR: 600; 310; 30; 20 INJECTION, SOLUTION INTRAVENOUS at 11:10

## 2022-06-17 RX ADMIN — SODIUM CHLORIDE, PRESERVATIVE FREE 10 ML: 5 INJECTION INTRAVENOUS at 13:56

## 2022-06-17 RX ADMIN — ACETAMINOPHEN 650 MG: 325 TABLET ORAL at 20:09

## 2022-06-17 RX ADMIN — CEFUROXIME AXETIL 500 MG: 250 TABLET ORAL at 12:19

## 2022-06-17 RX ADMIN — SODIUM CHLORIDE, PRESERVATIVE FREE 10 ML: 5 INJECTION INTRAVENOUS at 22:00

## 2022-06-17 RX ADMIN — SODIUM CHLORIDE, PRESERVATIVE FREE 10 ML: 5 INJECTION INTRAVENOUS at 06:00

## 2022-06-17 RX ADMIN — PROPOFOL 40 MG: 10 INJECTION, EMULSION INTRAVENOUS at 11:37

## 2022-06-17 RX ADMIN — CEFUROXIME AXETIL 500 MG: 250 TABLET ORAL at 20:09

## 2022-06-17 RX ADMIN — PIPERACILLIN SODIUM AND TAZOBACTAM SODIUM 3.38 G: 3; .375 INJECTION, POWDER, LYOPHILIZED, FOR SOLUTION INTRAVENOUS at 03:13

## 2022-06-17 RX ADMIN — METRONIDAZOLE 500 MG: 500 TABLET ORAL at 20:17

## 2022-06-17 RX ADMIN — SODIUM CHLORIDE 40 MG: 9 INJECTION INTRAMUSCULAR; INTRAVENOUS; SUBCUTANEOUS at 08:58

## 2022-06-17 RX ADMIN — SODIUM CHLORIDE 40 MG: 9 INJECTION INTRAMUSCULAR; INTRAVENOUS; SUBCUTANEOUS at 20:17

## 2022-06-17 RX ADMIN — Medication 6 UNITS: at 21:57

## 2022-06-17 RX ADMIN — PROPOFOL 60 MG: 10 INJECTION, EMULSION INTRAVENOUS at 11:31

## 2022-06-17 RX ADMIN — METRONIDAZOLE 500 MG: 500 TABLET ORAL at 12:21

## 2022-06-17 RX ADMIN — PROPOFOL 30 MG: 10 INJECTION, EMULSION INTRAVENOUS at 11:33

## 2022-06-17 RX ADMIN — LEVOTHYROXINE SODIUM 112 MCG: 112 TABLET ORAL at 06:00

## 2022-06-17 RX ADMIN — ATORVASTATIN CALCIUM 40 MG: 40 TABLET, FILM COATED ORAL at 22:00

## 2022-06-17 RX ADMIN — MIDODRINE HYDROCHLORIDE 5 MG: 5 TABLET ORAL at 08:58

## 2022-06-17 NOTE — ROUTINE PROCESS
Bedside and Verbal shift change report given to Nitza Liu RN (oncoming nurse) by Meryl Marie RN (offgoing nurse). Report included the following information SBAR, Kardex, Intake/Output, MAR, Recent Results and Cardiac Rhythm is NSR on telemetry. Initial assessment completed and recorded in doc flowsheets. No complaints or distress. Currently NPO for endoscopy scheduled this morning. Telephone report given to endoscopy nurse.

## 2022-06-17 NOTE — PROGRESS NOTES
Problem: Self Care Deficits Care Plan (Adult)  Goal: *Acute Goals and Plan of Care (Insert Text)  Description: Occupational Therapy Goals  Initiated 6/15/2022 within 7 day(s). 1.  Patient will perform upper body dressing with supervision/set-up. 2.  Patient will perform grooming sitting EOB with supervision/set-up with Good balance. 3.  Patient will perform bathing with supervision/set-up. 4.  Patient will perform toilet transfers with minimal assistance/contact guard assist.  5.  Patient will perform all aspects of toileting with minimal assistance/contact guard assist.  6.  Patient will participate in upper extremity therapeutic exercise/activities with supervision/set-up for 8 minutes to improve endurance and UB strength needed for ADLs    7. Patient will utilize energy conservation techniques during functional activities with verbal cues. 8.  Patient will perform lower body dressing with minimal assistance. Prior Level of Function: Pt reports she lives alone and is usually independent with ADLs and functional mobility using cane mostly. Pt lives in 2nd floor apartment  Outcome: Progressing Towards Goal   OCCUPATIONAL THERAPY TREATMENT    Patient: Lj Elena (10 y.o. female)  Date: 6/17/2022  Diagnosis: DKA (diabetic ketoacidosis) ([facility] [address] [E11.10] <principal problem not specified>  Procedure(s) (LRB):  ENDOSCOPY/ Polypectomy/ Biopsies/ Endo clips (N/A) Day of Surgery  Precautions: Skin,Fall  PLOF: Pt reports she lives alone and is usually independent with ADLs and functional mobility using cane mostly. Pt lives in 2nd floor apartment    Chart, occupational therapy assessment, plan of care, and goals were reviewed. ASSESSMENT:  Pt cleared by RN for OT tx at this time. Pt presented supine in bed upon therapist arrival and agreeable to work with OT. BP's monitored throughout tx session and WNL.  Compensatory strategy instruction for limitations in ROM to improve R UE and shoulder GM coordination to improve functional grasping and reaching during selfcare tasks. Pt performed RUE AAROM with L UE to assist and increase pt RUE ROM for self cares. SBA required for facial hygiene @ bed level and required MIN A for UB bathing task. Pt able to roll towards R MIN A. Placed wedge under trunk towards R for pressure relief to reduce risk of skin breakdown. Pt left with bed alarm active and all needs left within reach. Progression toward goals:  []          Improving appropriately and progressing toward goals  [x]          Improving slowly and progressing toward goals  []          Not making progress toward goals and plan of care will be adjusted     PLAN:  Patient continues to benefit from skilled intervention to address the above impairments. Continue treatment per established plan of care. Discharge Recommendations:  Rehab   Further Equipment Recommendations for Discharge:  TBA     SUBJECTIVE:   Patient stated  Thank you. \"     OBJECTIVE DATA SUMMARY:   Cognitive/Behavioral Status:  Neurologic State: Alert  Orientation Level: Oriented X4  Cognition: Follows commands  Safety/Judgement: Awareness of environment,Fall prevention    Functional Mobility and Transfers for ADLs:   Bed Mobility:  Rolling: Minimum assistance        Scooting: Maximum assistance (towards [facility] )     ADL Intervention:       Grooming  Position Performed: Other (comment) (supine in bed with HOB elevated)  Washing Face: Stand-by assistance    Upper Body Bathing  Bathing Assistance: Minimum assistance  Position Performed: Other (comment) (supine in bed with HOB elevated)    Pain:  Pain level pre-treatment: 0/10   Pain level post-treatment: 0/10    Activity Tolerance:    Fair   Please refer to the flowsheet for vital signs taken during this treatment.   After treatment:   []  Patient left in no apparent distress sitting up in chair  [x]  Patient left in no apparent distress in bed  [x]  Call bell left within reach  [x]  Nursing notified  []  Caregiver present  [x]  Bed alarm activated    COMMUNICATION/EDUCATION:   [x] Role of Occupational Therapy in the acute care setting  [] Home safety education was provided and the patient/caregiver indicated understanding. [x] Patient/family have participated as able in working towards goals and plan of care. [x] Patient/family agree to work toward stated goals and plan of care. [] Patient understands intent and goals of therapy, but is neutral about his/her participation. [] Patient is unable to participate in goal setting and plan of care.       Thank you for this referral.  ASIF Hernandez  Time Calculation: 23 mins

## 2022-06-17 NOTE — DIABETES MGMT
Diabetes/ Glycemic Control Plan of Care  Recommendations:   Patient with hypoglycemia this am, 51 mg/dl. Steroids discontinued 6/15. Recommend decreasing Lantus dose to 5 units daily  Continue corrective insulin coverage as needed. Will continue inpatient monitoring. Fasting/ Morning blood glucose:   Lab Results   Component Value Date/Time    Glucose 49 (LL) 06/17/2022 01:57 AM    Glucose (POC) 102 06/17/2022 08:13 AM    Glucose (POC) 90 02/06/2019 02:13 PM    Glucose,  (H) 01/19/2021 09:26 PM     IV Fluids containing dextrose:   None   Steroids:   Discontinued. Last dose yesterday 6/15/22. Blood glucose values:       Results for Paul Silva (MRN [id number]) as of 6/17/2022 08:34   Ref. Range 6/16/2022 11:04 6/16/2022 20:56 6/17/2022 03:49 6/17/2022 04:29 6/17/2022 08:13   GLUCOSE,FAST - POC Latest Ref Range: 70 - 110 mg/dL 130 (H) 181 (H) 51 (LL) 189 (H) 102     Within target range (70-180mg/dL): progressing. Current insulin orders:   Lantus 12 units daily  Lispro corrective insulin coverage  Total Daily Dose previous 24 hours = 15 units   Current A1c:   Lab Results   Component Value Date/Time    Hemoglobin A1c 9.0 (H) 06/11/2022 02:15 PM    Hemoglobin A1c, External 9.5 07/08/2021 12:00 AM      equivalent  to ave Blood Glucose of 212 mg/dl for 2-3 months prior to admission  Adequate glycemic control PTA:   No   Nutrition/Diet:   DIET NPO Sips of Water with Meds  Active Orders   Diet    DIET NPO Sips of Water with Meds      Meal Intake:  Patient Vitals for the past 168 hrs:   % Diet Eaten   06/14/22 1841 0%     Supplement Intake:  No data found. Home diabetes medications:   Key Antihyperglycemic Medications             insulin glargine (LANTUS) 100 unit/mL injection (Taking) Inject 15 units SQ BID    metFORMIN ER (GLUCOPHAGE XR) 500 mg tablet (Taking) Take 500 mg by mouth daily (with dinner).     insulin regular (NOVOLIN R, HUMULIN R) 100 unit/mL injection (Taking) by SubCUTAneous route Before breakfast, lunch, and dinner. Sliding scale        Plan/Goals:   Blood glucose will be within target of 70 - 180 mg/dl within 72 hours  Reinforce dietary and medication compliance at home.          Education:  [x] Refer to Diabetes Education Record (6/14/22)                         [] Education not indicated at this time     Albaro Crocker, [facility] CDE  Ext [phone]

## 2022-06-17 NOTE — PROGRESS NOTES
Cardiology Progress Note    Admit Date: 6/11/2022  Attending Cardiologist: Dr. Albert Mane    F/u CHF/ HFrEF    Assessment:     -Septic shock, WBC ~30K with bandemia up to 12% on 6/13. S/p pressor support. · CT C/A/P 6/12/2022 with sludge int he gallbladder, small amount of nonspecific mesenteric induration near the gallbladder; hepatomegaly; patchy groundglass opacities in medial upper lobes bilaterally, likely infectious or inflammatory  -DKA  -BRENDA with hyperkalemia, Cr 3.89 with K 7.9 on presentation 6/11/2022, improving. K mildly low at 3.3 on 6/14/2022.  -Elevated troponin up to 5482 on 6/13/2022, suspect demand in setting of above  -Cardiomyopathy, Echo 6/12/2022 with EF 30-35% with akinetic basal anterolateral, mid anterolateral and apical lateral walls. Prior Echo 01/2021 with LVEF 55-60% with normal wall motion.  -Anemia, Hgb ~8  -Thyroid disorder, TSH 0.17 on 6/11 with FT4 1.3 and FT3 1.1  -Hx colon cancer s/p total colectomy and chemotherapy  -Hx HTN. -Hx HLD.     No primary cardiologist, initial referral completed by Dr. Peewee Nieto:     Pt seen and examined earlier today, agree with following comments:    -Would recommend to wean Midodrine as able and plan to gradually add GDMT for CMY as allowed by BP if able to come off Midodrine.  -Would add ASA 81 mg if okay from bleeding/GI standpoint.  -Will add Lipitor 40 mg.  -Pending EGD today per GI team, appreciate assistance. -Heparin remains on hold due to melena, RN reports pt still having dark stools.  -Continue supportive care per primary team.  -Nephrology following, appreciate assistance.  -Will consider further cardiac evaluation based on hospital course. Subjective:     No new complaints. Denies chest pain, shortness of breath.     Objective:      Patient Vitals for the past 8 hrs:   Temp Pulse Resp BP SpO2   06/17/22 0645 -- (!) 57 15 -- 100 %   06/17/22 0630 -- (!) 57 14 -- 100 %   06/17/22 0615 -- 65 16 -- 100 %   06/17/22 0600 -- [facility] 57 16 (!) 129/59 100 %   06/17/22 0545 -- (!) 56 15 -- 100 %   06/17/22 0530 -- (!) 56 15 -- 100 %   06/17/22 0515 -- (!) 56 16 -- 100 %   06/17/22 0500 -- 63 20 -- 100 %   06/17/22 0430 -- 64 17 -- 100 %   06/17/22 0400 97.8 °F (36.6 °C) 64 17 112/80 97 %   06/17/22 0330 -- 74 20 -- 96 %   06/17/22 0300 -- (!) 59 19 -- 97 %   06/17/22 0230 -- (!) 59 21 -- 96 %   06/17/22 0200 -- 65 20 (!) 101/43 97 %         No data found.     TELE: normal sinus rhythm               Current Facility-Administered Medications   Medication Dose Route Frequency Last Admin    pantoprazole (PROTONIX) 40 mg in 0.9% sodium chloride 10 mL injection  40 mg IntraVENous Q12H 40 mg at 06/17/22 0858    insulin lispro (HUMALOG) injection   SubCUTAneous AC&HS 3 Units at 06/16/22 2200    [Held by provider] insulin glargine (LANTUS) injection 12 Units  12 Units SubCUTAneous DAILY 12 Units at 06/16/22 0832    midodrine (PROAMATINE) tablet 5 mg  5 mg Oral TID WITH MEALS 5 mg at 06/17/22 0858    polyethylene glycol (MIRALAX) packet 17 g  17 g Oral DAILY      docusate sodium (COLACE) capsule 100 mg  100 mg Oral DAILY      benzonatate (TESSALON) capsule 100 mg  100 mg Oral TID  mg at 06/14/22 2059    albuterol-ipratropium (DUO-NEB) 2.5 MG-0.5 MG/3 ML  3 mL Nebulization Q4H PRN 3 mL at 06/15/22 0556    glucose chewable tablet 16 g  16 g Oral PRN      glucagon (GLUCAGEN) injection 1 mg  1 mg IntraMUSCular PRN      dextrose 10% infusion 0-250 mL  0-250 mL IntraVENous PRN      levothyroxine (SYNTHROID) tablet 112 mcg  112 mcg Oral 6am 112 mcg at 06/17/22 0600    piperacillin-tazobactam (ZOSYN) 3.375 g in 0.9% sodium chloride (MBP/ADV) 100 mL MBP  3.375 g IntraVENous Q8H 3.375 g at 06/17/22 0313    [Held by provider] heparin 25,000 units in  mL infusion  12-25 Units/kg/hr IntraVENous TITRATE 15 Units/kg/hr at 06/15/22 1406    sodium chloride (NS) flush 5-40 mL  5-40 mL IntraVENous Q8H 10 mL at 06/17/22 0600    sodium chloride (NS) flush 5-40 mL  5-40 mL IntraVENous PRN      acetaminophen (TYLENOL) tablet 650 mg  650 mg Oral Q6H PRN      Or    acetaminophen (TYLENOL) suppository 650 mg  650 mg Rectal Q6H PRN      ondansetron (ZOFRAN ODT) tablet 4 mg  4 mg Oral Q8H PRN      Or    ondansetron (ZOFRAN) injection 4 mg  4 mg IntraVENous Q6H PRN 4 mg at 06/16/22 1352         Intake/Output Summary (Last 24 hours) at 6/17/2022 0941  Last data filed at 6/17/2022 0354  Gross per 24 hour   Intake 300 ml   Output 1945 ml   Net -1645 ml       Physical Exam:  General:  alert, cooperative, no distress, appears stated age  Neck:  supple  Lungs:  clear to auscultation bilaterally, to anterolateral lung fields  Heart:  regular rate and rhythm  Abdomen:  abdomen is soft without significant tenderness, masses, organomegaly or guarding  Extremities:  atraumatic, no edema    Visit Vitals  BP (!) 129/59   Pulse (!) 57   Temp 97.8 °F (36.6 °C)   Resp 15   Ht 5' 6\" (1.676 m)   Wt 82.1 kg (181 lb)   SpO2 100%   BMI 29.21 kg/m²       Data Review:     Labs: Results:       Chemistry Recent Labs     06/17/22  0157 06/16/22  1730 06/16/22  0400 06/15/22  1800 06/15/22  0230   GLU 49*  --  92  --  118*     --  143  --  140   K 3.8 4.1 3.5   < > 3.4*   *  --  110  --  107   CO2 29  --  27  --  27   BUN 27*  --  33*  --  30*   CREA 1.25  --  1.53*  --  1.39*   CA 7.7*  --  7.7*  --  7.6*   MG  --   --  2.0  --  2.0   PHOS 2.8 3.4 2.4*   < > 2.4*   AGAP 4  --  6  --  6   BUCR 22*  --  22*  --  22*   AP 90  --   --   --   --    TP 4.7*  --   --   --   --    ALB 2.3*  --   --   --   --    GLOB 2.4  --   --   --   --    AGRAT 1.0  --   --   --   --     < > = values in this interval not displayed.       CBC w/Diff Recent Labs     06/17/22  0157 06/16/22  0400 06/15/22  0230   WBC 11.9 15.9* 20.1*   RBC 2.77* 2.77* 2.77*   HGB 7.2* 7.4* 7.4*   HCT 23.0* 22.6* 22.2*   * 116* 114*   GRANS 71 79* 92*   LYMPH 15* 13* 4*   EOS 1 0 0      Coagulation Recent Labs 06/15/22  1107 06/15/22  0518   APTT 144.3* 133.0*       Lipid Panel Lab Results   Component Value Date/Time    Cholesterol, total 299 (H) 11/03/2011 09:26 AM    HDL Cholesterol 204 (H) 11/03/2011 09:26 AM    LDL, calculated 86.6 11/03/2011 09:26 AM    VLDL, calculated 8.4 11/03/2011 09:26 AM    Triglyceride 42 11/03/2011 09:26 AM    CHOL/HDL Ratio 1.5 11/03/2011 09:26 AM      Liver Enzymes Recent Labs     06/17/22  0157   TP 4.7*   ALB 2.3*   AP 90      Thyroid Studies Lab Results   Component Value Date/Time    T4, Total 2.6 (L) 11/03/2011 09:26 AM    T3 Uptake 27 08/18/2011 02:11 PM    TSH 0.17 (L) 06/11/2022 08:51 PM          Signed By: Leah Herring PA-C     June 17, 2022

## 2022-06-17 NOTE — PROGRESS NOTES
Infectious Disease progress Note        Reason: septic shock    Current abx Prior abx     Zosyn since 6/13 Cefepime, vancomycin, metronidazole  6/11/22-6/13     Lines:       Assessment :  66 y.o. female with pmh of type 2 diabetes mellitus, hypertension, hyperlipidemia, hypothyroidism, and colon cancer s/p total colectomy and chemotherapy who presented to [facility]  via EMS on 6/11/22 for altered mental status. Highly complex clinical picture. Clinical presentation is concerning for sepsis (POA)- however, exact etiology remains unclear  D/D: cardiogenic/hypovolemic shock in setting of DKA versus septic shock due to occult infeciton, right sided heart failure     Concerns for pneumonia on Ct chest- ? Aspiration pneumonia superimposed on pulmonary congestion. Clinical presentation not c/w community acquired pneumonia. No significant hypoxia, SOB, cough    No definitive evidence of cholecystitis per HIDA scan. Gallbladder changes noted on ct scan could be due to perihepatic ascites as seen on US 6/14/22. Wbc scan reveals increased uptake in RLQ- patient could have undiagnosed colitis on admission which contributed to nausea, abdominal pain/tenderness. Currently abdominal pain/tenderness resolved    H/o left hip TKR- currently no evidence of infection left hip. Will monitor for this possibility    Worsening leukocytosis- likely due to sepsis, steroids- r/o fungemia    Acute kidney injury- likely due to sepsis, volume depletion    DKA    Altered mentation- likely metabolic encephalopathy-improved    . improving leukocytosis    Recommendations:    1. D/c Zosyn. Start cefuroxime, flagyl till 6/19  2. F/u fungitell  3. Discharge planning per primary team    I am out-of-town 6/18/22-6/24/22. Dr. Jose Pereira will be covering for me in the interim. She can be reached through [employer]. Above plan was discussed in details with patient, dr. Danay Blair. HPI:    Feels better. No cp, sob, abdominal pain. Resolved generalized aches            Past Medical History:   Diagnosis Date    Colon cancer ([facility] [place] [address]     Diabetes ([facility] [place] [address]     Hypertension     Hypothyroidism        Past Surgical History:   Procedure Laterality Date    COLONOSCOPY N/A 12/30/2016    COLONOSCOPY. SURVEILLANCE /c Hot Snared Polypectomy /c EndoClip x3 performed by Adrien Anderson MD at [facility] ENDOSCOPY    HX HYSTERECTOMY      HX TOTAL COLECTOMY         home Medication List       Details   insulin glargine (LANTUS) 100 unit/mL injection Inject 15 units SQ BID  Qty: 1 Vial, Refills: 0      metFORMIN ER (GLUCOPHAGE XR) 500 mg tablet       gabapentin (NEURONTIN) 100 mg capsule Take 100 mg by mouth three (3) times daily. Cholecalciferol, Vitamin D3, 50,000 unit cap Take 1 Cap by mouth every seven (7) days. insulin regular (NOVOLIN R, HUMULIN R) 100 unit/mL injection by SubCUTAneous route Before breakfast, lunch, and dinner. Sliding scale      amLODIPine (NORVASC) 5 mg tablet Take 5 mg by mouth daily. alendronate (FOSAMAX) 10 mg tablet Take 70 mg by mouth every seven (7) days. aspirin 81 mg chewable tablet Take 81 mg by mouth daily. levothyroxine (SYNTHROID) 112 mcg tablet Take 100 mcg by mouth Daily (before breakfast).              Current Facility-Administered Medications   Medication Dose Route Frequency    pantoprazole (PROTONIX) 40 mg in 0.9% sodium chloride 10 mL injection  40 mg IntraVENous Q12H    insulin lispro (HUMALOG) injection   SubCUTAneous AC&HS    insulin glargine (LANTUS) injection 12 Units  12 Units SubCUTAneous DAILY    midodrine (PROAMATINE) tablet 5 mg  5 mg Oral TID WITH MEALS    polyethylene glycol (MIRALAX) packet 17 g  17 g Oral DAILY    docusate sodium (COLACE) capsule 100 mg  100 mg Oral DAILY    benzonatate (TESSALON) capsule 100 mg  100 mg Oral TID PRN    albuterol-ipratropium (DUO-NEB) 2.5 MG-0.5 MG/3 ML  3 mL Nebulization Q4H PRN    glucose chewable tablet 16 g  16 g Oral PRN    glucagon (GLUCAGEN) injection 1 mg  1 mg IntraMUSCular PRN    dextrose 10% infusion 0-250 mL  0-250 mL IntraVENous PRN    levothyroxine (SYNTHROID) tablet 112 mcg  112 mcg Oral 6am    piperacillin-tazobactam (ZOSYN) 3.375 g in 0.9% sodium chloride (MBP/ADV) 100 mL MBP  3.375 g IntraVENous Q8H    [Held by provider] heparin 25,000 units in  mL infusion  12-25 Units/kg/hr IntraVENous TITRATE    sodium chloride (NS) flush 5-40 mL  5-40 mL IntraVENous Q8H    sodium chloride (NS) flush 5-40 mL  5-40 mL IntraVENous PRN    acetaminophen (TYLENOL) tablet 650 mg  650 mg Oral Q6H PRN    Or    acetaminophen (TYLENOL) suppository 650 mg  650 mg Rectal Q6H PRN    ondansetron (ZOFRAN ODT) tablet 4 mg  4 mg Oral Q8H PRN    Or    ondansetron (ZOFRAN) injection 4 mg  4 mg IntraVENous Q6H PRN       Allergies: Patient has no known allergies. Family History   Problem Relation Age of Onset    Diabetes Mother     Cancer Father         colon     Social History     Socioeconomic History    Marital status: SINGLE     Spouse name: Not on file    Number of children: Not on file    Years of education: Not on file    Highest education level: Not on file   Occupational History    Not on file   Tobacco Use    Smoking status: Former Smoker    Smokeless tobacco: Never Used   Substance and Sexual Activity    Alcohol use: Yes     Comment: occasionally    Drug use: No    Sexual activity: Not on file   Other Topics Concern    Not on file   Social History Narrative    Not on file     Social Determinants of Health     Financial Resource Strain:     Difficulty of Paying Living Expenses: Not on file   Food Insecurity:     Worried About [address] in the Last Year: Not on file    [address] in the Last Year: Not on file   Transportation Needs:     Lack of Transportation (Medical): Not on file    Lack of Transportation (Non-Medical):  Not on file   Physical Activity:     Days of Exercise per Week: Not on file    Minutes of Exercise per Session: Not on file   Stress:     Feeling of Stress : Not on file   Social Connections:     Frequency of Communication with Friends and Family: Not on file    Frequency of Social Gatherings with Friends and Family: Not on file    Attends [employer] Services: Not on file    Active Member of [employer] or Organizations: Not on file    Attends Club or Organization Meetings: Not on file    Marital Status: Not on file   Intimate Partner Violence:     Fear of Current or Ex-Partner: Not on file    Emotionally Abused: Not on file    Physically Abused: Not on file    Sexually Abused: Not on file   Housing Stability:     Unable to Pay for Housing in the Last Year: Not on file    Number of [place] in the Last Year: Not on file    Unstable Housing in the Last Year: Not on file     Social History     Tobacco Use   Smoking Status Former Smoker   Smokeless Tobacco Never Used        Temp (24hrs), Av.9 °F (36.6 °C), Min:97.7 °F (36.5 °C), Max:98.1 °F (36.7 °C)    Visit Vitals  /80   Pulse 63   Temp 97.8 °F (36.6 °C)   Resp 20   Ht 5' 6\" (1.676 m)   Wt 82.1 kg (181 lb)   SpO2 100%   BMI 29.21 kg/m²       ROS: 12 point review of systems obtained details. Pertinent positive as mentioned in HPI, otherwise negative    Physical Exam:    General: Well developed, well nourished female laying on the bed, alert awake oriented x3, communicative, in no acute distress. HEENT:  Normocephalic, atraumatic,  EOMI, no scleral icterus or pallor; no conjunctival hemmohage;  nasal and oral mucous are moist and without evidence of lesions. Neck supple, no bruits. Lymph Nodes:   not examined   Lungs:   non-labored, bilateral chest movements equal, no audible wheezes   Heart:  RRR, s1 and s2; no rubs or gallops, no edema   Abdomen:  soft, non-distended, active bowel sounds, no hepatomegaly, no splenomegaly. no tenderness   Genitourinary:  deferred   Extremities:   no clubbing, cyanosis; no joint effusions or swelling; pain on movements of all extremities; muscle mass appropriate for age   Neurologic:  No gross focal sensory abnormalities; 5/5 muscle strength to upper and lower extremities. Speech appropriate. Cranial nerves intact                        Skin:  No rash or ulcers noted   Back:  no spinal or paraspinal muscle tenderness or rigidity, no CVA tenderness     Psychiatric:   Appropriate mood and affect         Labs: Results:   Chemistry Recent Labs     06/17/22  0157 06/16/22  1730 06/16/22  0400 06/15/22  1800 06/15/22  0230   GLU 49*  --  92  --  118*     --  143  --  140   K 3.8 4.1 3.5   < > 3.4*   *  --  110  --  107   CO2 29  --  27  --  27   BUN 27*  --  33*  --  30*   CREA 1.25  --  1.53*  --  1.39*   CA 7.7*  --  7.7*  --  7.6*   AGAP 4  --  6  --  6   BUCR 22*  --  22*  --  22*   AP 90  --   --   --   --    TP 4.7*  --   --   --   --    ALB 2.3*  --   --   --   --    GLOB 2.4  --   --   --   --    AGRAT 1.0  --   --   --   --     < > = values in this interval not displayed. CBC w/Diff Recent Labs     06/17/22  0157 06/16/22  0400 06/15/22  0230   WBC 11.9 15.9* 20.1*   RBC 2.77* 2.77* 2.77*   HGB 7.2* 7.4* 7.4*   HCT 23.0* 22.6* 22.2*   * 116* 114*   GRANS 71 79* 92*   LYMPH 15* 13* 4*   EOS 1 0 0      Microbiology Recent Labs     06/14/22  1138   CULT NO GROWTH 2 DAYS          RADIOLOGY:    All available imaging studies/reports in [facility] care for this admission were reviewed    Disclaimer: Sections of this note are dictated utilizing voice recognition software, which may have resulted in some phonetic based errors in grammar and contents. Even though attempts were made to correct all the mistakes, some may have been missed, and remained in the body of the document. If questions arise, please contact our department.     Dr. Minnie Knight, Infectious Disease Specialist  [phone]  June 17, 2022  7:08 PM

## 2022-06-17 NOTE — PROGRESS NOTES
Attempted pt for OT tx at 910. Pt unable to be seen 2/2 MAIA. Will continue to f/u as schedule allows.  Thank you  ASIF Moreno/JEET

## 2022-06-17 NOTE — ROUTINE PROCESS
1200: Received patient back into ICU room # 313 following completion of endoscopy. Reassessment completed and documented on doc flowsheets. A&O x 4. NSR on telemetry. VSS. Denies pain. No distress noted.

## 2022-06-17 NOTE — PROGRESS NOTES
[facility]  Progress Note    Patient: Melanie Abdi Age: 66 y.o. : 1950 MR#: [id number] SSN: [id number]  Date: 2022     Subjective:     Patient without complaints currently. No SOB. Underwent EGD today. Cleared for discharged by ID. Assessment/Plan:   1. Acute metabolic encephalopathy -in setting of DKA, shock, sepsis; improved  2. NSTEMI -previously on heparin drip for 48 hrs+ now on hold, continue ASA and statin  3. Multifactorial shock in setting of sepsis, hypovolemia, cardiogenic -off pressors, continue midodrine and wean as able; WBC improving  4. DKA with high anion gap in setting of type 2 diabetes - A1c 9.0, continue Lantus, mealtime insulin and SSI, diabetes educator follows  5. Sepsis, POA - Aspiration pneumonia. Per ID - clear for discharge - Send home with cufuroxime, flagyl until .   6. Cardiomyopathy -echo 2022 with a EF of 30 to 35%  7. BRENDA -improving in setting of sepsis and DKA, nephrology appreciated, monitor off IV fluids; d/c brown and monitor   8. Acute Hypoxic Respiratory failure - progressively improving, wean as able   9. Electrolyte abnormalities (low potassium / phosphorus) - replace and monitor  10. Acute normocytic anemia - GI following. Underwent EGD on . #10. EGD results with large ulcer in stomach, blood oozing. Hemoclips used for hemostasis. Per recs - Continue clears. Advance diet if H&H remains stable x24 hours. PPI BID. 11. Disposition - PT/OT following, discussion of SNF on d/c, CM following.   Medicaid application has been completed per CM notes    Discussed with patient regarding continued admission and tx as outlined    Family contact is Oziel Daniels [phone]     Additional Notes:      Case discussed with:  [x]Patient  []Family  [x]Nursing  []Case Management  DVT Prophylaxis:  []Lovenox  []Hep SQ  [x]SCDs  []Coumadin / Eliquis or Xarelto   []On Heparin gtt    Objective:     VS:     Visit Vitals  /78   Pulse 71   Temp 98.1 °F (36.7 °C)   Resp 26   Ht 5' 6\" (1.676 m)   Wt 82.1 kg (181 lb)   SpO2 100%   BMI 29.21 kg/m²      Tmax/24hrs: Temp (24hrs), Av.7 °F (36.5 °C), Min:97.2 °F (36.2 °C), Max:98.3 °F (36.8 °C)      Intake/Output Summary (Last 24 hours) at 2022 1902  Last data filed at 2022 1800  Gross per 24 hour   Intake 460.83 ml   Output 845 ml   Net -384.17 ml       General:  Alert, NAD  HEENT: Oral mucosa moist  Cardiovascular:  RRR, Nl S1/S2  Pulmonary:  LSC throughout.  Normal resp effort  GI:  +BS in all four quadrants, soft, non-tender  Extremities:  No edema; 2+ dorsalis pedis pulses bilaterally  Neuro: alert and oriented x 3    Labs / micro / imaging :    Recent Results (from the past 24 hour(s))   GLUCOSE, POC    Collection Time: 22  8:56 PM   Result Value Ref Range    Glucose (POC) 181 (H) 70 - 110 mg/dL   PHOSPHORUS    Collection Time: 22  1:57 AM   Result Value Ref Range    Phosphorus 2.8 2.5 - 4.9 MG/DL   IRON PROFILE    Collection Time: 22  1:57 AM   Result Value Ref Range    Iron 18 (L) 50 - 175 ug/dL    TIBC 185 (L) 250 - 450 ug/dL    Iron % saturation 10 (L) 20 - 50 %   FERRITIN    Collection Time: 22  1:57 AM   Result Value Ref Range    Ferritin 61 8 - 388 NG/ML   VITAMIN B12 & FOLATE    Collection Time: 22  1:57 AM   Result Value Ref Range    Vitamin B12 1,248 (H) 211 - 911 pg/mL    Folate 3.8 3.10 - 17.50 ng/mL   CBC WITH AUTOMATED DIFF    Collection Time: 22  1:57 AM   Result Value Ref Range    WBC 11.9 4.6 - 13.2 K/uL    RBC 2.77 (L) 4.20 - 5.30 M/uL    HGB 7.2 (L) 12.0 - 16.0 g/dL    HCT 23.0 (L) 35.0 - 45.0 %    MCV 83.0 78.0 - 100.0 FL    MCH 26.0 24.0 - 34.0 PG    MCHC 31.3 31.0 - 37.0 g/dL    RDW 15.1 (H) 11.6 - 14.5 %    PLATELET 040 (L) 798 - 420 K/uL    MPV 12.9 (H) 9.2 - 11.8 FL    NRBC 0.3 (H) 0  WBC    ABSOLUTE NRBC 0.04 (H) 0.00 - 0.01 K/uL    NEUTROPHILS 71 40 - 73 %    LYMPHOCYTES 15 (L) 21 - 52 % MONOCYTES 10 3 - 10 %    EOSINOPHILS 1 0 - 5 %    BASOPHILS 0 0 - 2 %    IMMATURE GRANULOCYTES 3 (H) 0.0 - 0.5 %    ABS. NEUTROPHILS 8.5 (H) 1.8 - 8.0 K/UL    ABS. LYMPHOCYTES 1.8 0.9 - 3.6 K/UL    ABS. MONOCYTES 1.1 0.05 - 1.2 K/UL    ABS. EOSINOPHILS 0.2 0.0 - 0.4 K/UL    ABS. BASOPHILS 0.0 0.0 - 0.1 K/UL    ABS. IMM. GRANS. 0.3 (H) 0.00 - 0.04 K/UL    DF AUTOMATED     METABOLIC PANEL, COMPREHENSIVE    Collection Time: 06/17/22  1:57 AM   Result Value Ref Range    Sodium 145 136 - 145 mmol/L    Potassium 3.8 3.5 - 5.5 mmol/L    Chloride 112 (H) 100 - 111 mmol/L    CO2 29 21 - 32 mmol/L    Anion gap 4 3.0 - 18 mmol/L    Glucose 49 (LL) 74 - 99 mg/dL    BUN 27 (H) 7.0 - 18 MG/DL    Creatinine 1.25 0.6 - 1.3 MG/DL    BUN/Creatinine ratio 22 (H) 12 - 20      GFR est AA 51 (L) >60 ml/min/1.73m2    GFR est non-AA 42 (L) >60 ml/min/1.73m2    Calcium 7.7 (L) 8.5 - 10.1 MG/DL    Bilirubin, total 0.2 0.2 - 1.0 MG/DL    ALT (SGPT) 49 13 - 56 U/L    AST (SGOT) 17 10 - 38 U/L    Alk.  phosphatase 90 45 - 117 U/L    Protein, total 4.7 (L) 6.4 - 8.2 g/dL    Albumin 2.3 (L) 3.4 - 5.0 g/dL    Globulin 2.4 2.0 - 4.0 g/dL    A-G Ratio 1.0 0.8 - 1.7     GLUCOSE, POC    Collection Time: 06/17/22  3:49 AM   Result Value Ref Range    Glucose (POC) 51 (LL) 70 - 110 mg/dL   GLUCOSE, POC    Collection Time: 06/17/22  4:29 AM   Result Value Ref Range    Glucose (POC) 189 (H) 70 - 110 mg/dL   GLUCOSE, POC    Collection Time: 06/17/22  8:13 AM   Result Value Ref Range    Glucose (POC) 102 70 - 110 mg/dL   COVID-19 RAPID TEST    Collection Time: 06/17/22  8:45 AM   Result Value Ref Range    Specimen source Nasopharyngeal      COVID-19 rapid test Not detected NOTD     GLUCOSE, POC    Collection Time: 06/17/22 11:06 AM   Result Value Ref Range    Glucose (POC) 106 70 - 110 mg/dL   GLUCOSE, POC    Collection Time: 06/17/22 12:23 PM   Result Value Ref Range    Glucose (POC) 94 70 - 110 mg/dL   GLUCOSE, POC    Collection Time: 06/17/22  5:11 PM Result Value Ref Range    Glucose (POC) 138 (H) 70 - 110 mg/dL       Results     Procedure Component Value Units Date/Time    COVID-19 RAPID TEST [[id number]] Collected: 06/17/22 0845    Order Status: Completed Specimen: Nasopharyngeal Updated: 06/17/22 0947     Specimen source Nasopharyngeal        COVID-19 rapid test Not detected        Comment: Rapid Abbott ID Now       Rapid NAAT:  The specimen is NEGATIVE for SARS-CoV-2, the novel coronavirus associated with COVID-19. Negative results should be treated as presumptive and, if inconsistent with clinical signs and symptoms or necessary for patient management, should be tested with an alternative molecular assay. Negative results do not preclude SARS-CoV-2 infection and should not be used as the sole basis for patient management decisions. This test has been authorized by the FDA under an Emergency Use Authorization (EUA) for use by authorized laboratories.    Fact sheet for Healthcare Providers: [facility]  Fact sheet for Patients: [employer] [facility]       Methodology: Isothermal Nucleic Acid Amplification         CULTURE, BLOOD [[id number]] Collected: 06/14/22 1138    Order Status: Completed Specimen: Blood Updated: 06/17/22 0701     Special Requests: NO SPECIAL REQUESTS        Culture result: NO GROWTH 3 DAYS       STREP PNEUMO AG, URINE [[id number]]     Order Status: Canceled Specimen: Urine     LEGIONELLA PNEUMOPHILA AG, URINE [[id number]]     Order Status: Canceled Specimen: Urine     CULTURE, BLOOD [[id number]] Collected: 06/11/22 2150    Order Status: Completed Specimen: Blood Updated: 06/17/22 0700     Special Requests: NO SPECIAL REQUESTS        Culture result: NO GROWTH 6 DAYS       CULTURE, BLOOD [[id number]] Collected: 06/11/22 2150    Order Status: Completed Specimen: Blood Updated: 06/17/22 0700     Special Requests: NO SPECIAL REQUESTS        Culture result: NO GROWTH 6 DAYS RESPIRATORY VIRUS PANEL W/COVID-19, PCR [[id number]] Collected: 06/11/22 2051    Order Status: Completed Specimen: Nasopharyngeal Updated: 06/11/22 2236     Adenovirus Not detected        Coronavirus 229E Not detected        Coronavirus HKU1 Not detected        Coronavirus CVNL63 Not detected        Coronavirus OC43 Not detected        SARS-CoV-2, PCR Not detected        Metapneumovirus Not detected        Rhinovirus and Enterovirus Not detected        Influenza A Not detected        Influenza A, subtype H1 Not detected        Influenza A, subtype H3 Not detected        INFLUENZA A H1N1 PCR Not detected        Influenza B Not detected        Parainfluenza 1 Not detected        Parainfluenza 2 Not detected        Parainfluenza 3 Not detected        Parainfluenza virus 4 Not detected        RSV by PCR Not detected        B. parapertussis, PCR Not detected        Bordetella pertussis - PCR Not detected        Chlamydophila pneumoniae DNA, QL, PCR Not detected        Mycoplasma pneumoniae DNA, QL, PCR Not detected       CULTURE, MRSA [[id number]] Collected: 06/11/22 1120    Order Status: Completed Specimen: Nasal from Nares Updated: 06/13/22 1330     Special Requests: NO SPECIAL REQUESTS        Culture result: MRSA NOT PRESENT               Screening of patient nares for MRSA is for surveillance purposes and, if positive, to facilitate isolation considerations in high risk settings. It is not intended for automatic decolonization interventions per se as regimens are not sufficiently effective to warrant routine use. NM BONE MARROW [facility] BODY    Result Date: 6/17/2022  Patchy multifocal increased white cell accumulation in the right lower quadrant suspicious for active inflammation versus infection, suspect bowel related activity/process as discussed above. No additional sites of focal abnormal increased labeled WBC accumulation identified localizing for active infection/abscess.      [facility]    Result Date: 6/14/2022  1. Hepatomegaly -Biliary tree unremarkable by sonography    XR CHEST PORT    Result Date: 6/14/2022  : 1. No acute cardiopulmonary disease. NM INFLAM WB MULTI    Result Date: 6/17/2022  Patchy multifocal increased white cell accumulation in the right lower quadrant suspicious for active inflammation versus infection, suspect bowel related activity/process as discussed above. No additional sites of focal abnormal increased labeled WBC accumulation identified localizing for active infection/abscess.         Signed By: Jovan Day DO     June 17, 2022

## 2022-06-17 NOTE — PROGRESS NOTES
Rec'd pt resting in bed awake and alert, VSS, denies pain. Assisted with turning pt -partial bath completed due to incontinence of small soft stool, mepilex to sacrum dry and intact. NC  02 turned down from 2 liter to 1.  2200  Awake, talking on phone, no changes. Encouraged pt to take po fluids. Refill fresh water/ice with HS snack. 2300  Assited pt turning, generalized weakness throughout, pt asked for N/C off - agreed pt sats still  %. 0100  Assisted pt turning, linen pads/linen replaced. Replaced pt mepilex to sacrum area/ Has small circular approx 1 cm excoriated skin opening. Site is pink, no drainage, (new mepilex placed over site) tuned pt on her side to avoid any pressure to buttocks. Cannot determine if this is from excoriation\frequent stools/or pressure injury. Will continue to monitor. 0330  Notified by lab of pts BS level 49. Recheck at bedside 51. D10 250 mls given. 0400   250 ml bolus D10 completed with immediate recheck of 181. Pt remains asymptomatic.     0445 incont of small dark brown stool. Pt washed/turned, brown catheter d/c'd and was replaced with pure wick.

## 2022-06-17 NOTE — ANESTHESIA PREPROCEDURE EVALUATION
Relevant Problems   No relevant active problems       Anesthetic History   No history of anesthetic complications            Review of Systems / Medical History  Patient summary reviewed, nursing notes reviewed and pertinent labs reviewed    Pulmonary  Within defined limits                 Neuro/Psych   Within defined limits           Cardiovascular    Hypertension: well controlled                   GI/Hepatic/Renal  Within defined limits              Endo/Other    Diabetes: well controlled, type 2  Hypothyroidism: well controlled       Other Findings   Comments: Pt admitted in DKA, hyperkalemic, demand ischemia causing elevated troponin, and reduced LVEF to 30-35%. DKA and hyperkalemia resolved. Also found to be anemic, EGD for UGI source.            Physical Exam    Airway  Mallampati: II  TM Distance: 4 - 6 cm  Neck ROM: normal range of motion   Mouth opening: Normal     Cardiovascular    Rhythm: regular  Rate: normal         Dental  No notable dental hx       Pulmonary  Breath sounds clear to auscultation               Abdominal  GI exam deferred       Other Findings            Anesthetic Plan    ASA: 3  Anesthesia type: MAC          Induction: Intravenous  Anesthetic plan and risks discussed with: Patient

## 2022-06-17 NOTE — PROGRESS NOTES
Chart reviewed and attempted to see the patient  Out for endoscopy  Renal wise patient is doing really well.   Renal parameters improved  Creatinine down to 1.25   Off pressors and blood pressures in good range, excellent urine output  Wean midodrine as tolerated    Following peripherally through the weekend,  Plan to see Monday    Please call with questions    Eric Balderas MD [facility]  Cell [phone]  Pager: [phone]

## 2022-06-17 NOTE — PROCEDURES
[email or web address]  [place], [address]     Endoscopic Gastroduodenoscopy Procedure Note    Lorenda Leaks  [birth year]  [id number]    Indication: melena, anemia     : Brigette Carreon MD    Referring Provider:  Endy Kumar MD    Anesthesia/Sedation:  MAC anesthesia Propofol      Procedure Details   Full disclosure of risks were reviewed with patient as detailed on the consent form. The patient was placed in the left lateral decubitus position and monitored with continuous interval blood pressure monitoring and direct observations. After adequate sedation, the endoscope was carefully introduced into the oropharynx and passed in to the esophagus under direct visualization. The esophagus and GE junction were carefully examined, including a measurement of the Z-line at 38 cm, GEJ at 38 cm and hiatus at 38 cm from the incisors. After advancement of the endoscope into the stomach, a careful examination was performed, including views of the antrum, incisura angularis, corpus and retroflexed views of the cardia and fundus. The pylorus was then intubated without any difficulty and the endoscope was advanced to the second portion of the duodenum. Careful examination of the second portion of the duodenum and the bulb was then performed. The stomach was then decompressed and the endoscope withdrawn. Findings and intervention(s) are detailed below. Findings:   Esophagus:  · Normal mucosa without evidence of erythema, erosion or ulceration  · Regular Z-line    Stomach:  · Insufflated appropriately  · Gastric antrum appeared normal mucosa without evidence of erythema, erosion or ulceration  · Retroflexed view of the incisura and cardia revealed normal mucosa  · Large crated ulcer with heaped borders in gastric body with blood oozing. Biopsies were taken.  Two hemoclips were deployed for hemostasis    Duodenum/small bowel:  · Examination into second portion of duodenum  · Mucosa appeared normal without evidence of erythema, erosion or ulceration  · One 5mm sessile polyp in duodenal bulb was removed with biopsy forceps, retrieved, and sent to pathology    Therapies:    · biopsy of stomach body ulcer  · polypectomy of duodenal polyp    Specimens:   ID Type Source Tests Collected by Time Destination   1 : Duodenal polyp Preservative Duodenum  Tommy Kendrick MD 6/17/2022 1133 Pathology   2 : gastric ulcer bxs Preservative Gastric  Tommy Kendrick MD 6/17/2022 1137 Pathology       Complications:   · none; patient tolerated the procedure well. EBL: <10 ml    Impression:  · One 5mm sessile polyp in duodenal bulb was removed with biopsy forceps, retrieved, and sent to pathology  · Large crated ulcer with heaped borders in gastric body with blood oozing. Biopsies were taken. Two hemoclips were deployed for hemostasis. Recommendations:  · Clears for now. Would only advance diet if H/H remains stable for 24 hours. · PPI IV BID while inpatient, PO BID at discharge until repeat endoscopy. · Repeat EGD in 3 months to ensure ulcer healing. · Follow Path closely. Needs malignancy ruled out.     Ami Deluca MD  6/17/2022  11:47 AM

## 2022-06-17 NOTE — ANESTHESIA POSTPROCEDURE EVALUATION
Procedure(s):  ENDOSCOPY/ Polypectomy/ Biopsies/ Endo clips. MAC    Anesthesia Post Evaluation      Multimodal analgesia: multimodal analgesia used between 6 hours prior to anesthesia start to PACU discharge  Patient location during evaluation: PACU  Patient participation: complete - patient participated  Level of consciousness: sleepy but conscious  Pain management: adequate  Airway patency: patent  Anesthetic complications: no  Cardiovascular status: acceptable  Respiratory status: acceptable  Hydration status: acceptable  Post anesthesia nausea and vomiting:  controlled  Final Post Anesthesia Temperature Assessment:  Normothermia (36.0-37.5 degrees C)      INITIAL Post-op Vital signs:   Vitals Value Taken Time   /66 06/17/22 1230   Temp 36.4 °C (97.6 °F) 06/17/22 1204   Pulse 56 06/17/22 1243   Resp 15 06/17/22 1243   SpO2 98 % 06/17/22 1243   Vitals shown include unvalidated device data.

## 2022-06-17 NOTE — PERIOP NOTES
TRANSFER - OUT REPORT:    Verbal report given to Brigette Garcia RN) on [facility]  being transferred to ([phone]) for routine post - op       Report consisted of patients Situation, Background, Assessment and   Recommendations(SBAR). Information from the following report(s) SBAR was reviewed with the receiving nurse. Opportunity for questions and clarification was provided.       Patient transported with:  accompanied by NGA Meléndez   Monitor  O2 @ 2 liters  Registered Nurse

## 2022-06-17 NOTE — ROUTINE PROCESS
Transported by bed on portable cardiac monitor to endoscopy lab. Accompanied by transport tech and endoscopy nurse. No distress noted.

## 2022-06-17 NOTE — CONSULTS
[email or web address]  [phone]    GASTROENTEROLOGY CONSULT      Impression:   1. GI Bleed - melena for past several days with gradual decline in h/h.  2. Acute anemia - h/h 7.2/23.0  3. Iron deficiency - serum iron 18, Tsat 10%, ferritin nl  4. Hx CRC 2014, s/p lap transverse colectomy with Dr. Kandace Mehta, no repeat colonoscopy since surgery per patient. 5. Acute metabolic encephalopathy in setting of DKA, shock, sepsis  6. NSTEMI - on heparin drip, held due to melena  7. DKA in setting of type 2 DM  8. Sepsis POA - ? Aspiration PNA  9. Cardiomyopathy - EF 30-35%  10. Acute hypoxic respiratory failure - improved      Plan:     1. EGD today  2. Keep NPO  3. Continue PPI  4. Monitor h/h, transfuse per protocol  5. Continue to hold anticoagulation  6. Will need to have colonoscopy at some point in light of her hx CRC, no follow up studies per patient. Can consider this as inpatient or as OP. 7. Medical management per primary team      Chief Complaint: Melena, Anemia      HPI:  Tamir Page is a 66 y.o. female who I am being asked to see in consultation for an opinion regarding the above. She presented to  ED with altered mental status and was found by family unresponsive. She was brought by EMS, had BVM assisted ventilations en route but did not require intubation. She was found to be hypotensive and critical with above medical issues, was then transferred from ED to ICU. She presents today with melena for past several days with continued drop in her h/h as noted above. She denies previous issues with bleeding, reports chronic constipation treated with stool softeners at home. She denies hx PUD, reflux, abdominal pain, or previous blood in the stool. She has a history of CRC s/p resection in 2014 as noted above, no repeat colonoscopies completed for surveillance. Patient states she had other medical issues she was dealing with after that time and has not returned for follow up.      PMH:   Past Medical History: Diagnosis Date    [facility]     Diabetes ([facility]     Hypertension     Hypothyroidism        PSH:   Past Surgical History:   Procedure Laterality Date    COLONOSCOPY N/A 12/30/2016    COLONOSCOPY. SURVEILLANCE /c Hot Snared Polypectomy /c EndoClip x3 performed by Chuck Keith MD at [facility] ENDOSCOPY    HX HYSTERECTOMY      HX TOTAL COLECTOMY         Social HX:   Social History     Socioeconomic History    Marital status: SINGLE     Spouse name: Not on file    Number of children: Not on file    Years of education: Not on file    Highest education level: Not on file   Occupational History    Not on file   Tobacco Use    Smoking status: Former Smoker    Smokeless tobacco: Never Used   Substance and Sexual Activity    Alcohol use: Yes     Comment: occasionally    Drug use: No    Sexual activity: Not on file   Other Topics Concern    Not on file   Social History Narrative    Not on file     Social Determinants of Health     Financial Resource Strain:     Difficulty of Paying Living Expenses: Not on file   Food Insecurity:     Worried About [address] in the Last Year: Not on file    Albino of Food in the Last Year: Not on file   Transportation Needs:     Lack of Transportation (Medical): Not on file    Lack of Transportation (Non-Medical):  Not on file   Physical Activity:     Days of Exercise per Week: Not on file    Minutes of Exercise per Session: Not on file   Stress:     Feeling of Stress : Not on file   Social Connections:     Frequency of Communication with Friends and Family: Not on file    Frequency of Social Gatherings with Friends and Family: Not on file    Attends [employer] Services: Not on file    Active Member of Clubs or Organizations: Not on file    Attends Club or Organization Meetings: Not on file    Marital Status: Not on file   Intimate Partner Violence:     Fear of Current or Ex-Partner: Not on file    Emotionally Abused: Not on file    Physically Abused: Not on file    Sexually Abused: Not on file   Housing Stability:     Unable to Pay for Housing in the Last Year: Not on file    Number of Places Lived in the Last Year: Not on file    Unstable Housing in the Last Year: Not on file       FHX:   Family History   Problem Relation Age of Onset    Diabetes Mother     Cancer Father         colon       Allergy:   No Known Allergies    Patient Active Problem List   Diagnosis Code    Hyperkalemia E87.5    DKA (diabetic ketoacidoses) E11.10    [facility]acidosis) [facility] E11.10    Elevated troponin R77.8    Primary hypertension I10    Acquired hypothyroidism E03.9    History of colon cancer Z85.038    BRENDA (acute kidney injury) ([facility] N17.9       Home Medications:     Medications Prior to Admission   Medication Sig    insulin glargine (LANTUS) 100 unit/mL injection Inject 15 units SQ BID (Patient taking differently: 20 Units nightly. Inject 15 units SQ BID)    metFORMIN ER (GLUCOPHAGE XR) 500 mg tablet Take 500 mg by mouth daily (with dinner).  Cholecalciferol, Vitamin D3, 50,000 unit cap Take 1 Capsule by mouth every seven (7) days. Takes on wednesdays    insulin regular (NOVOLIN R, HUMULIN R) 100 unit/mL injection by SubCUTAneous route Before breakfast, lunch, and dinner. Sliding scale    amLODIPine (NORVASC) 5 mg tablet Take 5 mg by mouth daily.  aspirin 81 mg chewable tablet Take 81 mg by mouth daily.  levothyroxine (SYNTHROID) 112 mcg tablet Take 100 mcg by mouth Daily (before breakfast).  gabapentin (NEURONTIN) 100 mg capsule Take 100 mg by mouth three (3) times daily.  alendronate (FOSAMAX) 10 mg tablet Take 70 mg by mouth every seven (7) days. Review of Systems:     Constitutional: No fevers, chills, weight loss, fatigue. Skin: No rashes, pruritis, jaundice, ulcerations, erythema. HENT: No headaches, nosebleeds, sinus pressure, rhinorrhea, sore throat.    Eyes: No visual changes, blurred vision, eye pain, photophobia, jaundice. Cardiovascular: No chest pain, heart palpitations. Respiratory: No cough, SOB, wheezing, chest discomfort, orthopnea. Gastrointestinal: Neg unless noted otherwise in H&P   Genitourinary: No dysuria, bleeding, discharge, pyuria. Musculoskeletal: No weakness, arthralgias, wasting. Endo: No sweats. Heme: No bruising, easy bleeding. Allergies: As noted. Neurological: Cranial nerves intact. Alert and oriented. Gait not assessed. Psychiatric:  No anxiety, depression, hallucinations. Visit Vitals  BP (!) 129/59   Pulse (!) 57   Temp 97.8 °F (36.6 °C)   Resp 15   Ht 5' 6\" (1.676 m)   Wt 82.1 kg (181 lb)   SpO2 100%   BMI 29.21 kg/m²       Physical Assessment:     constitutional: appearance: well developed, well nourished, normal habitus, no deformities, in no acute distress. skin: inspection: no rashes, ulcers, icterus or other lesions; no clubbing or telangiectasias. palpation: no induration or subcutaneos nodules. eyes: inspection: normal conjunctivae and lids; no jaundice pupils: normal  ENMT: mouth: normal oral mucosa,lips and gums; poor dentition. oropharynx: normal tongue, hard and soft palate; posterior pharynx without erithema, exudate or lesions. neck: thyroid: normal size, consistency and position; no masses or tenderness. respiratory: effort: normal chest excursion; no intercostal retraction or accessory muscle use. cardiovascular: abdominal aorta: normal size and position; no bruits. palpation: PMI of normal size and position; normal rhythm; no thrill or murmurs. abdominal: abdomen: normal consistency; no tenderness or masses. hernias: no hernias appreciated. liver: normal size and consistency. spleen: not palpable. rectal: hemoccult/guaiac: not performed. musculoskeletal: grossly normal, gait not assessed, resting in bed  neurologic: cranial nerves: II-XII grossly normal. Pupils intact. psychiatric: judgement/insight: within normal limits. memory: within normal limits for recent and remote events. mood and affect: no evidence of depression, anxiety or agitation. orientation: oriented to time, space and person. Basic Metabolic Profile   Recent Labs     06/17/22  0157 06/16/22  1730 06/16/22  0400     --  143   K 3.8   < > 3.5   *  --  110   CO2 29  --  27   BUN 27*  --  33*   GLU 49*  --  92   CA 7.7*  --  7.7*   MG  --   --  2.0   PHOS 2.8   < > 2.4*    < > = values in this interval not displayed. CBC w/Diff    Recent Labs     06/17/22  0157   WBC 11.9   RBC 2.77*   HGB 7.2*   HCT 23.0*   MCV 83.0   MCH 26.0   MCHC 31.3   RDW 15.1*   *    Recent Labs     06/17/22  0157   GRANS 71   LYMPH 15*   EOS 1        Hepatic Function   Recent Labs     06/17/22  0157   ALB 2.3*   TP 4.7*   TBILI 0.2   AP 90        Coags   Recent Labs     06/15/22  1107 06/15/22  0518   APTT 144.3* 133.0*           YOLANDA Gordon. [facility], [address] [place] [address]  Cell: [phone]  [email or web address]

## 2022-06-17 NOTE — ROUTINE PROCESS
Bedside and Verbal shift change report given to Babs Owens RN (oncoming nurse) by Ronnell Kong RN (offgoing nurse). Report included the following information SBAR, Kardex, Intake/Output, MAR, Recent Results and Cardiac Rhythm is NSR on telemetry.

## 2022-06-18 LAB
ANION GAP SERPL CALC-SCNC: 5 MMOL/L (ref 3–18)
BASOPHILS # BLD: 0 K/UL (ref 0–0.1)
BASOPHILS NFR BLD: 0 % (ref 0–2)
BUN SERPL-MCNC: 18 MG/DL (ref 7–18)
BUN/CREAT SERPL: 16 (ref 12–20)
CALCIUM SERPL-MCNC: 8.5 MG/DL (ref 8.5–10.1)
CHLORIDE SERPL-SCNC: 110 MMOL/L (ref 100–111)
CO2 SERPL-SCNC: 29 MMOL/L (ref 21–32)
CREAT SERPL-MCNC: 1.11 MG/DL (ref 0.6–1.3)
DIFFERENTIAL METHOD BLD: ABNORMAL
EOSINOPHIL # BLD: 0.1 K/UL (ref 0–0.4)
EOSINOPHIL NFR BLD: 1 % (ref 0–5)
ERYTHROCYTE [DISTWIDTH] IN BLOOD BY AUTOMATED COUNT: 14.9 % (ref 11.6–14.5)
GLUCOSE BLD STRIP.AUTO-MCNC: 103 MG/DL (ref 70–110)
GLUCOSE BLD STRIP.AUTO-MCNC: 157 MG/DL (ref 70–110)
GLUCOSE BLD STRIP.AUTO-MCNC: 245 MG/DL (ref 70–110)
GLUCOSE BLD STRIP.AUTO-MCNC: 268 MG/DL (ref 70–110)
GLUCOSE SERPL-MCNC: 73 MG/DL (ref 74–99)
HCT VFR BLD AUTO: 24.6 % (ref 35–45)
HGB BLD-MCNC: 7.7 G/DL (ref 12–16)
IMM GRANULOCYTES # BLD AUTO: 0 K/UL
IMM GRANULOCYTES NFR BLD AUTO: 0 %
LYMPHOCYTES # BLD: 2.3 K/UL (ref 0.9–3.6)
LYMPHOCYTES NFR BLD: 24 % (ref 21–52)
MCH RBC QN AUTO: 26.1 PG (ref 24–34)
MCHC RBC AUTO-ENTMCNC: 31.3 G/DL (ref 31–37)
MCV RBC AUTO: 83.4 FL (ref 78–100)
MONOCYTES # BLD: 0.9 K/UL (ref 0.05–1.2)
MONOCYTES NFR BLD: 10 % (ref 3–10)
NEUTS BAND NFR BLD MANUAL: 6 % (ref 0–5)
NEUTS SEG # BLD: 6.1 K/UL (ref 1.8–8)
NEUTS SEG NFR BLD: 59 % (ref 40–73)
NRBC # BLD: 0.02 K/UL (ref 0–0.01)
NRBC BLD-RTO: 0.2 PER 100 WBC
PHOSPHATE SERPL-MCNC: 2.6 MG/DL (ref 2.5–4.9)
PLATELET # BLD AUTO: 185 K/UL (ref 135–420)
PLATELET COMMENTS,PCOM: ABNORMAL
PMV BLD AUTO: 11 FL (ref 9.2–11.8)
POTASSIUM SERPL-SCNC: 3.6 MMOL/L (ref 3.5–5.5)
RBC # BLD AUTO: 2.95 M/UL (ref 4.2–5.3)
RBC MORPH BLD: ABNORMAL
SODIUM SERPL-SCNC: 144 MMOL/L (ref 136–145)
WBC # BLD AUTO: 9.4 K/UL (ref 4.6–13.2)

## 2022-06-18 PROCEDURE — 74011636637 HC RX REV CODE- 636/637: Performed by: NURSE PRACTITIONER

## 2022-06-18 PROCEDURE — 74011250637 HC RX REV CODE- 250/637: Performed by: INTERNAL MEDICINE

## 2022-06-18 PROCEDURE — 74011000250 HC RX REV CODE- 250: Performed by: STUDENT IN AN ORGANIZED HEALTH CARE EDUCATION/TRAINING PROGRAM

## 2022-06-18 PROCEDURE — 74011250636 HC RX REV CODE- 250/636: Performed by: PHYSICIAN ASSISTANT

## 2022-06-18 PROCEDURE — 74011250637 HC RX REV CODE- 250/637: Performed by: PHYSICIAN ASSISTANT

## 2022-06-18 PROCEDURE — 74011250637 HC RX REV CODE- 250/637: Performed by: STUDENT IN AN ORGANIZED HEALTH CARE EDUCATION/TRAINING PROGRAM

## 2022-06-18 PROCEDURE — 65660000004 HC RM CVT STEPDOWN

## 2022-06-18 PROCEDURE — 84100 ASSAY OF PHOSPHORUS: CPT

## 2022-06-18 PROCEDURE — 2709999900 HC NON-CHARGEABLE SUPPLY

## 2022-06-18 PROCEDURE — 99232 SBSQ HOSP IP/OBS MODERATE 35: CPT | Performed by: INTERNAL MEDICINE

## 2022-06-18 PROCEDURE — C9113 INJ PANTOPRAZOLE SODIUM, VIA: HCPCS | Performed by: PHYSICIAN ASSISTANT

## 2022-06-18 PROCEDURE — 80048 BASIC METABOLIC PNL TOTAL CA: CPT

## 2022-06-18 PROCEDURE — 85025 COMPLETE CBC W/AUTO DIFF WBC: CPT

## 2022-06-18 PROCEDURE — 77030038269 HC DRN EXT URIN PURWCK BARD -A

## 2022-06-18 PROCEDURE — 82962 GLUCOSE BLOOD TEST: CPT

## 2022-06-18 PROCEDURE — 36415 COLL VENOUS BLD VENIPUNCTURE: CPT

## 2022-06-18 PROCEDURE — 74011250637 HC RX REV CODE- 250/637: Performed by: NURSE PRACTITIONER

## 2022-06-18 PROCEDURE — 74011000250 HC RX REV CODE- 250: Performed by: PHYSICIAN ASSISTANT

## 2022-06-18 RX ORDER — INSULIN GLARGINE 100 [IU]/ML
4 INJECTION, SOLUTION SUBCUTANEOUS DAILY
Status: DISCONTINUED | OUTPATIENT
Start: 2022-06-19 | End: 2022-06-19

## 2022-06-18 RX ORDER — MIDODRINE HYDROCHLORIDE 2.5 MG/1
2.5 TABLET ORAL
Status: DISCONTINUED | OUTPATIENT
Start: 2022-06-18 | End: 2022-06-19

## 2022-06-18 RX ORDER — PANTOPRAZOLE SODIUM 40 MG/1
40 TABLET, DELAYED RELEASE ORAL
Status: DISCONTINUED | OUTPATIENT
Start: 2022-06-18 | End: 2022-06-30 | Stop reason: HOSPADM

## 2022-06-18 RX ADMIN — DOCUSATE SODIUM 100 MG: 100 CAPSULE, LIQUID FILLED ORAL at 08:26

## 2022-06-18 RX ADMIN — MIDODRINE HYDROCHLORIDE 5 MG: 5 TABLET ORAL at 12:23

## 2022-06-18 RX ADMIN — CEFUROXIME AXETIL 500 MG: 250 TABLET ORAL at 21:48

## 2022-06-18 RX ADMIN — MIDODRINE HYDROCHLORIDE 2.5 MG: 2.5 TABLET ORAL at 16:49

## 2022-06-18 RX ADMIN — METRONIDAZOLE 500 MG: 500 TABLET ORAL at 21:48

## 2022-06-18 RX ADMIN — SODIUM CHLORIDE, PRESERVATIVE FREE 10 ML: 5 INJECTION INTRAVENOUS at 05:32

## 2022-06-18 RX ADMIN — Medication 9 UNITS: at 21:49

## 2022-06-18 RX ADMIN — ACETAMINOPHEN 650 MG: 325 TABLET ORAL at 03:05

## 2022-06-18 RX ADMIN — CEFUROXIME AXETIL 500 MG: 250 TABLET ORAL at 08:25

## 2022-06-18 RX ADMIN — METRONIDAZOLE 500 MG: 500 TABLET ORAL at 09:00

## 2022-06-18 RX ADMIN — LEVOTHYROXINE SODIUM 112 MCG: 112 TABLET ORAL at 05:32

## 2022-06-18 RX ADMIN — Medication 3 UNITS: at 12:24

## 2022-06-18 RX ADMIN — ATORVASTATIN CALCIUM 40 MG: 40 TABLET, FILM COATED ORAL at 21:48

## 2022-06-18 RX ADMIN — Medication 6 UNITS: at 16:49

## 2022-06-18 RX ADMIN — SODIUM CHLORIDE, PRESERVATIVE FREE 10 ML: 5 INJECTION INTRAVENOUS at 21:50

## 2022-06-18 RX ADMIN — ACETAMINOPHEN 650 MG: 325 TABLET ORAL at 21:56

## 2022-06-18 RX ADMIN — SODIUM CHLORIDE, PRESERVATIVE FREE 10 ML: 5 INJECTION INTRAVENOUS at 14:22

## 2022-06-18 RX ADMIN — POLYETHYLENE GLYCOL 3350 17 G: 17 POWDER, FOR SOLUTION ORAL at 08:26

## 2022-06-18 RX ADMIN — PANTOPRAZOLE SODIUM 40 MG: 40 TABLET, DELAYED RELEASE ORAL at 16:49

## 2022-06-18 RX ADMIN — MIDODRINE HYDROCHLORIDE 5 MG: 5 TABLET ORAL at 08:26

## 2022-06-18 RX ADMIN — SODIUM CHLORIDE 40 MG: 9 INJECTION INTRAMUSCULAR; INTRAVENOUS; SUBCUTANEOUS at 08:26

## 2022-06-18 NOTE — PROGRESS NOTES
[facility]  [facility]    Patient: July Nugent Age: 66 y.o. : 1950 MR#: [id number] SSN: [id number]  Date: 2022     Subjective:     Patient was seen in presence of nursing. She feels much better today. Denies any headaches or dizziness. Denies any chest pain shortness of breath or cough. No nausea or vomiting. Had a bowel movement without blood in it. No abdominal pain. Tolerating diet. Lives alone at home. Assessment/Plan:   1. Acute metabolic encephalopathy -in setting of DKA, shock, sepsis; improved  2. NSTEMI -previously on heparin drip for 48 hrs+. Continue Lipitor. We will start aspirin after 48 hours if hemoglobin is stable. 3. Multifactorial shock in setting of sepsis, hypovolemia, cardiogenic -off pressors, continue reducing dose of midodrine. We will put holding parameters. 4. DKA with high anion gap in setting of type 2 diabetes - A1c 9.0, will restart low-dose Lantus to avoid hypoglycemia. Continue insulin sliding scale  5. Sepsis, POA - Aspiration pneumonia. Per ID - clear for discharge - Send home with cufuroxime, flagyl until .   6. Cardiomyopathy -echo 2022 with a EF of 30 to 35%. Compensated currently. No cardiac arrhythmia noted on telemetry. We will discontinue telemetry at this point  7. BRENDA -improved in setting of sepsis and DKA, nephrology appreciated, monitor off IV fluids; off brown and monitor   8. Acute Hypoxic Respiratory failure -on room air. Resolved. 9. Electrolyte abnormalities (low potassium / phosphorus) - replace and monitor  10. Acute GI blood loss anemia secondary to large gastric ulcer: S/p endoscopy and hemoclips. GI input noted about changing patient to p.o. PPI twice daily. Patient is tolerating diet. Stable hemoglobin currently. Outpatient follow-up with GI. Disposition - PT/OT recommendation noted about SNF placement. Hopefully on Monday.     Total time to take care of this patient was 35 minutes and more than 50% of time was spent counseling and coordinating care. Family contact is Tarun Tyson [phone]       Case discussed with:  [x]Patient  []Family  [x]Nursing  []Case Management  DVT Prophylaxis:  []Lovenox  []Hep SQ  [x]SCDs  []Coumadin / Eliquis or Xarelto   []On Heparin gtt    Objective:       Visit Vitals  /78   Pulse 71   Temp 98.1 °F (36.7 °C)   Resp 26   Ht 5' 6\" (1.676 m)   Wt 82.1 kg (181 lb)   SpO2 100%   BMI 29.21 kg/m²      Tmax/24hrs: Temp (24hrs), Av °F (36.7 °C), Min:97.4 °F (36.3 °C), Max:98.3 °F (36.8 °C)      Intake/Output Summary (Last 24 hours) at 2022 1422  Last data filed at 2022 1211  Gross per 24 hour   Intake 600 ml   Output 1350 ml   Net -750 ml     General appearance - alert, well appearing, and in no distress  Chest -clear air entry noted in bases, no wheezes  Heart - S1 and S2 normal  Abdomen - soft, nontender, nondistended, Bowel sounds present  Neurological - alert, oriented, normal speech, no focal findings noted, no tremors noted, sensation touch normal in all 4 extremities.   Musculoskeletal - no joint tenderness or erythema of knees bilaterally  Extremities - no pedal edema noted      Labs / micro / imaging :    Recent Results (from the past 24 hour(s))   GLUCOSE, POC    Collection Time: 22  5:11 PM   Result Value Ref Range    Glucose (POC) 138 (H) 70 - 110 mg/dL   GLUCOSE, POC    Collection Time: 22  9:55 PM   Result Value Ref Range    Glucose (POC) 244 (H) 70 - 110 mg/dL   PHOSPHORUS    Collection Time: 22  1:26 AM   Result Value Ref Range    Phosphorus 2.6 2.5 - 4.9 MG/DL   CBC WITH AUTOMATED DIFF    Collection Time: 22  1:26 AM   Result Value Ref Range    WBC 9.4 4.6 - 13.2 K/uL    RBC 2.95 (L) 4.20 - 5.30 M/uL    HGB 7.7 (L) 12.0 - 16.0 g/dL    HCT 24.6 (L) 35.0 - 45.0 %    MCV 83.4 78.0 - 100.0 FL    MCH 26.1 24.0 - 34.0 PG    MCHC 31.3 31.0 - 37.0 g/dL    RDW 14.9 (H) 11.6 - 14.5 % PLATELET 183 571 - 348 K/uL    MPV 11.0 9.2 - 11.8 FL    NRBC 0.2 (H) 0  WBC    ABSOLUTE NRBC 0.02 (H) 0.00 - 0.01 K/uL    NEUTROPHILS 59 40 - 73 %    BAND NEUTROPHILS 6 (H) 0 - 5 %    LYMPHOCYTES 24 21 - 52 %    MONOCYTES 10 3 - 10 %    EOSINOPHILS 1 0 - 5 %    BASOPHILS 0 0 - 2 %    IMMATURE GRANULOCYTES 0 %    ABS. NEUTROPHILS 6.1 1.8 - 8.0 K/UL    ABS. LYMPHOCYTES 2.3 0.9 - 3.6 K/UL    ABS. MONOCYTES 0.9 0.05 - 1.2 K/UL    ABS. EOSINOPHILS 0.1 0.0 - 0.4 K/UL    ABS. BASOPHILS 0.0 0.0 - 0.1 K/UL    ABS. IMM. GRANS. 0.0 K/UL    DF MANUAL      PLATELET COMMENTS ADEQUATE PLATELETS      RBC COMMENTS POLYCHROMASIA  1+        RBC COMMENTS OVALOCYTES  1+        RBC COMMENTS ANISOCYTOSIS  1+       METABOLIC PANEL, BASIC    Collection Time: 06/18/22  1:26 AM   Result Value Ref Range    Sodium 144 136 - 145 mmol/L    Potassium 3.6 3.5 - 5.5 mmol/L    Chloride 110 100 - 111 mmol/L    CO2 29 21 - 32 mmol/L    Anion gap 5 3.0 - 18 mmol/L    Glucose 73 (L) 74 - 99 mg/dL    BUN 18 7.0 - 18 MG/DL    Creatinine 1.11 0.6 - 1.3 MG/DL    BUN/Creatinine ratio 16 12 - 20      GFR est AA 58 (L) >60 ml/min/1.73m2    GFR est non-AA 48 (L) >60 ml/min/1.73m2    Calcium 8.5 8.5 - 10.1 MG/DL   GLUCOSE, POC    Collection Time: 06/18/22  8:31 AM   Result Value Ref Range    Glucose (POC) 103 70 - 110 mg/dL   GLUCOSE, POC    Collection Time: 06/18/22 12:07 PM   Result Value Ref Range    Glucose (POC) 157 (H) 70 - 110 mg/dL       Results     Procedure Component Value Units Date/Time    COVID-19 RAPID TEST [[id number]] Collected: 06/17/22 0845    Order Status: Completed Specimen: Nasopharyngeal Updated: 06/17/22 0939     Specimen source Nasopharyngeal        COVID-19 rapid test Not detected        Comment: Rapid Abbott ID Now       Rapid NAAT:  The specimen is NEGATIVE for SARS-CoV-2, the novel coronavirus associated with COVID-19.        Negative results should be treated as presumptive and, if inconsistent with clinical signs and symptoms or necessary for patient management, should be tested with an alternative molecular assay. Negative results do not preclude SARS-CoV-2 infection and should not be used as the sole basis for patient management decisions. This test has been authorized by the FDA under an Emergency Use Authorization (EUA) for use by authorized laboratories.    Fact sheet for Healthcare Providers: ConventionVisonysdate.co.nz  Fact sheet for Patients: [employer] Mediadate.co.nz       Methodology: Isothermal Nucleic Acid Amplification         CULTURE, BLOOD [[id number]] Collected: 06/14/22 1138    Order Status: Completed Specimen: Blood Updated: 06/18/22 0611     Special Requests: NO SPECIAL REQUESTS        Culture result: NO GROWTH 4 DAYS       STREP PNEUMO AG, URINE [[id number]]     Order Status: Canceled Specimen: Urine     LEGIONELLA PNEUMOPHILA AG, URINE [[id number]]     Order Status: Canceled Specimen: Urine     CULTURE, BLOOD [[id number]] Collected: 06/11/22 2150    Order Status: Completed Specimen: Blood Updated: 06/17/22 0700     Special Requests: NO SPECIAL REQUESTS        Culture result: NO GROWTH 6 DAYS       CULTURE, BLOOD [[id number]] Collected: 06/11/22 2150    Order Status: Completed Specimen: Blood Updated: 06/17/22 0700     Special Requests: NO SPECIAL REQUESTS        Culture result: NO GROWTH 6 DAYS       RESPIRATORY VIRUS PANEL W/COVID-19, PCR [[id number]] Collected: 06/11/22 2051    Order Status: Completed Specimen: Nasopharyngeal Updated: 06/11/22 2236     Adenovirus Not detected        Coronavirus 229E Not detected        Coronavirus HKU1 Not detected        Coronavirus CVNL63 Not detected        Coronavirus OC43 Not detected        SARS-CoV-2, PCR Not detected        Metapneumovirus Not detected        Rhinovirus and Enterovirus Not detected        Influenza A Not detected        Influenza A, subtype H1 Not detected        Influenza A, subtype H3 Not detected        INFLUENZA A H1N1 PCR Not detected        Influenza B Not detected        Parainfluenza 1 Not detected        Parainfluenza 2 Not detected        Parainfluenza 3 Not detected        Parainfluenza virus 4 Not detected        RSV by PCR Not detected        B. parapertussis, PCR Not detected        Bordetella pertussis - PCR Not detected        Chlamydophila pneumoniae DNA, QL, PCR Not detected        Mycoplasma pneumoniae DNA, QL, PCR Not detected       CULTURE, MRSA [[id number]] Collected: 06/11/22 1120    Order Status: Completed Specimen: Nasal from Nares Updated: 06/13/22 1330     Special Requests: NO SPECIAL REQUESTS        Culture result: MRSA NOT PRESENT               Screening of patient nares for MRSA is for surveillance purposes and, if positive, to facilitate isolation considerations in high risk settings. It is not intended for automatic decolonization interventions per se as regimens are not sufficiently effective to warrant routine use. NM BONE MARROW [address] BODY    Result Date: 6/17/2022  Patchy multifocal increased white cell accumulation in the right lower quadrant suspicious for active inflammation versus infection, suspect bowel related activity/process as discussed above. No additional sites of focal abnormal increased labeled WBC accumulation identified localizing for active infection/abscess. US ABD LTD    Result Date: 6/14/2022  1. Hepatomegaly -Biliary tree unremarkable by sonography    XR CHEST PORT    Result Date: 6/14/2022  : 1. No acute cardiopulmonary disease. NM INFLAM WB MULTI    Result Date: 6/17/2022  Patchy multifocal increased white cell accumulation in the right lower quadrant suspicious for active inflammation versus infection, suspect bowel related activity/process as discussed above. No additional sites of focal abnormal increased labeled WBC accumulation identified localizing for active infection/abscess.       Disclaimer: Sections of this note are dictated using utilizing voice recognition software, which may have resulted in some phonetic based errors in grammar and contents. Even though attempts were made to correct all the mistakes, some may have been missed, and remained in the body of the document. If questions arise, please contact our department.     Signed By: Eliezer Scheuermann, MD     June 18, 2022

## 2022-06-18 NOTE — PROGRESS NOTES
Patient also had a history of pancreatic cystic lesion that was noted in 2021. MRI pancreas was suggestive of pseudocyst versus serous cystadenoma-this lesion seems to have been stable since 2015 based on imaging. Patient has a remote history of pancreatitis-can consider continued elective surveillance to make sure this is truly a cystadenoma versus other etiology      Timoteo Kraus MD  [facility].  [email or web address]  Phone: [phone]  Pager: [phone]  Cell: [phone]. [email or web address]

## 2022-06-18 NOTE — PROGRESS NOTES
TRANSFER - IN REPORT:    Verbal report received from Krista Wood RN(name) on Lazarus Riis  being received from ICU(unit) for routine progression of care      Report consisted of patients Situation, Background, Assessment and   Recommendations(SBAR). Information from the following report(s) SBAR, Kardex, Intake/Output, MAR and Cardiac Rhythm NSR was reviewed with the receiving nurse. Opportunity for questions and clarification was provided. Assessment completed upon patients arrival to unit and care assumed. Pt had a reddish/purplish excoriated area on the sacral/coccyx, with a little opening and an abrasion on the forehead. Wound Prevention Checklist    Patient: Lazarus Riis (72 y.o. female)  Date: 6/18/2022  Diagnosis: [facility] [E11.10] <principal problem not specified>    Precautions: Skin,Fall       []  Heel prevention boots placed on patient    [x]  Patient turned q2h during shift    []  Lift team ordered    []  Patient on Diane bed/Specialty bed    [x]  Each Wound is documented during shift (Stage, Color, drainage, odor, measurements, and dressings)    [x]  Dual skin checks done at bedside during shift report with       Alberto Sandoval RN     Bedside shift change report given to Talia Fernandez RN (oncoming nurse) by Tarun Mayen RN (offgoing nurse). Report included the following information SBAR, Kardex, Intake/Output, MAR and Cardiac Rhythm NSR.

## 2022-06-18 NOTE — PROGRESS NOTES
0800- Pt received from offgoing RN. Pt received lying in bed, attached to cardiac monitor. All alarms reviewed, set and audible. Pt is AAOx4 and can make her needs known appropriately. Pt denies pain and SOB. Pt complaining that she is hungry. GI recommendations of slowly advancing diet with stable hemoglobin relayed to pt. Call bell use and fall precautions reviewed. Pt states understanding of information. Purewick to suction per protocol. 1200- Daughter of pt at bedside. Updated on pt assessment and plan of care. All questions addressed. 1400- Dr Marta Blancas at bedside to assess pt. Updated on pt assessment, VS trend, output, and lab results. No verbal orders received. Pt continent of stool on bedpan. Pt bathed in CHG wipes and bath pack. Gown, linens, and preventative sacral allevyn dressing changed. Pt tolerated activity without incident. 1900- Bedside shift report endorsed to receiving RN. Pt assessment, VS trend, lab results, output, orders, and EMAR reviewed. Care relinquished.

## 2022-06-18 NOTE — PROGRESS NOTES
Problem: Diabetes Self-Management  Goal: *Disease process and treatment process  Description: Define diabetes and identify own type of diabetes; list 3 options for treating diabetes. Outcome: Progressing Towards Goal  Goal: *Incorporating nutritional management into lifestyle  Description: Describe effect of type, amount and timing of food on blood glucose; list 3 methods for planning meals. Outcome: Progressing Towards Goal  Goal: *Incorporating physical activity into lifestyle  Description: State effect of exercise on blood glucose levels. Outcome: Progressing Towards Goal  Goal: *Developing strategies to promote health/change behavior  Description: Define the ABC's of diabetes; identify appropriate screenings, schedule and personal plan for screenings. Outcome: Progressing Towards Goal  Goal: *Using medications safely  Description: State effect of diabetes medications on diabetes; name diabetes medication taking, action and side effects. Outcome: Progressing Towards Goal  Goal: *Monitoring blood glucose, interpreting and using results  Description: Identify recommended blood glucose targets  and personal targets. Outcome: Progressing Towards Goal  Goal: *Prevention, detection, treatment of acute complications  Description: List symptoms of hyper- and hypoglycemia; describe how to treat low blood sugar and actions for lowering  high blood glucose level. Outcome: Progressing Towards Goal  Goal: *Prevention, detection and treatment of chronic complications  Description: Define the natural course of diabetes and describe the relationship of blood glucose levels to long term complications of diabetes.   Outcome: Progressing Towards Goal  Goal: *Developing strategies to address psychosocial issues  Description: Describe feelings about living with diabetes; identify support needed and support network  Outcome: Progressing Towards Goal  Goal: *Insulin pump training  Outcome: Progressing Towards Goal  Goal: *Sick day guidelines  Outcome: Progressing Towards Goal  Goal: *Patient Specific Goal (EDIT GOAL, INSERT TEXT)  Outcome: Progressing Towards Goal     Problem: Patient Education: Go to Patient Education Activity  Goal: Patient/Family Education  Outcome: Progressing Towards Goal     Problem: Falls - Risk of  Goal: *Absence of Falls  Description: Document Mega Sampson Fall Risk and appropriate interventions in the flowsheet. Outcome: Progressing Towards Goal  Note: Fall Risk Interventions:  Mobility Interventions: Bed/chair exit alarm,Patient to call before getting OOB    Mentation Interventions: Bed/chair exit alarm,More frequent rounding,Toileting rounds    Medication Interventions: Bed/chair exit alarm,Patient to call before getting OOB    Elimination Interventions: Bed/chair exit alarm,Call light in reach,Patient to call for help with toileting needs              Problem: Patient Education: Go to Patient Education Activity  Goal: Patient/Family Education  Outcome: Progressing Towards Goal     Problem: Nutrition Deficit  Goal: *Optimize nutritional status  Outcome: Progressing Towards Goal     Problem: Patient Education: Go to Patient Education Activity  Goal: Patient/Family Education  Outcome: Progressing Towards Goal     Problem: Patient Education: Go to Patient Education Activity  Goal: Patient/Family Education  Outcome: Progressing Towards Goal     Problem: Patient Education: Go to Patient Education Activity  Goal: Patient/Family Education  Outcome: Progressing Towards Goal     Problem: Pressure Injury - Risk of  Goal: *Prevention of pressure injury  Description: Document Sid Scale and appropriate interventions in the flowsheet.   Outcome: Progressing Towards Goal     Problem: Patient Education: Go to Patient Education Activity  Goal: Patient/Family Education  Outcome: Progressing Towards Goal

## 2022-06-18 NOTE — PROGRESS NOTES
Problem: Diabetes Self-Management  Goal: *Disease process and treatment process  Description: Define diabetes and identify own type of diabetes; list 3 options for treating diabetes. Outcome: Progressing Towards Goal  Goal: *Incorporating nutritional management into lifestyle  Description: Describe effect of type, amount and timing of food on blood glucose; list 3 methods for planning meals. Outcome: Progressing Towards Goal  Goal: *Incorporating physical activity into lifestyle  Description: State effect of exercise on blood glucose levels. Outcome: Progressing Towards Goal  Goal: *Developing strategies to promote health/change behavior  Description: Define the ABC's of diabetes; identify appropriate screenings, schedule and personal plan for screenings. Outcome: Progressing Towards Goal  Goal: *Using medications safely  Description: State effect of diabetes medications on diabetes; name diabetes medication taking, action and side effects. Outcome: Progressing Towards Goal  Goal: *Monitoring blood glucose, interpreting and using results  Description: Identify recommended blood glucose targets  and personal targets. Outcome: Progressing Towards Goal  Goal: *Prevention, detection, treatment of acute complications  Description: List symptoms of hyper- and hypoglycemia; describe how to treat low blood sugar and actions for lowering  high blood glucose level. Outcome: Progressing Towards Goal  Goal: *Prevention, detection and treatment of chronic complications  Description: Define the natural course of diabetes and describe the relationship of blood glucose levels to long term complications of diabetes.   Outcome: Progressing Towards Goal  Goal: *Developing strategies to address psychosocial issues  Description: Describe feelings about living with diabetes; identify support needed and support network  Outcome: Progressing Towards Goal  Goal: *Insulin pump training  Outcome: Progressing Towards Goal  Goal: *Sick day guidelines  Outcome: Progressing Towards Goal  Goal: *Patient Specific Goal (EDIT GOAL, INSERT TEXT)  Outcome: Progressing Towards Goal     Problem: Patient Education: Go to Patient Education Activity  Goal: Patient/Family Education  Outcome: Progressing Towards Goal     Problem: Falls - Risk of  Goal: *Absence of Falls  Description: Document Earma Piero Fall Risk and appropriate interventions in the flowsheet. Outcome: Progressing Towards Goal  Note: Fall Risk Interventions:  Mobility Interventions: Bed/chair exit alarm,Patient to call before getting OOB    Mentation Interventions: Bed/chair exit alarm,More frequent rounding,Toileting rounds    Medication Interventions: Bed/chair exit alarm,Patient to call before getting OOB    Elimination Interventions: Bed/chair exit alarm,Call light in reach,Patient to call for help with toileting needs              Problem: Patient Education: Go to Patient Education Activity  Goal: Patient/Family Education  Outcome: Progressing Towards Goal     Problem: Nutrition Deficit  Goal: *Optimize nutritional status  Outcome: Progressing Towards Goal     Problem: Patient Education: Go to Patient Education Activity  Goal: Patient/Family Education  Outcome: Progressing Towards Goal     Problem: Patient Education: Go to Patient Education Activity  Goal: Patient/Family Education  Outcome: Progressing Towards Goal     Problem: Patient Education: Go to Patient Education Activity  Goal: Patient/Family Education  Outcome: Progressing Towards Goal     Problem: Pressure Injury - Risk of  Goal: *Prevention of pressure injury  Description: Document Sid Scale and appropriate interventions in the flowsheet. Outcome: Progressing Towards Goal  Note: Pressure Injury Interventions:  Sensory Interventions: Assess changes in LOC,Turn and reposition approx.  every two hours (pillows and wedges if needed),Minimize linen layers,Keep linens dry and wrinkle-free,Pressure redistribution bed/mattress (bed type)    Moisture Interventions: Absorbent underpads    Activity Interventions: Assess need for specialty bed    Mobility Interventions: Assess need for specialty bed,Pressure redistribution bed/mattress (bed type),HOB 30 degrees or less,Turn and reposition approx.  every two hours(pillow and wedges),PT/OT evaluation    Nutrition Interventions: Document food/fluid/supplement intake,Discuss nutritional consult with provider    Friction and Shear Interventions: HOB 30 degrees or less

## 2022-06-18 NOTE — PROGRESS NOTES
[email or web address]  [phone]    Gastroenterology follow up-Progress note    Impression:  1. GI bleeding-Secondary to large gastric ulcer noted on endoscopy on 6/17/2022. Required endoscopic therapy. Biopsies taken to rule out malignancy. Patient endorses history of NSAID use. Counseled about lifelong NSAID use cessation. Recommend twice daily PPI for 8 weeks followed by once a day lifelong. Will need repeat endoscopy in 8 to 12 weeks to document healing of ulcer (as long as malignancy is ruled out on current biopsy)  2. Acute anemia with iron deficiency-suspect this is related to the gastric ulcer-transfuse as needed. 3.  History of colon cancer in 2014-status post left transverse colectomy with Dr. Shital Oneal was able to find a colonoscopy report from 2019 that did not show any recurrent polyps (this was done by Dr. Chau Ball) 5-year recall was recommended. 4. Cardiomyopathy      Plan:  1. Twice daily PPI for 8 weeks followed by once a day for life. 2.  Follow biopsies-if malignancy is ruled out, she will need repeat endoscopy in 8 to 12 weeks to document healing of ulcer. Check for H. pylori status-treat if positive  3. Lifelong NSAID cessation recommended. 4.  Okay to advance diet    Chief Complaint: Follow-up for GI bleeding      Subjective: Denies any abdominal pain, bleeding. ROS: Denies any fevers, chills, rash.      Eyes: conjunctiva normal, EOM normal   Neck: ROM normal, supple and trachea normal   Cardiovascular: heart normal, intact distal pulses, normal rate and regular rhythm   Pulmonary/Chest Wall: breath sounds normal and effort normal   Abdominal: appearance normal, bowel sounds normal and soft, non-acute, non-tender     Patient Active Problem List   Diagnosis Code    Hyperkalemia E87.5    DKA (diabetic ketoacidoses) E11.10    DKA (diabetic ketoacidosis) (HCC) E11.10    Elevated troponin R77.8    Primary hypertension I10    Acquired hypothyroidism E03.9    History of colon cancer Z85.038    BRENDA (acute kidney injury) ([facility] N17.9         Visit Vitals  BP (!) 142/70 (BP 1 Location: Left upper arm, BP Patient Position: At rest)   Pulse 70   Temp 97.7 °F (36.5 °C)   Resp 20   Ht 5' 6\" (1.676 m)   Wt 82.1 kg (181 lb)   SpO2 94%   BMI 29.21 kg/m²           Intake/Output Summary (Last 24 hours) at 6/18/2022 0951  Last data filed at 6/17/2022 1800  Gross per 24 hour   Intake 260.83 ml   Output 500 ml   Net -239.17 ml       CBC w/Diff    Lab Results   Component Value Date/Time    WBC 9.4 06/18/2022 01:26 AM    RBC 2.95 (L) 06/18/2022 01:26 AM    HGB 7.7 (L) 06/18/2022 01:26 AM    HCT 24.6 (L) 06/18/2022 01:26 AM    MCV 83.4 06/18/2022 01:26 AM    MCH 26.1 06/18/2022 01:26 AM    MCHC 31.3 06/18/2022 01:26 AM    RDW 14.9 (H) 06/18/2022 01:26 AM     06/18/2022 01:26 AM    Lab Results   Component Value Date/Time    GRANS 59 06/18/2022 01:26 AM    LYMPH 24 06/18/2022 01:26 AM    EOS 1 06/18/2022 01:26 AM    BANDS 6 (H) 06/18/2022 01:26 AM    BASOS 0 06/18/2022 01:26 AM    MYELO 0 08/18/2011 01:54 PM    METAS 1 (H) 06/14/2022 04:00 AM    PRO 0 08/18/2011 01:54 PM    BLAST 0 08/18/2011 01:54 PM      Basic Metabolic Profile   Recent Labs     06/18/22  0126 06/16/22  1730 06/16/22  0400      < > 143   K 3.6   < > 3.5      < > 110   CO2 29   < > 27   BUN 18   < > 33*   CA 8.5   < > 7.7*   MG  --   --  2.0   PHOS 2.6   < > 2.4*    < > = values in this interval not displayed. Hepatic Function    Lab Results   Component Value Date/Time    ALB 2.3 (L) 06/17/2022 01:57 AM    TP 4.7 (L) 06/17/2022 01:57 AM    AP 90 06/17/2022 01:57 AM    No results found for: TBIL       Coags   Recent Labs     06/15/22  1107   APTT 144.3*               Cm Heredia MD    [facility]. [email or web address]  Phone: [phone]  Pager: [phone]

## 2022-06-19 LAB
ANION GAP SERPL CALC-SCNC: 5 MMOL/L (ref 3–18)
BASOPHILS # BLD: 0 K/UL (ref 0–0.1)
BASOPHILS NFR BLD: 0 % (ref 0–2)
BUN SERPL-MCNC: 13 MG/DL (ref 7–18)
BUN/CREAT SERPL: 13 (ref 12–20)
CALCIUM SERPL-MCNC: 8.9 MG/DL (ref 8.5–10.1)
CHLORIDE SERPL-SCNC: 106 MMOL/L (ref 100–111)
CO2 SERPL-SCNC: 29 MMOL/L (ref 21–32)
CREAT SERPL-MCNC: 0.99 MG/DL (ref 0.6–1.3)
DIFFERENTIAL METHOD BLD: ABNORMAL
EOSINOPHIL # BLD: 0.4 K/UL (ref 0–0.4)
EOSINOPHIL NFR BLD: 6 % (ref 0–5)
ERYTHROCYTE [DISTWIDTH] IN BLOOD BY AUTOMATED COUNT: 14.8 % (ref 11.6–14.5)
GLUCOSE BLD STRIP.AUTO-MCNC: 252 MG/DL (ref 70–110)
GLUCOSE BLD STRIP.AUTO-MCNC: 260 MG/DL (ref 70–110)
GLUCOSE BLD STRIP.AUTO-MCNC: 315 MG/DL (ref 70–110)
GLUCOSE BLD STRIP.AUTO-MCNC: 60 MG/DL (ref 70–110)
GLUCOSE BLD STRIP.AUTO-MCNC: 70 MG/DL (ref 70–110)
GLUCOSE BLD STRIP.AUTO-MCNC: 73 MG/DL (ref 70–110)
GLUCOSE SERPL-MCNC: 165 MG/DL (ref 74–99)
HCT VFR BLD AUTO: 26.3 % (ref 35–45)
HGB BLD-MCNC: 8.2 G/DL (ref 12–16)
IMM GRANULOCYTES # BLD AUTO: 0 K/UL
IMM GRANULOCYTES NFR BLD AUTO: 0 %
LYMPHOCYTES # BLD: 1.6 K/UL (ref 0.9–3.6)
LYMPHOCYTES NFR BLD: 23 % (ref 21–52)
MCH RBC QN AUTO: 25.9 PG (ref 24–34)
MCHC RBC AUTO-ENTMCNC: 31.2 G/DL (ref 31–37)
MCV RBC AUTO: 83 FL (ref 78–100)
MONOCYTES # BLD: 1.2 K/UL (ref 0.05–1.2)
MONOCYTES NFR BLD: 17 % (ref 3–10)
NEUTS BAND NFR BLD MANUAL: 2 % (ref 0–5)
NEUTS SEG # BLD: 3.9 K/UL (ref 1.8–8)
NEUTS SEG NFR BLD: 52 % (ref 40–73)
NRBC # BLD: 0 K/UL (ref 0–0.01)
NRBC BLD-RTO: 0 PER 100 WBC
PHOSPHATE SERPL-MCNC: 2.9 MG/DL (ref 2.5–4.9)
PLATELET # BLD AUTO: 256 K/UL (ref 135–420)
PLATELET COMMENTS,PCOM: ABNORMAL
PMV BLD AUTO: 10.9 FL (ref 9.2–11.8)
POTASSIUM SERPL-SCNC: 4 MMOL/L (ref 3.5–5.5)
RBC # BLD AUTO: 3.17 M/UL (ref 4.2–5.3)
RBC MORPH BLD: ABNORMAL
SODIUM SERPL-SCNC: 140 MMOL/L (ref 136–145)
WBC # BLD AUTO: 7.1 K/UL (ref 4.6–13.2)

## 2022-06-19 PROCEDURE — 80048 BASIC METABOLIC PNL TOTAL CA: CPT

## 2022-06-19 PROCEDURE — 74011250637 HC RX REV CODE- 250/637: Performed by: PHYSICIAN ASSISTANT

## 2022-06-19 PROCEDURE — 74011250637 HC RX REV CODE- 250/637: Performed by: INTERNAL MEDICINE

## 2022-06-19 PROCEDURE — 74011000250 HC RX REV CODE- 250: Performed by: STUDENT IN AN ORGANIZED HEALTH CARE EDUCATION/TRAINING PROGRAM

## 2022-06-19 PROCEDURE — 99232 SBSQ HOSP IP/OBS MODERATE 35: CPT | Performed by: INTERNAL MEDICINE

## 2022-06-19 PROCEDURE — 84100 ASSAY OF PHOSPHORUS: CPT

## 2022-06-19 PROCEDURE — 74011250637 HC RX REV CODE- 250/637: Performed by: STUDENT IN AN ORGANIZED HEALTH CARE EDUCATION/TRAINING PROGRAM

## 2022-06-19 PROCEDURE — 82962 GLUCOSE BLOOD TEST: CPT

## 2022-06-19 PROCEDURE — 36415 COLL VENOUS BLD VENIPUNCTURE: CPT

## 2022-06-19 PROCEDURE — 74011636637 HC RX REV CODE- 636/637: Performed by: INTERNAL MEDICINE

## 2022-06-19 PROCEDURE — 65660000004 HC RM CVT STEPDOWN

## 2022-06-19 PROCEDURE — 85025 COMPLETE CBC W/AUTO DIFF WBC: CPT

## 2022-06-19 PROCEDURE — 74011636637 HC RX REV CODE- 636/637: Performed by: NURSE PRACTITIONER

## 2022-06-19 PROCEDURE — 74011000250 HC RX REV CODE- 250: Performed by: REGISTERED NURSE

## 2022-06-19 PROCEDURE — 74011250637 HC RX REV CODE- 250/637: Performed by: NURSE PRACTITIONER

## 2022-06-19 RX ORDER — AMLODIPINE BESYLATE 2.5 MG/1
2.5 TABLET ORAL DAILY
Status: DISCONTINUED | OUTPATIENT
Start: 2022-06-19 | End: 2022-06-19

## 2022-06-19 RX ORDER — METOPROLOL TARTRATE 25 MG/1
12.5 TABLET, FILM COATED ORAL 2 TIMES DAILY
Status: DISCONTINUED | OUTPATIENT
Start: 2022-06-19 | End: 2022-06-20

## 2022-06-19 RX ORDER — MIDODRINE HYDROCHLORIDE 5 MG/1
2.5 TABLET ORAL
Status: DISCONTINUED | OUTPATIENT
Start: 2022-06-19 | End: 2022-06-30 | Stop reason: HOSPADM

## 2022-06-19 RX ORDER — INSULIN GLARGINE 100 [IU]/ML
3 INJECTION, SOLUTION SUBCUTANEOUS
Status: COMPLETED | OUTPATIENT
Start: 2022-06-19 | End: 2022-06-19

## 2022-06-19 RX ORDER — INSULIN GLARGINE 100 [IU]/ML
8 INJECTION, SOLUTION SUBCUTANEOUS DAILY
Status: DISCONTINUED | OUTPATIENT
Start: 2022-06-20 | End: 2022-06-20

## 2022-06-19 RX ADMIN — PANTOPRAZOLE SODIUM 40 MG: 40 TABLET, DELAYED RELEASE ORAL at 09:07

## 2022-06-19 RX ADMIN — Medication 6 UNITS: at 09:09

## 2022-06-19 RX ADMIN — LEVOTHYROXINE SODIUM 112 MCG: 112 TABLET ORAL at 05:36

## 2022-06-19 RX ADMIN — DOCUSATE SODIUM 100 MG: 100 CAPSULE, LIQUID FILLED ORAL at 09:07

## 2022-06-19 RX ADMIN — BENZONATATE 100 MG: 100 CAPSULE ORAL at 17:40

## 2022-06-19 RX ADMIN — METOPROLOL TARTRATE 12.5 MG: 25 TABLET, FILM COATED ORAL at 17:40

## 2022-06-19 RX ADMIN — Medication 12 UNITS: at 17:35

## 2022-06-19 RX ADMIN — SODIUM CHLORIDE, PRESERVATIVE FREE 10 ML: 5 INJECTION INTRAVENOUS at 13:01

## 2022-06-19 RX ADMIN — Medication 9 UNITS: at 12:59

## 2022-06-19 RX ADMIN — DEXTROSE MONOHYDRATE 250 ML: 10 INJECTION, SOLUTION INTRAVENOUS at 22:28

## 2022-06-19 RX ADMIN — METRONIDAZOLE 500 MG: 500 TABLET ORAL at 09:07

## 2022-06-19 RX ADMIN — INSULIN GLARGINE 4 UNITS: 100 INJECTION, SOLUTION SUBCUTANEOUS at 09:08

## 2022-06-19 RX ADMIN — PANTOPRAZOLE SODIUM 40 MG: 40 TABLET, DELAYED RELEASE ORAL at 16:41

## 2022-06-19 RX ADMIN — SODIUM CHLORIDE, PRESERVATIVE FREE 10 ML: 5 INJECTION INTRAVENOUS at 23:23

## 2022-06-19 RX ADMIN — METRONIDAZOLE 500 MG: 500 TABLET ORAL at 22:24

## 2022-06-19 RX ADMIN — INSULIN GLARGINE 3 UNITS: 100 INJECTION, SOLUTION SUBCUTANEOUS at 12:59

## 2022-06-19 RX ADMIN — CEFUROXIME AXETIL 500 MG: 250 TABLET ORAL at 09:07

## 2022-06-19 RX ADMIN — ATORVASTATIN CALCIUM 40 MG: 40 TABLET, FILM COATED ORAL at 22:23

## 2022-06-19 RX ADMIN — SODIUM CHLORIDE, PRESERVATIVE FREE 10 ML: 5 INJECTION INTRAVENOUS at 05:36

## 2022-06-19 RX ADMIN — CEFUROXIME AXETIL 500 MG: 250 TABLET ORAL at 22:23

## 2022-06-19 NOTE — PROGRESS NOTES
0700: Bedside shift change report given to Allie Arriaga RN (oncoming nurse) by Blanca Avila RN (offgoing nurse). Report included the following information SBAR, Kardex, Intake/Output, MAR and Cardiac Rhythm NSR.     1900: Bedside shift change report given to Blanca Avila RN (oncoming nurse) by Allie Arriaga RN (offgoing nurse). Report included the following information SBAR, Kardex, Intake/Output, MAR, Recent Results and Cardiac Rhythm NSR.

## 2022-06-19 NOTE — PROGRESS NOTES
Problem: Diabetes Self-Management  Goal: *Disease process and treatment process  Description: Define diabetes and identify own type of diabetes; list 3 options for treating diabetes. Outcome: Progressing Towards Goal  Goal: *Monitoring blood glucose, interpreting and using results  Description: Identify recommended blood glucose targets  and personal targets. Outcome: Progressing Towards Goal     Problem: Falls - Risk of  Goal: *Absence of Falls  Description: Document Jeanne Bare Fall Risk and appropriate interventions in the flowsheet. Outcome: Progressing Towards Goal  Note: Fall Risk Interventions:  Mobility Interventions: Bed/chair exit alarm    Mentation Interventions: Adequate sleep, hydration, pain control,Bed/chair exit alarm    Medication Interventions: Assess postural VS orthostatic hypotension    Elimination Interventions: Bed/chair exit alarm,Call light in reach,Patient to call for help with toileting needs              Problem: Pressure Injury - Risk of  Goal: *Prevention of pressure injury  Description: Document Sid Scale and appropriate interventions in the flowsheet.   Outcome: Progressing Towards Goal  Note: Pressure Injury Interventions:  Sensory Interventions: Assess changes in LOC    Moisture Interventions: Absorbent underpads    Activity Interventions: Assess need for specialty bed    Mobility Interventions: Assess need for specialty bed    Nutrition Interventions: Document food/fluid/supplement intake    Friction and Shear Interventions: Apply protective barrier, creams and emollients,Foam dressings/transparent film/skin sealants,Lift sheet,Lift team/patient mobility team,Minimize layers,Transferring/repositioning devices

## 2022-06-19 NOTE — PROGRESS NOTES
[facility] Hospitalist Group  Progress Note    Patient: Lincoln Rodney Age: 66 y.o. : 1950 MR#: [id number] SSN: [id number]  Date: 2022     Subjective:     Patient was seen in presence of nursing. Denies for headaches or dizziness. No abdominal pain or chest pain. No shortness of breath or cough. No nausea or vomiting. Tolerating diet well. Assessment/Plan:   1. Acute metabolic encephalopathy -in setting of DKA, shock, sepsis; improved  2. NSTEMI -previously on heparin drip for 48 hrs+. Continue Lipitor. We will start aspirin after 24 hours if hemoglobin is stable. We will add low-dose Lopressor and monitor. 3. Multifactorial shock in setting of sepsis, hypovolemia, cardiogenic -off pressors, will change midodrine as needed  4. DKA with high anion gap in setting of type 2 diabetes - A1c 9.0, will adjust the dose of Lantus to avoid hypoglycemia. Continue insulin sliding scale  5. Sepsis, POA - Aspiration pneumonia. Per ID -last dose of cufuroxime, flagyl until , today. 6. Cardiomyopathy -echo 2022 with a EF of 30 to 35%. Compensated currently. No cardiac arrhythmia noted on telemetry. 7. BRENDA -improved in setting of sepsis and DKA, nephrology appreciated, monitor off IV fluids; off brown and monitor   8. Acute Hypoxic Respiratory failure -on room air. Resolved. 9. Electrolyte abnormalities (low potassium / phosphorus) - replace and monitor. Stable today. 10. Acute GI blood loss anemia secondary to large gastric ulcer: S/p endoscopy and hemoclips. GI input noted about changing patient to p.o. PPI twice daily. Patient is tolerating diet. Stable hemoglobin currently. Outpatient follow-up with GI. Disposition - PT/OT recommendation noted about SNF placement. Hopefully on Monday. Total time to take care of this patient was 25 minutes and more than 50% of time was spent counseling and coordinating care.      Family contact is [employer] [phone]. Spoke to her and updated her about patient's current clinical condition. She wants patient to go to skilled nursing facility. Case discussed with:  [x]Patient  []Family  [x]Nursing  []Case Management  DVT Prophylaxis:  []Lovenox  []Hep SQ  [x]SCDs  []Coumadin / Eliquis or Xarelto   []On Heparin gtt    Objective:       BP (!) 157/73 (BP 1 Location: Left upper arm, BP Patient Position: At rest)   Pulse 85   Temp 98 °F (36.7 °C)   Resp 18   Ht 5' 6\" (1.676 m)   Wt 82.1 kg (181 lb)   SpO2 93%   BMI 29.21 kg/m²     Tmax/24hrs: Temp (24hrs), Av.2 °F (36.8 °C), Min:97.4 °F (36.3 °C), Max:98.6 °F (37 °C)      Intake/Output Summary (Last 24 hours) at 2022 1228  Last data filed at 2022 0851  Gross per 24 hour   Intake 1101 ml   Output 2200 ml   Net -1099 ml     General appearance - alert, well appearing, and in no distress  Chest -clear air entry noted in bases, no wheezes  Heart - S1 and S2 normal  Abdomen - soft, nontender, nondistended, Bowel sounds present  Neurological - alert, oriented, normal speech, no focal findings noted, no tremors noted, sensation touch normal in all 4 extremities. Musculoskeletal - no joint tenderness or erythema of knees bilaterally, normal range of motion.   Extremities - no pedal edema noted      Labs / micro / imaging :    Recent Results (from the past 24 hour(s))   GLUCOSE, POC    Collection Time: 22  4:42 PM   Result Value Ref Range    Glucose (POC) 245 (H) 70 - 110 mg/dL   GLUCOSE, POC    Collection Time: 22  9:22 PM   Result Value Ref Range    Glucose (POC) 268 (H) 70 - 110 mg/dL   PHOSPHORUS    Collection Time: 22  3:41 AM   Result Value Ref Range    Phosphorus 2.9 2.5 - 4.9 MG/DL   CBC WITH AUTOMATED DIFF    Collection Time: 22  3:41 AM   Result Value Ref Range    WBC 7.1 4.6 - 13.2 K/uL    RBC 3.17 (L) 4.20 - 5.30 M/uL    HGB 8.2 (L) 12.0 - 16.0 g/dL    HCT 26.3 (L) 35.0 - 45.0 %    MCV 83.0 78.0 - 100.0 FL    MCH 25.9 24.0 - 34.0 PG    MCHC 31.2 31.0 - 37.0 g/dL    RDW 14.8 (H) 11.6 - 14.5 %    PLATELET 397 187 - 218 K/uL    MPV 10.9 9.2 - 11.8 FL    NRBC 0.0 0  WBC    ABSOLUTE NRBC 0.00 0.00 - 0.01 K/uL    NEUTROPHILS 52 40 - 73 %    BAND NEUTROPHILS 2 0 - 5 %    LYMPHOCYTES 23 21 - 52 %    MONOCYTES 17 (H) 3 - 10 %    EOSINOPHILS 6 (H) 0 - 5 %    BASOPHILS 0 0 - 2 %    IMMATURE GRANULOCYTES 0 %    ABS. NEUTROPHILS 3.9 1.8 - 8.0 K/UL    ABS. LYMPHOCYTES 1.6 0.9 - 3.6 K/UL    ABS. MONOCYTES 1.2 0.05 - 1.2 K/UL    ABS. EOSINOPHILS 0.4 0.0 - 0.4 K/UL    ABS. BASOPHILS 0.0 0.0 - 0.1 K/UL    ABS. IMM. GRANS. 0.0 K/UL    DF MANUAL      PLATELET COMMENTS ADEQUATE PLATELETS      RBC COMMENTS ANISOCYTOSIS  1+        RBC COMMENTS OVALOCYTES  1+        RBC COMMENTS POLYCHROMASIA  1+       METABOLIC PANEL, BASIC    Collection Time: 06/19/22  3:41 AM   Result Value Ref Range    Sodium 140 136 - 145 mmol/L    Potassium 4.0 3.5 - 5.5 mmol/L    Chloride 106 100 - 111 mmol/L    CO2 29 21 - 32 mmol/L    Anion gap 5 3.0 - 18 mmol/L    Glucose 165 (H) 74 - 99 mg/dL    BUN 13 7.0 - 18 MG/DL    Creatinine 0.99 0.6 - 1.3 MG/DL    BUN/Creatinine ratio 13 12 - 20      GFR est AA >60 >60 ml/min/1.73m2    GFR est non-AA 55 (L) >60 ml/min/1.73m2    Calcium 8.9 8.5 - 10.1 MG/DL   GLUCOSE, POC    Collection Time: 06/19/22  8:47 AM   Result Value Ref Range    Glucose (POC) 252 (H) 70 - 110 mg/dL       Results     Procedure Component Value Units Date/Time    COVID-19 RAPID TEST [[id number]] Collected: 06/17/22 2227    Order Status: Completed Specimen: Nasopharyngeal Updated: 06/17/22 0971     Specimen source Nasopharyngeal        COVID-19 rapid test Not detected        Comment: Rapid Abbott ID Now       Rapid NAAT:  The specimen is NEGATIVE for SARS-CoV-2, the novel coronavirus associated with COVID-19.        Negative results should be treated as presumptive and, if inconsistent with clinical signs and symptoms or necessary for patient management, should be tested with an alternative molecular assay. Negative results do not preclude SARS-CoV-2 infection and should not be used as the sole basis for patient management decisions. This test has been authorized by the FDA under an Emergency Use Authorization (EUA) for use by authorized laboratories.    Fact sheet for Healthcare Providers: ConventionUpdate.co.nz  Fact sheet for Patients: ConventionUpdate.co.nz       Methodology: Isothermal Nucleic Acid Amplification         CULTURE, BLOOD [[id number]] Collected: 06/14/22 1138    Order Status: Completed Specimen: Blood Updated: 06/19/22 0605     Special Requests: NO SPECIAL REQUESTS        Culture result: NO GROWTH 5 DAYS       STREP PNEUMO AG, URINE [[id number]]     Order Status: Canceled Specimen: Urine     LEGIONELLA PNEUMOPHILA AG, URINE [[id number]]     Order Status: Canceled Specimen: Urine     CULTURE, BLOOD [[id number]] Collected: 06/11/22 2150    Order Status: Completed Specimen: Blood Updated: 06/17/22 0700     Special Requests: NO SPECIAL REQUESTS        Culture result: NO GROWTH 6 DAYS       CULTURE, BLOOD [[id number]] Collected: 06/11/22 2150    Order Status: Completed Specimen: Blood Updated: 06/17/22 0700     Special Requests: NO SPECIAL REQUESTS        Culture result: NO GROWTH 6 DAYS       RESPIRATORY VIRUS PANEL W/COVID-19, PCR [[id number]] Collected: 06/11/22 2051    Order Status: Completed Specimen: Nasopharyngeal Updated: 06/11/22 2236     Adenovirus Not detected        Coronavirus 229E Not detected        Coronavirus HKU1 Not detected        Coronavirus CVNL63 Not detected        Coronavirus OC43 Not detected        SARS-CoV-2, PCR Not detected        Metapneumovirus Not detected        Rhinovirus and Enterovirus Not detected        Influenza A Not detected        Influenza A, subtype H1 Not detected        Influenza A, subtype H3 Not detected        INFLUENZA A H1N1 PCR Not detected        Influenza B Not detected        Parainfluenza 1 Not detected        Parainfluenza 2 Not detected        Parainfluenza 3 Not detected        Parainfluenza virus 4 Not detected        RSV by PCR Not detected        B. parapertussis, PCR Not detected        Bordetella pertussis - PCR Not detected        Chlamydophila pneumoniae DNA, QL, PCR Not detected        Mycoplasma pneumoniae DNA, QL, PCR Not detected       CULTURE, MRSA [[id number]] Collected: 06/11/22 1120    Order Status: Completed Specimen: Nasal from Nares Updated: 06/13/22 1330     Special Requests: NO SPECIAL REQUESTS        Culture result: MRSA NOT PRESENT               Screening of patient nares for MRSA is for surveillance purposes and, if positive, to facilitate isolation considerations in high risk settings. It is not intended for automatic decolonization interventions per se as regimens are not sufficiently effective to warrant routine use. NM BONE MARROW [facility] BODY    Result Date: 6/17/2022  Patchy multifocal increased white cell accumulation in the right lower quadrant suspicious for active inflammation versus infection, suspect bowel related activity/process as discussed above. No additional sites of focal abnormal increased labeled WBC accumulation identified localizing for active infection/abscess. US ABD LTD    Result Date: 6/14/2022  1. Hepatomegaly -Biliary tree unremarkable by sonography    XR CHEST PORT    Result Date: 6/14/2022  : 1. No acute cardiopulmonary disease. NM INFLAM WB MULTI    Result Date: 6/17/2022  Patchy multifocal increased white cell accumulation in the right lower quadrant suspicious for active inflammation versus infection, suspect bowel related activity/process as discussed above. No additional sites of focal abnormal increased labeled WBC accumulation identified localizing for active infection/abscess.       Disclaimer: Sections of this note are dictated using utilizing voice recognition software, which may have resulted in some phonetic based errors in grammar and contents. Even though attempts were made to correct all the mistakes, some may have been missed, and remained in the body of the document. If questions arise, please contact our department.     Signed By: Elo Villarreal MD     June 19, 2022

## 2022-06-19 NOTE — PROGRESS NOTES
Problem: Diabetes Self-Management  Goal: *Disease process and treatment process  Description: Define diabetes and identify own type of diabetes; list 3 options for treating diabetes. Outcome: Progressing Towards Goal  Goal: *Incorporating nutritional management into lifestyle  Description: Describe effect of type, amount and timing of food on blood glucose; list 3 methods for planning meals. Outcome: Progressing Towards Goal  Goal: *Incorporating physical activity into lifestyle  Description: State effect of exercise on blood glucose levels. Outcome: Progressing Towards Goal  Goal: *Developing strategies to promote health/change behavior  Description: Define the ABC's of diabetes; identify appropriate screenings, schedule and personal plan for screenings. Outcome: Progressing Towards Goal  Goal: *Using medications safely  Description: State effect of diabetes medications on diabetes; name diabetes medication taking, action and side effects. Outcome: Progressing Towards Goal  Goal: *Monitoring blood glucose, interpreting and using results  Description: Identify recommended blood glucose targets  and personal targets. Outcome: Progressing Towards Goal  Goal: *Prevention, detection, treatment of acute complications  Description: List symptoms of hyper- and hypoglycemia; describe how to treat low blood sugar and actions for lowering  high blood glucose level. Outcome: Progressing Towards Goal  Goal: *Prevention, detection and treatment of chronic complications  Description: Define the natural course of diabetes and describe the relationship of blood glucose levels to long term complications of diabetes.   Outcome: Progressing Towards Goal  Goal: *Developing strategies to address psychosocial issues  Description: Describe feelings about living with diabetes; identify support needed and support network  Outcome: Progressing Towards Goal  Goal: *Insulin pump training  Outcome: Progressing Towards Goal  Goal: *Sick day guidelines  Outcome: Progressing Towards Goal  Goal: *Patient Specific Goal (EDIT GOAL, INSERT TEXT)  Outcome: Progressing Towards Goal     Problem: Patient Education: Go to Patient Education Activity  Goal: Patient/Family Education  Outcome: Progressing Towards Goal     Problem: Falls - Risk of  Goal: *Absence of Falls  Description: Document Kailyn Girard Fall Risk and appropriate interventions in the flowsheet. Outcome: Progressing Towards Goal  Note: Fall Risk Interventions:  Mobility Interventions: Bed/chair exit alarm    Mentation Interventions: Adequate sleep, hydration, pain control,Bed/chair exit alarm    Medication Interventions: Assess postural VS orthostatic hypotension    Elimination Interventions: Bed/chair exit alarm,Call light in reach,Patient to call for help with toileting needs              Problem: Patient Education: Go to Patient Education Activity  Goal: Patient/Family Education  Outcome: Progressing Towards Goal     Problem: Nutrition Deficit  Goal: *Optimize nutritional status  Outcome: Progressing Towards Goal     Problem: Patient Education: Go to Patient Education Activity  Goal: Patient/Family Education  Outcome: Progressing Towards Goal     Problem: Patient Education: Go to Patient Education Activity  Goal: Patient/Family Education  Outcome: Progressing Towards Goal     Problem: Patient Education: Go to Patient Education Activity  Goal: Patient/Family Education  Outcome: Progressing Towards Goal     Problem: Pressure Injury - Risk of  Goal: *Prevention of pressure injury  Description: Document Sid Scale and appropriate interventions in the flowsheet.   Outcome: Progressing Towards Goal     Problem: Patient Education: Go to Patient Education Activity  Goal: Patient/Family Education  Outcome: Progressing Towards Goal     Problem: Pain  Goal: *Control of Pain  Outcome: Progressing Towards Goal     Problem: Patient Education: Go to Patient Education Activity  Goal: Patient/Family Education  Outcome: Progressing Towards Goal

## 2022-06-20 LAB
ANION GAP SERPL CALC-SCNC: 5 MMOL/L (ref 3–18)
BACTERIA SPEC CULT: NORMAL
BASOPHILS # BLD: 0 K/UL (ref 0–0.1)
BASOPHILS NFR BLD: 0 % (ref 0–2)
BUN SERPL-MCNC: 10 MG/DL (ref 7–18)
BUN/CREAT SERPL: 10 (ref 12–20)
CALCIUM SERPL-MCNC: 8.7 MG/DL (ref 8.5–10.1)
CHLORIDE SERPL-SCNC: 104 MMOL/L (ref 100–111)
CO2 SERPL-SCNC: 31 MMOL/L (ref 21–32)
CREAT SERPL-MCNC: 1 MG/DL (ref 0.6–1.3)
DIFFERENTIAL METHOD BLD: ABNORMAL
EOSINOPHIL # BLD: 0.7 K/UL (ref 0–0.4)
EOSINOPHIL NFR BLD: 9 % (ref 0–5)
ERYTHROCYTE [DISTWIDTH] IN BLOOD BY AUTOMATED COUNT: 14.6 % (ref 11.6–14.5)
GLUCOSE BLD STRIP.AUTO-MCNC: 103 MG/DL (ref 70–110)
GLUCOSE BLD STRIP.AUTO-MCNC: 170 MG/DL (ref 70–110)
GLUCOSE BLD STRIP.AUTO-MCNC: 197 MG/DL (ref 70–110)
GLUCOSE BLD STRIP.AUTO-MCNC: 213 MG/DL (ref 70–110)
GLUCOSE BLD STRIP.AUTO-MCNC: 229 MG/DL (ref 70–110)
GLUCOSE SERPL-MCNC: 114 MG/DL (ref 74–99)
HCT VFR BLD AUTO: 26.6 % (ref 35–45)
HGB BLD-MCNC: 8.3 G/DL (ref 12–16)
IMM GRANULOCYTES # BLD AUTO: 0 K/UL
IMM GRANULOCYTES NFR BLD AUTO: 0 %
LYMPHOCYTES # BLD: 1.6 K/UL (ref 0.9–3.6)
LYMPHOCYTES NFR BLD: 20 % (ref 21–52)
MCH RBC QN AUTO: 26.1 PG (ref 24–34)
MCHC RBC AUTO-ENTMCNC: 31.2 G/DL (ref 31–37)
MCV RBC AUTO: 83.6 FL (ref 78–100)
METAMYELOCYTES NFR BLD MANUAL: 1 %
MONOCYTES # BLD: 1 K/UL (ref 0.05–1.2)
MONOCYTES NFR BLD: 13 % (ref 3–10)
MYELOCYTES NFR BLD MANUAL: 3 %
NEUTS BAND NFR BLD MANUAL: 1 % (ref 0–5)
NEUTS SEG # BLD: 4.2 K/UL (ref 1.8–8)
NEUTS SEG NFR BLD: 53 % (ref 40–73)
NRBC # BLD: 0 K/UL (ref 0–0.01)
NRBC BLD-RTO: 0 PER 100 WBC
PHOSPHATE SERPL-MCNC: 2.6 MG/DL (ref 2.5–4.9)
PLATELET # BLD AUTO: 312 K/UL (ref 135–420)
PLATELET COMMENTS,PCOM: ABNORMAL
PMV BLD AUTO: 10.6 FL (ref 9.2–11.8)
POTASSIUM SERPL-SCNC: 3.7 MMOL/L (ref 3.5–5.5)
RBC # BLD AUTO: 3.18 M/UL (ref 4.2–5.3)
RBC MORPH BLD: ABNORMAL
RBC MORPH BLD: ABNORMAL
SERVICE CMNT-IMP: NORMAL
SODIUM SERPL-SCNC: 140 MMOL/L (ref 136–145)
WBC # BLD AUTO: 7.8 K/UL (ref 4.6–13.2)

## 2022-06-20 PROCEDURE — 77030038269 HC DRN EXT URIN PURWCK BARD -A

## 2022-06-20 PROCEDURE — 74011636637 HC RX REV CODE- 636/637: Performed by: INTERNAL MEDICINE

## 2022-06-20 PROCEDURE — 74011250637 HC RX REV CODE- 250/637: Performed by: INTERNAL MEDICINE

## 2022-06-20 PROCEDURE — 74011250637 HC RX REV CODE- 250/637: Performed by: PHYSICIAN ASSISTANT

## 2022-06-20 PROCEDURE — 36415 COLL VENOUS BLD VENIPUNCTURE: CPT

## 2022-06-20 PROCEDURE — 74011636637 HC RX REV CODE- 636/637: Performed by: NURSE PRACTITIONER

## 2022-06-20 PROCEDURE — 74011000250 HC RX REV CODE- 250: Performed by: STUDENT IN AN ORGANIZED HEALTH CARE EDUCATION/TRAINING PROGRAM

## 2022-06-20 PROCEDURE — 80048 BASIC METABOLIC PNL TOTAL CA: CPT

## 2022-06-20 PROCEDURE — 2709999900 HC NON-CHARGEABLE SUPPLY

## 2022-06-20 PROCEDURE — 97530 THERAPEUTIC ACTIVITIES: CPT

## 2022-06-20 PROCEDURE — 65660000004 HC RM CVT STEPDOWN

## 2022-06-20 PROCEDURE — 84100 ASSAY OF PHOSPHORUS: CPT

## 2022-06-20 PROCEDURE — 74011250637 HC RX REV CODE- 250/637: Performed by: NURSE PRACTITIONER

## 2022-06-20 PROCEDURE — 99232 SBSQ HOSP IP/OBS MODERATE 35: CPT | Performed by: INTERNAL MEDICINE

## 2022-06-20 PROCEDURE — 97535 SELF CARE MNGMENT TRAINING: CPT

## 2022-06-20 PROCEDURE — 85025 COMPLETE CBC W/AUTO DIFF WBC: CPT

## 2022-06-20 PROCEDURE — 82962 GLUCOSE BLOOD TEST: CPT

## 2022-06-20 RX ORDER — CARVEDILOL 3.12 MG/1
3.12 TABLET ORAL EVERY 12 HOURS
Status: DISCONTINUED | OUTPATIENT
Start: 2022-06-20 | End: 2022-06-21

## 2022-06-20 RX ORDER — ASPIRIN 81 MG/1
81 TABLET ORAL DAILY
Status: DISCONTINUED | OUTPATIENT
Start: 2022-06-21 | End: 2022-06-30 | Stop reason: HOSPADM

## 2022-06-20 RX ORDER — GUAIFENESIN 100 MG/5ML
81 LIQUID (ML) ORAL DAILY
Qty: 30 TABLET | Refills: 0 | Status: SHIPPED
Start: 2022-06-27 | End: 2022-06-20

## 2022-06-20 RX ORDER — PANTOPRAZOLE SODIUM 40 MG/1
40 TABLET, DELAYED RELEASE ORAL
Qty: 60 TABLET | Refills: 0 | Status: SHIPPED
Start: 2022-06-20

## 2022-06-20 RX ORDER — POLYETHYLENE GLYCOL 3350 17 G/17G
17 POWDER, FOR SOLUTION ORAL DAILY
Qty: 10 PACKET | Refills: 0 | Status: ON HOLD
Start: 2022-06-21 | End: 2022-10-03 | Stop reason: SDUPTHER

## 2022-06-20 RX ORDER — METOPROLOL TARTRATE 25 MG/1
12.5 TABLET, FILM COATED ORAL 2 TIMES DAILY
Qty: 30 TABLET | Refills: 0 | Status: SHIPPED
Start: 2022-06-20 | End: 2022-06-29

## 2022-06-20 RX ORDER — ATORVASTATIN CALCIUM 40 MG/1
40 TABLET, FILM COATED ORAL
Qty: 30 TABLET | Refills: 0 | Status: SHIPPED
Start: 2022-06-20 | End: 2022-09-28

## 2022-06-20 RX ORDER — INSULIN GLARGINE 100 [IU]/ML
10 INJECTION, SOLUTION SUBCUTANEOUS DAILY
Status: DISCONTINUED | OUTPATIENT
Start: 2022-06-21 | End: 2022-06-24

## 2022-06-20 RX ORDER — INSULIN LISPRO 100 [IU]/ML
INJECTION, SOLUTION INTRAVENOUS; SUBCUTANEOUS
Qty: 1 EACH | Refills: 0 | Status: SHIPPED
Start: 2022-06-20

## 2022-06-20 RX ORDER — ASPIRIN 81 MG/1
81 TABLET ORAL DAILY
Qty: 30 TABLET | Refills: 0 | Status: SHIPPED
Start: 2022-06-21

## 2022-06-20 RX ORDER — DOCUSATE SODIUM 100 MG/1
100 CAPSULE, LIQUID FILLED ORAL DAILY
Qty: 30 CAPSULE | Refills: 0 | Status: SHIPPED
Start: 2022-06-21

## 2022-06-20 RX ORDER — INSULIN GLARGINE 100 [IU]/ML
INJECTION, SOLUTION SUBCUTANEOUS
Qty: 10 ML | Refills: 0 | Status: SHIPPED
Start: 2022-06-20 | End: 2022-06-29 | Stop reason: SDUPTHER

## 2022-06-20 RX ADMIN — PANTOPRAZOLE SODIUM 40 MG: 40 TABLET, DELAYED RELEASE ORAL at 08:26

## 2022-06-20 RX ADMIN — PANTOPRAZOLE SODIUM 40 MG: 40 TABLET, DELAYED RELEASE ORAL at 16:52

## 2022-06-20 RX ADMIN — CARVEDILOL 3.12 MG: 3.12 TABLET, FILM COATED ORAL at 21:46

## 2022-06-20 RX ADMIN — Medication 3 UNITS: at 12:29

## 2022-06-20 RX ADMIN — Medication 6 UNITS: at 21:46

## 2022-06-20 RX ADMIN — Medication 3 UNITS: at 08:26

## 2022-06-20 RX ADMIN — Medication 6 UNITS: at 16:58

## 2022-06-20 RX ADMIN — SODIUM CHLORIDE, PRESERVATIVE FREE 10 ML: 5 INJECTION INTRAVENOUS at 14:00

## 2022-06-20 RX ADMIN — ATORVASTATIN CALCIUM 40 MG: 40 TABLET, FILM COATED ORAL at 21:46

## 2022-06-20 RX ADMIN — LEVOTHYROXINE SODIUM 112 MCG: 112 TABLET ORAL at 05:43

## 2022-06-20 RX ADMIN — SODIUM CHLORIDE, PRESERVATIVE FREE 10 ML: 5 INJECTION INTRAVENOUS at 05:43

## 2022-06-20 RX ADMIN — METOPROLOL TARTRATE 12.5 MG: 25 TABLET, FILM COATED ORAL at 08:31

## 2022-06-20 RX ADMIN — Medication 8 UNITS: at 08:26

## 2022-06-20 RX ADMIN — SODIUM CHLORIDE, PRESERVATIVE FREE 10 ML: 5 INJECTION INTRAVENOUS at 21:47

## 2022-06-20 NOTE — PROGRESS NOTES
Nephrology Note    Gurmeet Mandel is a 66 y.o. female BLACK/   Chief Complaint   Patient presents with    High Blood Sugar    Altered mental status     Admission diagnosis: <principal problem not specified>     Subjective:    No dyspnea. Impression and Plan:   1. BRENDA due to ATN, improved  2. Hyperkalemia due to BRENDA, better  3. Sepsis, better  4. HTN    - resume home norvasc at lower dose 2.5 mg po daily, titrate as needed  - Monitor BMP, electrolytes          Physical Assessment:     Visit Vitals  BP (!) 167/71 (BP 1 Location: Left upper arm, BP Patient Position: At rest)   Pulse 84   Temp 98.7 °F (37.1 °C)   Resp 18   Ht 5' 6\" (1.676 m)   Wt 82.1 kg (181 lb)   SpO2 96%   BMI 29.21 kg/m²       Intake/Output Summary (Last 24 hours) at 6/20/2022 1030  Last data filed at 6/20/2022 6179  Gross per 24 hour   Intake --   Output 3750 ml   Net -3750 ml       General: In no acute distress. Lungs: Clear to auscultation  No LE edema. Data Review:    Labs: Results:       Chemistry Recent Labs     06/20/22  0240 06/19/22  0341 06/18/22  0126   * 165* 73*    140 144   K 3.7 4.0 3.6    106 110   CO2 31 29 29   BUN 10 13 18   CREA 1.00 0.99 1.11   CA 8.7 8.9 8.5   AGAP 5 5 5   BUCR 10* 13 16   PHOS 2.6 2.9 2.6      CBC w/Diff Recent Labs     06/20/22  0240 06/19/22  0341 06/18/22  0126   WBC 7.8 7.1 9.4   RBC 3.18* 3.17* 2.95*   HGB 8.3* 8.2* 7.7*   HCT 26.6* 26.3* 24.6*    256 185   GRANS 53 52 59   LYMPH 20* 23 24   EOS 9* 6* 1                                   Please call with questions. Silvina Myers M.D. [facility], [place].   June 20, 2022

## 2022-06-20 NOTE — WOUND CARE
Physical Exam   Room 355: Focused wound assess    Left buttock 5x2.5cm x 0cm purple discoloration over bony prominence. Sacrum center 1.3d8j3by purple discoloration over bony prominence. Right buttock 2.8x1.5x0.1cm purple discoloration over bony prominence. Silicone heart dressing covers all 2 areas & is appropriate. Left heel pink, blanchable, intact. Right heel, pink, blanchable, intact. Topical prevention & treatment orders per nursing unit skin care protocol. Pt on [place] bed. Both Heels are pink, blanchable, intact, pt moves & lifts legs on own without assistance. Pt can turn over on her own but needs assistance to shift hip all the way over for skin care & assessments. Will turn over care to nursing staff at this time.   Kirit TORRESN, RN, [facility], [address]

## 2022-06-20 NOTE — PROGRESS NOTES
Problem: Mobility Impaired (Adult and Pediatric)  Goal: *Acute Goals and Plan of Care (Insert Text)  Description: Physical Therapy Goals  Initiated 6/15/2022 and to be accomplished within 7 day(s)  1. Patient will move from supine to sit and sit to supine , scoot up and down, and roll side to side in bed with supervision/set-up. 2.  Patient will transfer from bed to chair and chair to bed with supervision/set-up using the least restrictive device. 3.  Patient will perform sit to stand with supervision/set-up. 4.  Patient will ambulate with supervision/set-up for 50 feet with the least restrictive device. 5.  Patient will ascend/descend 13 stairs with 1 handrail(s) with minimal assistance/contact guard assist.  6.  Patient will perform BLE therex as prescribed to tolerance     PLOF: Pt lives alone in an apt with 13 TOREY, has cane and walker for use if needed     Outcome: Progressing Towards Goal     PHYSICAL THERAPY TREATMENT    Patient: Melanie Abdi (62 y.o. female)  Date: 6/20/2022  Diagnosis: [facility] [E11.10] <principal problem not specified>  Procedure(s) (LRB):  ENDOSCOPY/ Polypectomy/ Biopsies/ Endo clips (N/A) 3 Days Post-Op  Precautions: Skin,Fall  PLOF: see above     ASSESSMENT:  Pt received in bed in NAD and agreeable. Pt with improved mobility this date and able to come to sitting EOB with min A and increased time with HOB elevated. Pt with good sitting balance, educated in hand placement and sequencing for sit <> stand. Pt performs one standing trial with min A, tolerates approx 20\" and then returns to sitting for brief rest break. Pt agreeable to get OOB into recliner and then stands again for SPT to recliner with min A and HHA for safety and steadying, pt with decreased step clearance. Pt positioned for comfort with BLE elevated, left with all needs met and call bell within reach. Nurse updated on progress this date.  Will continue to follow per POC, pt to benefit from ARU at discharge. Progression toward goals:   [x]      Improving appropriately and progressing toward goals  []      Improving slowly and progressing toward goals  []      Not making progress toward goals and plan of care will be adjusted     PLAN:  Patient continues to benefit from skilled intervention to address the above impairments. Continue treatment per established plan of care. Discharge Recommendations:  Inpatient Rehab  Further Equipment Recommendations for Discharge:  rolling walker     SUBJECTIVE:   Patient stated I want to move so I can get out of here.     OBJECTIVE DATA SUMMARY:   Critical Behavior:  Neurologic State: Alert  Orientation Level: Oriented X4  Cognition: Appropriate decision making  Safety/Judgement: Awareness of environment,Fall prevention  Functional Mobility Training:  Bed Mobility:     Supine to Sit: Minimum assistance     Scooting: Contact guard assistance         Transfers:  Sit to Stand: Minimum assistance  Stand to Sit: Contact guard assistance          Balance:  Sitting: Intact  Standing: Impaired; With support    Pain:  Pain level pre-treatment: 0/10  Pain level post-treatment: 0/10   Pain Intervention(s): Medication (see MAR); Rest, Ice, Repositioning   Response to intervention: Nurse notified    Activity Tolerance:   Good    Please refer to the flowsheet for vital signs taken during this treatment. After treatment:   [x] Patient left in no apparent distress sitting up in chair  [] Patient left in no apparent distress in bed  [] Call bell left within reach  [x] Nursing notified  [] Caregiver present  [] Bed alarm activated  [] SCDs applied      COMMUNICATION/EDUCATION:   [x]         Role of Physical Therapy in the acute care setting. [x]         Fall prevention education was provided and the patient/caregiver indicated understanding. [x]         Patient/family have participated as able in working toward goals and plan of care.   [x]         Patient/family agree to work toward stated goals and plan of care. []         Patient understands intent and goals of therapy, but is neutral about his/her participation.   []         Patient is unable to participate in stated goals/plan of care: ongoing with therapy staff.  []         Other:        Nicolas Carrillo   Time Calculation: 23 mins

## 2022-06-20 NOTE — PROGRESS NOTES
ARU/IPR REFERRAL CONTACT NOTE  [id number] [facility]    RE:  [place]     Thank you for the opportunity to review this patient's case for admission to [id number] [facility]. Based on our pre-admission screening:     [ Fabienne Montoya Team/Medical Director is following this case. Comments:  Met with patient at bedside. Up in recliner eating this PM. Very motivated to come to IPR. Daughter able to assist at DC. Will need authorization. Please be advised admission to ARU will be based on meeting admission requirements, authorization from her Managed Medicare policy        Thank you for this referral.   Please do not hesitate to call if you need further information or have additional questions.      Regards,    Bonny Richardson PTA   Admissions [facility]  [phone]

## 2022-06-20 NOTE — PROGRESS NOTES
Infectious Disease Inpatient Progress Note    Name: Melanie Abdi  : 1950  MRN: [id number]  Location: [facility]    ASSESSEMENT:      Fever + AMS on admission - resolved    WBC uptake on NM scan in RLQ - possible colitis  -No definitive evidence of cholecystitis per HIDA scan. Gallbladder changes noted on ct scan could be due to perihepatic ascites as seen on US 22.   -S/p course of abx for possible colitis, completed . Gastric ulcer  -S/p EGD on 22 - Large crated ulcer with heaped borders in gastric body with blood oozing. Biopsies were taken. Two hemoclips were deployed for hemostasis. Dry cough   -Concerns for pneumonia on CT chest - ? Aspiration pneumonia superimposed on pulmonary congestion. Clinical presentation not c/w community acquired pneumonia. Leukocytosis - resolved    DKA     H/o colon cancer s/p total colectomy and chemotherapy     PLAN:    -Patient doing well, finished her course of abx yesterday and feels ready to go home. WBC normalized, afebrile. Continue to monitor off abx.   -No need for ID follow up at this time. OK for discharge from ID standpoint. Will sign off at this time. Please call with questions/concerns. ?    [facility], DO  [employer] messaging    ___________________________________________________________________________    CC: possible colitis     Subjective:  Patient doing very well, has no complaints, denies pain, and didn't eat her lunch as she could not tell what it was and it did not look appetizing to her but did say that she would eat if the food was more to her liking. Appetite is good per her. Denies fevers, chills, N/V, abdominal pains, rashes, or itching at this time. Thought she was going to go home yesterday but still here today. Patient does admit to some coughing, dry, which is just irritating, not severe, no associated SOB. ?     ROS:   General fever, weight lost, loss of appetite, decrease energy, night sweat, chills   HEENT Headache, ear pain, ear discharge, sinus pain, congestion, neck pain,   Lymph Cervical, axillary, or groin lymphadenopathy   Heart Chest pains, PND, orthopnea, palpitations   Lungs Cough, sputum production, rib pains, costochondral pain   GI Diarrhea, abdominal pains, N/V, bloody stools, black stools    Perineal pain, burning with urination, flank pains, frequency, urgency, dysuria, hematuria   MSK Joint pains, swelling, spine/back pains,   Psych SI or HI   SKIN Rash, itching, skin breaks or ulcerations, abscesses   ? No Known Allergies    PMH:  Past Medical History:   Diagnosis Date    [facility] ([facility]     Diabetes ([facility]     Hypertension     Hypothyroidism        Social History:  Past Surgical History:   Procedure Laterality Date    COLONOSCOPY N/A 12/30/2016    COLONOSCOPY.  SURVEILLANCE /c Hot Snared Polypectomy /c EndoClip x3 performed by Tana Villa MD at [address] [place] [address] HX HYSTERECTOMY      HX TOTAL COLECTOMY         [address] [facility],[address]:  Family History   Problem Relation Age of Onset    Diabetes Mother     Cancer Father         colon       SocHx:  Social History     Socioeconomic History    Marital status: SINGLE     Spouse name: Not on file    Number of children: Not on file    Years of education: Not on file    Highest education level: Not on file   Occupational History    Not on file   Tobacco Use    Smoking status: Former Smoker    Smokeless tobacco: Never Used   Substance and Sexual Activity    Alcohol use: Yes     Comment: occasionally    Drug use: No    Sexual activity: Not on file   Other Topics Concern    Not on file   Social History Narrative    Not on file     Social Determinants of Health     Financial Resource Strain:     Difficulty of Paying Living Expenses: Not on file   Food Insecurity:     Worried About [address] in the Last Year: Not on file    Albino of Food in the Last Year: Not on file   Transportation Needs:     Lack of Transportation (Medical): Not on file    Lack of Transportation (Non-Medical): Not on file   Physical Activity:     Days of Exercise per Week: Not on file    Minutes of Exercise per Session: Not on file   Stress:     Feeling of Stress : Not on file   Social Connections:     Frequency of Communication with Friends and Family: Not on file    Frequency of Social Gatherings with Friends and Family: Not on file    Attends Catholic Services: Not on file    Active Member of Clubs or Organizations: Not on file    Attends Club or Organization Meetings: Not on file    Marital Status: Not on file   Intimate Partner Violence:     Fear of Current or Ex-Partner: Not on file    Emotionally Abused: Not on file    Physically Abused: Not on file    Sexually Abused: Not on file   Housing Stability:     Unable to Pay for Housing in the Last Year: Not on file    Number of [place] in the Last Year: Not on file    Unstable Housing in the Last Year: Not on file       PE:  Vitals:    06/20/22 0011 06/20/22 0400 06/20/22 0829 06/20/22 1217   BP: (!) 169/77 (!) 159/84 (!) 167/71 (!) 155/90   Pulse: 77 88 84 85   Resp: 16 18 18 18   Temp: 98.5 °F (36.9 °C) 99 °F (37.2 °C) 98.7 °F (37.1 °C) 98.6 °F (37 °C)   SpO2: 96% 95% 96% 98%   Weight:       Height:         GEN: in no apparent distress, pleasant, appears well clinically, elderly but looks great for her age  HEENT: PERRL, EOMI, no icterus. Atraumatic, normocephalic, OP clear, moist MM, No oral lesions or thrush, No parotid or mastoid swelling. NECK: No cervical or supraclavicular lymphadenopathy  CV: RRR, no murmur or rub/gallops appreciated  PULM: CTAB, good air entry, no wheezes. Normal inspiratory effort. ABD: Soft, not tender, non-distended, no organomegaly. BS +  EXT: There is no swelling or redness seen at the ankles, knees, elbows or wrists. NEURO: A&O x 4, no focal neuro deficits  Skin: no rashes on limited skin exam,  deferred.    Psych: Lizzy Love insight. Does not appear depressed or with flat effect. ? Lines:   - PIV   - PIV    Current abx:  None    Previous abx:  - -- Cefuroxime PO  - -- Flagyl 500mg PO q8hrs   - -- Zosyn IV  - -- Vancomycin IV  - -- Cefepime IV  - -- Flagyl 500mg IV q8hrs     Labs: reviewed    Microbiology:  22 Blood cx x 2: no growth x 6 days  22 Blood cx x 1: no growth x 6 days  22 MRSA nasal screen: negative     Pathology: N/A    Pertinent imagin22 NM scan: Patchy multifocal increased white cell accumulation in the right lower quadrant suspicious for active inflammation versus infection, suspect bowel related activity/process as discussed above. 22 U/S abd limited: Hepatomegaly. Biliary tree unremarkable by sonography     No additional sites of focal abnormal increased labeled WBC accumulation identified localizing for active infection/abscess. This is my first time seeing patient. A total of 38 minutes was spent with patient and reviewing records, greater than 50% was spent counseling and coordinating care related to possible colitis. 165

## 2022-06-20 NOTE — PROGRESS NOTES
0710: Bedside shift change report given to See Guy RN (oncoming nurse) by Job Bradley RN (offgoing nurse). Report included the following information SBAR, Kardex, Intake/Output, MAR and Cardiac Rhythm N/A.     0710: Wound Prevention Checklist    Patient: Daxa Ballard (85 y.o. female)  Date: 6/20/2022  Diagnosis: DKA (diabetic ketoacidosis) ([facility] [E11.10] <principal problem not specified>    Precautions: Skin,Fall       []  Heel prevention boots placed on patient    []  Patient turned q2h during shift    []  Lift team ordered    []  Patient on Diane bed/Specialty bed    []  Each Wound is documented during shift (Stage, Color, drainage, odor, measurements, and dressings)    [x]  Dual skin checks done at bedside during shift report with Job Bradley RN  Pt's skin is C/D/I with excoriation to sacrum. Bruise to forehead from fall PTA. Ethel aClixto RN       0830: Rounding on pt.    1500: Family at bedside. 2035: Bedside shift change report given to Ford Arreguin RN (oncoming nurse) by See Guy RN (offgoing nurse). Report included the following information SBAR, Kardex, Intake/Output and MAR.

## 2022-06-20 NOTE — PROGRESS NOTES
[email or web address]  [phone]    Gastroenterology follow up-Progress note    Impression:  1. GI bleeding-Secondary to large gastric ulcer noted on endoscopy on 6/17/2022. Required endoscopic therapy. Biopsies taken to rule out malignancy. Patient endorses history of NSAID use. Counseled about lifelong NSAID use cessation. Recommend twice daily PPI for 8 weeks followed by once a day lifelong. Will need repeat endoscopy in 8 to 12 weeks to document healing of ulcer (as long as malignancy is ruled out on current biopsy). Hgb stable; no ongoing melena. 2.  Acute anemia with iron deficiency-suspect this is related to the gastric ulcer-transfuse as needed. 3.  History of colon cancer in 2014-status post left transverse colectomy with Dr. Garcia Sers was able to find a colonoscopy report from 2019 that did not show any recurrent polyps (this was done by Dr. Estephania Pereira) 5-year recall was recommended. 4. History of pancreatic cystic lesion that was noted in 2021. MRI pancreas was suggestive of pseudocyst versus serous cystadenoma-this lesion seems to have been stable since 2015 based on imaging. Patient has a remote history of pancreatitis-can consider continued elective surveillance to make sure this is truly a cystadenoma versus other etiology  5. Cardiomyopathy      Plan:  1. Twice daily PPI for 8 weeks followed by once a day for life. 2.  Ok to resume ASA tomorrow. 3.  Follow biopsies-if malignancy is ruled out, she will need repeat endoscopy in 8 to 12 weeks to document healing of ulcer. Check for H. pylori status-treat if positive  4. Lifelong NSAID cessation recommended. 5.  Okay to advance diet. Chief Complaint: Follow-up for GI bleeding    Subjective: Denies any abdominal pain, bleeding. ROS: Denies any fevers, chills, rash.      Eyes: conjunctiva normal, EOM normal   Neck: ROM normal, supple and trachea normal   Cardiovascular: heart normal, intact distal pulses, normal rate and regular rhythm   Pulmonary/Chest Wall: breath sounds normal and effort normal   Abdominal: appearance normal, bowel sounds normal and soft, non-acute, non-tender     Patient Active Problem List   Diagnosis Code    Hyperkalemia E87.5    DKA (diabetic ketoacidoses) E11.10    DKA (diabetic ketoacidosis) ([facility] E11.10    Elevated troponin R77.8    Primary hypertension I10    Acquired hypothyroidism E03.9    History of colon cancer Z85.038    BRENDA (acute kidney injury) ([facility] N17.9         Visit Vitals  BP (!) 155/90 (BP 1 Location: Left upper arm, BP Patient Position: At rest)   Pulse 85   Temp 98.6 °F (37 °C)   Resp 18   Ht 5' 6\" (1.676 m)   Wt 82.1 kg (181 lb)   SpO2 98%   BMI 29.21 kg/m²           Intake/Output Summary (Last 24 hours) at 6/20/2022 1350  Last data filed at 6/20/2022 1217  Gross per 24 hour   Intake --   Output 3400 ml   Net -3400 ml       CBC w/Diff    Lab Results   Component Value Date/Time    WBC 7.8 06/20/2022 02:40 AM    RBC 3.18 (L) 06/20/2022 02:40 AM    HGB 8.3 (L) 06/20/2022 02:40 AM    HCT 26.6 (L) 06/20/2022 02:40 AM    MCV 83.6 06/20/2022 02:40 AM    MCH 26.1 06/20/2022 02:40 AM    MCHC 31.2 06/20/2022 02:40 AM    RDW 14.6 (H) 06/20/2022 02:40 AM     06/20/2022 02:40 AM    Lab Results   Component Value Date/Time    GRANS 53 06/20/2022 02:40 AM    LYMPH 20 (L) 06/20/2022 02:40 AM    EOS 9 (H) 06/20/2022 02:40 AM    BANDS 1 06/20/2022 02:40 AM    BASOS 0 06/20/2022 02:40 AM    MYELO 3 (H) 06/20/2022 02:40 AM    METAS 1 (H) 06/20/2022 02:40 AM    PRO 0 08/18/2011 01:54 PM    BLAST 0 08/18/2011 01:54 PM      Basic Metabolic Profile   Recent Labs     06/20/22  0240      K 3.7      CO2 31   BUN 10   CA 8.7   PHOS 2.6        Hepatic Function    Lab Results   Component Value Date/Time    ALB 2.3 (L) 06/17/2022 01:57 AM    TP 4.7 (L) 06/17/2022 01:57 AM    AP 90 06/17/2022 01:57 AM    No results found for: TBIL       Coags   No results for input(s): PTP, INR, APTT, INREXT, INREXT in the last 72 hours. Rosana Shea MD    [facility]. [email or web address]  Phone: [phone]  Pager: [phone]

## 2022-06-20 NOTE — PROGRESS NOTES
[facility] - Progress Note  Admit Date: 6/11/2022    Assessment:     -Septic shock, WBC ~30K with bandemia up to 12% on 6/13.  S/p pressor support. Improved. · CT C/A/P 6/12/2022 with sludge in gallbladder, small amount of nonspecific mesenteric induration near the gallbladder; hepatomegaly; patchy groundglass opacities in medial upper lobes bilaterally, likely infectious or inflammatory  -Large gastric ulcer by EGD June 17. Noted oozing. Hemoclips applied. -DKA, improved  -BRENDA with hyperkalemia, Cr 3.89 with K 7.9 on presentation 6/11/2022, improving. Improved  -Elevated troponin up to 5482 on 6/13/2022, suspect demand in setting of above  -Cardiomyopathy, Echo 6/12/2022 with EF 30-35% with akinetic basal anterolateral, mid anterolateral and apical lateral walls.  Prior Echo 01/2021 with LVEF 55-60% with normal wall motion.  -Anemia, Hgb ~8  -Thyroid disorder, TSH 0.17 on 6/11 with FT4 1.3 and FT3 1.1  -Hx colon cancer s/p total colectomy and chemotherapy  -Hx HTN. -Hx HLD.     No primary cardiologist, initial referral completed by Dr. Brent Balbuena:     Although patient has new cardiomyopathy with EF 30-35% with risk factors, given ulcer findings last week by EGD I would tentatively plan to treat medically for now and consider ischemia evaluation as outpatient. Given DNR status I would not plan LifeVest.    I will switch to Coreg in place of metoprolol today. Consider starting losartan tomorrow if blood pressure stable and okay with all teams. Subjective:     No new complaints. Objective:      Patient Vitals for the past 8 hrs:   Temp Pulse Resp BP SpO2   06/20/22 0829 98.7 °F (37.1 °C) 84 18 (!) 167/71 96 %   06/20/22 0400 99 °F (37.2 °C) 88 18 (!) 159/84 95 %         No data found.                  Intake/Output Summary (Last 24 hours) at 6/20/2022 0935  Last data filed at 6/20/2022 0833  Gross per 24 hour   Intake --   Output 3750 ml   Net -3750 ml       Physical Exam:  General:  alert, cooperative, no distress, appears stated age  Neck:  nontender  Lungs:  clear to auscultation bilaterally  Heart:  regular rate and rhythm, S1, S2 normal, no murmur, click, rub or gallop  Abdomen:  abdomen is soft without significant tenderness, masses, organomegaly or guarding  Extremities:  extremities normal, atraumatic, no cyanosis or edema    Data Review:     Labs: Results:       Chemistry Recent Labs     06/20/22  0240 06/19/22  0341 06/18/22  0126   * 165* 73*    140 144   K 3.7 4.0 3.6    106 110   CO2 31 29 29   BUN 10 13 18   CREA 1.00 0.99 1.11   CA 8.7 8.9 8.5   PHOS 2.6 2.9 2.6   AGAP 5 5 5   BUCR 10* 13 16      CBC w/Diff Recent Labs     06/20/22  0240 06/19/22  0341 06/18/22  0126   WBC 7.8 7.1 9.4   RBC 3.18* 3.17* 2.95*   HGB 8.3* 8.2* 7.7*   HCT 26.6* 26.3* 24.6*    256 185   GRANS 53 52 59   LYMPH 20* 23 24   EOS 9* 6* 1      Cardiac Enzymes No results found for: CPK, CK, CKMMB, CKMB, RCK3, CKMBT, CKNDX, CKND1, ADAM, TROPT, TROIQ, BRYCE, TROPT, TNIPOC, BNP, BNPP   Coagulation No results for input(s): PTP, INR, APTT, INREXT in the last 72 hours. Lipid Panel Lab Results   Component Value Date/Time    Cholesterol, total 299 (H) 11/03/2011 09:26 AM    HDL Cholesterol 204 (H) 11/03/2011 09:26 AM    LDL, calculated 86.6 11/03/2011 09:26 AM    VLDL, calculated 8.4 11/03/2011 09:26 AM    Triglyceride 42 11/03/2011 09:26 AM    CHOL/HDL Ratio 1.5 11/03/2011 09:26 AM      BNP No results found for: BNP, BNPP, XBNPT   Liver Enzymes No results for input(s): TP, ALB, TBIL, AP in the last 72 hours.     No lab exists for component: SGOT, GPT, DBIL   Digoxin    Thyroid Studies Lab Results   Component Value Date/Time    T4, Total 2.6 (L) 11/03/2011 09:26 AM    T3 Uptake 27 08/18/2011 02:11 PM    TSH 0.17 (L) 06/11/2022 08:51 PM          Signed By: Mikaela Ochoa MD     June 20, 2022

## 2022-06-20 NOTE — DIABETES MGMT
Diabetes/ Glycemic Control Plan of Care  Recommendations:   Patient with hypoglycemia last night, 60 mg/dl. Recommend decreasing Lantus dose to 5 units daily  Continue corrective insulin coverage as needed. Will continue inpatient monitoring. Fasting/ Morning blood glucose:   Lab Results   Component Value Date/Time    Glucose 114 (H) 06/20/2022 02:40 AM    Glucose (POC) 197 (H) 06/20/2022 08:02 AM    Glucose (POC) 90 02/06/2019 02:13 PM    Glucose,  (H) 01/19/2021 09:26 PM     IV Fluids containing dextrose:   None   Steroids:   None     Blood glucose values:       Results for Jeaneth Wilkes (MRN [id number]) as of 6/20/2022 12:12   Ref. Range 6/19/2022 22:23 6/19/2022 22:54 6/19/2022 23:18 6/20/2022 00:17 6/20/2022 08:02   GLUCOSE,FAST - POC Latest Ref Range: 70 - 110 mg/dL 60 (L) 70 73 103 197 (H)     Within target range (70-180mg/dL): progressing. Current insulin orders:   Lantus 8 units daily  Lispro corrective insulin coverage  Total Daily Dose previous 24 hours = 34 units   Current A1c:   Lab Results   Component Value Date/Time    Hemoglobin A1c 9.0 (H) 06/11/2022 02:15 PM    Hemoglobin A1c, External 9.5 07/08/2021 12:00 AM      equivalent  to ave Blood Glucose of 212 mg/dl for 2-3 months prior to admission  Adequate glycemic control PTA:   No   Nutrition/Diet:   ADULT DIET Regular; 3 carb choices (45 gm/meal); Low Fat/Low Chol/High Fiber/AMBROSIO; Low Sodium (2 gm)  Active Orders   Diet    ADULT DIET Regular; 3 carb choices (45 gm/meal); Low Fat/Low Chol/High Fiber/AMBROSIO; Low Sodium (2 gm)      Meal Intake:  Patient Vitals for the past 168 hrs:   % Diet Eaten   06/19/22 1726 0%   06/19/22 0851 1 - 25%   06/18/22 1600 26 - 50%   06/18/22 1211 51 - 75%   06/18/22 1029 26 - 50%   06/17/22 1700 1 - 25%   06/14/22 1841 0%     Supplement Intake:  No data found.      Home diabetes medications:   Key Antihyperglycemic Medications             insulin glargine (LANTUS) 100 unit/mL injection (Taking) Inject 15 units SQ BID    metFORMIN ER (GLUCOPHAGE XR) 500 mg tablet (Taking) Take 500 mg by mouth daily (with dinner). insulin regular (NOVOLIN R, HUMULIN R) 100 unit/mL injection (Taking) by SubCUTAneous route Before breakfast, lunch, and dinner. Sliding scale        Plan/Goals:   Blood glucose will be within target of 70 - 180 mg/dl within 72 hours  Reinforce dietary and medication compliance at home.          Education:  [x] Refer to Diabetes Education Record (6/14/22)                         [] Education not indicated at this time     Landry Pulse, [address] [facility] CDE  Ext [phone]

## 2022-06-20 NOTE — PROGRESS NOTES
Problem: Self Care Deficits Care Plan (Adult)  Goal: *Acute Goals and Plan of Care (Insert Text)  Description: Occupational Therapy Goals  Initiated 6/15/2022 within 7 day(s). 1.  Patient will perform upper body dressing with supervision/set-up. 2.  Patient will perform grooming sitting EOB with supervision/set-up with Good balance. 3.  Patient will perform bathing with supervision/set-up. 4.  Patient will perform toilet transfers with minimal assistance/contact guard assist.  5.  Patient will perform all aspects of toileting with minimal assistance/contact guard assist.  6.  Patient will participate in upper extremity therapeutic exercise/activities with supervision/set-up for 8 minutes to improve endurance and UB strength needed for ADLs    7. Patient will utilize energy conservation techniques during functional activities with verbal cues. 8.  Patient will perform lower body dressing with minimal assistance. Prior Level of Function: Pt reports she lives alone and is usually independent with ADLs and functional mobility using cane mostly. Pt lives in 2nd floor apartment  Outcome: Progressing Towards Goal   OCCUPATIONAL THERAPY TREATMENT    Patient: Brian Gil (70 y.o. female)  Date: 6/20/2022  Diagnosis: DKA (diabetic ketoacidosis) ([facility] [E11.10] <principal problem not specified>  Procedure(s) (LRB):  ENDOSCOPY/ Polypectomy/ Biopsies/ Endo clips (N/A) 3 Days Post-Op  Precautions: Skin,Fall  PLOF: Pt reports she lives alone and is usually independent with ADLs and functional mobility using cane mostly. Pt lives in 2nd floor apartment    Chart, occupational therapy assessment, plan of care, and goals were reviewed. ASSESSMENT:  Pt cleared by RN for OT tx at this time. PT co tx to maximize pt safety and participation. Pt presented supine in bed upon therapist arrival and motivated for OOB activity. Pt was assisted to EOB from supine with MIN A with prep for functional task.  STS transfer MIN A with B UE support, tolerating ~ 20 seconds before requiring seated rest break for ADL carryover. Pt performed SPT to reclining chair MIN A with HHA for increased safety, simulating BSC transfer. Once sitting in chair, pt performed facial and oral hygiene with S/U. Pt was positioned for comfort at end of session with B LE's elevated and all needs left within reach. RN made aware of pt's participation and position. Progression toward goals:  []          Improving appropriately and progressing toward goals  [x]          Improving slowly and progressing toward goals  []          Not making progress toward goals and plan of care will be adjusted     PLAN:  Patient continues to benefit from skilled intervention to address the above impairments. Continue treatment per established plan of care. Discharge Recommendations:  Rehab   Further Equipment Recommendations for Discharge:  RW     SUBJECTIVE:   Patient stated  I am ready to take a shower. \"     OBJECTIVE DATA SUMMARY:   Cognitive/Behavioral Status:  Neurologic State: Alert  Orientation Level: Oriented X4  Cognition: Appropriate decision making  Safety/Judgement: Awareness of environment,Fall prevention    Functional Mobility and Transfers for ADLs:   Bed Mobility:     Supine to Sit: Minimum assistance     Scooting: Contact guard assistance   Transfers:  Sit to Stand: Minimum assistance  Stand to Sit: Contact guard assistance      Balance:  Sitting: Intact  Standing: Impaired; With support    ADL Intervention:       Grooming  Position Performed: Seated in chair  Washing Face: Set-up  Brushing Teeth: Set-up    Pain:  Pain level pre-treatment: 0/10   Pain level post-treatment: 0/10    Activity Tolerance:    Fair   Please refer to the flowsheet for vital signs taken during this treatment.   After treatment:   [x]  Patient left in no apparent distress sitting up in chair  []  Patient left in no apparent distress in bed  [x]  Call bell left within reach  [x]  Nursing notified  []  Caregiver present  []  Bed alarm activated    COMMUNICATION/EDUCATION:   [x] Role of Occupational Therapy in the acute care setting  [] Home safety education was provided and the patient/caregiver indicated understanding. [x] Patient/family have participated as able in working towards goals and plan of care. [x] Patient/family agree to work toward stated goals and plan of care. [] Patient understands intent and goals of therapy, but is neutral about his/her participation. [] Patient is unable to participate in goal setting and plan of care.       Thank you for this referral.  ASIF Lyles  Time Calculation: 23 mins

## 2022-06-20 NOTE — PROGRESS NOTES
Problem: Falls - Risk of  Goal: *Absence of Falls  Description: Document Green Salvia Fall Risk and appropriate interventions in the flowsheet. Outcome: Progressing Towards Goal  Note: Fall Risk Interventions:  Mobility Interventions: Bed/chair exit alarm    Mentation Interventions: Adequate sleep, hydration, pain control    Medication Interventions: Bed/chair exit alarm,Patient to call before getting OOB    Elimination Interventions: Call light in reach,Bed/chair exit alarm,Patient to call for help with toileting needs,Toileting schedule/hourly rounds              Problem: Nutrition Deficit  Goal: *Optimize nutritional status  Outcome: Not Progressing Towards Goal     Problem: Patient Education: Go to Patient Education Activity  Goal: Patient/Family Education  Outcome: Progressing Towards Goal     Problem: Pressure Injury - Risk of  Goal: *Prevention of pressure injury  Description: Document Sid Scale and appropriate interventions in the flowsheet.   Outcome: Progressing Towards Goal  Note: Pressure Injury Interventions:  Sensory Interventions: Assess changes in LOC    Moisture Interventions: Absorbent underpads    Activity Interventions: Assess need for specialty bed    Mobility Interventions: Assess need for specialty bed    Nutrition Interventions: Document food/fluid/supplement intake    Friction and Shear Interventions: Apply protective barrier, creams and emollients,Foam dressings/transparent film/skin sealants,HOB 30 degrees or less,Transferring/repositioning devices                Problem: Pain  Goal: *Control of Pain  Outcome: Progressing Towards Goal

## 2022-06-20 NOTE — PROGRESS NOTES
Per notes, daughter requested [facility] inpatient rehab first then SNF as backup plan. CM requested Brandon Jonas in ARU to review for inpatient rehab.  will continue to monitor and assist with transition of care needs.     NAFISA Morel, RN  Care Management  Pager # [phone]

## 2022-06-20 NOTE — PROGRESS NOTES
[facility]  [facility]    Patient: Sammi Carlson Age: 66 y.o. : 1950 MR#: [id number] SSN: [id number]  Date: 2022     Subjective:     Patient feels much better. Denies any chest or abdominal pain. No nausea or vomiting. No shortness of breath or cough. No headaches or dizziness. Tolerating diet very well. Assessment/Plan:   1. Acute metabolic encephalopathy -in setting of DKA, shock, sepsis; improved  2. NSTEMI -previously on heparin drip for 48 hrs+. Continue Lipitor and low-dose Lopressor. Cardiology to follow her outpatient  3. Multifactorial shock in setting of sepsis, hypovolemia, cardiogenic -off pressors, will change midodrine as needed  4. DKA with high anion gap in setting of type 2 diabetes - A1c 9.0, will adjust the dose of Lantus to avoid hypoglycemia. Continue insulin sliding scale  5. Sepsis, POA - Aspiration pneumonia. Per ID -last dose of cufuroxime, flagyl until , today. 6. Cardiomyopathy -echo 2022 with a EF of 30 to 35%. Compensated currently. No cardiac arrhythmia noted on telemetry. 7. BRENDA -improved in setting of sepsis and DKA, nephrology appreciated, monitor off IV fluids; off brown and monitor   8. Acute Hypoxic Respiratory failure -on room air. Resolved. 9. Electrolyte abnormalities (low potassium / phosphorus) - replace and monitor. Stable today. 10. Acute GI blood loss anemia secondary to large gastric ulcer: S/p endoscopy and hemoclips. GI input noted about changing patient to p.o. PPI twice daily and avoid using NSAIDs. Patient is tolerating diet. Stable hemoglobin currently. Outpatient follow-up with GI. Try to call GI nurse practitioner so patient can be started on aspirin. Voice message has been left. Addendum: Discussed with Dr. Paradise Mckinnon and patient will be started on aspirin from tomorrow. Disposition -patient is otherwise medically stable. PT/OT recommendation noted about SNF placement. Total time to take care of this patient was 25 minutes and more than 50% of time was spent counseling and coordinating care. Family contact is [employer] [phone]. Spoke to her on 2022 and updated her about patient's current clinical condition. She wants patient to go to skilled nursing facility. Case discussed with:  [x]Patient  []Family  [x]Nursing  []Case Management  DVT Prophylaxis:  []Lovenox  []Hep SQ  [x]SCDs  []Coumadin / Eliquis or Xarelto   []On Heparin gtt    Objective:       BP (!) 167/71 (BP 1 Location: Left upper arm, BP Patient Position: At rest)   Pulse 84   Temp 98.7 °F (37.1 °C)   Resp 18   Ht 5' 6\" (1.676 m)   Wt 82.1 kg (181 lb)   SpO2 96%   BMI 29.21 kg/m²     Tmax/24hrs: Temp (24hrs), Av.7 °F (37.1 °C), Min:98.5 °F (36.9 °C), Max:99 °F (37.2 °C)      Intake/Output Summary (Last 24 hours) at 2022 0959  Last data filed at 2022 4635  Gross per 24 hour   Intake --   Output 3750 ml   Net -3750 ml     General appearance - alert, well appearing, and in no distress  Chest -clear air entry noted in bases, no wheezes  Heart - S1 and S2 normal  Abdomen - soft, nontender, nondistended, Bowel sounds present  Neurological - alert, oriented, normal speech, no focal findings noted, no tremors noted, sensation touch normal in all 4 extremities.   Extremities - no pedal edema noted      Labs / micro / imaging :    Recent Results (from the past 24 hour(s))   GLUCOSE, POC    Collection Time: 22 12:36 PM   Result Value Ref Range    Glucose (POC) 260 (H) 70 - 110 mg/dL   GLUCOSE, POC    Collection Time: 22  5:23 PM   Result Value Ref Range    Glucose (POC) 315 (H) 70 - 110 mg/dL   GLUCOSE, POC    Collection Time: 22 10:23 PM   Result Value Ref Range    Glucose (POC) 60 (L) 70 - 110 mg/dL   GLUCOSE, POC    Collection Time: 22 10:54 PM   Result Value Ref Range    Glucose (POC) 70 70 - 110 mg/dL   GLUCOSE, POC    Collection Time: 22 11:18 PM   Result Value Ref Range    Glucose (POC) 73 70 - 110 mg/dL   GLUCOSE, POC    Collection Time: 06/20/22 12:17 AM   Result Value Ref Range    Glucose (POC) 103 70 - 110 mg/dL   PHOSPHORUS    Collection Time: 06/20/22  2:40 AM   Result Value Ref Range    Phosphorus 2.6 2.5 - 4.9 MG/DL   CBC WITH AUTOMATED DIFF    Collection Time: 06/20/22  2:40 AM   Result Value Ref Range    WBC 7.8 4.6 - 13.2 K/uL    RBC 3.18 (L) 4.20 - 5.30 M/uL    HGB 8.3 (L) 12.0 - 16.0 g/dL    HCT 26.6 (L) 35.0 - 45.0 %    MCV 83.6 78.0 - 100.0 FL    MCH 26.1 24.0 - 34.0 PG    MCHC 31.2 31.0 - 37.0 g/dL    RDW 14.6 (H) 11.6 - 14.5 %    PLATELET 283 328 - 525 K/uL    MPV 10.6 9.2 - 11.8 FL    NRBC 0.0 0  WBC    ABSOLUTE NRBC 0.00 0.00 - 0.01 K/uL    NEUTROPHILS 53 40 - 73 %    BAND NEUTROPHILS 1 0 - 5 %    LYMPHOCYTES 20 (L) 21 - 52 %    MONOCYTES 13 (H) 3 - 10 %    EOSINOPHILS 9 (H) 0 - 5 %    BASOPHILS 0 0 - 2 %    METAMYELOCYTES 1 (H) 0 %    MYELOCYTES 3 (H) 0 %    IMMATURE GRANULOCYTES 0 %    ABS. NEUTROPHILS 4.2 1.8 - 8.0 K/UL    ABS. LYMPHOCYTES 1.6 0.9 - 3.6 K/UL    ABS. MONOCYTES 1.0 0.05 - 1.2 K/UL    ABS. EOSINOPHILS 0.7 (H) 0.0 - 0.4 K/UL    ABS. BASOPHILS 0.0 0.0 - 0.1 K/UL    ABS. IMM.  GRANS. 0.0 K/UL    DF MANUAL      PLATELET COMMENTS ADEQUATE PLATELETS      RBC COMMENTS ANISOCYTOSIS  1+        RBC COMMENTS POLYCHROMASIA  1+       METABOLIC PANEL, BASIC    Collection Time: 06/20/22  2:40 AM   Result Value Ref Range    Sodium 140 136 - 145 mmol/L    Potassium 3.7 3.5 - 5.5 mmol/L    Chloride 104 100 - 111 mmol/L    CO2 31 21 - 32 mmol/L    Anion gap 5 3.0 - 18 mmol/L    Glucose 114 (H) 74 - 99 mg/dL    BUN 10 7.0 - 18 MG/DL    Creatinine 1.00 0.6 - 1.3 MG/DL    BUN/Creatinine ratio 10 (L) 12 - 20      GFR est AA >60 >60 ml/min/1.73m2    GFR est non-AA 54 (L) >60 ml/min/1.73m2    Calcium 8.7 8.5 - 10.1 MG/DL   GLUCOSE, POC    Collection Time: 06/20/22  8:02 AM   Result Value Ref Range    Glucose (POC) 197 (H) 70 - 110 mg/dL       Results     Procedure Component Value Units Date/Time    COVID-19 RAPID TEST [[id number]] Collected: 06/17/22 0845    Order Status: Completed Specimen: Nasopharyngeal Updated: 06/17/22 0947     Specimen source Nasopharyngeal        COVID-19 rapid test Not detected        Comment: Rapid Abbott ID Now       Rapid NAAT:  The specimen is NEGATIVE for SARS-CoV-2, the novel coronavirus associated with COVID-19. Negative results should be treated as presumptive and, if inconsistent with clinical signs and symptoms or necessary for patient management, should be tested with an alternative molecular assay. Negative results do not preclude SARS-CoV-2 infection and should not be used as the sole basis for patient management decisions. This test has been authorized by the FDA under an Emergency Use Authorization (EUA) for use by authorized laboratories.    Fact sheet for Healthcare Providers: ConventionUpdate.co.nz  Fact sheet for Patients: ConventionUpdate.co.nz       Methodology: Isothermal Nucleic Acid Amplification         CULTURE, BLOOD [[id number]] Collected: 06/14/22 1138    Order Status: Completed Specimen: Blood Updated: 06/20/22 0634     Special Requests: NO SPECIAL REQUESTS        Culture result: NO GROWTH 6 DAYS       STREP PNEUMO AG, URINE [[id number]]     Order Status: Canceled Specimen: Urine     LEGIONELLA PNEUMOPHILA AG, URINE [[id number]]     Order Status: Canceled Specimen: Urine     CULTURE, BLOOD [[id number]] Collected: 06/11/22 2150    Order Status: Completed Specimen: Blood Updated: 06/17/22 0700     Special Requests: NO SPECIAL REQUESTS        Culture result: NO GROWTH 6 DAYS       CULTURE, BLOOD [[id number]] Collected: 06/11/22 2150    Order Status: Completed Specimen: Blood Updated: 06/17/22 0700     Special Requests: NO SPECIAL REQUESTS        Culture result: NO GROWTH 6 DAYS       RESPIRATORY VIRUS PANEL W/COVID-19, PCR [[id number]] Collected: 06/11/22 2051    Order Status: Completed Specimen: Nasopharyngeal Updated: 06/11/22 2236     Adenovirus Not detected        Coronavirus 229E Not detected        Coronavirus HKU1 Not detected        Coronavirus CVNL63 Not detected        Coronavirus OC43 Not detected        SARS-CoV-2, PCR Not detected        Metapneumovirus Not detected        Rhinovirus and Enterovirus Not detected        Influenza A Not detected        Influenza A, subtype H1 Not detected        Influenza A, subtype H3 Not detected        INFLUENZA A H1N1 PCR Not detected        Influenza B Not detected        Parainfluenza 1 Not detected        Parainfluenza 2 Not detected        Parainfluenza 3 Not detected        Parainfluenza virus 4 Not detected        RSV by PCR Not detected        B. parapertussis, PCR Not detected        Bordetella pertussis - PCR Not detected        Chlamydophila pneumoniae DNA, QL, PCR Not detected        Mycoplasma pneumoniae DNA, QL, PCR Not detected       CULTURE, MRSA [[id number]] Collected: 06/11/22 1120    Order Status: Completed Specimen: Nasal from Nares Updated: 06/13/22 1330     Special Requests: NO SPECIAL REQUESTS        Culture result: MRSA NOT PRESENT               Screening of patient nares for MRSA is for surveillance purposes and, if positive, to facilitate isolation considerations in high risk settings. It is not intended for automatic decolonization interventions per se as regimens are not sufficiently effective to warrant routine use. NM BONE MARROW [address] BODY    Result Date: 6/17/2022  Patchy multifocal increased white cell accumulation in the right lower quadrant suspicious for active inflammation versus infection, suspect bowel related activity/process as discussed above. No additional sites of focal abnormal increased labeled WBC accumulation identified localizing for active infection/abscess. [facility]    Result Date: 6/14/2022  1.  Hepatomegaly -Biliary tree unremarkable by sonography    XR CHEST PORT    Result Date: 6/14/2022  : 1. No acute cardiopulmonary disease. NM INFLAM WB MULTI    Result Date: 6/17/2022  Patchy multifocal increased white cell accumulation in the right lower quadrant suspicious for active inflammation versus infection, suspect bowel related activity/process as discussed above. No additional sites of focal abnormal increased labeled WBC accumulation identified localizing for active infection/abscess. Disclaimer: Sections of this note are dictated using utilizing voice recognition software, which may have resulted in some phonetic based errors in grammar and contents. Even though attempts were made to correct all the mistakes, some may have been missed, and remained in the body of the document. If questions arise, please contact our department.     Signed By: Salomon Correa MD     June 20, 2022

## 2022-06-20 NOTE — DISCHARGE SUMMARY
Physician Discharge Summary       Patient: Trey Johnson MRN: [id number]  SSN: [id number]    YOB: 1950  Age: 66 y.o. Sex: female    PCP: Corrine Wong MD    Allergies: Patient has no known allergies. Admit date: 6/11/2022  Admitting Provider: Kelsey Boggs DO    Discharge date: 06/21/2022  Discharging Provider: George Escobar MD    * Admission Diagnoses: DKA (diabetic ketoacidosis) ([facility] [address] [E11.10]    * Discharge Diagnoses:      1. Acute metabolic encephalopathy in setting of DKA, currently resolved  2. Non-STEMI s/p treatment  3. Multifactorial shock in setting of hypovolemia, and sepsis: Resolved  4. DKA, resolved  5. Diabetes mellitus with hyperglycemia  6. Sepsis secondary to aspiration pneumonia  7. Chronic systolic heart failure with EF of 30-35 percentage, compensated  8. Acute renal failure, resolved  9. Acute hypoxic respiratory failure, resolved  10. Acute GI blood loss anemia status post blood transfusion  11. Large gastric ulcer s/p endoscopy and hemoclips  12. Electrolyte abnormalities    * Hospital Course: Patient presented to hospital on June 11, 2022 with complaint of high blood sugar. Please refer to hospital admission H&P for further detail. Patient was found out to have DKA with blood sugar level of greater than 1200 and bicarb level of 4. Patient was admitted to ICU. Patient was started on insulin drip with improvement in DKA. Subsequently patient was started on insulin sliding scale and Lantus. Patient had hypoglycemia with Lantus of 12 units of was changed to 10 units without any more hypoglycemia. Patient initially had acute toxic/metabolic encephalopathy and CT head was showing vague hypodensity right cerebellum. Patient also had hypotension was was treated with IV pressor. CTA chest abdomen pelvis was done and it reported patient having sludge in gallbladder with hepatomegaly.   Patient was treated with IV antibiotic with help of ID.  Patient had negative blood culture. Patient completed antibiotic for total of 10 days. Her blood pressure remained stable and was started back on metoprolol and Norvasc. Patient initially had acute renal failure that got better. Patient was seen by nephrologist during hospital course. Renal ultrasound was unremarkable. Patient underwent HIDA scan that was negative. Ultrasound of abdomen was also negative for cholecystitis other than hepatomegaly. Patient had non-STEMI that was treated with heparin drip. Patient was seen by cardiology service. Started on Lipitor and low-dose beta-blocker. However, during hospital course patient also developed acute GI blood loss anemia requiring blood transfusion. Patient underwent endoscopy and showed large gastric ulcer which was hemoclipped. Biopsies pending. Patient was started on PPI twice daily. GI recommended not to use NSAIDs other than starting aspirin from tomorrow and follow-up as an outpatient. Patient also chest x-ray and WBC scan done during hospital course. Patient was slowly getting better. Tolerating diet very well. Nephrologist, cardiologist and GI signed off. Patient was seen by PT and OT and recommended ARU versus SNF placement. * Procedures:   Procedure(s):  ENDOSCOPY/ Polypectomy/ Biopsies/ Endo clips      Consults: Cardiology, Pulmonary/Intensive care, ID, GI and Nephrology    Significant Diagnostic Studies: Chest x-ray, CT abdomen pelvis and chest, CT head, WBC scan, HIDA scan and ultrasound of abdomen    Discharge Exam:  Please refer to my progress note from June 20, 2022 for further detail    * Discharge Condition: improved  * Disposition: ARU versus SNF    Discharge Medications:  Current Discharge Medication List      START taking these medications    Details   atorvastatin (LIPITOR) 40 mg tablet Take 1 Tablet by mouth nightly.   Qty: 30 Tablet, Refills: 0  Start date: 6/20/2022      docusate sodium (COLACE) 100 mg capsule Take 1 Capsule by mouth daily. Qty: 30 Capsule, Refills: 0  Start date: 6/21/2022      insulin lispro (HUMALOG) 100 unit/mL injection For Blood Sugar (mg/dL) of:              Less than 150 =   0 units  150 -199 =   3 units  200 -249 =   6 units  250 -299 =   9 units  300 -349 =   12 units  350 and above =   15 units and Call Physician  Initiate Hypoglycemic protocol if blood glucose is <70 mg/dL. Fast Acting - Administer Immediately - or within 15 minutes of start of meal, if mealtime coverage. Qty: 1 Each, Refills: 0  Start date: 6/20/2022      metoprolol tartrate (LOPRESSOR) 25 mg tablet Take 0.5 Tablets by mouth two (2) times a day. Qty: 30 Tablet, Refills: 0  Start date: 6/20/2022      pantoprazole (PROTONIX) 40 mg tablet Take 1 Tablet by mouth Before breakfast and dinner. Qty: 60 Tablet, Refills: 0  Start date: 6/20/2022      polyethylene glycol (MIRALAX) 17 gram packet Take 1 Packet by mouth daily. Indications: HOLD IT IF DIARRHEA  Qty: 10 Packet, Refills: 0  Start date: 6/21/2022      aspirin delayed-release 81 mg tablet Take 1 Tablet by mouth daily. Qty: 30 Tablet, Refills: 0  Start date: 6/21/2022         CONTINUE these medications which have CHANGED    Details   insulin glargine (LANTUS) 100 unit/mL injection Inject 10 units SQ DAILY  Qty: 10 mL, Refills: 0  Start date: 6/20/2022         CONTINUE these medications which have NOT CHANGED    Details   metFORMIN ER (GLUCOPHAGE XR) 500 mg tablet Take 500 mg by mouth daily (with dinner). Cholecalciferol, Vitamin D3, 50,000 unit cap Take 1 Capsule by mouth every seven (7) days. Takes on wednesdays      amLODIPine (NORVASC) 5 mg tablet Take 5 mg by mouth daily. levothyroxine (SYNTHROID) 112 mcg tablet Take 100 mcg by mouth Daily (before breakfast). gabapentin (NEURONTIN) 100 mg capsule Take 100 mg by mouth three (3) times daily. alendronate (FOSAMAX) 10 mg tablet Take 70 mg by mouth every seven (7) days.          STOP taking these medications insulin regular (NOVOLIN R, HUMULIN R) 100 unit/mL injection Comments:   Reason for Stopping:         aspirin 81 mg chewable tablet Comments:   Reason for Stopping:               * Follow-up Care/Patient Instructions: Activity: PT/OT Eval and Treat  Diet: Cardiac Diet and Diabetic Diet  Wound Care: As directed    Follow-up Information    None       Follow-up Appointments   Procedures    FOLLOW UP VISIT Appointment in: Other (Specify) Nursing home doctor to follow this patient Follow-up with Dr. Lizbeth Abdullahi and group [cardiologist] in 2 weeks Follow-up with SHANNAN Mccann in 2 weeks     Nursing home doctor to follow this patient  Follow-up with Dr. Lizbeth Abdullahi and group [cardiologist] in 2 weeks  Follow-up with SHANNAN Mccann in 2 weeks     Standing Status:   Standing     Number of Occurrences:   1     Order Specific Question:   Appointment in     Answer: Other (Specify)     Total time of discharge greater than 35 minutes    Disclaimer: Sections of this note are dictated using utilizing voice recognition software, which may have resulted in some phonetic based errors in grammar and contents. Even though attempts were made to correct all the mistakes, some may have been missed, and remained in the body of the document. If questions arise, please contact our department.     Signed:  Debbie Smalls MD  6/21/2022

## 2022-06-20 NOTE — PROGRESS NOTES
Problem: Diabetes Self-Management  Goal: *Disease process and treatment process  Description: Define diabetes and identify own type of diabetes; list 3 options for treating diabetes. Outcome: Progressing Towards Goal     Problem: Diabetes Self-Management  Goal: *Incorporating nutritional management into lifestyle  Description: Describe effect of type, amount and timing of food on blood glucose; list 3 methods for planning meals. Outcome: Progressing Towards Goal     Problem: Diabetes Self-Management  Goal: *Prevention, detection, treatment of acute complications  Description: List symptoms of hyper- and hypoglycemia; describe how to treat low blood sugar and actions for lowering  high blood glucose level. Outcome: Progressing Towards Goal     Problem: Falls - Risk of  Goal: *Absence of Falls  Description: Document Hellenrob Cohen Fall Risk and appropriate interventions in the flowsheet. Outcome: Progressing Towards Goal  Note: Fall Risk Interventions:  Mobility Interventions: Bed/chair exit alarm    Mentation Interventions: Adequate sleep, hydration, pain control,Bed/chair exit alarm,More frequent rounding,Room close to nurse's station    Medication Interventions: Bed/chair exit alarm    Elimination Interventions: Bed/chair exit alarm,Call light in reach              Problem: Pressure Injury - Risk of  Goal: *Prevention of pressure injury  Description: Document Sid Scale and appropriate interventions in the flowsheet.   Outcome: Progressing Towards Goal     Problem: Pain  Goal: *Control of Pain  Outcome: Progressing Towards Goal

## 2022-06-21 LAB
1,3 BETA GLUCAN SER-MCNC: 184 PG/ML
ANION GAP SERPL CALC-SCNC: 4 MMOL/L (ref 3–18)
BASOPHILS # BLD: 0 K/UL (ref 0–0.1)
BASOPHILS NFR BLD: 0 % (ref 0–2)
BUN SERPL-MCNC: 12 MG/DL (ref 7–18)
BUN/CREAT SERPL: 10 (ref 12–20)
CALCIUM SERPL-MCNC: 8.3 MG/DL (ref 8.5–10.1)
CHLORIDE SERPL-SCNC: 107 MMOL/L (ref 100–111)
CO2 SERPL-SCNC: 29 MMOL/L (ref 21–32)
CREAT SERPL-MCNC: 1.25 MG/DL (ref 0.6–1.3)
DIFFERENTIAL METHOD BLD: ABNORMAL
EOSINOPHIL # BLD: 0.3 K/UL (ref 0–0.4)
EOSINOPHIL NFR BLD: 3 % (ref 0–5)
ERYTHROCYTE [DISTWIDTH] IN BLOOD BY AUTOMATED COUNT: 14.6 % (ref 11.6–14.5)
GLUCOSE BLD STRIP.AUTO-MCNC: 119 MG/DL (ref 70–110)
GLUCOSE BLD STRIP.AUTO-MCNC: 208 MG/DL (ref 70–110)
GLUCOSE BLD STRIP.AUTO-MCNC: 249 MG/DL (ref 70–110)
GLUCOSE BLD STRIP.AUTO-MCNC: 264 MG/DL (ref 70–110)
GLUCOSE SERPL-MCNC: 154 MG/DL (ref 74–99)
HCT VFR BLD AUTO: 24.4 % (ref 35–45)
HGB BLD-MCNC: 7.6 G/DL (ref 12–16)
IMM GRANULOCYTES # BLD AUTO: 0.6 K/UL (ref 0–0.04)
IMM GRANULOCYTES NFR BLD AUTO: 8 % (ref 0–0.5)
LYMPHOCYTES # BLD: 1.6 K/UL (ref 0.9–3.6)
LYMPHOCYTES NFR BLD: 20 % (ref 21–52)
MCH RBC QN AUTO: 25.7 PG (ref 24–34)
MCHC RBC AUTO-ENTMCNC: 31.1 G/DL (ref 31–37)
MCV RBC AUTO: 82.4 FL (ref 78–100)
MONOCYTES # BLD: 1.4 K/UL (ref 0.05–1.2)
MONOCYTES NFR BLD: 18 % (ref 3–10)
NEUTS SEG # BLD: 4.1 K/UL (ref 1.8–8)
NEUTS SEG NFR BLD: 51 % (ref 40–73)
NRBC # BLD: 0 K/UL (ref 0–0.01)
NRBC BLD-RTO: 0 PER 100 WBC
PHOSPHATE SERPL-MCNC: 2.4 MG/DL (ref 2.5–4.9)
PLATELET # BLD AUTO: 289 K/UL (ref 135–420)
PMV BLD AUTO: 9.5 FL (ref 9.2–11.8)
POTASSIUM SERPL-SCNC: 3.8 MMOL/L (ref 3.5–5.5)
RBC # BLD AUTO: 2.96 M/UL (ref 4.2–5.3)
SODIUM SERPL-SCNC: 140 MMOL/L (ref 136–145)
WBC # BLD AUTO: 7.9 K/UL (ref 4.6–13.2)

## 2022-06-21 PROCEDURE — 85025 COMPLETE CBC W/AUTO DIFF WBC: CPT

## 2022-06-21 PROCEDURE — 77030037878 HC DRSG MEPILEX >48IN BORD MOLN -B

## 2022-06-21 PROCEDURE — 84100 ASSAY OF PHOSPHORUS: CPT

## 2022-06-21 PROCEDURE — 36415 COLL VENOUS BLD VENIPUNCTURE: CPT

## 2022-06-21 PROCEDURE — 74011250637 HC RX REV CODE- 250/637: Performed by: NURSE PRACTITIONER

## 2022-06-21 PROCEDURE — 74011636637 HC RX REV CODE- 636/637: Performed by: INTERNAL MEDICINE

## 2022-06-21 PROCEDURE — 77030038269 HC DRN EXT URIN PURWCK BARD -A

## 2022-06-21 PROCEDURE — 74011250637 HC RX REV CODE- 250/637: Performed by: PHYSICIAN ASSISTANT

## 2022-06-21 PROCEDURE — 74011250637 HC RX REV CODE- 250/637: Performed by: STUDENT IN AN ORGANIZED HEALTH CARE EDUCATION/TRAINING PROGRAM

## 2022-06-21 PROCEDURE — 74011000250 HC RX REV CODE- 250: Performed by: STUDENT IN AN ORGANIZED HEALTH CARE EDUCATION/TRAINING PROGRAM

## 2022-06-21 PROCEDURE — 74011250637 HC RX REV CODE- 250/637: Performed by: INTERNAL MEDICINE

## 2022-06-21 PROCEDURE — 82962 GLUCOSE BLOOD TEST: CPT

## 2022-06-21 PROCEDURE — 2709999900 HC NON-CHARGEABLE SUPPLY

## 2022-06-21 PROCEDURE — 80048 BASIC METABOLIC PNL TOTAL CA: CPT

## 2022-06-21 PROCEDURE — 74011636637 HC RX REV CODE- 636/637: Performed by: NURSE PRACTITIONER

## 2022-06-21 PROCEDURE — 65660000004 HC RM CVT STEPDOWN

## 2022-06-21 RX ORDER — CARVEDILOL 6.25 MG/1
6.25 TABLET ORAL 2 TIMES DAILY WITH MEALS
Status: DISCONTINUED | OUTPATIENT
Start: 2022-06-21 | End: 2022-06-30 | Stop reason: HOSPADM

## 2022-06-21 RX ORDER — LISINOPRIL 2.5 MG/1
2.5 TABLET ORAL DAILY
Status: DISCONTINUED | OUTPATIENT
Start: 2022-06-22 | End: 2022-06-30 | Stop reason: HOSPADM

## 2022-06-21 RX ADMIN — PANTOPRAZOLE SODIUM 40 MG: 40 TABLET, DELAYED RELEASE ORAL at 16:14

## 2022-06-21 RX ADMIN — Medication 9 UNITS: at 08:42

## 2022-06-21 RX ADMIN — LEVOTHYROXINE SODIUM 112 MCG: 112 TABLET ORAL at 06:29

## 2022-06-21 RX ADMIN — ASPIRIN 81 MG: 81 TABLET, COATED ORAL at 08:45

## 2022-06-21 RX ADMIN — SODIUM CHLORIDE, PRESERVATIVE FREE 10 ML: 5 INJECTION INTRAVENOUS at 06:29

## 2022-06-21 RX ADMIN — CARVEDILOL 3.12 MG: 3.12 TABLET, FILM COATED ORAL at 08:45

## 2022-06-21 RX ADMIN — POLYETHYLENE GLYCOL 3350 17 G: 17 POWDER, FOR SOLUTION ORAL at 08:45

## 2022-06-21 RX ADMIN — SODIUM CHLORIDE, PRESERVATIVE FREE 10 ML: 5 INJECTION INTRAVENOUS at 22:24

## 2022-06-21 RX ADMIN — PANTOPRAZOLE SODIUM 40 MG: 40 TABLET, DELAYED RELEASE ORAL at 08:45

## 2022-06-21 RX ADMIN — CARVEDILOL 6.25 MG: 6.25 TABLET, FILM COATED ORAL at 16:14

## 2022-06-21 RX ADMIN — DIBASIC SODIUM PHOSPHATE, MONOBASIC POTASSIUM PHOSPHATE AND MONOBASIC SODIUM PHOSPHATE 2 TABLET: 852; 155; 130 TABLET ORAL at 12:03

## 2022-06-21 RX ADMIN — Medication 6 UNITS: at 12:02

## 2022-06-21 RX ADMIN — DOCUSATE SODIUM 100 MG: 100 CAPSULE, LIQUID FILLED ORAL at 08:45

## 2022-06-21 RX ADMIN — SODIUM CHLORIDE, PRESERVATIVE FREE 10 ML: 5 INJECTION INTRAVENOUS at 16:14

## 2022-06-21 RX ADMIN — Medication 10 UNITS: at 08:42

## 2022-06-21 RX ADMIN — Medication 6 UNITS: at 22:23

## 2022-06-21 RX ADMIN — ATORVASTATIN CALCIUM 40 MG: 40 TABLET, FILM COATED ORAL at 22:23

## 2022-06-21 NOTE — PROGRESS NOTES
Problem: Diabetes Self-Management  Goal: *Disease process and treatment process  Description: Define diabetes and identify own type of diabetes; list 3 options for treating diabetes. Outcome: Progressing Towards Goal  Goal: *Incorporating nutritional management into lifestyle  Description: Describe effect of type, amount and timing of food on blood glucose; list 3 methods for planning meals. Outcome: Progressing Towards Goal  Goal: *Incorporating physical activity into lifestyle  Description: State effect of exercise on blood glucose levels. Outcome: Progressing Towards Goal  Goal: *Developing strategies to promote health/change behavior  Description: Define the ABC's of diabetes; identify appropriate screenings, schedule and personal plan for screenings. Outcome: Progressing Towards Goal  Goal: *Using medications safely  Description: State effect of diabetes medications on diabetes; name diabetes medication taking, action and side effects. Outcome: Progressing Towards Goal  Goal: *Monitoring blood glucose, interpreting and using results  Description: Identify recommended blood glucose targets  and personal targets. Outcome: Progressing Towards Goal  Goal: *Prevention, detection, treatment of acute complications  Description: List symptoms of hyper- and hypoglycemia; describe how to treat low blood sugar and actions for lowering  high blood glucose level. Outcome: Progressing Towards Goal  Goal: *Prevention, detection and treatment of chronic complications  Description: Define the natural course of diabetes and describe the relationship of blood glucose levels to long term complications of diabetes.   Outcome: Progressing Towards Goal  Goal: *Developing strategies to address psychosocial issues  Description: Describe feelings about living with diabetes; identify support needed and support network  Outcome: Progressing Towards Goal  Goal: *Insulin pump training  Outcome: Progressing Towards Goal  Goal: *Sick day guidelines  Outcome: Progressing Towards Goal  Goal: *Patient Specific Goal (EDIT GOAL, INSERT TEXT)  Outcome: Progressing Towards Goal     Problem: Patient Education: Go to Patient Education Activity  Goal: Patient/Family Education  Outcome: Progressing Towards Goal     Problem: Falls - Risk of  Goal: *Absence of Falls  Description: Document Leafy Bonilla Fall Risk and appropriate interventions in the flowsheet. Outcome: Progressing Towards Goal  Note: Fall Risk Interventions:  Mobility Interventions: Patient to call before getting OOB    Mentation Interventions: Increase mobility    Medication Interventions: Patient to call before getting OOB    Elimination Interventions: Call light in reach              Problem: Patient Education: Go to Patient Education Activity  Goal: Patient/Family Education  Outcome: Progressing Towards Goal     Problem: Nutrition Deficit  Goal: *Optimize nutritional status  Outcome: Progressing Towards Goal     Problem: Patient Education: Go to Patient Education Activity  Goal: Patient/Family Education  Outcome: Progressing Towards Goal     Problem: Patient Education: Go to Patient Education Activity  Goal: Patient/Family Education  Outcome: Progressing Towards Goal     Problem: Patient Education: Go to Patient Education Activity  Goal: Patient/Family Education  Outcome: Progressing Towards Goal     Problem: Pressure Injury - Risk of  Goal: *Prevention of pressure injury  Description: Document Sid Scale and appropriate interventions in the flowsheet.   Outcome: Progressing Towards Goal     Problem: Patient Education: Go to Patient Education Activity  Goal: Patient/Family Education  Outcome: Progressing Towards Goal     Problem: Pain  Goal: *Control of Pain  Outcome: Progressing Towards Goal     Problem: Patient Education: Go to Patient Education Activity  Goal: Patient/Family Education  Outcome: Progressing Towards Goal

## 2022-06-21 NOTE — PROGRESS NOTES
Cardiology Progress Note    Admit Date: 6/11/2022  Attending Cardiologist: Dr. Supriya Delgado:     -Septic shock, WBC ~30K with bandemia up to 12% on 6/13.  S/p pressor support. Resolved. · CT C/A/P 6/12/2022 with sludge in gallbladder, small amount of nonspecific mesenteric induration near the gallbladder; hepatomegaly; patchy groundglass opacities in medial upper lobes bilaterally, likely infectious or inflammatory  -Large gastric ulcer by EGD June 17. Noted oozing. Hemoclips applied. -DKA, improved  -BRENDA with hyperkalemia, Cr 3.89 with K 7.9 on presentation 6/11/2022, improving. Improved  -Elevated troponin up to 5482 on 6/13/2022, suspect demand in setting of above  -Cardiomyopathy, Echo 6/12/2022 with EF 30-35% with akinetic basal anterolateral, mid anterolateral and apical lateral walls.  Prior Echo 01/2021 with LVEF 55-60% with normal wall motion.  -Anemia, Hgb ~8  -Thyroid disorder, TSH 0.17 on 6/11 with FT4 1.3 and FT3 1.1  -Hx colon cancer s/p total colectomy and chemotherapy  -Hx HTN. -Hx HLD.     No primary cardiologist, initial referral completed by Dr. Peewee Nieto:     -Will increase Coreg to 6.25 mg BID, start low-dose Lisinopril.  -Continue ASA, Lipitor.  -Will defer ischemia evaluation to outpatient setting given EGD with large ulcer (s/p hemoclips) this admission.  -Dispo planning per primary team.    Subjective:     No new complaints. Denies pain or shortness of breath. Objective:      Patient Vitals for the past 8 hrs:   Temp Pulse Resp BP SpO2   06/21/22 1503 98.3 °F (36.8 °C) 86 18 134/76 94 %   06/21/22 1137 98.6 °F (37 °C) 86 20 (!) 162/75 95 %   06/21/22 0745 98.8 °F (37.1 °C) 84 18 (!) 165/73 95 %         No data found.                  Current Facility-Administered Medications   Medication Dose Route Frequency Last Admin    insulin glargine (LANTUS) injection 10 Units  10 Units SubCUTAneous DAILY 10 Units at 06/21/22 0842    aspirin delayed-release tablet 81 mg  81 mg Oral DAILY 81 mg at 06/21/22 0845    carvediloL (COREG) tablet 3.125 mg  3.125 mg Oral Q12H 3.125 mg at 06/21/22 0845    midodrine (PROAMATINE) tablet 2.5 mg  2.5 mg Oral Q8H PRN      pantoprazole (PROTONIX) tablet 40 mg  40 mg Oral ACB&D 40 mg at 06/21/22 0845    atorvastatin (LIPITOR) tablet 40 mg  40 mg Oral QHS 40 mg at 06/20/22 2146    insulin lispro (HUMALOG) injection   SubCUTAneous AC&HS 6 Units at 06/21/22 1202    polyethylene glycol (MIRALAX) packet 17 g  17 g Oral DAILY 17 g at 06/21/22 0845    docusate sodium (COLACE) capsule 100 mg  100 mg Oral DAILY 100 mg at 06/21/22 0845    benzonatate (TESSALON) capsule 100 mg  100 mg Oral TID  mg at 06/19/22 1740    albuterol-ipratropium (DUO-NEB) 2.5 MG-0.5 MG/3 ML  3 mL Nebulization Q4H PRN 3 mL at 06/15/22 0556    glucose chewable tablet 16 g  16 g Oral PRN      glucagon (GLUCAGEN) injection 1 mg  1 mg IntraMUSCular PRN      dextrose 10% infusion 0-250 mL  0-250 mL IntraVENous  mL at 06/19/22 2228    levothyroxine (SYNTHROID) tablet 112 mcg  112 mcg Oral 6am 112 mcg at 06/21/22 0629    sodium chloride (NS) flush 5-40 mL  5-40 mL IntraVENous Q8H 10 mL at 06/21/22 0629    sodium chloride (NS) flush 5-40 mL  5-40 mL IntraVENous PRN      acetaminophen (TYLENOL) tablet 650 mg  650 mg Oral Q6H  mg at 06/18/22 2156    Or    acetaminophen (TYLENOL) suppository 650 mg  650 mg Rectal Q6H PRN      ondansetron (ZOFRAN ODT) tablet 4 mg  4 mg Oral Q8H PRN      Or    ondansetron (ZOFRAN) injection 4 mg  4 mg IntraVENous Q6H PRN 4 mg at 06/16/22 1352         Intake/Output Summary (Last 24 hours) at 6/21/2022 1509  Last data filed at 6/21/2022 1195  Gross per 24 hour   Intake 240 ml   Output 300 ml   Net -60 ml       Physical Exam:  General:  alert, cooperative, no distress, appears stated age  Neck:  supple  Lungs:  clear to auscultation bilaterally  Heart:  regular rate and rhythm  Abdomen:  abdomen is soft without significant tenderness, masses, organomegaly or guarding  Extremities:  atraumatic, no edema    Visit Vitals  /76 (BP 1 Location: Left upper arm, BP Patient Position: At rest)   Pulse 86   Temp 98.3 °F (36.8 °C)   Resp 18   Ht 5' 6\" (1.676 m)   Wt 82.1 kg (181 lb)   SpO2 94%   BMI 29.21 kg/m²       Data Review:     Labs: Results:       Chemistry Recent Labs     06/21/22  0156 06/20/22  0240 06/19/22  0341   * 114* 165*    140 140   K 3.8 3.7 4.0    104 106   CO2 29 31 29   BUN 12 10 13   CREA 1.25 1.00 0.99   CA 8.3* 8.7 8.9   PHOS 2.4* 2.6 2.9   AGAP 4 5 5   BUCR 10* 10* 13      CBC w/Diff Recent Labs     06/21/22  0156 06/20/22  0240 06/19/22  0341   WBC 7.9 7.8 7.1   RBC 2.96* 3.18* 3.17*   HGB 7.6* 8.3* 8.2*   HCT 24.4* 26.6* 26.3*    312 256   GRANS 51 53 52   LYMPH 20* 20* 23   EOS 3 9* 6*      Lipid Panel Lab Results   Component Value Date/Time    Cholesterol, total 299 (H) 11/03/2011 09:26 AM    HDL Cholesterol 204 (H) 11/03/2011 09:26 AM    LDL, calculated 86.6 11/03/2011 09:26 AM    VLDL, calculated 8.4 11/03/2011 09:26 AM    Triglyceride 42 11/03/2011 09:26 AM    CHOL/HDL Ratio 1.5 11/03/2011 09:26 AM      Thyroid Studies Lab Results   Component Value Date/Time    T4, Total 2.6 (L) 11/03/2011 09:26 AM    T3 Uptake 27 08/18/2011 02:11 PM    TSH 0.17 (L) 06/11/2022 08:51 PM          Signed By: Miriam Lewis PA-C     June 21, 2022

## 2022-06-21 NOTE — ROUTINE PROCESS
2030: Bedside and Verbal shift change report given to Sherlyn Carrington RN (oncoming nurse) by Tae Tse RN (offgoing nurse). Report included the following information SBAR, Kardex and ED Summary. 0700: Bedside and Verbal shift change report given to Alonna Apley, RN (oncoming nurse) by Sherlyn Carrington RN (offgoing nurse). Report included the following information SBAR, Kardex and ED Summary.      Wound Prevention Checklist    Patient: Bita Pickett (30 y.o. female)  Date: 6/20/2022  Diagnosis: DKA (diabetic ketoacidosis) ([facility] [E11.10] <principal problem not specified>     Precautions: Skin,Fall       []  Heel prevention boots placed on patient    []  Patient turned q2h during shift    []  Lift team ordered    [x]  Patient on [place] bed/Specialty bed    [x]  Each Wound is documented during shift (Stage, Color, drainage, odor, measurements, and dressings)    [x]  Dual skin checks done at bedside during shift report with Alonna Apley, [address]

## 2022-06-21 NOTE — PROGRESS NOTES
[facility] in [place] [address]. Aurora Trevino met with the patient and the patient is appropriate for inpatient rehab. Aurora Trevino will start insurance auth today for ARU.  will continue to monitor and assist with transition of care needs.     Donis Porter, MELISSAN, RN  Care Management  Pager # [phone]

## 2022-06-21 NOTE — PROGRESS NOTES
Large bowel movement. Meryl care performed. Bed pads, gown and mepilex changed. Pt able to turn and pull self up in bed. Tolerate well.

## 2022-06-21 NOTE — PROGRESS NOTES
[facility] Hospitalist Group  Progress Note    Patient: Mona Rubalcava Age: 66 y.o. : 1950 MR#: [id number] SSN: [id number]  Date: 2022     Subjective:     Patient does not have any new complaints. Tolerating diet. Denies any shortness of breath or cough. Denies any chest discomfort currently. No nausea or vomiting. She is inquiring whether she will be able to go to skilled nursing facility today    Assessment/Plan:   1. Acute metabolic encephalopathy -in setting of DKA, shock, sepsis; improved  2. NSTEMI -previously on heparin drip for 48 hrs+. Continue Lipitor and low-dose Lopressor. Cardiology to follow her outpatient  3. Multifactorial shock in setting of sepsis, hypovolemia, cardiogenic -off pressors, will change midodrine as needed  4. DKA with high anion gap in setting of type 2 diabetes - A1c 9.0, will adjust the dose of Lantus to avoid hypoglycemia. Continue insulin sliding scale  5. Sepsis, POA - Aspiration pneumonia. Per ID -last dose of cufuroxime, flagyl until , today. 6. Cardiomyopathy -echo 2022 with a EF of 30 to 35%. Compensated currently. No cardiac arrhythmia noted on telemetry. 7. BRENDA -improved in setting of sepsis and DKA, nephrology appreciated, monitor off IV fluids; off brown and monitor   8. Acute Hypoxic Respiratory failure -on room air. Resolved. 9. Electrolyte abnormalities (low potassium / phosphorus) - replace and monitor. Stable today. 10. Acute GI blood loss anemia secondary to large gastric ulcer: S/p endoscopy and hemoclips. GI input noted about changing patient to p.o. PPI twice daily and avoid using NSAIDs. Patient is tolerating diet. Stable hemoglobin currently. Outpatient follow-up with GI.  GI input noted about starting patient on aspirin. Disposition -patient is otherwise medically stable. PT/OT recommendation noted about SNF placement.       Total time to take care of this patient was 25 minutes and more than 50% of time was spent counseling and coordinating care. Family contact is [employer] [phone]. Spoke to her on 2022 and updated her about patient's current clinical condition. She wants patient to go to skilled nursing facility. Case discussed with:  [x]Patient  []Family  [x]Nursing  []Case Management  DVT Prophylaxis:  []Lovenox  []Hep SQ  [x]SCDs  []Coumadin / Eliquis or Xarelto   []On Heparin gtt    Objective:       BP (!) 165/73 (BP 1 Location: Left upper arm, BP Patient Position: At rest)   Pulse 84   Temp 98.8 °F (37.1 °C)   Resp 18   Ht 5' 6\" (1.676 m)   Wt 82.1 kg (181 lb)   SpO2 95%   BMI 29.21 kg/m²     Tmax/24hrs: Temp (24hrs), Av.5 °F (36.9 °C), Min:97.9 °F (36.6 °C), Max:98.9 °F (37.2 °C)      Intake/Output Summary (Last 24 hours) at 2022 0943  Last data filed at 2022 1655  Gross per 24 hour   Intake 240 ml   Output 450 ml   Net -210 ml     General appearance - alert, well appearing, and in no distress  Chest -clear air entry noted in bases, no wheezes  Heart - S1 and S2 normal  Abdomen - soft, nontender, nondistended, Bowel sounds present  Neurological - alert, oriented, normal speech, no focal findings noted, no tremors noted, sensation touch normal in all 4 extremities.   Extremities - no pedal edema noted      Labs / micro / imaging :    Recent Results (from the past 24 hour(s))   GLUCOSE, POC    Collection Time: 22 12:13 PM   Result Value Ref Range    Glucose (POC) 170 (H) 70 - 110 mg/dL   GLUCOSE, POC    Collection Time: 22  4:51 PM   Result Value Ref Range    Glucose (POC) 213 (H) 70 - 110 mg/dL   GLUCOSE, POC    Collection Time: 22  9:46 PM   Result Value Ref Range    Glucose (POC) 229 (H) 70 - 110 mg/dL   PHOSPHORUS    Collection Time: 22  1:56 AM   Result Value Ref Range    Phosphorus 2.4 (L) 2.5 - 4.9 MG/DL   CBC WITH AUTOMATED DIFF    Collection Time: 22  1:56 AM   Result Value Ref Range    WBC 7.9 4.6 - 13.2 K/uL    RBC 2.96 (L) 4.20 - 5.30 M/uL    HGB 7.6 (L) 12.0 - 16.0 g/dL    HCT 24.4 (L) 35.0 - 45.0 %    MCV 82.4 78.0 - 100.0 FL    MCH 25.7 24.0 - 34.0 PG    MCHC 31.1 31.0 - 37.0 g/dL    RDW 14.6 (H) 11.6 - 14.5 %    PLATELET 757 643 - 836 K/uL    MPV 9.5 9.2 - 11.8 FL    NRBC 0.0 0  WBC    ABSOLUTE NRBC 0.00 0.00 - 0.01 K/uL    NEUTROPHILS 51 40 - 73 %    LYMPHOCYTES 20 (L) 21 - 52 %    MONOCYTES 18 (H) 3 - 10 %    EOSINOPHILS 3 0 - 5 %    BASOPHILS 0 0 - 2 %    IMMATURE GRANULOCYTES 8 (H) 0.0 - 0.5 %    ABS. NEUTROPHILS 4.1 1.8 - 8.0 K/UL    ABS. LYMPHOCYTES 1.6 0.9 - 3.6 K/UL    ABS. MONOCYTES 1.4 (H) 0.05 - 1.2 K/UL    ABS. EOSINOPHILS 0.3 0.0 - 0.4 K/UL    ABS. BASOPHILS 0.0 0.0 - 0.1 K/UL    ABS. IMM. GRANS. 0.6 (H) 0.00 - 0.04 K/UL    DF AUTOMATED     METABOLIC PANEL, BASIC    Collection Time: 06/21/22  1:56 AM   Result Value Ref Range    Sodium 140 136 - 145 mmol/L    Potassium 3.8 3.5 - 5.5 mmol/L    Chloride 107 100 - 111 mmol/L    CO2 29 21 - 32 mmol/L    Anion gap 4 3.0 - 18 mmol/L    Glucose 154 (H) 74 - 99 mg/dL    BUN 12 7.0 - 18 MG/DL    Creatinine 1.25 0.6 - 1.3 MG/DL    BUN/Creatinine ratio 10 (L) 12 - 20      GFR est AA 51 (L) >60 ml/min/1.73m2    GFR est non-AA 42 (L) >60 ml/min/1.73m2    Calcium 8.3 (L) 8.5 - 10.1 MG/DL   GLUCOSE, POC    Collection Time: 06/21/22  7:43 AM   Result Value Ref Range    Glucose (POC) 264 (H) 70 - 110 mg/dL       Results     Procedure Component Value Units Date/Time    COVID-19 RAPID TEST [[id number]] Collected: 06/17/22 0845    Order Status: Completed Specimen: Nasopharyngeal Updated: 06/17/22 0947     Specimen source Nasopharyngeal        COVID-19 rapid test Not detected        Comment: Rapid Abbott ID Now       Rapid NAAT:  The specimen is NEGATIVE for SARS-CoV-2, the novel coronavirus associated with COVID-19.        Negative results should be treated as presumptive and, if inconsistent with clinical signs and symptoms or necessary for patient management, should be tested with an alternative molecular assay. Negative results do not preclude SARS-CoV-2 infection and should not be used as the sole basis for patient management decisions. This test has been authorized by the FDA under an Emergency Use Authorization (EUA) for use by authorized laboratories.    Fact sheet for Healthcare Providers: [employer]nz  Fact sheet for Patients: [employer]       Methodology: Isothermal Nucleic Acid Amplification         CULTURE, BLOOD [[id number]] Collected: 06/14/22 1138    Order Status: Completed Specimen: Blood Updated: 06/20/22 0634     Special Requests: NO SPECIAL REQUESTS        Culture result: NO GROWTH 6 DAYS       STREP PNEUMO AG, URINE [[id number]]     Order Status: Canceled Specimen: Urine     LEGIONELLA PNEUMOPHILA AG, URINE [[id number]]     Order Status: Canceled Specimen: Urine     CULTURE, BLOOD [[id number]] Collected: 06/11/22 2150    Order Status: Completed Specimen: Blood Updated: 06/17/22 0700     Special Requests: NO SPECIAL REQUESTS        Culture result: NO GROWTH 6 DAYS       CULTURE, BLOOD [[id number]] Collected: 06/11/22 2150    Order Status: Completed Specimen: Blood Updated: 06/17/22 0700     Special Requests: NO SPECIAL REQUESTS        Culture result: NO GROWTH 6 DAYS       RESPIRATORY VIRUS PANEL W/COVID-19, PCR [[id number]] Collected: 06/11/22 2051    Order Status: Completed Specimen: Nasopharyngeal Updated: 06/11/22 2236     Adenovirus Not detected        Coronavirus 229E Not detected        Coronavirus HKU1 Not detected        Coronavirus CVNL63 Not detected        Coronavirus OC43 Not detected        SARS-CoV-2, PCR Not detected        Metapneumovirus Not detected        Rhinovirus and Enterovirus Not detected        Influenza A Not detected        Influenza A, subtype H1 Not detected        Influenza A, subtype H3 Not detected        INFLUENZA A H1N1 PCR Not detected Influenza B Not detected        Parainfluenza 1 Not detected        Parainfluenza 2 Not detected        Parainfluenza 3 Not detected        Parainfluenza virus 4 Not detected        RSV by PCR Not detected        B. parapertussis, PCR Not detected        Bordetella pertussis - PCR Not detected        Chlamydophila pneumoniae DNA, QL, PCR Not detected        Mycoplasma pneumoniae DNA, QL, PCR Not detected       CULTURE, MRSA [[id number]] Collected: 06/11/22 1120    Order Status: Completed Specimen: Nasal from Nares Updated: 06/13/22 1330     Special Requests: NO SPECIAL REQUESTS        Culture result: MRSA NOT PRESENT               Screening of patient nares for MRSA is for surveillance purposes and, if positive, to facilitate isolation considerations in high risk settings. It is not intended for automatic decolonization interventions per se as regimens are not sufficiently effective to warrant routine use. NM BONE MARROW [address] [facility] BODY    Result Date: 6/17/2022  Patchy multifocal increased white cell accumulation in the right lower quadrant suspicious for active inflammation versus infection, suspect bowel related activity/process as discussed above. No additional sites of focal abnormal increased labeled WBC accumulation identified localizing for active infection/abscess. US ABD LTD    Result Date: 6/14/2022  1. Hepatomegaly -Biliary tree unremarkable by sonography    XR CHEST PORT    Result Date: 6/14/2022  : 1. No acute cardiopulmonary disease. NM INFLAM WB MULTI    Result Date: 6/17/2022  Patchy multifocal increased white cell accumulation in the right lower quadrant suspicious for active inflammation versus infection, suspect bowel related activity/process as discussed above. No additional sites of focal abnormal increased labeled WBC accumulation identified localizing for active infection/abscess.       Disclaimer: Sections of this note are dictated using utilizing voice recognition software, which may have resulted in some phonetic based errors in grammar and contents. Even though attempts were made to correct all the mistakes, some may have been missed, and remained in the body of the document. If questions arise, please contact our department.     Signed By: Shannon Harris MD     June 21, 2022

## 2022-06-21 NOTE — REHAB NOTE
ARU/IPR REFERRAL CONTACT NOTE  [id number] [facility]      Thank you for the opportunity to review this patient's case for admission to [address] [facility]. Based on our pre-admission screening:     [ x] Our Team/Medical Director is following this case. Comments: Pt and daughter are receptive to ARU admission per Cam Pronto note from 6/20/22. Authorization has been initiated with [facility] Medicare. Once authorization is obtained, patient can transition to ARU provided she remains medically stable. Again, Thank you for this referral. Should you have any questions please do not hesitate to call.      Sincerely,  [facility]  [phone]

## 2022-06-22 LAB
GLUCOSE BLD STRIP.AUTO-MCNC: 126 MG/DL (ref 70–110)
GLUCOSE BLD STRIP.AUTO-MCNC: 147 MG/DL (ref 70–110)
GLUCOSE BLD STRIP.AUTO-MCNC: 253 MG/DL (ref 70–110)
GLUCOSE BLD STRIP.AUTO-MCNC: 253 MG/DL (ref 70–110)
HCT VFR BLD AUTO: 24.1 % (ref 35–45)
HGB BLD-MCNC: 7.5 G/DL (ref 12–16)
PHOSPHATE SERPL-MCNC: 2.4 MG/DL (ref 2.5–4.9)

## 2022-06-22 PROCEDURE — 2709999900 HC NON-CHARGEABLE SUPPLY

## 2022-06-22 PROCEDURE — 97535 SELF CARE MNGMENT TRAINING: CPT

## 2022-06-22 PROCEDURE — 74011250637 HC RX REV CODE- 250/637: Performed by: INTERNAL MEDICINE

## 2022-06-22 PROCEDURE — 65660000004 HC RM CVT STEPDOWN

## 2022-06-22 PROCEDURE — 97530 THERAPEUTIC ACTIVITIES: CPT

## 2022-06-22 PROCEDURE — 74011250637 HC RX REV CODE- 250/637: Performed by: PHYSICIAN ASSISTANT

## 2022-06-22 PROCEDURE — 74011636637 HC RX REV CODE- 636/637: Performed by: INTERNAL MEDICINE

## 2022-06-22 PROCEDURE — 82962 GLUCOSE BLOOD TEST: CPT

## 2022-06-22 PROCEDURE — 74011000250 HC RX REV CODE- 250: Performed by: STUDENT IN AN ORGANIZED HEALTH CARE EDUCATION/TRAINING PROGRAM

## 2022-06-22 PROCEDURE — 36415 COLL VENOUS BLD VENIPUNCTURE: CPT

## 2022-06-22 PROCEDURE — 77030038269 HC DRN EXT URIN PURWCK BARD -A

## 2022-06-22 PROCEDURE — 97164 PT RE-EVAL EST PLAN CARE: CPT

## 2022-06-22 PROCEDURE — 97168 OT RE-EVAL EST PLAN CARE: CPT

## 2022-06-22 PROCEDURE — 84100 ASSAY OF PHOSPHORUS: CPT

## 2022-06-22 PROCEDURE — 74011250637 HC RX REV CODE- 250/637: Performed by: STUDENT IN AN ORGANIZED HEALTH CARE EDUCATION/TRAINING PROGRAM

## 2022-06-22 PROCEDURE — 74011250637 HC RX REV CODE- 250/637: Performed by: NURSE PRACTITIONER

## 2022-06-22 PROCEDURE — 85018 HEMOGLOBIN: CPT

## 2022-06-22 PROCEDURE — 99232 SBSQ HOSP IP/OBS MODERATE 35: CPT | Performed by: FAMILY MEDICINE

## 2022-06-22 RX ADMIN — Medication 10 UNITS: at 08:11

## 2022-06-22 RX ADMIN — SODIUM CHLORIDE, PRESERVATIVE FREE 10 ML: 5 INJECTION INTRAVENOUS at 05:50

## 2022-06-22 RX ADMIN — ASPIRIN 81 MG: 81 TABLET, COATED ORAL at 08:11

## 2022-06-22 RX ADMIN — LEVOTHYROXINE SODIUM 112 MCG: 112 TABLET ORAL at 05:50

## 2022-06-22 RX ADMIN — Medication 6 UNITS: at 16:56

## 2022-06-22 RX ADMIN — SODIUM CHLORIDE, PRESERVATIVE FREE 10 ML: 5 INJECTION INTRAVENOUS at 21:34

## 2022-06-22 RX ADMIN — CARVEDILOL 6.25 MG: 6.25 TABLET, FILM COATED ORAL at 16:56

## 2022-06-22 RX ADMIN — ACETAMINOPHEN 650 MG: 325 TABLET ORAL at 16:58

## 2022-06-22 RX ADMIN — ATORVASTATIN CALCIUM 40 MG: 40 TABLET, FILM COATED ORAL at 21:34

## 2022-06-22 RX ADMIN — LISINOPRIL 2.5 MG: 2.5 TABLET ORAL at 08:11

## 2022-06-22 RX ADMIN — PANTOPRAZOLE SODIUM 40 MG: 40 TABLET, DELAYED RELEASE ORAL at 08:11

## 2022-06-22 RX ADMIN — CARVEDILOL 6.25 MG: 6.25 TABLET, FILM COATED ORAL at 08:11

## 2022-06-22 RX ADMIN — PANTOPRAZOLE SODIUM 40 MG: 40 TABLET, DELAYED RELEASE ORAL at 16:56

## 2022-06-22 RX ADMIN — Medication 9 UNITS: at 11:15

## 2022-06-22 NOTE — PROGRESS NOTES
Problem: Diabetes Self-Management  Goal: *Using medications safely  Description: State effect of diabetes medications on diabetes; name diabetes medication taking, action and side effects. Outcome: Progressing Towards Goal     Problem: Diabetes Self-Management  Goal: *Prevention, detection, treatment of acute complications  Description: List symptoms of hyper- and hypoglycemia; describe how to treat low blood sugar and actions for lowering  high blood glucose level. Outcome: Progressing Towards Goal     Problem: Falls - Risk of  Goal: *Absence of Falls  Description: Document [place] Fall Risk and appropriate interventions in the flowsheet. Outcome: Progressing Towards Goal  Note: Fall Risk Interventions:  Mobility Interventions: Bed/chair exit alarm,Patient to call before getting OOB    Mentation Interventions: Adequate sleep, hydration, pain control,Bed/chair exit alarm,Toileting rounds    Medication Interventions: Assess postural VS orthostatic hypotension,Teach patient to arise slowly,Patient to call before getting OOB    Elimination Interventions: Call light in reach,Patient to call for help with toileting needs              Problem: Patient Education: Go to Patient Education Activity  Goal: Patient/Family Education  Outcome: Progressing Towards Goal     Problem: Nutrition Deficit  Goal: *Optimize nutritional status  Outcome: Progressing Towards Goal     Problem: Pressure Injury - Risk of  Goal: *Prevention of pressure injury  Description: Document Sid Scale and appropriate interventions in the flowsheet. Outcome: Progressing Towards Goal  Note: Pressure Injury Interventions:  Sensory Interventions: Assess changes in LOC,Minimize linen layers,Pressure redistribution bed/mattress (bed type),Turn and reposition approx.  every two hours (pillows and wedges if needed),Keep linens dry and wrinkle-free    Moisture Interventions: Absorbent underpads,Apply protective barrier, creams and emollients,Moisture barrier    Activity Interventions: Increase time out of bed,PT/OT evaluation    Mobility Interventions: HOB 30 degrees or less,Float heels,Turn and reposition approx.  every two hours(pillow and wedges),Pressure redistribution bed/mattress (bed type)    Nutrition Interventions: Document food/fluid/supplement intake,Offer support with meals,snacks and hydration,Discuss nutritional consult with provider    Friction and Shear Interventions: Apply protective barrier, creams and emollients,HOB 30 degrees or less,Lift sheet                Problem: Nutrition Deficit  Goal: *Optimize nutritional status  Outcome: Progressing Towards Goal     Problem: Pain  Goal: *Control of Pain  Outcome: Progressing Towards Goal

## 2022-06-22 NOTE — PROGRESS NOTES
[facility] Hospitalists  Progress Note    Patient: Elissa Aguirre Age: 66 y.o. : 1950 MR#: [id number] SSN: [id number]  Date: 2022     Subjective/24-hour events:     Patient resting comfortably, sitting in chair at bedside. No chest pain or shortness of breath reported. Assessment:   NSTEMI  DM2 with hyperglycemia  Aspiration pneumonia  GI bleed  Hypothyroidism  Acute blood loss anemia status post PRBC transfusion  Chronic systolic CHF, EF 30 to 57% BRENDA, resolved  Acute hypoxic respiratory failure, resolved  Sepsis, secondary to above, resolved  Shock, secondary to sepsis and volume depletion, resolved  DKA resolved      Plan:   Patient being evaluated for inpatient rehab placement. Insurance authorization still pending. If inpatient rehab not authorized, patient has multiple acceptances for SNF placement. Continue supportive management as ordered in interim. Patient remains medically stable for discharge once placement arrangements finalized. Case discussed with:  [x]Patient  []Family  [x]Nursing  [x]Case Management  DVT Prophylaxis:  []Lovenox  []Hep SQ  [x]SCDs  []Coumadin   []On Heparin gtt    Objective:   VS:   Visit Vitals  /76 (BP 1 Location: Left upper arm, BP Patient Position: Sitting)   Pulse 83   Temp 98.2 °F (36.8 °C)   Resp 19   Ht 5' 6\" (1.676 m)   Wt 82.1 kg (181 lb)   SpO2 95%   BMI 29.21 kg/m²      Tmax/24hrs: Temp (24hrs), Av.5 °F (36.9 °C), Min:98.2 °F (36.8 °C), Max:98.7 °F (37.1 °C)      Intake/Output Summary (Last 24 hours) at 2022 1533  Last data filed at 2022 0547  Gross per 24 hour   Intake --   Output 400 ml   Net -400 ml       General:  In NAD. Nontoxic-appearing. Cardiovascular:  RRR. Pulmonary:  Lungs clear bilaterally, no wheezes. No accessory muscle use. GI:  Abdomen soft, NTTP. Extremities:  Warm, no edema or ischemia. Neuro:  Awake and alert.   Moves extremities spontaneously, motor grossly nonfocal.    Labs:    Recent Results (from the past 24 hour(s))   GLUCOSE, POC    Collection Time: 06/21/22  4:03 PM   Result Value Ref Range    Glucose (POC) 119 (H) 70 - 110 mg/dL   GLUCOSE, POC    Collection Time: 06/21/22 10:12 PM   Result Value Ref Range    Glucose (POC) 208 (H) 70 - 110 mg/dL   PHOSPHORUS    Collection Time: 06/22/22  1:12 AM   Result Value Ref Range    Phosphorus 2.4 (L) 2.5 - 4.9 MG/DL   HGB & HCT    Collection Time: 06/22/22  1:12 AM   Result Value Ref Range    HGB 7.5 (L) 12.0 - 16.0 g/dL    HCT 24.1 (L) 35.0 - 45.0 %   GLUCOSE, POC    Collection Time: 06/22/22  7:48 AM   Result Value Ref Range    Glucose (POC) 147 (H) 70 - 110 mg/dL   GLUCOSE, POC    Collection Time: 06/22/22 11:09 AM   Result Value Ref Range    Glucose (POC) 253 (H) 70 - 110 mg/dL       Signed By: Brad Moreira MD     June 22, 2022

## 2022-06-22 NOTE — PROGRESS NOTES
CM uploaded clinicals to [facility] and [address] for back up plan for SNF. Patient has many acceptances for SNF. Auth is still pending for Acute Rehab Unit at this time.            Ashley Morfin RN  Case Management [phone]

## 2022-06-22 NOTE — PROGRESS NOTES
Problem: Mobility Impaired (Adult and Pediatric)  Goal: *Acute Goals and Plan of Care (Insert Text)  Description: Physical Therapy Goals  Initiated 6/15/2022 and to be accomplished within 7 day(s)  1. Patient will move from supine to sit and sit to supine , scoot up and down, and roll side to side in bed with supervision/set-up. 2.  Patient will transfer from bed to chair and chair to bed with supervision/set-up using the least restrictive device. 3.  Patient will perform sit to stand with supervision/set-up. 4.  Patient will ambulate with supervision/set-up for 50 feet with the least restrictive device. 5.  Patient will ascend/descend 13 stairs with 1 handrail(s) with minimal assistance/contact guard assist.  6.  Patient will perform BLE therex as prescribed to tolerance     PLOF: Pt lives alone in an apt with 13 [address], has cane and walker for use if needed     Outcome: Progressing Towards Goal     PHYSICAL THERAPY RE-EVALUATION    Patient: Brian Gil (44 y.o. female)  Date: 6/22/2022  Primary Diagnosis: DKA (diabetic ketoacidosis) ([facility] [address] [E11.10]  Procedure(s) (LRB):  ENDOSCOPY/ Polypectomy/ Biopsies/ Endo clips (N/A) 5 Days Post-Op   Precautions:   Fall  PLOF: see above     ASSESSMENT :  Based on the objective data described below, the patient presents with generalized weakness, decreased activity tolerance, impaired balance and gait, decreased functional mobility from baseline. Pt seen sitting EOB in NAD and agreeable, finishing session with OT for ADL. Pt is given RW in front for support and then stands to perform SPT to [facility]. Following no success with toileting, pt encouraged to progress to ambulation. Pt able to take approx 5 ft of steps with min A for safety and steadying, decreased step clearance and step lengths noted before stating \"I need to sit down\" citing fatigue.  Pt is then positioned for comfort with LE elevated and all needs met, will continue to follow per POC in the acute setting, recommend rehab at discharge. Patient will benefit from skilled intervention to address the above impairments. Patient's rehabilitation potential is considered to be Good  Factors which may influence rehabilitation potential include:   []         None noted  []         Mental ability/status  [x]         Medical condition  []         Home/family situation and support systems  []         Safety awareness  []         Pain tolerance/management  []         Other:      PLAN :  Recommendations and Planned Interventions:   [x]           Bed Mobility Training             [x]    Neuromuscular Re-Education  [x]           Transfer Training                   []    Orthotic/Prosthetic Training  [x]           Gait Training                          []    Modalities  [x]           Therapeutic Exercises           []    Edema Management/Control  [x]           Therapeutic Activities            [x]    Family Training/Education  [x]           Patient Education  []           Other (comment):    Frequency/Duration: Patient will be followed by physical therapy 1-2 times per day/3-5 days per week to address goals. Discharge Recommendations: Rehab  Further Equipment Recommendations for Discharge: rolling walker     SUBJECTIVE:   Patient stated Lori Hallman will get in the chair.     OBJECTIVE DATA SUMMARY:   Hospital course since last seen and reason for re-evaluation: pt making slow but steady progress towards goals, able to take several steps this date as well as performed a SPT  Past Medical History:   Diagnosis Date    Colon cancer ([facility] [address]     Diabetes ([facility] [address]     Hypertension     Hypothyroidism      Past Surgical History:   Procedure Laterality Date    COLONOSCOPY N/A 12/30/2016    COLONOSCOPY.  SURVEILLANCE /c Hot Snared Polypectomy /c EndoClip x3 performed by Juanito Alarcon MD at [facility] ENDOSCOPY    HX HYSTERECTOMY      HX TOTAL COLECTOMY       Barriers to Learning/Limitations: yes;  physical  Compensate with: Visual Cues, Verbal Cues, and Tactile Cues  Home Situation:   Home Situation  Home Environment: Private residence  One/Two Story Residence: One story  Living Alone: Yes  Support Systems: Other (Comment) (Patient lives alone. )  Patient Expects to be Discharged to[de-identified] Rehab Unit Subacute  Current DME Used/Available at Home: Gloria Coulter, ly,Walker, rolling  Critical Behavior:  Neurologic State: Alert  Orientation Level: Oriented X4  Cognition: Follows commands  Safety/Judgement: Fall prevention  Psychosocial  Patient Behaviors: Calm; Cooperative  Purposeful Interaction: Yes  Pt Identified Daily Priority: Clinical issues (comment)  Caritas Process: Nurture loving kindness;Enable ellie/hope;Establish trust;Nurture spiritual self;Teaching/learning; Attend basic human needs;Create healing environment;Supportive expression  Caring Interventions: Reassure; Therapeutic modalities  Reassure: Therapeutic listening; Informing; Instilling ellie and hope  Therapeutic Modalities: Intentional therapeutic touch  Other Caring Modalities: purposeful rounding  Skin Condition/Temp: Warm     Skin Integrity: Abrasion; Excoriation  Skin Integumentary  Skin Color: Appropriate for ethnicity  Skin Condition/Temp: Warm  Skin Integrity: Abrasion; Excoriation  Turgor: Non-tenting  Hair Growth: Present  Varicosities: Absent  Nails: Within Defined Limits     Strength:    Strength: Generally decreased, functional                    Tone & Sensation:   Tone: Normal              Sensation: Intact               Range Of Motion:  AROM: Within functional limits           PROM: Grossly decreased, non-functional (R shoulder flexion ~ 100 degrees)       Functional Mobility:  Bed Mobility:     Supine to Sit: Contact guard assistance     Scooting: Contact guard assistance  Transfers:  Sit to Stand: Contact guard assistance  Stand to Sit: Contact guard assistance                       Balance:   Sitting: Intact  Standing: Impaired; With support  Standing - Static: Fair  Standing - Dynamic : Fair    Ambulation/Gait Training:  Distance (ft): 5 Feet (ft)  Assistive Device: Walker, rolling  Ambulation - Level of Assistance: Minimal assistance        Gait Abnormalities: Decreased step clearance        Base of Support: Narrowed; Center of gravity altered     Speed/Una: Slow;Shuffled  Step Length: Right shortened;Left shortened    Pain:  Pain level pre-treatment: 0/10   Pain level post-treatment: /10   Pain Intervention(s) : Medication (see MAR); Rest, Ice, Repositioning   Response to intervention: Nurse notified    Activity Tolerance:   Good    Please refer to the flowsheet for vital signs taken during this treatment. After treatment:   [x]         Patient left in no apparent distress sitting up in chair  []         Patient left in no apparent distress in bed  [x]         Call bell left within reach  [x]         Nursing notified  []         Caregiver present  []         Bed alarm activated  []         SCDs applied    COMMUNICATION/EDUCATION:   [x]         Role of Physical Therapy in the acute care setting. [x]         Fall prevention education was provided and the patient/caregiver indicated understanding. [x]         Patient/family have participated as able in goal setting and plan of care. [x]         Patient/family agree to work toward stated goals and plan of care. []         Patient understands intent and goals of therapy, but is neutral about his/her participation. []         Patient is unable to participate in goal setting/plan of care: ongoing with therapy staff.  []         Other:     Thank you for this referral.  Bon Mitchell   Time Calculation: 16 mins

## 2022-06-22 NOTE — PROGRESS NOTES
Spoke with representative from [place]. They are backed up on faxes and this writer gave verbal report for IPR submission. She will contact later today or first thing AM regarding auth status.

## 2022-06-22 NOTE — PROGRESS NOTES
0710: Bedside shift change report given to Yoni Patton RN (oncoming nurse) by Cara John RN (offgoing nurse). Report included the following information SBAR, Kardex, Intake/Output, MAR and Cardiac Rhythm N/A.     0710: Wound Prevention Checklist    Patient: July Nugent (1 y.o. female)  Date: 6/22/2022  Diagnosis: DKA (diabetic ketoacidosis) ([facility] [address] [E11.10] <principal problem not specified>    Precautions: Skin,Fall       []  Heel prevention boots placed on patient    []  Patient turned q2h during shift    []  Lift team ordered    []  Patient on Diane bed/Specialty bed    []  Each Wound is documented during shift (Stage, Color, drainage, odor, measurements, and dressings)    [x]  Dual skin checks done at bedside during shift report with GIOVANNA Matias  Pt has an abrasion to her forehead from fall she had PTA. Excoriation to sacrum and buttocks. Looks like it can be moisture associated. Bay Valle RN     3100: Dr. Gen Andres rounding on pt.     [phone]: Family at bedside. 1530: Pt's daughter (Ms. Thomas Romo [phone]) asked for an update from MD. Will notify MD.     Gracie Iqbal: Bedside shift change report given to Shavonne Lawson RN (oncoming nurse) by Yoni Patton RN (offgoing nurse).  Report included the following information SBAR, Kardex, Intake/Output, MAR and Cardiac Rhythm N/A.

## 2022-06-22 NOTE — PROGRESS NOTES
Problem: Self Care Deficits Care Plan (Adult)  Goal: *Acute Goals and Plan of Care (Insert Text)  Description: Occupational Therapy Goals  Initiated 6/15/2022 within 7 day(s), re-evaluation 6/22/2022, pt has made progress towards goals, goal # 4 & 5 upgraded, goal #8 modified    1. Patient will perform upper body dressing with supervision/set-up. 2.  Patient will perform grooming sitting EOB with supervision/set-up with Good balance. 3.  Patient will perform bathing with supervision/set-up. 4.  Patient will perform toilet transfers with standby assist using RW. 5.  Patient will perform all aspects of toileting with standby assist.  6.  Patient will participate in upper extremity therapeutic exercise/activities with supervision/set-up for 8 minutes to improve endurance and UB strength needed for ADLs    7. Patient will utilize energy conservation techniques during functional activities with verbal cues. 8.  Patient will perform lower body dressing with minimal assistance using adaptive equipment. Prior Level of Function: Pt reports she lives alone and is usually independent with ADLs and functional mobility using cane mostly. Pt lives in 2nd floor apartment  Outcome: Progressing Towards Goal   OCCUPATIONAL THERAPY RE-EVALUATION    Patient: Sherron Richardson (65 y.o. female)  Date: 6/22/2022  Primary Diagnosis: DKA (diabetic ketoacidosis) ([facility] [address] [E11.10]  Procedure(s) (LRB):  ENDOSCOPY/ Polypectomy/ Biopsies/ Endo clips (N/A) 5 Days Post-Op   Precautions:   Fall    ASSESSMENT :  Nursing/RN cleared for pt to participate in OT re-evaluation and tx session. Pt presents lying semi-reclined in bed, agreeable to participate in ADL routine seated on edge of bed and in stance w/ RW. Patient was seen with partial PT to maximize patient safety, participation, and functional mobility in preparation for self-care tasks.  STS w/ CGA and additional time and several attempts, noted incontinence of stool, pt reports occurs when she cough-cga pericare in stance w/ RW, stand step using RW for bed to bedside commode with RW and good safety awareness. Pt sitting up in recliner at end of tx session, performing oral care. Call bell within reach & pt verbalized understanding and provided return demonstration to utilize for assist e.g. functional transfers in order to prevent falls. Pt reports no pain, states some discomfort under skin on chest \"where they did a procedure\". Patient will benefit from skilled intervention to address the above impairments. Patient's rehabilitation potential is considered to be Excellent  Factors which may influence rehabilitation potential include:   []             None noted  []             Mental ability/status  [x]             Medical condition  []             Home/family situation and support systems  []             Safety awareness  []             Pain tolerance/management  []             Other:      PLAN :  Recommendations and Planned Interventions:   [x]               Self Care Training                  [x]      Therapeutic Activities  [x]               Functional Mobility Training   []      Cognitive Retraining  [x]               Therapeutic Exercises           [x]      Endurance Activities  [x]               Balance Training                    [x]      Neuromuscular Re-Education  []               Visual/Perceptual Training     [x]      Home Safety Training  [x]               Patient Education                   [x]      Family Training/Education  []               Other (comment):    Frequency/Duration: Patient will be followed by occupational therapy 3 times a week to address goals. Discharge Recommendations: Inpatient Rehab  Further Equipment Recommendations for Discharge: bedside commode, shower chair, and rolling walker     SUBJECTIVE:   Patient stated My daughter Isac Cook is going to help me and my daughter Albaro Summers lives with her, but she lost her leg to diabetes.     OBJECTIVE DATA SUMMARY: Hospital course since last seen and reason for reevaluation: re-evaluation 6/22/2022, pt has made progress towards goals, goal # 4 & 5 upgraded, goal #8 modified    Past Medical History:   Diagnosis Date    Colon cancer ([facility] [address]     Diabetes ([facility] [address]     Hypertension     Hypothyroidism      Past Surgical History:   Procedure Laterality Date    COLONOSCOPY N/A 12/30/2016    COLONOSCOPY. SURVEILLANCE /c Hot Snared Polypectomy /c EndoClip x3 performed by Fidel Oritz MD at [facility] ENDOSCOPY    HX HYSTERECTOMY      HX TOTAL COLECTOMY       Barriers to Learning/Limitations: None  Compensate with: visual, verbal, tactile, kinesthetic cues/model    Home Situation:   Home Situation  Home Environment: Private residence  One/Two Story Residence: One story  Living Alone: Yes  Support Systems: Other (Comment) (Patient lives alone. )  Patient Expects to be Discharged to[de-identified] Skilled nursing facility  Current DME Used/Available at Home: Thedore Marin, straight,Walker, rolling  [x]  Right hand dominant   []  Left hand dominant    Cognitive/Behavioral Status:  Neurologic State: Alert  Orientation Level: Oriented X4  Cognition: Follows commands  Safety/Judgement: Fall prevention    Skin: abrasion on forehead  Edema: none noted    Vision/Perceptual:   Corrective Lenses: Glasses    Coordination: BUE  Coordination: Generally decreased, functional  Fine Motor Skills-Upper: Left Intact; Right Intact    Gross Motor Skills-Upper: Left Intact; Right Intact    Balance:  Sitting: Intact  Standing: Impaired; With support  Standing - Static: Fair  Standing - Dynamic : Fair (-)    Strength: BUE  Strength: Generally decreased, functional (R shoulder flexion 3-/5)     Tone & Sensation: BUE  Tone: Normal  Sensation: Intact     Range of Motion: BUE  AROM: Generally decreased, functional (RUE shoulder flexion ~ 90 degrees)  PROM: Grossly decreased, non-functional (R shoulder flexion ~ 100 degrees)     Functional Mobility and Transfers for ADLs:  Bed Mobility:     Supine to Sit: Contact guard assistance     Scooting: Contact guard assistance  Transfers:  Sit to Stand: Contact guard assistance  Stand to Sit: Stand-by assistance     Toilet Transfer : Contact guard assistance      ADL Assessment:   Feeding: Modified independent    Oral Facial Hygiene/Grooming: Stand-by assistance    Bathing: Contact guard assistance    Upper Body Dressing: Stand-by assistance    Lower Body Dressing: Maximum assistance (used reacher at [facility], will benefit from trial for sock aide)    Toileting: Contact guard assistance     ADL Intervention:    Grooming  Position Performed: Seated edge of bed;Seated in chair  Washing Face: Stand-by assistance  Brushing Teeth: Set-up    Upper Body Bathing  Bathing Assistance: Stand-by assistance  Position Performed: Seated edge of bed    Lower Body Bathing  Bathing Assistance: Contact guard assistance  Perineal  : Contact guard assistance  Position Performed: Standing (w/ RW)  Adaptive Equipment: Tanya Dessert [address]: Stand-by assistance    Toileting  Bowel Hygiene: Contact guard assistance  Clothing Management: Contact guard assistance    Cognitive Retraining  Safety/Judgement: Fall prevention    Pain:  Pain level pre-treatment: 0/10   Pain level post-treatment: 0/10    Activity Tolerance:   fair  Please refer to the flowsheet for vital signs taken during this treatment. After treatment:   [x] Patient left in no apparent distress sitting up in chair  [] Patient left in no apparent distress in bed  [x] Call bell left within reach  [x] Nursing notified  [] Caregiver present  [] Bed alarm activated    COMMUNICATION/EDUCATION:   [x] Role of Occupational Therapy in the acute care setting  [x] Home safety education was provided and the patient/caregiver indicated understanding. [x] Patient/family have participated as able in goal setting and plan of care. [x] Patient/family agree to work toward stated goals and plan of care.   [] Patient understands intent and goals of therapy, but is neutral about his/her participation. [] Patient is unable to participate in goal setting and plan of care.     Thank you for this referral.  Guanako Palmer  Time Calculation: 28 mins

## 2022-06-22 NOTE — PROGRESS NOTES
0720hrs; Bedside shift change report given to GIOVANNA Matias (oncoming nurse) by Jersey Newman RN (offgoing nurse). Report included the following information SBAR, Kardex, Intake/Output, MAR and Recent Results. Wound Prevention Checklist    Patient: Muriel Bence (89 y.o. female)  Date: 6/22/2022  Diagnosis: DKA (diabetic ketoacidosis) ([facility] [address] [E11.10] <principal problem not specified>    Precautions: Skin,Fall       []  Heel prevention boots placed on patient    [x]  Patient turned q2h during shift:  Pt informed to turn Left and Right     []  Lift team ordered    []  Patient on [place] bed/Specialty bed    []  Each Wound is documented during shift (Stage, Color, drainage, odor, measurements, and dressings)    [x]  Dual skin checks done at bedside during shift report with Jersey Newman RN    Pt with Abrasion to Forehead (DALIA); Excoriation/Peeling to Buttocks and Sacrum areas - Moisture Associated; Possible DTI Bilat butt checks. Chanel Ridley RN      0565VDR: Pt advised that her BG is 208 and that she will need 6 Units Humalog insulin (Very Insulin Resistant) for coverage. Pt stated that she does not understand why she does not receive her Lantus at night like she does not home, as she has had several hypoglycemic episodes here at the hospital.  She believes that the Humalog dosage is too high. Pt requested to speak with Diabetes Management to inquire. RN advised that they are unavailable and can be consulted during the morning shift. Question/concern added to whiteboard and note for follow-up. RN will also pass on during shift report. Humalog admin'd per sliding scale protocol. 0547hrs:  During shift, pt has reported 0/10 pain during assessments; however, she states that she feels discomfort in her Right Chest/region, where she had hemoclips applied. She will inquire with Cardiology during morning rounds, as she wants to ensure her discomfort is within limits and what to expect regarding recovery.   Will pass on this information to the morning shift RN. 0730hrs:  Bedside shift change report given to Renetta Aragon, Student RN (oncoming nurse) by Pennie Quiles RN (offgoing nurse). Report included the following information SBAR, Kardex, Procedure Summary, Intake/Output, MAR and Recent Results.

## 2022-06-22 NOTE — DIABETES MGMT
Diabetes/ Glycemic Control Plan of Care  Recommendations:   Blood glucose this am 147 mg/dl  Continue current insulin regimen. Will continue inpatient monitoring. Fasting/ Morning blood glucose:   Lab Results   Component Value Date/Time    Glucose 154 (H) 06/21/2022 01:56 AM    Glucose (POC) 147 (H) 06/22/2022 07:48 AM    Glucose (POC) 90 02/06/2019 02:13 PM    Glucose,  (H) 01/19/2021 09:26 PM     IV Fluids containing dextrose:   None   Steroids:   None     Blood glucose values:       Results for Marcelino Jerez (MRN [id number]) as of 6/22/2022 08:52   Ref. Range 6/21/2022 07:43 6/21/2022 11:35 6/21/2022 16:03 6/21/2022 22:12 6/22/2022 07:48   GLUCOSE,FAST - POC Latest Ref Range: 70 - 110 mg/dL 264 (H) 249 (H) 119 (H) 208 (H) 147 (H)     Within target range (70-180mg/dL): progressing. Current insulin orders:   Lantus 10 units daily  Lispro corrective insulin coverage  Total Daily Dose previous 24 hours = 31 units   Current A1c:   Lab Results   Component Value Date/Time    Hemoglobin A1c 9.0 (H) 06/11/2022 02:15 PM    Hemoglobin A1c, External 9.5 07/08/2021 12:00 AM      equivalent  to ave Blood Glucose of 212 mg/dl for 2-3 months prior to admission  Adequate glycemic control PTA:   No   Nutrition/Diet:   ADULT DIET Regular; 3 carb choices (45 gm/meal); Low Fat/Low Chol/High Fiber/AMBROSIO; Low Sodium (2 gm)  Active Orders   Diet    ADULT DIET Regular; 3 carb choices (45 gm/meal); Low Fat/Low Chol/High Fiber/AMBROSIO; Low Sodium (2 gm)      Meal Intake:  Patient Vitals for the past 168 hrs:   % Diet Eaten   06/21/22 0853 26 - 50%   06/20/22 1217 0%   06/19/22 1726 0%   06/19/22 0851 1 - 25%   06/18/22 1600 26 - 50%   06/18/22 1211 51 - 75%   06/18/22 1029 26 - 50%   06/17/22 1700 1 - 25%     Supplement Intake:  No data found.      Home diabetes medications:   Key Antihyperglycemic Medications             insulin lispro (HUMALOG) 100 unit/mL injection (Taking) For Blood Sugar (mg/dL) of:              Less than 150 = 0 units  150 -199 =   3 units  200 -249 =   6 units  250 -299 =   9 units  300 -349 =   12 units  350 and above =   15 units and Call Physician  Initiate Hypoglycemic protocol if blood glucose is <70 mg/dL. Fast Acting - Administer Immediately - or within 15 minutes of start of meal, if mealtime coverage. insulin glargine (LANTUS) 100 unit/mL injection (Taking) Inject 10 units SQ DAILY    metFORMIN ER (GLUCOPHAGE XR) 500 mg tablet (Taking) Take 500 mg by mouth daily (with dinner). insulin regular (NOVOLIN R, HUMULIN R) 100 unit/mL injection (Taking) by SubCUTAneous route Before breakfast, lunch, and dinner. Sliding scale        Plan/Goals:   Blood glucose will be within target of 70 - 180 mg/dl within 72 hours  Reinforce dietary and medication compliance at home.          Education:  [x] Refer to Diabetes Education Record (6/14/22)                         [] Education not indicated at this time     Aga Quintanilla, [facility] CDE  Ext [phone]

## 2022-06-23 LAB
GLUCOSE BLD STRIP.AUTO-MCNC: 238 MG/DL (ref 70–110)
GLUCOSE BLD STRIP.AUTO-MCNC: 239 MG/DL (ref 70–110)
GLUCOSE BLD STRIP.AUTO-MCNC: 249 MG/DL (ref 70–110)
GLUCOSE BLD STRIP.AUTO-MCNC: 257 MG/DL (ref 70–110)
PHOSPHATE SERPL-MCNC: 2.6 MG/DL (ref 2.5–4.9)

## 2022-06-23 PROCEDURE — 74011250637 HC RX REV CODE- 250/637: Performed by: STUDENT IN AN ORGANIZED HEALTH CARE EDUCATION/TRAINING PROGRAM

## 2022-06-23 PROCEDURE — 74011636637 HC RX REV CODE- 636/637: Performed by: INTERNAL MEDICINE

## 2022-06-23 PROCEDURE — 74011250637 HC RX REV CODE- 250/637: Performed by: PHYSICIAN ASSISTANT

## 2022-06-23 PROCEDURE — 99232 SBSQ HOSP IP/OBS MODERATE 35: CPT | Performed by: FAMILY MEDICINE

## 2022-06-23 PROCEDURE — 74011000250 HC RX REV CODE- 250: Performed by: STUDENT IN AN ORGANIZED HEALTH CARE EDUCATION/TRAINING PROGRAM

## 2022-06-23 PROCEDURE — 82962 GLUCOSE BLOOD TEST: CPT

## 2022-06-23 PROCEDURE — 65270000029 HC RM PRIVATE

## 2022-06-23 PROCEDURE — 36415 COLL VENOUS BLD VENIPUNCTURE: CPT

## 2022-06-23 PROCEDURE — 74011250637 HC RX REV CODE- 250/637: Performed by: INTERNAL MEDICINE

## 2022-06-23 PROCEDURE — 74011250637 HC RX REV CODE- 250/637: Performed by: NURSE PRACTITIONER

## 2022-06-23 PROCEDURE — 84100 ASSAY OF PHOSPHORUS: CPT

## 2022-06-23 RX ADMIN — Medication 6 UNITS: at 08:29

## 2022-06-23 RX ADMIN — Medication 9 UNITS: at 21:51

## 2022-06-23 RX ADMIN — SODIUM CHLORIDE, PRESERVATIVE FREE 10 ML: 5 INJECTION INTRAVENOUS at 06:28

## 2022-06-23 RX ADMIN — PANTOPRAZOLE SODIUM 40 MG: 40 TABLET, DELAYED RELEASE ORAL at 09:05

## 2022-06-23 RX ADMIN — LEVOTHYROXINE SODIUM 112 MCG: 112 TABLET ORAL at 06:28

## 2022-06-23 RX ADMIN — DOCUSATE SODIUM 100 MG: 100 CAPSULE, LIQUID FILLED ORAL at 09:05

## 2022-06-23 RX ADMIN — SODIUM CHLORIDE, PRESERVATIVE FREE 10 ML: 5 INJECTION INTRAVENOUS at 14:00

## 2022-06-23 RX ADMIN — CARVEDILOL 6.25 MG: 6.25 TABLET, FILM COATED ORAL at 17:01

## 2022-06-23 RX ADMIN — ONDANSETRON 4 MG: 4 TABLET, ORALLY DISINTEGRATING ORAL at 04:49

## 2022-06-23 RX ADMIN — CARVEDILOL 6.25 MG: 6.25 TABLET, FILM COATED ORAL at 09:05

## 2022-06-23 RX ADMIN — ATORVASTATIN CALCIUM 40 MG: 40 TABLET, FILM COATED ORAL at 21:51

## 2022-06-23 RX ADMIN — SODIUM CHLORIDE, PRESERVATIVE FREE 10 ML: 5 INJECTION INTRAVENOUS at 21:51

## 2022-06-23 RX ADMIN — Medication 10 UNITS: at 08:29

## 2022-06-23 RX ADMIN — LISINOPRIL 2.5 MG: 2.5 TABLET ORAL at 09:05

## 2022-06-23 RX ADMIN — Medication 6 UNITS: at 12:16

## 2022-06-23 RX ADMIN — Medication 6 UNITS: at 17:00

## 2022-06-23 RX ADMIN — PANTOPRAZOLE SODIUM 40 MG: 40 TABLET, DELAYED RELEASE ORAL at 17:01

## 2022-06-23 RX ADMIN — ASPIRIN 81 MG: 81 TABLET, COATED ORAL at 09:05

## 2022-06-23 NOTE — PROGRESS NOTES
Problem: Diabetes Self-Management  Goal: *Disease process and treatment process  Description: Define diabetes and identify own type of diabetes; list 3 options for treating diabetes. Outcome: Progressing Towards Goal  Goal: *Incorporating nutritional management into lifestyle  Description: Describe effect of type, amount and timing of food on blood glucose; list 3 methods for planning meals. Outcome: Progressing Towards Goal  Goal: *Incorporating physical activity into lifestyle  Description: State effect of exercise on blood glucose levels. Outcome: Progressing Towards Goal  Goal: *Developing strategies to promote health/change behavior  Description: Define the ABC's of diabetes; identify appropriate screenings, schedule and personal plan for screenings. Outcome: Progressing Towards Goal  Goal: *Using medications safely  Description: State effect of diabetes medications on diabetes; name diabetes medication taking, action and side effects. Outcome: Progressing Towards Goal  Goal: *Monitoring blood glucose, interpreting and using results  Description: Identify recommended blood glucose targets  and personal targets. Outcome: Progressing Towards Goal  Goal: *Prevention, detection, treatment of acute complications  Description: List symptoms of hyper- and hypoglycemia; describe how to treat low blood sugar and actions for lowering  high blood glucose level. Outcome: Progressing Towards Goal  Goal: *Prevention, detection and treatment of chronic complications  Description: Define the natural course of diabetes and describe the relationship of blood glucose levels to long term complications of diabetes.   Outcome: Progressing Towards Goal  Goal: *Developing strategies to address psychosocial issues  Description: Describe feelings about living with diabetes; identify support needed and support network  Outcome: Progressing Towards Goal  Goal: *Insulin pump training  Outcome: Progressing Towards Goal  Goal: *Sick day guidelines  Outcome: Progressing Towards Goal  Goal: *Patient Specific Goal (EDIT GOAL, INSERT TEXT)  Outcome: Progressing Towards Goal     Problem: Patient Education: Go to Patient Education Activity  Goal: Patient/Family Education  Outcome: Progressing Towards Goal     Problem: Falls - Risk of  Goal: *Absence of Falls  Description: Document Jessie De Jesus Fall Risk and appropriate interventions in the flowsheet.   Outcome: Progressing Towards Goal  Note: Fall Risk Interventions:  Mobility Interventions: Bed/chair exit alarm,Assess mobility with egress test,Communicate number of staff needed for ambulation/transfer    Mentation Interventions: Adequate sleep, hydration, pain control,Bed/chair exit alarm,Door open when patient unattended,Evaluate medications/consider consulting pharmacy    Medication Interventions: Bed/chair exit alarm,Evaluate medications/consider consulting pharmacy,Patient to call before getting OOB    Elimination Interventions: Bed/chair exit alarm,Call light in reach,Patient to call for help with toileting needs,Toileting schedule/hourly rounds    History of Falls Interventions: Consult care management for discharge planning,Bed/chair exit alarm,Door open when patient unattended,Investigate reason for fall         Problem: Patient Education: Go to Patient Education Activity  Goal: Patient/Family Education  Outcome: Progressing Towards Goal     Problem: Nutrition Deficit  Goal: *Optimize nutritional status  Outcome: Progressing Towards Goal     Problem: Patient Education: Go to Patient Education Activity  Goal: Patient/Family Education  Outcome: Progressing Towards Goal     Problem: Patient Education: Go to Patient Education Activity  Goal: Patient/Family Education  Outcome: Progressing Towards Goal     Problem: Patient Education: Go to Patient Education Activity  Goal: Patient/Family Education  Outcome: Progressing Towards Goal     Problem: Pressure Injury - Risk of  Goal: *Prevention of pressure injury  Description: Document Sid Scale and appropriate interventions in the flowsheet.   Outcome: Progressing Towards Goal     Problem: Patient Education: Go to Patient Education Activity  Goal: Patient/Family Education  Outcome: Progressing Towards Goal     Problem: Pain  Goal: *Control of Pain  Outcome: Progressing Towards Goal     Problem: Patient Education: Go to Patient Education Activity  Goal: Patient/Family Education  Outcome: Progressing Towards Goal

## 2022-06-23 NOTE — ROUTINE PROCESS
1900: Bedside and Verbal shift change report given to Crissy Cotter RN (oncoming nurse) by Darrell Calvo RN (offgoing nurse). Report included the following information SBAR, Kardex and ED Summary. 0700: Bedside and Verbal shift change report given to Harvey Katz RN (oncoming nurse) by Crissy Cotter RN (offgoing nurse). Report included the following information SBAR, Kardex and ED Summary.      Wound Prevention Checklist    Patient: Gurmeet Mandel (68 y.o. female)  Date: 6/23/2022  Diagnosis[facility] [E[facility]] <principal problem not specified>    Precautions: Fall       []  Heel prevention boots placed on patient    []  Patient turned q2h during shift    []  Lift team ordered    [x]  Patient on [place] bed/Specialty bed    []  Each Wound is documented during shift (Stage, Color, drainage, odor, measurements, and dressings)    [x]  Dual skin checks done at bedside during shift report with Sandra Wyman

## 2022-06-23 NOTE — PROGRESS NOTES
Verbal order from Dr. Page Reason, patient does not need cardiac monitoring and can be downgraded to Medical. RBV.

## 2022-06-23 NOTE — PROGRESS NOTES
CM received Perfect Serve from [employer] in [place] [employer], said insurance auth was denied for ARU, and information for Peer 2 Peer sent to Dr. Margie Mojica, and Dr. Margie Mojica to speak with patient and her daughter to see what they would like to do.           Charles Pendleton, RN  Case Management [phone]

## 2022-06-23 NOTE — DIABETES MGMT
Diabetes/ Glycemic Control Plan of Care  Recommendations:   Blood glucose this am 238 mg/dl. Yesterday 147 mg/dl  Patient with blood glucose fluctuations. Last episode of hypoglycemia on 6/19/2022  Would continue insulin regimen as ordered. Will continue inpatient monitoring. Fasting/ Morning blood glucose:   Lab Results   Component Value Date/Time    Glucose 154 (H) 06/21/2022 01:56 AM    Glucose (POC) 238 (H) 06/23/2022 07:29 AM    Glucose (POC) 90 02/06/2019 02:13 PM    Glucose,  (H) 01/19/2021 09:26 PM     IV Fluids containing dextrose:   None   Steroids:   None     Blood glucose values:       Results for Taqueria Tay (MRN [id number]) as of 6/23/2022 10:40   Ref. Range 6/22/2022 07:48 6/22/2022 11:09 6/22/2022 16:30 6/22/2022 21:33 6/23/2022 07:29   GLUCOSE,FAST - POC Latest Ref Range: 70 - 110 mg/dL 147 (H) 253 (H) 253 (H) 126 (H) 238 (H)     Within target range (70-180mg/dL): progressing. Current insulin orders:   Lantus 10 units daily  Lispro corrective insulin coverage  Total Daily Dose previous 24 hours = 25 units   Current A1c:   Lab Results   Component Value Date/Time    Hemoglobin A1c 9.0 (H) 06/11/2022 02:15 PM    Hemoglobin A1c, External 9.5 07/08/2021 12:00 AM      equivalent  to ave Blood Glucose of 212 mg/dl for 2-3 months prior to admission  Adequate glycemic control PTA:   No   Nutrition/Diet:   ADULT DIET Regular; 3 carb choices (45 gm/meal); Low Fat/Low Chol/High Fiber/AMBROSIO; Low Sodium (2 gm)  Active Orders   Diet    ADULT DIET Regular; 3 carb choices (45 gm/meal); Low Fat/Low Chol/High Fiber/AMBROSIO; Low Sodium (2 gm)      Meal Intake:  Patient Vitals for the past 168 hrs:   % Diet Eaten   06/21/22 0853 26 - 50%   06/20/22 1217 0%   06/19/22 1726 0%   06/19/22 0851 1 - 25%   06/18/22 1600 26 - 50%   06/18/22 1211 51 - 75%   06/18/22 1029 26 - 50%   06/17/22 1700 1 - 25%     Supplement Intake:  No data found.      Home diabetes medications:   Key Antihyperglycemic Medications insulin lispro (HUMALOG) 100 unit/mL injection (Taking) For Blood Sugar (mg/dL) of:              Less than 150 =   0 units  150 -199 =   3 units  200 -249 =   6 units  250 -299 =   9 units  300 -349 =   12 units  350 and above =   15 units and Call Physician  Initiate Hypoglycemic protocol if blood glucose is <70 mg/dL. Fast Acting - Administer Immediately - or within 15 minutes of start of meal, if mealtime coverage. insulin glargine (LANTUS) 100 unit/mL injection (Taking) Inject 10 units SQ DAILY    metFORMIN ER (GLUCOPHAGE XR) 500 mg tablet (Taking) Take 500 mg by mouth daily (with dinner). insulin regular (NOVOLIN R, HUMULIN R) 100 unit/mL injection (Taking) by SubCUTAneous route Before breakfast, lunch, and dinner. Sliding scale        Plan/Goals:   Blood glucose will be within target of 70 - 180 mg/dl within 72 hours  Reinforce dietary and medication compliance at home.          Education:  [x] Refer to Diabetes Education Record (6/14/22)                         [] Education not indicated at this time     Ann Gupta, [facility] CDE  Ext [phone]

## 2022-06-23 NOTE — PROGRESS NOTES
Comprehensive Nutrition Assessment    Type and Reason for Visit: Reassess    Nutrition Recommendations/Plan:   1. Plan to add Glucerna TID d/t varied/fair PO intake   2. Monitor PO intake, compliance of diet/supplement, weight, labs and plan of care      Malnutrition Assessment:  Malnutrition Status: At risk for malnutrition (specify) (NPO pending SLP eval with altered mentation) (06/14/22 1358)      Nutrition Assessment:    Visited pt in room, sitting up in chair, very pleasant, alert and able to communicate. Pt stated she is feeling well and tolerating current diet. Pt reported appetite remains the same at less than 50% at most meals. Pt transferred from ICU. Pt receptive to include Glucerna TID since she is familiar with this supplement PTA. Nutrition Related Findings:    Last BM 6/22. Output: 200mL (urine-catheter). Pertinent medications: Lipitor, coreg, colace, lantus, Humalog, Zofran, pantoprazole, miralax. Wound Type: Skin tears    Current Nutrition Intake & Therapies:  Average Meal Intake: 26-50%  Average Supplement Intake: None ordered  ADULT DIET Regular; 3 carb choices (45 gm/meal); Low Fat/Low Chol/High Fiber/AMBROSIO; Low Sodium (2 gm)    Anthropometric Measures:  Height: 5' 6\" (167.6 cm)  Ideal Body Weight (IBW): 130 lbs (59 kg)  Admission Body Weight: 181 lb  Current Body Wt:  82.1 kg (181 lb), 139.2 % IBW. Not specified  Current BMI (kg/m2): 29.2  Usual Body Weight: 82.1 kg (181 lb)  % Weight Change (Calculated): 0  BMI Category: Overweight (BMI 25.0-29. 9)    Estimated Daily Nutrient Needs:  Energy Requirements Based On: Formula  Weight Used for Energy Requirements: Current  Energy (kcal/day): 7416-1939  Weight Used for Protein Requirements: Current  Protein (g/day): 66-82  Method Used for Fluid Requirements: 1 ml/kcal  Fluid (ml/day): 7096-3928    Nutrition Diagnosis:   · Inadequate oral intake related to renal dysfunction,early satiety,acute injury/trauma as evidenced by poor intake prior to admission,intake 26-50%      Nutrition Interventions:   Food and/or Nutrient Delivery: Continue current diet,Start oral nutrition supplement  Nutrition Education/Counseling: Education not indicated,No recommendations at this time  Coordination of Nutrition Care: Continue to monitor while inpatient  Plan of Care discussed with: patient    Goals:  Previous Goal Met: Progressing toward goal(s)  Goals: Meet at least 75% of estimated needs,by next RD assessment       Nutrition Monitoring and Evaluation:   Behavioral-Environmental Outcomes: None identified  Food/Nutrient Intake Outcomes: Diet advancement/tolerance,Food and nutrient intake,Supplement intake  Physical Signs/Symptoms Outcomes: Biochemical data,Meal time behavior,Nutrition focused physical findings,Weight,GI status,Fluid status or edema    Discharge Planning:    Continue current diet,Continue oral nutrition supplement    Kimberly Kumar MA, RDN, LD   Contact: [phone]

## 2022-06-23 NOTE — REHAB NOTE
ARU/IPR REFERRAL CONTACT NOTE  [id number] [place] [address] for Physical Rehabilitation      Thank you for the opportunity to review this patient's case for admission to [id number] [place] [address] for Physical Rehabilitation. Based on our pre-admission screening:     Pt was denied ARU by the medical director but has been approved for SNF. A peer to peer option for ARU was provided and information is below. VM and perfect serve were provided to  and perfect serve has been sent to MD.    Peer to peer can be completed within 3 business days. [phone] Option 2. Case reference #[id number]    Again, Thank you for this referral. Should you have any questions please do not hesitate to call.      Sincerely,  Brigette Ragsdale [facility] for Physical Rehabilitation  [phone]

## 2022-06-23 NOTE — ROUTINE PROCESS
7215 - Bedside and Verbal shift change report given to Checo Bueno RN (oncoming nurse) by Wilmer Mckee (offgoing nurse). Report included the following information SBAR, Kardex, Intake/Output, MAR and Recent Results. Wound Prevention Checklist    Patient: Sukhwinder Elias (1 y.o. female)  Date: 6/23/2022  Diagnosis[facility] [E11.10] <principal problem not specified>    Precautions: Fall       []  Heel prevention boots placed on patient    [x]  Patient turned q2h during shift TURNS SELF    []  Lift team ordered    [x]  Patient on [place] bed/Specialty bed    [x]  Each Wound is documented during shift (Stage, Color, drainage, odor, measurements, and dressings)      [x]  Dual skin checks done at bedside during shift report with Santhosh Edwards RN  (Noted: Scar on forehead; excoriation on sacrum)    Yvan Osei     7028 - Patient stating that she would like to discharge to the home since her family member will be moving in with her. Informed patient that we can discuss discharge plans with the MD and CM during rounds to ensure that this is appropriate for her. 2100 - Patient up in the chair.

## 2022-06-23 NOTE — PROGRESS NOTES
[facility] Hospitalists  Progress Note    Patient: Sukhwinder Elias Age: 66 y.o. : 1950 MR#: [id number] SSN: [id number]  Date: 2022     Subjective/24-hour events:     Sitting in chair at bedside. No new complaints overnight. Assessment:   NSTEMI  DM2 with hyperglycemia  Aspiration pneumonia  GI bleed  Hypothyroidism  Acute blood loss anemia status post PRBC transfusion  Chronic systolic CHF, EF 30 to 12% BRENDA, resolved  Acute hypoxic respiratory failure, resolved  Sepsis, secondary to above, resolved  Shock, secondary to sepsis and volume depletion, resolved  DKA resolved      Plan:   Continue supportive medical management as ordered. Patient remains medically stable for discharge. Unfortunately, notification just received that insurance is denying inpatient rehab but is authorizing skilled nursing placement. We will follow-up with patient and daughter later this afternoon to determine plan of action. Suspect denial will be upheld even if [id number] is completed. Patient and daughter are already frustrated by the fact that process has taken as long as it has. We will update care management once follow-up conversation with patient and daughter held. Case discussed with:  [x]Patient  []Family  [x]Nursing  [x]Case Management  DVT Prophylaxis:  []Lovenox  []Hep SQ  [x]SCDs  []Coumadin   []On Heparin gtt    Objective:   VS:   Visit Vitals  /69 (BP 1 Location: Left upper arm, BP Patient Position: Sitting; At rest)   Pulse 82   Temp 98.3 °F (36.8 °C)   Resp 18   Ht 5' 6\" (1.676 m)   Wt 82.1 kg (181 lb)   SpO2 96%   BMI 29.21 kg/m²      Tmax/24hrs: Temp (24hrs), Av.4 °F (36.9 °C), Min:98.2 °F (36.8 °C), Max:98.6 °F (37 °C)    No intake or output data in the 24 hours ending 22 1444    General:  In NAD. Nontoxic-appearing. Cardiovascular:  RRR. Pulmonary:  Lungs clear bilaterally, no wheezes. No accessory muscle use.   GI:  Abdomen soft, NTTP.  Extremities:  Warm, no edema or ischemia. Neuro:  Awake and alert. Moves extremities spontaneously.     Labs:    Recent Results (from the past 24 hour(s))   GLUCOSE, POC    Collection Time: 06/22/22  4:30 PM   Result Value Ref Range    Glucose (POC) 253 (H) 70 - 110 mg/dL   GLUCOSE, POC    Collection Time: 06/22/22  9:33 PM   Result Value Ref Range    Glucose (POC) 126 (H) 70 - 110 mg/dL   PHOSPHORUS    Collection Time: 06/23/22  1:57 AM   Result Value Ref Range    Phosphorus 2.6 2.5 - 4.9 MG/DL   GLUCOSE, POC    Collection Time: 06/23/22  7:29 AM   Result Value Ref Range    Glucose (POC) 238 (H) 70 - 110 mg/dL   GLUCOSE, POC    Collection Time: 06/23/22 11:17 AM   Result Value Ref Range    Glucose (POC) 239 (H) 70 - 110 mg/dL       Signed By: Maggie Glynn MD     June 23, 2022

## 2022-06-24 LAB
GLUCOSE BLD STRIP.AUTO-MCNC: 175 MG/DL (ref 70–110)
GLUCOSE BLD STRIP.AUTO-MCNC: 179 MG/DL (ref 70–110)
GLUCOSE BLD STRIP.AUTO-MCNC: 199 MG/DL (ref 70–110)
GLUCOSE BLD STRIP.AUTO-MCNC: 96 MG/DL (ref 70–110)
PHOSPHATE SERPL-MCNC: 2.3 MG/DL (ref 2.5–4.9)

## 2022-06-24 PROCEDURE — 36415 COLL VENOUS BLD VENIPUNCTURE: CPT

## 2022-06-24 PROCEDURE — 97110 THERAPEUTIC EXERCISES: CPT

## 2022-06-24 PROCEDURE — 74011250637 HC RX REV CODE- 250/637: Performed by: NURSE PRACTITIONER

## 2022-06-24 PROCEDURE — 77030038269 HC DRN EXT URIN PURWCK BARD -A

## 2022-06-24 PROCEDURE — 84100 ASSAY OF PHOSPHORUS: CPT

## 2022-06-24 PROCEDURE — 74011250637 HC RX REV CODE- 250/637: Performed by: STUDENT IN AN ORGANIZED HEALTH CARE EDUCATION/TRAINING PROGRAM

## 2022-06-24 PROCEDURE — 74011250637 HC RX REV CODE- 250/637: Performed by: INTERNAL MEDICINE

## 2022-06-24 PROCEDURE — 74011636637 HC RX REV CODE- 636/637: Performed by: INTERNAL MEDICINE

## 2022-06-24 PROCEDURE — 97530 THERAPEUTIC ACTIVITIES: CPT

## 2022-06-24 PROCEDURE — 77030037878 HC DRSG MEPILEX >48IN BORD MOLN -B

## 2022-06-24 PROCEDURE — 99232 SBSQ HOSP IP/OBS MODERATE 35: CPT | Performed by: FAMILY MEDICINE

## 2022-06-24 PROCEDURE — 97535 SELF CARE MNGMENT TRAINING: CPT

## 2022-06-24 PROCEDURE — 65270000029 HC RM PRIVATE

## 2022-06-24 PROCEDURE — 74011000250 HC RX REV CODE- 250: Performed by: STUDENT IN AN ORGANIZED HEALTH CARE EDUCATION/TRAINING PROGRAM

## 2022-06-24 PROCEDURE — 74011636637 HC RX REV CODE- 636/637: Performed by: FAMILY MEDICINE

## 2022-06-24 PROCEDURE — 82962 GLUCOSE BLOOD TEST: CPT

## 2022-06-24 PROCEDURE — 74011250637 HC RX REV CODE- 250/637: Performed by: PHYSICIAN ASSISTANT

## 2022-06-24 PROCEDURE — 2709999900 HC NON-CHARGEABLE SUPPLY

## 2022-06-24 RX ORDER — INSULIN GLARGINE 100 [IU]/ML
15 INJECTION, SOLUTION SUBCUTANEOUS DAILY
Status: DISCONTINUED | OUTPATIENT
Start: 2022-06-25 | End: 2022-06-27

## 2022-06-24 RX ORDER — INSULIN GLARGINE 100 [IU]/ML
5 INJECTION, SOLUTION SUBCUTANEOUS ONCE
Status: COMPLETED | OUTPATIENT
Start: 2022-06-24 | End: 2022-06-24

## 2022-06-24 RX ADMIN — LISINOPRIL 2.5 MG: 2.5 TABLET ORAL at 09:12

## 2022-06-24 RX ADMIN — LEVOTHYROXINE SODIUM 112 MCG: 112 TABLET ORAL at 05:16

## 2022-06-24 RX ADMIN — CARVEDILOL 6.25 MG: 6.25 TABLET, FILM COATED ORAL at 17:07

## 2022-06-24 RX ADMIN — Medication 3 UNITS: at 11:56

## 2022-06-24 RX ADMIN — PANTOPRAZOLE SODIUM 40 MG: 40 TABLET, DELAYED RELEASE ORAL at 17:07

## 2022-06-24 RX ADMIN — SODIUM CHLORIDE, PRESERVATIVE FREE 10 ML: 5 INJECTION INTRAVENOUS at 14:49

## 2022-06-24 RX ADMIN — CARVEDILOL 6.25 MG: 6.25 TABLET, FILM COATED ORAL at 09:12

## 2022-06-24 RX ADMIN — ACETAMINOPHEN 650 MG: 325 TABLET ORAL at 05:18

## 2022-06-24 RX ADMIN — Medication 5 UNITS: at 11:57

## 2022-06-24 RX ADMIN — Medication 3 UNITS: at 08:11

## 2022-06-24 RX ADMIN — SODIUM CHLORIDE, PRESERVATIVE FREE 10 ML: 5 INJECTION INTRAVENOUS at 21:57

## 2022-06-24 RX ADMIN — ATORVASTATIN CALCIUM 40 MG: 40 TABLET, FILM COATED ORAL at 21:55

## 2022-06-24 RX ADMIN — Medication 10 UNITS: at 09:11

## 2022-06-24 RX ADMIN — ASPIRIN 81 MG: 81 TABLET, COATED ORAL at 09:12

## 2022-06-24 RX ADMIN — PANTOPRAZOLE SODIUM 40 MG: 40 TABLET, DELAYED RELEASE ORAL at 05:16

## 2022-06-24 NOTE — PROGRESS NOTES
Problem: Diabetes Self-Management  Goal: *Disease process and treatment process  Description: Define diabetes and identify own type of diabetes; list 3 options for treating diabetes. Outcome: Progressing Towards Goal  Goal: *Incorporating nutritional management into lifestyle  Description: Describe effect of type, amount and timing of food on blood glucose; list 3 methods for planning meals. Outcome: Progressing Towards Goal  Goal: *Incorporating physical activity into lifestyle  Description: State effect of exercise on blood glucose levels. Outcome: Progressing Towards Goal  Goal: *Developing strategies to promote health/change behavior  Description: Define the ABC's of diabetes; identify appropriate screenings, schedule and personal plan for screenings. Outcome: Progressing Towards Goal  Goal: *Using medications safely  Description: State effect of diabetes medications on diabetes; name diabetes medication taking, action and side effects. Outcome: Progressing Towards Goal  Goal: *Monitoring blood glucose, interpreting and using results  Description: Identify recommended blood glucose targets  and personal targets. Outcome: Progressing Towards Goal  Goal: *Prevention, detection, treatment of acute complications  Description: List symptoms of hyper- and hypoglycemia; describe how to treat low blood sugar and actions for lowering  high blood glucose level. Outcome: Progressing Towards Goal  Goal: *Prevention, detection and treatment of chronic complications  Description: Define the natural course of diabetes and describe the relationship of blood glucose levels to long term complications of diabetes.   Outcome: Progressing Towards Goal  Goal: *Developing strategies to address psychosocial issues  Description: Describe feelings about living with diabetes; identify support needed and support network  Outcome: Progressing Towards Goal  Goal: *Insulin pump training  Outcome: Progressing Towards Goal  Goal: *Sick day guidelines  Outcome: Progressing Towards Goal  Goal: *Patient Specific Goal (EDIT GOAL, INSERT TEXT)  Outcome: Progressing Towards Goal     Problem: Patient Education: Go to Patient Education Activity  Goal: Patient/Family Education  Outcome: Progressing Towards Goal     Problem: Falls - Risk of  Goal: *Absence of Falls  Description: Document [place] Fall Risk and appropriate interventions in the flowsheet.   Outcome: Progressing Towards Goal  Note: Fall Risk Interventions:  Mobility Interventions: Assess mobility with egress test,Patient to call before getting OOB    Mentation Interventions: Adequate sleep, hydration, pain control,More frequent rounding    Medication Interventions: Patient to call before getting OOB,Teach patient to arise slowly    Elimination Interventions: Bed/chair exit alarm,Call light in reach,Patient to call for help with toileting needs    History of Falls Interventions: Bed/chair exit alarm,Consult care management for discharge planning         Problem: Nutrition Deficit  Goal: *Optimize nutritional status  Outcome: Progressing Towards Goal     Problem: Patient Education: Go to Patient Education Activity  Goal: Patient/Family Education  Outcome: Progressing Towards Goal

## 2022-06-24 NOTE — DIABETES MGMT
Diabetes/ Glycemic Control Plan of Care    Elevated fasting BG values. POC BG 6/24/2022: 238, 239, 249, 257  POC BG 6/25/2022: 199, 179    PO intake: 26-50%    Recommendations:   1.) increase basal lantus insulin dose from 10 units to 15 units daily. Order obtained. 2.) cont current humalog insulin order: correctional    Assessment:   DX:   1. Hyperosmolar hyperglycemic state (HHS) ([facility]     2. Diabetic ketoacidosis with coma associated with type 2 diabetes mellitus ([facility]     3. High anion gap metabolic acidosis     4. Acute metabolic encephalopathy     5. Hypovolemic shock ([facility]     6. Acute hyperkalemia     7. Acute renal failure, unspecified acute renal failure type ([facility]     8. Elevated troponin     9. Cardiomyopathy, unspecified type ([facility]     10. [facility]     11. Cardiogenic shock (HCC)        Fasting/ Morning blood glucose:   Lab Results   Component Value Date/Time    Glucose 154 (H) 06/21/2022 01:56 AM    Glucose (POC) 179 (H) 06/24/2022 11:34 AM    Glucose (POC) 90 02/06/2019 02:13 PM    Glucose,  (H) 01/19/2021 09:26 PM     IV Fluids containing dextrose:  None    Steroids:  None    Blood glucose values: Within target range (70-180mg/dL):  No.    Current insulin orders:   Basal lantus insulin 15 units daily  Correctional lispro insulin. Very resistant dose    Total Daily Dose previous 24 hours: 37 units of insulin  Lantus: 10 units  Lispro: 27 units    Current A1c:   Lab Results   Component Value Date/Time    Hemoglobin A1c 9.0 (H) 06/11/2022 02:15 PM    Hemoglobin A1c, External 9.5 07/08/2021 12:00 AM      equivalent  to ave Blood Glucose of 212 mg/dl for 2-3 months prior to admission    Adequate glycemic control PTA:  No    Nutrition/Diet:   Active Orders   Diet    ADULT DIET Regular; 3 carb choices (45 gm/meal);  Low Fat/Low Chol/High Fiber/AMBROSIO; Low Sodium (2 gm)      Meal Intake:  Patient Vitals for the past 168 hrs:   % Diet Eaten   06/24/22 1412 26 - 50% 06/24/22 0911 26 - 50%   06/21/22 0853 26 - 50%   06/20/22 1217 0%   06/19/22 1726 0%   06/19/22 0851 1 - 25%   06/18/22 1600 26 - 50%   06/18/22 1211 51 - 75%   06/18/22 1029 26 - 50%   06/17/22 1700 1 - 25%     Supplement Intake:  No data found. Home diabetes medications:   Key Antihyperglycemic Medications             insulin lispro (HUMALOG) 100 unit/mL injection (Taking) For Blood Sugar (mg/dL) of:              Less than 150 =   0 units  150 -199 =   3 units  200 -249 =   6 units  250 -299 =   9 units  300 -349 =   12 units  350 and above =   15 units and Call Physician  Initiate Hypoglycemic protocol if blood glucose is <70 mg/dL. Fast Acting - Administer Immediately - or within 15 minutes of start of meal, if mealtime coverage. insulin glargine (LANTUS) 100 unit/mL injection (Taking) Inject 10 units SQ DAILY    metFORMIN ER (GLUCOPHAGE XR) 500 mg tablet (Taking) Take 500 mg by mouth daily (with dinner). insulin regular (NOVOLIN R, HUMULIN R) 100 unit/mL injection (Taking) by SubCUTAneous route Before breakfast, lunch, and dinner. Sliding scale          Plan/Goals:   Blood glucose will be within target of 70 - 180 mg/dl within 72 hours  Reinforce dietary and medication compliance at home.        Education:  [] Refer to Diabetes Education Record                       [] Education not indicated at this time     Kartik Moreno RN [facility]  Pager: [phone]

## 2022-06-24 NOTE — PROGRESS NOTES
Problem: Mobility Impaired (Adult and Pediatric)  Goal: *Acute Goals and Plan of Care (Insert Text)  Description: Physical Therapy Goals  Initiated 6/15/2022 and to be accomplished within 7 day(s)  1. Patient will move from supine to sit and sit to supine , scoot up and down, and roll side to side in bed with supervision/set-up. 2.  Patient will transfer from bed to chair and chair to bed with supervision/set-up using the least restrictive device. 3.  Patient will perform sit to stand with supervision/set-up. 4.  Patient will ambulate with supervision/set-up for 50 feet with the least restrictive device. 5.  Patient will ascend/descend 13 stairs with 1 handrail(s) with minimal assistance/contact guard assist.  6.  Patient will perform BLE therex as prescribed to tolerance     PLOF: Pt lives alone in an apt with 13 TOREY, has cane and walker for use if needed     Outcome: Progressing Towards Goal   PHYSICAL THERAPY TREATMENT    Patient: Lj Elena (58 y.o. female)  Date: 6/24/2022  Diagnosis: D[facility]di[facility]osis[facility] [E11.10] <principal problem not specified>  Procedure(s) (LRB):  ENDOSCOPY/ Polypectomy/ Biopsies/ Endo clips (N/A) 7 Days Post-Op  Precautions: Fall  PLOF: see above    ASSESSMENT:  Pt sitting up in recliner on arrival and motivated to participate in PT treatment session. Pt performed seated LE exercises as noted below. Pt stood from the recliner with min A and ambulated with RW and CGA 12 feet to the bathroom. Pt required mod A to stand from the commode due to the lower height. After toileting patient ambulated an additional 12 feet back to the recliner. Pt had no major loss of balance when ambulating however was visibly fatigued. Pt was left sitting up in the recliner with needs in reach and nursing notified.     Progression toward goals:   []      Improving appropriately and progressing toward goals  []      Improving slowly and progressing toward goals  []      Not making progress toward goals and plan of care will be adjusted     PLAN:  Patient continues to benefit from skilled intervention to address the above impairments. Continue treatment per established plan of care. Discharge Recommendations:  Rehab  Further Equipment Recommendations for Discharge:  N/A     SUBJECTIVE:   Patient stated Fabienne Mcclure will do anything to help myself get home.     OBJECTIVE DATA SUMMARY:   Critical Behavior:  Neurologic State: Alert  Orientation Level: Oriented X4  Cognition: Follows commands  Safety/Judgement: Fall prevention  Functional Mobility Training:  Transfers:  Sit to Stand: Moderate assistance (from commode, min A from recliner)  Stand to Sit: Minimum assistance     Balance:  Sitting: Intact  Standing: With support  Standing - Static: Good  Standing - Dynamic : Fair       Ambulation/Gait Training:  Distance (ft): 12 Feet (ft) (twice, to and from the bathroom)  Assistive Device: Walker, rolling  Ambulation - Level of Assistance: Contact guard assistance  Gait Abnormalities: Decreased step clearance  Speed/Una: Slow     Therapeutic Exercises:         EXERCISE   Sets   Reps   Active Active Assist   Passive Self ROM   Comments   Ankle Pumps 1 10  [x] [] [] []    Glut Sets 1 10  [x] [] [] [] Hold for 3 secs   Hamstring Sets   [] [] [] []    Short Arc Quads   [] [] [] []    Heel Slides   [] [] [] []    Straight Leg Raises   [] [] [] []    Hip Add   [] [] [] [] Hold for 5 secs, w/ pillow squeeze   Long Arc Quads 1 10 [x] [] [] []    Seated Marching 1 10 [x] [] [] []    Standing Marching   [] [] [] []    Sit to stand 1 4 [x] [] [] []        Pain:  Pain level pre-treatment: 0/10  Pain level post-treatment: 0/10   Pain Intervention(s): n/a    Activity Tolerance:   fair  Please refer to the flowsheet for vital signs taken during this treatment.   After treatment:   [x] Patient left in no apparent distress sitting up in chair  [] Patient left in no apparent distress in bed  [x] Call bell left within reach  [x] Nursing notified  [] Caregiver present  [] Bed alarm activated  [] SCDs applied      COMMUNICATION/EDUCATION:   [x]         Role of Physical Therapy in the acute care setting. [x]         Fall prevention education was provided and the patient/caregiver indicated understanding. [x]         Patient/family have participated as able in working toward goals and plan of care. [x]         Patient/family agree to work toward stated goals and plan of care. []         Patient understands intent and goals of therapy, but is neutral about his/her participation.   []         Patient is unable to participate in stated goals/plan of care: ongoing with therapy staff.  []         Other:        Lorna Nair, PT   Time Calculation: 23 mins

## 2022-06-24 NOTE — ROUTINE PROCESS
TRANSFER - IN REPORT:    Verbal report received from Army Goodwin RN(name) on Trey Elizabeth  being received from [address] [place] [address](unit) for routine progression of care      Report consisted of patients Situation, Background, Assessment and   Recommendations(SBAR). Information from the following report(s) SBAR, Kardex and Intake/Output was reviewed with the receiving nurse. Opportunity for questions and clarification was provided. Assessment will be completed upon patients arrival to unit and care assumed.

## 2022-06-24 NOTE — PROGRESS NOTES
Problem: Self Care Deficits Care Plan (Adult)  Goal: *Acute Goals and Plan of Care (Insert Text)  Description: Occupational Therapy Goals  Initiated 6/15/2022 within 7 day(s), re-evaluation 6/22/2022, pt has made progress towards goals, goal # 4 & 5 upgraded, goal #8 modified    1. Patient will perform upper body dressing with supervision/set-up. 2.  Patient will perform grooming sitting EOB with supervision/set-up with Good balance. 3.  Patient will perform bathing with supervision/set-up. 4.  Patient will perform toilet transfers with standby assist using RW. 5.  Patient will perform all aspects of toileting with standby assist.  6.  Patient will participate in upper extremity therapeutic exercise/activities with supervision/set-up for 8 minutes to improve endurance and UB strength needed for ADLs    7. Patient will utilize energy conservation techniques during functional activities with verbal cues. 8.  Patient will perform lower body dressing with minimal assistance using adaptive equipment. Prior Level of Function: Pt reports she lives alone and is usually independent with ADLs and functional mobility using cane mostly. Pt lives in 2nd floor apartment  Outcome: Progressing Towards Goal   OCCUPATIONAL THERAPY TREATMENT    Patient: Lincoln Rodney (89 y.o. female)  Date: 6/24/2022  Diagnosis: DKA [facility]diabetic [facility]acidosis[facility] [E11.10] <principal problem not specified>  Procedure(s) (LRB):  ENDOSCOPY/ Polypectomy/ Biopsies/ Endo clips (N/A) 7 Days Post-Op  Precautions: Fall  PLOF: Pt reports she lives alone and is usually independent with ADLs and functional mobility using cane mostly. Pt lives in 2nd floor apartment    Chart, occupational therapy assessment, plan of care, and goals were reviewed. ASSESSMENT:  Pt presented supine in bed upon therapist arrival and agreeable to work with OT. Reviewed energy conservation techniques w/ADLs, and safety w/RW and functional transfers/mobility.  She came to EOB SBA in prep for functional task. STS transfer MIN A with RW and maneuvered to reclining chair ~ 8 ft CGA, simulating BSC transfer. Pt educated on benefits of performing PLB technique to prevent SOB and for optimal oxygenation. She was left sitting upright in chair at end of session with B LE's elevated and all needs left within reach. RN made aware of pt's participation and position. Progression toward goals:  []          Improving appropriately and progressing toward goals  [x]          Improving slowly and progressing toward goals  []          Not making progress toward goals and plan of care will be adjusted     PLAN:  Patient continues to benefit from skilled intervention to address the above impairments. Continue treatment per established plan of care. Discharge Recommendations: Rehab   Further Equipment Recommendations for Discharge:  N/A     SUBJECTIVE:   Patient stated  I will sit up now. \"     OBJECTIVE DATA SUMMARY:   Cognitive/Behavioral Status:  Neurologic State: Alert  Orientation Level: Oriented X4  Cognition: Follows commands  Safety/Judgement: Fall prevention    Functional Mobility and Transfers for ADLs:   Bed Mobility:     Supine to Sit: Stand-by assistance     Scooting: Stand-by assistance   Transfers:  Sit to Stand: Minimum assistance  Stand to Sit: Contact guard assistance       Balance:  Sitting: Intact  Standing: With support  Standing - Static: Good  Standing - Dynamic : Fair      Pain:  Pain level pre-treatment: 0/10   Pain level post-treatment: 0/10    Activity Tolerance:    Fair   Please refer to the flowsheet for vital signs taken during this treatment.   After treatment:   [x]  Patient left in no apparent distress sitting up in chair  []  Patient left in no apparent distress in bed  [x]  Call bell left within reach  [x]  Nursing notified  []  Caregiver present  []  Bed alarm activated    COMMUNICATION/EDUCATION:   [x] Role of Occupational Therapy in the acute care setting  [] Home safety education was provided and the patient/caregiver indicated understanding. [x] Patient/family have participated as able in working towards goals and plan of care. [x] Patient/family agree to work toward stated goals and plan of care. [] Patient understands intent and goals of therapy, but is neutral about his/her participation. [] Patient is unable to participate in goal setting and plan of care.       Thank you for this referral.  ASIF Cannon  Time Calculation: 23 mins

## 2022-06-24 NOTE — ROUTINE PROCESS
TRANSFER - OUT REPORT:    Verbal report given to Sandy RN (name) on Bita Pickett  being transferred to  (unit) for routine progression of care       Report consisted of patients Situation, Background, Assessment and   Recommendations(SBAR). Information from the following report(s) SBAR, Kardex and ED Summary was reviewed with the receiving nurse. Lines:   Peripheral IV 06/11/22 Right Antecubital (Active)   Site Assessment Clean, dry, & intact 06/23/22 2315   Phlebitis Assessment 0 06/23/22 2315   Infiltration Assessment 0 06/23/22 2315   Dressing Status Clean, dry, & intact 06/23/22 2315   Dressing Type Transparent 06/23/22 2315   Hub Color/Line Status Capped 06/23/22 2315   Action Taken Open ports on tubing capped 06/23/22 2315   Alcohol Cap Used Yes 06/23/22 2315       Peripheral IV 06/16/22 Right Forearm (Active)   Site Assessment Clean, dry, & intact 06/23/22 2315   Phlebitis Assessment 0 06/23/22 2315   Infiltration Assessment 0 06/23/22 2315   Dressing Status Clean, dry, & intact 06/23/22 2315   Dressing Type Transparent 06/23/22 2315   Hub Color/Line Status Capped 06/23/22 2315   Action Taken Open ports on tubing capped 06/23/22 2315   Alcohol Cap Used Yes 06/23/22 2315        Opportunity for questions and clarification was provided.       Patient transported with:   [employer]

## 2022-06-24 NOTE — PROGRESS NOTES
Discharge planning    Received call from German Jean-Baptisteing, daughter, concerning discharge planning. If peer to peer is denied for ARU, Mrs. Amandeep Schaffer wants SNF placement at [facility]. [place] of choice obtained for [facility] and placed in cc/link. CM will continue to assist with transition of care needs.      NAFISA Crockett, RN  Pager # [phone]  Care Manager

## 2022-06-24 NOTE — ROUTINE PROCESS
1900: Bedside and Verbal shift change report given to Ricardo Swan RN (oncoming nurse) by Kun Rowe RN (offgoing nurse). Report included the following information SBAR, Kardex and ED Summary. 2125: Patient's daughter,  informed that she will be transferred to a different unit. Patient will be going to room 452.    2345: Report called to [facility] on 4N. Transport orders placed. 0205: Transport at bedside to transport patient.

## 2022-06-24 NOTE — PROGRESS NOTES
Called patient's insurance company at number provided to request P2P. VM left as instructed. Will await return call.

## 2022-06-25 LAB
GLUCOSE BLD STRIP.AUTO-MCNC: 186 MG/DL (ref 70–110)
GLUCOSE BLD STRIP.AUTO-MCNC: 194 MG/DL (ref 70–110)
GLUCOSE BLD STRIP.AUTO-MCNC: 212 MG/DL (ref 70–110)
GLUCOSE BLD STRIP.AUTO-MCNC: 52 MG/DL (ref 70–110)
GLUCOSE BLD STRIP.AUTO-MCNC: 53 MG/DL (ref 70–110)
GLUCOSE BLD STRIP.AUTO-MCNC: 56 MG/DL (ref 70–110)
GLUCOSE BLD STRIP.AUTO-MCNC: 56 MG/DL (ref 70–110)
GLUCOSE BLD STRIP.AUTO-MCNC: 93 MG/DL (ref 70–110)
PHOSPHATE SERPL-MCNC: 3.2 MG/DL (ref 2.5–4.9)

## 2022-06-25 PROCEDURE — 84100 ASSAY OF PHOSPHORUS: CPT

## 2022-06-25 PROCEDURE — 74011636637 HC RX REV CODE- 636/637: Performed by: INTERNAL MEDICINE

## 2022-06-25 PROCEDURE — 74011000250 HC RX REV CODE- 250: Performed by: STUDENT IN AN ORGANIZED HEALTH CARE EDUCATION/TRAINING PROGRAM

## 2022-06-25 PROCEDURE — 65270000029 HC RM PRIVATE

## 2022-06-25 PROCEDURE — 36415 COLL VENOUS BLD VENIPUNCTURE: CPT

## 2022-06-25 PROCEDURE — 2709999900 HC NON-CHARGEABLE SUPPLY

## 2022-06-25 PROCEDURE — 74011250637 HC RX REV CODE- 250/637: Performed by: PHYSICIAN ASSISTANT

## 2022-06-25 PROCEDURE — 82962 GLUCOSE BLOOD TEST: CPT

## 2022-06-25 PROCEDURE — 99232 SBSQ HOSP IP/OBS MODERATE 35: CPT | Performed by: FAMILY MEDICINE

## 2022-06-25 PROCEDURE — 74011250637 HC RX REV CODE- 250/637: Performed by: INTERNAL MEDICINE

## 2022-06-25 PROCEDURE — 74011250637 HC RX REV CODE- 250/637: Performed by: NURSE PRACTITIONER

## 2022-06-25 PROCEDURE — 74011250637 HC RX REV CODE- 250/637: Performed by: STUDENT IN AN ORGANIZED HEALTH CARE EDUCATION/TRAINING PROGRAM

## 2022-06-25 PROCEDURE — 74011636637 HC RX REV CODE- 636/637: Performed by: FAMILY MEDICINE

## 2022-06-25 RX ADMIN — LEVOTHYROXINE SODIUM 112 MCG: 112 TABLET ORAL at 06:22

## 2022-06-25 RX ADMIN — SODIUM CHLORIDE, PRESERVATIVE FREE 10 ML: 5 INJECTION INTRAVENOUS at 14:07

## 2022-06-25 RX ADMIN — SODIUM CHLORIDE, PRESERVATIVE FREE 10 ML: 5 INJECTION INTRAVENOUS at 06:24

## 2022-06-25 RX ADMIN — CARVEDILOL 6.25 MG: 6.25 TABLET, FILM COATED ORAL at 16:44

## 2022-06-25 RX ADMIN — Medication 3 UNITS: at 12:26

## 2022-06-25 RX ADMIN — Medication 6 UNITS: at 08:39

## 2022-06-25 RX ADMIN — ACETAMINOPHEN 650 MG: 325 TABLET ORAL at 22:37

## 2022-06-25 RX ADMIN — PANTOPRAZOLE SODIUM 40 MG: 40 TABLET, DELAYED RELEASE ORAL at 16:45

## 2022-06-25 RX ADMIN — ASPIRIN 81 MG: 81 TABLET, COATED ORAL at 08:39

## 2022-06-25 RX ADMIN — LISINOPRIL 2.5 MG: 2.5 TABLET ORAL at 08:39

## 2022-06-25 RX ADMIN — DOCUSATE SODIUM 100 MG: 100 CAPSULE, LIQUID FILLED ORAL at 08:39

## 2022-06-25 RX ADMIN — POLYETHYLENE GLYCOL 3350 17 G: 17 POWDER, FOR SOLUTION ORAL at 08:39

## 2022-06-25 RX ADMIN — CARVEDILOL 6.25 MG: 6.25 TABLET, FILM COATED ORAL at 08:39

## 2022-06-25 RX ADMIN — PANTOPRAZOLE SODIUM 40 MG: 40 TABLET, DELAYED RELEASE ORAL at 08:39

## 2022-06-25 RX ADMIN — Medication 15 UNITS: at 08:40

## 2022-06-25 RX ADMIN — ACETAMINOPHEN 650 MG: 325 TABLET ORAL at 08:51

## 2022-06-25 RX ADMIN — SODIUM CHLORIDE, PRESERVATIVE FREE 10 ML: 5 INJECTION INTRAVENOUS at 22:35

## 2022-06-25 RX ADMIN — ATORVASTATIN CALCIUM 40 MG: 40 TABLET, FILM COATED ORAL at 22:33

## 2022-06-25 NOTE — PROGRESS NOTES
Wound Prevention Checklist    Patient: Ara Burkitt (1 y.o. female)  Date: 6/24/2022  Diagnosis: [facility] [E11.10] <principal problem not specified>    Precautions: Fall       []  Heel prevention boots placed on patient    []  Patient turned q2h during shift    []  Lift team ordered    [x]  Patient on Diane bed/Specialty bed    [x]  Each Wound is documented during shift (Stage, Color, drainage, odor, measurements, and dressings)    [x]  Dual skin checks done at bedside during shift report with Uriah Seay RN

## 2022-06-25 NOTE — ROUTINE PROCESS
Wound Prevention Checklist    Patient: Charbel Mathews (21 y.o. female)  Date: 6/25/2022  Diagnosis: [facility] [E11.10] <principal problem not specified>    Precautions: Fall       []  Heel prevention boots placed on patient    []  Patient turned q2h during shift    []  Lift team ordered    [x]  Patient on Diane bed/Specialty bed    [x]  Each Wound is documented during shift (Stage, Color, drainage, odor, measurements, and dressings)    [x]  Dual skin checks done at bedside during shift report with Seamus Christian RN

## 2022-06-25 NOTE — ROUTINE PROCESS
Bedside and Verbal shift change report given to GIOVANNA Chavez (oncoming nurse) by Juan Miguel Petersen RN (offgoing nurse). Report included the following information SBAR, Kardex, MAR and Recent Results.     SITUATION:  Code Status: DNR  Reason for Admission: DKA (diabetic ketoacidosis) ([facility] [E11.10]  Hospital day: 14  Problem List:       Hospital Problems  Date Reviewed: 6/12/2022          Codes Class Noted POA    Elevated troponin ICD-10-CM: R77.8  ICD-9-CM: 790.6  6/12/2022 Yes        Primary hypertension ICD-10-CM: I10  ICD-9-CM: 401.9  6/12/2022 Unknown        Acquired hypothyroidism ICD-10-CM: E03.9  ICD-9-CM: 244.9  6/12/2022 Unknown        History of colon cancer ICD-10-CM: Z85.038  ICD-9-CM: V10.05  6/12/2022 Unknown        BRENDA (acute kidney injury) [facility]) ICD-10-CM: N17.9  ICD-9-CM: 584.9  6/12/2022 Yes        DKA (diabetic ketoacidosis) ([facility] ICD-10-CM: E11.10  ICD-9-CM: 250.12  6/11/2022 Yes        Hyperkalemia ICD-10-CM: E87.5  ICD-9-CM: 276.7  1/19/2021 Yes              BACKGROUND:   Past Medical History:   Past Medical History:   Diagnosis Date    [facility]     Diabetes ([facility]     Hypertension     Hypothyroidism       Patient taking anticoagulants no    Patient has a defibrillator: no    History of shots YES for example, flu, pneumonia, tetanus   Isolation History NO for example, MRSA, CDiff    ASSESSMENT:  Changes in Assessment Throughout Shift: NONE  Significant Changes in 24 hours (for example, RR/code, fall)  Patient has Central Line: no   Patient has Chandler Cath: no   Mobility Issues  PT  IV Patency  OR Checklist  Pending Tests    Last Vitals:  Vitals w/ MEWS Score (last day)     Date/Time MEWS Score Pulse Resp Temp BP Level of Consciousness SpO2    06/25/22 0408 1 82 20 97.5 °F (36.4 °C) 144/67 Alert (0) 95 %    06/25/22 0030 1 95 18 97.8 °F (36.6 °C) 135/67 Alert (0) 97 %    06/24/22 2042 1 88 20 97.8 °F (36.6 °C) 136/76 Alert (0) 96 %    06/24/22 1707 1 82 18 98.2 °F (36.8 °C) 143/80 Alert (0) 96 %    06/24/22 1136 1 73 18 97.4 °F (36.3 °C) 152/73 Alert (0) 97 %    06/24/22 0750 1 81 18 98.3 °F (36.8 °C) 156/75 Alert (0) 97 %    06/24/22 0300 1 83 18 98.4 °F (36.9 °C) 151/72 Alert (0) 93 %    06/24/22 0005 1 71 20 98.3 °F (36.8 °C) 126/76 Alert (0) 96 %        PAIN    Pain Assessment    Pain Intensity 1: 0 (06/25/22 0408)    Pain Location 1: Head    Pain Intervention(s) 1: Environmental changes    Patient Stated Pain Goal: 0  Intervention effective: N/A  Time of last intervention: N/A Reassessment Completed: yes   Other actions taken for pain: Distraction    Last 3 Weights:  Last 3 Recorded Weights in this Encounter    06/11/22 1415 06/12/22 1218   Weight: 82.1 kg (181 lb) 82.1 kg (181 lb)   Weight change:     INTAKE/OUPUT    Current Shift: No intake/output data recorded. Last three shifts: 06/23 1901 - 06/25 0700  In: 65 [P.O.:660]  Out: -     RECOMMENDATIONS AND DISCHARGE PLANNING  Patient needs and requests: Assistance with ADL's    Pending tests/procedures: labs     Discharge plan for patient: Home    Discharge planning Needs or Barriers: None    Estimated Discharge Date: 6/27/2022 Posted on Whiteboard in Patients Room: yes       \"HEALS\" SAFETY CHECK  A safety check occurred in the patient's room between off going nurse and oncoming nurse listed above. The safety check included the below items:    H  High Alert Medications Verify all high alert medication drips (heparin, PCA, etc.)  E  Equipment Suction is set up for ALL patients (with yanker)  Red plugs utilized for all equipment (IV pumps, etc.)  WOWs wiped down at end of shift.   Room stocked with oxygen, suction, and other unit-specific supplies  A  Alarms Bed alarm is set for fall risk patients  Ensure chair alarm is in place and activated if patient is up in a chair  L  Lines Check IV for any infiltration  Chandler bag is empty if patient has a Chandler   Tubing and IV bags are labeled  S  Safety  Room is clean, patient is clean, and equipment is clean. Hallways are clear from equipment besides carts. Fall bracelet on for fall risk patients  Ensure room is clear and free of clutter  Suction is set up for ALL patients (with osiel)  Hallways are clear from equipment besides carts.    Isolation precautions followed, supplies available outside room, sign posted    [place], GIOVANNA

## 2022-06-25 NOTE — PROGRESS NOTES
Patient had a hypoglycemic episode this afternoon. A point of care blood glucose was checked to be  53, and  rechecked to be 56. Patient was alert and oriented  with no noted or stated discomfort. 4 ounces of fruit juice was given and BS rechecked in 15 minutes with no change. Another 4 ounces of fruit  juice was given and BS rechecked to be 93. Anahi Blew was served at this time and patient  ate 100%. Dr Anastacio Cain was notified. Will continue to monitor.

## 2022-06-25 NOTE — ROUTINE PROCESS
Wound Prevention Checklist    Patient: Daxa Ballard (16 y.o. female)  Date: 6/25/2022  Diagnosis: DKA [facility]diabetic [facility]acidosis[facility] [E11.10] <principal problem not specified>    Precautions: Fall       []  Heel prevention boots placed on patient    []  Patient turned q2h during shift    []  Lift team ordered    [x]  Patient on Diane bed/Specialty bed    [x]  Each Wound is documented during shift (Stage, Color, drainage, odor, measurements, and dressings)    [x]  Dual skin checks done at bedside during shift report with GIOVANNA Chavez RN

## 2022-06-25 NOTE — PROGRESS NOTES
Problem: Diabetes Self-Management  Goal: *Disease process and treatment process  Description: Define diabetes and identify own type of diabetes; list 3 options for treating diabetes. Outcome: Progressing Towards Goal  Goal: *Incorporating nutritional management into lifestyle  Description: Describe effect of type, amount and timing of food on blood glucose; list 3 methods for planning meals. Outcome: Progressing Towards Goal  Goal: *Incorporating physical activity into lifestyle  Description: State effect of exercise on blood glucose levels. Outcome: Progressing Towards Goal  Goal: *Developing strategies to promote health/change behavior  Description: Define the ABC's of diabetes; identify appropriate screenings, schedule and personal plan for screenings. Outcome: Progressing Towards Goal  Goal: *Using medications safely  Description: State effect of diabetes medications on diabetes; name diabetes medication taking, action and side effects. Outcome: Progressing Towards Goal  Goal: *Monitoring blood glucose, interpreting and using results  Description: Identify recommended blood glucose targets  and personal targets. Outcome: Progressing Towards Goal  Goal: *Prevention, detection, treatment of acute complications  Description: List symptoms of hyper- and hypoglycemia; describe how to treat low blood sugar and actions for lowering  high blood glucose level. Outcome: Progressing Towards Goal  Goal: *Prevention, detection and treatment of chronic complications  Description: Define the natural course of diabetes and describe the relationship of blood glucose levels to long term complications of diabetes.   Outcome: Progressing Towards Goal  Goal: *Developing strategies to address psychosocial issues  Description: Describe feelings about living with diabetes; identify support needed and support network  Outcome: Progressing Towards Goal  Goal: *Sick day guidelines  Outcome: Progressing Towards Goal  Goal: *Patient Specific Goal (EDIT GOAL, INSERT TEXT)  Outcome: Progressing Towards Goal     Problem: Patient Education: Go to Patient Education Activity  Goal: Patient/Family Education  Outcome: Progressing Towards Goal     Problem: Falls - Risk of  Goal: *Absence of Falls  Description: Document Nikhil Dumont Fall Risk and appropriate interventions in the flowsheet. Outcome: Progressing Towards Goal  Note: Fall Risk Interventions:  Mobility Interventions: Assess mobility with egress test,Patient to call before getting OOB    Mentation Interventions: Adequate sleep, hydration, pain control,More frequent rounding    Medication Interventions: Evaluate medications/consider consulting pharmacy,Patient to call before getting OOB,Teach patient to arise slowly    Elimination Interventions: Bed/chair exit alarm,Elevated toilet seat,Patient to call for help with toileting needs    History of Falls Interventions: Bed/chair exit alarm         Problem: Patient Education: Go to Patient Education Activity  Goal: Patient/Family Education  Outcome: Progressing Towards Goal     Problem: Nutrition Deficit  Goal: *Optimize nutritional status  Outcome: Progressing Towards Goal     Problem: Patient Education: Go to Patient Education Activity  Goal: Patient/Family Education  Outcome: Progressing Towards Goal     Problem: Patient Education: Go to Patient Education Activity  Goal: Patient/Family Education  Outcome: Progressing Towards Goal     Problem: Patient Education: Go to Patient Education Activity  Goal: Patient/Family Education  Outcome: Progressing Towards Goal     Problem: Pressure Injury - Risk of  Goal: *Prevention of pressure injury  Description: Document Sid Scale and appropriate interventions in the flowsheet.   Outcome: Progressing Towards Goal     Problem: Patient Education: Go to Patient Education Activity  Goal: Patient/Family Education  Outcome: Progressing Towards Goal     Problem: Pain  Goal: *Control of Pain  Outcome: Progressing Towards Goal     Problem: Patient Education: Go to Patient Education Activity  Goal: Patient/Family Education  Outcome: Progressing Towards Goal

## 2022-06-25 NOTE — ROUTINE PROCESS
Bedside shift change report given to Ruddy Day RN (oncoming nurse) by Connie Cardona RN (offgoing nurse). Report included the following information SBAR, Kardex, Intake/Output and MAR.

## 2022-06-25 NOTE — PROGRESS NOTES
[facility]  Progress Note    Patient: Jameson Fulton Age: 66 y.o. : 1950 MR#: [id number] SSN: [id number]  Date: 2022     Subjective/24-hour events:     Sitting in chair at bedside, no new complaints. Assessment:   NSTEMI  DM2 with hyperglycemia  Aspiration pneumonia  GI bleed  Hypothyroidism  Acute blood loss anemia status post PRBC transfusion  Chronic systolic CHF, EF 30 to 93% BRENDA, resolved  Acute hypoxic respiratory failure, resolved  Sepsis, secondary to above, resolved  Shock, secondary to sepsis and volume depletion, resolved  DKA resolved      Plan:   Patient denied by insurance for inpatient rehab placement. Will call insurance company to request peer to peer. Patient updated on current events with regard to disposition, questions answered. Continue supportive medical management as ordered in interim. Patient remains medically stable for discharge once placement arranged. Case discussed with:  [x]Patient  [x]Family  [x]Nursing  [x]Case Management  DVT Prophylaxis:  []Lovenox  []Hep SQ  [x]SCDs  []Coumadin   []On Heparin gtt    Objective:   VS:   Visit Vitals  BP (!) 156/75   Pulse 88   Temp 97.4 °F (36.3 °C)   Resp 18   Ht 5' 6\" (1.676 m)   Wt 82.1 kg (181 lb)   SpO2 97%   BMI 29.21 kg/m²        General:  In NAD. Nontoxic-appearing. Cardiovascular:  RRR. Pulmonary:  Lungs clear bilaterally, no wheezes. No accessory muscle use. GI:  Abdomen soft, NTTP. Extremities:  Warm, no edema or ischemia. Neuro:  Awake and alert. Moves extremities spontaneously.       Signed By: Idania Mazariegos MD     2022

## 2022-06-25 NOTE — ROUTINE PROCESS
Bedside shift change report given to Jay Schaefer RN (oncoming nurse) by Kvaya Goff RN (offgoing nurse). Report included the following information SBAR, Kardex, Intake/Output and MAR.

## 2022-06-25 NOTE — PROGRESS NOTES
[facility]  Progress Note    Patient: Cornelio Holden Age: 66 y.o. : 1950 MR#: [id number] SSN: [id number]  Date: 2022     Subjective/24-hour events:     Remains stable, nothing new or acute overnight. Daughter present at bedside. Assessment:   NSTEMI  DM2 with hyperglycemia  Aspiration pneumonia  GI bleed  Hypothyroidism  Acute blood loss anemia status post PRBC transfusion  Chronic systolic CHF, EF 30 to 54% BRENDA, resolved  Acute hypoxic respiratory failure, resolved  Sepsis, secondary to above, resolved  Shock, secondary to sepsis and volume depletion, resolved  DKA resolved      Plan:   Call made to insurance company yesterday afternoon to request [id number] but no callback received as of yet. Anticipate that this will not be able to be done until Monday as we are now into the weekend. Have already alerted case management of need to investigate SNF placement as I suspect that denial for inpatient rehab placement will be upheld. Patient and family also aware. They have already made at least 1 request for skilled nursing placement and case management has been notified. We will continue to work on disposition which will hopefully be arranged by Monday. I updated patient's brother, Geno Oates, by phone late yesterday evening per her request.  Discussed with patient and daughter today at bedside.     Case discussed with:  [x]Patient  []Family  [x]Nursing  [x]Case Management  DVT Prophylaxis:  []Lovenox  []Hep SQ  [x]SCDs  []Coumadin   []On Heparin gtt    Objective:   VS:   Visit Vitals  BP (!) 169/80   Pulse 88   Temp 98.3 °F (36.8 °C)   Resp 20   Ht 5' 6\" (1.676 m)   Wt 82.1 kg (181 lb)   SpO2 93%   BMI 29.21 kg/m²      Tmax/24hrs: Temp (24hrs), Av.8 °F (36.6 °C), Min:97.4 °F (36.3 °C), Max:98.3 °F (36.8 °C)      Intake/Output Summary (Last 24 hours) at 2022 1030  Last data filed at 2022 0624  Gross per 24 hour   Intake 540 ml   Output 300 ml   Net 240 ml       General:  In NAD. Nontoxic-appearing. Cardiovascular:  RRR. Pulmonary:  Lungs clear bilaterally, no wheezes. No accessory muscle use. GI:  Abdomen soft, NTTP. Extremities:  Warm, no edema or ischemia. Neuro:  Awake and alert. Moves extremities spontaneously.     Labs:    Recent Results (from the past 24 hour(s))   GLUCOSE, POC    Collection Time: 06/24/22 11:34 AM   Result Value Ref Range    Glucose (POC) 179 (H) 70 - 110 mg/dL   GLUCOSE, POC    Collection Time: 06/24/22  5:11 PM   Result Value Ref Range    Glucose (POC) 96 70 - 110 mg/dL   GLUCOSE, POC    Collection Time: 06/24/22  9:55 PM   Result Value Ref Range    Glucose (POC) 175 (H) 70 - 110 mg/dL   PHOSPHORUS    Collection Time: 06/25/22 12:46 AM   Result Value Ref Range    Phosphorus 3.2 2.5 - 4.9 MG/DL   GLUCOSE, POC    Collection Time: 06/25/22  8:10 AM   Result Value Ref Range    Glucose (POC) 212 (H) 70 - 110 mg/dL       Signed By: Ronald Villa MD     June 25, 2022

## 2022-06-25 NOTE — PROGRESS NOTES
Problem: Diabetes Self-Management  Goal: *Disease process and treatment process  Description: Define diabetes and identify own type of diabetes; list 3 options for treating diabetes. Outcome: Progressing Towards Goal  Goal: *Incorporating nutritional management into lifestyle  Description: Describe effect of type, amount and timing of food on blood glucose; list 3 methods for planning meals. Outcome: Progressing Towards Goal  Goal: *Incorporating physical activity into lifestyle  Description: State effect of exercise on blood glucose levels. Outcome: Progressing Towards Goal  Goal: *Developing strategies to promote health/change behavior  Description: Define the ABC's of diabetes; identify appropriate screenings, schedule and personal plan for screenings. Outcome: Progressing Towards Goal  Goal: *Using medications safely  Description: State effect of diabetes medications on diabetes; name diabetes medication taking, action and side effects. Outcome: Progressing Towards Goal  Goal: *Monitoring blood glucose, interpreting and using results  Description: Identify recommended blood glucose targets  and personal targets. Outcome: Progressing Towards Goal  Goal: *Prevention, detection, treatment of acute complications  Description: List symptoms of hyper- and hypoglycemia; describe how to treat low blood sugar and actions for lowering  high blood glucose level. Outcome: Progressing Towards Goal  Goal: *Prevention, detection and treatment of chronic complications  Description: Define the natural course of diabetes and describe the relationship of blood glucose levels to long term complications of diabetes.   Outcome: Progressing Towards Goal  Goal: *Developing strategies to address psychosocial issues  Description: Describe feelings about living with diabetes; identify support needed and support network  Outcome: Progressing Towards Goal  Goal: *Sick day guidelines  Outcome: Progressing Towards Goal  Goal: *Patient Specific Goal (EDIT GOAL, INSERT TEXT)  Outcome: Progressing Towards Goal     Problem: Patient Education: Go to Patient Education Activity  Goal: Patient/Family Education  Outcome: Progressing Towards Goal     Problem: Falls - Risk of  Goal: *Absence of Falls  Description: Document Sanjiv Moreno Fall Risk and appropriate interventions in the flowsheet.   Outcome: Progressing Towards Goal  Note: Fall Risk Interventions:  Mobility Interventions: Assess mobility with egress test,Patient to call before getting OOB    Mentation Interventions: Adequate sleep, hydration, pain control,More frequent rounding    Medication Interventions: Evaluate medications/consider consulting pharmacy,Patient to call before getting OOB,Teach patient to arise slowly    Elimination Interventions: Bed/chair exit alarm,Elevated toilet seat,Patient to call for help with toileting needs    History of Falls Interventions: Bed/chair exit alarm         Problem: Nutrition Deficit  Goal: *Optimize nutritional status  Outcome: Progressing Towards Goal     Problem: Patient Education: Go to Patient Education Activity  Goal: Patient/Family Education  Outcome: Progressing Towards Goal     Problem: Patient Education: Go to Patient Education Activity  Goal: Patient/Family Education  Outcome: Progressing Towards Goal     Problem: Patient Education: Go to Patient Education Activity  Goal: Patient/Family Education  Outcome: Progressing Towards Goal     Problem: Pain  Goal: *Control of Pain  Outcome: Progressing Towards Goal

## 2022-06-26 LAB
ANION GAP SERPL CALC-SCNC: 4 MMOL/L (ref 3–18)
BUN SERPL-MCNC: 11 MG/DL (ref 7–18)
BUN/CREAT SERPL: 11 (ref 12–20)
CA-I SERPL-SCNC: 1.17 MMOL/L (ref 1.15–1.33)
CALCIUM SERPL-MCNC: 8.2 MG/DL (ref 8.5–10.1)
CHLORIDE SERPL-SCNC: 108 MMOL/L (ref 100–111)
CO2 SERPL-SCNC: 27 MMOL/L (ref 21–32)
CREAT SERPL-MCNC: 1.02 MG/DL (ref 0.6–1.3)
ERYTHROCYTE [DISTWIDTH] IN BLOOD BY AUTOMATED COUNT: 15.1 % (ref 11.6–14.5)
GLUCOSE BLD STRIP.AUTO-MCNC: 186 MG/DL (ref 70–110)
GLUCOSE BLD STRIP.AUTO-MCNC: 186 MG/DL (ref 70–110)
GLUCOSE BLD STRIP.AUTO-MCNC: 226 MG/DL (ref 70–110)
GLUCOSE BLD STRIP.AUTO-MCNC: 252 MG/DL (ref 70–110)
GLUCOSE BLD STRIP.AUTO-MCNC: 333 MG/DL (ref 70–110)
GLUCOSE BLD STRIP.AUTO-MCNC: 347 MG/DL (ref 70–110)
GLUCOSE SERPL-MCNC: 165 MG/DL (ref 74–99)
HCT VFR BLD AUTO: 22.7 % (ref 35–45)
HGB BLD-MCNC: 7.1 G/DL (ref 12–16)
MCH RBC QN AUTO: 26.3 PG (ref 24–34)
MCHC RBC AUTO-ENTMCNC: 31.3 G/DL (ref 31–37)
MCV RBC AUTO: 84.1 FL (ref 78–100)
NRBC # BLD: 0 K/UL (ref 0–0.01)
NRBC BLD-RTO: 0 PER 100 WBC
PHOSPHATE SERPL-MCNC: 3 MG/DL (ref 2.5–4.9)
PLATELET # BLD AUTO: 311 K/UL (ref 135–420)
PMV BLD AUTO: 9.9 FL (ref 9.2–11.8)
POTASSIUM SERPL-SCNC: 4.8 MMOL/L (ref 3.5–5.5)
RBC # BLD AUTO: 2.7 M/UL (ref 4.2–5.3)
SODIUM SERPL-SCNC: 139 MMOL/L (ref 136–145)
WBC # BLD AUTO: 4.8 K/UL (ref 4.6–13.2)

## 2022-06-26 PROCEDURE — 85027 COMPLETE CBC AUTOMATED: CPT

## 2022-06-26 PROCEDURE — 74011250637 HC RX REV CODE- 250/637: Performed by: STUDENT IN AN ORGANIZED HEALTH CARE EDUCATION/TRAINING PROGRAM

## 2022-06-26 PROCEDURE — 77030040392 HC DRSG OPTIFOAM MDII -A

## 2022-06-26 PROCEDURE — 36415 COLL VENOUS BLD VENIPUNCTURE: CPT

## 2022-06-26 PROCEDURE — 65270000029 HC RM PRIVATE

## 2022-06-26 PROCEDURE — 74011636637 HC RX REV CODE- 636/637: Performed by: INTERNAL MEDICINE

## 2022-06-26 PROCEDURE — 74011250637 HC RX REV CODE- 250/637: Performed by: PHYSICIAN ASSISTANT

## 2022-06-26 PROCEDURE — 77030038269 HC DRN EXT URIN PURWCK BARD -A

## 2022-06-26 PROCEDURE — 84100 ASSAY OF PHOSPHORUS: CPT

## 2022-06-26 PROCEDURE — 74011250637 HC RX REV CODE- 250/637: Performed by: INTERNAL MEDICINE

## 2022-06-26 PROCEDURE — 80048 BASIC METABOLIC PNL TOTAL CA: CPT

## 2022-06-26 PROCEDURE — 82962 GLUCOSE BLOOD TEST: CPT

## 2022-06-26 PROCEDURE — 99232 SBSQ HOSP IP/OBS MODERATE 35: CPT | Performed by: FAMILY MEDICINE

## 2022-06-26 PROCEDURE — 2709999900 HC NON-CHARGEABLE SUPPLY

## 2022-06-26 PROCEDURE — 74011000250 HC RX REV CODE- 250: Performed by: STUDENT IN AN ORGANIZED HEALTH CARE EDUCATION/TRAINING PROGRAM

## 2022-06-26 PROCEDURE — 74011636637 HC RX REV CODE- 636/637: Performed by: FAMILY MEDICINE

## 2022-06-26 PROCEDURE — 82330 ASSAY OF CALCIUM: CPT

## 2022-06-26 PROCEDURE — 74011250637 HC RX REV CODE- 250/637: Performed by: NURSE PRACTITIONER

## 2022-06-26 RX ADMIN — LEVOTHYROXINE SODIUM 112 MCG: 112 TABLET ORAL at 06:49

## 2022-06-26 RX ADMIN — CARVEDILOL 6.25 MG: 6.25 TABLET, FILM COATED ORAL at 10:47

## 2022-06-26 RX ADMIN — SODIUM CHLORIDE, PRESERVATIVE FREE 10 ML: 5 INJECTION INTRAVENOUS at 06:53

## 2022-06-26 RX ADMIN — Medication 6 UNITS: at 11:30

## 2022-06-26 RX ADMIN — PANTOPRAZOLE SODIUM 40 MG: 40 TABLET, DELAYED RELEASE ORAL at 10:48

## 2022-06-26 RX ADMIN — LISINOPRIL 2.5 MG: 2.5 TABLET ORAL at 10:47

## 2022-06-26 RX ADMIN — Medication 15 UNITS: at 10:48

## 2022-06-26 RX ADMIN — SODIUM CHLORIDE, PRESERVATIVE FREE 10 ML: 5 INJECTION INTRAVENOUS at 14:00

## 2022-06-26 RX ADMIN — CARVEDILOL 6.25 MG: 6.25 TABLET, FILM COATED ORAL at 19:00

## 2022-06-26 RX ADMIN — ASPIRIN 81 MG: 81 TABLET, COATED ORAL at 10:47

## 2022-06-26 RX ADMIN — Medication 6 UNITS: at 18:59

## 2022-06-26 RX ADMIN — SODIUM CHLORIDE, PRESERVATIVE FREE 10 ML: 5 INJECTION INTRAVENOUS at 21:19

## 2022-06-26 RX ADMIN — DOCUSATE SODIUM 100 MG: 100 CAPSULE, LIQUID FILLED ORAL at 10:48

## 2022-06-26 RX ADMIN — PANTOPRAZOLE SODIUM 40 MG: 40 TABLET, DELAYED RELEASE ORAL at 19:00

## 2022-06-26 RX ADMIN — ATORVASTATIN CALCIUM 40 MG: 40 TABLET, FILM COATED ORAL at 21:17

## 2022-06-26 NOTE — ROUTINE PROCESS
Wound Prevention Checklist    Patient: Tiana Nair (82 y.o. female)  Date: 6/26/2022  Diagnosis: [facility] [E11.10] <principal problem not specified>    Precautions: Fall       []  Heel prevention boots placed on patient    []  Patient turned q2h during shift    []  Lift team ordered    [x]  Patient on Diane bed/Specialty bed    [x]  Each Wound is documented during shift (Stage, Color, drainage, odor, measurements, and dressings)    [x]  Dual skin checks done at bedside during shift report with KEYSHA Kimbrough RN

## 2022-06-26 NOTE — PROGRESS NOTES
Problem: Diabetes Self-Management  Goal: *Disease process and treatment process  Description: Define diabetes and identify own type of diabetes; list 3 options for treating diabetes. Outcome: Progressing Towards Goal  Goal: *Incorporating nutritional management into lifestyle  Description: Describe effect of type, amount and timing of food on blood glucose; list 3 methods for planning meals. Outcome: Progressing Towards Goal  Goal: *Incorporating physical activity into lifestyle  Description: State effect of exercise on blood glucose levels. Outcome: Progressing Towards Goal  Goal: *Developing strategies to promote health/change behavior  Description: Define the ABC's of diabetes; identify appropriate screenings, schedule and personal plan for screenings. Outcome: Progressing Towards Goal  Goal: *Using medications safely  Description: State effect of diabetes medications on diabetes; name diabetes medication taking, action and side effects. Outcome: Progressing Towards Goal  Goal: *Monitoring blood glucose, interpreting and using results  Description: Identify recommended blood glucose targets  and personal targets. Outcome: Progressing Towards Goal  Goal: *Prevention, detection, treatment of acute complications  Description: List symptoms of hyper- and hypoglycemia; describe how to treat low blood sugar and actions for lowering  high blood glucose level. Outcome: Progressing Towards Goal  Goal: *Prevention, detection and treatment of chronic complications  Description: Define the natural course of diabetes and describe the relationship of blood glucose levels to long term complications of diabetes.   Outcome: Progressing Towards Goal  Goal: *Developing strategies to address psychosocial issues  Description: Describe feelings about living with diabetes; identify support needed and support network  Outcome: Progressing Towards Goal  Goal: *Sick day guidelines  Outcome: Progressing Towards Goal  Goal: *Patient Specific Goal (EDIT GOAL, INSERT TEXT)  Outcome: Progressing Towards Goal     Problem: Patient Education: Go to Patient Education Activity  Goal: Patient/Family Education  Outcome: Progressing Towards Goal     Problem: Falls - Risk of  Goal: *Absence of Falls  Description: Document Earma Piero Fall Risk and appropriate interventions in the flowsheet.   Outcome: Progressing Towards Goal  Note: Fall Risk Interventions:  Mobility Interventions: Bed/chair exit alarm,Patient to call before getting OOB,Utilize walker, cane, or other assistive device    Mentation Interventions: Adequate sleep, hydration, pain control,Bed/chair exit alarm,Door open when patient unattended,More frequent rounding,Reorient patient,Room close to nurse's station,Toileting rounds,Update white board    Medication Interventions: Bed/chair exit alarm,Patient to call before getting OOB,Teach patient to arise slowly    Elimination Interventions: Bed/chair exit alarm,Call light in reach,Elevated toilet seat,Patient to call for help with toileting needs,Stay With Me (per policy),Toilet paper/wipes in reach,Toileting schedule/hourly rounds,Urinal in reach    History of Falls Interventions: Bed/chair exit alarm,Door open when patient unattended,Room close to nurse's station         Problem: Patient Education: Go to Patient Education Activity  Goal: Patient/Family Education  Outcome: Progressing Towards Goal     Problem: Nutrition Deficit  Goal: *Optimize nutritional status  Outcome: Progressing Towards Goal     Problem: Patient Education: Go to Patient Education Activity  Goal: Patient/Family Education  Outcome: Progressing Towards Goal     Problem: Patient Education: Go to Patient Education Activity  Goal: Patient/Family Education  Outcome: Progressing Towards Goal     Problem: Patient Education: Go to Patient Education Activity  Goal: Patient/Family Education  Outcome: Progressing Towards Goal     Problem: Pressure Injury - Risk of  Goal: *Prevention of pressure injury  Description: Document Sid Scale and appropriate interventions in the flowsheet.   Outcome: Progressing Towards Goal     Problem: Patient Education: Go to Patient Education Activity  Goal: Patient/Family Education  Outcome: Progressing Towards Goal     Problem: Pain  Goal: *Control of Pain  Outcome: Progressing Towards Goal     Problem: Patient Education: Go to Patient Education Activity  Goal: Patient/Family Education  Outcome: Progressing Towards Goal

## 2022-06-26 NOTE — PROGRESS NOTES
[facility] Hospitalists  Progress Note    Patient: Radha Benson Age: 66 y.o. : 1950 MR#: [id number] SSN: [id number]  Date: 2022     Subjective/24-hour events:     Stable clinically, no new complaints. Assessment:   NSTEMI  DM2 with hyperglycemia  Aspiration pneumonia  GI bleed  Hypothyroidism  Acute blood loss anemia status post PRBC transfusion  Chronic systolic CHF, EF 30 to 31% BRENDA, resolved  Acute hypoxic respiratory failure, resolved  Sepsis, secondary to above, resolved  Shock, secondary to sepsis and volume depletion, resolved  DKA resolved      Plan:   Add on ionized calcium ordered this morning. Await result. Continue supportive medical management as ordered otherwise. Medically stable for discharge once disposition arrangements finalized. Anticipate being able to complete [id number] call tomorrow and if insurance continues to deny inpatient rehab placement, skilled nursing placement to be worked on. Care management already aware but will follow up in the morning to discuss further. Case discussed with:  [x]Patient  []Family  [x]Nursing  []Case Management  DVT Prophylaxis:  []Lovenox  []Hep SQ  [x]SCDs  []Coumadin   []On Heparin gtt    Objective:   VS:   Visit Vitals  BP (!) 148/72   Pulse 90   Temp 98.2 °F (36.8 °C)   Resp 20   Ht 5' 6\" (1.676 m)   Wt 82.1 kg (181 lb)   SpO2 98%   BMI 29.21 kg/m²      Tmax/24hrs: Temp (24hrs), Av °F (36.7 °C), Min:97.3 °F (36.3 °C), Max:98.3 °F (36.8 °C)      Intake/Output Summary (Last 24 hours) at 2022 0937  Last data filed at 2022 0649  Gross per 24 hour   Intake 120 ml   Output 2000 ml   Net -1880 ml       General:  In NAD. Nontoxic-appearing. Cardiovascular:  RRR. Pulmonary:  Lungs clear bilaterally, no wheezes. No accessory muscle use. GI:  Abdomen soft, NTTP. Extremities:  Warm, no edema or ischemia. Neuro:  Awake and alert. Moves extremities spontaneously.     Labs:    Recent Results (from the past 24 hour(s))   GLUCOSE, POC    Collection Time: 06/25/22 11:46 AM   Result Value Ref Range    Glucose (POC) 186 (H) 70 - 110 mg/dL   GLUCOSE, POC    Collection Time: 06/25/22  3:45 PM   Result Value Ref Range    Glucose (POC) 52 (LL) 70 - 110 mg/dL   GLUCOSE, POC    Collection Time: 06/25/22  3:46 PM   Result Value Ref Range    Glucose (POC) 56 (L) 70 - 110 mg/dL   GLUCOSE, POC    Collection Time: 06/25/22  4:05 PM   Result Value Ref Range    Glucose (POC) 53 (LL) 70 - 110 mg/dL   GLUCOSE, POC    Collection Time: 06/25/22  4:07 PM   Result Value Ref Range    Glucose (POC) 56 (L) 70 - 110 mg/dL   GLUCOSE, POC    Collection Time: 06/25/22  4:32 PM   Result Value Ref Range    Glucose (POC) 93 70 - 110 mg/dL   GLUCOSE, POC    Collection Time: 06/25/22 10:33 PM   Result Value Ref Range    Glucose (POC) 194 (H) 70 - 110 mg/dL   PHOSPHORUS    Collection Time: 06/26/22 12:46 AM   Result Value Ref Range    Phosphorus 3.0 2.5 - 4.9 MG/DL   METABOLIC PANEL, BASIC    Collection Time: 06/26/22 12:46 AM   Result Value Ref Range    Sodium 139 136 - 145 mmol/L    Potassium 4.8 3.5 - 5.5 mmol/L    Chloride 108 100 - 111 mmol/L    CO2 27 21 - 32 mmol/L    Anion gap 4 3.0 - 18 mmol/L    Glucose 165 (H) 74 - 99 mg/dL    BUN 11 7.0 - 18 MG/DL    Creatinine 1.02 0.6 - 1.3 MG/DL    BUN/Creatinine ratio 11 (L) 12 - 20      GFR est AA >60 >60 ml/min/1.73m2    GFR est non-AA 53 (L) >60 ml/min/1.73m2    Calcium 8.2 (L) 8.5 - 10.1 MG/DL   CBC W/O DIFF    Collection Time: 06/26/22 12:46 AM   Result Value Ref Range    WBC 4.8 4.6 - 13.2 K/uL    RBC 2.70 (L) 4.20 - 5.30 M/uL    HGB 7.1 (L) 12.0 - 16.0 g/dL    HCT 22.7 (L) 35.0 - 45.0 %    MCV 84.1 78.0 - 100.0 FL    MCH 26.3 24.0 - 34.0 PG    MCHC 31.3 31.0 - 37.0 g/dL    RDW 15.1 (H) 11.6 - 14.5 %    PLATELET 781 986 - 025 K/uL    MPV 9.9 9.2 - 11.8 FL    NRBC 0.0 0  WBC    ABSOLUTE NRBC 0.00 0.00 - 0.01 K/uL   GLUCOSE, POC    Collection Time: 06/26/22  8:04 AM   Result Value Ref Range    Glucose (POC) 186 (H) 70 - 110 mg/dL       Signed By: Rizwana Bhakta MD     June 26, 2022

## 2022-06-26 NOTE — ROUTINE PROCESS
Bedside and Verbal shift change report given to Filippo Pro LPN (oncoming nurse) by Chad Garces RN (offgoing nurse). Report included the following information SBAR, Kardex, MAR and Recent Results.     SITUATION:  Code Status: DNR  Reason for Admission: DKA (diabetic ketoacidosis) ([facility] [place] [E11.10]  Hospital day: 15  Problem List:       Hospital Problems  Date Reviewed: 6/12/2022          Codes Class Noted POA    Elevated troponin ICD-10-CM: R77.8  ICD-9-CM: 790.6  6/12/2022 Yes        Primary hypertension ICD-10-CM: I10  ICD-9-CM: 401.9  6/12/2022 Unknown        Acquired hypothyroidism ICD-10-CM: E03.9  ICD-9-CM: 244.9  6/12/2022 Unknown        History of colon cancer ICD-10-CM: Z85.038  ICD-9-CM: V10.05  6/12/2022 Unknown        BRENDA (acute kidney injury) ([facility] [place] [facility] ICD-10-CM: N17.9  ICD-9-CM: 584.9  6/12/2022 Yes        DKA (diabetic ketoacidosis) ([facility] [place] [facility] ICD-10-CM: E11.10  ICD-9-CM: 250.12  6/11/2022 Yes        Hyperkalemia ICD-10-CM: E87.5  ICD-9-CM: 276.7  1/19/2021 Yes              BACKGROUND:   Past Medical History:   Past Medical History:   Diagnosis Date    Colon cancer ([facility] [place] [facility]     Diabetes ([facility] [place]     Hypertension     Hypothyroidism       Patient taking anticoagulants no    Patient has a defibrillator: no    History of shots YES for example, flu, pneumonia, tetanus   Isolation History NO for example, MRSA, CDiff    ASSESSMENT:  Changes in Assessment Throughout Shift: NONE  Significant Changes in 24 hours (for example, RR/code, fall)  Patient has Central Line: no   Patient has Chandler Cath: no   Mobility Issues  PT  IV Patency  OR Checklist  Pending Tests    Last Vitals:  Vitals w/ MEWS Score (last day)     Date/Time MEWS Score Pulse Resp Temp BP Level of Consciousness SpO2    06/26/22 0325 1 75 18 97.8 °F (36.6 °C) 154/78 Alert (0) 97 %    06/26/22 0042 1 81 18 97.8 °F (36.6 °C) 130/84 Alert (0) 96 %    06/25/22 1923 1 80 20 98.3 °F (36.8 °C) 157/74 Alert (0) 97 %    06/25/22 1551 1 75 20 98.3 °F (36.8 °C) 143/80 Alert (0) 98 %    06/25/22 1149 1 79 20 97.3 °F (36.3 °C) 136/77 Alert (0) 97 %    06/25/22 0811 1 88 20 98.3 °F (36.8 °C) 169/80 Alert (0) 93 %    06/25/22 0408 1 82 20 97.5 °F (36.4 °C) 144/67 Alert (0) 95 %    06/25/22 0030 1 95 18 97.8 °F (36.6 °C) 135/67 Alert (0) 97 %        PAIN    Pain Assessment    Pain Intensity 1: 0 (06/26/22 0325)    Pain Location 1: Head    Pain Intervention(s) 1: Medication (see MAR)    Patient Stated Pain Goal: 0  Intervention effective: N/A  Time of last intervention: N/A Reassessment Completed: yes   Other actions taken for pain: Distraction    Last 3 Weights:  Last 3 Recorded Weights in this Encounter    06/11/22 1415 06/12/22 1218   Weight: 82.1 kg (181 lb) 82.1 kg (181 lb)   Weight change:     INTAKE/OUPUT    Current Shift: No intake/output data recorded. Last three shifts: 06/24 1901 - 06/26 0700  In: 5 [P.O.:420]  Out: 1700 [Urine:1700]    RECOMMENDATIONS AND DISCHARGE PLANNING  Patient needs and requests: Assistance with ADL's    Pending tests/procedures: labs     Discharge plan for patient: Home    Discharge planning Needs or Barriers: None    Estimated Discharge Date: 6/27/2022 Posted on Whiteboard in Patients Room: yes       \"HEALS\" SAFETY CHECK  A safety check occurred in the patient's room between off going nurse and oncoming nurse listed above. The safety check included the below items:    H  High Alert Medications Verify all high alert medication drips (heparin, PCA, etc.)  E  Equipment Suction is set up for ALL patients (with yanker)  Red plugs utilized for all equipment (IV pumps, etc.)  WOWs wiped down at end of shift.   Room stocked with oxygen, suction, and other unit-specific supplies  A  Alarms Bed alarm is set for fall risk patients  Ensure chair alarm is in place and activated if patient is up in a chair  L  Lines Check IV for any infiltration  Chandler bag is empty if patient has a Chandler   Tubing and IV bags are labeled  S  Safety  Room is clean, patient is clean, and equipment is clean. Hallways are clear from equipment besides carts. Fall bracelet on for fall risk patients  Ensure room is clear and free of clutter  Suction is set up for ALL patients (with osiel)  Hallways are clear from equipment besides carts.    Isolation precautions followed, supplies available outside room, sign posted    Reese Gupta RN

## 2022-06-27 LAB
GLUCOSE BLD STRIP.AUTO-MCNC: 188 MG/DL (ref 70–110)
GLUCOSE BLD STRIP.AUTO-MCNC: 192 MG/DL (ref 70–110)
GLUCOSE BLD STRIP.AUTO-MCNC: 214 MG/DL (ref 70–110)
GLUCOSE BLD STRIP.AUTO-MCNC: 219 MG/DL (ref 70–110)
GLUCOSE BLD STRIP.AUTO-MCNC: 317 MG/DL (ref 70–110)
PHOSPHATE SERPL-MCNC: 3.5 MG/DL (ref 2.5–4.9)

## 2022-06-27 PROCEDURE — 74011250637 HC RX REV CODE- 250/637: Performed by: NURSE PRACTITIONER

## 2022-06-27 PROCEDURE — 99232 SBSQ HOSP IP/OBS MODERATE 35: CPT | Performed by: FAMILY MEDICINE

## 2022-06-27 PROCEDURE — 74011636637 HC RX REV CODE- 636/637: Performed by: FAMILY MEDICINE

## 2022-06-27 PROCEDURE — 36415 COLL VENOUS BLD VENIPUNCTURE: CPT

## 2022-06-27 PROCEDURE — 74011250637 HC RX REV CODE- 250/637: Performed by: PHYSICIAN ASSISTANT

## 2022-06-27 PROCEDURE — 2709999900 HC NON-CHARGEABLE SUPPLY

## 2022-06-27 PROCEDURE — 74011250637 HC RX REV CODE- 250/637: Performed by: INTERNAL MEDICINE

## 2022-06-27 PROCEDURE — 97535 SELF CARE MNGMENT TRAINING: CPT

## 2022-06-27 PROCEDURE — 74011636637 HC RX REV CODE- 636/637: Performed by: INTERNAL MEDICINE

## 2022-06-27 PROCEDURE — 74011000250 HC RX REV CODE- 250: Performed by: STUDENT IN AN ORGANIZED HEALTH CARE EDUCATION/TRAINING PROGRAM

## 2022-06-27 PROCEDURE — 82962 GLUCOSE BLOOD TEST: CPT

## 2022-06-27 PROCEDURE — 65270000029 HC RM PRIVATE

## 2022-06-27 PROCEDURE — 84100 ASSAY OF PHOSPHORUS: CPT

## 2022-06-27 RX ORDER — INSULIN GLARGINE 100 [IU]/ML
5 INJECTION, SOLUTION SUBCUTANEOUS ONCE
Status: COMPLETED | OUTPATIENT
Start: 2022-06-27 | End: 2022-06-27

## 2022-06-27 RX ORDER — INSULIN GLARGINE 100 [IU]/ML
20 INJECTION, SOLUTION SUBCUTANEOUS DAILY
Status: DISCONTINUED | OUTPATIENT
Start: 2022-06-28 | End: 2022-06-28

## 2022-06-27 RX ADMIN — LISINOPRIL 2.5 MG: 2.5 TABLET ORAL at 08:26

## 2022-06-27 RX ADMIN — SODIUM CHLORIDE, PRESERVATIVE FREE 10 ML: 5 INJECTION INTRAVENOUS at 15:17

## 2022-06-27 RX ADMIN — PANTOPRAZOLE SODIUM 40 MG: 40 TABLET, DELAYED RELEASE ORAL at 16:44

## 2022-06-27 RX ADMIN — CARVEDILOL 6.25 MG: 6.25 TABLET, FILM COATED ORAL at 16:44

## 2022-06-27 RX ADMIN — Medication 5 UNITS: at 11:37

## 2022-06-27 RX ADMIN — Medication 6 UNITS: at 16:44

## 2022-06-27 RX ADMIN — BENZONATATE 100 MG: 100 CAPSULE ORAL at 06:26

## 2022-06-27 RX ADMIN — Medication 12 UNITS: at 22:43

## 2022-06-27 RX ADMIN — ATORVASTATIN CALCIUM 40 MG: 40 TABLET, FILM COATED ORAL at 22:27

## 2022-06-27 RX ADMIN — Medication 3 UNITS: at 11:37

## 2022-06-27 RX ADMIN — PANTOPRAZOLE SODIUM 40 MG: 40 TABLET, DELAYED RELEASE ORAL at 08:26

## 2022-06-27 RX ADMIN — Medication 6 UNITS: at 08:25

## 2022-06-27 RX ADMIN — Medication 15 UNITS: at 08:25

## 2022-06-27 RX ADMIN — ASPIRIN 81 MG: 81 TABLET, COATED ORAL at 08:26

## 2022-06-27 RX ADMIN — CARVEDILOL 6.25 MG: 6.25 TABLET, FILM COATED ORAL at 08:26

## 2022-06-27 RX ADMIN — SODIUM CHLORIDE, PRESERVATIVE FREE 10 ML: 5 INJECTION INTRAVENOUS at 06:22

## 2022-06-27 RX ADMIN — LEVOTHYROXINE SODIUM 112 MCG: 112 TABLET ORAL at 06:22

## 2022-06-27 RX ADMIN — SODIUM CHLORIDE, PRESERVATIVE FREE 10 ML: 5 INJECTION INTRAVENOUS at 22:41

## 2022-06-27 NOTE — PROGRESS NOTES
Discharge planning    Received call from Nichole Odom, with [facility]. Per Nichole Odom, they can accept but patient will need PCR test within 48 hrs of admission. Family will need to provide COVID vaccine cards to facility. She will start insurance authorization. Updated Dr. Pranay Tarango via [employer] communication.     MELISSA FreemanN, RN  Pager # [phone]  Care Manager

## 2022-06-27 NOTE — PROGRESS NOTES
[facility] Hospitalists  Progress Note    Patient: Reina Chacon Age: 66 y.o. : 1950 MR#: [id number] SSN: [id number]  Date: 2022     Subjective/24-hour events:     Resting comfortably currently, no new issues overnight. Daughter is present at bedside. Assessment:   NSTEMI  DM2 with hyperglycemia  Aspiration pneumonia  GI bleed  Hypothyroidism  Acute blood loss anemia status post PRBC transfusion  Chronic systolic CHF, EF 30 to 60% BRENDA, resolved  Acute hypoxic respiratory failure, resolved  Sepsis, secondary to above, resolved  Shock, secondary to sepsis and volume depletion, resolved  DKA resolved      Plan:   [id number] completed this morning with Ambrosio reviewer, inpatient rehab denial upheld as expected. Patient and family made aware of this development. Placement at facility of choice still pending, care management continues to work on disposition. Have asked patient to provide at least 2 additional facilities that information can be forwarded to case primary selection is unavailable for what ever reason. Care management updated with this information on unit. Continue supportive care as ordered until disposition arrangements can be finalized. Case discussed with:  [x]Patient  []Family  [x]Nursing  []Case Management  DVT Prophylaxis:  []Lovenox  []Hep SQ  [x]SCDs  []Coumadin   []On Heparin gtt    Objective:   VS:   Visit Vitals  BP (!) 147/71   Pulse 89   Temp 98.2 °F (36.8 °C)   Resp 20   Ht 5' 6\" (1.676 m)   Wt 82.1 kg (181 lb)   SpO2 96%   BMI 29.21 kg/m²      Tmax/24hrs: Temp (24hrs), Av.1 °F (36.7 °C), Min:97.7 °F (36.5 °C), Max:98.4 °F (36.9 °C)      Intake/Output Summary (Last 24 hours) at 2022 1604  Last data filed at 2022 0630  Gross per 24 hour   Intake 750 ml   Output 250 ml   Net 500 ml       General:  In NAD. Nontoxic-appearing. Cardiovascular:  RRR. Pulmonary:  Lungs clear bilaterally, no wheezes. No accessory muscle use.   GI: Abdomen soft, NTTP. Extremities:  Warm, no edema or ischemia. Neuro:  Awake and alert. Moves extremities spontaneously.     Labs:    Recent Results (from the past 24 hour(s))   GLUCOSE, POC    Collection Time: 06/26/22  6:55 PM   Result Value Ref Range    Glucose (POC) 226 (H) 70 - 110 mg/dL   GLUCOSE, POC    Collection Time: 06/26/22  9:17 PM   Result Value Ref Range    Glucose (POC) 186 (H) 70 - 110 mg/dL   PHOSPHORUS    Collection Time: 06/27/22  2:31 AM   Result Value Ref Range    Phosphorus 3.5 2.5 - 4.9 MG/DL   GLUCOSE, POC    Collection Time: 06/27/22  7:47 AM   Result Value Ref Range    Glucose (POC) 219 (H) 70 - 110 mg/dL   GLUCOSE, POC    Collection Time: 06/27/22 11:02 AM   Result Value Ref Range    Glucose (POC) 192 (H) 70 - 110 mg/dL   GLUCOSE, POC    Collection Time: 06/27/22  4:01 PM   Result Value Ref Range    Glucose (POC) 214 (H) 70 - 110 mg/dL       Signed By: Rona Dawkins MD     June 27, 2022

## 2022-06-27 NOTE — ROUTINE PROCESS
Bedside and Verbal shift change report given to Annalee Orozco RN (oncoming nurse) by Dipti Roy RN (offgoing nurse). Report included the following information SBAR, Kardex, MAR and Recent Results.     SITUATION:  Code Status: DNR  Reason for Admission: DKA (diabetic ketoacidosis) ([facility] [E11.10]  Hospital day: 16  Problem List:       Hospital Problems  Date Reviewed: 6/12/2022          Codes Class Noted POA    Elevated troponin ICD-10-CM: R77.8  ICD-9-CM: 790.6  6/12/2022 Yes        Primary hypertension ICD-10-CM: I10  ICD-9-CM: 401.9  6/12/2022 Unknown        Acquired hypothyroidism ICD-10-CM: E03.9  ICD-9-CM: 244.9  6/12/2022 Unknown        History of colon cancer ICD-10-CM: Z85.038  ICD-9-CM: V10.05  6/12/2022 Unknown        BRENDA (acute kidney injury) [facility]) ICD-10-CM: N17.9  ICD-9-CM: 584.9  6/12/2022 Yes        DKA (diabetic ketoacidosis) ([facility] ICD-10-CM: E11.10  ICD-9-CM: 250.12  6/11/2022 Yes        Hyperkalemia ICD-10-CM: E87.5  ICD-9-CM: 276.7  1/19/2021 Yes              BACKGROUND:   Past Medical History:   Past Medical History:   Diagnosis Date    [facility]     [facility]     Hypertension     Hypothyroidism       Patient taking anticoagulants no    Patient has a defibrillator: no    History of shots YES for example, flu, pneumonia, tetanus   Isolation History NO for example, MRSA, CDiff    ASSESSMENT:  Changes in Assessment Throughout Shift: NONE  Significant Changes in 24 hours (for example, RR/code, fall)  Patient has Central Line: no   Patient has Chandler Cath: no   Mobility Issues  PT  IV Patency  OR Checklist  Pending Tests    Last Vitals:  Vitals w/ MEWS Score (last day)     Date/Time MEWS Score Pulse Resp Temp BP Level of Consciousness SpO2    06/27/22 0329 1 86 18 98.1 °F (36.7 °C) 148/78 Alert (0) 95 %    06/27/22 0001 1 90 18 97.7 °F (36.5 °C) 138/80 Alert (0) 97 %    06/26/22 1939 1 90 18 97.8 °F (36.6 °C) 134/79 Alert (0) 97 %    06/26/22 1558 1 81 20 97.3 °F (36.3 °C) 154/79 Alert (0) 98 %    06/26/22 1120 1 78 20 98.4 °F (36.9 °C) 185/70 Alert (0) 95 %    06/26/22 0807 1 90 20 98.2 °F (36.8 °C) 148/72 Alert (0) 98 %    06/26/22 0325 1 75 18 97.8 °F (36.6 °C) 154/78 Alert (0) 97 %    06/26/22 0042 1 81 18 97.8 °F (36.6 °C) 130/84 Alert (0) 96 %        PAIN    Pain Assessment    Pain Intensity 1: 0 (06/27/22 0427)    Pain Location 1: Head    Pain Intervention(s) 1: Medication (see MAR)    Patient Stated Pain Goal: 0  Intervention effective: N/A  Time of last intervention: N/A Reassessment Completed: yes   Other actions taken for pain: Distraction    Last 3 Weights:  Last 3 Recorded Weights in this Encounter    06/11/22 1415 06/12/22 1218   Weight: 82.1 kg (181 lb) 82.1 kg (181 lb)   Weight change:     INTAKE/OUPUT    Current Shift: No intake/output data recorded. Last three shifts: 06/25 1901 - 06/27 0700  In: 870 [P.O.:870]  Out: 2250 [Urine:2250]    RECOMMENDATIONS AND DISCHARGE PLANNING  Patient needs and requests: Assistance with ADL's    Pending tests/procedures: labs     Discharge plan for patient: Home    Discharge planning Needs or Barriers: None    Estimated Discharge Date: 6/27/2022 Posted on Whiteboard in Patients Room: yes       \"HEALS\" SAFETY CHECK  A safety check occurred in the patient's room between off going nurse and oncoming nurse listed above. The safety check included the below items:    H  High Alert Medications Verify all high alert medication drips (heparin, PCA, etc.)  E  Equipment Suction is set up for ALL patients (with yanker)  Red plugs utilized for all equipment (IV pumps, etc.)  WOWs wiped down at end of shift.   Room stocked with oxygen, suction, and other unit-specific supplies  A  Alarms Bed alarm is set for fall risk patients  Ensure chair alarm is in place and activated if patient is up in a chair  L  Lines Check IV for any infiltration  Chandler bag is empty if patient has a Chandler   Tubing and IV bags are labeled  S  Safety  Room is clean, patient is clean, and equipment is clean. Hallways are clear from equipment besides carts. Fall bracelet on for fall risk patients  Ensure room is clear and free of clutter  Suction is set up for ALL patients (with osiel)  Hallways are clear from equipment besides carts.    Isolation precautions followed, supplies available outside room, sign posted    Irma Oseguera RN

## 2022-06-27 NOTE — DIABETES MGMT
Diabetes/ Glycemic Control Plan of Care    BG values still elevated. POC BG 6/26/2022: 186, 333, 347, 252, 226, 186  POC BG 6/27/2022: 219    Recommendations:   1.) increase basal lantus insulin dose from 15 units to 20 units daily. Order obtained. 2.) cont current humalog insulin order: correctional    Assessment:   DX:   1. Hyperosmolar hyperglycemic state (HHS) ([facility]     2. Diabetic ketoacidosis with coma associated with type 2 diabetes mellitus ([facility]     3. High anion gap metabolic acidosis     4. Acute metabolic encephalopathy     5. Hypovolemic shock ([facility]     6. Acute hyperkalemia     7. Acute renal failure, unspecified acute renal failure type ([facility]     8. Elevated troponin     9. Cardiomyopathy, unspecified type ([facility]     10. [facility]     11. Cardiogenic shock (HCC)        Fasting/ Morning blood glucose:   Lab Results   Component Value Date/Time    Glucose 165 (H) 06/26/2022 12:46 AM    Glucose (POC) 219 (H) 06/27/2022 07:47 AM    Glucose (POC) 90 02/06/2019 02:13 PM    Glucose,  (H) 01/19/2021 09:26 PM     IV Fluids containing dextrose:  None    Steroids:  None    Blood glucose values: Within target range (70-180mg/dL):  No.    Current insulin orders:   Basal lantus insulin 20 units daily  Correctional lispro insulin. Very resistant dose    Total Daily Dose previous 24 hours: 27 units of insulin  Lantus: 15 units  Lispro: 12 units    Current A1c:   Lab Results   Component Value Date/Time    Hemoglobin A1c 9.0 (H) 06/11/2022 02:15 PM    Hemoglobin A1c, External 9.5 07/08/2021 12:00 AM      equivalent  to ave Blood Glucose of 212 mg/dl for 2-3 months prior to admission    Adequate glycemic control PTA:  No    Nutrition/Diet:   Active Orders   Diet    ADULT DIET Regular; 3 carb choices (45 gm/meal);  Low Fat/Low Chol/High Fiber/AMBROSIO; Low Sodium (2 gm)      Meal Intake:  Patient Vitals for the past 168 hrs:   % Diet Eaten   06/24/22 1412 26 - 50%   06/24/22 0911 26 - 50% 06/21/22 0853 26 - 50%   06/20/22 1217 0%     Supplement Intake:  No data found. Home diabetes medications:   Key Antihyperglycemic Medications             insulin lispro (HUMALOG) 100 unit/mL injection (Taking) For Blood Sugar (mg/dL) of:              Less than 150 =   0 units  150 -199 =   3 units  200 -249 =   6 units  250 -299 =   9 units  300 -349 =   12 units  350 and above =   15 units and Call Physician  Initiate Hypoglycemic protocol if blood glucose is <70 mg/dL. Fast Acting - Administer Immediately - or within 15 minutes of start of meal, if mealtime coverage. insulin glargine (LANTUS) 100 unit/mL injection (Taking) Inject 10 units SQ DAILY    metFORMIN ER (GLUCOPHAGE XR) 500 mg tablet (Taking) Take 500 mg by mouth daily (with dinner). insulin regular (NOVOLIN R, HUMULIN R) 100 unit/mL injection (Taking) by SubCUTAneous route Before breakfast, lunch, and dinner. Sliding scale          Plan/Goals:   Blood glucose will be within target of 70 - 180 mg/dl within 72 hours  Reinforce dietary and medication compliance at home.        Education:  [] Refer to Diabetes Education Record                       [] Education not indicated at this time     Anival Samano RN [facility]  Pager: [phone]

## 2022-06-27 NOTE — PROGRESS NOTES
Problem: Self Care Deficits Care Plan (Adult)  Goal: *Acute Goals and Plan of Care (Insert Text)  Description: Occupational Therapy Goals  Initiated 6/15/2022 within 7 day(s), re-evaluation 6/22/2022, pt has made progress towards goals, goal # 4 & 5 upgraded, goal #8 modified    1. Patient will perform upper body dressing with supervision/set-up. 2.  Patient will perform grooming sitting EOB with supervision/set-up with Good balance. 3.  Patient will perform bathing with supervision/set-up. 4.  Patient will perform toilet transfers with standby assist using RW. 5.  Patient will perform all aspects of toileting with standby assist.  6.  Patient will participate in upper extremity therapeutic exercise/activities with supervision/set-up for 8 minutes to improve endurance and UB strength needed for ADLs    7. Patient will utilize energy conservation techniques during functional activities with verbal cues. 8.  Patient will perform lower body dressing with minimal assistance using adaptive equipment. Prior Level of Function: Pt reports she lives alone and is usually independent with ADLs and functional mobility using cane mostly. Pt lives in 2nd floor apartment  Outcome: Progressing Towards Goal   OCCUPATIONAL THERAPY TREATMENT    Patient: Elissa Aguirre (53 y.o. female)  Date: 6/27/2022  Diagnosis: [facility] [E11.10] <principal problem not specified>  Procedure(s) (LRB):  ENDOSCOPY/ Polypectomy/ Biopsies/ Endo clips (N/A) 10 Days Post-Op  Precautions: Fall    Chart, occupational therapy assessment, plan of care, and goals were reviewed. ASSESSMENT:  Nursing/RN cleared for pt to participate in OT tx session. Pt participated in bedside commode transfer using RW with SBA, toilet tasks with SBA. Pt reports she has and used one at [facility] and will be going to her daughter's home with additional family members residing in home who will be able to provide assist as needed for safe d/c home. Pt lying semi-reclined in bed at end of tx session, call bell within reach & pt verbalized understanding following repetition with education for how to utilize for assist e.g. functional transfers in order to prevent falls. Progression toward goals:  []          Improving appropriately and progressing toward goals  []          Improving slowly and progressing toward goals  []          Not making progress toward goals and plan of care will be adjusted     PLAN:  Patient continues to benefit from skilled intervention to address the above impairments. Continue treatment per established plan of care. Discharge Recommendations:  Home Health  Further Equipment Recommendations for Discharge: patient has all recommended DME     SUBJECTIVE:   Patient stated I have a shower chair and RW.    OBJECTIVE DATA SUMMARY:   Cognitive/Behavioral Status:  Neurologic State: Alert  Orientation Level: Oriented X4  Cognition: Follows commands  Safety/Judgement: Fall prevention    Functional Mobility and Transfers for ADLs:   Bed Mobility:     Supine to Sit: Stand-by assistance  Sit to Supine: Stand-by assistance      Transfers:  Sit to Stand: Stand-by assistance  Stand to Sit: Stand-by assistance      Toilet Transfer : Stand-by assistance      Balance:  Sitting: Intact  Standing: Impaired; With support  Standing - Static: Good  Standing - Dynamic : Fair    ADL Intervention:   Grooming  Position Performed: Seated edge of bed  Washing Hands: Stand-by assistance    Toileting  Toileting Assistance: Stand-by assistance  Bladder Hygiene: Stand-by assistance    Cognitive Retraining  Safety/Judgement: Fall prevention  Pain:  Pain level pre-treatment: 0/10   Pain level post-treatment: 0/10    Activity Tolerance:    fair  Please refer to the flowsheet for vital signs taken during this treatment.   After treatment:   []  Patient left in no apparent distress sitting up in chair  [x]  Patient left in no apparent distress in bed  [x]  Call bell left within reach  [x]  Nursing notified  []  Caregiver present  [x]  Bed alarm activated    COMMUNICATION/EDUCATION:   [x] Role of Occupational Therapy in the acute care setting  [x] Home safety education was provided and the patient/caregiver indicated understanding. [x] Patient/family have participated as able in working towards goals and plan of care. [x] Patient/family agree to work toward stated goals and plan of care. [] Patient understands intent and goals of therapy, but is neutral about his/her participation. [] Patient is unable to participate in goal setting and plan of care.       Thank you for this referral.  Vicki Palmer  Time Calculation: 21 mins

## 2022-06-27 NOTE — PROGRESS NOTES
Comprehensive Nutrition Assessment    Type and Reason for Visit: Initial,Wound    Nutrition Recommendations/Plan:   1. Liberalize diet and add oral nutrition supplements: Glucerna Shake TID and Neil BID. Malnutrition Assessment:  Malnutrition Status: At risk for malnutrition (specify) (NPO pending SLP eval with altered mentation) (06/14/22 7955)      Nutrition History and Allergies:   Past medical history of diabetes mellitus, hypertension, hyperlipidemia, hypothyroidism, and colon cancer s/p total colectomy and chemotherapy who presented today via EMS for altered mental status. Weight history per chart review: 212 lb (2/6/19), 191 lb (11/11/20), 181 lb (1/22/21), 181 lb (6/12/22). NKFA     Nutrition Assessment:    Patient with good appetite and fair meal intake. Nutrition Related Findings:    Last BM 6/26. Pertinent Medications: colace, SSI, synthroid, miralax, lantus. Wounds: sacral stage 2 pressure injury/MASD, scalp wound Wound Type: Multiple,Pressure injury,Stage II    Current Nutrition Intake & Therapies:  Average Meal Intake: 26-50%  Average Supplement Intake: None ordered  ADULT DIET Regular; 3 carb choices (45 gm/meal); Low Fat/Low Chol/High Fiber/AMBROSIO; Low Sodium (2 gm)  ADULT ORAL NUTRITION SUPPLEMENT Breakfast, Lunch, Dinner; Diabetic Supplement    Anthropometric Measures:  Height: 5' 6\" (167.6 cm)  Ideal Body Weight (IBW): 130 lbs (59 kg)  Admission Body Weight: 180 lb 12.4 oz  Current Body Wt:  82.1 kg (181 lb), 139.2 % IBW. Not specified  Current BMI (kg/m2): 29.2  Usual Body Weight: 82.1 kg (181 lb)  % Weight Change (Calculated): 0  Weight Adjustment: No adjustment  BMI Category: Overweight (BMI 25.0-29. 9)    Estimated Daily Nutrient Needs:  Energy Requirements Based On: Formula  Weight Used for Energy Requirements: Current  Energy (kcal/day): 2201-0201  Weight Used for Protein Requirements: Current  Protein (g/day):   Method Used for Fluid Requirements: 1 ml/kcal  Fluid (ml/day): 0072-9466    Nutrition Diagnosis:   · Increased nutrient needs related to increased demand for energy/nutrients as evidenced by wounds      Nutrition Interventions:   Food and/or Nutrient Delivery: Continue current diet,Start oral nutrition supplement  Nutrition Education/Counseling: No recommendations at this time,Education not indicated  Coordination of Nutrition Care: Continue to monitor while inpatient  Plan of Care discussed with: patient    Goals:  Previous Goal Met: Progressing toward goal(s)  Goals: Meet at least 75% of estimated needs,by next RD assessment    Nutrition Monitoring and Evaluation:   Behavioral-Environmental Outcomes: None identified  Food/Nutrient Intake Outcomes: Food and nutrient intake,Supplement intake  Physical Signs/Symptoms Outcomes: Meal time behavior,Nutrition focused physical findings,Skin    Discharge Planning:    Continue oral nutrition supplement,Continue current diet    Fuad Pro, [address], [facility]  Contact: [phone]

## 2022-06-27 NOTE — PROGRESS NOTES
Problem: Diabetes Self-Management  Goal: *Disease process and treatment process  Description: Define diabetes and identify own type of diabetes; list 3 options for treating diabetes. Outcome: Progressing Towards Goal  Goal: *Incorporating nutritional management into lifestyle  Description: Describe effect of type, amount and timing of food on blood glucose; list 3 methods for planning meals. Outcome: Progressing Towards Goal  Goal: *Incorporating physical activity into lifestyle  Description: State effect of exercise on blood glucose levels. Outcome: Progressing Towards Goal  Goal: *Developing strategies to promote health/change behavior  Description: Define the ABC's of diabetes; identify appropriate screenings, schedule and personal plan for screenings. Outcome: Progressing Towards Goal  Goal: *Using medications safely  Description: State effect of diabetes medications on diabetes; name diabetes medication taking, action and side effects. Outcome: Progressing Towards Goal  Goal: *Monitoring blood glucose, interpreting and using results  Description: Identify recommended blood glucose targets  and personal targets. Outcome: Progressing Towards Goal  Goal: *Prevention, detection, treatment of acute complications  Description: List symptoms of hyper- and hypoglycemia; describe how to treat low blood sugar and actions for lowering  high blood glucose level. Outcome: Progressing Towards Goal  Goal: *Prevention, detection and treatment of chronic complications  Description: Define the natural course of diabetes and describe the relationship of blood glucose levels to long term complications of diabetes.   Outcome: Progressing Towards Goal  Goal: *Developing strategies to address psychosocial issues  Description: Describe feelings about living with diabetes; identify support needed and support network  Outcome: Progressing Towards Goal  Goal: *Sick day guidelines  Outcome: Progressing Towards Goal  Goal: *Patient Specific Goal (EDIT GOAL, INSERT TEXT)  Outcome: Progressing Towards Goal     Problem: Patient Education: Go to Patient Education Activity  Goal: Patient/Family Education  Outcome: Progressing Towards Goal     Problem: Falls - Risk of  Goal: *Absence of Falls  Description: Document Jessie De Jesus Fall Risk and appropriate interventions in the flowsheet. Outcome: Progressing Towards Goal  Note: Fall Risk Interventions:  Mobility Interventions: Bed/chair exit alarm    Mentation Interventions: Adequate sleep, hydration, pain control,Bed/chair exit alarm    Medication Interventions: Bed/chair exit alarm,Patient to call before getting OOB    Elimination Interventions: Bed/chair exit alarm,Call light in reach    History of Falls Interventions: Bed/chair exit alarm,Door open when patient unattended,Room close to nurse's station         Problem: Patient Education: Go to Patient Education Activity  Goal: Patient/Family Education  Outcome: Progressing Towards Goal     Problem: Nutrition Deficit  Goal: *Optimize nutritional status  Outcome: Progressing Towards Goal     Problem: Patient Education: Go to Patient Education Activity  Goal: Patient/Family Education  Outcome: Progressing Towards Goal     Problem: Patient Education: Go to Patient Education Activity  Goal: Patient/Family Education  Outcome: Progressing Towards Goal     Problem: Patient Education: Go to Patient Education Activity  Goal: Patient/Family Education  Outcome: Progressing Towards Goal     Problem: Pressure Injury - Risk of  Goal: *Prevention of pressure injury  Description: Document Sid Scale and appropriate interventions in the flowsheet.   Outcome: Progressing Towards Goal  Note: Pressure Injury Interventions:  Sensory Interventions: Assess changes in LOC,Minimize linen layers    Moisture Interventions: Absorbent underpads,Apply protective barrier, creams and emollients    Activity Interventions: Increase time out of bed,Pressure redistribution bed/mattress(bed type)    Mobility Interventions: Float heels    Nutrition Interventions: Document food/fluid/supplement intake    Friction and Shear Interventions: Apply protective barrier, creams and emollients                Problem: Patient Education: Go to Patient Education Activity  Goal: Patient/Family Education  Outcome: Progressing Towards Goal     Problem: Pain  Goal: *Control of Pain  Outcome: Progressing Towards Goal     Problem: Patient Education: Go to Patient Education Activity  Goal: Patient/Family Education  Outcome: Progressing Towards Goal

## 2022-06-27 NOTE — PROGRESS NOTES
Discharge planning    Insurance authorization was declined for ARU. Called Yumiko, admissions director, for [facility] to confirm acceptance and bed availably. Will need insurance authorization. Left a voice message and waiting for return call.      NAFISA Hull, RN  Pager # [phone]  Care Manager

## 2022-06-27 NOTE — ROUTINE PROCESS
Wound Prevention Checklist    Patient: Aurora Girard (37 y.o. female)  Date: 6/27/2022  Diagnosis: [facility] [E11.10] <principal problem not specified>    Precautions: Fall       []  Heel prevention boots placed on patient    []  Patient turned q2h during shift    []  Lift team ordered    [x]  Patient on Diane bed/Specialty bed    [x]  Each Wound is documented during shift (Stage, Color, drainage, odor, measurements, and dressings)    [x]  Dual skin checks done at bedside during shift report with GIOVANNA Ramos RN

## 2022-06-27 NOTE — PROGRESS NOTES
Wound Prevention Checklist    Patient: Agustina Thompson (56 y.o. female)  Date: 6/26/2022  Diagnosis: DKA (diabetic ketoacidosis) ([facility] [E11.10] <principal problem not specified>    Precautions: Fall       []  Heel prevention boots placed on patient    []  Patient turned q2h during shift    []  Lift team ordered    []  Patient on [place] bed/Specialty bed    []  Each Wound is documented during shift (Stage, Color, drainage, odor, measurements, and dressings)    []  Dual skin checks done at bedside during shift report with Italo Nicole RN

## 2022-06-28 LAB
FLUAV RNA SPEC QL NAA+PROBE: NOT DETECTED
FLUBV RNA SPEC QL NAA+PROBE: NOT DETECTED
GLUCOSE BLD STRIP.AUTO-MCNC: 148 MG/DL (ref 70–110)
GLUCOSE BLD STRIP.AUTO-MCNC: 184 MG/DL (ref 70–110)
GLUCOSE BLD STRIP.AUTO-MCNC: 208 MG/DL (ref 70–110)
GLUCOSE BLD STRIP.AUTO-MCNC: 276 MG/DL (ref 70–110)
PHOSPHATE SERPL-MCNC: 3.2 MG/DL (ref 2.5–4.9)
SARS-COV-2, COV2: NOT DETECTED

## 2022-06-28 PROCEDURE — 87636 SARSCOV2 & INF A&B AMP PRB: CPT

## 2022-06-28 PROCEDURE — 36415 COLL VENOUS BLD VENIPUNCTURE: CPT

## 2022-06-28 PROCEDURE — 82962 GLUCOSE BLOOD TEST: CPT

## 2022-06-28 PROCEDURE — 74011250637 HC RX REV CODE- 250/637: Performed by: INTERNAL MEDICINE

## 2022-06-28 PROCEDURE — 74011250637 HC RX REV CODE- 250/637: Performed by: PHYSICIAN ASSISTANT

## 2022-06-28 PROCEDURE — 84100 ASSAY OF PHOSPHORUS: CPT

## 2022-06-28 PROCEDURE — 74011636637 HC RX REV CODE- 636/637: Performed by: INTERNAL MEDICINE

## 2022-06-28 PROCEDURE — 65270000029 HC RM PRIVATE

## 2022-06-28 PROCEDURE — 74011000250 HC RX REV CODE- 250: Performed by: STUDENT IN AN ORGANIZED HEALTH CARE EDUCATION/TRAINING PROGRAM

## 2022-06-28 PROCEDURE — 74011250637 HC RX REV CODE- 250/637: Performed by: NURSE PRACTITIONER

## 2022-06-28 PROCEDURE — 97164 PT RE-EVAL EST PLAN CARE: CPT

## 2022-06-28 PROCEDURE — 74011636637 HC RX REV CODE- 636/637: Performed by: FAMILY MEDICINE

## 2022-06-28 RX ORDER — INSULIN GLARGINE 100 [IU]/ML
4 INJECTION, SOLUTION SUBCUTANEOUS ONCE
Status: COMPLETED | OUTPATIENT
Start: 2022-06-28 | End: 2022-06-28

## 2022-06-28 RX ORDER — INSULIN GLARGINE 100 [IU]/ML
24 INJECTION, SOLUTION SUBCUTANEOUS DAILY
Status: DISCONTINUED | OUTPATIENT
Start: 2022-06-29 | End: 2022-06-30 | Stop reason: HOSPADM

## 2022-06-28 RX ADMIN — ASPIRIN 81 MG: 81 TABLET, COATED ORAL at 08:27

## 2022-06-28 RX ADMIN — Medication 6 UNITS: at 22:15

## 2022-06-28 RX ADMIN — Medication 4 UNITS: at 12:22

## 2022-06-28 RX ADMIN — LEVOTHYROXINE SODIUM 112 MCG: 112 TABLET ORAL at 05:28

## 2022-06-28 RX ADMIN — PANTOPRAZOLE SODIUM 40 MG: 40 TABLET, DELAYED RELEASE ORAL at 08:27

## 2022-06-28 RX ADMIN — SODIUM CHLORIDE, PRESERVATIVE FREE 10 ML: 5 INJECTION INTRAVENOUS at 05:28

## 2022-06-28 RX ADMIN — Medication 9 UNITS: at 12:22

## 2022-06-28 RX ADMIN — Medication 3 UNITS: at 08:27

## 2022-06-28 RX ADMIN — LISINOPRIL 2.5 MG: 2.5 TABLET ORAL at 08:26

## 2022-06-28 RX ADMIN — PANTOPRAZOLE SODIUM 40 MG: 40 TABLET, DELAYED RELEASE ORAL at 16:44

## 2022-06-28 RX ADMIN — Medication 20 UNITS: at 08:27

## 2022-06-28 RX ADMIN — CARVEDILOL 6.25 MG: 6.25 TABLET, FILM COATED ORAL at 16:44

## 2022-06-28 RX ADMIN — ATORVASTATIN CALCIUM 40 MG: 40 TABLET, FILM COATED ORAL at 22:15

## 2022-06-28 RX ADMIN — SODIUM CHLORIDE, PRESERVATIVE FREE 10 ML: 5 INJECTION INTRAVENOUS at 14:00

## 2022-06-28 RX ADMIN — SODIUM CHLORIDE, PRESERVATIVE FREE 10 ML: 5 INJECTION INTRAVENOUS at 22:15

## 2022-06-28 RX ADMIN — CARVEDILOL 6.25 MG: 6.25 TABLET, FILM COATED ORAL at 08:27

## 2022-06-28 NOTE — PROGRESS NOTES
Bedside shift change report given to Sandra Carlin RN (oncoming nurse) by Alejo Miller RN (offgoing nurse). Report included the following information SBAR, Kardex, Intake/Output, MAR and Quality Measures.         Wound Prevention Checklist    Patient: David Vallecillo (43 y.o. female)  Date: 6/27/2022  Diagnosis: D[facility]diabetic k[facility]acid[facility] [E11.10] <principal problem not specified>    Precautions: Fall       []  Heel prevention boots placed on patient    [x]  Patient turned q2h during shift    []  Lift team ordered    []  Patient on [place] bed/Specialty bed    []  Each Wound is documented during shift (Stage, Color, drainage, odor, measurements, and dressings)    [x]  Dual skin checks done at bedside during shift report with       Palak Cardozo RN

## 2022-06-28 NOTE — PROGRESS NOTES
Bedside shift change report given to Anna Parks (oncoming nurse) by Virginia Lovell RN (offgoing nurse). Report included the following information SBAR, Kardex, Intake/Output, MAR and Quality Measures.

## 2022-06-28 NOTE — PROGRESS NOTES
[facility] Hospitalists  Progress Note    Patient: Aurora Girard Age: 66 y.o. : 1950 MR#: [id number] SSN: [id number]  Date: 2022     Subjective/24-hour events:     No new acute overnight. Remains eager to transition to next level of care. Assessment:   NSTEMI  DM2 with hyperglycemia  Aspiration pneumonia  GI bleed  Hypothyroidism  Acute blood loss anemia status post PRBC transfusion  Chronic systolic CHF, EF 30 to 13% BRENDA, resolved  Acute hypoxic respiratory failure, resolved  Sepsis, secondary to above, resolved  Shock, secondary to sepsis and volume depletion, resolved  DKA resolved      Plan:   Patient has acceptance at [facility] which is her first choice facility. Will need COVID-19 testing prior to discharge. Respiratory virus panel ordered as PCR testing is required by facility. Case discussed with:  [x]Patient  []Family  [x]Nursing  [x]Case Management  DVT Prophylaxis:  []Lovenox  []Hep SQ  [x]SCDs  []Coumadin   []On Heparin gtt    Objective:   VS:   Visit Vitals  /79 (BP 1 Location: Right upper arm, BP Patient Position: At rest)   Pulse 93   Temp 97.9 °F (36.6 °C)   Resp 16   Ht 5' 6\" (1.676 m)   Wt 82.1 kg (181 lb)   SpO2 98%   BMI 29.21 kg/m²      Tmax/24hrs: Temp (24hrs), Av.1 °F (36.7 °C), Min:97.8 °F (36.6 °C), Max:98.4 °F (36.9 °C)      Intake/Output Summary (Last 24 hours) at 2022 1001  Last data filed at 2022 0400  Gross per 24 hour   Intake --   Output 550 ml   Net -550 ml       General:  In NAD. Nontoxic-appearing. Cardiovascular:  RRR. Pulmonary:  Lungs clear bilaterally, no wheezes. GI:  Abdomen soft, NTTP. Extremities:  Warm, no edema or ischemia. Neuro:  Awake and alert. Moves extremities spontaneously.     Labs:    Recent Results (from the past 24 hour(s))   GLUCOSE, POC    Collection Time: 22 11:02 AM   Result Value Ref Range    Glucose (POC) 192 (H) 70 - 110 mg/dL   GLUCOSE, POC    Collection Time: 06/27/22  4:01 PM   Result Value Ref Range    Glucose (POC) 214 (H) 70 - 110 mg/dL   GLUCOSE, POC    Collection Time: 06/27/22  8:29 PM   Result Value Ref Range    Glucose (POC) 188 (H) 70 - 110 mg/dL   GLUCOSE, POC    Collection Time: 06/27/22 10:31 PM   Result Value Ref Range    Glucose (POC) 317 (H) 70 - 110 mg/dL   PHOSPHORUS    Collection Time: 06/28/22  2:29 AM   Result Value Ref Range    Phosphorus 3.2 2.5 - 4.9 MG/DL   GLUCOSE, POC    Collection Time: 06/28/22  7:27 AM   Result Value Ref Range    Glucose (POC) 184 (H) 70 - 110 mg/dL       Signed By: Rizwana Bhakta MD     June 28, 2022

## 2022-06-28 NOTE — PROGRESS NOTES
Problem: Diabetes Self-Management  Goal: *Disease process and treatment process  Description: Define diabetes and identify own type of diabetes; list 3 options for treating diabetes. Outcome: Progressing Towards Goal  Goal: *Incorporating nutritional management into lifestyle  Description: Describe effect of type, amount and timing of food on blood glucose; list 3 methods for planning meals. Outcome: Progressing Towards Goal  Goal: *Using medications safely  Description: State effect of diabetes medications on diabetes; name diabetes medication taking, action and side effects. Outcome: Progressing Towards Goal  Goal: *Monitoring blood glucose, interpreting and using results  Description: Identify recommended blood glucose targets  and personal targets. Outcome: Progressing Towards Goal  Goal: *Prevention, detection, treatment of acute complications  Description: List symptoms of hyper- and hypoglycemia; describe how to treat low blood sugar and actions for lowering  high blood glucose level. Outcome: Progressing Towards Goal     Problem: Falls - Risk of  Goal: *Absence of Falls  Description: Document Watson M[place] Fall Risk and appropriate interventions in the flowsheet. Outcome: Progressing Towards Goal  Note: Fall Risk Interventions:  Mobility Interventions: Bed/chair exit alarm    Mentation Interventions: Bed/chair exit alarm,More frequent rounding,Toileting rounds    Medication Interventions: Bed/chair exit alarm,Patient to call before getting OOB,Teach patient to arise slowly    Elimination Interventions: Bed/chair exit alarm,Call light in reach,Elevated toilet seat    History of Falls Interventions: Bed/chair exit alarm         Problem: Pressure Injury - Risk of  Goal: *Prevention of pressure injury  Description: Document Sid Scale and appropriate interventions in the flowsheet.   Outcome: Progressing Towards Goal  Note: Pressure Injury Interventions:  Sensory Interventions: Assess changes in LOC,Assess need for specialty bed,Minimize linen layers,Keep linens dry and wrinkle-free,Turn and reposition approx. every two hours (pillows and wedges if needed)    Moisture Interventions: Absorbent underpads,Check for incontinence Q2 hours and as needed,Minimize layers    Activity Interventions: Chair cushion,Pressure redistribution bed/mattress(bed type)    Mobility Interventions: Assess need for specialty bed,HOB 30 degrees or less,Pressure redistribution bed/mattress (bed type),Turn and reposition approx.  every two hours(pillow and wedges)    Nutrition Interventions: Document food/fluid/supplement intake    Friction and Shear Interventions: HOB 30 degrees or less,Apply protective barrier, creams and emollients                Problem: Patient Education: Go to Patient Education Activity  Goal: Patient/Family Education  Outcome: Progressing Towards Goal     Problem: Pain  Goal: *Control of Pain  Outcome: Progressing Towards Goal     Problem: Patient Education: Go to Patient Education Activity  Goal: Patient/Family Education  Outcome: Progressing Towards Goal

## 2022-06-28 NOTE — PROGRESS NOTES
Wound Prevention Checklist    Patient: Sagrario Bar (44 y.o. female)  Date: 6/28/2022  Diagnosis: [facility] [E11.10] <principal problem not specified>    Precautions: Fall       [x]  Heel prevention boots placed on patient    []  Patient turned q2h during shift    []  Lift team ordered    [x]  Patient on [place] bed/Specialty bed    []  Each Wound is documented during shift (Stage, Color, drainage, odor, measurements, and dressings)    [x]  Dual skin checks done at bedside during shift report with Devika Rendon, GIOVANNA Tilley

## 2022-06-28 NOTE — PROGRESS NOTES
Problem: Mobility Impaired (Adult and Pediatric)  Goal: *Acute Goals and Plan of Care (Insert Text)  Description: Physical Therapy Goals  Re-eval 06/28/22 - goals updated to indicate progress  1. Patient will move from supine to sit and sit to supine , scoot up and down, and roll side to side in bed with modified independence    2. Patient will transfer from bed to chair and chair to bed with modified independence using the least restrictive device. 3.  Patient will perform sit to stand with modified independence. 4.  Patient will ambulate with modified independence for 150 feet with the least restrictive device. 5.  Patient will ascend/descend 13 stairs with 1 handrail(s) with minimal assistance/contact guard assist.  6.  Patient will perform BLE therex as prescribed to tolerance     PLOF: Pt lives alone in an apt with 13 TOREY, has cane and walker for use if needed     6/28/2022 0850 by BELLA Garcia  Outcome: Progressing Towards Goal     PHYSICAL THERAPY RE-EVALUATION    Patient: Elvia Chau (58 y.o. female)  Date: 6/28/2022  Primary Diagnosis: DKA (diabetic ketoacidosis) ([facility] [address] [E11.10]  Procedure(s) (LRB):  ENDOSCOPY/ Polypectomy/ Biopsies/ Endo clips (N/A) 11 Days Post-Op   Precautions: Fall  ASSESSMENT :  Re-evaluation 06/28/22; goals reviewed and updated as indicated. Pt reclined in bed, NAD, and agreeable to PT, eager to move. Able to perform bed mobility with supervision requiring additional time and bed railings for assistance. STS with CGA to RW. Denies lightheadedness / dizziness upon standing. Ambulate with RW and Jabier progressing to CGA about room (15ftx5). Demonstrated narrowed, shuffled gait with decreased speed and forward posture. Pt has decreased activity tolerance, strength, and ROM limiting gait mechanics and functional mobility. PTA pt lived alone and was independent and ambulating with AD. Pt would benefit from rehab to address functional impairments to return to PLOF. Plans to live with daughter who can provide assistance and supervision after discharge from rehab. Pt left seated in chair, BLE elevated, set up with breakfast with all needs met and call bell in reach, RN in room. Educated on need for RN assistance with mobility; verbalized understanding. Patient will continue to benefit from skilled intervention to address the above impairments. PLAN :  Recommendations and Planned Interventions:  [x]           Bed Mobility Training             [x]    Neuromuscular Re-Education  [x]           Transfer Training                   []    Orthotic/Prosthetic Training  [x]           Gait Training                          []    Modalities  [x]           Therapeutic Exercises           []    Edema Management/Control  [x]           Therapeutic Activities            [x]    Family Training/Education  [x]           Patient Education  []           Other (comment):    Frequency/Duration: Patient will continue to be followed by physical therapy 3-5 times a week  to address goals. Discharge Recommendations: Rehab  Further Equipment Recommendations for Discharge: rolling walker     SUBJECTIVE:   Patient stated going to a facility.     OBJECTIVE DATA SUMMARY:     Past Medical History:   Diagnosis Date    Colon cancer ([facility] [address]     Diabetes ([facility] [address]     Hypertension     Hypothyroidism      Past Surgical History:   Procedure Laterality Date    COLONOSCOPY N/A 12/30/2016    COLONOSCOPY. SURVEILLANCE /c Hot Snared Polypectomy /c EndoClip x3 performed by Niya Merritt MD at [facility] ENDOSCOPY    HX HYSTERECTOMY      HX TOTAL COLECTOMY         Critical Behavior:  Neurologic State: Alert  Orientation Level: Oriented X4  Cognition: Follows commands     Psychosocial  Patient Behaviors: Calm; Cooperative    Strength:    Manual Muscle Testing (LE)  BLE generally decreased, WFL  _________________________________________________   Range Of Motion:  BLE generally decreased, WFL  Functional Mobility:  Bed Mobility:  Supine to Sit: Supervision  Scooting: Supervision  Transfers:  Sit to Stand: Stand-by assistance  Stand to Sit: Stand-by assistance  Balance:   Sitting: Intact  Standing: Impaired; With support  Standing - Static: Good  Standing - Dynamic : Fair  Ambulation/Gait Training:  Distance (ft):  (15ftx5)   Assistive Device: Walker, rolling  Ambulation - Level of Assistance: Contact guard assistance  Gait Abnormalities: Scissoring; Steppage gait    Pain:  Pain level pre-treatment: 0/10   Pain level post-treatment: 0/10     Activity Tolerance:   Fair    After treatment:   [x]         Patient left in no apparent distress sitting up in chair  []         Patient left in no apparent distress in bed  [x]         Call bell left within reach  [x]         Nursing notified  []         Caregiver present  []         Bed alarm activated  []         SCDs applied    COMMUNICATION/EDUCATION:   [x]         Role of physical therapy in the acute care setting. [x]         Fall prevention education was provided and the patient/caregiver indicated understanding. [x]         Patient/family have participated as able in goal setting and plan of care. [x]         Patient/family agree to work toward stated goals and plan of care. []         Patient understands intent and goals of therapy, but is neutral about his/her participation. []         Patient is unable to participate in goal setting/plan of care: ongoing with therapy staff.     Thank you for this referral.  Terald Goltz, SPT   Time Calculation: 11 mins

## 2022-06-28 NOTE — DIABETES MGMT
Diabetes/ Glycemic Control Plan of Care    BG values still elevated. POC BG 6/27/2022: 219, 192, 214, 188, an 317 (bedtime). Increased lantus dose from 15 units to 20 units daily + cont on very resistant dose of f  POC BG 6/27/2022: 219    Recommendations:   1.) increase basal lantus insulin dose from 20 units to 24 units daily. Order obtained. 2.) cont current humalog insulin order: correctional at very resistant dose    Assessment:   DX:   1. Hyperosmolar hyperglycemic state (HHS) ([address]     2. Diabetic ketoacidosis with coma associated with type 2 diabetes mellitus ([address]     3. High anion gap metabolic acidosis     4. Acute metabolic encephalopathy     5. Hypovolemic shock ([address]     6. Acute hyperkalemia     7. Acute renal failure, unspecified acute renal failure type ([address]     8. Elevated troponin     9. Cardiomyopathy, unspecified type ([address]     10. BRENDA (acute kidney injury) ([address]     11. Cardiogenic shock (HCC)        Fasting/ Morning blood glucose:   Lab Results   Component Value Date/Time    Glucose 165 (H) 06/26/2022 12:46 AM    Glucose (POC) 184 (H) 06/28/2022 07:27 AM    Glucose (POC) 90 02/06/2019 02:13 PM    Glucose,  (H) 01/19/2021 09:26 PM     IV Fluids containing dextrose:  None    Steroids:  None    Blood glucose values: Within target range (70-180mg/dL):  No.    Current insulin orders:   Basal lantus insulin 24 units daily  Correctional lispro insulin.  Very resistant dose    Total Daily Dose previous 24 hours: 47 units of insulin  Lantus: 20 units  Lispro: 27 units    Current A1c:   Lab Results   Component Value Date/Time    Hemoglobin A1c 9.0 (H) 06/11/2022 02:15 PM    Hemoglobin A1c, External 9.5 07/08/2021 12:00 AM      equivalent  to ave Blood Glucose of 212 mg/dl for 2-3 months prior to admission    Adequate glycemic control PTA:  No    Nutrition/Diet:   Active Orders   Diet    ADULT DIET Regular; 4 carb choices (60 gm/meal)      Meal Intake:  Patient Vitals for the past 168 hrs:   % Diet Eaten   06/24/22 1412 26 - 50%   06/24/22 0911 26 - 50%     Supplement Intake:  No data found. Home diabetes medications:   Key Antihyperglycemic Medications             insulin lispro (HUMALOG) 100 unit/mL injection (Taking) For Blood Sugar (mg/dL) of:              Less than 150 =   0 units  150 -199 =   3 units  200 -249 =   6 units  250 -299 =   9 units  300 -349 =   12 units  350 and above =   15 units and Call Physician  Initiate Hypoglycemic protocol if blood glucose is <70 mg/dL. Fast Acting - Administer Immediately - or within 15 minutes of start of meal, if mealtime coverage. insulin glargine (LANTUS) 100 unit/mL injection (Taking) Inject 10 units SQ DAILY    metFORMIN ER (GLUCOPHAGE XR) 500 mg tablet (Taking) Take 500 mg by mouth daily (with dinner). insulin regular (NOVOLIN R, HUMULIN R) 100 unit/mL injection (Taking) by SubCUTAneous route Before breakfast, lunch, and dinner. Sliding scale          Plan/Goals:   Blood glucose will be within target of 70 - 180 mg/dl within 72 hours  Reinforce dietary and medication compliance at home.        Education:  [] Refer to Diabetes Education Record                       [] Education not indicated at this time     Fatou Rendon RN [facility]  Pager: [phone]

## 2022-06-29 LAB
GLUCOSE BLD STRIP.AUTO-MCNC: 101 MG/DL (ref 70–110)
GLUCOSE BLD STRIP.AUTO-MCNC: 143 MG/DL (ref 70–110)
GLUCOSE BLD STRIP.AUTO-MCNC: 160 MG/DL (ref 70–110)
GLUCOSE BLD STRIP.AUTO-MCNC: 285 MG/DL (ref 70–110)
GLUCOSE BLD STRIP.AUTO-MCNC: 290 MG/DL (ref 70–110)
GLUCOSE BLD STRIP.AUTO-MCNC: 70 MG/DL (ref 70–110)
PHOSPHATE SERPL-MCNC: 3.6 MG/DL (ref 2.5–4.9)

## 2022-06-29 PROCEDURE — 74011250637 HC RX REV CODE- 250/637: Performed by: INTERNAL MEDICINE

## 2022-06-29 PROCEDURE — 99238 HOSP IP/OBS DSCHRG MGMT 30/<: CPT | Performed by: FAMILY MEDICINE

## 2022-06-29 PROCEDURE — 82962 GLUCOSE BLOOD TEST: CPT

## 2022-06-29 PROCEDURE — 74011636637 HC RX REV CODE- 636/637: Performed by: FAMILY MEDICINE

## 2022-06-29 PROCEDURE — 36415 COLL VENOUS BLD VENIPUNCTURE: CPT

## 2022-06-29 PROCEDURE — 65270000029 HC RM PRIVATE

## 2022-06-29 PROCEDURE — 74011636637 HC RX REV CODE- 636/637: Performed by: INTERNAL MEDICINE

## 2022-06-29 PROCEDURE — 97168 OT RE-EVAL EST PLAN CARE: CPT

## 2022-06-29 PROCEDURE — 97535 SELF CARE MNGMENT TRAINING: CPT

## 2022-06-29 PROCEDURE — 74011250637 HC RX REV CODE- 250/637: Performed by: PHYSICIAN ASSISTANT

## 2022-06-29 PROCEDURE — 74011250637 HC RX REV CODE- 250/637: Performed by: NURSE PRACTITIONER

## 2022-06-29 PROCEDURE — 2709999900 HC NON-CHARGEABLE SUPPLY

## 2022-06-29 PROCEDURE — 84100 ASSAY OF PHOSPHORUS: CPT

## 2022-06-29 RX ORDER — INSULIN GLARGINE 100 [IU]/ML
24 INJECTION, SOLUTION SUBCUTANEOUS DAILY
Qty: 10 ML | Refills: 0 | Status: ON HOLD
Start: 2022-06-29 | End: 2022-10-03 | Stop reason: SDUPTHER

## 2022-06-29 RX ORDER — LISINOPRIL 2.5 MG/1
2.5 TABLET ORAL DAILY
Qty: 30 TABLET | Refills: 0 | Status: SHIPPED | OUTPATIENT
Start: 2022-06-30

## 2022-06-29 RX ORDER — CARVEDILOL 6.25 MG/1
6.25 TABLET ORAL 2 TIMES DAILY WITH MEALS
Qty: 60 TABLET | Refills: 0 | Status: SHIPPED | OUTPATIENT
Start: 2022-06-29

## 2022-06-29 RX ADMIN — LISINOPRIL 2.5 MG: 2.5 TABLET ORAL at 09:01

## 2022-06-29 RX ADMIN — ATORVASTATIN CALCIUM 40 MG: 40 TABLET, FILM COATED ORAL at 22:28

## 2022-06-29 RX ADMIN — CARVEDILOL 6.25 MG: 6.25 TABLET, FILM COATED ORAL at 09:01

## 2022-06-29 RX ADMIN — CARVEDILOL 6.25 MG: 6.25 TABLET, FILM COATED ORAL at 20:41

## 2022-06-29 RX ADMIN — Medication 24 UNITS: at 09:01

## 2022-06-29 RX ADMIN — Medication 9 UNITS: at 11:57

## 2022-06-29 RX ADMIN — LEVOTHYROXINE SODIUM 112 MCG: 112 TABLET ORAL at 05:48

## 2022-06-29 RX ADMIN — PANTOPRAZOLE SODIUM 40 MG: 40 TABLET, DELAYED RELEASE ORAL at 09:01

## 2022-06-29 RX ADMIN — ASPIRIN 81 MG: 81 TABLET, COATED ORAL at 09:01

## 2022-06-29 NOTE — DISCHARGE SUMMARY
Discharge summary addendum:  Please see previously dictated summary done on 6/21/2022 by Dr. Regla Delcid. Patient was previously being prepared for discharge to inpatient rehab here at [facility] but this was denied by her insurance. She has continued to remain stable medically and has had no new issues in interim status previously planned discharge today. Patient has continued to work with PT/OT and is continuing to make strides with regard to improvement of overall functional status. Insurance authorization has been obtained for transfer to a local skilled nursing facility for subacute rehab and she remains medically stable for transition today. COVID-19 PCR testing done 6/28/2022 has resulted as negative. Visit Vitals  /83   Pulse 97   Temp 97 °F (36.1 °C)   Resp 20   Ht 5' 6\" (1.676 m)   Wt 82.1 kg (181 lb)   SpO2 97%   BMI 29.21 kg/m²       General:  In NAD. Nontoxic-appearing. Cardiovascular:  RRR. Pulmonary:  Lungs clear bilaterally, no wheezes. No accessory muscle use. GI:  Abdomen soft, NTTP. Extremities:  Warm, no edema or ischemia. Neuro:  Awake and alert. Moves extremities spontaneously, motor grossly nonfocal.      Current discharge medications (revised):  Current Discharge Medication List      START taking these medications    Details   lisinopriL (PRINIVIL, ZESTRIL) 2.5 mg tablet Take 1 Tablet by mouth daily. Qty: 30 Tablet, Refills: 0  Start date: 6/30/2022      carvediloL (COREG) 6.25 mg tablet Take 1 Tablet by mouth two (2) times daily (with meals). Qty: 60 Tablet, Refills: 0  Start date: 6/29/2022      atorvastatin (LIPITOR) 40 mg tablet Take 1 Tablet by mouth nightly. Qty: 30 Tablet, Refills: 0  Start date: 6/20/2022      docusate sodium (COLACE) 100 mg capsule Take 1 Capsule by mouth daily.   Qty: 30 Capsule, Refills: 0  Start date: 6/21/2022      insulin lispro (HUMALOG) 100 unit/mL injection For Blood Sugar (mg/dL) of:              Less than 150 =   0 units  150 -199 = 3 units  200 -249 =   6 units  250 -299 =   9 units  300 -349 =   12 units  350 and above =   15 units and Call Physician  Initiate Hypoglycemic protocol if blood glucose is <70 mg/dL. Fast Acting - Administer Immediately - or within 15 minutes of start of meal, if mealtime coverage. Qty: 1 Each, Refills: 0  Start date: 6/20/2022      pantoprazole (PROTONIX) 40 mg tablet Take 1 Tablet by mouth Before breakfast and dinner. Qty: 60 Tablet, Refills: 0  Start date: 6/20/2022      polyethylene glycol (MIRALAX) 17 gram packet Take 1 Packet by mouth daily. Indications: HOLD IT IF DIARRHEA  Qty: 10 Packet, Refills: 0  Start date: 6/21/2022      aspirin delayed-release 81 mg tablet Take 1 Tablet by mouth daily. Qty: 30 Tablet, Refills: 0  Start date: 6/21/2022         CONTINUE these medications which have CHANGED    Details   insulin glargine (LANTUS) 100 unit/mL injection 24 Units by SubCUTAneous route daily. Inject 10 units SQ DAILY  Qty: 10 mL, Refills: 0  Start date: 6/29/2022         CONTINUE these medications which have NOT CHANGED    Details   metFORMIN ER (GLUCOPHAGE XR) 500 mg tablet Take 500 mg by mouth daily (with dinner). Cholecalciferol, Vitamin D3, 50,000 unit cap Take 1 Capsule by mouth every seven (7) days. Takes on wednesdays      amLODIPine (NORVASC) 5 mg tablet Take 5 mg by mouth daily. levothyroxine (SYNTHROID) 112 mcg tablet Take 100 mcg by mouth Daily (before breakfast). gabapentin (NEURONTIN) 100 mg capsule Take 100 mg by mouth three (3) times daily. alendronate (FOSAMAX) 10 mg tablet Take 70 mg by mouth every seven (7) days. STOP taking these medications       insulin regular (NOVOLIN R, HUMULIN R) 100 unit/mL injection Comments:   Reason for Stopping:         aspirin 81 mg chewable tablet Comments:   Reason for Stopping:               Yanelis Mancera.  Jocelyn Wilkes MD  [address] [facility]

## 2022-06-29 NOTE — PROGRESS NOTES
Received a call from pt daughter Brent Rosado [phone] requesting an updated on the status of insurance authorization for pt. Per our conversation SW explained that Juhi Coleman was still pending. Pt daughter expressed that she felt as though pt would need additional care and that she needed information on applying for Medicaid. GURDEEP provided pt daughter with [employer] contact name and number to assist with Medicaid application. Gurdeep ex[plained to pt daughter that pt would not be able to stay in hospital until Medicaid application was approved that if insurance denies for rehab that the plans would be home with  services.       [facility] Case Management Social Worker    [phone]

## 2022-06-29 NOTE — ROUTINE PROCESS
Bedside and Verbal shift change report given to Opal Katz RN (oncoming nurse) by Manuel Alcala RN (offgoing nurse). Report included the following information SBAR.

## 2022-06-29 NOTE — PROGRESS NOTES
Problem: Self Care Deficits Care Plan (Adult)  Goal: *Acute Goals and Plan of Care (Insert Text)  Description: Occupational Therapy Goals  Re-evaluation 6/29/2022, goals # 1 -3 and # 8 updated; all other goals to be achieved within 7 day(s)    1. Patient will perform upper body dressing with modified independence   2. Patient will perform grooming standing > 5 minutes with supervision/set-up, F+ balance. 3.  Patient will perform bathing with supervision/set-up. 4.  Patient will perform toilet transfers with standby assist using RW. 5.  Patient will perform all aspects of toileting with standby assist.  6.  Patient will participate in upper extremity therapeutic exercise/activities with supervision/set-up for 8 minutes to improve endurance and UB strength needed for ADLs    7. Patient will utilize energy conservation techniques during functional activities with verbal cues. 8.  Patient will perform lower body dressing with supervision/ set-up. Prior Level of Function: Pt reports she lives alone and is usually independent with ADLs and functional mobility using cane mostly. Pt lives in 2nd floor apartment  Outcome: Progressing Towards Goal     OCCUPATIONAL THERAPY RE-EVALUATION    Patient: Jameson Fulton (31 y.o. female)  Date: 6/29/2022  Primary Diagnosis: DKA (diabetic ketoacidosis) ([facility] [address] [E11.10]  Procedure(s) (LRB):  ENDOSCOPY/ Polypectomy/ Biopsies/ Endo clips (N/A) 12 Days Post-Op   Precautions: Fall,Skin      ASSESSMENT :  Upon entering the room, the patient was seated EOB, alert, and agreeable to participate in OT re-evaluation. Patient requesting to get to [facility], stand by assist for sit <> stand and contact guard assist for functional transfer. Patient performed toileting tasks with stand by assist seated, contact guard assist standing. Patient contact guard assist for donning briefs.  Patient ambulated approx 7 ft this session to recliner ans left semi reclined in bed, all needs met and call bell in reach. Based on the objective data described below, the patient presents with decreased strength, decreased independence, decreased activity tolerance, decreased functional balance, and decreased functional mobility, which impedes pt performance in basic self-care/ADL tasks. Patient would benefit from continued skilled OT to restore PLOF and maximize function. Patient will benefit from skilled intervention to address the above impairments. Patient's rehabilitation potential is considered to be Good  Factors which may influence rehabilitation potential include:   []             None noted  []             Mental ability/status  [x]             Medical condition  [x]             Home/family situation and support systems  [x]             Safety awareness  []             Pain tolerance/management  []             Other:      PLAN :  Recommendations and Planned Interventions:   [x]               Self Care Training                  [x]      Therapeutic Activities  [x]               Functional Mobility Training   []      Cognitive Retraining  [x]               Therapeutic Exercises           [x]      Endurance Activities  [x]               Balance Training                    [x]      Neuromuscular Re-Education  []               Visual/Perceptual Training     [x]      Home Safety Training  [x]               Patient Education                   [x]      Family Training/Education  []               Other (comment):    Frequency/Duration: Patient will be followed by occupational therapy 3 - 5  times a week to address goals. Discharge Recommendations: Rehab  Further Equipment Recommendations for Discharge: shower chair and rolling walker     SUBJECTIVE:   Patient stated do you know what time I'm leaving?     OBJECTIVE DATA SUMMARY:   Hospital course since last seen and reason for reevaluation: Patient has been progressing appropriately as a result of receiving skilled OT services.  OT will continue to follow the patient for further intervention during this hospitalization, in order to maximize ADL performance and overall functional independence. Past Medical History:   Diagnosis Date    Colon cancer ([facility] [address]     Diabetes ([facility] [address]     Hypertension     Hypothyroidism      Past Surgical History:   Procedure Laterality Date    COLONOSCOPY N/A 12/30/2016    COLONOSCOPY. SURVEILLANCE /c Hot Snared Polypectomy /c EndoClip x3 performed by Silvestre Maxwell MD at [facility] ENDOSCOPY    HX HYSTERECTOMY      HX TOTAL COLECTOMY       Barriers to Learning/Limitations: None  Compensate with: visual, verbal, tactile, kinesthetic cues/model    Home Situation:   Home Situation  Home Environment: Private residence  One/Two Story Residence: One story  Living Alone: Yes  Support Systems: Other (Comment) (Patient lives alone. )  Patient Expects to be Discharged to[de-identified] Rehab Unit Subacute  Current DME Used/Available at Home: Avni Chino, straight,Walker, rolling  []  Right hand dominant   []  Left hand dominant    Cognitive/Behavioral Status:  Neurologic State: Alert  Orientation Level: Oriented X4  Cognition: Follows commands  Safety/Judgement: Fall prevention    Skin: Intact  Edema: None noted    Vision/Perceptual:    Acuity: Within Defined Limits    Corrective Lenses: Glasses    Coordination: BUE  Fine Motor Skills-Upper: Left Intact; Right Intact    Gross Motor Skills-Upper: Left Intact; Right Intact    Balance:  Sitting: Intact  Standing: Impaired; With support  Standing - Static: Fair (F+)  Standing - Dynamic : Fair    Strength: BUE  Strength: Generally decreased, functional    Tone & Sensation: BUE  Tone: Normal  Sensation: Intact    Range of Motion: BUE  AROM: Within functional limits    Functional Mobility and Transfers for ADLs:  Bed Mobility:  Supine to Sit:  (pt siting EOB upon OT entering the room)  Scooting: Supervision    Transfers:  Sit to Stand: Stand-by assistance  Stand to Sit: Stand-by assistance   Toilet Transfer : Contact guard assistance (BSC)      ADL Assessment:   Feeding: Independent    Oral Facial Hygiene/Grooming: Setup;Stand-by assistance;Contact guard assistance (CGA standing)    Bathing: Contact guard assistance;Minimum assistance    Upper Body Dressing: Stand-by assistance    Lower Body Dressing: Contact guard assistance;Minimum assistance    Toileting: Stand by assistance;Contact guard assistance       ADL Intervention:  Lower Body Dressing Assistance  Dressing Assistance: Contact guard assistance  Underpants: Contact guard assistance (briefs)  Leg Crossed Method Used: Yes  Position Performed: Bending forward method;Seated in chair    Toileting  Toileting Assistance: Stand-by assistance;Contact guard assistance  Bladder Hygiene: Stand-by assistance  Bowel Hygiene: Contact guard assistance (standing)    Cognitive Retraining  Safety/Judgement: Fall prevention    Pain:  Pain level pre-treatment: 0/10   Pain level post-treatment: 0/10  Pain Intervention(s): Medication (see MAR); Rest, Ice, Repositioning   Response to intervention: Nurse notified, See doc flow    Activity Tolerance:   Good      Please refer to the flowsheet for vital signs taken during this treatment. After treatment:   [x] Patient left in no apparent distress sitting up in chair  [] Patient left in no apparent distress in bed  [x] Call bell left within reach  [x] Nursing notified  [] Caregiver present  [] Bed alarm activated    COMMUNICATION/EDUCATION:   [x] Role of Occupational Therapy in the acute care setting  [x] Home safety education was provided and the patient/caregiver indicated understanding. [x] Patient/family have participated as able in goal setting and plan of care. [x] Patient/family agree to work toward stated goals and plan of care. [] Patient understands intent and goals of therapy, but is neutral about his/her participation. [] Patient is unable to participate in goal setting and plan of care.     Thank you for this referral.  Colt Meng, OTR/L  Time Calculation: 17 mins

## 2022-06-29 NOTE — PROGRESS NOTES
Problem: Diabetes Self-Management  Goal: *Disease process and treatment process  Description: Define diabetes and identify own type of diabetes; list 3 options for treating diabetes. Outcome: Progressing Towards Goal  Goal: *Incorporating nutritional management into lifestyle  Description: Describe effect of type, amount and timing of food on blood glucose; list 3 methods for planning meals. Outcome: Progressing Towards Goal  Goal: *Incorporating physical activity into lifestyle  Description: State effect of exercise on blood glucose levels. Outcome: Progressing Towards Goal  Goal: *Developing strategies to promote health/change behavior  Description: Define the ABC's of diabetes; identify appropriate screenings, schedule and personal plan for screenings. Outcome: Progressing Towards Goal  Goal: *Using medications safely  Description: State effect of diabetes medications on diabetes; name diabetes medication taking, action and side effects. Outcome: Progressing Towards Goal  Goal: *Monitoring blood glucose, interpreting and using results  Description: Identify recommended blood glucose targets  and personal targets. Outcome: Progressing Towards Goal  Goal: *Prevention, detection, treatment of acute complications  Description: List symptoms of hyper- and hypoglycemia; describe how to treat low blood sugar and actions for lowering  high blood glucose level. Outcome: Progressing Towards Goal  Goal: *Prevention, detection and treatment of chronic complications  Description: Define the natural course of diabetes and describe the relationship of blood glucose levels to long term complications of diabetes.   Outcome: Progressing Towards Goal  Goal: *Developing strategies to address psychosocial issues  Description: Describe feelings about living with diabetes; identify support needed and support network  Outcome: Progressing Towards Goal  Goal: *Sick day guidelines  Outcome: Progressing Towards Goal  Goal: *Patient Specific Goal (EDIT GOAL, INSERT TEXT)  Outcome: Progressing Towards Goal     Problem: Patient Education: Go to Patient Education Activity  Goal: Patient/Family Education  Outcome: Progressing Towards Goal     Problem: Falls - Risk of  Goal: *Absence of Falls  Description: Document Surya Morales Fall Risk and appropriate interventions in the flowsheet. Outcome: Progressing Towards Goal  Note: Fall Risk Interventions:  Mobility Interventions: Bed/chair exit alarm,Communicate number of staff needed for ambulation/transfer,Patient to call before getting OOB    Mentation Interventions: Adequate sleep, hydration, pain control,Toileting rounds    Medication Interventions: Bed/chair exit alarm,Patient to call before getting OOB    Elimination Interventions: Bed/chair exit alarm,Call light in reach,Patient to call for help with toileting needs    History of Falls Interventions: Bed/chair exit alarm         Problem: Patient Education: Go to Patient Education Activity  Goal: Patient/Family Education  Outcome: Progressing Towards Goal     Problem: Nutrition Deficit  Goal: *Optimize nutritional status  Outcome: Progressing Towards Goal     Problem: Patient Education: Go to Patient Education Activity  Goal: Patient/Family Education  Outcome: Progressing Towards Goal     Problem: Pressure Injury - Risk of  Goal: *Prevention of pressure injury  Description: Document Sid Scale and appropriate interventions in the flowsheet.   Outcome: Progressing Towards Goal  Note: Pressure Injury Interventions:  Sensory Interventions: Assess changes in LOC,Minimize linen layers    Moisture Interventions: Absorbent underpads    Activity Interventions: Pressure redistribution bed/mattress(bed type),PT/OT evaluation    Mobility Interventions: PT/OT evaluation,Pressure redistribution bed/mattress (bed type)    Nutrition Interventions: Document food/fluid/supplement intake    Friction and Shear Interventions: Minimize layers,Apply protective barrier, creams and emollients                Problem: Patient Education: Go to Patient Education Activity  Goal: Patient/Family Education  Outcome: Progressing Towards Goal     Problem: Pain  Goal: *Control of Pain  Outcome: Progressing Towards Goal     Problem: Patient Education: Go to Patient Education Activity  Goal: Patient/Family Education  Outcome: Progressing Towards Goal

## 2022-06-29 NOTE — PROGRESS NOTES
Patient unable to understand and/or complete the \"Important Message from [employer]". Reviewed document with patient's daughter Tavia Chun) at phone number [phone] telephonically and addressed questions. Ms. Kevin stated she understands her mother's right to appeal and was given Jeniffer Pee number [phone] for future, incase she has any questions. A copy of the IMM letter left at bedside with patient. Original, with verbal signature noted, placed in patient's chart.

## 2022-06-29 NOTE — PROGRESS NOTES
SW contacted [employer] ([phone]) spoke with Yumiko regarding status of insurance auth and per our discussion she states that she will need  Her medical doctor review the clinicals as pt is walking 15 ft (X) 5.        [facility] Case Management Social worker  [phone]

## 2022-06-29 NOTE — PROGRESS NOTES
Patient POC glucose resulted 70mg/dl. Patient stated she did not eat much lunch. She was given apple juice and ate her dinner. POC glucose rechecked and resulted 101mg/dl. Patient has no further questions/complaints. Will continue to monitor.

## 2022-06-30 ENCOUNTER — HOME HEALTH ADMISSION (OUTPATIENT)
Dept: HOME HEALTH SERVICES | Facility: HOME HEALTH | Age: 72
End: 2022-06-30
Payer: MEDICARE

## 2022-06-30 VITALS
OXYGEN SATURATION: 99 % | HEIGHT: 66 IN | RESPIRATION RATE: 20 BRPM | HEART RATE: 79 BPM | TEMPERATURE: 98.7 F | SYSTOLIC BLOOD PRESSURE: 120 MMHG | BODY MASS INDEX: 29.09 KG/M2 | WEIGHT: 181 LBS | DIASTOLIC BLOOD PRESSURE: 77 MMHG

## 2022-06-30 LAB
GLUCOSE BLD STRIP.AUTO-MCNC: 108 MG/DL (ref 70–110)
GLUCOSE BLD STRIP.AUTO-MCNC: 177 MG/DL (ref 70–110)
PHOSPHATE SERPL-MCNC: 4 MG/DL (ref 2.5–4.9)

## 2022-06-30 PROCEDURE — 97535 SELF CARE MNGMENT TRAINING: CPT

## 2022-06-30 PROCEDURE — 74011250637 HC RX REV CODE- 250/637: Performed by: INTERNAL MEDICINE

## 2022-06-30 PROCEDURE — 74011636637 HC RX REV CODE- 636/637: Performed by: INTERNAL MEDICINE

## 2022-06-30 PROCEDURE — 74011250637 HC RX REV CODE- 250/637: Performed by: PHYSICIAN ASSISTANT

## 2022-06-30 PROCEDURE — 74011000250 HC RX REV CODE- 250: Performed by: STUDENT IN AN ORGANIZED HEALTH CARE EDUCATION/TRAINING PROGRAM

## 2022-06-30 PROCEDURE — 82962 GLUCOSE BLOOD TEST: CPT

## 2022-06-30 PROCEDURE — 74011636637 HC RX REV CODE- 636/637: Performed by: FAMILY MEDICINE

## 2022-06-30 PROCEDURE — 84100 ASSAY OF PHOSPHORUS: CPT

## 2022-06-30 PROCEDURE — 74011250637 HC RX REV CODE- 250/637: Performed by: NURSE PRACTITIONER

## 2022-06-30 PROCEDURE — 36415 COLL VENOUS BLD VENIPUNCTURE: CPT

## 2022-06-30 RX ADMIN — PANTOPRAZOLE SODIUM 40 MG: 40 TABLET, DELAYED RELEASE ORAL at 09:07

## 2022-06-30 RX ADMIN — CARVEDILOL 6.25 MG: 6.25 TABLET, FILM COATED ORAL at 09:08

## 2022-06-30 RX ADMIN — LISINOPRIL 2.5 MG: 2.5 TABLET ORAL at 09:07

## 2022-06-30 RX ADMIN — SODIUM CHLORIDE, PRESERVATIVE FREE 10 ML: 5 INJECTION INTRAVENOUS at 12:35

## 2022-06-30 RX ADMIN — Medication 3 UNITS: at 12:35

## 2022-06-30 RX ADMIN — SODIUM CHLORIDE, PRESERVATIVE FREE 10 ML: 5 INJECTION INTRAVENOUS at 09:09

## 2022-06-30 RX ADMIN — LEVOTHYROXINE SODIUM 112 MCG: 112 TABLET ORAL at 06:48

## 2022-06-30 RX ADMIN — Medication 24 UNITS: at 09:08

## 2022-06-30 RX ADMIN — ASPIRIN 81 MG: 81 TABLET, COATED ORAL at 09:08

## 2022-06-30 NOTE — PROGRESS NOTES
Wound Prevention Checklist    Patient: Sagrario Bar (44 y.o. female)  Date: 6/30/2022  Diagnosis: DKA (diabetic ketoacidosis) ([facility] [address] [E11.10] <principal problem not specified>    Precautions: Fall,Skin       []  Heel prevention boots placed on patient    [x]  Patient turned q2h during shift    []  Lift team ordered    [x]  Patient on [place] bed/Specialty bed    [x]  Each Wound is documented during shift (Stage, Color, drainage, odor, measurements, and dressings)    [x]  Dual skin checks done at bedside during shift report with Leila Dakins, RN    Patient noted with stage 2 on upper center buttock, no friction or pressure. States\" she scratches area when it itches and broke the skin in two area. \" Communicated to oncoming nurse. George Rivera RN     Bedside and Verbal shift change report given to Leila Dakins, RN (oncoming nurse) by Hosey Homans, RN (offgoing nurse). Report given with SBAR, Kardex, Intake/Output, MAR and Recent Results.

## 2022-06-30 NOTE — PROGRESS NOTES
Discharge order noted for today. Pt has been accepted to [facility] agency. Met with patient and pt is agreeable to the transition plan today. Transport has been arranged through pt daughter Lester Mac who states  That she will be transporting pt home. Patient's discharge summary and home health  orders have been forwarded to [facility] home health  agency via the Queue (Epic). Updated bedside RN, Hrasha Holliday  to the transition plan.   Discharge information has been documented on the AVS.       [address] Case management Social Worker  [phone]

## 2022-06-30 NOTE — DISCHARGE INSTRUCTIONS
DISCHARGE SUMMARY from Nurse    PATIENT INSTRUCTIONS:    After general anesthesia or intravenous sedation, for 24 hours or while taking prescription Narcotics:  · Limit your activities  · Do not drive and operate hazardous machinery  · Do not make important personal or business decisions  · Do  not drink alcoholic beverages  · If you have not urinated within 8 hours after discharge, please contact your surgeon on call. Report the following to your surgeon:  · Excessive pain, swelling, redness or odor of or around the surgical area  · Temperature over 100.5  · Nausea and vomiting lasting longer than 4 hours or if unable to take medications  · Any signs of decreased circulation or nerve impairment to extremity: change in color, persistent  numbness, tingling, coldness or increase pain  · Any questions    What to do at Home:  Recommended activity: Activity as tolerated, ***    If you experience any of the following symptoms chest pain , shortness of breath, fever greater than 100.5, nausea, vomiting, pain unrelieved by medication, please follow up with Rayne Zamorano MD.    *  Please give a list of your current medications to your Primary Care Provider. *  Please update this list whenever your medications are discontinued, doses are      changed, or new medications (including over-the-counter products) are added. *  Please carry medication information at all times in case of emergency situations. These are general instructions for a healthy lifestyle:    No smoking/ No tobacco products/ Avoid exposure to second hand smoke  Surgeon General's Warning:  Quitting smoking now greatly reduces serious risk to your health.     Obesity, smoking, and sedentary lifestyle greatly increases your risk for illness    A healthy diet, regular physical exercise & weight monitoring are important for maintaining a healthy lifestyle    You may be retaining fluid if you have a history of heart failure or if you experience any of the following symptoms:  Weight gain of 3 pounds or more overnight or 5 pounds in a week, increased swelling in our hands or feet or shortness of breath while lying flat in bed. Please call your doctor as soon as you notice any of these symptoms; do not wait until your next office visit. Patient armband removed and shredded  MyChart Activation    Thank you for requesting access to [facility] Steps. Please follow the instructions below to securely access and download your online medical record. [facility] Steps allows you to send messages to your doctor, view your test results, renew your prescriptions, schedule appointments, and more. How Do I Sign Up? 1. In your internet browser, go to [email or web address]  2. Click on the First Time User? Click Here link in the Sign In box. You will be redirect to the New Member Sign Up page. 3. Enter your [facility] Steps Access Code exactly as it appears below. You will not need to use this code after youve completed the sign-up process. If you do not sign up before the expiration date, you must request a new code. Casual Steps Access Code: [email or web address]  Expires: 2022 11:33 AM (This is the date your Casual Steps access code will )    4. Enter the last four digits of your Social Security Number (xxxx) and Date of Birth (mm/dd/yyyy) as indicated and click Submit. You will be taken to the next sign-up page. 5. Create a Casual Steps ID. This will be your Casual Steps login ID and cannot be changed, so think of one that is secure and easy to remember. 6. Create a Casual Steps password. You can change your password at any time. 7. Enter your Password Reset Question and Answer. This can be used at a later time if you forget your password. 8. Enter your e-mail address. You will receive e-mail notification when new information is available in [address] 9. Click Sign Up. You can now view and download portions of your medical record.   10. Click the Download Summary menu link to download a portable copy of your medical information. Additional Information    If you have questions, please visit the Frequently Asked Questions section of the [employer] website at [email or web address] Remember, Inventalatort is NOT to be used for urgent needs. For medical emergencies, dial 911. The discharge information has been reviewed with the {PATIENT PARENT GUARDIAN:21548}. The {PATIENT PARENT GUARDIAN:11775} verbalized understanding. Discharge medications reviewed with the {Dishcarge meds reviewed UVGQ:24628} and appropriate educational materials and side effects teaching were provided. ___________________________________________________________________________________________________________________________________  DISCHARGE SUMMARY from Nurse    PATIENT INSTRUCTIONS:    After general anesthesia or intravenous sedation, for 24 hours or while taking prescription Narcotics:  · Limit your activities  · Do not drive and operate hazardous machinery  · Do not make important personal or business decisions  · Do  not drink alcoholic beverages  · If you have not urinated within 8 hours after discharge, please contact your surgeon on call. Report the following to your surgeon:  · Excessive pain, swelling, redness or odor of or around the surgical area  · Temperature over 100.5  · Nausea and vomiting lasting longer than 4 hours or if unable to take medications  · Any signs of decreased circulation or nerve impairment to extremity: change in color, persistent  numbness, tingling, coldness or increase pain  · Any questions    What to do at Home:  Recommended activity: Activity as tolerated, ***    If you experience any of the following symptoms chest pain, shortness of breath, fever greater than 100.5, nausea, vomiting, pain unrelieved by medication , please follow up with [facility]. *  Please give a list of your current medications to your Primary Care Provider.     *  Please update this list whenever your medications are discontinued, doses are      changed, or new medications (including over-the-counter products) are added. *  Please carry medication information at all times in case of emergency situations. These are general instructions for a healthy lifestyle:    No smoking/ No tobacco products/ Avoid exposure to second hand smoke  Surgeon General's Warning:  Quitting smoking now greatly reduces serious risk to your health. Obesity, smoking, and sedentary lifestyle greatly increases your risk for illness    A healthy diet, regular physical exercise & weight monitoring are important for maintaining a healthy lifestyle    You may be retaining fluid if you have a history of heart failure or if you experience any of the following symptoms:  Weight gain of 3 pounds or more overnight or 5 pounds in a week, increased swelling in our hands or feet or shortness of breath while lying flat in bed. Please call your doctor as soon as you notice any of these symptoms; do not wait until your next office visit. Patient armband removed and shredded  MyChart Activation    Thank you for requesting access to [facility]. Please follow the instructions below to securely access and download your online medical record. [facility] allows you to send messages to your doctor, view your test results, renew your prescriptions, schedule appointments, and more. How Do I Sign Up?    11. In your internet browser, go to [email or web address]  12. Click on the First Time User? Click Here link in the Sign In box. You will be redirect to the New Member Sign Up page. 15. Enter your MI [facility]line Access Code exactly as it appears below. You will not need to use this code after youve completed the sign-up process. If you do not sign up before the expiration date, you must request a new code. [facility]line Access Code: [email or web address]  Expires: 2022 11:33 AM (This is the date your MI [facility]line access code will )    14.  Enter the last four digits of your Social Security Number (xxxx) and Date of Birth (mm/dd/yyyy) as indicated and click Submit. You will be taken to the next sign-up page. 15. Create a Neocis ID. This will be your Neocis login ID and cannot be changed, so think of one that is secure and easy to remember. 12. Create a Neocis password. You can change your password at any time. 16. Enter your Password Reset Question and Answer. This can be used at a later time if you forget your password. 25. Enter your e-mail address. You will receive e-mail notification when new information is available in [address] 19. Click Sign Up. You can now view and download portions of your medical record. 20. Click the Download Summary menu link to download a portable copy of your medical information. Additional Information    If you have questions, please visit the Frequently Asked Questions section of the Neocis website at [email or web address] Remember, Neocis is NOT to be used for urgent needs. For medical emergencies, dial 911. The discharge information has been reviewed with the {PATIENT PARENT GUARDIAN:82455}. The {PATIENT PARENT GUARDIAN:86854} verbalized understanding. Discharge medications reviewed with the {Dishcarge meds reviewed CRKV:96628} and appropriate educational materials and side effects teaching were provided.   ___________________________________________________________________________________________________________________________________

## 2022-06-30 NOTE — PROGRESS NOTES
Discharge teaching completed at bedside with patient. Opportunity provided for clarifying questions. All answered to patient satisfaction. IV(1) removed. ID removed and shredded.

## 2022-06-30 NOTE — HOME CARE
Home care referral received for SN PT OT  Spoke to patient's daughter, Mayra Wells. Verified address and telephone numbers. We will use her phone [phone] as primary and patient's phone as back up to schedule visits. Explained services ordered and agency routines. She states patient has dme including 2 walkers, 1 with wheels, 1 without, cane, bsc, over the toilet seat. Orders noted and arranged.    Contact information for agency on AVS.    Harriett Johnson RN, [address] [place] [address] [employer]  [phone]

## 2022-06-30 NOTE — PROGRESS NOTES
Bedside shift change report given to Roxane Hoover RN (oncoming nurse) by Lynn Morton RN (offgoing nurse). Report included the following information SBAR, Kardex, MAR and Quality Measures. Wound Prevention Checklist    Patient: Amara Maddox (2 y.o. female)  Date: 6/29/2022  Diagnosis: DKA (diabetic ketoacidosis) ([facility] [address] [E11.10] <principal problem not specified>    Precautions: Fall,Skin       []  Heel prevention boots placed on patient    []  Patient turned q2h during shift    []  Lift team ordered    []  Patient on [place] bed/Specialty bed    [x]  Each Wound is documented during shift (Stage, Color, drainage, odor, measurements, and dressings)    [x]  Dual skin checks done at bedside during shift report with Roxane Hoover RN.     Rafael Pavon, RN [FreeTextEntry1] : Ms. HEYDI ROMAN is a 52 year old female PMHx htn/ hld/ DMT2 w neuropathy BLE, CKD/ COPD/ bipolar disorder/ chronic back pain, who presents with low back pain. Patient stated that back in april was suppose to get Percocet (was on 10mg/325mg two to three times a day (on for couple of years) renewed by her orthopedic spine physician Dr. Jesse Shaikh but her tox screen was negative for Percocet when she said she took it the night before and thus was unable to get her Percocet refilled. She has since been looking for a physician for medication options for her pain. \par \par Location:Low back and mid back bilaterally L>R \par Onset:Years ago, chronic\par Provocation/Palliative:Worse with movement, better with rest\par Quality: achy, sore \par Radiation: sometimes will get sharp shooting pain to the LLE, no issues with RLE \par Severity: 10+/10 at its worst, states that she is in severe pain now \par Timing: progressively getting worse \par \par Endorses to numbness on her left anterior shin as well as on her left foot at times. Endorses to left leg weakness due to pain. Denies any loss of bowel/bladder control or any groin numbness.\par Previous medications trialed:Has been on Percocet in past. Gabapentin 800mg TID (was increased from 600mg TID beginning of May 2021), Flexeril PRN for back spasm which she stated helps with spasm \par Previous procedures relevant to complaint:L5-S1 laminectomy done in 2016 in past, tried epidural injections in the past with some relief and MBB with no relief \par Conservative therapy tried?: PT with aqua therapy a year ago which she stated did help\par

## 2022-06-30 NOTE — PROGRESS NOTES
Problem: Self Care Deficits Care Plan (Adult)  Goal: *Acute Goals and Plan of Care (Insert Text)  Description: Occupational Therapy Goals  Re-evaluation 6/29/2022, goals # 1 -3 and # 8 updated; all other goals to be achieved within 7 day(s)    1. Patient will perform upper body dressing with modified independence   2. Patient will perform grooming standing > 5 minutes with supervision/set-up, F+ balance. 3.  Patient will perform bathing with supervision/set-up. 4.  Patient will perform toilet transfers with standby assist using RW. 5.  Patient will perform all aspects of toileting with standby assist.  6.  Patient will participate in upper extremity therapeutic exercise/activities with supervision/set-up for 8 minutes to improve endurance and UB strength needed for ADLs    7. Patient will utilize energy conservation techniques during functional activities with verbal cues. 8.  Patient will perform lower body dressing with supervision/ set-up. Prior Level of Function: Pt reports she lives alone and is usually independent with ADLs and functional mobility using cane mostly. Pt lives in 2nd floor apartment  Outcome: Progressing Towards Goal       OCCUPATIONAL THERAPY TREATMENT    Patient: Lincoln Rodney (69 y.o. female)  Date: 6/30/2022  Diagnosis: DKA (diabetic ketoacidosis) ([facility] [E11.10] <principal problem not specified>  Procedure(s) (LRB):  ENDOSCOPY/ Polypectomy/ Biopsies/ Endo clips (N/A) 13 Days Post-Op  Precautions: Fall,Skin        Chart, occupational therapy assessment, plan of care, and goals were reviewed. ASSESSMENT:  Patient cleared to participate in OT treatment by RN. Upon entering the room, the patient was in bed, alert, and agreeable to participate in OT session and requesting to get dressed for discharge home. Patient modified independent with bed mobility, stand by assist with sit to stand and contact guard - minimal assist for lower body dressing donning briefs and legging pants. Patient educated on compensatory techniques dressing \"more difficult\" RLE first, patient reports using a reacher at home. Patient stood this session for pericare following removal of briefs, patient contact guard assist for standing balance. Patient left in bed, all needs met and call bell in reach. RN present to remove IV. Progression toward goals:  []          Improving appropriately and progressing toward goals  [x]          Improving slowly and progressing toward goals  []          Not making progress toward goals and plan of care will be adjusted     PLAN:  Patient continues to benefit from skilled intervention to address the above impairments. Continue treatment per established plan of care. Discharge Recommendations:  Home Health with 24/7 family supervision for safety  Further Equipment Recommendations for Discharge:  shower chair and rolling walker     SUBJECTIVE:   Patient stated my daughter will be with me    OBJECTIVE DATA SUMMARY:   Cognitive/Behavioral Status:  Neurologic State: Alert  Orientation Level: Oriented X4  Cognition: Follows commands  Safety/Judgement: Fall prevention    Functional Mobility and Transfers for ADLs:   Bed Mobility:  Supine to Sit: Modified independent  Scooting: Modified independent     Transfers:  Sit to Stand: Stand-by assistance  Stand to Sit: Stand-by assistance    Balance:  Sitting: Intact  Standing: Impaired; With support  Standing - Static: Good  Standing - Dynamic : Fair    ADL Intervention:  Toileting Assistance: Contact guard assistance  Perineal Care: Contact guard assistance ( standing)      Upper Body Dressing Assistance  Dressing Assistance: Modified independent  Bra: Modified independent  Hospital Gown: Modified independent (doffing)  Pullover Shirt: Modified independent (donning)      Lower Body Dressing Assistance  Dressing Assistance: Contact guard assistance;Minimum assistance  Underpants: Contact guard assistance  Pants With Elastic Waist: Minimum assistance (assist for RLE; pt reports she uses reacher at home)  Leg Crossed Method Used: No  Position Performed: Bending forward method;Seated edge of bed;Standing    Cognitive Retraining  Safety/Judgement: Fall prevention    Pain:  Pain level pre-treatment: 0/10   Pain level post-treatment: 0/10  Pain Intervention(s): Medication (see MAR); Rest, Ice, Repositioning   Response to intervention: Nurse notified, See doc flow    Activity Tolerance:    Good      Please refer to the flowsheet for vital signs taken during this treatment. After treatment:   []  Patient left in no apparent distress sitting up in chair  [x]  Patient left in no apparent distress in bed  [x]  Call bell left within reach  [x]  Nursing notified  []  Caregiver present  []  Bed alarm activated    COMMUNICATION/EDUCATION:   [x] Role of Occupational Therapy in the acute care setting  [x] Home safety education was provided and the patient/caregiver indicated understanding. [x] Patient/family have participated as able in working towards goals and plan of care. [x] Patient/family agree to work toward stated goals and plan of care. [] Patient understands intent and goals of therapy, but is neutral about his/her participation. [] Patient is unable to participate in goal setting and plan of care.       Thank you for this referral.  Ivette Mieer OTR/L  Time Calculation: 13 mins

## 2022-06-30 NOTE — DIABETES MGMT
Diabetes/ Glycemic Control Plan of Care      POC BG 6/29/2022: 290 (ac breakfast), 285 (ac lunch), 70 (ac dinner and repeat of 101), 143 (bedtime)    POC BG 6/30/2022: 108    Recommendations:   1.) cont on current insulin orders/dose: basal and correctional.    Assessment:   DX:   1. Hyperosmolar hyperglycemic state (HHS) ([address]     2. Diabetic ketoacidosis with coma associated with type 2 diabetes mellitus ([address]     3. High anion gap metabolic acidosis     4. Acute metabolic encephalopathy     5. Hypovolemic shock ([address]     6. Acute hyperkalemia     7. Acute renal failure, unspecified acute renal failure type ([address]     8. Elevated troponin     9. Cardiomyopathy, unspecified type ([address]     10. BRENDA (acute kidney injury) ([address]     11. Cardiogenic shock (HCC)        Fasting/ Morning blood glucose:   Lab Results   Component Value Date/Time    Glucose 165 (H) 06/26/2022 12:46 AM    Glucose (POC) 108 06/30/2022 07:47 AM    Glucose (POC) 90 02/06/2019 02:13 PM    Glucose,  (H) 01/19/2021 09:26 PM     IV Fluids containing dextrose:  None    Steroids:  None    Blood glucose values: Within target range (70-180mg/dL):  No.    Current insulin orders:   Basal lantus insulin 24 units daily  Correctional lispro insulin. Very resistant dose    Total Daily Dose previous 24 hours: 33 units of insulin  Lantus: 24 units  Lispro: 9 units    Current A1c:   Lab Results   Component Value Date/Time    Hemoglobin A1c 9.0 (H) 06/11/2022 02:15 PM    Hemoglobin A1c, External 9.5 07/08/2021 12:00 AM      equivalent  to ave Blood Glucose of 212 mg/dl for 2-3 months prior to admission    Adequate glycemic control PTA:  No    Nutrition/Diet:   Active Orders   Diet    ADULT DIET Regular; 4 carb choices (60 gm/meal)      Meal Intake:  Patient Vitals for the past 168 hrs:   % Diet Eaten   06/24/22 1412 26 - 50%   06/24/22 0911 26 - 50%     Supplement Intake:  No data found.     Home diabetes medications:   Key Antihyperglycemic Medications             insulin glargine (LANTUS) 100 unit/mL injection (Taking) 24 Units by SubCUTAneous route daily. Inject 10 units SQ DAILY    insulin lispro (HUMALOG) 100 unit/mL injection (Taking) For Blood Sugar (mg/dL) of:              Less than 150 =   0 units  150 -199 =   3 units  200 -249 =   6 units  250 -299 =   9 units  300 -349 =   12 units  350 and above =   15 units and Call Physician  Initiate Hypoglycemic protocol if blood glucose is <70 mg/dL. Fast Acting - Administer Immediately - or within 15 minutes of start of meal, if mealtime coverage. metFORMIN ER (GLUCOPHAGE XR) 500 mg tablet (Taking) Take 500 mg by mouth daily (with dinner). insulin regular (NOVOLIN R, HUMULIN R) 100 unit/mL injection (Taking) by SubCUTAneous route Before breakfast, lunch, and dinner. Sliding scale          Plan/Goals:   Blood glucose will be within target of 70 - 180 mg/dl within 72 hours  Reinforce dietary and medication compliance at home.        Education:  [] Refer to Diabetes Education Record                       [] Education not indicated at this time     Fatou Rendon RN [facility]  Pager: [phone]

## 2022-07-03 ENCOUNTER — HOME CARE VISIT (OUTPATIENT)
Dept: SCHEDULING | Facility: HOME HEALTH | Age: 72
End: 2022-07-03
Payer: MEDICARE

## 2022-07-03 PROCEDURE — 400013 HH SOC

## 2022-07-03 PROCEDURE — G0299 HHS/HOSPICE OF RN EA 15 MIN: HCPCS

## 2022-07-03 NOTE — Clinical Note
Patient admitted to [facility] services on 7/3/22 for DM. Laura Olvera MD notified of patient admission to home health and plan of care including anticipated frequency of 2w2, 1w6, 2 prn and treatments/interventions/modalities of disease process teaching and medication management. Medications reconciled and all medications are not available in the home this visit. pt taking neurontin 300mg, not 100mg- only once daily. pt needing to get miralax and colace. pt taking lantus 10 units, not 24 units. pt taking novolog, not humalog at this time due to not getting a prescription from hospital and patient wanting to stay on her novolog unless pcp changes medication. pt has glucose tablets, added to list. pt was not given a prescription for lipitor, colace, humalog, protonix or miralax- SN to notify PCP following holiday weekend (7/5). No severe medication interactions noted on admission visit. patient was educated on moderate interactions. Please call #[phone] with any questions. Thank you!   Hazel Marshall, RN, BSN

## 2022-07-04 VITALS
SYSTOLIC BLOOD PRESSURE: 146 MMHG | HEART RATE: 97 BPM | DIASTOLIC BLOOD PRESSURE: 83 MMHG | TEMPERATURE: 98.1 F | OXYGEN SATURATION: 96 % | RESPIRATION RATE: 18 BRPM

## 2022-07-05 NOTE — HOME HEALTH
Skilled services/Home bound verification:   Skilled Reason for admission/summary of clinical condition:  DM requiring disease process teaching and medication management . This patient is homebound for the following reasons Requires considerable and taxing effort to leave the home  and Requires the assistance of 1 or more persons to leave the home . Caregiver: daughters. Caregiver assists with iadls, adls, meal prep, med management, taking to md appointments. Medications reconciled and all medications are not available in the home this visit. pt taking neurontin 300mg, not 100mg- only once daily. pt needing to get miralax and colace. pt taking lantus 10 units, not 24 units. pt taking novolog, not humalog at this time due to not getting a prescription from hospital and patient wanting to stay on her novolog unless pcp changes medication. pt has glucose tablets, added to list. pt was not given a prescription for lipitor, colace, humalog, protonix or miralax- SN to notify PCP following holiday weekend (7/5). The following education was provided regarding medications, medication interactions, and look alike medications (specify): reviewed side effects, purposes, dosage, frequencies. Medications  are effective at this time. High risk medication teaching regarding anticoagulants, hyperglycemic agents or opiod narcotics performed (specify) lantus, novolog, glucophage, actos, januvia-reviewed side effects, purposes, dosage, frequencies. patient reminded of s/s of hypoglycemia/hyperglycemia, what to monitor for and who to report to/when. MD notified of any discrepancies/medication interactions- no severe interactions. patient was educated on moderate interactions.    Home health supplies by type and quantity ordered/delivered this visit include: na  Patient education provided this visit to include: patient/cg instructed to monitor for edema/increase in edema, to elevate extremity when edema occurs and to notify md if edema exceeds normal limits for patient, none noted at this time. patient made aware to monitor for s/s of infection [increased swelling, increased redness around site, increased pain, foul smelling drainage, fever] aware who to report to/when. no s/s of infection noted. encouraged patient to get three nutritional meals daily and to stay hydrated, drink plenty of fluids. Pt instructed to follow with diabetic diet- monitoring sugar intake, limiting foods with high sugar content. SN discussed dietary adjustments such as: reduce portion sizes, limit daily carbohydrate intake to not greater than 45-60 grams per meal for a total of <180 grams of carbohydrate daily, avoid concentrated carbohydrates such as soft drinks, sweet tea, and sweet treats and to increase physical activity along with strength training as tolerated to facilitate weight loss and build muscle mass. pt instructed to continue monitoring BS and to ensure BS levels are within good range to ensure proper healing. reviewed low sodium diet- patient aware to limit sodium, no added sodium to diet. reviewed foods to avoid, how to order foods when eating out, how to read nutrition labels and measure sodium intake. discussed importance of monitoring blood pressure daily and recording for review, discussed hypertension, causes/long term effects of uncontrolled hypertension. pt denies any s/s of hypertension at this time- instructed to monitor for blurred vision, strong headaches, fatigue, confusion, etc. discussed fall precautions in detail- having lighted hallways, removing throw rugs, monitoring medication that may alter mental status. patient made aware to turn a minimum of every 2 hours and to keep pressure off of bony prominences, to monitor for any pressure ulcer development/worsening.      Sharps education provided: Clinician instructed patient/cg on proper disposal of sharps as follows: Containers should be made of hard plastic, be puncture-resistant and leakproof, such as a laundry detergent or bleach bottle.  When the container is ¾ full, it should be sealed with tape and labeled. DO NOT RECYCLE prior to discarding in the regular trash. Patient level of understanding of education provided: patient has a partial understanding of education that was provided at this time by engaging in all education provided and is able to verbalize understanding, first visit and will require additional education. pt denies any questions or concerns at this time. Skilled Care Performed this visit:  full body assessment and education as above. Patient response to procedure performed:  patient tolerated assessment with no signs of discomfort, grimacing or pain, no complications or concerns noted. Home exercise program/Homework provided: patient instructed to perform sob hep 4-5 x daily and prn for sob, to promote lung expansion. pt also encouraged to use ICS q 2 hours and to perform sob hep during therapy. patient instructed to perform incontinence hep 3-4 x daily to strengthen muscles and to perform timed voiding q 2 hours to prevent incontinence from occurring. Pt/Caregiver instructed on plan of care and are agreeable to plan of care at this time. Bell Elizabeth MD notified of patient admission to home health and plan of care including anticipated frequency of 2w2, 1w6, 2 prn and treatments/interventions/modalities of disease process teaching and medication management. Discharge planning discussed with patient and caregiver. Discharge planning as follows: Patient will be discharged once education has completed, patient is medically stable and pt/cg are able to independently manage medications and disease process. Pt/Caregiver did verbalize understanding of discharge planning.    Next MD appointment 7/15/22 (date) with Bell Elizabeth MD.  Patient/caregiver encouraged/instructed to keep appointment as lack of follow through with physician appointment could result in discontinuation of home care services due to non-compliance.

## 2022-07-06 ENCOUNTER — HOME CARE VISIT (OUTPATIENT)
Dept: SCHEDULING | Facility: HOME HEALTH | Age: 72
End: 2022-07-06
Payer: MEDICARE

## 2022-07-06 VITALS
DIASTOLIC BLOOD PRESSURE: 70 MMHG | TEMPERATURE: 97.4 F | OXYGEN SATURATION: 98 % | SYSTOLIC BLOOD PRESSURE: 142 MMHG | HEART RATE: 91 BPM

## 2022-07-06 PROCEDURE — G0151 HHCP-SERV OF PT,EA 15 MIN: HCPCS

## 2022-07-06 NOTE — Clinical Note
Therapy Functional Score Assessment  Question   Score   Grooming  1       Upper Dressing 2      Lower Dressing 2      Bathing  3      Toilet Transfer  1    Transfer  2            Ambulation  3   Dyspnea                     1       Pain Interfering with activity 0  Est number therapy visits      9

## 2022-07-07 ENCOUNTER — HOME CARE VISIT (OUTPATIENT)
Dept: SCHEDULING | Facility: HOME HEALTH | Age: 72
End: 2022-07-07
Payer: MEDICARE

## 2022-07-07 PROCEDURE — G0299 HHS/HOSPICE OF RN EA 15 MIN: HCPCS

## 2022-07-07 NOTE — HOME HEALTH
HPI:  Per Hospital Referral:  \"Inpatient Notes  Patient presented to hospital on June 11, 2022 with complaint of high blood sugar. Please refer to hospital admission H&P for further detail. Patient was found out to have DKA with blood sugar level of greater than 1200 and bicarb level of 4. Patient was admitted to ICU. Patient was started on insulin drip with improvement in DKA. Subsequently patient was started on insulin sliding scale and Lantus. Patient had hypoglycemia with Lantus of 12 units of was changed to 10 units without any more hypoglycemia. Patient initially had acute toxic/metabolic encephalopathy and CT head was showing vague hypodensity right cerebellum. Patient also had hypotension was was treated with IV pressor. CTA chest abdomen pelvis was done and it reported patient having sludge in gallbladder with hepatomegaly. Patient was treated with IV antibiotic with help of I D. Patient had negative blood culture. Patient completed antibiotic for total of 10 days. Her blood pressure remained stable and was started back on metoprolol and Norvasc. Patient initially had acute renal failure that got better. Patient was seen by nephrologist during hospital course. Renal ultrasound was unremarkable. Patient underwent HIDA scan that was negative. Ultrasound of abdomen was also negative for cholecy stitis other than hepatomegaly. Patient had non-STEMI that was treated with heparin drip. Patient was seen by cardiology service. Started on Lipitor and low-dose beta-blocker. However, during hospital course patient also developed acute GI blood loss anemia requiring blood transfusion. Patient underwent endoscopy and showed large gastric ulcer which was hemoclipped. Biopsies pending. Patient was started on PPI twice daily. GI recommended not to use NSAIDs other than starting aspirin from tomorrow and follow-up as an outpatient. Patient also chest x-ray and WBC scan done during hospital course. Patient was slowly getting better. Tolerating diet very well. Nephrologist, cardiologist and GI signed off. Patient was seen by PT and OT and recommended ARU versus SNF placement. \"  . Prior Medical History:   Hyperkalemia 1/19/2021    DKA (diabetic ketoacidoses)  1/19/2021   DKA (diabetic ketoacidosis)  6/11/2022    Elevated troponin  6/12/2022    Primary hypertension  6/12/2022    Acquired hypothyroidism  6/12/2022    History of colon cancer  6/12/2022    BRENDA (acute kidney injury)  6/12/2022   Historu of DM type 2  L THR in March 2022 by Dr. Joselin Crane, received [facility] PT only, no outpatient therapy d/t insurance issue per patient. .  Subjective   Patient is a 66 y.o. female referred to [facility] after a hospitalization for hyperglycemia. She formally lived alone, but is now living with one of her daughters who moved in with her in order to provide support and assistance as needed. The patient reports that she was independent with her mobility prior to the hoepitalization; however, it appears she did have some limitations in mobility and was unable to fully care for herself and her home. She presents sitting up in her living room on first floor. She states she only goes up and down the stairs once a day because it is very tiring. Her goal is to be stronger and more independent in the home. Living /Home Situation: Patient lives in a 2 story [facility] with her daughter. There are no steps to enter, a sloped driveway, and at least 12 steps to 2nd floor. The patient has a hospital bed, raised toilet seat, BSC, FWW, cane and w/c as equipment. Caregiver involvement: Patient's daughter,  iRtesh Davidson, is living with her to provide support and assistance. Her other daughter Tarun Ackerman is also involved in her care. PT Evaluation:  Strength:  R Hip 3+/5, L hip 3-/5, B knees 3+/5, see PT ROM for details re; MMT. LL:  Able to complete 10 Heel raises with knees extended and holding support.   Functional strength:  Unable to stand without hand support and using LL to push back against the chair/couch. When instructed in transfer sit to stand from a tall chair, she was able to complete a forward w/s and stand with minimal hand support for cues. ROM: BLE WFL. L hip AROM is limited due to weakness. Patient reports she is no longer under hip precautions. Transfers: Uses hands and needs SBA for safety due to impaired balance. Gait:  ADFWW, Gait pattern: Gait significant for overly narrow GALINA as an almost tandem gait, decreased step length and heel strike, slow libertad and speed. Verbal cues needed for: widen stance and increase step length. Balance/ Fall risk:  Patient scored 10/28 on Tinetti Balance Test, indicating a high risk for falls. Bed mobility:  Supervision due to fall risk and LE weakness, has a hospital bed  . Assessment:   Patient presents with deficits, as described, in strength, gait and balance that impairs her ability to transfer, navigate her home and that increases her risk for falling and her dependence. HHPT is indicated in order to provide skilled interventions to improve and restore balance, strength, transfers and gait, that will improve her overall safety and lower her risk for falls and injury. Skilled interventions will include: Instruction in ther-ex, resistive exercises, functional activity for strengthening, HEP for strength, balance improvement; Training in gait, stairs, transfers, balance; Education in fall prevention, safety, energy conservation. PT will monitor vital signs, pain and patient response to therapy. Home exercise program:  Sit:  Heel-toe raises, LAQ with 5 sec hold. Theraband hip abduction, knee raises, all x 10, 2 x per day. Sit to stand from tall chair, min use of hands x 5. Stand at support, chair behind:  Heel raises with knees fully extended, knee raises as high as she can, ham curls, all x 10 each, 2 times a day.   Get up and walk every hour to prevent stiffness. Medications review completed. No changes in meds since [facility] 7/3/22. Home health supplies by type and quantity ordered/delivered this visit include: n/a  Patient education provided this visit:   Ed re: safety and fall prevention, HEP and purpose, transfer technoque and benefit of the activity, PT plan. ducated patient re: benefot of exercise to lower blood sugar by using the stored sugar in muscles that results in the blood sugar replacing it and thereby removing sugar from the blood. Patient response to education  Verbal understanding. Transfer ed:  demonstrated understanding by following instructions. Patient response to treatment/evaluation: Patient reports she is a little fatigued, is safe and in no apparent distress. Continued need for the following skills: Physical Therapy:  Plan 2w4, 1w1. Patient and caregiver are in agreement with the plan.

## 2022-07-08 ENCOUNTER — HOME CARE VISIT (OUTPATIENT)
Dept: SCHEDULING | Facility: HOME HEALTH | Age: 72
End: 2022-07-08
Payer: MEDICARE

## 2022-07-08 VITALS
RESPIRATION RATE: 19 BRPM | DIASTOLIC BLOOD PRESSURE: 74 MMHG | TEMPERATURE: 97.9 F | OXYGEN SATURATION: 97 % | SYSTOLIC BLOOD PRESSURE: 130 MMHG | HEART RATE: 76 BPM

## 2022-07-08 PROCEDURE — G0157 HHC PT ASSISTANT EA 15: HCPCS

## 2022-07-08 NOTE — HOME HEALTH
Skilled reason for visit: skilled assessment, education, medication management    Caregiver involvement:  Family assists ADL's, medications, meals, transportation, errands. Medications reviewed and all medications are available in the home this visit. The following education was provided regarding medications: SN instructed patient / caregiver regarding medication Lantus insulin. SN instructed patient / caregiver that insulin is used by the body to remove and use glucose from the blood and produce energy and that it is used in type 2 diabetes to overcome the resistance of the cells to insulin. SN explained that Lantus insulin starts working after 1 - 2 hours and peaks after 6 hours after injection, and has a duration of 18 - 26 hours. SN informed that hypoglycemia is the most common side effect that may occur during insulin therapy. SN instructed patient / caregiver to rotate insulin injection sites to avoid bruising and injury. Home health supplies by type and quantity ordered/delivered this visit include: na    Patient education provided this visit: Skilled nurse instructed patient on safety measures to avoid injuries and falls such as keeping adequate lighting during the day and night and was educated on proper use of assistive device to prevent falls. Sharps education provided: Clinician instructed patient/CG on proper disposal of sharps: Containers should be made of hard plastic, be puncture-resistant and leakproof,   such as a laundry detergent or bleach bottle.  When the container is ¾ full, it should be sealed with tape and labeled   DO NOT RECYCLE prior to discarding in the regular trash.      Patient level of understanding of education provided: patient/caregiver verbalized 100% understanding.     Skilled Care Performed this visit: skilled assessment, education, medication management    Patient response to procedure performed:  na    Agency Progress toward goals: progressing    Patient's Progress towards personal goals: progressing    Home exercise program: Sn instructed patient on pursed lip breathing. Pursed lip breathing is one of the simplest ways to control shortness of breath. It provides a quick and easy way to slow your pace of breathing, making each breath more effective. Pursed lip breathing: Improves ventilation, releases trapped air in the lungs, keeps the airways open longer and decreases the work of breathing, prolongs exhalation to slow the breathing rate, improves breathing patterns by moving old air out of the lungs and allowing for new air to enter the lungs, relieves shortness of breath, causes general relaxation. Practice this technique 4 - 5 times a day at first so you can get the correct breathing pattern. Pursed lip breathing technique: Relax your neck and shoulder muscles, breathe in ( inhale ) slowly through your nose for two counts, keeping your mouth closed. Don't take a deep breath; a normal breath will do. It may help to count to yourself: inhale, one, two. Pucker or purse your lips as if you were going to whistle or gently flicker the flame of a candle. Breathe out ( exhale ) slowly and gently through your pursed lips while counting to four. It may help to count to yourself: exhale, one, two, three, four.     Continued need for the following skills: Nursing, Physical Therapy and Occupational Therapy    Plan for next visit: skilled assessment, education, medication management    Patient and/or caregiver notified and agrees to changes in the Plan of Care N/A      The following discharge planning was discussed with the pt/caregiver:  Discharge planning as follows: when goals met, patient/caregiver able to manage disease process, medications and pain

## 2022-07-11 ENCOUNTER — HOME CARE VISIT (OUTPATIENT)
Dept: HOME HEALTH SERVICES | Facility: HOME HEALTH | Age: 72
End: 2022-07-11
Payer: MEDICARE

## 2022-07-11 ENCOUNTER — HOME CARE VISIT (OUTPATIENT)
Dept: SCHEDULING | Facility: HOME HEALTH | Age: 72
End: 2022-07-11
Payer: MEDICARE

## 2022-07-11 VITALS — HEART RATE: 98 BPM | DIASTOLIC BLOOD PRESSURE: 88 MMHG | SYSTOLIC BLOOD PRESSURE: 131 MMHG | TEMPERATURE: 98 F

## 2022-07-11 VITALS
TEMPERATURE: 98 F | HEART RATE: 94 BPM | OXYGEN SATURATION: 86 % | DIASTOLIC BLOOD PRESSURE: 90 MMHG | SYSTOLIC BLOOD PRESSURE: 157 MMHG

## 2022-07-11 PROCEDURE — G0157 HHC PT ASSISTANT EA 15: HCPCS

## 2022-07-11 NOTE — HOME HEALTH
S: Pt denies falls or changes in medication, no c/o pain   CAREGIVER PARTICIPATION: Daughters assist with meal prep pt manages medications and is able to perform ADLs Ind   O: TRANSFERS: Ind sit to stand transfers from edge of bed  THEREX/HEP: Pt instructed in heel toe rasies, hip abduction, knee to chest, mini squats, HS curls,  seated knee ext x 15 reps x 1 set, sit to stand transfers x 7   BED MOBIILTY: Ind supine<>sit transfers   GAIT: gait tng on even surfaces with rollator walker x 2 minutes pt amb with very slow libertad, narrow base of support, decreased hip flexion and foot clearance SBA provided  STAIRS: Pt declined stair negotiation   EDUCATION: Pt instructed to perform HEP 3x day, walk every hour for 3-4 minutes also instructed in fall prevention    A: RESPONSE TO EDUCATION: Pt able to return demonstrate with VC/TC to improve tech and posture, continued education on fall preventioin and safety  RESPONSE TO TX: Pt demonstrates poor LE muscle strength and well as endurance pt only able to tolerate 2 minutes of amb with very slow libertad, several rest periods needed throughout tx session   P: Next visit will focus on stair negotiation, gait and endurance, will review HEP for Ind and compliance

## 2022-07-11 NOTE — HOME HEALTH
S: Pt denies falls or changes in medication, no c/o pain, pt reports compliance with HEP  CAREGIVER PARTICIPATION: Daughters assist with meal prep pt manages medications and is able to perform ADLs Ind   O: TRANSFERS: Ind sit to stand transfers from edge of bed, recliner, couch  THEREX/HEP: Pt instructed in standing heel toe rasies, hip abduction, hip flexion, hip extension, knee to chest, HS curls x 10 reps x 1 set  GAIT: gait tng on uneven surfaces with FWW x 120ft pt amb with flexed posture, downward gaze, very slow libertad, narrow base of support VCs to improve gait with little return demonstration   STAIRS: Pt negotiates 1 flight of stairs with single rail step to method w/ CGA provided    TINETTI: 14/28  EDUCATION: Pt instructed to perform HEP 3x day, walk every hour for 3-4 minutes, reviewed fall prevention   A: RESPONSE TO EDUCATION: Pt able to return demonstrate with VC/TC to improve tech and posture, able to teach back fall prevention   RESPONSE TO TX: Pt fatigues quickly with minimal exertion, unable to return demonstrate recommendations to improve gait, improved Tinetti by 4 points, unable to tolerate increased reps of therex, RPE 6 follow tx session.     P: Next visit address ambulation with focus on improving stride length, base of support and libertad, pt will report compliance with HEP, will tolerate 15 reps of 5 exercises

## 2022-07-12 ENCOUNTER — HOME CARE VISIT (OUTPATIENT)
Dept: SCHEDULING | Facility: HOME HEALTH | Age: 72
End: 2022-07-12
Payer: MEDICARE

## 2022-07-12 VITALS
HEART RATE: 87 BPM | RESPIRATION RATE: 18 BRPM | DIASTOLIC BLOOD PRESSURE: 80 MMHG | SYSTOLIC BLOOD PRESSURE: 138 MMHG | OXYGEN SATURATION: 99 % | TEMPERATURE: 97 F

## 2022-07-12 VITALS
DIASTOLIC BLOOD PRESSURE: 72 MMHG | HEART RATE: 91 BPM | OXYGEN SATURATION: 98 % | RESPIRATION RATE: 18 BRPM | TEMPERATURE: 98.2 F | SYSTOLIC BLOOD PRESSURE: 126 MMHG

## 2022-07-12 PROBLEM — R79.89 ELEVATED TROPONIN: Status: RESOLVED | Noted: 2022-06-12 | Resolved: 2022-07-12

## 2022-07-12 PROBLEM — R77.8 ELEVATED TROPONIN: Status: RESOLVED | Noted: 2022-06-12 | Resolved: 2022-07-12

## 2022-07-12 PROCEDURE — G0299 HHS/HOSPICE OF RN EA 15 MIN: HCPCS

## 2022-07-12 PROCEDURE — G0152 HHCP-SERV OF OT,EA 15 MIN: HCPCS

## 2022-07-12 NOTE — Clinical Note
Mrs. Emilee Trent presents with good rehab potential to increase her endurance, strength and balance for increased independence with IADLs, IADLs and functional mobility. Pt is currently using rolling walker for support and reduces her need to ascend and descend stairs due to anxiety about stairs. Pt daughter has been assisting with all of her IADLs. Pt and her daughter able to teach back role of OT with good understanding however declined home health OT services at this time.      PLAN: D/C 1w1 with plans to discharge with home health PT

## 2022-07-12 NOTE — Clinical Note
Therapy Functional Score Assessment  Question                                            Score   Grooming                            1       Upper Dressing   1      Lower Dressing   2      Bathing                 3      Toilet Transfer                   1    Transfer                1            Ambulation                         3   Dyspnea                     2       Pain Interfering with activity        2  Est number therapy visits      1    Mrs. Mercado Public presents with good rehab potential to increase her endurance, strength and balance for increased independence with IADLs, IADLs and functional mobility. Pt is currently using rolling walker for support and reduces her need to ascend and descend stairs due to anxiety about stairs. Pt daughter has been assisting with all of her IADLs. Pt and her daughter able to teach back role of OT with good understanding however declined home health OT services at this time.      PLAN: D/C 1w1 with plans to discharge with home health PT

## 2022-07-12 NOTE — HOME HEALTH
Skilled reason for visit: skilled assessment, education, medication management   Caregiver involvement: Family assists ADL's, medications, meals, transportation, errands. Medications reviewed and all medications are available in the home this visit. The following education was provided regarding medications: nstructed patient/caregiver on general precautions while taking aspirin: take with food, milk, or large glass of water to decrease gastric symptoms. (Enteric coated or buffered may be better tolerated.); avoid alcohol due to possible internal bleeding; only take the recommended amount; use cautiously with Asthma; observe and report s/s of bleeding (easy bruising, bleeding gums, black stools); discard medications if vinegar odor is present; do not take antacids due to decreased effectiveness; avoid chewing or crushing enteric coated. Home health supplies by type and quantity ordered/delivered this visit include: na   Patient education provided this visit: Skilled nurse instructed patient on safety measures to avoid injuries and falls such as keeping adequate lighting during the day and night and was educated on proper use of assistive device to prevent falls. Sharps education provided: Clinician instructed patient/CG on proper disposal of sharps: Containers should be made of hard plastic, be puncture-resistant and leakproof,   such as a laundry detergent or bleach bottle. When the container is ¾ full, it should be sealed with tape and labeled   DO NOT RECYCLE prior to discarding in the regular trash. Patient level of understanding of education provided: patient/caregiver verbalized 100% understanding.    Skilled Care Performed this visit: skilled assessment, education, medication management   Patient response to procedure performed: na   Agency Progress toward goals: progressing   Patient's Progress towards personal goals: progressing   Home exercise program: Continue all medications as prescribed, implement fall/infection/pressure ulcer interventions, monitor for abnormal signs/symptoms of infection and report to MD immediately. Keep all follow up appointments as prescribed. Read all teaching packets for education of disease process and to prevent complications.   Continued need for the following skills: Nursing, Physical Therapy and Occupational Therapy   Plan for next visit: skilled assessment, education, medication management   Patient and/or caregiver notified and agrees to changes in the Plan of Care N/A   The following discharge planning was discussed with the pt/caregiver: Discharge planning as follows: when goals met, patient/caregiver able to manage disease process, medications and pain

## 2022-07-12 NOTE — HOME HEALTH
HPI: Per Hospital Referral:  \"Inpatient Notes: Patient presented to hospital on June 11, 2022 with complaint of high blood sugar. Please refer to hospital admission H&P for further detail. Patient was found out to have DKA with blood sugar level of greater than 1200 and bicarb level of 4. Patient was admitted to ICU. Patient was started on insulin drip with improvement in DKA. Subsequently patient was started on insulin sliding scale and Lantus. Patient had hypoglycemia with Lantus of 12 units of was changed to 10 units without any more hypoglycemia. Patient initially had acute toxic/metabolic encephalopathy and CT head was showing vague hypodensity right cerebellum. Patient also had hypotension was was treated with IV pressor. CTA chest abdomen pelvis was done and it reported patient having sludge in gallbladder with hepatomegaly. Patient was treated with IV antibiotic with help of I D. Patient had negative blood culture. Patient completed antibiotic for total of 10 days. Her blood pressure remained stable and was started back on metoprolol and Norvasc. Patient initially had acute renal failure that got better. Patient was seen by nephrologist during hospital course. Renal ultrasound was unremarkable. Patient underwent HIDA scan that was negative. Ultrasound of abdomen was also negative for cholecy stitis other than hepatomegaly. Patient had non-STEMI that was treated with heparin drip. Patient was seen by cardiology service. Started on Lipitor and low-dose beta-blocker. However, during hospital course patient also developed acute GI blood loss anemia requiring blood transfusion. Patient underwent endoscopy and showed large gastric ulcer which was hemoclipped. Biopsies pending. Patient was started on PPI twice daily. GI recommended not to use NSAIDs other than starting aspirin from tomorrow and follow-up as an outpatient. Patient also chest x-ray and WBC scan done during hospital course.  Patient was slowly getting better. Tolerating diet very well. Nephrologist, cardiologist and GI signed off. Patient was seen by PT and OT and recommended ARU versus SNF placement. \"  . Prior Medical History: Hyperkalemia 1/19/2021 DKA (diabetic ketoacidoses) 1/19/2021 DKA (diabetic ketoacidosis) 6/11/2022 Elevated troponin 6/12/2022 Primary hypertension 6/12/2022 Acquired hypothyroidism 6/12/2022 History of colon cancer 6/12/2022 BRENDA (acute kidney injury) 6/12/2022 History of DM type 2L THR in March 2022 by Dr. Burke MCCULLOUGHOF: Pt is 66 y.o. female referred to [facility] after a hospitalization for hyperglycemia. She formally lived alone however, is now living with one of her daughters who moved in with her in order to provide support and assistance as needed. She was previously was independent with her mobility prior to the hospitalization but did have some limitations in mobility and was unable to fully care for herself and her home. She lives in a 2 story house however expressed only going  up and down the stairs once a daily because it is very tiring. DME: The patient has a hospital bed, raised toilet seat, BSC, FWW, cane and w/c as equipment. Caregiver involvement: Patient's daughter, Leeann Donato, is living with her to provide support and assistance. Her other daughter Sylvie Hernandez is also involved in her care. SUBJECTIVE: Pt reports minmal pain 2/10 her her darian area. Pt with no other complaints  MEDICATION RECONCILIATION: Pt reports no changes to medications since last reviewed and educated to continue as directed per MD.  DME ORDERED/RECOMMENEDED: N/A    OBJECTIVE:  BATHING: Pt has walk in shower with shower chair and grab bars. Pt educated on long handled sponge for bathing to reeduce fatigue and wash back. TOILETING: Pt has raised toilet with handles.  Pt supervision for toileting with adaptive equipment  UB DRESSING: Pt set up for upper body dressing for donning /doffing shirts  LB DRESSING: Pt set up/supervision for lower body dressing to maria dolores/doff socks, shoes and pants. Pt educated on sock aid to reduce frustration/time for donning socks. Pt was educated and trialed sock aid with SBA. Pt and her daughter educated on obtaining hip kit to reduce frustration with dressing. GROOMING:  Pt modified independent for grooming standing at sink for oral care, hair care and washing face/hands  FEEDING: Pt independent for self feeding    Modified Ziyad RPE 0/10 after performing ambulation, transfers, and I/ADL assessment     OT instructed/demonstrated pt the following with good understanding:     IADL: Pt has daughter assisting with meal prep, cleaning, transportation and shopping  AMBULATION: Pt supervision for ambulating short house hold distances using rolling walker  EOB/BED TRANSFER:  Pt supervision for bed mobility. COUCH:  Pt supervision for sit to stand transfers with rolling walker  TOILET: Pt supervision for toilet transfers for raised toielt seat with handles. Pt has bathroom upstairs and bedside commode available if needed down stairs. TUB SHOWER: Pt declinded trial due to shower upstairs     PATIENT RESPONSE TO TREATMENT:  Pt responded well to home health OT     PATIENT EDUCATION PROVIDED THIS VISIT: OT role, adaptive equipment (reacher, sock aid, shoe horn and long handled sponge), fall prevention/safety training ADL/IADL tasks, continue diet and medications as instructed per MD, consult MD or urgent care for medical assistance as opposed to ER unless situation emergent. PATIENT LEVEL OF UNDERSTANDING OF EDUCATION PROVIDED: Pt able to teach back role of OT with good understanding. Pt able to teach back benifits and use of reacher, sock aid, long shoe horn and long handled sponge. Pt able to teach back use sock aid. Pt daughter also present and able to express good understanding of adaptive equipment. REHAB POTENTIAL: Mrs. Laura Rod presents with good rehab potenital to increase her endruance, strength and balance for increased independence with IADLs, IADLs and functional mobility. Pt is currently using rolling walker for support and redues her need to acend and decend stairs due to anxiety about stairs. Pt daughter has been assisting with all of her IADLs. Pt and her daughter able to teach back role of OT with good understanding however declined home health OT services at this time. HOME EXERCISE PROGRAM:N/A    ASSESSMENT: Pt presents with BUE strength of 4/5 with use of rolling walker throughout the home for mobiltiy with BUE support. Pt with fair standing balance using rolling walker during house hold mobility. She was educated on sitting during dressing and IADLs tasks to reduce her risk of falls. She would benifit from continued education on adaptive techniques for IADLs however declined working on these with OT. Pt with increased frustration and time for donning/doffing socks and educated/trialed sock aid with success. Pt would benifit from continued trialing of adaptive equipment for endsured compentance but declined further OT for adaptive equpment practice. Pt expressed desire to progress with HHPT at this time and no need for HHOT  at time of inital evaluation with OT to polietly oblige. Skilled Care Provided: Pt completed full occupational therapy assessment to include balance, coordination, strengthening, ADL education/training, functional mobility and home safety.     PLAN: D/C 1w1 with plans to discharge with home health PT

## 2022-07-13 ENCOUNTER — HOME CARE VISIT (OUTPATIENT)
Dept: SCHEDULING | Facility: HOME HEALTH | Age: 72
End: 2022-07-13
Payer: MEDICARE

## 2022-07-13 PROCEDURE — G0157 HHC PT ASSISTANT EA 15: HCPCS

## 2022-07-14 VITALS
OXYGEN SATURATION: 96 % | HEART RATE: 88 BPM | SYSTOLIC BLOOD PRESSURE: 120 MMHG | DIASTOLIC BLOOD PRESSURE: 74 MMHG | TEMPERATURE: 98.4 F

## 2022-07-14 NOTE — HOME HEALTH
S: Pt denies falls or changes in medication, no c/o pain, pt reports compliance with HEP  CAREGIVER PARTICIPATION: Daughters assist with meal prep pt manages medications and is able to perform ADLs Ind   O: TRANSFERS: Ind sit to stand transfers from edge of bed, recliner, couch  THEREX/HEP: Pt instructed in standing heel toe rasies, hip abduction, hip flexion, hip extension, knee to chest, HS curls x 10 reps x 1 set on balance board, supine/sidelying clams with orange Tband x 5    FTSTS= 25  TINETTI: 15/28  EDUCATION: Pt instructed to perform HEP 3x day increase to 15 reps of all exercises, walk every hour for 3-4 minutes, reviewed fall prevention   A: RESPONSE TO EDUCATION: Pt agrees to be compliant with HEP able to teach back fall prevention    RESPONSE TO TX: pt tolerated upgrade to HEP without pain c/o muscle fatigue, improved Tinetti by 1 point.  Pt progressing slowly toward established goals RPE 6     P: Next visit address ambulation with focus on improving stride length, base of support and libertad, pt will report compliance with HEP, will tolerate 15 reps of therex

## 2022-07-15 ENCOUNTER — HOME CARE VISIT (OUTPATIENT)
Dept: HOME HEALTH SERVICES | Facility: HOME HEALTH | Age: 72
End: 2022-07-15
Payer: MEDICARE

## 2022-07-20 ENCOUNTER — HOME CARE VISIT (OUTPATIENT)
Dept: SCHEDULING | Facility: HOME HEALTH | Age: 72
End: 2022-07-20
Payer: MEDICARE

## 2022-07-20 PROCEDURE — G0151 HHCP-SERV OF PT,EA 15 MIN: HCPCS

## 2022-07-20 NOTE — Clinical Note
Thanks Ivanna Brooks  ----- Message -----  From: Paul Cavanaugh, PT  Sent: 7/22/2022   8:37 AM EDT  To: Lorena Swan, PTA      PT competed re-assessment and is waiting for authorization from insurance to continue at least 4 more visits to improve patient safety and independence.   She continues to demonstrate unsafe movement patterns and is at a high risk for falls, requiring further [facility] PT.

## 2022-07-20 NOTE — Clinical Note
PT competed re-assessment and is waiting for authorization from insurance to continue at least 4 more visits to improve patient safety and independence.   She continues to demonstrate unsafe movement patterns and is at a high risk for falls, requiring further [facility] PT.

## 2022-07-21 ENCOUNTER — HOME CARE VISIT (OUTPATIENT)
Dept: SCHEDULING | Facility: HOME HEALTH | Age: 72
End: 2022-07-21
Payer: MEDICARE

## 2022-07-21 VITALS
HEART RATE: 92 BPM | DIASTOLIC BLOOD PRESSURE: 64 MMHG | OXYGEN SATURATION: 98 % | SYSTOLIC BLOOD PRESSURE: 112 MMHG | RESPIRATION RATE: 16 BRPM | TEMPERATURE: 97 F

## 2022-07-21 PROCEDURE — G0300 HHS/HOSPICE OF LPN EA 15 MIN: HCPCS

## 2022-07-21 NOTE — HOME HEALTH
SUBJECTIVE: Patient is a 66 y.o. female presenting for re-assessment. She and her daughter believe the patient is in continued need for [facility] PT. The patient reports she remains homebound and that she needs assistance to leave the home safely. CAREGIVER INVOLVEMENT/ASSISTANCE NEEDED FOR:   Supervision for med management, safety, CG assists with meal prep and ADLs, transportation to and from appointments. HOME HEALTH SUPPLIES BY TYPE AND QUANTITY ORDERED/DELIVERED THIS VISIT INCLUDE: n/a  OBJECTIVE:  See interventions. PATIENT RESPONSE TO TREATMENT:  Patient tolerated the therapy evaluation without c/o pain. She is noted to be fatigued and seeking to sit and rest.    PATIENT LEVEL OF UNDERSTANDING OF EDUCATION PROVIDED:   Patient and CG verbalize understanding of education re:  safety, fall risk, and the purpose of this re-assessment to demonstrate patient need for further HHPT. ASSESSMENT OF PROGRESS TOWARD GOALS:   The patient continues to demonstrate a high fall risk. She scored 12/28 on the Tinetti, improvement of 2 points. It is unclear as to why she scored lower today vs the previous 2 Tinetti tests that were 14 and 15 points; but either way, all of the scores are demonstrative of a high fall risk (<19 points). Her scores show improvement, but not achievement of the goal.  Her goal for balance and fall risk is 19/28 or higher, to bring her up to a score that shows a medium fall risk and therefore improved safety with mobility. At this time, the patient remains a high fall risk and needs supervision when ambulating. The patient has also progressed in her strength, scoring 4/5 strength for R hip and knee, L knee and 4-/5 L hip. Her goal was 4/5 for all these joints, and therefore she has not met the strength goal.  Hip strength is vital to safe ambulation. The L hip recently had surgery and per patient, minimal PT following.   There is a noted weakness vs R hip, it is improving but needs further intervention to restore full function of the hip. Ambulation has improved and the patient has exited the home once to walk in therapy with LPTA. She walked 120' with SBA needed for VC and safety. She is walking up and down the stairs daily and continues to benefit from SBA due to her high fall risk. Transfers:  Patient is able to transfer and continues to use her Lower Legs to stabilize against the chair when transferring. She requires hand support as well. Attempted sit to stand from standard height chair (transport chair with seat cushion), and the patient was unable to stand, because she was unable to push her legs against the chair to propel upwards. This habit can be dangerous, as a chair can tip over and cause a fall. The way she transfers also shows her weakness in LEs, poor coordination of Lower Legs with Hips and knees and lack of proper forward w/s to move over her COB to stand up. The transfer style also perpetuates her LE weakness. FTSST:  Required hand support from height of a standard chair, 26.5 seconds. Her goal is to complete 5 times without use of hands and with correct LE movement patterns, to demonstrate safety and lower fall risk. .  CONTINUED NEED FOR THE FOLLOWING SKILLS:   Althought the patient is making good progress toward her HHPT goals, she has not achieved these goals. The goals written are a minimal level of safety to establish patient as safe in her home and able to exit for appointments. Continuation of Home Health PT is medically necessary for the following reasons:  1. The patient is not safe to exit home without close supervision and VC for safety, SBA and CGA to min A depending on the surfaces encountered. She remains at a high fall risk, continues to have L hip weakness, and therefore exioting home is done only as necessary for medical appointments and requires assistance, is difficult for the patient.   2.  The patient requires skilled interventions from Physical Therapy in order to continue her progress. She requires further instruction, correction and verbal cues for transfer sequencing, gait training and strengthening exercises. Her daughter/CG is not able to provide this because she is not a physical therapist trained to assess a patient's movement patterns, progress, gait and strength and does not have the training to adapt and advance the HEP as needed in orderfor the patient to make further progress. Prior to her illness, the patient was independent and driving. She repuires further therapy to achoeve her prior level of function, and at this time, she is homebound due to safety with mobility and taxing effort, and will most benefit from [address] [place] [address] Walter Edwards PT to continue as poriginally planned. [place]  PLAN FOR NEXT VISIT:   Pending further authorization of visits, PT will address transfers and sit to stand using correct LE coordination of movement, w/s over feet and COB to strengthen legs and improve safety and balance. PT will also address gait, to improve her GALINA (she has too narrow GALINA that is unsafe), and to navigate outdoor surfaces, weather permitting.   .  THE FOLLOWING DISCHARGE PLANNING WAS DISCUSSED WITH THE PATIENT/CAREGIVER: Tiago Gonzalez PT plans to continue services pending further authorization of visit, for at least 4 more visits, or until she ios no longer homebound and is able to safely attend outpatient PT.

## 2022-07-22 ENCOUNTER — HOME CARE VISIT (OUTPATIENT)
Dept: HOME HEALTH SERVICES | Facility: HOME HEALTH | Age: 72
End: 2022-07-22
Payer: MEDICARE

## 2022-07-24 VITALS
HEART RATE: 62 BPM | TEMPERATURE: 97.7 F | RESPIRATION RATE: 16 BRPM | DIASTOLIC BLOOD PRESSURE: 74 MMHG | OXYGEN SATURATION: 98 % | SYSTOLIC BLOOD PRESSURE: 120 MMHG

## 2022-07-24 NOTE — HOME HEALTH
SKILLED REASON for visit: Teaching DM related to dx of hypergycemia  CAREGIVER INVOLVEMENT:  daughter is available as needed for assistance with iadls, adls, meal prep, medication management, taking to md appointments. MEDICATIONS: glucose tablets reviewed and all medications are available in the home this visit. Patient denies any medication changes at this time of assessment. EDUCATION PROVIDED: regarding medications, medication interactions, and look alike medications (specify): reviewed side effects, purposes, dosage, frequencies. High risk medication teaching regarding anticoagulants, hyperglycemic agents or opiod narcotics performed (specify) na  Medications  are effective at this time. SUPPLIES: by type and quantity ordered/delivered this visit include:   PATIENT EDUCATION ON THIS VISIT: Clinician reminded patient/cg on proper disposal of sharps as follows: Containers should be made of hard plastic, be puncture-resistant and leakproof, such as a laundry detergent or bleach bottle. When the container is ¾ full, it should be sealed with tape and labeled. DO NOT RECYCLE prior to discarding in the regular trash. . Pt instructed to follow with diabetic diet- monitoring sugar intake, limiting foods with high sugar content. SN discussed dietary adjustments such as: reduce portion sizes, limit daily carbohydrate intake to not greater than 45-60 grams per meal for a total of <180 grams of carbohydrate daily, avoid concentrated carbohydrates such as soft drinks, sweet tea, and sweet treats and to increase physical activity along with strength training as tolerated to facilitate weight loss and build muscle mass. pt instructed to continue monitoring BS and to ensure BS levels are within good range to ensure proper healing.    PATIENT LEVEL OF UNDERSTANDING: patient/cg has a partial understanding of education that was provided at this time by engaging in all education provided and is able to verbalize understanding, pt denies any questions or concerns at this time. SKILLED CARE PERFORMED THIS VISIT n/a  PATIENT RESPONSE TO PROCEDURE PERFORMED:  n/a  Agency Progress toward goals: goals/teaching reviewed. patient is progressing towards goals at this time, Patient's Progress towards personal goals: pt reporting they are progressing towards their goals at this time. Home exercise program: HEP deep breathing exercises  Continued need for the following skills: Nursing  Plan for next visit: rountine teaching assessment  Patient and/or caregiver notified and agrees to changes in the Plan of Care NA  The following discharge planning was discussed with the pt/caregiver: Patient will be discharged once education has completed, patient is medically stable and  independent with care.

## 2022-07-26 ENCOUNTER — HOME CARE VISIT (OUTPATIENT)
Dept: HOME HEALTH SERVICES | Facility: HOME HEALTH | Age: 72
End: 2022-07-26
Payer: MEDICARE

## 2022-07-28 ENCOUNTER — HOME CARE VISIT (OUTPATIENT)
Dept: HOME HEALTH SERVICES | Facility: HOME HEALTH | Age: 72
End: 2022-07-28
Payer: MEDICARE

## 2022-07-28 ENCOUNTER — HOME CARE VISIT (OUTPATIENT)
Dept: SCHEDULING | Facility: HOME HEALTH | Age: 72
End: 2022-07-28
Payer: MEDICARE

## 2022-07-28 PROCEDURE — G0300 HHS/HOSPICE OF LPN EA 15 MIN: HCPCS

## 2022-07-31 VITALS
HEART RATE: 61 BPM | TEMPERATURE: 97.1 F | SYSTOLIC BLOOD PRESSURE: 123 MMHG | OXYGEN SATURATION: 98 % | DIASTOLIC BLOOD PRESSURE: 74 MMHG | RESPIRATION RATE: 16 BRPM

## 2022-08-02 ENCOUNTER — HOME CARE VISIT (OUTPATIENT)
Dept: SCHEDULING | Facility: HOME HEALTH | Age: 72
End: 2022-08-02
Payer: MEDICARE

## 2022-08-02 PROCEDURE — G0151 HHCP-SERV OF PT,EA 15 MIN: HCPCS

## 2022-08-02 PROCEDURE — 400013 HH SOC

## 2022-08-02 NOTE — Clinical Note
PT Discipline Discharge completed 8/2/2022. She had no further authorization and declined to self-pay. She would continue to benefit from  physical therapy to further advance her strength, balance and to promote increased activity. She will need to pursue further therapy in an putpatient setting. She has upcoming appointments with her orthopedic surgeon and with her PCP, and plans to address this issue at each visit.     Thank you for your referral.

## 2022-08-03 ENCOUNTER — HOSPITAL ENCOUNTER (EMERGENCY)
Age: 72
Discharge: HOME | End: 2022-08-03
Attending: STUDENT IN AN ORGANIZED HEALTH CARE EDUCATION/TRAINING PROGRAM
Payer: MEDICARE

## 2022-08-03 ENCOUNTER — APPOINTMENT (OUTPATIENT)
Dept: CT IMAGING | Age: 72
End: 2022-08-03
Attending: PHYSICIAN ASSISTANT
Payer: MEDICARE

## 2022-08-03 VITALS
WEIGHT: 150 LBS | HEART RATE: 85 BPM | HEIGHT: 66 IN | SYSTOLIC BLOOD PRESSURE: 96 MMHG | DIASTOLIC BLOOD PRESSURE: 63 MMHG | TEMPERATURE: 97.6 F | BODY MASS INDEX: 24.11 KG/M2 | OXYGEN SATURATION: 98 % | RESPIRATION RATE: 18 BRPM

## 2022-08-03 VITALS
OXYGEN SATURATION: 98 % | TEMPERATURE: 97.1 F | DIASTOLIC BLOOD PRESSURE: 80 MMHG | SYSTOLIC BLOOD PRESSURE: 128 MMHG | HEART RATE: 93 BPM

## 2022-08-03 DIAGNOSIS — R51.9 NONINTRACTABLE EPISODIC HEADACHE, UNSPECIFIED HEADACHE TYPE: Primary | ICD-10-CM

## 2022-08-03 DIAGNOSIS — N17.9 AKI (ACUTE KIDNEY INJURY) (HCC): ICD-10-CM

## 2022-08-03 LAB
ANION GAP SERPL CALC-SCNC: 7 MMOL/L (ref 3–18)
ATRIAL RATE: 84 BPM
BASOPHILS # BLD: 0 K/UL (ref 0–0.1)
BASOPHILS NFR BLD: 0 % (ref 0–2)
BUN SERPL-MCNC: 31 MG/DL (ref 7–18)
BUN/CREAT SERPL: 15 (ref 12–20)
CALCIUM SERPL-MCNC: 9.6 MG/DL (ref 8.5–10.1)
CALCULATED P AXIS, ECG09: 78 DEGREES
CALCULATED R AXIS, ECG10: 72 DEGREES
CALCULATED T AXIS, ECG11: 80 DEGREES
CHLORIDE SERPL-SCNC: 103 MMOL/L (ref 100–111)
CO2 SERPL-SCNC: 26 MMOL/L (ref 21–32)
CREAT SERPL-MCNC: 2.04 MG/DL (ref 0.6–1.3)
DIAGNOSIS, 93000: NORMAL
DIFFERENTIAL METHOD BLD: ABNORMAL
EOSINOPHIL # BLD: 0.1 K/UL (ref 0–0.4)
EOSINOPHIL NFR BLD: 1 % (ref 0–5)
ERYTHROCYTE [DISTWIDTH] IN BLOOD BY AUTOMATED COUNT: 15 % (ref 11.6–14.5)
GLUCOSE SERPL-MCNC: 219 MG/DL (ref 74–99)
HCT VFR BLD AUTO: 33 % (ref 35–45)
HGB BLD-MCNC: 10.2 G/DL (ref 12–16)
IMM GRANULOCYTES # BLD AUTO: 0 K/UL (ref 0–0.04)
IMM GRANULOCYTES NFR BLD AUTO: 1 % (ref 0–0.5)
INR PPP: 1 (ref 0.8–1.2)
LYMPHOCYTES # BLD: 1.1 K/UL (ref 0.9–3.6)
LYMPHOCYTES NFR BLD: 13 % (ref 21–52)
MAGNESIUM SERPL-MCNC: 1.5 MG/DL (ref 1.6–2.6)
MCH RBC QN AUTO: 25.6 PG (ref 24–34)
MCHC RBC AUTO-ENTMCNC: 30.9 G/DL (ref 31–37)
MCV RBC AUTO: 82.7 FL (ref 78–100)
MONOCYTES # BLD: 0.5 K/UL (ref 0.05–1.2)
MONOCYTES NFR BLD: 7 % (ref 3–10)
NEUTS SEG # BLD: 6.3 K/UL (ref 1.8–8)
NEUTS SEG NFR BLD: 78 % (ref 40–73)
NRBC # BLD: 0 K/UL (ref 0–0.01)
NRBC BLD-RTO: 0 PER 100 WBC
P-R INTERVAL, ECG05: 138 MS
PLATELET # BLD AUTO: 329 K/UL (ref 135–420)
PMV BLD AUTO: 10.4 FL (ref 9.2–11.8)
POTASSIUM SERPL-SCNC: 5.1 MMOL/L (ref 3.5–5.5)
PROTHROMBIN TIME: 13.6 SEC (ref 11.5–15.2)
Q-T INTERVAL, ECG07: 370 MS
QRS DURATION, ECG06: 72 MS
QTC CALCULATION (BEZET), ECG08: 437 MS
RBC # BLD AUTO: 3.99 M/UL (ref 4.2–5.3)
SODIUM SERPL-SCNC: 136 MMOL/L (ref 136–145)
VENTRICULAR RATE, ECG03: 84 BPM
WBC # BLD AUTO: 8 K/UL (ref 4.6–13.2)

## 2022-08-03 PROCEDURE — 74011250637 HC RX REV CODE- 250/637: Performed by: PHYSICIAN ASSISTANT

## 2022-08-03 PROCEDURE — 83735 ASSAY OF MAGNESIUM: CPT

## 2022-08-03 PROCEDURE — 85610 PROTHROMBIN TIME: CPT

## 2022-08-03 PROCEDURE — 70450 CT HEAD/BRAIN W/O DYE: CPT

## 2022-08-03 PROCEDURE — 96360 HYDRATION IV INFUSION INIT: CPT

## 2022-08-03 PROCEDURE — 80048 BASIC METABOLIC PNL TOTAL CA: CPT

## 2022-08-03 PROCEDURE — 99284 EMERGENCY DEPT VISIT MOD MDM: CPT

## 2022-08-03 PROCEDURE — 74011250636 HC RX REV CODE- 250/636: Performed by: PHYSICIAN ASSISTANT

## 2022-08-03 PROCEDURE — 85025 COMPLETE CBC W/AUTO DIFF WBC: CPT

## 2022-08-03 PROCEDURE — 96365 THER/PROPH/DIAG IV INF INIT: CPT

## 2022-08-03 PROCEDURE — 74011000250 HC RX REV CODE- 250: Performed by: PHYSICIAN ASSISTANT

## 2022-08-03 PROCEDURE — 93005 ELECTROCARDIOGRAM TRACING: CPT

## 2022-08-03 RX ORDER — MAGNESIUM SULFATE 1 G/100ML
1 INJECTION INTRAVENOUS ONCE
Status: COMPLETED | OUTPATIENT
Start: 2022-08-03 | End: 2022-08-03

## 2022-08-03 RX ORDER — ACETAMINOPHEN 500 MG
1000 TABLET ORAL
Status: COMPLETED | OUTPATIENT
Start: 2022-08-03 | End: 2022-08-03

## 2022-08-03 RX ORDER — LIDOCAINE 4 G/100G
1 PATCH TOPICAL
Status: DISCONTINUED | OUTPATIENT
Start: 2022-08-03 | End: 2022-08-03 | Stop reason: HOSPADM

## 2022-08-03 RX ADMIN — SODIUM CHLORIDE 500 ML: 9 INJECTION, SOLUTION INTRAVENOUS at 13:19

## 2022-08-03 RX ADMIN — ACETAMINOPHEN 1000 MG: 500 TABLET ORAL at 13:18

## 2022-08-03 RX ADMIN — MAGNESIUM SULFATE HEPTAHYDRATE 1 G: 1 INJECTION, SOLUTION INTRAVENOUS at 15:00

## 2022-08-03 RX ADMIN — SODIUM CHLORIDE 1000 ML: 900 INJECTION, SOLUTION INTRAVENOUS at 15:00

## 2022-08-03 NOTE — DISCHARGE INSTRUCTIONS
Take medication as prescribed. Follow-up with your primary care physician within 2 days for reassessment. Bring the results from this visit with you for their review. Return to the ED immediately for any new, worsening, or persistent symptoms, including chest pain, shortness of breath, abdominal pain, or any other medical concerns.

## 2022-08-03 NOTE — ED PROVIDER NOTES
EMERGENCY DEPARTMENT HISTORY AND PHYSICAL EXAM    12:36 PM      Date: 8/3/2022  Patient Name: Emmett Zamora    History of Presenting Illness     Chief Complaint   Patient presents with    Headache     History Provided By: Patient    Additional History (Context): Emmett Zamora is a 66 y.o. female with  hx of HTN, DM, hypothyroidism and other noted PMH  who presents with complaint of right frontal headache associated lightheadedness x hours. Patient notes gradual onset of symptoms. Notes symptoms have slowly reduced. Notes posterior neck pain, diffuse. Denies fever or chills, dizziness, changes in vision, chest pain, shortness of breath, nausea or vomiting. Denies head injury or loss of consciousness. Denies worst headache of life. Did not take any medication for the symptoms prior to arrival.    PCP: Samantha Marrufo MD    Current Facility-Administered Medications   Medication Dose Route Frequency Provider Last Rate Last Admin    lidocaine 4 % patch 1 Patch  1 Patch TransDERmal NOW [facility] Janine [place]   1 Patch at 08/03/22 1318     Current Outpatient Medications   Medication Sig Dispense Refill    gabapentin (NEURONTIN) 300 mg capsule Take 300 mg by mouth daily. pioglitazone (ACTOS) 30 mg tablet Take 30 mg by mouth daily. levothyroxine (SYNTHROID) 150 mcg tablet Take 150 mcg by mouth Daily (before breakfast). SITagliptin (JANUVIA) 100 mg tablet Take 100 mg by mouth daily. insulin aspart U-100 (NOVOLOG) 100 unit/mL (3 mL) inpn 2-10 Units by SubCUTAneous route Before breakfast, lunch, and dinner. BS less than 150- no units  150-199- 2 units  200-249- 4 units  250-299- 6 units  300-349- 8 units  350 10 units  over 400 call MD      glucose 4 gram chewable tablet Take 15 g by mouth as needed for PRN Reason (Other) (low BS). insulin glargine (LANTUS) 100 unit/mL injection 24 Units by SubCUTAneous route daily.  Inject 10 units SQ DAILY (Patient taking differently: 10 Units by SubCUTAneous route daily. Inject 10 units SQ DAILY) 10 mL 0    lisinopriL (PRINIVIL, ZESTRIL) 2.5 mg tablet Take 1 Tablet by mouth daily. 30 Tablet 0    carvediloL (COREG) 6.25 mg tablet Take 1 Tablet by mouth two (2) times daily (with meals). 60 Tablet 0    atorvastatin (LIPITOR) 40 mg tablet Take 1 Tablet by mouth nightly. 30 Tablet 0    docusate sodium (COLACE) 100 mg capsule Take 1 Capsule by mouth daily. 30 Capsule 0    insulin lispro (HUMALOG) 100 unit/mL injection For Blood Sugar (mg/dL) of:              Less than 150 =   0 units  150 -199 =   3 units  200 -249 =   6 units  250 -299 =   9 units  300 -349 =   12 units  350 and above =   15 units and Call Physician  Initiate Hypoglycemic protocol if blood glucose is <70 mg/dL. Fast Acting - Administer Immediately - or within 15 minutes of start of meal, if mealtime coverage. 1 Each 0    pantoprazole (PROTONIX) 40 mg tablet Take 1 Tablet by mouth Before breakfast and dinner. 60 Tablet 0    polyethylene glycol (MIRALAX) 17 gram packet Take 1 Packet by mouth daily. Indications: HOLD IT IF DIARRHEA 10 Packet 0    aspirin delayed-release 81 mg tablet Take 1 Tablet by mouth daily. 30 Tablet 0    metFORMIN ER (GLUCOPHAGE XR) 500 mg tablet Take 500 mg by mouth daily (with dinner). Cholecalciferol, Vitamin D3, 50,000 unit cap Take 1 Capsule by mouth every seven (7) days. Takes on wednesdays      amLODIPine (NORVASC) 5 mg tablet Take 5 mg by mouth daily. alendronate (FOSAMAX) 10 mg tablet Take 70 mg by mouth every seven (7) days. Past History     Past Medical History:  Past Medical History:   Diagnosis Date    Colon cancer ([facility] [address]     Diabetes ([facility] [address]     Hypertension     Hypothyroidism        Past Surgical History:  Past Surgical History:   Procedure Laterality Date    COLONOSCOPY N/A 12/30/2016    COLONOSCOPY.  SURVEILLANCE /c Hot Snared Polypectomy /c EndoClip x3 performed by Ghada Brito MD at [address] [place] [id number] 52 Family History:  Family History   Problem Relation Age of Onset    Diabetes Mother     Cancer Father         colon       Social History:  Social History     Tobacco Use    Smoking status: Former    Smokeless tobacco: Never   Substance Use Topics    Alcohol use: Yes     Comment: occasionally    Drug use: No       Allergies:  No Known Allergies      Review of Systems       Review of Systems   Constitutional:  Negative for chills and fever. Respiratory:  Negative for shortness of breath. Cardiovascular:  Negative for chest pain. Gastrointestinal:  Negative for abdominal pain, nausea and vomiting. Musculoskeletal:  Positive for neck pain. Skin:  Negative for rash. Neurological:  Positive for light-headedness and headaches. Negative for weakness. All other systems reviewed and are negative. Physical Exam   Visit Vitals  BP 96/63 (BP 1 Location: Right upper arm, BP Patient Position: At rest)   Pulse 85   Temp 97.6 °F (36.4 °C)   Resp 18   Ht 5' 6\" (1.676 m)   Wt 68 kg (150 lb)   SpO2 98%   BMI 24.21 kg/m²         Physical Exam  Vitals and nursing note reviewed. Constitutional:       General: She is not in acute distress. Appearance: Normal appearance. She is well-developed. She is not ill-appearing, toxic-appearing or diaphoretic. HENT:      Head: Normocephalic and atraumatic. Cardiovascular:      Rate and Rhythm: Normal rate and regular rhythm. Heart sounds: Normal heart sounds. No murmur heard. No friction rub. No gallop. Pulmonary:      Effort: Pulmonary effort is normal. No respiratory distress. Breath sounds: Normal breath sounds. No wheezing or rales. Musculoskeletal:         General: Normal range of motion. Cervical back: Normal range of motion and neck supple. No rigidity or tenderness. Lymphadenopathy:      Cervical: No cervical adenopathy. Skin:     General: Skin is warm. Findings: No rash. Neurological:      General: No focal deficit present. Mental Status: She is alert and oriented to person, place, and time. Cranial Nerves: No cranial nerve deficit. Sensory: No sensory deficit. Motor: No weakness. Gait: Gait normal.         Diagnostic Study Results     Labs -  Recent Results (from the past 12 hour(s))   CBC WITH AUTOMATED DIFF    Collection Time: 08/03/22  1:15 PM   Result Value Ref Range    WBC 8.0 4.6 - 13.2 K/uL    RBC 3.99 (L) 4.20 - 5.30 M/uL    HGB 10.2 (L) 12.0 - 16.0 g/dL    HCT 33.0 (L) 35.0 - 45.0 %    MCV 82.7 78.0 - 100.0 FL    MCH 25.6 24.0 - 34.0 PG    MCHC 30.9 (L) 31.0 - 37.0 g/dL    RDW 15.0 (H) 11.6 - 14.5 %    PLATELET 350 141 - 437 K/uL    MPV 10.4 9.2 - 11.8 FL    NRBC 0.0 0  WBC    ABSOLUTE NRBC 0.00 0.00 - 0.01 K/uL    NEUTROPHILS 78 (H) 40 - 73 %    LYMPHOCYTES 13 (L) 21 - 52 %    MONOCYTES 7 3 - 10 %    EOSINOPHILS 1 0 - 5 %    BASOPHILS 0 0 - 2 %    IMMATURE GRANULOCYTES 1 (H) 0.0 - 0.5 %    ABS. NEUTROPHILS 6.3 1.8 - 8.0 K/UL    ABS. LYMPHOCYTES 1.1 0.9 - 3.6 K/UL    ABS. MONOCYTES 0.5 0.05 - 1.2 K/UL    ABS. EOSINOPHILS 0.1 0.0 - 0.4 K/UL    ABS. BASOPHILS 0.0 0.0 - 0.1 K/UL    ABS. IMM.  GRANS. 0.0 0.00 - 0.04 K/UL    DF AUTOMATED     METABOLIC PANEL, BASIC    Collection Time: 08/03/22  1:15 PM   Result Value Ref Range    Sodium 136 136 - 145 mmol/L    Potassium 5.1 3.5 - 5.5 mmol/L    Chloride 103 100 - 111 mmol/L    CO2 26 21 - 32 mmol/L    Anion gap 7 3.0 - 18 mmol/L    Glucose 219 (H) 74 - 99 mg/dL    BUN 31 (H) 7.0 - 18 MG/DL    Creatinine 2.04 (H) 0.6 - 1.3 MG/DL    BUN/Creatinine ratio 15 12 - 20      GFR est AA 29 (L) >60 ml/min/1.73m2    GFR est non-AA 24 (L) >60 ml/min/1.73m2    Calcium 9.6 8.5 - 10.1 MG/DL   PROTHROMBIN TIME + INR    Collection Time: 08/03/22  1:15 PM   Result Value Ref Range    Prothrombin time 13.6 11.5 - 15.2 sec    INR 1.0 0.8 - 1.2     MAGNESIUM    Collection Time: 08/03/22  1:15 PM   Result Value Ref Range    Magnesium 1.5 (L) 1.6 - 2.6 mg/dL   EKG, 12 LEAD, INITIAL    Collection Time: 08/03/22  1:17 PM   Result Value Ref Range    Ventricular Rate 84 BPM    Atrial Rate 84 BPM    P-R Interval 138 ms    QRS Duration 72 ms    Q-T Interval 370 ms    QTC Calculation (Bezet) 437 ms    Calculated P Axis 78 degrees    Calculated R Axis 72 degrees    Calculated T Axis 80 degrees    Diagnosis       Normal sinus rhythm  Normal ECG  When compared with ECG of 12-JUN-2022 07:27,  No significant change was found         Radiologic Studies -   CT HEAD WO CONT   Final Result                  1.  No acute intracranial process. 2.  Chronic small vessel ischemic changes. 3.  No significant interval change. Medical Decision Making   I am the first provider for this patient. I reviewed the vital signs, available nursing notes, past medical history, past surgical history, family history and social history. Vital Signs-Reviewed the patient's vital signs. Records Reviewed: Nursing Notes and Old Medical Records (Time of Review: 12:36 PM)    ED Course: Progress Notes, Reevaluation, and Consults:  2:30 PM: Updated patient and daughter with results. Penidng IVF, repeat BMP. Pt notes pain has reduced to 2/10.  4:00 PM: Pt notes she is feeling much better, pain has reduced to less than 1/10. Patient and daughter note they have follow-up with pt's PMD on Friday, would like to defer repeat BMP, would like to have repeat labs on Friday. Printed out results for their records to bring to Terri Goldstein office. Discussed strict return precautions, including dizziness, changes in vision, or any other medical concerns. In agreement with plan. Provider Notes (Medical Decision Making): 20-year-old female who presents to the ED due to right frontal headache associated with lightheadedness. Afebrile, nontoxic-appearing, looks well. Alert and oriented x3, no neurologic deficits on examination. Labs demonstrate mild anemia, elevated creatinine.   IV fluids administered, patient and daughter would like to defer repeat BMP at this time, patient has follow-up on Friday. CT head without acute process. Patient's pain controlled, no distress. Patient is stable for discharge close outpatient follow-up for further assessment      Diagnosis     Clinical Impression:   1. Nonintractable episodic headache, unspecified headache type    2. BRENDA (acute kidney injury) ([facility] [address]        Disposition: home    Follow-up Information       Follow up With Specialties Details Why [address] [facility] EMERGENCY DEPT Emergency Medicine  If symptoms worsen [address] [place] [id number]  [phone]    Aj Carlos MD Family Medicine Schedule an appointment as soon as possible for a visit   [address] [facility] [phone]               Patient's Medications   Start Taking    No medications on file   Continue Taking    ALENDRONATE (FOSAMAX) 10 MG TABLET    Take 70 mg by mouth every seven (7) days. AMLODIPINE (NORVASC) 5 MG TABLET    Take 5 mg by mouth daily. ASPIRIN DELAYED-RELEASE 81 MG TABLET    Take 1 Tablet by mouth daily. ATORVASTATIN (LIPITOR) 40 MG TABLET    Take 1 Tablet by mouth nightly. CARVEDILOL (COREG) 6.25 MG TABLET    Take 1 Tablet by mouth two (2) times daily (with meals). CHOLECALCIFEROL, VITAMIN D3, 50,000 UNIT CAP    Take 1 Capsule by mouth every seven (7) days. Takes on wednesdays    DOCUSATE SODIUM (COLACE) 100 MG CAPSULE    Take 1 Capsule by mouth daily. GABAPENTIN (NEURONTIN) 300 MG CAPSULE    Take 300 mg by mouth daily. GLUCOSE 4 GRAM CHEWABLE TABLET    Take 15 g by mouth as needed for PRN Reason (Other) (low BS). INSULIN ASPART U-100 (NOVOLOG) 100 UNIT/ML (3 ML) INPN    2-10 Units by SubCUTAneous route Before breakfast, lunch, and dinner.  BS less than 150- no units  150-199- 2 units  200-249- 4 units  250-299- 6 units  300-349- 8 units  350 10 units  over 400 call MD    INSULIN GLARGINE (LANTUS) 100 UNIT/ML INJECTION    24 Units by SubCUTAneous route daily. Inject 10 units SQ DAILY    INSULIN LISPRO (HUMALOG) 100 UNIT/ML INJECTION    For Blood Sugar (mg/dL) of:              Less than 150 =   0 units  150 -199 =   3 units  200 -249 =   6 units  250 -299 =   9 units  300 -349 =   12 units  350 and above =   15 units and Call Physician  Initiate Hypoglycemic protocol if blood glucose is <70 mg/dL. Fast Acting - Administer Immediately - or within 15 minutes of start of meal, if mealtime coverage. LEVOTHYROXINE (SYNTHROID) 150 MCG TABLET    Take 150 mcg by mouth Daily (before breakfast). LISINOPRIL (PRINIVIL, ZESTRIL) 2.5 MG TABLET    Take 1 Tablet by mouth daily. METFORMIN ER (GLUCOPHAGE XR) 500 MG TABLET    Take 500 mg by mouth daily (with dinner). PANTOPRAZOLE (PROTONIX) 40 MG TABLET    Take 1 Tablet by mouth Before breakfast and dinner. PIOGLITAZONE (ACTOS) 30 MG TABLET    Take 30 mg by mouth daily. POLYETHYLENE GLYCOL (MIRALAX) 17 GRAM PACKET    Take 1 Packet by mouth daily. Indications: HOLD IT IF DIARRHEA    SITAGLIPTIN (JANUVIA) 100 MG TABLET    Take 100 mg by mouth daily. These Medications have changed    No medications on file   Stop Taking    No medications on file       Dictation disclaimer:  Please note that this dictation was completed with [email or web address], the computer voice recognition software. Quite often unanticipated grammatical, syntax, homophones, and other interpretive errors are inadvertently transcribed by the computer software. Please disregard these errors. Please excuse any errors that have escaped final proofreading.

## 2022-08-03 NOTE — ED TRIAGE NOTES
Pt endorses a burning head pain x1 hour. Silver Cr blood thinners, states the pain has actually improved somewhat.

## 2022-08-04 ENCOUNTER — HOME CARE VISIT (OUTPATIENT)
Dept: SCHEDULING | Facility: HOME HEALTH | Age: 72
End: 2022-08-04
Payer: MEDICARE

## 2022-08-04 PROCEDURE — G0300 HHS/HOSPICE OF LPN EA 15 MIN: HCPCS

## 2022-08-04 NOTE — HOME HEALTH
Pt.clinically discharged and documentation finalized for completion of agency discharge. See discharge summary for details.

## 2022-08-08 VITALS
OXYGEN SATURATION: 94 % | TEMPERATURE: 97.7 F | HEART RATE: 86 BPM | SYSTOLIC BLOOD PRESSURE: 122 MMHG | DIASTOLIC BLOOD PRESSURE: 64 MMHG

## 2022-08-08 NOTE — HOME HEALTH
SN reason for visit: disease and med management     Caregiver involvement: Patient's cg is daughters and they are available if needed for assistance with iadls, adls, meal prep, medication management, taking to md appointments. .    Medications reconciled and all medications are available in the home this visit. The following education was provided regarding medications, medication interactions, and look a like medications: educated on the use of DM medications. Medications  are effective at this time. Home health supplies by type and quantity ordered/delivered this visit include: n/a    Patient education/skilled care provided this visit: discussed diabetic diet, s/s of hypoglycemia, hyperglycemia- who to report to/when, DM management with glucometer and BS checks, insulin management, preparation and injection administration. Clinician instructed patient/CG on proper disposal of sharps: Containers should be made of hard plastic, be puncture-resistant and leakproof,   such as a laundry detergent or bleach bottle.  When the container is ¾ full, it should be sealed with tape and labeled   DO NOT RECYCLE prior to discarding in the regular trash.    MEDICATION ADHERENCE IS AN IMPORTANT COMPONENT OF HTN MANAGEMENT. PATIENT STATES THE IMPORTANCE TO TAKE HTN MEDS SAME TIME EACH DAY. patient encouraged to supplement nutrition with protein shakes, ensure, boost in order to get nutrition needed and to be sure to stay hydrated- drink plenty of water/fluids throughout the day  encouraged patient to get three nutritional meals daily and to stay hydrated, drink plenty of fluids. patient encouraged to monitor for increase in pain and to continue with current pain management and to notify staff/md if pain becomes excrutiating/intolerable. patient/cg to continue to take medications as prescribed.  patient aware to monitor for effectiveness and to notify staff of any adverse reactions to medications/any changes to medication regimen. reviewed side effects, purposes, dosage, frequencies  Patient is a fall risk. Educated pateint to sit on the side of the chair/bed, take a slow deep breaths, have feet firmly planted before standing up, use cane/walker if available, or have someone to assist.  INSTRUCTED PATIENT AND CG THAT SHOULD ANY NEEDS OR CONCERNS ARISE TO FIRST CALL OUR OFFICE, OR THE DR'S OFFICE  OR GO TO AN URGENT CARE CENTER AND NOT TO THE ED FOR NON-LIFE THREATENING EVENTS. IF IT IS LIFE THREATENING THEN CALL 911 OR GO TO THE CLOSEST ER. Patient level of understanding of education provided: Pt verbalized understanding of education provided today with no questions/concerns at this time. Patient response to procedure performed:  n/a    Progress toward goals: Patient to remain compliant with diet and medications    Home exercise program: activity as tolerated, trying to get physical activity 4-5 x weekly, 30 minutes daily. stopping activity if causing shortness of breath or chest pain, dizziness or weakness. Continued need for the following skills: Nursing    The following discharge planning was discussed with the pt/caregiver: Patient will be discharged once education has completed, patient is medically stable and pt/cg are able to independently manage medications and disease process.

## 2022-08-11 ENCOUNTER — HOME CARE VISIT (OUTPATIENT)
Dept: SCHEDULING | Facility: HOME HEALTH | Age: 72
End: 2022-08-11
Payer: MEDICARE

## 2022-08-11 VITALS
RESPIRATION RATE: 16 BRPM | SYSTOLIC BLOOD PRESSURE: 118 MMHG | HEART RATE: 87 BPM | OXYGEN SATURATION: 98 % | DIASTOLIC BLOOD PRESSURE: 62 MMHG | TEMPERATURE: 97.6 F

## 2022-08-11 PROCEDURE — G0299 HHS/HOSPICE OF RN EA 15 MIN: HCPCS

## 2022-08-11 NOTE — Clinical Note
Patient DC from home health today. Patient requested to be DC at an earlier date, patient independent with DM management, denies any chest pain.

## 2022-08-11 NOTE — HOME HEALTH
Skilled reason for visit: disease management/education, med management/education. Caregiver involvement: available for ADLs, meal prep, med management and transportation. Medications reconciled and all medications are available in the home this visit. The following education was provided regarding medications:  sn instructed patient how BP meds keep veins open to allow for blood flow at lower pressure and how this can lead to dizziness and falls with sudden, quick movements. Educated patient on the use of oral diabetic medications and to watch for signs and symptoms of hypo and hyperglycemia. Educated patient on importance of checking blood sugars, cleaning with alcohol before injecting and site rotations when giving insulin. Went over s/s of hypo and hyperglycemia. MD notified of any discrepancies/look a-like medications/medication interactions: n/a  Medications are effective at this time. Home health supplies by type and quantity ordered/delivered this visit include: n/a    Patient education provided this visit: discussed fall precautions in detail- having lighted hallways, removing throw rugs, monitoring medication that may alter mental status. perform skin inspections looking for any skin breakdown or redness. monitor for edema. frequent position changes. Watch for signs and symptoms of infection which include; fever, drainage, foul smell, lethargy. Sharps education provided: Clinician instructed patient/CG on proper disposal of sharps: Containers should be made of hard plastic, be puncture-resistant and leakproof,   such as a laundry detergent or bleach bottle.  When the container is ¾ full, it should be sealed with tape and labeled   DO NOT RECYCLE prior to discarding in the regular trash.      Patient level of understanding of education provided: Patient verbalized understanding of all education provided.     Skilled Care Performed this visit: Alex Kendrick    Patient response to procedure performed:  n/a    Home exercise program: sn instructed patient to perform deep breathing exercises, 5-6 breaths 5 x daily to promote air exchange and prevent pneumonia     Patient and/or caregiver notified and agrees to changes in the Plan of Care YES/NO/NA: YES      The following discharge planning was discussed with the pt/caregiver: Patient DC early from [facility] per patients request. Patient independent with DM management, denies any chest pain, recent pain in general. Insctructed patient to continue with medication regiement and follow up with PCP.

## 2022-08-19 NOTE — HOME HEALTH
.The visit was missed due to the following reason(s): patient called several times throughout the day, no answer. Several voicemails left with no returned call.  and MD notified, visit will be missed visit.

## 2022-09-28 ENCOUNTER — HOSPITAL ENCOUNTER (INPATIENT)
Age: 72
LOS: 5 days | Discharge: NURSING, REHAB OR LONG-TERM CARE | DRG: 638 | End: 2022-10-03
Attending: EMERGENCY MEDICINE | Admitting: INTERNAL MEDICINE
Payer: MEDICARE

## 2022-09-28 ENCOUNTER — APPOINTMENT (OUTPATIENT)
Dept: GENERAL RADIOLOGY | Age: 72
DRG: 638 | End: 2022-09-28
Attending: EMERGENCY MEDICINE
Payer: MEDICARE

## 2022-09-28 DIAGNOSIS — Z85.038 HISTORY OF COLON CANCER: ICD-10-CM

## 2022-09-28 DIAGNOSIS — J20.8 ACUTE BRONCHITIS, VIRAL: ICD-10-CM

## 2022-09-28 DIAGNOSIS — E11.10 DIABETIC KETOACIDOSIS WITHOUT COMA ASSOCIATED WITH TYPE 2 DIABETES MELLITUS (HCC): Primary | ICD-10-CM

## 2022-09-28 DIAGNOSIS — K59.00 CONSTIPATION, UNSPECIFIED CONSTIPATION TYPE: ICD-10-CM

## 2022-09-28 PROBLEM — D63.1 ANEMIA DUE TO STAGE 3B CHRONIC KIDNEY DISEASE (HCC): Status: ACTIVE | Noted: 2022-09-28

## 2022-09-28 PROBLEM — N18.32 STAGE 3B CHRONIC KIDNEY DISEASE (HCC): Status: ACTIVE | Noted: 2022-09-28

## 2022-09-28 PROBLEM — R73.9 HYPERGLYCEMIA: Status: ACTIVE | Noted: 2022-09-28

## 2022-09-28 PROBLEM — E87.29 HIGH ANION GAP METABOLIC ACIDOSIS: Status: ACTIVE | Noted: 2022-09-28

## 2022-09-28 PROBLEM — R65.10 SIRS (SYSTEMIC INFLAMMATORY RESPONSE SYNDROME) (HCC): Status: ACTIVE | Noted: 2022-09-28

## 2022-09-28 PROBLEM — N18.32 ANEMIA DUE TO STAGE 3B CHRONIC KIDNEY DISEASE (HCC): Status: ACTIVE | Noted: 2022-09-28

## 2022-09-28 PROBLEM — E87.20 METABOLIC ACIDOSIS: Status: ACTIVE | Noted: 2022-09-28

## 2022-09-28 LAB
ADMINISTERED INITIALS, ADMINIT: NORMAL
ADMINISTERED INITIALS, ADMINIT: NORMAL
ALBUMIN SERPL-MCNC: 3.8 G/DL (ref 3.4–5)
ALBUMIN/GLOB SERPL: 1.2 {RATIO} (ref 0.8–1.7)
ALP SERPL-CCNC: 83 U/L (ref 45–117)
ALT SERPL-CCNC: 11 U/L (ref 13–56)
ANION GAP BLD CALC-SCNC: 15 MMOL/L (ref 10–20)
ANION GAP SERPL CALC-SCNC: 21 MMOL/L (ref 3–18)
ANION GAP SERPL CALC-SCNC: 24 MMOL/L (ref 3–18)
APPEARANCE UR: CLEAR
AST SERPL-CCNC: 4 U/L (ref 10–38)
BASE DEFICIT BLD-SCNC: 13.2 MMOL/L
BASOPHILS # BLD: 0 K/UL (ref 0–0.1)
BASOPHILS NFR BLD: 0 % (ref 0–2)
BILIRUB SERPL-MCNC: 0.5 MG/DL (ref 0.2–1)
BILIRUB UR QL: NEGATIVE
BUN SERPL-MCNC: 52 MG/DL (ref 7–18)
BUN SERPL-MCNC: 55 MG/DL (ref 7–18)
BUN/CREAT SERPL: 27 (ref 12–20)
BUN/CREAT SERPL: 28 (ref 12–20)
CA-I BLD-MCNC: 1.03 MMOL/L (ref 1.12–1.32)
CALCIUM SERPL-MCNC: 8.5 MG/DL (ref 8.5–10.1)
CALCIUM SERPL-MCNC: 9.1 MG/DL (ref 8.5–10.1)
CHLORIDE BLD-SCNC: 96 MMOL/L (ref 98–107)
CHLORIDE SERPL-SCNC: 89 MMOL/L (ref 100–111)
CHLORIDE SERPL-SCNC: 96 MMOL/L (ref 100–111)
CO2 BLD-SCNC: 14 MMOL/L (ref 19–24)
CO2 SERPL-SCNC: 12 MMOL/L (ref 21–32)
CO2 SERPL-SCNC: 14 MMOL/L (ref 21–32)
COLOR UR: YELLOW
CREAT BLD-MCNC: 1.49 MG/DL (ref 0.6–1.3)
CREAT SERPL-MCNC: 1.96 MG/DL (ref 0.6–1.3)
CREAT SERPL-MCNC: 1.97 MG/DL (ref 0.6–1.3)
D50 ADMINISTERED, D50ADM: 0 ML
D50 ADMINISTERED, D50ADM: 0 ML
D50 ORDER, D50ORD: 0 ML
D50 ORDER, D50ORD: 0 ML
DIFFERENTIAL METHOD BLD: ABNORMAL
EOSINOPHIL # BLD: 0 K/UL (ref 0–0.4)
EOSINOPHIL NFR BLD: 0 % (ref 0–5)
ERYTHROCYTE [DISTWIDTH] IN BLOOD BY AUTOMATED COUNT: 14.5 % (ref 11.6–14.5)
EST. AVERAGE GLUCOSE BLD GHB EST-MCNC: 232 MG/DL
FERRITIN SERPL-MCNC: 15 NG/ML (ref 8–388)
FOLATE SERPL-MCNC: 16.5 NG/ML (ref 3.1–17.5)
GLOBULIN SER CALC-MCNC: 3.2 G/DL (ref 2–4)
GLUCOSE BLD STRIP.AUTO-MCNC: >600 MG/DL (ref 70–110)
GLUCOSE BLD STRIP.AUTO-MCNC: >600 MG/DL (ref 70–110)
GLUCOSE BLD-MCNC: >700 MG/DL (ref 65–100)
GLUCOSE SERPL-MCNC: 866 MG/DL (ref 74–99)
GLUCOSE SERPL-MCNC: 901 MG/DL (ref 74–99)
GLUCOSE UR STRIP.AUTO-MCNC: >1000 MG/DL
GLUCOSE, GLC: 601 MG/DL
GLUCOSE, GLC: 602 MG/DL
HBA1C MFR BLD: 9.7 % (ref 4.2–5.6)
HCO3 BLD-SCNC: 13.3 MMOL/L (ref 22–26)
HCT VFR BLD AUTO: 31.1 % (ref 35–45)
HGB BLD-MCNC: 9.7 G/DL (ref 12–16)
HGB UR QL STRIP: NEGATIVE
HIGH TARGET, HITG: 180 MG/DL
HIGH TARGET, HITG: 180 MG/DL
IMM GRANULOCYTES # BLD AUTO: 0.1 K/UL (ref 0–0.04)
IMM GRANULOCYTES NFR BLD AUTO: 1 % (ref 0–0.5)
INSULIN ADMINSTERED, INSADM: 10.8 UNITS/HOUR
INSULIN ADMINSTERED, INSADM: 16.3 UNITS/HOUR
INSULIN ORDER, INSORD: 10.8 UNITS/HOUR
INSULIN ORDER, INSORD: 16.3 UNITS/HOUR
IRON SATN MFR SERPL: 10 % (ref 20–50)
IRON SERPL-MCNC: 32 UG/DL (ref 50–175)
KETONES UR QL STRIP.AUTO: 80 MG/DL
LACTATE BLD-SCNC: 2.43 MMOL/L (ref 0.4–2)
LACTATE BLD-SCNC: 2.57 MMOL/L (ref 0.4–2)
LEUKOCYTE ESTERASE UR QL STRIP.AUTO: NEGATIVE
LIPASE SERPL-CCNC: 41 U/L (ref 73–393)
LOW TARGET, LOT: 140 MG/DL
LOW TARGET, LOT: 140 MG/DL
LYMPHOCYTES # BLD: 0.9 K/UL (ref 0.9–3.6)
LYMPHOCYTES NFR BLD: 7 % (ref 21–52)
MAGNESIUM SERPL-MCNC: 2 MG/DL (ref 1.6–2.6)
MCH RBC QN AUTO: 25.1 PG (ref 24–34)
MCHC RBC AUTO-ENTMCNC: 31.2 G/DL (ref 31–37)
MCV RBC AUTO: 80.6 FL (ref 78–100)
MINUTES UNTIL NEXT BG, NBG: 60 MIN
MINUTES UNTIL NEXT BG, NBG: 60 MIN
MONOCYTES # BLD: 0.6 K/UL (ref 0.05–1.2)
MONOCYTES NFR BLD: 4 % (ref 3–10)
MULTIPLIER, MUL: 0.02
MULTIPLIER, MUL: 0.03
NEUTS SEG # BLD: 12 K/UL (ref 1.8–8)
NEUTS SEG NFR BLD: 88 % (ref 40–73)
NITRITE UR QL STRIP.AUTO: NEGATIVE
NRBC # BLD: 0 K/UL (ref 0–0.01)
NRBC BLD-RTO: 0 PER 100 WBC
ORDER INITIALS, ORDINIT: NORMAL
ORDER INITIALS, ORDINIT: NORMAL
PCO2 BLDV: 32 MMHG (ref 41–51)
PH BLDV: 7.23 [PH] (ref 7.32–7.42)
PH UR STRIP: 5 [PH] (ref 5–8)
PHOSPHATE SERPL-MCNC: 6 MG/DL (ref 2.5–4.9)
PLATELET # BLD AUTO: 371 K/UL (ref 135–420)
PMV BLD AUTO: 11.2 FL (ref 9.2–11.8)
PO2 BLDV: 53 MMHG (ref 25–40)
POTASSIUM BLD-SCNC: 6.2 MMOL/L (ref 3.5–5.1)
POTASSIUM SERPL-SCNC: 5.9 MMOL/L (ref 3.5–5.5)
POTASSIUM SERPL-SCNC: 6.4 MMOL/L (ref 3.5–5.5)
PROT SERPL-MCNC: 7 G/DL (ref 6.4–8.2)
PROT UR STRIP-MCNC: NEGATIVE MG/DL
RBC # BLD AUTO: 3.86 M/UL (ref 4.2–5.3)
SAO2 % BLD: 81 %
SERVICE CMNT-IMP: ABNORMAL
SODIUM BLD-SCNC: 124 MMOL/L (ref 136–145)
SODIUM SERPL-SCNC: 127 MMOL/L (ref 136–145)
SODIUM SERPL-SCNC: 129 MMOL/L (ref 136–145)
SP GR UR REFRACTOMETRY: 1.02 (ref 1–1.03)
SPECIMEN SITE: ABNORMAL
TIBC SERPL-MCNC: 335 UG/DL (ref 250–450)
TROPONIN-HIGH SENSITIVITY: 13 NG/L (ref 0–54)
TSH SERPL DL<=0.05 MIU/L-ACNC: 0.03 UIU/ML (ref 0.36–3.74)
UROBILINOGEN UR QL STRIP.AUTO: 0.2 EU/DL (ref 0.2–1)
VIT B12 SERPL-MCNC: 761 PG/ML (ref 211–911)
WBC # BLD AUTO: 13.6 K/UL (ref 4.6–13.2)

## 2022-09-28 PROCEDURE — 71045 X-RAY EXAM CHEST 1 VIEW: CPT

## 2022-09-28 PROCEDURE — 81003 URINALYSIS AUTO W/O SCOPE: CPT

## 2022-09-28 PROCEDURE — 96374 THER/PROPH/DIAG INJ IV PUSH: CPT

## 2022-09-28 PROCEDURE — 74011000250 HC RX REV CODE- 250: Performed by: EMERGENCY MEDICINE

## 2022-09-28 PROCEDURE — 83735 ASSAY OF MAGNESIUM: CPT

## 2022-09-28 PROCEDURE — 99223 1ST HOSP IP/OBS HIGH 75: CPT | Performed by: PHYSICIAN ASSISTANT

## 2022-09-28 PROCEDURE — 82607 VITAMIN B-12: CPT

## 2022-09-28 PROCEDURE — 84484 ASSAY OF TROPONIN QUANT: CPT

## 2022-09-28 PROCEDURE — 99285 EMERGENCY DEPT VISIT HI MDM: CPT

## 2022-09-28 PROCEDURE — 87040 BLOOD CULTURE FOR BACTERIA: CPT

## 2022-09-28 PROCEDURE — 83036 HEMOGLOBIN GLYCOSYLATED A1C: CPT

## 2022-09-28 PROCEDURE — 85025 COMPLETE CBC W/AUTO DIFF WBC: CPT

## 2022-09-28 PROCEDURE — 82962 GLUCOSE BLOOD TEST: CPT

## 2022-09-28 PROCEDURE — 84132 ASSAY OF SERUM POTASSIUM: CPT

## 2022-09-28 PROCEDURE — 96361 HYDRATE IV INFUSION ADD-ON: CPT

## 2022-09-28 PROCEDURE — 83605 ASSAY OF LACTIC ACID: CPT

## 2022-09-28 PROCEDURE — 65660000004 HC RM CVT STEPDOWN

## 2022-09-28 PROCEDURE — 82728 ASSAY OF FERRITIN: CPT

## 2022-09-28 PROCEDURE — 74011000258 HC RX REV CODE- 258: Performed by: PHYSICIAN ASSISTANT

## 2022-09-28 PROCEDURE — 74011000258 HC RX REV CODE- 258: Performed by: EMERGENCY MEDICINE

## 2022-09-28 PROCEDURE — 80053 COMPREHEN METABOLIC PANEL: CPT

## 2022-09-28 PROCEDURE — 84443 ASSAY THYROID STIM HORMONE: CPT

## 2022-09-28 PROCEDURE — 74011636637 HC RX REV CODE- 636/637: Performed by: EMERGENCY MEDICINE

## 2022-09-28 PROCEDURE — 74011636637 HC RX REV CODE- 636/637: Performed by: PHYSICIAN ASSISTANT

## 2022-09-28 PROCEDURE — 96375 TX/PRO/DX INJ NEW DRUG ADDON: CPT

## 2022-09-28 PROCEDURE — 99223 1ST HOSP IP/OBS HIGH 75: CPT | Performed by: INTERNAL MEDICINE

## 2022-09-28 PROCEDURE — 83540 ASSAY OF IRON: CPT

## 2022-09-28 PROCEDURE — 83690 ASSAY OF LIPASE: CPT

## 2022-09-28 PROCEDURE — 84100 ASSAY OF PHOSPHORUS: CPT

## 2022-09-28 PROCEDURE — 87150 DNA/RNA AMPLIFIED PROBE: CPT

## 2022-09-28 PROCEDURE — 83930 ASSAY OF BLOOD OSMOLALITY: CPT

## 2022-09-28 PROCEDURE — 74011250636 HC RX REV CODE- 250/636: Performed by: EMERGENCY MEDICINE

## 2022-09-28 PROCEDURE — 74011250636 HC RX REV CODE- 250/636: Performed by: PHYSICIAN ASSISTANT

## 2022-09-28 RX ORDER — ASPIRIN 81 MG/1
81 TABLET ORAL DAILY
Status: DISCONTINUED | OUTPATIENT
Start: 2022-09-29 | End: 2022-10-03 | Stop reason: HOSPADM

## 2022-09-28 RX ORDER — CARVEDILOL 6.25 MG/1
6.25 TABLET ORAL 2 TIMES DAILY WITH MEALS
Status: DISCONTINUED | OUTPATIENT
Start: 2022-09-29 | End: 2022-10-03 | Stop reason: HOSPADM

## 2022-09-28 RX ORDER — INSULIN GLARGINE 100 [IU]/ML
20 INJECTION, SOLUTION SUBCUTANEOUS ONCE
Status: DISCONTINUED | OUTPATIENT
Start: 2022-09-28 | End: 2022-09-28

## 2022-09-28 RX ORDER — INSULIN GLARGINE-YFGN 100 [IU]/ML
INJECTION, SOLUTION SUBCUTANEOUS
COMMUNITY
Start: 2022-09-10 | End: 2022-10-03

## 2022-09-28 RX ORDER — SODIUM CHLORIDE 9 MG/ML
125 INJECTION, SOLUTION INTRAVENOUS CONTINUOUS
Status: DISCONTINUED | OUTPATIENT
Start: 2022-09-28 | End: 2022-09-29

## 2022-09-28 RX ORDER — MAGNESIUM SULFATE 100 %
4 CRYSTALS MISCELLANEOUS AS NEEDED
Status: DISCONTINUED | OUTPATIENT
Start: 2022-09-28 | End: 2022-10-01

## 2022-09-28 RX ORDER — SODIUM CHLORIDE 0.9 % (FLUSH) 0.9 %
5-40 SYRINGE (ML) INJECTION AS NEEDED
Status: DISCONTINUED | OUTPATIENT
Start: 2022-09-28 | End: 2022-10-03 | Stop reason: HOSPADM

## 2022-09-28 RX ORDER — ATORVASTATIN CALCIUM 20 MG/1
20 TABLET, FILM COATED ORAL
Status: DISCONTINUED | OUTPATIENT
Start: 2022-09-28 | End: 2022-10-03 | Stop reason: HOSPADM

## 2022-09-28 RX ORDER — SODIUM CHLORIDE 0.9 % (FLUSH) 0.9 %
5-40 SYRINGE (ML) INJECTION EVERY 8 HOURS
Status: DISCONTINUED | OUTPATIENT
Start: 2022-09-28 | End: 2022-10-03 | Stop reason: HOSPADM

## 2022-09-28 RX ORDER — ACETAMINOPHEN 325 MG/1
650 TABLET ORAL
Status: DISCONTINUED | OUTPATIENT
Start: 2022-09-28 | End: 2022-10-03 | Stop reason: HOSPADM

## 2022-09-28 RX ORDER — ONDANSETRON 2 MG/ML
4 INJECTION INTRAMUSCULAR; INTRAVENOUS
Status: DISCONTINUED | OUTPATIENT
Start: 2022-09-28 | End: 2022-10-03 | Stop reason: HOSPADM

## 2022-09-28 RX ORDER — ONDANSETRON 2 MG/ML
4 INJECTION INTRAMUSCULAR; INTRAVENOUS
Status: COMPLETED | OUTPATIENT
Start: 2022-09-28 | End: 2022-09-28

## 2022-09-28 RX ORDER — ATORVASTATIN CALCIUM 20 MG/1
20 TABLET, FILM COATED ORAL
COMMUNITY
Start: 2022-07-15

## 2022-09-28 RX ORDER — FACIAL-BODY WIPES
10 EACH TOPICAL DAILY PRN
Status: DISCONTINUED | OUTPATIENT
Start: 2022-09-28 | End: 2022-10-03 | Stop reason: HOSPADM

## 2022-09-28 RX ORDER — PANTOPRAZOLE SODIUM 40 MG/1
40 TABLET, DELAYED RELEASE ORAL
Status: DISCONTINUED | OUTPATIENT
Start: 2022-09-29 | End: 2022-10-03 | Stop reason: HOSPADM

## 2022-09-28 RX ORDER — ENOXAPARIN SODIUM 100 MG/ML
30 INJECTION SUBCUTANEOUS DAILY
Status: DISCONTINUED | OUTPATIENT
Start: 2022-09-29 | End: 2022-09-30

## 2022-09-28 RX ORDER — VANCOMYCIN/0.9 % SOD CHLORIDE 1.5G/250ML
1500 PLASTIC BAG, INJECTION (ML) INTRAVENOUS ONCE
Status: DISCONTINUED | OUTPATIENT
Start: 2022-09-28 | End: 2022-09-28

## 2022-09-28 RX ORDER — LEVOTHYROXINE SODIUM 150 UG/1
150 TABLET ORAL
Status: DISCONTINUED | OUTPATIENT
Start: 2022-09-29 | End: 2022-10-03 | Stop reason: HOSPADM

## 2022-09-28 RX ORDER — POLYETHYLENE GLYCOL 3350 17 G/17G
17 POWDER, FOR SOLUTION ORAL DAILY PRN
Status: DISCONTINUED | OUTPATIENT
Start: 2022-09-28 | End: 2022-10-02

## 2022-09-28 RX ORDER — ACETAMINOPHEN 650 MG/1
650 SUPPOSITORY RECTAL
Status: DISCONTINUED | OUTPATIENT
Start: 2022-09-28 | End: 2022-10-03 | Stop reason: HOSPADM

## 2022-09-28 RX ORDER — ONDANSETRON 4 MG/1
4 TABLET, ORALLY DISINTEGRATING ORAL
Status: DISCONTINUED | OUTPATIENT
Start: 2022-09-28 | End: 2022-10-03 | Stop reason: HOSPADM

## 2022-09-28 RX ORDER — DEXTROSE MONOHYDRATE 100 MG/ML
0-250 INJECTION, SOLUTION INTRAVENOUS AS NEEDED
Status: DISCONTINUED | OUTPATIENT
Start: 2022-09-28 | End: 2022-10-01

## 2022-09-28 RX ORDER — AMLODIPINE BESYLATE 5 MG/1
5 TABLET ORAL DAILY
Status: DISCONTINUED | OUTPATIENT
Start: 2022-09-29 | End: 2022-10-03 | Stop reason: HOSPADM

## 2022-09-28 RX ORDER — GABAPENTIN 300 MG/1
300 CAPSULE ORAL DAILY
Status: DISCONTINUED | OUTPATIENT
Start: 2022-09-29 | End: 2022-10-03 | Stop reason: HOSPADM

## 2022-09-28 RX ADMIN — SODIUM CHLORIDE 125 ML/HR: 9 INJECTION, SOLUTION INTRAVENOUS at 23:32

## 2022-09-28 RX ADMIN — ONDANSETRON 4 MG: 2 INJECTION INTRAMUSCULAR; INTRAVENOUS at 20:51

## 2022-09-28 RX ADMIN — SODIUM CHLORIDE 1000 ML: 9 INJECTION, SOLUTION INTRAVENOUS at 22:06

## 2022-09-28 RX ADMIN — SODIUM CHLORIDE 1000 ML: 9 INJECTION, SOLUTION INTRAVENOUS at 20:51

## 2022-09-28 RX ADMIN — SODIUM CHLORIDE 16.3 UNITS/HR: 9 INJECTION, SOLUTION INTRAVENOUS at 23:30

## 2022-09-28 RX ADMIN — PIPERACILLIN AND TAZOBACTAM 4.5 G: 4; .5 INJECTION, POWDER, FOR SOLUTION INTRAVENOUS at 21:58

## 2022-09-28 RX ADMIN — SODIUM CHLORIDE 1000 ML: 900 INJECTION, SOLUTION INTRAVENOUS at 20:35

## 2022-09-28 RX ADMIN — SODIUM CHLORIDE 10.8 UNITS/HR: 9 INJECTION, SOLUTION INTRAVENOUS at 22:24

## 2022-09-28 RX ADMIN — FAMOTIDINE 20 MG: 10 INJECTION, SOLUTION INTRAVENOUS at 20:51

## 2022-09-28 NOTE — Clinical Note
Status[de-identified] INPATIENT [101]   Type of Bed: Stepdown [17]   Cardiac Monitoring Required?: Yes   Inpatient Hospitalization Certified Necessary for the Following Reasons: 3.  Patient receiving treatment that can only be provided in an inpatient setting (further clarification in H&P documentation)   Admitting Diagnosis: Hyperglycemia [083894]   Admitting Diagnosis: Metabolic acidosis [970091]   Admitting Diagnosis: DKA, type 2 [facility] [[id number]]   Admitting Physician: Hector Saucedo [[id number]   Attending Physician: Marvel Schaefer   Estimated Length of Stay: 2 Midnights   Discharge Plan[de-identified] [facility]

## 2022-09-28 NOTE — ED TRIAGE NOTES
Patient arrived via [facility] with c/o hyperglycemia. Per EMS patient had been having high blood sugars all day that have been unable to be controlled. Patient gave herself 8 units of insulin. EMS BG read HI pta. Patient also reporting n/v that started at home. Upon arrival patient alert and oriented x 4. Speaking in full sentences.

## 2022-09-29 LAB
ADMINISTERED INITIALS, ADMINIT: NORMAL
ANION GAP SERPL CALC-SCNC: 8 MMOL/L (ref 3–18)
BASOPHILS # BLD: 0 K/UL (ref 0–0.1)
BASOPHILS NFR BLD: 0 % (ref 0–2)
BUN SERPL-MCNC: 46 MG/DL (ref 7–18)
BUN/CREAT SERPL: 29 (ref 12–20)
CALCIUM SERPL-MCNC: 8.1 MG/DL (ref 8.5–10.1)
CHLORIDE SERPL-SCNC: 113 MMOL/L (ref 100–111)
CO2 SERPL-SCNC: 21 MMOL/L (ref 21–32)
CREAT SERPL-MCNC: 1.58 MG/DL (ref 0.6–1.3)
D50 ADMINISTERED, D50ADM: 0 ML
D50 ORDER, D50ORD: 0 ML
DIFFERENTIAL METHOD BLD: ABNORMAL
EOSINOPHIL # BLD: 0 K/UL (ref 0–0.4)
EOSINOPHIL NFR BLD: 0 % (ref 0–5)
ERYTHROCYTE [DISTWIDTH] IN BLOOD BY AUTOMATED COUNT: 14.7 % (ref 11.6–14.5)
GLUCOSE BLD STRIP.AUTO-MCNC: 102 MG/DL (ref 70–110)
GLUCOSE BLD STRIP.AUTO-MCNC: 111 MG/DL (ref 70–110)
GLUCOSE BLD STRIP.AUTO-MCNC: 114 MG/DL (ref 70–110)
GLUCOSE BLD STRIP.AUTO-MCNC: 117 MG/DL (ref 70–110)
GLUCOSE BLD STRIP.AUTO-MCNC: 118 MG/DL (ref 70–110)
GLUCOSE BLD STRIP.AUTO-MCNC: 131 MG/DL (ref 70–110)
GLUCOSE BLD STRIP.AUTO-MCNC: 137 MG/DL (ref 70–110)
GLUCOSE BLD STRIP.AUTO-MCNC: 140 MG/DL (ref 70–110)
GLUCOSE BLD STRIP.AUTO-MCNC: 145 MG/DL (ref 70–110)
GLUCOSE BLD STRIP.AUTO-MCNC: 162 MG/DL (ref 70–110)
GLUCOSE BLD STRIP.AUTO-MCNC: 167 MG/DL (ref 70–110)
GLUCOSE BLD STRIP.AUTO-MCNC: 184 MG/DL (ref 70–110)
GLUCOSE BLD STRIP.AUTO-MCNC: 190 MG/DL (ref 70–110)
GLUCOSE BLD STRIP.AUTO-MCNC: 292 MG/DL (ref 70–110)
GLUCOSE BLD STRIP.AUTO-MCNC: 311 MG/DL (ref 70–110)
GLUCOSE BLD STRIP.AUTO-MCNC: 360 MG/DL (ref 70–110)
GLUCOSE BLD STRIP.AUTO-MCNC: 437 MG/DL (ref 70–110)
GLUCOSE BLD STRIP.AUTO-MCNC: 493 MG/DL (ref 70–110)
GLUCOSE BLD STRIP.AUTO-MCNC: 72 MG/DL (ref 70–110)
GLUCOSE BLD STRIP.AUTO-MCNC: 80 MG/DL (ref 70–110)
GLUCOSE BLD STRIP.AUTO-MCNC: >600 MG/DL (ref 70–110)
GLUCOSE SERPL-MCNC: 76 MG/DL (ref 74–99)
GLUCOSE, GLC: 102 MG/DL
GLUCOSE, GLC: 111 MG/DL
GLUCOSE, GLC: 114 MG/DL
GLUCOSE, GLC: 118 MG/DL
GLUCOSE, GLC: 131 MG/DL
GLUCOSE, GLC: 137 MG/DL
GLUCOSE, GLC: 140 MG/DL
GLUCOSE, GLC: 145 MG/DL
GLUCOSE, GLC: 162 MG/DL
GLUCOSE, GLC: 167 MG/DL
GLUCOSE, GLC: 184 MG/DL
GLUCOSE, GLC: 190 MG/DL
GLUCOSE, GLC: 292 MG/DL
GLUCOSE, GLC: 311 MG/DL
GLUCOSE, GLC: 360 MG/DL
GLUCOSE, GLC: 437 MG/DL
GLUCOSE, GLC: 493 MG/DL
GLUCOSE, GLC: 603 MG/DL
GLUCOSE, GLC: 72 MG/DL
GLUCOSE, GLC: 80 MG/DL
HCT VFR BLD AUTO: 25.5 % (ref 35–45)
HGB BLD-MCNC: 8 G/DL (ref 12–16)
HIGH TARGET, HITG: 180 MG/DL
IMM GRANULOCYTES # BLD AUTO: 0.2 K/UL (ref 0–0.04)
IMM GRANULOCYTES NFR BLD AUTO: 1 % (ref 0–0.5)
INSULIN ADMINSTERED, INSADM: 0.1 UNITS/HOUR
INSULIN ADMINSTERED, INSADM: 0.5 UNITS/HOUR
INSULIN ADMINSTERED, INSADM: 0.6 UNITS/HOUR
INSULIN ADMINSTERED, INSADM: 0.8 UNITS/HOUR
INSULIN ADMINSTERED, INSADM: 1 UNITS/HOUR
INSULIN ADMINSTERED, INSADM: 1.1 UNITS/HOUR
INSULIN ADMINSTERED, INSADM: 1.4 UNITS/HOUR
INSULIN ADMINSTERED, INSADM: 1.7 UNITS/HOUR
INSULIN ADMINSTERED, INSADM: 1.8 UNITS/HOUR
INSULIN ADMINSTERED, INSADM: 17.3 UNITS/HOUR
INSULIN ADMINSTERED, INSADM: 17.6 UNITS/HOUR
INSULIN ADMINSTERED, INSADM: 18 UNITS/HOUR
INSULIN ADMINSTERED, INSADM: 18.6 UNITS/HOUR
INSULIN ADMINSTERED, INSADM: 18.9 UNITS/HOUR
INSULIN ADMINSTERED, INSADM: 2.1 UNITS/HOUR
INSULIN ADMINSTERED, INSADM: 2.5 UNITS/HOUR
INSULIN ADMINSTERED, INSADM: 2.6 UNITS/HOUR
INSULIN ADMINSTERED, INSADM: 2.7 UNITS/HOUR
INSULIN ADMINSTERED, INSADM: 21.7 UNITS/HOUR
INSULIN ADMINSTERED, INSADM: 8.6 UNITS/HOUR
INSULIN ORDER, INSORD: 0.1 UNITS/HOUR
INSULIN ORDER, INSORD: 0.5 UNITS/HOUR
INSULIN ORDER, INSORD: 0.6 UNITS/HOUR
INSULIN ORDER, INSORD: 0.8 UNITS/HOUR
INSULIN ORDER, INSORD: 1 UNITS/HOUR
INSULIN ORDER, INSORD: 1.1 UNITS/HOUR
INSULIN ORDER, INSORD: 1.4 UNITS/HOUR
INSULIN ORDER, INSORD: 1.7 UNITS/HOUR
INSULIN ORDER, INSORD: 1.8 UNITS/HOUR
INSULIN ORDER, INSORD: 17.3 UNITS/HOUR
INSULIN ORDER, INSORD: 17.6 UNITS/HOUR
INSULIN ORDER, INSORD: 18 UNITS/HOUR
INSULIN ORDER, INSORD: 18.6 UNITS/HOUR
INSULIN ORDER, INSORD: 18.9 UNITS/HOUR
INSULIN ORDER, INSORD: 2.1 UNITS/HOUR
INSULIN ORDER, INSORD: 2.5 UNITS/HOUR
INSULIN ORDER, INSORD: 2.6 UNITS/HOUR
INSULIN ORDER, INSORD: 2.7 UNITS/HOUR
INSULIN ORDER, INSORD: 21.7 UNITS/HOUR
INSULIN ORDER, INSORD: 8.6 UNITS/HOUR
LOW TARGET, LOT: 140 MG/DL
LYMPHOCYTES # BLD: 1.5 K/UL (ref 0.9–3.6)
LYMPHOCYTES NFR BLD: 7 % (ref 21–52)
MAGNESIUM SERPL-MCNC: 1.7 MG/DL (ref 1.6–2.6)
MCH RBC QN AUTO: 24.8 PG (ref 24–34)
MCHC RBC AUTO-ENTMCNC: 31.4 G/DL (ref 31–37)
MCV RBC AUTO: 79.2 FL (ref 78–100)
MINUTES UNTIL NEXT BG, NBG: 60 MIN
MONOCYTES # BLD: 1.7 K/UL (ref 0.05–1.2)
MONOCYTES NFR BLD: 8 % (ref 3–10)
MULTIPLIER, MUL: 0
MULTIPLIER, MUL: 0.01
MULTIPLIER, MUL: 0.02
MULTIPLIER, MUL: 0.03
MULTIPLIER, MUL: 0.03
MULTIPLIER, MUL: 0.04
MULTIPLIER, MUL: 0.05
MULTIPLIER, MUL: 0.05
MULTIPLIER, MUL: 0.06
MULTIPLIER, MUL: 0.06
MULTIPLIER, MUL: 0.07
MULTIPLIER, MUL: 0.08
MULTIPLIER, MUL: 0.08
NEUTS SEG # BLD: 19.1 K/UL (ref 1.8–8)
NEUTS SEG NFR BLD: 85 % (ref 40–73)
NRBC # BLD: 0 K/UL (ref 0–0.01)
NRBC BLD-RTO: 0 PER 100 WBC
ORDER INITIALS, ORDINIT: NORMAL
PHOSPHATE SERPL-MCNC: 2.2 MG/DL (ref 2.5–4.9)
PLATELET # BLD AUTO: 286 K/UL (ref 135–420)
PMV BLD AUTO: 10.9 FL (ref 9.2–11.8)
POTASSIUM SERPL-SCNC: 4.3 MMOL/L (ref 3.5–5.5)
RBC # BLD AUTO: 3.22 M/UL (ref 4.2–5.3)
SODIUM SERPL-SCNC: 142 MMOL/L (ref 136–145)
WBC # BLD AUTO: 22.5 K/UL (ref 4.6–13.2)

## 2022-09-29 PROCEDURE — 99232 SBSQ HOSP IP/OBS MODERATE 35: CPT | Performed by: HOSPITALIST

## 2022-09-29 PROCEDURE — 74011636637 HC RX REV CODE- 636/637: Performed by: PHYSICIAN ASSISTANT

## 2022-09-29 PROCEDURE — 74011250637 HC RX REV CODE- 250/637: Performed by: HOSPITALIST

## 2022-09-29 PROCEDURE — 74011250636 HC RX REV CODE- 250/636: Performed by: HOSPITALIST

## 2022-09-29 PROCEDURE — 74011250636 HC RX REV CODE- 250/636: Performed by: PHYSICIAN ASSISTANT

## 2022-09-29 PROCEDURE — 2709999900 HC NON-CHARGEABLE SUPPLY

## 2022-09-29 PROCEDURE — 74011000250 HC RX REV CODE- 250: Performed by: PHYSICIAN ASSISTANT

## 2022-09-29 PROCEDURE — 97161 PT EVAL LOW COMPLEX 20 MIN: CPT

## 2022-09-29 PROCEDURE — 74011636637 HC RX REV CODE- 636/637: Performed by: HOSPITALIST

## 2022-09-29 PROCEDURE — 65660000004 HC RM CVT STEPDOWN

## 2022-09-29 PROCEDURE — 74011000250 HC RX REV CODE- 250: Performed by: HOSPITALIST

## 2022-09-29 PROCEDURE — 74011000258 HC RX REV CODE- 258: Performed by: PHYSICIAN ASSISTANT

## 2022-09-29 PROCEDURE — 82962 GLUCOSE BLOOD TEST: CPT

## 2022-09-29 PROCEDURE — 80048 BASIC METABOLIC PNL TOTAL CA: CPT

## 2022-09-29 PROCEDURE — 97530 THERAPEUTIC ACTIVITIES: CPT

## 2022-09-29 PROCEDURE — 77030018842 HC SOL IRR SOD CL 9% BAXT -A

## 2022-09-29 PROCEDURE — 97166 OT EVAL MOD COMPLEX 45 MIN: CPT

## 2022-09-29 PROCEDURE — 83735 ASSAY OF MAGNESIUM: CPT

## 2022-09-29 PROCEDURE — 84100 ASSAY OF PHOSPHORUS: CPT

## 2022-09-29 PROCEDURE — 36415 COLL VENOUS BLD VENIPUNCTURE: CPT

## 2022-09-29 PROCEDURE — 85025 COMPLETE CBC W/AUTO DIFF WBC: CPT

## 2022-09-29 PROCEDURE — 74011250637 HC RX REV CODE- 250/637: Performed by: PHYSICIAN ASSISTANT

## 2022-09-29 RX ORDER — SODIUM CHLORIDE 450 MG/100ML
125 INJECTION, SOLUTION INTRAVENOUS CONTINUOUS
Status: DISPENSED | OUTPATIENT
Start: 2022-09-29 | End: 2022-09-30

## 2022-09-29 RX ORDER — MAGNESIUM SULFATE 100 %
16 CRYSTALS MISCELLANEOUS AS NEEDED
Status: DISCONTINUED | OUTPATIENT
Start: 2022-09-29 | End: 2022-09-29

## 2022-09-29 RX ORDER — INSULIN LISPRO 100 [IU]/ML
INJECTION, SOLUTION INTRAVENOUS; SUBCUTANEOUS
Status: DISCONTINUED | OUTPATIENT
Start: 2022-09-29 | End: 2022-10-03 | Stop reason: HOSPADM

## 2022-09-29 RX ORDER — SODIUM,POTASSIUM PHOSPHATES 280-250MG
1 POWDER IN PACKET (EA) ORAL 3 TIMES DAILY
Status: COMPLETED | OUTPATIENT
Start: 2022-09-29 | End: 2022-10-01

## 2022-09-29 RX ORDER — DEXTROSE MONOHYDRATE 100 MG/ML
0-250 INJECTION, SOLUTION INTRAVENOUS AS NEEDED
Status: DISCONTINUED | OUTPATIENT
Start: 2022-09-29 | End: 2022-09-29

## 2022-09-29 RX ORDER — INSULIN GLARGINE 100 [IU]/ML
24 INJECTION, SOLUTION SUBCUTANEOUS
Status: DISCONTINUED | OUTPATIENT
Start: 2022-09-29 | End: 2022-10-03

## 2022-09-29 RX ORDER — INSULIN GLARGINE 100 [IU]/ML
24 INJECTION, SOLUTION SUBCUTANEOUS
Status: DISCONTINUED | OUTPATIENT
Start: 2022-09-29 | End: 2022-09-29

## 2022-09-29 RX ORDER — LANOLIN ALCOHOL/MO/W.PET/CERES
400 CREAM (GRAM) TOPICAL DAILY
Status: DISCONTINUED | OUTPATIENT
Start: 2022-09-30 | End: 2022-10-02

## 2022-09-29 RX ADMIN — SODIUM CHLORIDE 18 UNITS/HR: 9 INJECTION, SOLUTION INTRAVENOUS at 04:31

## 2022-09-29 RX ADMIN — PANTOPRAZOLE SODIUM 40 MG: 40 TABLET, DELAYED RELEASE ORAL at 09:42

## 2022-09-29 RX ADMIN — SODIUM CHLORIDE, PRESERVATIVE FREE 10 ML: 5 INJECTION INTRAVENOUS at 00:35

## 2022-09-29 RX ADMIN — SODIUM CHLORIDE 21.7 UNITS/HR: 9 INJECTION, SOLUTION INTRAVENOUS at 00:33

## 2022-09-29 RX ADMIN — POTASSIUM & SODIUM PHOSPHATES POWDER PACK 280-160-250 MG 1 PACKET: 280-160-250 PACK at 16:37

## 2022-09-29 RX ADMIN — CARVEDILOL 6.25 MG: 6.25 TABLET, FILM COATED ORAL at 16:37

## 2022-09-29 RX ADMIN — ENOXAPARIN SODIUM 30 MG: 100 INJECTION SUBCUTANEOUS at 09:41

## 2022-09-29 RX ADMIN — ACETAMINOPHEN 650 MG: 325 TABLET, FILM COATED ORAL at 16:37

## 2022-09-29 RX ADMIN — SODIUM CHLORIDE 125 ML/HR: 4.5 INJECTION, SOLUTION INTRAVENOUS at 13:59

## 2022-09-29 RX ADMIN — ATORVASTATIN CALCIUM 20 MG: 20 TABLET, FILM COATED ORAL at 22:40

## 2022-09-29 RX ADMIN — PANTOPRAZOLE SODIUM 40 MG: 40 TABLET, DELAYED RELEASE ORAL at 16:37

## 2022-09-29 RX ADMIN — LEVOTHYROXINE SODIUM 150 MCG: 150 TABLET ORAL at 06:50

## 2022-09-29 RX ADMIN — ATORVASTATIN CALCIUM 20 MG: 20 TABLET, FILM COATED ORAL at 00:34

## 2022-09-29 RX ADMIN — IRON SUCROSE 200 MG: 20 INJECTION, SOLUTION INTRAVENOUS at 13:55

## 2022-09-29 RX ADMIN — Medication 24 UNITS: at 16:37

## 2022-09-29 RX ADMIN — SODIUM CHLORIDE, PRESERVATIVE FREE 10 ML: 5 INJECTION INTRAVENOUS at 07:16

## 2022-09-29 RX ADMIN — GABAPENTIN 300 MG: 300 CAPSULE ORAL at 09:42

## 2022-09-29 RX ADMIN — SODIUM CHLORIDE, PRESERVATIVE FREE 10 ML: 5 INJECTION INTRAVENOUS at 13:55

## 2022-09-29 RX ADMIN — POTASSIUM & SODIUM PHOSPHATES POWDER PACK 280-160-250 MG 1 PACKET: 280-160-250 PACK at 22:40

## 2022-09-29 RX ADMIN — SODIUM CHLORIDE, PRESERVATIVE FREE 10 ML: 5 INJECTION INTRAVENOUS at 22:40

## 2022-09-29 RX ADMIN — SODIUM CHLORIDE 125 ML/HR: 4.5 INJECTION, SOLUTION INTRAVENOUS at 20:59

## 2022-09-29 RX ADMIN — ASPIRIN 81 MG: 81 TABLET, COATED ORAL at 09:42

## 2022-09-29 NOTE — PROGRESS NOTES
Transfer report received on pt from ED. Received pt from ED via bed, she is alert and oriented, she denies any pain and in no apparent discomfort, call light within pt's reach.

## 2022-09-29 NOTE — ED PROVIDER NOTES
EMERGENCY DEPARTMENT HISTORY AND PHYSICAL EXAM      Date: 2022  Patient Name: Jay Hernandez      History of Presenting Illness     Chief Complaint   Patient presents with    High Blood Sugar       History Provided By: Patient    Location/Duration/Severity/Modifying factors   Patient is a 77-year-old female who presents to the emergency department with a chief complaint of hyperglycemia and vomiting. States that symptoms onset originally over the past day or 2. She has been dealing with the recent loss of her daughter who tragically passed away within the past couple of days. She does have a  for her daughter today. She started having exacerbated symptoms this evening. She denies any chest pain shortness of breath or difficulty urinating. She states that sugar at home was reading \"high\". EMS blood sugar readings were the same. States this happened to her in the past but she is not sure why it happened to her previously she says she has been giving herself her Lantus and giving herself insulin but she thinks has not been keeping very good track of it. High Blood Sugar  Pertinent negatives include no chest pain, no abdominal pain, no headaches and no shortness of breath. There are no other complaints, changes, or physical findings at this time.     PCP: Estefani Joseph MD    Current Facility-Administered Medications   Medication Dose Route Frequency Provider Last Rate Last Admin    glucose chewable tablet 16 g  4 Tablet Oral PRN Irvin Roberson PA        glucagon (GLUCAGEN) injection 1 mg  1 mg IntraMUSCular PRN Virginia Roberson PA        dextrose 10% infusion 0-250 mL  0-250 mL IntraVENous PRN Irvin Roberson PA        insulin regular (MYXREDLIN, NOVOLIN, HUMULIN) 100 units/100 ml NS infusion (premix)  0-50 Units/hr IntraVENous TITRATE Virginia Roberson PA 10.8 mL/hr at 22 2224 10.8 Units/hr at 22 2224    sodium chloride 0.9 % bolus infusion 1,000 mL 1,000 mL IntraVENous ONCE Reprogle, Kelsea Boogie, [address]        [START ON 9/29/2022] amLODIPine (NORVASC) tablet 5 mg  5 mg Oral DAILY Reprogle, YOLANDA Mtz        [START ON 9/29/2022] aspirin delayed-release tablet 81 mg  81 mg Oral DAILY Reprogle, YOLANDA Mtz        atorvastatin (LIPITOR) tablet 20 mg  20 mg Oral QHS Reprogle, YOLANDA Mtz        [START ON 9/29/2022] carvediloL (COREG) tablet 6.25 mg  6.25 mg Oral BID WITH MEALS Reprogle, YOLANDA Mtz        [START ON 9/29/2022] gabapentin (NEURONTIN) capsule 300 mg  300 mg Oral DAILY Reprogle, YOLANDA Mtz        [START ON 9/29/2022] levothyroxine (SYNTHROID) tablet 150 mcg  150 mcg Oral ACB Reprogle, YOLANDA Mtz        [START ON 9/29/2022] pantoprazole (PROTONIX) tablet 40 mg  40 mg Oral ACB&D Reprogle, YOLANDA Mtz        sodium chloride (NS) flush 5-40 mL  5-40 mL IntraVENous Q8H Reprogle, YOLANDA Sheridan        sodium chloride (NS) flush 5-40 mL  5-40 mL IntraVENous PRN Reprogle, YOLANDA Mtz        acetaminophen (TYLENOL) tablet 650 mg  650 mg Oral Q6H PRN Reprogle, YOLANDA Mtz        Or    acetaminophen (TYLENOL) suppository 650 mg  650 mg Rectal Q6H PRN Reprogle, YOLANDA Mtz        polyethylene glycol (MIRALAX) packet 17 g  17 g Oral DAILY PRN Reprogle, YOLANDA Mtz        bisacodyL (DULCOLAX) suppository 10 mg  10 mg Rectal DAILY PRN Reprogle, YOLANDA Mtz        ondansetron (ZOFRAN ODT) tablet 4 mg  4 mg Oral Q8H PRN Reprogle, YOLANDA Mtz        Or    ondansetron (ZOFRAN) injection 4 mg  4 mg IntraVENous Q6H PRN Reprogle, YOLANDA Mtz        [START ON 9/29/2022] enoxaparin (LOVENOX) injection 30 mg  30 mg SubCUTAneous DAILY Kelsea Roberson PA        0.9% sodium chloride infusion  125 mL/hr IntraVENous CONTINUOUS ReproKelsea keller PA         Current Outpatient Medications   Medication Sig Dispense Refill    insulin glargine-yfgn 100 unit/mL (3 mL) inpn INJECT 16 UNITS SUBCUTANEOUSLY IN THE MORNING AND 16 IN THE EVENING      atorvastatin (LIPITOR) 20 mg tablet Take 20 mg by mouth nightly. acetaminophen (TYLENOL) 500 mg tablet Take 500 mg by mouth every six (6) hours as needed for Pain.      gabapentin (NEURONTIN) 300 mg capsule Take 300 mg by mouth daily. pioglitazone (ACTOS) 30 mg tablet Take 30 mg by mouth daily. levothyroxine (SYNTHROID) 150 mcg tablet Take 150 mcg by mouth Daily (before breakfast). SITagliptin (JANUVIA) 100 mg tablet Take 100 mg by mouth daily. insulin aspart U-100 (NOVOLOG) 100 unit/mL (3 mL) inpn 2-10 Units by SubCUTAneous route Before breakfast, lunch, and dinner. BS less than 150- no units  150-199- 2 units  200-249- 4 units  250-299- 6 units  300-349- 8 units  350 10 units  over 400 call MD      glucose 4 gram chewable tablet Take 15 g by mouth as needed for PRN Reason (Other) (low BS). insulin glargine (LANTUS) 100 unit/mL injection 24 Units by SubCUTAneous route daily. Inject 10 units SQ DAILY (Patient taking differently: 20 Units by SubCUTAneous route nightly. Inject 20 units SQ QHS - lowered due to hypoglycemia) 10 mL 0    lisinopriL (PRINIVIL, ZESTRIL) 2.5 mg tablet Take 1 Tablet by mouth daily. 30 Tablet 0    carvediloL (COREG) 6.25 mg tablet Take 1 Tablet by mouth two (2) times daily (with meals). 60 Tablet 0    docusate sodium (COLACE) 100 mg capsule Take 1 Capsule by mouth daily. (Patient taking differently: Take 1 Capsule by mouth daily as needed for Constipation.) 30 Capsule 0    insulin lispro (HUMALOG) 100 unit/mL injection For Blood Sugar (mg/dL) of:              Less than 150 =   0 units  150 -199 =   3 units  200 -249 =   6 units  250 -299 =   9 units  300 -349 =   12 units  350 and above =   15 units and Call Physician  Initiate Hypoglycemic protocol if blood glucose is <70 mg/dL. Fast Acting - Administer Immediately - or within 15 minutes of start of meal, if mealtime coverage. 1 Each 0    pantoprazole (PROTONIX) 40 mg tablet Take 1 Tablet by mouth Before breakfast and dinner. 60 Tablet 0    polyethylene glycol (MIRALAX) 17 gram packet Take 1 Packet by mouth daily. Indications: HOLD IT IF DIARRHEA (Patient taking differently: Take 1 Packet by mouth daily as needed for Constipation. Indications: HOLD IT IF DIARRHEA) 10 Packet 0    aspirin delayed-release 81 mg tablet Take 1 Tablet by mouth daily. 30 Tablet 0    metFORMIN ER (GLUCOPHAGE XR) 500 mg tablet Take 500 mg by mouth daily (with dinner). Cholecalciferol, Vitamin D3, 50,000 unit cap Take 1 Capsule by mouth every seven (7) days. Takes on wednesdays      amLODIPine (NORVASC) 5 mg tablet Take 5 mg by mouth daily. alendronate (FOSAMAX) 10 mg tablet Take 70 mg by mouth every seven (7) days. Past History     Past Medical History:  Past Medical History:   Diagnosis Date    Anemia due to stage 3b chronic kidney disease ([facility] 9/28/2022    Colon cancer ([facility]     Diabetes ([facility]     Hypertension     Hypothyroidism     Stage 3b chronic kidney disease ([facility] 9/28/2022       Past Surgical History:  Past Surgical History:   Procedure Laterality Date    COLONOSCOPY N/A 12/30/2016    COLONOSCOPY. SURVEILLANCE /c Hot Snared Polypectomy /c EndoClip x3 performed by Sarahi Storey MD at [facility] ENDOSCOPY    HX HYSTERECTOMY      HX TOTAL COLECTOMY         Family History:  Family History   Problem Relation Age of Onset    Diabetes Mother     Cancer Father         colon       Social History:  Social History     Tobacco Use    Smoking status: Former    Smokeless tobacco: Never   Substance Use Topics    Alcohol use: Yes     Comment: occasionally    Drug use: No       Allergies:  No Known Allergies      Review of Systems     Review of Systems   Constitutional:  Negative for chills and fever. HENT:  Negative for congestion, rhinorrhea, sinus pressure and sneezing. Eyes:  Negative for visual disturbance. Respiratory:  Negative for cough and shortness of breath. Cardiovascular:  Negative for chest pain, palpitations and leg swelling. Gastrointestinal:  Positive for nausea and vomiting. Negative for abdominal pain and diarrhea. Endocrine: Positive for polyuria. Genitourinary:  Negative for dysuria, frequency, urgency, vaginal bleeding and vaginal discharge. Musculoskeletal:  Negative for back pain and neck pain. Skin:  Negative for rash. Neurological:  Negative for syncope, numbness and headaches. Physical Exam     Physical Exam  Vitals and nursing note reviewed. Constitutional:       General: She is not in acute distress. Appearance: Normal appearance. HENT:      Head: Normocephalic and atraumatic. Right Ear: External ear normal.      Left Ear: External ear normal.      Nose: Nose normal. No congestion. Mouth/Throat:      Mouth: Mucous membranes are dry. Pharynx: Oropharynx is clear. No oropharyngeal exudate or posterior oropharyngeal erythema. Eyes:      Conjunctiva/sclera: Conjunctivae normal.   Cardiovascular:      Rate and Rhythm: Regular rhythm. Tachycardia present. Pulses: Normal pulses. Heart sounds: Normal heart sounds. No murmur heard. Pulmonary:      Effort: Pulmonary effort is normal.      Breath sounds: Normal breath sounds. No wheezing, rhonchi or rales. Abdominal:      General: Abdomen is flat. Tenderness: There is no abdominal tenderness. There is no guarding or rebound. Comments: Actively dry heaving   Musculoskeletal:         General: No swelling or tenderness. Normal range of motion. Cervical back: Normal range of motion and neck supple. Right lower leg: No edema. Left lower leg: No edema. Skin:     General: Skin is warm and dry. Capillary Refill: Capillary refill takes less than 2 seconds. Findings: No rash. Neurological:      General: No focal deficit present. Mental Status: She is alert. Cranial Nerves: No cranial nerve deficit. Sensory: No sensory deficit.        Lab and Diagnostic Study Results     Labs -  Recent Results (from the past 24 hour(s))   GLUCOSE, POC    Collection Time: 09/28/22  8:00 PM   Result Value Ref Range    Glucose (POC) >600 (HH) 70 - 110 mg/dL   CBC WITH AUTOMATED DIFF    Collection Time: 09/28/22  8:07 PM   Result Value Ref Range    WBC 13.6 (H) 4.6 - 13.2 K/uL    RBC 3.86 (L) 4.20 - 5.30 M/uL    HGB 9.7 (L) 12.0 - 16.0 g/dL    HCT 31.1 (L) 35.0 - 45.0 %    MCV 80.6 78.0 - 100.0 FL    MCH 25.1 24.0 - 34.0 PG    MCHC 31.2 31.0 - 37.0 g/dL    RDW 14.5 11.6 - 14.5 %    PLATELET 035 802 - 583 K/uL    MPV 11.2 9.2 - 11.8 FL    NRBC 0.0 0  WBC    ABSOLUTE NRBC 0.00 0.00 - 0.01 K/uL    NEUTROPHILS 88 (H) 40 - 73 %    LYMPHOCYTES 7 (L) 21 - 52 %    MONOCYTES 4 3 - 10 %    EOSINOPHILS 0 0 - 5 %    BASOPHILS 0 0 - 2 %    IMMATURE GRANULOCYTES 1 (H) 0.0 - 0.5 %    ABS. NEUTROPHILS 12.0 (H) 1.8 - 8.0 K/UL    ABS. LYMPHOCYTES 0.9 0.9 - 3.6 K/UL    ABS. MONOCYTES 0.6 0.05 - 1.2 K/UL    ABS. EOSINOPHILS 0.0 0.0 - 0.4 K/UL    ABS. BASOPHILS 0.0 0.0 - 0.1 K/UL    ABS. IMM. GRANS. 0.1 (H) 0.00 - 0.04 K/UL    DF AUTOMATED     METABOLIC PANEL, COMPREHENSIVE    Collection Time: 09/28/22  8:07 PM   Result Value Ref Range    Sodium 127 (L) 136 - 145 mmol/L    Potassium 6.4 (HH) 3.5 - 5.5 mmol/L    Chloride 89 (L) 100 - 111 mmol/L    CO2 14 (L) 21 - 32 mmol/L    Anion gap 24 (H) 3.0 - 18 mmol/L    Glucose 901 (HH) 74 - 99 mg/dL    BUN 52 (H) 7.0 - 18 MG/DL    Creatinine 1.96 (H) 0.6 - 1.3 MG/DL    BUN/Creatinine ratio 27 (H) 12 - 20      GFR est AA 30 (L) >60 ml/min/1.73m2    GFR est non-AA 25 (L) >60 ml/min/1.73m2    Calcium 9.1 8.5 - 10.1 MG/DL    Bilirubin, total 0.5 0.2 - 1.0 MG/DL    ALT (SGPT) 11 (L) 13 - 56 U/L    AST (SGOT) 4 (L) 10 - 38 U/L    Alk.  phosphatase 83 45 - 117 U/L    Protein, total 7.0 6.4 - 8.2 g/dL    Albumin 3.8 3.4 - 5.0 g/dL    Globulin 3.2 2.0 - 4.0 g/dL    A-G Ratio 1.2 0.8 - 1.7     LIPASE    Collection Time: 09/28/22  8:07 PM   Result Value Ref Range    Lipase 41 (L) 73 - 393 U/L MAGNESIUM    Collection Time: 09/28/22  8:07 PM   Result Value Ref Range    Magnesium 2.0 1.6 - 2.6 mg/dL   TROPONIN-HIGH SENSITIVITY    Collection Time: 09/28/22  8:07 PM   Result Value Ref Range    Troponin-High Sensitivity 13 0 - 54 ng/L   HEMOGLOBIN A1C WITH EAG    Collection Time: 09/28/22  8:07 PM   Result Value Ref Range    Hemoglobin A1c 9.7 (H) 4.2 - 5.6 %    Est. average glucose 232 mg/dL   BLOOD GAS,CHEM8,LACTIC ACID POC    Collection Time: 09/28/22  8:24 PM   Result Value Ref Range    Calcium, ionized (POC) 1.03 (L) 1.12 - 1.32 mmol/L    Base deficit (POC) 13.2 mmol/L    HCO3 (POC) 13.3 (L) 22 - 26 MMOL/L    CO2, POC 14 (L) 19 - 24 MMOL/L    O2 SAT 81 %    Sample source VENOUS BLOOD      Performed by [facility]     Sodium (POC) 124 (L) 136 - 145 mmol/L    Potassium (POC) 6.2 (H) 3.5 - 5.1 mmol/L    Glucose (POC) >700 (HH) 65 - 100 mg/dL    Creatinine (POC) 1.49 (H) 0.6 - 1.3 mg/dL    Lactic Acid (POC) 2.43 (HH) 0.40 - 2.00 mmol/L    Chloride (POC) 96 (L) 98 - 107 mmol/L    Anion gap, POC 15 10 - 20      GFRAA, POC 41 (L) >60 ml/min/1.73m2    GFRNA, POC 34 (L) >60 ml/min/1.73m2    pH, venous (POC) 7.23 (L) 7.32 - 7.42      pCO2, venous (POC) 32.0 (L) 41 - 51 MMHG    pO2, venous (POC) 53 (H) 25 - 40 mmHg   URINALYSIS W/ RFLX MICROSCOPIC    Collection Time: 09/28/22  9:54 PM   Result Value Ref Range    Color YELLOW      Appearance CLEAR      Specific gravity 1.023 1.005 - 1.030      pH (UA) 5.0 5.0 - 8.0      Protein Negative NEG mg/dL    Glucose >1,000 (A) NEG mg/dL    Ketone 80 (A) NEG mg/dL    Bilirubin Negative NEG      Blood Negative NEG      Urobilinogen 0.2 0.2 - 1.0 EU/dL    Nitrites Negative NEG      Leukocyte Esterase Negative NEG     GLUCOSTABILIZER    Collection Time: 09/28/22 10:23 PM   Result Value Ref Range    Glucose 601 mg/dL    Insulin order 10.8 units/hour    Insulin adminstered 10.8 units/hour    Multiplier 0.020     Low target 140 mg/dL    High target 180 mg/dL    D50 order 0.0 ml    D50 administered 0.00 ml    Minutes until next BG 60 min    Order initials TT     Administered initials TT          Radiologic Studies -   XR CHEST PORT   Final Result      No radiographic finding for an acute cardiopulmonary process            Medical Decision Making and ED Course   - I am the first and primary provider for this patient AND AM THE PRIMARY PROVIDER OF RECORD. - I reviewed the vital signs, available nursing notes, past medical history, past surgical history, family history and social history. - Initial assessment performed. The patients presenting problems have been discussed, and the staff are in agreement with the care plan formulated and outlined with them. I have encouraged them to ask questions as they arise throughout their visit. Vital Signs-Reviewed the patient's vital signs. Patient Vitals for the past 12 hrs:   Temp Pulse Resp BP SpO2   09/28/22 1953 98.1 °F (36.7 °C) (!) 114 18 (!) 177/67 97 %         Records Reviewed: Nursing Notes, Old Medical Records, Previous Radiology Studies, and Previous Laboratory Studies        Provider Notes (Medical Decision Making):     MDM  Number of Diagnoses or Management Options  Diagnosis management comments: Patient is a 64-year-old female presenting with complaints of elevated blood glucose as well and she is diabetic. DDx: DKA, HHS, hyperglycemia, dehydration, sepsis, SIRS, lactic acidosis, hyperkalemia, hypercalcemia, etc.      Work-up reveals mild to moderate DKA with pH of around 7.23. Plan will be for admission. Insulin drip started. Potassium slightly elevated at 6.4 although by the time the test have resulted she is already received fluids and has been on the insulin drip we will recheck it and monitor. Case discussed with hospitalist, Dr. Radha Marroquin for admission. I suspect that this is DKA is due to noncompliance there is no alternative cause of weeks at this point in terms of infection or sepsis.            ED Course: ED Course as of 09/28/22 2307   Wed Sep 28, 2022   2108 . Code sepsis called   [JOSSELYN]      ED Course User Index  [JOSSELYN] Aleksandra Mccabe DO     ------------------------------------------------------------------------------------------------------------        Consultations:       Consultations: -  Hospitalist Consultant: Dr. Haritha Ayala: We have asked for emergent assistance with regard to this patient. We have discussed the patients HPI, ROS, PE and results this far. They will come and evaluate the patient for admission. Procedures and Critical Care       Performed by: Radha Adams DO    Procedures             CRITICAL CARE NOTE :  11:02 PM  CRITICAL CARE TIME: I have spent 40 minutes of critical care time involved in lab review, consultations with specialist, family decision-making, and documentation. During this entire length of time I was immediately available to the patient. Critical Care: The reason for providing this level of medical care for this critically ill patient was due a critical illness that impaired one or more vital organ systems such that there was a high probability of imminent or life threatening deterioration in the patients condition. This care involved high complexity decision making to assess, manipulate, and support vital system functions, to treat this vital organ system failure and to prevent further life threatening deterioration of the patients condition. Time is exclusive of procedural and teaching time. DO Radha Cooper DO        Disposition         Diagnosis:   1. Diabetic ketoacidosis without coma associated with type 2 diabetes mellitus ([facility]          Disposition: ADMITTED    Follow-up Information    None         Patient's Medications   Start Taking    No medications on file   Continue Taking    ACETAMINOPHEN (TYLENOL) 500 MG TABLET    Take 500 mg by mouth every six (6) hours as needed for Pain.     ALENDRONATE (FOSAMAX) 10 MG TABLET    Take 70 mg by mouth every seven (7) days. AMLODIPINE (NORVASC) 5 MG TABLET    Take 5 mg by mouth daily. ASPIRIN DELAYED-RELEASE 81 MG TABLET    Take 1 Tablet by mouth daily. ATORVASTATIN (LIPITOR) 20 MG TABLET    Take 20 mg by mouth nightly. CARVEDILOL (COREG) 6.25 MG TABLET    Take 1 Tablet by mouth two (2) times daily (with meals). CHOLECALCIFEROL, VITAMIN D3, 50,000 UNIT CAP    Take 1 Capsule by mouth every seven (7) days. Takes on wednesdays    DOCUSATE SODIUM (COLACE) 100 MG CAPSULE    Take 1 Capsule by mouth daily. GABAPENTIN (NEURONTIN) 300 MG CAPSULE    Take 300 mg by mouth daily. GLUCOSE 4 GRAM CHEWABLE TABLET    Take 15 g by mouth as needed for PRN Reason (Other) (low BS). INSULIN ASPART U-100 (NOVOLOG) 100 UNIT/ML (3 ML) INPN    2-10 Units by SubCUTAneous route Before breakfast, lunch, and dinner. BS less than 150- no units  150-199- 2 units  200-249- 4 units  250-299- 6 units  300-349- 8 units  350 10 units  over 400 call MD    INSULIN GLARGINE (LANTUS) 100 UNIT/ML INJECTION    24 Units by SubCUTAneous route daily. Inject 10 units SQ DAILY    INSULIN GLARGINE-YFGN 100 UNIT/ML (3 ML) INPN    INJECT 16 UNITS SUBCUTANEOUSLY IN THE MORNING AND 16 IN THE EVENING    INSULIN LISPRO (HUMALOG) 100 UNIT/ML INJECTION    For Blood Sugar (mg/dL) of:              Less than 150 =   0 units  150 -199 =   3 units  200 -249 =   6 units  250 -299 =   9 units  300 -349 =   12 units  350 and above =   15 units and Call Physician  Initiate Hypoglycemic protocol if blood glucose is <70 mg/dL. Fast Acting - Administer Immediately - or within 15 minutes of start of meal, if mealtime coverage. LEVOTHYROXINE (SYNTHROID) 150 MCG TABLET    Take 150 mcg by mouth Daily (before breakfast). LISINOPRIL (PRINIVIL, ZESTRIL) 2.5 MG TABLET    Take 1 Tablet by mouth daily. METFORMIN ER (GLUCOPHAGE XR) 500 MG TABLET    Take 500 mg by mouth daily (with dinner).     PANTOPRAZOLE (PROTONIX) 40 MG TABLET    Take 1 Tablet by mouth Before breakfast and dinner. PIOGLITAZONE (ACTOS) 30 MG TABLET    Take 30 mg by mouth daily. POLYETHYLENE GLYCOL (MIRALAX) 17 GRAM PACKET    Take 1 Packet by mouth daily. Indications: HOLD IT IF DIARRHEA    SITAGLIPTIN (JANUVIA) 100 MG TABLET    Take 100 mg by mouth daily. These Medications have changed    No medications on file   Stop Taking    ATORVASTATIN (LIPITOR) 40 MG TABLET    Take 1 Tablet by mouth nightly. Diagnosis     Clinical Impression:   1. Diabetic ketoacidosis without coma associated with type 2 [facility] mellitus [facility]        Attestations:    Ara Lala, DO    Please note that this dictation was completed with [employer], the computer voice recognition software. Quite often unanticipated grammatical, syntax, homophones, and other interpretive errors are inadvertently transcribed by the computer software. Please disregard these errors. Please excuse any errors that have escaped final proofreading. Thank you.

## 2022-09-29 NOTE — PROGRESS NOTES
NEW PT orders received and chart reviewed. Pt currently on caseload. Will acknowledge new orders and continue to follow.      Thank you for this referral   Cornelio Lynn PT DPT

## 2022-09-29 NOTE — PROGRESS NOTES
[facility] Hospitalist Group  Progress Note    Patient: Meir Pino Age: 74 y.o. : 1950 MR#: [id number] SSN: [id number]  Date/Time: 2022    Subjective:     Diabetes dx within last year. She was admitted once in the past with dka. States she's had diabetes education. Uses lantus pen at home. Her daughter recently  and she did not attend  as she was vomiting . Her other daughter resides locally and brought her to the ED. She has slight sore throat, was worse yesterday. Has chronic dry cough, does not feel congested. Denies abdominal pain, diarrhea, dysuria, dental infection, rash, wound, foot issue. Vomiting has resolved. Ambulated in her room and felt okay. She takes iron weekly. She has not gotten colonoscopy as she's been hospitalized. Plans to see Dr. Valentino Guthrie for this. Accepts iv iron. Patient lives alone. Declines  visit. Assessment/Plan:     1. DKA improving. Transition to Lantus, sliding scale insulin. 2. Anemia normocytic normochromic  3. Leukocytosis, no sign or sx of infection, likely reactive in the setting of dka. 4. Hypophosphatemia replete as needed. 5. Iron def anemia. Venofer. Outpatient screening colonoscopy. 6. Grief reaction. Daughter recently . 7.  Diabetes type 2 with hyperglycemia. Diabetes educator consulted  8. High anion gap metabolic acidosis. Improving. 9.  Hyponatremia, hypovolemic as well as pseudohyponatremia in the setting of elevated blood glucose. Improving. Change to half-normal saline. 10.  DVT prophylaxis  11. Full code. Patient is not walking well at this time, will discuss SNF tomorrow pending her progress. Additional Notes: Discussed on IDR.     Case discussed with:  []Patient  []Family  []Nursing  []Case Management  DVT Prophylaxis:  []Lovenox  []Hep SQ  []SCDs  []Coumadin   []On Heparin gtt    Objective:   VS: Visit Vitals  BP (!) 117/56 (BP 1 Location: Left upper arm, BP Patient Position: At rest)   Pulse 96   Temp 99.9 °F (37.7 °C)   Resp 18   Ht 5' 6\" (1.676 m)   Wt 69.9 kg (154 lb 3.2 oz)   SpO2 93%   BMI 24.89 kg/m²      Tmax/24hrs: Temp (24hrs), Av.8 °F (37.1 °C), Min:97.6 °F (36.4 °C), Max:99.9 °F (37.7 °C)    Input/Output: No intake or output data in the 24 hours ending 22    General:    Heent: ncat. Perrl. Oropharynx clear. Back of throat mild erythema, no exudate. Cardiovascular:  RRR  Pulmonary:  cta b.l  GI:  soft nd nt nabs  Extremities: No edema  Additional: No focal deficit    Labs:    Recent Results (from the past 24 hour(s))   GLUCOSE, POC    Collection Time: 22  8:00 PM   Result Value Ref Range    Glucose (POC) >600 (HH) 70 - 110 mg/dL   CBC WITH AUTOMATED DIFF    Collection Time: 22  8:07 PM   Result Value Ref Range    WBC 13.6 (H) 4.6 - 13.2 K/uL    RBC 3.86 (L) 4.20 - 5.30 M/uL    HGB 9.7 (L) 12.0 - 16.0 g/dL    HCT 31.1 (L) 35.0 - 45.0 %    MCV 80.6 78.0 - 100.0 FL    MCH 25.1 24.0 - 34.0 PG    MCHC 31.2 31.0 - 37.0 g/dL    RDW 14.5 11.6 - 14.5 %    PLATELET 711 765 - 290 K/uL    MPV 11.2 9.2 - 11.8 FL    NRBC 0.0 0  WBC    ABSOLUTE NRBC 0.00 0.00 - 0.01 K/uL    NEUTROPHILS 88 (H) 40 - 73 %    LYMPHOCYTES 7 (L) 21 - 52 %    MONOCYTES 4 3 - 10 %    EOSINOPHILS 0 0 - 5 %    BASOPHILS 0 0 - 2 %    IMMATURE GRANULOCYTES 1 (H) 0.0 - 0.5 %    ABS. NEUTROPHILS 12.0 (H) 1.8 - 8.0 K/UL    ABS. LYMPHOCYTES 0.9 0.9 - 3.6 K/UL    ABS. MONOCYTES 0.6 0.05 - 1.2 K/UL    ABS. EOSINOPHILS 0.0 0.0 - 0.4 K/UL    ABS. BASOPHILS 0.0 0.0 - 0.1 K/UL    ABS. IMM.  GRANS. 0.1 (H) 0.00 - 0.04 K/UL    DF AUTOMATED     METABOLIC PANEL, COMPREHENSIVE    Collection Time: 22  8:07 PM   Result Value Ref Range    Sodium 127 (L) 136 - 145 mmol/L    Potassium 6.4 (HH) 3.5 - 5.5 mmol/L    Chloride 89 (L) 100 - 111 mmol/L    CO2 14 (L) 21 - 32 mmol/L    Anion gap 24 (H) 3.0 - 18 mmol/L    Glucose 901 (HH) 74 - 99 mg/dL    BUN 52 (H) 7.0 - 18 MG/DL Creatinine 1.96 (H) 0.6 - 1.3 MG/DL    BUN/Creatinine ratio 27 (H) 12 - 20      GFR est AA 30 (L) >60 ml/min/1.73m2    GFR est non-AA 25 (L) >60 ml/min/1.73m2    Calcium 9.1 8.5 - 10.1 MG/DL    Bilirubin, total 0.5 0.2 - 1.0 MG/DL    ALT (SGPT) 11 (L) 13 - 56 U/L    AST (SGOT) 4 (L) 10 - 38 U/L    Alk.  phosphatase 83 45 - 117 U/L    Protein, total 7.0 6.4 - 8.2 g/dL    Albumin 3.8 3.4 - 5.0 g/dL    Globulin 3.2 2.0 - 4.0 g/dL    A-G Ratio 1.2 0.8 - 1.7     LIPASE    Collection Time: 09/28/22  8:07 PM   Result Value Ref Range    Lipase 41 (L) 73 - 393 U/L   MAGNESIUM    Collection Time: 09/28/22  8:07 PM   Result Value Ref Range    Magnesium 2.0 1.6 - 2.6 mg/dL   TROPONIN-HIGH SENSITIVITY    Collection Time: 09/28/22  8:07 PM   Result Value Ref Range    Troponin-High Sensitivity 13 0 - 54 ng/L   HEMOGLOBIN A1C WITH EAG    Collection Time: 09/28/22  8:07 PM   Result Value Ref Range    Hemoglobin A1c 9.7 (H) 4.2 - 5.6 %    Est. average glucose 232 mg/dL   BLOOD GAS,CHEM8,LACTIC ACID POC    Collection Time: 09/28/22  8:24 PM   Result Value Ref Range    Calcium, ionized (POC) 1.03 (L) 1.12 - 1.32 mmol/L    Base deficit (POC) 13.2 mmol/L    HCO3 (POC) 13.3 (L) 22 - 26 MMOL/L    CO2, POC 14 (L) 19 - 24 MMOL/L    O2 SAT 81 %    Sample source VENOUS BLOOD      Performed by Taya Camargo     Sodium (POC) 124 (L) 136 - 145 mmol/L    Potassium (POC) 6.2 (H) 3.5 - 5.1 mmol/L    Glucose (POC) >700 (HH) 65 - 100 mg/dL    Creatinine (POC) 1.49 (H) 0.6 - 1.3 mg/dL    Lactic Acid (POC) 2.43 (HH) 0.40 - 2.00 mmol/L    Chloride (POC) 96 (L) 98 - 107 mmol/L    Anion gap, POC 15 10 - 20      GFRAA, POC 41 (L) >60 ml/min/1.73m2    GFRNA, POC 34 (L) >60 ml/min/1.73m2    pH, venous (POC) 7.23 (L) 7.32 - 7.42      pCO2, venous (POC) 32.0 (L) 41 - 51 MMHG    pO2, venous (POC) 53 (H) 25 - 40 mmHg   CULTURE, BLOOD    Collection Time: 09/28/22  8:26 PM    Specimen: Blood   Result Value Ref Range    Special Requests: NO SPECIAL REQUESTS Culture result: NO GROWTH AFTER 11 HOURS     CULTURE, BLOOD    Collection Time: 09/28/22  8:30 PM    Specimen: Blood   Result Value Ref Range    Special Requests: NO SPECIAL REQUESTS      Culture result: NO GROWTH AFTER 11 HOURS     URINALYSIS W/ RFLX MICROSCOPIC    Collection Time: 09/28/22  9:54 PM   Result Value Ref Range    Color YELLOW      Appearance CLEAR      Specific gravity 1.023 1.005 - 1.030      pH (UA) 5.0 5.0 - 8.0      Protein Negative NEG mg/dL    Glucose >1,000 (A) NEG mg/dL    Ketone 80 (A) NEG mg/dL    Bilirubin Negative NEG      Blood Negative NEG      Urobilinogen 0.2 0.2 - 1.0 EU/dL    Nitrites Negative NEG      Leukocyte Esterase Negative NEG     GLUCOSTABILIZER    Collection Time: 09/28/22 10:23 PM   Result Value Ref Range    Glucose 601 mg/dL    Insulin order 10.8 units/hour    Insulin adminstered 10.8 units/hour    Multiplier 0.020     Low target 140 mg/dL    High target 180 mg/dL    D50 order 0.0 ml    D50 administered 0.00 ml    Minutes until next BG 60 min    Order initials TT     Administered initials TT    IRON PROFILE    Collection Time: 09/28/22 11:02 PM   Result Value Ref Range    Iron 32 (L) 50 - 175 ug/dL    TIBC 335 250 - 450 ug/dL    Iron % saturation 10 (L) 20 - 50 %   FERRITIN    Collection Time: 09/28/22 11:02 PM   Result Value Ref Range    Ferritin 15 8 - 000 NG/ML   METABOLIC PANEL, BASIC    Collection Time: 09/28/22 11:02 PM   Result Value Ref Range    Sodium 129 (L) 136 - 145 mmol/L    Potassium 5.9 (H) 3.5 - 5.5 mmol/L    Chloride 96 (L) 100 - 111 mmol/L    CO2 12 (L) 21 - 32 mmol/L    Anion gap 21 (H) 3.0 - 18 mmol/L    Glucose 866 (HH) 74 - 99 mg/dL    BUN 55 (H) 7.0 - 18 MG/DL    Creatinine 1.97 (H) 0.6 - 1.3 MG/DL    BUN/Creatinine ratio 28 (H) 12 - 20      GFR est AA 30 (L) >60 ml/min/1.73m2    GFR est non-AA 25 (L) >60 ml/min/1.73m2    Calcium 8.5 8.5 - 10.1 MG/DL   PHOSPHORUS    Collection Time: 09/28/22 11:02 PM   Result Value Ref Range    Phosphorus 6.0 (H) 2.5 - 4.9 MG/DL   TSH 3RD GENERATION    Collection Time: 09/28/22 11:02 PM   Result Value Ref Range    TSH 0.03 (L) 0.36 - 3.74 uIU/mL   VITAMIN B12 & FOLATE    Collection Time: 09/28/22 11:02 PM   Result Value Ref Range    Vitamin B12 761 211 - 911 pg/mL    Folate 16.5 3.10 - 17.50 ng/mL   POC LACTIC ACID    Collection Time: 09/28/22 11:08 PM   Result Value Ref Range    Lactic Acid (POC) 2.57 (HH) 0.40 - 2.00 mmol/L   GLUCOSE, POC    Collection Time: 09/28/22 11:28 PM   Result Value Ref Range    Glucose (POC) >600 (HH) 70 - 110 mg/dL   GLUCOSTABILIZER    Collection Time: 09/28/22 11:32 PM   Result Value Ref Range    Glucose 602 mg/dL    Insulin order 16.3 units/hour    Insulin adminstered 16.3 units/hour    Multiplier 0.030     Low target 140 mg/dL    High target 180 mg/dL    D50 order 0.0 ml    D50 administered 0.00 ml    Minutes until next BG 60 min    Order initials TT     Administered initials TT    GLUCOSE, POC    Collection Time: 09/29/22 12:31 AM   Result Value Ref Range    Glucose (POC) >600 (HH) 70 - 110 mg/dL   GLUCOSTABILIZER    Collection Time: 09/29/22 12:33 AM   Result Value Ref Range    Glucose 603 mg/dL    Insulin order 21.7 units/hour    Insulin adminstered 21.7 units/hour    Multiplier 0.040     Low target 140 mg/dL    High target 180 mg/dL    D50 order 0.0 ml    D50 administered 0.00 ml    Minutes until next BG 60 min    Order initials TT     Administered initials TT    GLUCOSE, POC    Collection Time: 09/29/22  1:37 AM   Result Value Ref Range    Glucose (POC) 493 (HH) 70 - 110 mg/dL   GLUCOSTABILIZER    Collection Time: 09/29/22  1:40 AM   Result Value Ref Range    Glucose 493 mg/dL    Insulin order 17.3 units/hour    Insulin adminstered 17.3 units/hour    Multiplier 0.040     Low target 140 mg/dL    High target 180 mg/dL    D50 order 0.0 ml    D50 administered 0.00 ml    Minutes until next BG 60 min    Order initials carolyn     Administered initials carolyn    GLUCOSE, POC    Collection Time: 09/29/22 2:46 AM   Result Value Ref Range    Glucose (POC) 437 (HH) 70 - 110 mg/dL   GLUCOSTABILIZER    Collection Time: 09/29/22  2:47 AM   Result Value Ref Range    Glucose 437 mg/dL    Insulin order 18.9 units/hour    Insulin adminstered 18.9 units/hour    Multiplier 0.050     Low target 140 mg/dL    High target 180 mg/dL    D50 order 0.0 ml    D50 administered 0.00 ml    Minutes until next BG 60 min    Order initials carolyn     Administered initials carolyn    GLUCOSE, POC    Collection Time: 09/29/22  3:41 AM   Result Value Ref Range    Glucose (POC) 360 (H) 70 - 110 mg/dL   GLUCOSTABILIZER    Collection Time: 09/29/22  3:42 AM   Result Value Ref Range    Glucose 360 mg/dL    Insulin order 18.0 units/hour    Insulin adminstered 18.0 units/hour    Multiplier 0.060     Low target 140 mg/dL    High target 180 mg/dL    D50 order 0.0 ml    D50 administered 0.00 ml    Minutes until next BG 60 min    Order initials carolyn     Administered initials carolyn    GLUCOSE, POC    Collection Time: 09/29/22  4:37 AM   Result Value Ref Range    Glucose (POC) 311 (H) 70 - 110 mg/dL   GLUCOSTABILIZER    Collection Time: 09/29/22  4:38 AM   Result Value Ref Range    Glucose 311 mg/dL    Insulin order 17.6 units/hour    Insulin adminstered 17.6 units/hour    Multiplier 0.070     Low target 140 mg/dL    High target 180 mg/dL    D50 order 0.0 ml    D50 administered 0.00 ml    Minutes until next BG 60 min    Order initials carolyn     Administered initials carolyn    GLUCOSE, POC    Collection Time: 09/29/22  5:37 AM   Result Value Ref Range    Glucose (POC) 292 (H) 70 - 110 mg/dL   GLUCOSTABILIZER    Collection Time: 09/29/22  5:38 AM   Result Value Ref Range    Glucose 292 mg/dL    Insulin order 18.6 units/hour    Insulin adminstered 18.6 units/hour    Multiplier 0.080     Low target 140 mg/dL    High target 180 mg/dL    D50 order 0.0 ml    D50 administered 0.00 ml    Minutes until next BG 60 min    Order initials carolyn     Administered initials carolyn    GLUCOSE, POC Collection Time: 09/29/22  6:47 AM   Result Value Ref Range    Glucose (POC) 167 (H) 70 - 110 mg/dL   GLUCOSTABILIZER    Collection Time: 09/29/22  6:49 AM   Result Value Ref Range    Glucose 167 mg/dL    Insulin order 8.6 units/hour    Insulin adminstered 8.6 units/hour    Multiplier 0.080     Low target 140 mg/dL    High target 180 mg/dL    D50 order 0.0 ml    D50 administered 0.00 ml    Minutes until next BG 60 min    Order initials carolyn     Administered initials carolyn    GLUCOSE, POC    Collection Time: 09/29/22  8:08 AM   Result Value Ref Range    Glucose (POC) 102 70 - 110 mg/dL   GLUCOSTABILIZER    Collection Time: 09/29/22  8:10 AM   Result Value Ref Range    Glucose 102 mg/dL    Insulin order 2.7 units/hour    Insulin adminstered 2.7 units/hour    Multiplier 0.064     Low target 140 mg/dL    High target 180 mg/dL    D50 order 0.0 ml    D50 administered 0.00 ml    Minutes until next BG 60 min    Order initials TH     Administered initials TH      Additional Data Reviewed:      Signed By: Joy Wood MD     September 29, 2022 9:14 AM

## 2022-09-29 NOTE — PROGRESS NOTES
Problem: Mobility Impaired (Adult and Pediatric)  Goal: *Acute Goals and Plan of Care (Insert Text)  Description: Physical Therapy Goals  Initiated 9/29/2022 and to be accomplished within 7 day(s)  1. Patient will move from supine to sit and sit to supine , scoot up and down, and roll side to side in bed with modified independence. 2.  Patient will transfer from bed to chair and chair to bed with modified independence using the least restrictive device. 3.  Patient will perform sit to stand with modified independence. 4.  Patient will ambulate with modified independence for 100 feet with the least restrictive device. 5.  Patient will ascend/descend 12 stairs with unilateral handrail(s) with supervision/set-up. PLOF: Pt reporting she lives alone in 2 story house, reporting she did have a daughter living with her but she passed away this week. Pt has SPC, BSC and SC. Anyi with SPC, has other family in the area. Outcome: Progressing Towards Goal     PHYSICAL THERAPY EVALUATION    Patient: Meir Pino (11 y.o. female)  Date: 9/29/2022  Primary Diagnosis: Hyperglycemia [B91.1]  Metabolic acidosis [D44.5]  DKA, type 2 ([facility] [address] [E11.10]       Precautions:  Fall    ASSESSMENT :  Pt cleared to participate in PT session, pt received semi-reclined in bed and agreeable to therapy session. Based on the objective data described below, the patient presents with decreased endurance, decreased strength, decreased balance reactions, gait deviations, and decreased independence in functional mobility. Pt found to be soiled of urine, CGA for supine to sit with increased time. Pt sitting on EOB with good balance. Standing with CGA to RW, Jabier for walking SPT to bedside chair. Chux and bed linens changed. Pt then returning to bed, declining further ambulation at this time. Pt positioned for comfort and educated to call for assist before getting up, pt verbalized understanding.  Pt left with all needs met and call bell in reach. RN notified of position and participation. Bed alarm activated      Patient will benefit from skilled intervention to address the above impairments. Patient's rehabilitation potential is considered to be Fair  Factors which may influence rehabilitation potential include:   []         None noted  []         Mental ability/status  [x]         Medical condition  [x]         Home/family situation and support systems  []         Safety awareness  []         Pain tolerance/management  []         Other:      PLAN :  Recommendations and Planned Interventions:   [x]           Bed Mobility Training             []    Neuromuscular Re-Education  [x]           Transfer Training                   []    Orthotic/Prosthetic Training  [x]           Gait Training                          []    Modalities  [x]           Therapeutic Exercises           []    Edema Management/Control  [x]           Therapeutic Activities            [x]    Family Training/Education  [x]           Patient Education  []           Other (comment):    Frequency/Duration: Patient will be followed by physical therapy 1-2 times per day/4-7 days per week to address goals. Further Equipment Recommendations for Discharge: rolling walker    AMPAC: Based on an AM-PAC score of 17/24 and their current functional mobility deficits, it is recommended that the patient have 3-5 sessions per week of Physical Therapy at d/c to increase the patient's independence. This AMPAC score should be considered in conjunction with interdisciplinary team recommendations to determine the most appropriate discharge setting. Patient's social support, diagnosis, medical stability, and prior level of function should also be taken into consideration. SUBJECTIVE:   Patient stated This is how I found my daughter.     OBJECTIVE DATA SUMMARY:     Past Medical History:   Diagnosis Date    Anemia due to stage 3b chronic kidney disease ([facility] [address] 9/28/2022    [facility] ([facility] Diabetes ([facility] [address]     Hypertension     Hypothyroidism     Stage 3b chronic kidney disease ([facility] [address] 9/28/2022     Past Surgical History:   Procedure Laterality Date    COLONOSCOPY N/A 12/30/2016    COLONOSCOPY. SURVEILLANCE /c Hot Snared Polypectomy /c EndoClip x3 performed by Michelle Chrales MD at [facility] ENDOSCOPY    HX HYSTERECTOMY      HX TOTAL COLECTOMY       Barriers to Learning/Limitations: None  Compensate with: N/A  Home Situation:  Home Situation  Home Environment: Private residence  Wheelchair Ramp: No  One/Two Story Residence: Two story  # of Interior Steps: 12  Lift Chair Available: No  Living Alone: Yes  Support Systems: Child(citlali)  Patient Expects to be Discharged to[de-identified] Home  Current DME Used/Available at Home: Cane, straight, Commode, bedside, Shower chair  Critical Behavior:  Neurologic State: Alert  Orientation Level: Oriented X4  Cognition: Follows commands  Safety/Judgement: Awareness of environment; Fall prevention  Psychosocial  Patient Behaviors: Calm; Cooperative  Purposeful Interaction: Yes  Pt Identified Daily Priority: Clinical issues (comment)  Caritas Process: Nurture loving kindness;Establish trust;Teaching/learning; Attend basic human needs;Create healing environment  Caring Interventions: Reassure; Therapeutic modalities  Reassure: Therapeutic listening; Informing; Acceptance;Caring rounds  Therapeutic Modalities: Intentional therapeutic touch    Strength:    Strength: Generally decreased, functional    Tone & Sensation:   Tone: Normal    Sensation: Intact    Range Of Motion:  AROM: Within functional limits    Posture:  Posture (WDL): Within defined limits     Functional Mobility:  Bed Mobility:     Supine to Sit: Contact guard assistance; Additional time;Bed Modified  Sit to Supine: Contact guard assistance  Scooting: Minimum assistance  Transfers:  Sit to Stand: Contact guard assistance  Stand to Sit: Contact guard assistance  Stand Pivot Transfers: Minimum assistance          Balance:   Sitting: Intact  Standing: Impaired; With support  Standing - Static: Fair  Standing - Dynamic : Fair    Pain:  Pain level pre-treatment: 0/10   Pain level post-treatment: 0/10     Activity Tolerance:   Good     Please refer to the flowsheet for vital signs taken during this treatment. After treatment:   []         Patient left in no apparent distress sitting up in chair  [x]         Patient left in no apparent distress in bed  [x]         Call bell left within reach  [x]         Nursing notified  []         Caregiver present  []         Bed alarm activated  []         SCDs applied    COMMUNICATION/EDUCATION:   [x]         Role of Physical Therapy in the acute care setting. [x]         Fall prevention education was provided and the patient/caregiver indicated understanding. [x]         Patient/family have participated as able in goal setting and plan of care. [x]         Patient/family agree to work toward stated goals and plan of care. []         Patient understands intent and goals of therapy, but is neutral about his/her participation. []         Patient is unable to participate in goal setting/plan of care: ongoing with therapy staff.  []         Other:     Thank you for this referral.  Kaye Sams, PT   Time Calculation: 23 mins      Eval Complexity: History: MEDIUM  Complexity : 1-2 comorbidities / personal factors will impact the outcome/ POC Exam:LOW Complexity : 1-2 Standardized tests and measures addressing body structure, function, activity limitation and / or participation in recreation  Presentation: LOW Complexity : Stable, uncomplicated  Clinical Decision Making:Low Complexity low  Overall Complexity:LOW     [employer] AM-PAC® Basic Mobility Inpatient Short Form (6-Clicks) Version 2    How much HELP from another person does the patient currently need    (If the patient hasn't done an activity recently, how much help from another person do you think he/she would need if he/she tried?) Total (Total A or Dep)   A Lot  (Mod to Max A)   A Little (Sup or Min A)   None (Mod I to I)   Turning from your back to your side while in a flat bed without using bedrails? [] 1 [] 2 [x] 3 [] 4   2. Moving from lying on your back to sitting on the side of a flat bed without using bedrails? [] 1 [] 2 [x] 3 [] 4   3. Moving to and from a bed to a chair (including a wheelchair)? [] 1 [] 2 [x] 3 [] 4   4. Standing up from a chair using your arms (e.g., wheelchair, or bedside chair)? [] 1 [] 2 [x] 3 [] 4   5. Walking in hospital room? [] 1 [] 2 [x] 3 [] 4   6. Climbing 3-5 steps with a railing?+   [] 1 [x] 2 [] 3 [] 4   +If stair climbing cannot be assessed, skip item #6. Sum responses from items 1-5.

## 2022-09-29 NOTE — ED NOTES
TRANSFER - OUT REPORT:    Verbal report given to Kaitlyn Roland RN on [facility]  being transferred to @3N(unit) for routine progression of care       Report consisted of patients Situation, Background, Assessment and   Recommendations(SBAR). Information from the following report(s) SBAR, Kardex, ED Summary, MAR, and Recent Results was reviewed with the receiving nurse. Opportunity for questions and clarification was provided.       Patient transported with:   Registered Nurse

## 2022-09-29 NOTE — PROGRESS NOTES
conducted an initial consultation and Spiritual Assessment for Juanita Hare, who is a 74 y.o.,female. Patients Primary Language is: [place]. According to the patients EMR Christian Affiliation is: [facility].     The reason the Patient came to the hospital is:   Patient Active Problem List    Diagnosis Date Noted    High anion gap metabolic acidosis 53/43/5899    DKA, type 2 ([address] 09/28/2022    Stage 3b chronic kidney disease ([facility] [address] 09/28/2022    SIRS (systemic inflammatory response syndrome) ([facility] [address] 09/28/2022    Anemia due to stage 3b chronic kidney disease ([facility] [address] 09/28/2022    Elevated troponin 06/12/2022    Primary hypertension 06/12/2022    Acquired hypothyroidism 06/12/2022    History of colon cancer 06/12/2022    BRENDA (acute kidney injury) ([facility] [address] 06/12/2022    DKA (diabetic ketoacidosis) ([facility] [address] 06/11/2022    Hyperkalemia 01/19/2021    DKA (diabetic ketoacidoses) 01/19/2021        The  provided the following Interventions:  Initiated a relationship of care and support. Provided information about Spiritual Care Services. Chart reviewed. The following outcomes where achieved:  Patient is not interested at this time in completing an Advance Medical Directive.  confirmed Patient's Christian Affiliation. Patient expressed gratitude for 's visit. Assessment:  Patient does not have any Judaism/cultural needs that will affect patients preferences in health care. There are no spiritual or Judaism issues which require intervention at this time. Plan:  Chaplains will continue to follow and will provide pastoral care on an as needed/requested basis.  recommends bedside caregivers page  on duty if patient shows signs of acute spiritual or emotional distress.     [address]  [phone])

## 2022-09-29 NOTE — ROUTINE PROCESS
Bedside and Verbal shift change report given to [facility]  (oncoming nurse) by Michelene Siemens (offgoing nurse). Report included the following information SBAR, Kardex, Intake/Output, MAR, and Recent Results. Wound Prevention Checklist    Patient: Renaldo Atwood (42 y.o. female)  Date: 9/29/2022  Diagnosis: Hyperglycemia [E50.1]  Metabolic acidosis [S18.5]  DKA, type 2 ([address] [E11.10] DKA, type 2 ([address]    Precautions:         []  Heel prevention boots placed on patient    [x]  Patient turned q2h during shift    []  Lift team ordered    [x]  Patient on Lena bed/Specialty bed    []  Each Wound is documented during shift (Stage, Color, drainage, odor, measurements, and dressings)    [x]  Dual skin check done with Mario Durán     0930 - Phlebotomy paged for stat labs to be completed     0935 - Family update given to daughter Edith Muse via telephone. Requesting possible tests to check GI ulcers that patient was supposed to have checked on 9/21.

## 2022-09-29 NOTE — ROUTINE PROCESS
Bedside shift change report given to Alex Snachez (oncoming nurse) by Emily Mckinley RN (offgoing nurse). Report included the following information SBAR, ED Summary, Procedure Summary, Intake/Output, MAR, and Recent Results.

## 2022-09-29 NOTE — PROGRESS NOTES
Problem: Self Care Deficits Care Plan (Adult)  Goal: *Acute Goals and Plan of Care (Insert Text)  Description: Occupational Therapy Goals  Initiated 9/29/2022 within 7 day(s). 1.  Patient will perform self-feeding and grooming with modified independence. 2.  Patient will perform bathing with modified independence. 3.  Patient will perform upper body dressing and lower body dressing with modified independence. 4.  Patient will perform toilet transfers with modified independence using RW with good balance. 5.  Patient will perform all aspects of toileting with modified independence. 6.  Patient will participate in upper extremity therapeutic exercise/activities with modified independence for 8 minutes. 7.  Patient will utilize energy conservation techniques during functional activities with min verbal cues. Prior Level of Function: Mod I with ADLs and functional mobility using SPC     Outcome: Progressing Towards Goal   OCCUPATIONAL THERAPY EVALUATION    Patient: Jaime Gould (34 y.o. female)  Date: 9/29/2022  Primary Diagnosis: Hyperglycemia [N76.6]  Metabolic acidosis [G69.7]  DKA, type 2 ([facility] [E11.10]       Precautions:   Fall  ASSESSMENT :  Nursing/RN cleared for pt to participate in OT evaluation and tx session. Patient presents sitting upright in bed, nursing and dtr/Emelia at bedside,. Pt tearful and appears withdrawn during answering of orientation questions. Pt noted with mild impairment of BUE dexterity/coordination during self feeding tasks using standard spoon and washing face w/ SBA/set up. Pt declined out of bed activity e.g. toilet transfer. Call bell within reach & pt verbalized understanding and provided return demonstration to utilize for assist e.g. functional transfers in order to prevent falls. Nursing to notify  of pt's tearfulness s/p recent loss of daughter. Patient will benefit from skilled intervention to address the above impairments.   Patient's rehabilitation potential is considered to be Good  Factors which may influence rehabilitation potential include:   []             None noted  []             Mental ability/status  [x]             Medical condition  []             Home/family situation and support systems  []             Safety awareness  []             Pain tolerance/management  []             Other:      PLAN :  Recommendations and Planned Interventions:   [x]               Self Care Training                  [x]      Therapeutic Activities  [x]               Functional Mobility Training   []      Cognitive Retraining  [x]               Therapeutic Exercises           [x]      Endurance Activities  [x]               Balance Training                    [x]      Neuromuscular Re-Education  []               Visual/Perceptual Training     [x]      Home Safety Training  [x]               Patient Education                   [x]      Family Training/Education  []               Other (comment):    Frequency/Duration: Patient will be followed by occupational therapy 3-5 times a week to address goals. Further Equipment Recommendations for Discharge: to be determined as progress is made with therapy    AMPAC: Based on an AM-PAC score of 12/24 and their current ADL deficits; it is recommended that the patient have 3-5 sessions per week of Occupational Therapy at d/c to increase the patient's independence. This AMPAC score should be considered in conjunction with interdisciplinary team recommendations to determine the most appropriate discharge setting. Patient's social support, diagnosis, medical stability, and prior level of function should also be taken into consideration. SUBJECTIVE:   Patient stated Can have my jello?     OBJECTIVE DATA SUMMARY:     Past Medical History:   Diagnosis Date    Anemia due to stage 3b chronic kidney disease ([facility] [address] [facility] 9/28/2022    Colon cancer (HCC)     Di[facility]     Hypertension     Hypothyroidism     Stage 3b chronic kidney disease ([facility] [facility] 9/28/2022     Past Surgical History:   Procedure Laterality Date    COLONOSCOPY N/A 12/30/2016    COLONOSCOPY. SURVEILLANCE /c Hot Snared Polypectomy /c EndoClip x3 performed by Stephy Rios MD at [facility] ENDOSCOPY    HX HYSTERECTOMY      HX TOTAL COLECTOMY       Barriers to Learning/Limitations: None  Compensate with: visual, verbal, tactile, kinesthetic cues/model    Home Situation:   Home Situation  Home Environment: Private residence  Wheelchair Ramp: No  One/Two Story Residence: Two story  # of Interior Steps: 12  Lift Chair Available: No  Living Alone: Yes  Support Systems: Child(citlali)  Patient Expects to be Discharged to[de-identified] Home  Current DME Used/Available at Home: Gregory Herbert, straight, Walker, rolling, Shower chair, Raised toilet seat, Commode, bedside, Wheelchair, Grab bars  Tub or Shower Type: Shower  []  Right hand dominant   []  Left hand dominant    Cognitive/Behavioral Status:  Neurologic State: Alert  Orientation Level: Oriented to situation;Oriented to place;Oriented to person  Cognition: Follows commands  Safety/Judgement: Fall prevention    Skin: appears intact  Edema: none noted    Vision/Perceptual:  appears intact     Coordination: BUE  Coordination: Generally decreased, functional  Fine Motor Skills-Upper: Left Intact; Right Intact (mild incoordination)    Gross Motor Skills-Upper: Left Intact; Right Intact    Balance:  Sitting: Intact  Standing: Impaired; With support  Standing - Static: Fair  Standing - Dynamic : Fair    Strength: BUE  Strength: Generally decreased, functional                Tone & Sensation: BUE  Tone: Normal  Sensation: Intact     Range of Motion: BUE  AROM: Generally decreased, functional      Functional Mobility and Transfers for ADLs:  Bed Mobility:     Supine to Sit: Contact guard assistance; Additional time;Bed Modified  Sit to Supine: Contact guard assistance  Scooting: Minimum assistance  Transfers:  Sit to Stand: Contact guard assistance  Stand to Sit: Contact guard assistance  Stand Pivot Transfers: Minimum assistance     ADL Assessment:   Feeding: Setup;Stand-by assistance    Oral Facial Hygiene/Grooming: Setup;Stand-by assistance    Bathing: Moderate assistance    Upper Body Dressing: Moderate assistance    Lower Body Dressing: Moderate assistance    Toileting: Moderate assistance     ADL Intervention:  Feeding  Food to Mouth: Stand-by assistance;Set-up    Grooming  Washing Face: Set-up; Stand-by assistance    Cognitive Retraining  Safety/Judgement: Fall prevention    Pain:  Pain level pre-treatment: 0/10   Pain level post-treatment: 0/10     Activity Tolerance:   poor  Please refer to the flowsheet for vital signs taken during this treatment. After treatment:   [] Patient left in no apparent distress sitting up in chair  [x] Patient left in no apparent distress in bed  [x] Call bell left within reach  [x] Nursing notified  [x] Caregiver/dtr present  [x] Bed alarm activated    COMMUNICATION/EDUCATION:   [x] Role of Occupational Therapy in the acute care setting  [x] Home safety education was provided and the patient/caregiver indicated understanding. [x] Patient/family have participated as able in goal setting and plan of care. [x] Patient/family agree to work toward stated goals and plan of care. [] Patient understands intent and goals of therapy, but is neutral about his/her participation. [] Patient is unable to participate in goal setting and plan of care. Thank you for this referral.  Dominique العراقي  Time Calculation: 10 mins    Eval Complexity: History: MEDIUM Complexity : Expanded review of history including physical, cognitive and psychosocial  history ; Examination: MEDIUM Complexity : 3-5 performance deficits relating to physical, cognitive , or psychosocial skils that result in activity limitations and / or participation restrictions;    Decision Making:MEDIUM Complexity : Patient may present with comorbidities that affect occupational performnce. Miniml to moderate modification of tasks or assistance (eg, physical or verbal ) with assesment(s) is necessary to enable patient to complete evaluation     [facility] AM-PAC® Daily Activity Inpatient Short Form (6-Clicks)*    How much HELP from another person does the patient currently need    (If the patient hasn't done an activity recently, how much help from another person do you think he/she would need if he/she tried?)   Total (Total A or Dep)   A Lot  (Mod to Max A)   A Little (Sup or Min A)   None (Mod I to I)   Putting on and taking off regular lower body clothing? [] 1 [x] 2 [] 3 [] 4   2. Bathing (including washing, rinsing,      drying)? [] 1 [x] 2 [] 3 [] 4   3. Toileting, which includes using toilet, bedpan or urinal?   [] 1 [x] 2 [] 3 [] 4   4. Putting on and taking off regular upper body clothing? [] 1 [] 2 [x] 3 [] 4   5. Taking care of personal grooming such as brushing teeth? [] 1 [] 2 [x] 3 [] 4   6. Eating meals?    [] 1 [] 2 [x] 3 [] 4

## 2022-09-29 NOTE — H&P
History and Physical          Subjective     HPI: Wesley Valerio is a 74 y.o. female with a PMHx of DM, HTN, hypothyroidism, CKD3b who presented to the ED with c/o NV. Symptoms started today and are described as moderate to severe. She had multiple episodes of vomiting this morning associated with malaise. Her daughter states she observed the patient take 8 units of Novolin this morning and the patient reported that her BG was 136. Her daughter had her recheck it and it read \"high\". The patient states she has been taking her lantus, last took 20 units last night. Her [other] daughter  very recently, and the  was today. She was unable to make it due to her symptoms, and when the daughter at bedside came back to check on her, she looked even worse and glucometer read \"error\" prompting her to call EMS. The patient denies fever, sob, cp, abd pain, dysuria. Medications unable to be reconciled, daughter to bring current medications to bedside tomorrow. In the ED, she is mildly tachy with /67. WBC 13.6. Anemia and renal function near baseline. UA +ketones. BMP with na 127, K 6.4, Cl 89, Co2 14, AG 24, . Insulin gtt and IV fluids given. PMHx:  Past Medical History:   Diagnosis Date    Colon cancer ([facility] [facility]     Diabetes ([facility] [facility]     Hypertension     Hypothyroidism     Stage 3b chronic kidney disease ([facility] [facility] 2022       PSurgHx:  Past Surgical History:   Procedure Laterality Date    COLONOSCOPY N/A 2016    COLONOSCOPY.  SURVEILLANCE /c Hot Snared Polypectomy /c EndoClip x3 performed by Amrik Erickson MD at [facility] ENDOSCOPY    HX HYSTERECTOMY      HX TOTAL COLECTOMY         SocialHx:  Social History     Socioeconomic History    Marital status: SINGLE   Tobacco Use    Smoking status: Former    Smokeless tobacco: Never   Substance and Sexual Activity    Alcohol use: Yes     Comment: occasionally    Drug use: No       FamilyHx:  Family History   Problem Relation Age of Onset    Diabetes Mother Cancer Father         colon       Home Medications:  Prior to Admission Medications   Prescriptions Last Dose Informant Patient Reported? Taking? Cholecalciferol, Vitamin D3, 50,000 unit cap   Yes No   Sig: Take 1 Capsule by mouth every seven (7) days. Takes on    SITagliptin (JANUVIA) 100 mg tablet   Yes No   Sig: Take 100 mg by mouth daily. acetaminophen (TYLENOL) 500 mg tablet   Yes No   Sig: Take 500 mg by mouth every six (6) hours as needed for Pain. alendronate (FOSAMAX) 10 mg tablet   Yes No   Sig: Take 70 mg by mouth every seven (7) days. amLODIPine (NORVASC) 5 mg tablet   Yes No   Sig: Take 5 mg by mouth daily. aspirin delayed-release 81 mg tablet   No No   Sig: Take 1 Tablet by mouth daily. atorvastatin (LIPITOR) 20 mg tablet   Yes No   Sig: Take 20 mg by mouth nightly. carvediloL (COREG) 6.25 mg tablet   No No   Sig: Take 1 Tablet by mouth two (2) times daily (with meals). docusate sodium (COLACE) 100 mg capsule   No No   Sig: Take 1 Capsule by mouth daily. Patient taking differently: Take 1 Capsule by mouth daily as needed for Constipation. gabapentin (NEURONTIN) 300 mg capsule   Yes No   Sig: Take 300 mg by mouth daily. glucose 4 gram chewable tablet   Yes No   Sig: Take 15 g by mouth as needed for PRN Reason (Other) (low BS). insulin aspart U-100 (NOVOLOG) 100 unit/mL (3 mL) inpn   Yes No   Si-10 Units by SubCUTAneous route Before breakfast, lunch, and dinner. BS less than 150- no units  150-199- 2 units  200-249- 4 units  250-299- 6 units  300-349- 8 units  350 10 units  over 400 call MD   insulin glargine (LANTUS) 100 unit/mL injection   No No   Si Units by SubCUTAneous route daily. Inject 10 units SQ DAILY   Patient taking differently: 20 Units by SubCUTAneous route nightly.  Inject 20 units SQ QHS - lowered due to hypoglycemia   insulin glargine-yfgn 100 unit/mL (3 mL) inpn   Yes No   Sig: INJECT 16 UNITS SUBCUTANEOUSLY IN THE MORNING AND 16 IN THE EVENING insulin lispro (HUMALOG) 100 unit/mL injection   No No   Sig: For Blood Sugar (mg/dL) of:              Less than 150 =   0 units  150 -199 =   3 units  200 -249 =   6 units  250 -299 =   9 units  300 -349 =   12 units  350 and above =   15 units and Call Physician  Initiate Hypoglycemic protocol if blood glucose is <70 mg/dL. Fast Acting - Administer Immediately - or within 15 minutes of start of meal, if mealtime coverage. levothyroxine (SYNTHROID) 150 mcg tablet   Yes No   Sig: Take 150 mcg by mouth Daily (before breakfast). lisinopriL (PRINIVIL, ZESTRIL) 2.5 mg tablet   No No   Sig: Take 1 Tablet by mouth daily. metFORMIN ER (GLUCOPHAGE XR) 500 mg tablet   Yes No   Sig: Take 500 mg by mouth daily (with dinner). pantoprazole (PROTONIX) 40 mg tablet   No No   Sig: Take 1 Tablet by mouth Before breakfast and dinner. pioglitazone (ACTOS) 30 mg tablet   Yes No   Sig: Take 30 mg by mouth daily. polyethylene glycol (MIRALAX) 17 gram packet   No No   Sig: Take 1 Packet by mouth daily. Indications: HOLD IT IF DIARRHEA   Patient taking differently: Take 1 Packet by mouth daily as needed for Constipation. Indications: HOLD IT IF DIARRHEA      Facility-Administered Medications: None       Allergies:  No Known Allergies     Review of Systems:  CONST: no weight loss/gain, no fever or chills, no fatigue, +malaise  Eyes: No change in vision, no itching or drainage  ENT: No earache, no tinnitus, no sore throat or sinus congestion. PULM: No shortness of breath, no cough or wheeze. CV: no pnd or orthopnea, no CP, no palpitations, no edema  GI: No abdominal pain, + nausea, + vomiting no  diarrhea  : No urinary frequency, no urgency, no hesitancy or dysuria. MSK: No joint or muscle pain, no back pain, no neck pain, no recent trauma. INTEG: No rash, no itching, no lesions. ENDO: No polyuria, no polydipsia, no heat or cold intolerance. HEME: No anemia or easy bruising or bleeding.    NEURO: No headache, no dizziness  PSYCH: No anxiety, + depression      Objective     Physical Exam:  Visit Vitals  BP (!) 177/67   Pulse (!) 114   Temp 98.1 °F (36.7 °C)   Resp 18   Ht 5' 6\" (1.676 m)   Wt 66.7 kg (147 lb)   SpO2 97%   BMI 23.73 kg/m²       General: NAD, appears uncomfortable, alert  Skin: warm, dry, no rashes  Eyes: PERRL, sclera is non-icteric  HENT: normocephalic/atraumatic, dry mucus membranes  Respiratory: CTA with no signs of respiratory distress  Cardiovascular: tachy, no m/r/g, no cyanosis or peripheral edema of extremities  GI: soft, non-tender, normal bowel sounds  Neuro: moves all extremities, no focal deficits, normal speech    Laboratory Studies:  Recent Results (from the past 24 hour(s))   GLUCOSE, POC    Collection Time: 09/28/22  8:00 PM   Result Value Ref Range    Glucose (POC) >600 (HH) 70 - 110 mg/dL   CBC WITH AUTOMATED DIFF    Collection Time: 09/28/22  8:07 PM   Result Value Ref Range    WBC 13.6 (H) 4.6 - 13.2 K/uL    RBC 3.86 (L) 4.20 - 5.30 M/uL    HGB 9.7 (L) 12.0 - 16.0 g/dL    HCT 31.1 (L) 35.0 - 45.0 %    MCV 80.6 78.0 - 100.0 FL    MCH 25.1 24.0 - 34.0 PG    MCHC 31.2 31.0 - 37.0 g/dL    RDW 14.5 11.6 - 14.5 %    PLATELET 976 120 - 734 K/uL    MPV 11.2 9.2 - 11.8 FL    NRBC 0.0 0  WBC    ABSOLUTE NRBC 0.00 0.00 - 0.01 K/uL    NEUTROPHILS 88 (H) 40 - 73 %    LYMPHOCYTES 7 (L) 21 - 52 %    MONOCYTES 4 3 - 10 %    EOSINOPHILS 0 0 - 5 %    BASOPHILS 0 0 - 2 %    IMMATURE GRANULOCYTES 1 (H) 0.0 - 0.5 %    ABS. NEUTROPHILS 12.0 (H) 1.8 - 8.0 K/UL    ABS. LYMPHOCYTES 0.9 0.9 - 3.6 K/UL    ABS. MONOCYTES 0.6 0.05 - 1.2 K/UL    ABS. EOSINOPHILS 0.0 0.0 - 0.4 K/UL    ABS. BASOPHILS 0.0 0.0 - 0.1 K/UL    ABS. IMM.  GRANS. 0.1 (H) 0.00 - 0.04 K/UL    DF AUTOMATED     METABOLIC PANEL, COMPREHENSIVE    Collection Time: 09/28/22  8:07 PM   Result Value Ref Range    Sodium 127 (L) 136 - 145 mmol/L    Potassium 6.4 (HH) 3.5 - 5.5 mmol/L    Chloride 89 (L) 100 - 111 mmol/L    CO2 14 (L) 21 - 32 mmol/L Anion gap 24 (H) 3.0 - 18 mmol/L    Glucose 901 (HH) 74 - 99 mg/dL    BUN 52 (H) 7.0 - 18 MG/DL    Creatinine 1.96 (H) 0.6 - 1.3 MG/DL    BUN/Creatinine ratio 27 (H) 12 - 20      GFR est AA 30 (L) >60 ml/min/1.73m2    GFR est non-AA 25 (L) >60 ml/min/1.73m2    Calcium 9.1 8.5 - 10.1 MG/DL    Bilirubin, total 0.5 0.2 - 1.0 MG/DL    ALT (SGPT) 11 (L) 13 - 56 U/L    AST (SGOT) 4 (L) 10 - 38 U/L    Alk.  phosphatase 83 45 - 117 U/L    Protein, total 7.0 6.4 - 8.2 g/dL    Albumin 3.8 3.4 - 5.0 g/dL    Globulin 3.2 2.0 - 4.0 g/dL    A-G Ratio 1.2 0.8 - 1.7     LIPASE    Collection Time: 09/28/22  8:07 PM   Result Value Ref Range    Lipase 41 (L) 73 - 393 U/L   MAGNESIUM    Collection Time: 09/28/22  8:07 PM   Result Value Ref Range    Magnesium 2.0 1.6 - 2.6 mg/dL   TROPONIN-HIGH SENSITIVITY    Collection Time: 09/28/22  8:07 PM   Result Value Ref Range    Troponin-High Sensitivity 13 0 - 54 ng/L   HEMOGLOBIN A1C WITH EAG    Collection Time: 09/28/22  8:07 PM   Result Value Ref Range    Hemoglobin A1c 9.7 (H) 4.2 - 5.6 %    Est. average glucose 232 mg/dL   BLOOD GAS,CHEM8,LACTIC ACID POC    Collection Time: 09/28/22  8:24 PM   Result Value Ref Range    Calcium, ionized (POC) 1.03 (L) 1.12 - 1.32 mmol/L    Base deficit (POC) 13.2 mmol/L    HCO3 (POC) 13.3 (L) 22 - 26 MMOL/L    CO2, POC 14 (L) 19 - 24 MMOL/L    O2 SAT 81 %    Sample source VENOUS BLOOD      Performed by Taya Camargo     Sodium (POC) 124 (L) 136 - 145 mmol/L    Potassium (POC) 6.2 (H) 3.5 - 5.1 mmol/L    Glucose (POC) >700 (HH) 65 - 100 mg/dL    Creatinine (POC) 1.49 (H) 0.6 - 1.3 mg/dL    Lactic Acid (POC) 2.43 (HH) 0.40 - 2.00 mmol/L    Chloride (POC) 96 (L) 98 - 107 mmol/L    Anion gap, POC 15 10 - 20      GFRAA, POC 41 (L) >60 ml/min/1.73m2    GFRNA, POC 34 (L) >60 ml/min/1.73m2    pH, venous (POC) 7.23 (L) 7.32 - 7.42      pCO2, venous (POC) 32.0 (L) 41 - 51 MMHG    pO2, venous (POC) 53 (H) 25 - 40 mmHg   URINALYSIS W/ RFLX MICROSCOPIC    Collection Time: 09/28/22  9:54 PM   Result Value Ref Range    Color YELLOW      Appearance CLEAR      Specific gravity 1.023 1.005 - 1.030      pH (UA) 5.0 5.0 - 8.0      Protein Negative NEG mg/dL    Glucose >1,000 (A) NEG mg/dL    Ketone 80 (A) NEG mg/dL    Bilirubin Negative NEG      Blood Negative NEG      Urobilinogen 0.2 0.2 - 1.0 EU/dL    Nitrites Negative NEG      Leukocyte Esterase Negative NEG     GLUCOSTABILIZER    Collection Time: 09/28/22 10:23 PM   Result Value Ref Range    Glucose 601 mg/dL    Insulin order 10.8 units/hour    Insulin adminstered 10.8 units/hour    Multiplier 0.020     Low target 140 mg/dL    High target 180 mg/dL    D50 order 0.0 ml    D50 administered 0.00 ml    Minutes until next BG 60 min    Order initials TT     Administered initials TT        Imaging Reviewed:  XR CHEST PORT    Result Date: 9/28/2022  Portable Chest CPT CODE: 56608 HISTORY: Hyperglycemia. FINDINGS: Comparison 6/14/2022. Heart size and mediastinal contours within normal limits. No pulmonary vascular congestion, effusion, or pneumothorax. No focal lung consolidation.  Arthritic change of the shoulders     No radiographic finding for an acute cardiopulmonary process         Assessment/Plan     Hospital Problems  Date Reviewed: 6/12/2022            Codes Class Noted POA    High anion gap metabolic acidosis [facility]-90-KP: E87.2  ICD-9-CM: 276.2  9/28/2022 Yes        * (Principal) DKA, type 2 ([facility] ICD-10-CM: E11.10  ICD-9-CM: 250.12  9/28/2022 Yes        Stage [facility]b [facility] ([facility] ICD-10-CM: N18.32  ICD-9-CM: 585.3  9/28/2022 Yes        SIRS (systemic inflammatory response syndrome) ([facility] ICD-10-CM: R65.10  ICD-9-CM: 995.90  9/28/2022 Yes        Anemia due to stage 3b chronic kidney disease ([facility] ICD-10-CM: N18.32, D63.1  ICD-9-CM: 285.21, 585.3  9/28/2022 Yes        Primary hypertension ICD-10-CM: I10  ICD-9-CM: 401.9  6/12/2022 Yes        Acquired hypothyroidism ICD-10-CM: E03.9  ICD-9-CM: 244.9  6/12/2022 Yes Hyperkalemia ICD-10-CM: E87.5  ICD-9-CM: 276.7  1/19/2021 Yes         DKA: unclear cause, concern for medication noncompliance due to current circumstances. Patient does follow with an endocrinologist  - insulin gtt per protocol. Transition back to lantus when gap closes and acidosis resolves  - IV fluids. Patient to receive 3L in ED and will continue 125/hr  - monitor BMP, Mg every 6 hours  - add D5 when BG <250  - DM mgmt consult    HAGMA: ketones, lactic acidosis (metformin?)  - monitor BMP with above treatment  - repeat lactic now    Hyperkalemia  - STAT recheck. If still elevated, will treat  - cardiac monitoring     SIRS (tachy, WBC): due to DKA. No source of infection identified. Elevated WBC likely leukemoid reaction  - monitor off abx  - monitor CBC    CKD3b: at baseline  - monitor BMP  - caution with resuming metformin at discharge    HTN  - cont home medications (daughter to bring medications in tomorrow to reconcile)    Hypothyroid  - cont synthroid  - check TSH    Anemia: at baseline.  underlying CKD  - check iron, ferritin, b12, folate      Anticipated Discharge: 1-2 days    DVT Prophylaxis:  [x]Lovenox  []Hep SQ  []SCDs  []Coumadin []DOAC  []On Heparin gtt     Time spent reviewing records, independently interpreting results, obtaining history from patient or caregiver, performing physical exam, ordering tests and medications, communicating with specialists, documenting in the chart, and coordinating overall care is 75 minutes      Nuris Recinos PA-C  [address] [facility]  Office:  [phone]  Pager: [phone]

## 2022-09-29 NOTE — DIABETES MGMT
Diabetes/ Glycemic Control Plan of Care  Recommendations:   Patient admitted with DKA with a blood glucose of 901 mg/dl. Pt on GlucoStabilizer insulin gtt for blood glucose management  Current blood glucose 80 mg/dl. Recommend starting IVF containing dextrose. Last anion gap 21. CO2 12. Recommend continuing insulin gtt until anion gap and CO2 are within normal limits and gtt multiplier remains stable for at least 4 hours. Will continue inpatient monitoring. Assessment:   DX:   1. Diabetic ketoacidosis without coma associated with type 2 diabetes mellitus ([facility] [address]           Fasting/ Morning blood glucose:   Lab Results   Component Value Date/Time    Glucose 866 (HH) 09/28/2022 11:02 PM    Glucose (POC) 80 09/29/2022 09:12 AM    Glucose (POC) 90 02/06/2019 02:13 PM    Glucose,  (H) 01/19/2021 09:26 PM     IV Fluids containing dextrose: none   Steroids:  none     Blood glucose values:        Latest Reference Range & Units 9/29/22 04:37 9/29/22 05:37 9/29/22 06:47 9/29/22 08:08 9/29/22 09:12   GLUCOSE,FAST - POC 70 - 110 mg/dL 311 (H) 292 (H) 167 (H) 102 80   (H): Data is abnormally high  Within target range (70-180mg/dL):  progressing   Current insulin orders:   GlucoStabilizer insulin gtt    Total Daily Dose previous 24 hours = approximately 27 units   Current A1c:   Lab Results   Component Value Date/Time    Hemoglobin A1c 9.7 (H) 09/28/2022 08:07 PM    Hemoglobin A1c, External 9.5 07/08/2021 12:00 AM      Equivalent to an average Blood Glucose of 232 mg/dl for 2-3 months prior to admission  Adequate glycemic control PTA:   Nutrition/Diet:   Active Orders   Diet    DIET NPO Sips of Water with Meds      Meal Intake:  No data found. Supplement Intake:  No data found. Home diabetes medications:   Key Antihyperglycemic Medications               pioglitazone (ACTOS) 30 mg tablet (Taking) Take 30 mg by mouth daily. SITagliptin (JANUVIA) 100 mg tablet (Taking) Take 100 mg by mouth daily.     metFORMIN ER (GLUCOPHAGE XR) 500 mg tablet (Taking) Take 500 mg by mouth daily (with dinner). insulin glargine-yfgn 100 unit/mL (3 mL) inpn INJECT 16 UNITS SUBCUTANEOUSLY IN THE MORNING AND 16 IN THE EVENING    insulin aspart U-100 (NOVOLOG) 100 unit/mL (3 mL) inpn 2-10 Units by SubCUTAneous route Before breakfast, lunch, and dinner. BS less than 150- no units  150-199- 2 units  200-249- 4 units  250-299- 6 units  300-349- 8 units  350 10 units  over 400 call MD    insulin glargine (LANTUS) 100 unit/mL injection 24 Units by SubCUTAneous route daily. Inject 10 units SQ DAILY    insulin lispro (HUMALOG) 100 unit/mL injection For Blood Sugar (mg/dL) of:              Less than 150 =   0 units  150 -199 =   3 units  200 -249 =   6 units  250 -299 =   9 units  300 -349 =   12 units  350 and above =   15 units and Call Physician  Initiate Hypoglycemic protocol if blood glucose is <70 mg/dL. Fast Acting - Administer Immediately - or within 15 minutes of start of meal, if mealtime coverage. Plan/Goals:   Blood glucose will be within target of 70 - 180 mg/dl within 72 hours  Reinforce dietary and medication compliance at home.           Education:  [] Refer to Diabetes Education Record                       [] Education not indicated at this time     Demetra Faye RN 1 [facility]  Ext [phone]

## 2022-09-29 NOTE — PROGRESS NOTES
Problem: Patient Education: Go to Patient Education Activity  Goal: Patient/Family Education  9/29/2022 0314 by Katherin Hastings RN  Outcome: Progressing Towards Goal  9/29/2022 0314 by Katherin Hastings RN  Outcome: Progressing Towards Goal     Problem: Falls - Risk of  Goal: *Absence of Falls  Description: Document Ottoniel Santiago Fall Risk and appropriate interventions in the flowsheet. 9/29/2022 0314 by Katherin Hastings RN  Outcome: Progressing Towards Goal  Note: Fall Risk Interventions:  Mobility Interventions: Communicate number of staff needed for ambulation/transfer, Patient to call before getting OOB         Medication Interventions: Evaluate medications/consider consulting pharmacy, Patient to call before getting OOB, Teach patient to arise slowly    Elimination Interventions: Call light in reach, Patient to call for help with toileting needs, Toilet paper/wipes in reach    9/29/2022 0314 by Katherin Hastings RN  Outcome: Progressing Towards Goal  Note: Fall Risk Interventions:  Mobility Interventions: Communicate number of staff needed for ambulation/transfer, Patient to call before getting OOB         Medication Interventions: Evaluate medications/consider consulting pharmacy, Patient to call before getting OOB, Teach patient to arise slowly    Elimination Interventions: Call light in reach, Patient to call for help with toileting needs, Toilet paper/wipes in reach    Problem: Pain  Goal: *Control of Pain  Outcome: Progressing Towards Goal     Problem: Diabetes Self-Management  Goal: *Disease process and treatment process  Description: Define diabetes and identify own type of diabetes; list 3 options for treating diabetes.   9/29/2022 0314 by Katherin Hastings RN  Outcome: Progressing Towards Goal  9/29/2022 0314 by Katherin Hastings RN  Outcome: Progressing Towards Goal  Goal: *Incorporating nutritional management into lifestyle  Description: Describe effect of type, amount and timing of food on blood glucose; list 3 methods for planning meals. 9/29/2022 0314 by Logan Mancia RN  Outcome: Progressing Towards Goal  9/29/2022 0314 by Logan Mancia RN  Outcome: Progressing Towards Goal  Goal: *Incorporating physical activity into lifestyle  Description: State effect of exercise on blood glucose levels. 9/29/2022 0314 by Logan Mancia RN  Outcome: Progressing Towards Goal  9/29/2022 0314 by Logan Mancia RN  Outcome: Progressing Towards Goal  Goal: *Developing strategies to promote health/change behavior  Description: Define the ABC's of diabetes; identify appropriate screenings, schedule and personal plan for screenings. 9/29/2022 0314 by Logan Mancia RN  Outcome: Progressing Towards Goal  9/29/2022 0314 by Logan Mancia RN  Outcome: Progressing Towards Goal  Goal: *Using medications safely  Description: State effect of diabetes medications on diabetes; name diabetes medication taking, action and side effects. 9/29/2022 0314 by Logan Mancia RN  Outcome: Progressing Towards Goal  9/29/2022 0314 by Logan Mancia RN  Outcome: Progressing Towards Goal  Goal: *Monitoring blood glucose, interpreting and using results  Description: Identify recommended blood glucose targets  and personal targets. 9/29/2022 0314 by Logan Mancia RN  Outcome: Progressing Towards Goal  9/29/2022 0314 by Logan Mancia RN  Outcome: Progressing Towards Goal  Goal: *Prevention, detection, treatment of acute complications  Description: List symptoms of hyper- and hypoglycemia; describe how to treat low blood sugar and actions for lowering  high blood glucose level.   9/29/2022 0314 by Logan Mancia RN  Outcome: Progressing Towards Goal  9/29/2022 0314 by Logan Mancia RN  Outcome: Progressing Towards Goal  Goal: *Prevention, detection and treatment of chronic complications  Description: Define the natural course of diabetes and describe the relationship of blood glucose levels to long term complications of diabetes.   9/29/2022 0314 by Latia Ngo RN  Outcome: Progressing Towards Goal  9/29/2022 0314 by Latia Ngo RN  Outcome: Progressing Towards Goal  Goal: *Developing strategies to address psychosocial issues  Description: Describe feelings about living with diabetes; identify support needed and support network  9/29/2022 0314 by Latia Ngo RN  Outcome: Progressing Towards Goal  9/29/2022 0314 by Latia Ngo RN  Goal: *Patient Specific Goal (EDIT GOAL, INSERT TEXT)  9/29/2022 0314 by Latia Ngo RN  Outcome: Progressing Towards Goal  9/29/2022 0314 by Latia Ngo RN  Outcome: Progressing Towards Goal

## 2022-09-30 LAB
ANION GAP SERPL CALC-SCNC: 8 MMOL/L (ref 3–18)
BUN SERPL-MCNC: 27 MG/DL (ref 7–18)
BUN/CREAT SERPL: 23 (ref 12–20)
CALCIUM SERPL-MCNC: 8.4 MG/DL (ref 8.5–10.1)
CHLORIDE SERPL-SCNC: 108 MMOL/L (ref 100–111)
CO2 SERPL-SCNC: 21 MMOL/L (ref 21–32)
CREAT SERPL-MCNC: 1.17 MG/DL (ref 0.6–1.3)
GLUCOSE BLD STRIP.AUTO-MCNC: 104 MG/DL (ref 70–110)
GLUCOSE BLD STRIP.AUTO-MCNC: 137 MG/DL (ref 70–110)
GLUCOSE BLD STRIP.AUTO-MCNC: 219 MG/DL (ref 70–110)
GLUCOSE BLD STRIP.AUTO-MCNC: 269 MG/DL (ref 70–110)
GLUCOSE SERPL-MCNC: 198 MG/DL (ref 74–99)
MAGNESIUM SERPL-MCNC: 1.6 MG/DL (ref 1.6–2.6)
OSMOLALITY SERPL: 343 MOSMOL/KG (ref 280–301)
PHOSPHATE SERPL-MCNC: 3 MG/DL (ref 2.5–4.9)
POTASSIUM SERPL-SCNC: 4.3 MMOL/L (ref 3.5–5.5)
SODIUM SERPL-SCNC: 137 MMOL/L (ref 136–145)

## 2022-09-30 PROCEDURE — 74011250637 HC RX REV CODE- 250/637: Performed by: PHYSICIAN ASSISTANT

## 2022-09-30 PROCEDURE — 97530 THERAPEUTIC ACTIVITIES: CPT

## 2022-09-30 PROCEDURE — 84100 ASSAY OF PHOSPHORUS: CPT

## 2022-09-30 PROCEDURE — 65660000004 HC RM CVT STEPDOWN

## 2022-09-30 PROCEDURE — 36415 COLL VENOUS BLD VENIPUNCTURE: CPT

## 2022-09-30 PROCEDURE — 82962 GLUCOSE BLOOD TEST: CPT

## 2022-09-30 PROCEDURE — 74011250636 HC RX REV CODE- 250/636: Performed by: PHYSICIAN ASSISTANT

## 2022-09-30 PROCEDURE — 99232 SBSQ HOSP IP/OBS MODERATE 35: CPT | Performed by: HOSPITALIST

## 2022-09-30 PROCEDURE — 74011636637 HC RX REV CODE- 636/637: Performed by: HOSPITALIST

## 2022-09-30 PROCEDURE — 74011000250 HC RX REV CODE- 250: Performed by: PHYSICIAN ASSISTANT

## 2022-09-30 PROCEDURE — 83735 ASSAY OF MAGNESIUM: CPT

## 2022-09-30 PROCEDURE — 74011250637 HC RX REV CODE- 250/637: Performed by: HOSPITALIST

## 2022-09-30 PROCEDURE — 97110 THERAPEUTIC EXERCISES: CPT

## 2022-09-30 PROCEDURE — 80048 BASIC METABOLIC PNL TOTAL CA: CPT

## 2022-09-30 PROCEDURE — 74011250636 HC RX REV CODE- 250/636: Performed by: HOSPITALIST

## 2022-09-30 RX ORDER — ENOXAPARIN SODIUM 100 MG/ML
40 INJECTION SUBCUTANEOUS DAILY
Status: DISCONTINUED | OUTPATIENT
Start: 2022-10-01 | End: 2022-10-03 | Stop reason: HOSPADM

## 2022-09-30 RX ADMIN — PANTOPRAZOLE SODIUM 40 MG: 40 TABLET, DELAYED RELEASE ORAL at 16:14

## 2022-09-30 RX ADMIN — Medication 6 UNITS: at 12:09

## 2022-09-30 RX ADMIN — SODIUM CHLORIDE, PRESERVATIVE FREE 10 ML: 5 INJECTION INTRAVENOUS at 05:37

## 2022-09-30 RX ADMIN — GABAPENTIN 300 MG: 300 CAPSULE ORAL at 09:59

## 2022-09-30 RX ADMIN — POTASSIUM & SODIUM PHOSPHATES POWDER PACK 280-160-250 MG 1 PACKET: 280-160-250 PACK at 22:52

## 2022-09-30 RX ADMIN — SODIUM CHLORIDE, PRESERVATIVE FREE 10 ML: 5 INJECTION INTRAVENOUS at 22:53

## 2022-09-30 RX ADMIN — CARVEDILOL 6.25 MG: 6.25 TABLET, FILM COATED ORAL at 16:13

## 2022-09-30 RX ADMIN — IRON SUCROSE 200 MG: 20 INJECTION, SOLUTION INTRAVENOUS at 14:14

## 2022-09-30 RX ADMIN — CARVEDILOL 6.25 MG: 6.25 TABLET, FILM COATED ORAL at 09:59

## 2022-09-30 RX ADMIN — SODIUM CHLORIDE, PRESERVATIVE FREE 10 ML: 5 INJECTION INTRAVENOUS at 14:14

## 2022-09-30 RX ADMIN — PANTOPRAZOLE SODIUM 40 MG: 40 TABLET, DELAYED RELEASE ORAL at 09:59

## 2022-09-30 RX ADMIN — Medication 400 MG: at 17:21

## 2022-09-30 RX ADMIN — Medication 4 UNITS: at 17:12

## 2022-09-30 RX ADMIN — POTASSIUM & SODIUM PHOSPHATES POWDER PACK 280-160-250 MG 1 PACKET: 280-160-250 PACK at 16:14

## 2022-09-30 RX ADMIN — ENOXAPARIN SODIUM 30 MG: 100 INJECTION SUBCUTANEOUS at 09:58

## 2022-09-30 RX ADMIN — ASPIRIN 81 MG: 81 TABLET, COATED ORAL at 09:59

## 2022-09-30 RX ADMIN — ATORVASTATIN CALCIUM 20 MG: 20 TABLET, FILM COATED ORAL at 22:52

## 2022-09-30 RX ADMIN — LEVOTHYROXINE SODIUM 150 MCG: 150 TABLET ORAL at 05:37

## 2022-09-30 RX ADMIN — POTASSIUM & SODIUM PHOSPHATES POWDER PACK 280-160-250 MG 1 PACKET: 280-160-250 PACK at 09:59

## 2022-09-30 RX ADMIN — AMLODIPINE BESYLATE 5 MG: 5 TABLET ORAL at 09:59

## 2022-09-30 RX ADMIN — Medication 4 UNITS: at 22:52

## 2022-09-30 RX ADMIN — Medication 24 UNITS: at 17:14

## 2022-09-30 NOTE — PROGRESS NOTES
Bedside shift change report given to Sarika Guzmán RN (oncoming nurse) by Demario Vazquez RN (offgoing nurse). Report included the following information SBAR, Kardex, Intake/Output, MAR, and Cardiac Rhythm NSR .

## 2022-09-30 NOTE — PROGRESS NOTES
CM spoke with patient and patient provided [facility] for SNF only in [facility], [address] [facility]. CM contacted \"Sushma\" with [address] start authorization for SNF therapy.     John Gautam, LYDIA, QMHP

## 2022-09-30 NOTE — DIABETES MGMT
Diabetes Patient/Family Education Record    Factors That May Influence Patients Ability to Learn or Comply with Recommendations   []   Language barrier    []   Cultural needs   []   Motivation    []   Cognitive limitation    []   Physical   [x]   Education    []   Physiological factors   []   Hearing/vision/speaking impairment   []   Jew beliefs    []   Financial factors   []  Other:   []  No factors identified at this time. Person Instructed:   [x]   Patient   []   Family   []  Other     Preference for Learning:   [x]   Verbal   [x]   Written   []  Demonstration     Level of Comprehension & Competence:    [x]  Good                                      [] Fair                                     []  Poor                             []  Needs Reinforcement   [x]  Teach back completed    Education Component:   [x]  Medication management, including how to administer insulin (if appropriate) and potential medication interactions   Patient states her doctor increased her Lantus dose to 30 units every bedtime, but when she woke up the next morning, her BG was 21 mg/dl, so she adjusted her dose to 20 units every bedtime and she said her blood sugar was running low normal.  Patient also takes Humalog on a corrective scale 3 times daily with meals. [x]  Nutritional management - [] Obtained usual meal pattern   []   Basic carbohydrate counting  []  Plate method  []  Limit concentrated sweets and avoid sweetened beverages  []  Portion control  []    Avoid skipping meals   []  Exercise   [x]  Signs, symptoms, and treatment of hyperglycemia and hypoglycemia  Pt has hypoglycemia unawareness. Encouraged pt to check her blood glucose more frequently throughout the day and not to skip meals.    [x] Prevention, recognition and treatment of hyperglycemia and hypoglycemia   [x]  Importance of blood glucose monitoring  [x] Blood Glucose targets   []   Provided patient with blood glucose meter  [x]  Has glucometer and supplies at home  Currently checks her BG 3 times daily and PRN   []  Instruction on use of the blood glucose meter and recommended monitoring schedule   [x]  Discuss the importance of HbA1C monitoring. Patients A1c is 9.7 %. This is equivalent to average glucose of 231 mg/dl for the past 2-3 months. [x]  Sick day guidelines   [x]  Proper use and disposal of lancets, needles, syringes or insulin pens (if appropriate)   [x]  Potential long-term complications (retinopathy, kidney disease, neuropathy, foot care)   [x] Information about whom to contact in case of emergency or for more information    [x]  Goal:  Patient/family will demonstrate understanding of Diabetes Self- Management Skills by: (date) ___10/05/2022____  Plan for post-discharge education or self-management support:    [x] Outpatient class schedule provided            [] Patient Declined    [] Scheduled for outpatient classes (date) _______    [] Written information provided  Verify: [x] Prior to admission Diabetes medications    Does patient understand how diabetes medications work? Yes   Does patient have difficulty obtaining diabetes medications or testing supplies? No issues voiced.

## 2022-09-30 NOTE — PROGRESS NOTES
Reason for Admission:  Hyperglycemia [Z13.3]  Metabolic acidosis [B20.0]  DKA, type 2 ([address] [E11.10]                 RUR Score:   18%           Plan for utilizing home health:   SNF                    Likelihood of Readmission:   Moderate                         Do you (patient/family) have any concerns for transition/discharge?  no    Transition of Care Plan:       Initial assessment completed with patient. Cognitive status of patient: oriented to time, place, person and situation. Face sheet information confirmed:  yes. This patient lives alone in a [facility] Patient is not able to navigate steps as needed. Prior to hospitalization, patient was considered to be independent with ADLs/IADLS : no .     Patient has a current ACP document on file: no      Healthcare Decision Maker:     Click here to complete [address] including selection of the Healthcare Decision Maker Relationship (ie \"Primary\")    The daughter will be available to transport patient home upon discharge. The patient already has Jorge Luis Hatchet, W/C, Shower chair, medical equipment available in the home. Patient is not currently active with home health. Patient has not stayed in a skilled nursing facility or rehab. Was  stay within last 60 days : no. This patient is on dialysis :no    Freedom of choice signed: yes, for any accepting SNF. Currently, the discharge plan is SNF. The patient states that she can obtain her medications from the pharmacy, and take her medications as directed. Patient's current insurance is [employer]  PCP Verified by CM: Yes  Mode of Transport at Discharge:  Other (see comment)  MyChart Signup: No  Discharge Durable Medical Equipment: No  Physical Therapy Consult: Yes  Occupational Therapy Consult: Yes  Speech Therapy Consult: Yes  Support Systems: Child(citlali)  Discharge Location  Patient Expects to be Discharged to[de-identified] [address] [facility] Nancy Aguayo, MSW, QMHP

## 2022-09-30 NOTE — DIABETES MGMT
Diabetes/ Glycemic Control Plan of Care  Recommendations:   Patient admitted with DKA with a blood glucose of 901 mg/dl. Patient transitioned yesterday off the insulin gtt to basal and corrective insulin coverage. Blood glucose this am 137 mg/dl  Will continue inpatient monitoring. Assessment:   DX:   1. Diabetic ketoacidosis without coma associated with type 2 diabetes mellitus ([facility] [address]           Fasting/ Morning blood glucose:   Lab Results   Component Value Date/Time    Glucose 76 09/29/2022 10:40 AM    Glucose (POC) 137 (H) 09/30/2022 07:56 AM    Glucose (POC) 90 02/06/2019 02:13 PM    Glucose,  (H) 01/19/2021 09:26 PM     IV Fluids containing dextrose: none   Steroids:  none     Blood glucose values:        Latest Reference Range & Units 9/29/22 20:49 9/29/22 21:54 9/29/22 23:04 9/30/22 01:22 9/30/22 07:56   GLUCOSE,FAST - POC 70 - 110 mg/dL 162 (H) 145 (H) 118 (H) 104 137 (H)   (H): Data is abnormally high  Within target range (70-180mg/dL):  yes    Current insulin orders:   Lantus 24 units daily before dinner  Lispro corrective insulin coverage AC&HS as needed. Total Daily Dose previous 24 hours = approximately 150 units   Current A1c:   Lab Results   Component Value Date/Time    Hemoglobin A1c 9.7 (H) 09/28/2022 08:07 PM    Hemoglobin A1c, External 9.5 07/08/2021 12:00 AM      Equivalent to an average Blood Glucose of 232 mg/dl for 2-3 months prior to admission  Adequate glycemic control PTA:   Nutrition/Diet:   Active Orders   Diet    ADULT DIET Regular; 4 carb choices (60 gm/meal); No Concentrated Sweets      Meal Intake:  No data found. Supplement Intake:  No data found. Home diabetes medications:   Key Antihyperglycemic Medications               pioglitazone (ACTOS) 30 mg tablet (Taking) Take 30 mg by mouth daily. SITagliptin (JANUVIA) 100 mg tablet (Taking) Take 100 mg by mouth daily. metFORMIN ER (GLUCOPHAGE XR) 500 mg tablet (Taking) Take 500 mg by mouth daily (with dinner). insulin glargine-yfgn 100 unit/mL (3 mL) inpn INJECT 16 UNITS SUBCUTANEOUSLY IN THE MORNING AND 16 IN THE EVENING    insulin aspart U-100 (NOVOLOG) 100 unit/mL (3 mL) inpn 2-10 Units by SubCUTAneous route Before breakfast, lunch, and dinner. BS less than 150- no units  150-199- 2 units  200-249- 4 units  250-299- 6 units  300-349- 8 units  350 10 units  over 400 call MD    insulin glargine (LANTUS) 100 unit/mL injection 24 Units by SubCUTAneous route daily. Inject 10 units SQ DAILY    insulin lispro (HUMALOG) 100 unit/mL injection For Blood Sugar (mg/dL) of:              Less than 150 =   0 units  150 -199 =   3 units  200 -249 =   6 units  250 -299 =   9 units  300 -349 =   12 units  350 and above =   15 units and Call Physician  Initiate Hypoglycemic protocol if blood glucose is <70 mg/dL. Fast Acting - Administer Immediately - or within 15 minutes of start of meal, if mealtime coverage. Plan/Goals:   Blood glucose will be within target of 70 - 180 mg/dl within 72 hours  Reinforce dietary and medication compliance at home.           Education:  [] Refer to Diabetes Education Record                       [] Education not indicated at this time     Haresh Mark RN 1 [facility]  Ext [phone]

## 2022-09-30 NOTE — ROUTINE PROCESS
Bedside and Verbal shift change report given to [address] [place] [address] (oncoming nurse) by Sukh Villa RN (offgoing nurse). Report included the following information SBAR, Kardex, Intake/Output, MAR, and Recent Results.      Wound Prevention Checklist    Patient: Lynne Irving (50 y.o. female)  Date: 9/30/2022  Diagnosis: Hyperglycemia [L33.2]  Metabolic acidosis [T89.4]  DKA, type 2 ([address] [E11.10] DKA, type 2 ([address]    Precautions: Fall       []  Heel prevention boots placed on patient    [x]  Patient turned q2h during shift    []  Lift team ordered    [x]  Patient on Diane bed/Specialty bed    []  Each Wound is documented during shift (Stage, Color, drainage, odor, measurements, and dressings)    [x]  Dual skin check done with Sukh Villa RN  [facility] to [facility]  Caprice Ortiz

## 2022-09-30 NOTE — PROGRESS NOTES
Problem: Self Care Deficits Care Plan (Adult)  Goal: *Acute Goals and Plan of Care (Insert Text)  Description: Occupational Therapy Goals  Initiated 9/29/2022 within 7 day(s). 1.  Patient will perform self-feeding and grooming with modified independence. 2.  Patient will perform bathing with modified independence. 3.  Patient will perform upper body dressing and lower body dressing with modified independence. 4.  Patient will perform toilet transfers with modified independence using RW with good balance. 5.  Patient will perform all aspects of toileting with modified independence. 6.  Patient will participate in upper extremity therapeutic exercise/activities with modified independence for 8 minutes. 7.  Patient will utilize energy conservation techniques during functional activities with min verbal cues. Prior Level of Function: Mod I with ADLs and functional mobility using SPC     Outcome: Progressing Towards Goal   OCCUPATIONAL THERAPY TREATMENT    Patient: Florencia Olszewski (89 y.o. female)  Date: 9/30/2022  Diagnosis: Hyperglycemia [R60.9]  Metabolic acidosis [X68.6]  DKA, type 2 ([address] [E11.10] DKA, type 2 ([address]      Precautions: Fall    Chart, occupational therapy assessment, plan of care, and goals were reviewed. ASSESSMENT:  Patient pleasant and agreeable to therapy session. She was motivated to work on BUE therapeutic exercises to increase her functional strength and endurance while seated upright in bed. VCs given for form. Patient advised to complete exercises three times daily and she verbalized understanding. She voiced a desire to get out of the bed for toileting. BSC brought in room but patient declined current need to toilet. Reminded patient that she needs to call before getting OOB for safety and she again verbalized understanding of need for assistance. Nursing notified of BSC. She was left seated upright in bed with all needs met at end of session.       Progression toward goals:  []          Improving appropriately and progressing toward goals  [x]          Improving slowly and progressing toward goals  []          Not making progress toward goals and plan of care will be adjusted     PLAN:  Patient continues to benefit from skilled intervention to address the above impairments. Continue treatment per established plan of care. Further Equipment Recommendations for Discharge: TBD at next level of care    AMPAC: Current research shows that an AM-PAC score of 17 or less is not associated with a discharge to the patient's home setting. Based on an AM-PAC score of 15/24 and their current ADL deficits; it is recommended that the patient have 3-5 sessions per week of Occupational Therapy at d/c to increase the patient's independence. This AMPAC score should be considered in conjunction with interdisciplinary team recommendations to determine the most appropriate discharge setting. Patient's social support, diagnosis, medical stability, and prior level of function should also be taken into consideration.      SUBJECTIVE:   Patient stated I want to get up and go to the bathroom when I need to.    OBJECTIVE DATA SUMMARY:   Cognitive/Behavioral Status:  Neurologic State: Alert  Orientation Level: Oriented to person, Oriented to place, Oriented to situation  Cognition: Follows commands  Safety/Judgement: Fall prevention    Functional Mobility and Transfers for ADLs:   Bed Mobility:  Supine to Sit: Supervision (long sitting in bed)  Sit to Supine: Supervision  Scooting: Supervision (Up to [facility] using UEs/LEs; VCs given to bend knees and use hands on bedrails)    Balance:  Sitting: Intact (long sitting in bed)    UE Therapeutic Exercises:   Pt was able to perform BUE:    EXERCISE   Sets   Reps   Active Active Assist   Passive Self- assisted ROM   Comments   Shoulder horizontal abduction  1  10 [x]  []  []  []      Shoulder flexion 1   10 [x]  []  []  []      Shoulder extension  1  10 [x] []  []  []      Forearm extension/flexion 1 10 [x]  []  []  []     Scapular protraction/retraction 1   10 [x]  []  []  []  While long sitting in bed    To increase strength/endurance for ADLs. Pain:  Pain level pre-treatment: 0/10   Pain level post-treatment: 0/10  Pain Intervention(s): NA  Response to intervention: NA    Activity Tolerance:    Good  Please refer to the flowsheet for vital signs taken during this treatment. After treatment:   []  Patient left in no apparent distress sitting up in chair  [x]  Patient left in no apparent distress in bed  [x]  Call bell left within reach  [x]  Nursing notified  []  Caregiver present  []  Bed alarm activated    COMMUNICATION/EDUCATION:   [x] Role of Occupational Therapy in the acute care setting  [x] Home safety education was provided and the patient/caregiver indicated understanding. [x] Patient/family have participated as able in working towards goals and plan of care. [x] Patient/family agree to work toward stated goals and plan of care. [] Patient understands intent and goals of therapy, but is neutral about his/her participation. [] Patient is unable to participate in goal setting and plan of care. Thank you for this referral.  Luna Villarreal, MS OTR/L   Time Calculation: 24 mins    [facility] AM-PAC® Daily Activity Inpatient Short Form (6-Clicks)*    How much HELP from another person does the patient currently need    (If the patient hasn't done an activity recently, how much help from another person do you think he/she would need if he/she tried?)   Total (Total A or Dep)   A Lot  (Mod to Max A)   A Little (Sup or Min A)   None (Mod I to I)   Putting on and taking off regular lower body clothing? [] 1 [x] 2 [] 3 [] 4   2. Bathing (including washing, rinsing,      drying)? [] 1 [x] 2 [] 3 [] 4   3. Toileting, which includes using toilet, bedpan or urinal?   [] 1 [x] 2 [] 3 [] 4   4.  Putting on and taking off regular upper body clothing? [] 1 [] 2 [x] 3 [] 4   5. Taking care of personal grooming such as brushing teeth? [] 1 [] 2 [x] 3 [] 4   6. Eating meals? [] 1 [] 2 [x] 3 [] 4     Current research shows that an AM-PAC score of 17 or less is not associated with a discharge to the patient's home setting. Based on an AM-PAC score of 15/24 and their current ADL deficits; it is recommended that the patient have 3-5 sessions per week of Occupational Therapy at d/c to increase the patient's independence.

## 2022-10-01 LAB
ANION GAP SERPL CALC-SCNC: 8 MMOL/L (ref 3–18)
BUN SERPL-MCNC: 17 MG/DL (ref 7–18)
BUN/CREAT SERPL: 17 (ref 12–20)
CALCIUM SERPL-MCNC: 8.1 MG/DL (ref 8.5–10.1)
CHLORIDE SERPL-SCNC: 109 MMOL/L (ref 100–111)
CO2 SERPL-SCNC: 25 MMOL/L (ref 21–32)
CREAT SERPL-MCNC: 1.02 MG/DL (ref 0.6–1.3)
GLUCOSE BLD STRIP.AUTO-MCNC: 164 MG/DL (ref 70–110)
GLUCOSE BLD STRIP.AUTO-MCNC: 169 MG/DL (ref 70–110)
GLUCOSE BLD STRIP.AUTO-MCNC: 191 MG/DL (ref 70–110)
GLUCOSE BLD STRIP.AUTO-MCNC: 241 MG/DL (ref 70–110)
GLUCOSE SERPL-MCNC: 227 MG/DL (ref 74–99)
MAGNESIUM SERPL-MCNC: 1.6 MG/DL (ref 1.6–2.6)
PHOSPHATE SERPL-MCNC: 2.3 MG/DL (ref 2.5–4.9)
POTASSIUM SERPL-SCNC: 3.9 MMOL/L (ref 3.5–5.5)
SODIUM SERPL-SCNC: 142 MMOL/L (ref 136–145)

## 2022-10-01 PROCEDURE — 80048 BASIC METABOLIC PNL TOTAL CA: CPT

## 2022-10-01 PROCEDURE — 74011250636 HC RX REV CODE- 250/636: Performed by: HOSPITALIST

## 2022-10-01 PROCEDURE — 83735 ASSAY OF MAGNESIUM: CPT

## 2022-10-01 PROCEDURE — 36415 COLL VENOUS BLD VENIPUNCTURE: CPT

## 2022-10-01 PROCEDURE — 94762 N-INVAS EAR/PLS OXIMTRY CONT: CPT

## 2022-10-01 PROCEDURE — 65660000004 HC RM CVT STEPDOWN

## 2022-10-01 PROCEDURE — 77030038269 HC DRN EXT URIN PURWCK BARD -A

## 2022-10-01 PROCEDURE — 74011250637 HC RX REV CODE- 250/637: Performed by: PHYSICIAN ASSISTANT

## 2022-10-01 PROCEDURE — 74011636637 HC RX REV CODE- 636/637: Performed by: HOSPITALIST

## 2022-10-01 PROCEDURE — 74011250637 HC RX REV CODE- 250/637: Performed by: HOSPITALIST

## 2022-10-01 PROCEDURE — 84100 ASSAY OF PHOSPHORUS: CPT

## 2022-10-01 PROCEDURE — 82962 GLUCOSE BLOOD TEST: CPT

## 2022-10-01 PROCEDURE — 74011000250 HC RX REV CODE- 250: Performed by: PHYSICIAN ASSISTANT

## 2022-10-01 PROCEDURE — 99232 SBSQ HOSP IP/OBS MODERATE 35: CPT | Performed by: HOSPITALIST

## 2022-10-01 RX ORDER — SODIUM,POTASSIUM PHOSPHATES 280-250MG
2 POWDER IN PACKET (EA) ORAL 2 TIMES DAILY
Status: DISCONTINUED | OUTPATIENT
Start: 2022-10-01 | End: 2022-10-02

## 2022-10-01 RX ADMIN — ENOXAPARIN SODIUM 40 MG: 40 INJECTION SUBCUTANEOUS at 08:42

## 2022-10-01 RX ADMIN — GABAPENTIN 300 MG: 300 CAPSULE ORAL at 08:41

## 2022-10-01 RX ADMIN — CARVEDILOL 6.25 MG: 6.25 TABLET, FILM COATED ORAL at 08:42

## 2022-10-01 RX ADMIN — Medication 400 MG: at 08:41

## 2022-10-01 RX ADMIN — SODIUM CHLORIDE, PRESERVATIVE FREE 10 ML: 5 INJECTION INTRAVENOUS at 05:31

## 2022-10-01 RX ADMIN — SODIUM CHLORIDE, PRESERVATIVE FREE 10 ML: 5 INJECTION INTRAVENOUS at 17:07

## 2022-10-01 RX ADMIN — PANTOPRAZOLE SODIUM 40 MG: 40 TABLET, DELAYED RELEASE ORAL at 08:41

## 2022-10-01 RX ADMIN — LEVOTHYROXINE SODIUM 150 MCG: 150 TABLET ORAL at 05:31

## 2022-10-01 RX ADMIN — POTASSIUM & SODIUM PHOSPHATES POWDER PACK 280-160-250 MG 2 PACKET: 280-160-250 PACK at 19:38

## 2022-10-01 RX ADMIN — Medication 24 UNITS: at 16:58

## 2022-10-01 RX ADMIN — Medication 2 UNITS: at 11:58

## 2022-10-01 RX ADMIN — ASPIRIN 81 MG: 81 TABLET, COATED ORAL at 08:41

## 2022-10-01 RX ADMIN — POTASSIUM & SODIUM PHOSPHATES POWDER PACK 280-160-250 MG 1 PACKET: 280-160-250 PACK at 08:41

## 2022-10-01 RX ADMIN — SODIUM CHLORIDE, PRESERVATIVE FREE 10 ML: 5 INJECTION INTRAVENOUS at 22:47

## 2022-10-01 RX ADMIN — Medication 4 UNITS: at 17:01

## 2022-10-01 RX ADMIN — PANTOPRAZOLE SODIUM 40 MG: 40 TABLET, DELAYED RELEASE ORAL at 17:02

## 2022-10-01 RX ADMIN — CARVEDILOL 6.25 MG: 6.25 TABLET, FILM COATED ORAL at 17:02

## 2022-10-01 RX ADMIN — Medication 2 UNITS: at 08:36

## 2022-10-01 RX ADMIN — AMLODIPINE BESYLATE 5 MG: 5 TABLET ORAL at 08:41

## 2022-10-01 RX ADMIN — ATORVASTATIN CALCIUM 20 MG: 20 TABLET, FILM COATED ORAL at 22:45

## 2022-10-01 NOTE — PROGRESS NOTES
Spoke with Bakari Martin at [facility]. Insurance Rima Silverman was started 9/30/2022 for [address]. [facility] [facility] Updated Dr. Clarisa Sherwood will continue to monitor and assist with transition of care needs.     John Nieves, MELISSAN, RN  Care Management

## 2022-10-01 NOTE — PROGRESS NOTES
[facility] Hospitalist Group  Progress Note    Patient: Ginna Lebron Age: 74 y.o. : 1950 MR#: [id number] [id number]: [id number]  Date/Time: 10/1/2022    Subjective:     WBC increased to 22.5. Afebrile, couple of low-grade temps. Hemoglobin has dropped to 8. Blood cx : ONE OF TWO BOTTLES HAS BEEN FLAGGED POSITIVE BY INSTRUMENT. BOTTLE HAS BEEN SENT TO [facility] TO ASSESS FOR POSSIBLE GROWTH. No organisms seen on gram stain. Multiple subcultures are in progress. NO GROWTH THUS FAR    Patient seen and examined at bedside, she states she feels about the same. Denies dental infection, sore throat, abdominal pain, diarrhea, any hardware in her body. Denies rash or wound. She has a dry cough which has been worsening, does not feel congested. Assessment/Plan:     1. DKA improving. Transitioned to Lantus, sliding scale insulin. 2. Anemia normocytic normochromic  3. Leukocytosis, worsening, unclear etiology. Chest x-ray tomorrow morning. ID consult. 4. Hypophosphatemia replete as needed. 5. Iron def anemia. Venofer. Outpatient screening colonoscopy. 6. Grief reaction. Daughter recently . 7.  Diabetes type 2 with hyperglycemia. Diabetes educator consulted  8. High anion gap metabolic acidosis. Improving. 9.  Hyponatremia, hypovolemic as well as pseudohyponatremia in the setting of elevated blood glucose. Improving. IV fluids stopped. 10.  Ambulatory dysfunction. 11.   blood cx flagged +. ID consult. 12. DVT prophylaxis  13. Full code. Additional Notes: Discussed on IDR.     Case discussed with:  [x]Patient  []Family  [x]Nursing  [x]Case Management  DVT Prophylaxis:  [x]Lovenox  []Hep SQ  []SCDs  []Coumadin   []On Heparin gtt    Objective:   VS: Visit Vitals  /69   Pulse 80   Temp 98.4 °F (36.9 °C)   Resp 18   Ht 5' 6\" (1.676 m)   Wt 69.9 kg (154 lb 3.2 oz)   SpO2 96%   BMI 24.89 kg/m²      Tmax/24hrs: Temp (24hrs), Av.3 °F (36.8 °C), Min:97.2 °F (36.2 °C), Max:98.8 °F (37.1 °C)    Input/Output: No intake or output data in the 24 hours ending 10/01/22 1347    General: Awake alert and oriented x4. Found sitting up in bed watching TV, speaking full sentences on room air. Heent: ncat. Perrl. Oropharynx clear. Cardiovascular:  RRR  Pulmonary:  cta b.l  GI:  soft nd nt nabs  Extremities: No edema  Neuro: No focal deficit  Skin: no rash to visible skin. No wounds.     Labs:    Recent Results (from the past 24 hour(s))   GLUCOSE, POC    Collection Time: 09/30/22  4:12 PM   Result Value Ref Range    Glucose (POC) 219 (H) 70 - 110 mg/dL   MAGNESIUM    Collection Time: 10/01/22  3:19 AM   Result Value Ref Range    Magnesium 1.6 1.6 - 2.6 mg/dL   METABOLIC PANEL, BASIC    Collection Time: 10/01/22  3:19 AM   Result Value Ref Range    Sodium 142 136 - 145 mmol/L    Potassium 3.9 3.5 - 5.5 mmol/L    Chloride 109 100 - 111 mmol/L    CO2 25 21 - 32 mmol/L    Anion gap 8 3.0 - 18 mmol/L    Glucose 227 (H) 74 - 99 mg/dL    BUN 17 7.0 - 18 MG/DL    Creatinine 1.02 0.6 - 1.3 MG/DL    BUN/Creatinine ratio 17 12 - 20      GFR est AA >60 >60 ml/min/1.73m2    GFR est non-AA 53 (L) >60 ml/min/1.73m2    Calcium 8.1 (L) 8.5 - 10.1 MG/DL   PHOSPHORUS    Collection Time: 10/01/22  3:19 AM   Result Value Ref Range    Phosphorus 2.3 (L) 2.5 - 4.9 MG/DL   GLUCOSE, POC    Collection Time: 10/01/22  8:28 AM   Result Value Ref Range    Glucose (POC) 164 (H) 70 - 110 mg/dL   GLUCOSE, POC    Collection Time: 10/01/22 11:55 AM   Result Value Ref Range    Glucose (POC) 191 (H) 70 - 110 mg/dL     Additional Data Reviewed:      Signed By: Silviano Gutierrez MD     October 1, 2022 9:14 AM

## 2022-10-01 NOTE — PROGRESS NOTES
Bedside and Verbal shift change report given to Haylie Fleming (oncoming nurse) by Yanely Aranda  (offgoing nurse). Report included the following information SBAR, Kardex, ED Summary, Intake/Output, Recent Results, Cardiac Rhythm SR, and Quality Measures.

## 2022-10-01 NOTE — PROGRESS NOTES
[facility]    Patient: Renaldo Atwood Age: 74 y.o. : 1950 MR#: [id number] SSN: [id number]  Date/Time: 2022    Subjective:     Patient states she is feeling better. She expresses understanding she is not able to walk at this time, not even with her cane. She wants to go to ARU, but understands she may need to go to SNF for subacute rehab. She states she can stay with her daughter, but agrees to rehab so that she can regain ambulatory function. Denies chest pain, cough, shortness of breath, nausea vomiting diarrhea constipation. No family at bedside. Assessment/Plan:     1. DKA improving. Transitioned to Lantus, sliding scale insulin. 2. Anemia normocytic normochromic  3. Leukocytosis, no sign or sx of infection, likely reactive in the setting of dka. 4. Hypophosphatemia replete as needed. 5. Iron def anemia. Venofer. Outpatient screening colonoscopy. 6. Grief reaction. Daughter recently . 7.  Diabetes type 2 with hyperglycemia. Diabetes educator consulted  8. High anion gap metabolic acidosis. Improving. 9.  Hyponatremia, hypovolemic as well as pseudohyponatremia in the setting of elevated blood glucose. Improving. IV fluids stopped. 10.  Ambulatory dysfunction. Hopefully SNF tomorrow. 11. DVT prophylaxis  12. Full code. Additional Notes: Discussed on IDR.     Case discussed with:  [x]Patient  []Family  [x]Nursing  [x]Case Management  DVT Prophylaxis:  [x]Lovenox  []Hep SQ  []SCDs  []Coumadin   []On Heparin gtt    Objective:   VS: Visit Vitals  BP (!) 153/72 (BP 1 Location: Left upper arm, BP Patient Position: At rest)   Pulse 77   Temp 98.5 °F (36.9 °C)   Resp 18   Ht 5' 6\" (1.676 m)   Wt 69.9 kg (154 lb 3.2 oz)   SpO2 98%   BMI 24.89 kg/m²      Tmax/24hrs: Temp (24hrs), Av.7 °F (37.1 °C), Min:97.9 °F (36.6 °C), Max:99.5 °F (37.5 °C)    Input/Output: No intake or output data in the 24 hours ending 09/30/22 2015    General: Awake alert and oriented x4. Found sitting up in bed watching TV, speaking full sentences on room air. Heent: ncat. Perrl. Oropharynx clear. Back of throat mild erythema, no exudate.    Cardiovascular:  RRR  Pulmonary:  cta b.l  GI:  soft nd nt nabs  Extremities: No edema  Additional: No focal deficit    Labs:    Recent Results (from the past 24 hour(s))   GLUCOSE, POC    Collection Time: 09/29/22  8:49 PM   Result Value Ref Range    Glucose (POC) 162 (H) 70 - 110 mg/dL   GLUCOSTABILIZER    Collection Time: 09/29/22  8:51 PM   Result Value Ref Range    Glucose 162 mg/dL    Insulin order 2.1 units/hour    Insulin adminstered 2.1 units/hour    Multiplier 0.021     Low target 140 mg/dL    High target 180 mg/dL    D50 order 0.0 ml    D50 administered 0.00 ml    Minutes until next BG 60 min    Order initials uk     Administered initials     GLUCOSE, POC    Collection Time: 09/29/22  9:54 PM   Result Value Ref Range    Glucose (POC) 145 (H) 70 - 110 mg/dL   GLUCOSTABILIZER    Collection Time: 09/29/22  9:55 PM   Result Value Ref Range    Glucose 145 mg/dL    Insulin order 1.8 units/hour    Insulin adminstered 1.8 units/hour    Multiplier 0.021     Low target 140 mg/dL    High target 180 mg/dL    D50 order 0.0 ml    D50 administered 0.00 ml    Minutes until next BG 60 min    Order initials uk     Administered initials     GLUCOSE, POC    Collection Time: 09/29/22 11:04 PM   Result Value Ref Range    Glucose (POC) 118 (H) 70 - 110 mg/dL   GLUCOSTABILIZER    Collection Time: 09/29/22 11:10 PM   Result Value Ref Range    Glucose 118 mg/dL    Insulin order 0.6 units/hour    Insulin adminstered 0.6 units/hour    Multiplier 0.011     Low target 140 mg/dL    High target 180 mg/dL    D50 order 0.0 ml    D50 administered 0.00 ml    Minutes until next BG 60 min    Order initials uk     Administered initials uk    GLUCOSE, POC    Collection Time: 09/30/22  1:22 AM   Result Value Ref Range    Glucose (POC) 104 70 - 110 mg/dL   GLUCOSE, POC    Collection Time: 09/30/22  7:56 AM   Result Value Ref Range    Glucose (POC) 137 (H) 70 - 110 mg/dL   PHOSPHORUS    Collection Time: 09/30/22  9:07 AM   Result Value Ref Range    Phosphorus 3.0 2.5 - 4.9 MG/DL   METABOLIC PANEL, BASIC    Collection Time: 09/30/22  9:07 AM   Result Value Ref Range    Sodium 137 136 - 145 mmol/L    Potassium 4.3 3.5 - 5.5 mmol/L    Chloride 108 100 - 111 mmol/L    CO2 21 21 - 32 mmol/L    Anion gap 8 3.0 - 18 mmol/L    Glucose 198 (H) 74 - 99 mg/dL    BUN 27 (H) 7.0 - 18 MG/DL    Creatinine 1.17 0.6 - 1.3 MG/DL    BUN/Creatinine ratio 23 (H) 12 - 20      GFR est AA 55 (L) >60 ml/min/1.73m2    GFR est non-AA 45 (L) >60 ml/min/1.73m2    Calcium 8.4 (L) 8.5 - 10.1 MG/DL   MAGNESIUM    Collection Time: 09/30/22  9:07 AM   Result Value Ref Range    Magnesium 1.6 1.6 - 2.6 mg/dL   GLUCOSE, POC    Collection Time: 09/30/22 11:41 AM   Result Value Ref Range    Glucose (POC) 269 (H) 70 - 110 mg/dL   GLUCOSE, POC    Collection Time: 09/30/22  4:12 PM   Result Value Ref Range    Glucose (POC) 219 (H) 70 - 110 mg/dL     Additional Data Reviewed:      Signed By: Reilly Franco MD     September 30, 2022 9:14 AM

## 2022-10-01 NOTE — PROGRESS NOTES
Pharmacist Review and Automatic Dose Adjustment of Prophylactic Enoxaparin    The reviewing pharmacist has made an adjustment to the ordered enoxaparin dose or converted to UFH per the [facility] protocol and table as identified below. Mack Westfall is a 74 y.o. female. Recent Labs     09/30/22  0907 09/29/22  1040 09/28/22  2302 09/28/22 2007   CREA 1.17 1.58* 1.97* 1.96*   BUN 27* 46* 55* 52*     Estimated Creatinine Clearance: 39.5 mL/min (based on SCr of 1.17 mg/dL).     Height:   Ht Readings from Last 1 Encounters:   09/29/22 167.6 cm (66\")     Weight:  Wt Readings from Last 1 Encounters:   09/29/22 69.9 kg (154 lb 3.2 oz)               Plan: Based upon the patient's weight and renal function, the ordered enoxaparin dose of 30mg SubQ q24h has been changed/converted to 40 mg SubQ q24h      Thank you,  SVETLANA Chavez

## 2022-10-01 NOTE — ROUTINE PROCESS
Received report from [address] [place] [address] AAOx3, NAD, breathing non labored, on room air, HOB up. IV site clean, dry and intact. Bed at the lowest level on lock position, call bell w/i reach. Bedside and Verbal shift change report given to GIOVANNA Rojas (oncoming nurse) by Bailey Valenzuela RN (offgoing nurse). Report included the following information SBAR, Kardex, MAR and Recent Results.     SITUATION:   Code Status: Full Code  Reason for Admission: Hyperglycemia [I66.2]  Metabolic acidosis [Z67.63]  D[facility] [E11.10]    Hospital day: 3  Problem List:       Hospital Problems  Date Reviewed: 6/12/2022            Codes Class Noted POA    High anion gap metabolic acidosis [id number]: E87.29  ICD-9-CM: 276.2  9/28/2022 Yes        * (Principal) DKA, type 2 ([facility] ICD-10-CM: E11.10  ICD-9-CM: 250.12  9/28/2022 Yes        Stage 3b chronic kidney disease ([facility] ICD-10-CM: N18.32  ICD-9-CM: 585.3  9/28/2022 Yes        SIRS (systemic inflammatory response syndrome) ([facility] ICD-10-CM: R65.10  ICD-9-CM: 995.90  9/28/2022 Yes        Anemia due to stage 3b chronic kidney disease ([facility] ICD-10-CM: N18.32, D63.1  ICD-9-CM: 285.21, 585.3  9/28/2022 Yes        Primary hypertension ICD-10-CM: I10  ICD-9-CM: 401.9  6/12/2022 Yes        Acquired hypothyroidism ICD-10-CM: E03.9  ICD-9-CM: 244.9  6/12/2022 Yes        Hyperkalemia ICD-10-CM: E87.5  ICD-9-CM: 276.7  1/19/2021 Yes           BACKGROUND:    Past Medical History:   Past Medical History:   Diagnosis Date    Anemia due to stage 3b chronic kidney disease ([facility] 9/28/2022    Colon cancer ([facility]     Diabetes ([facility]     Hypertension     Hypothyroidism     Stage 3b chronic kidney disease ([facility] 9/28/2022         Patient taking anticoagulants yes     ASSESSMENT:   Changes in Assessment Throughout Shift: no    Patient has Central Line: no Reasons if yes: na  Patient has Chandler Cath: no Reasons if yes: na     Last Vitals:     Vitals:    10/01/22 0353 10/01/22 0841 10/01/22 1109 10/01/22 1701 BP: (!) 150/80 138/85 109/69 110/64   Pulse: 81 89 80 68   Resp: 16 16 18 16   Temp: 98.3 °F (36.8 °C) 98.3 °F (36.8 °C) 98.4 °F (36.9 °C) 98.2 °F (36.8 °C)   SpO2: 96% 96% 96% 97%   Weight:       Height:           IV and DRAINS (will only show if present)   [REMOVED] Peripheral IV 09/28/22 Right Antecubital-Site Assessment: Clean, dry, & intact  Peripheral IV 09/28/22 Left Forearm-Site Assessment: Clean, dry, & intact  [REMOVED] Peripheral IV 09/28/22 Right Antecubital-Site Assessment: Clean, dry, & intact    WOUND (if present)   Wound Type:  none   Dressing present Dressing Present : No   Wound Concerns/Notes:  none    PAIN    Pain Assessment    Pain Intensity 1: 0 (10/01/22 1942)    Pain Location 1: Abdomen    Pain Intervention(s) 1: Medication (see MAR) (requested tylenol)    Patient Stated Pain Goal: 1  Interventions for Pain:  none  Intervention effective: na  Time of last intervention: na   Reassessment Completed: na     Last 3 Weights:  Last 3 Recorded Weights in this Encounter    09/28/22 2141 09/29/22 0056   Weight: 66.7 kg (147 lb) 69.9 kg (154 lb 3.2 oz)     Weight change:     INTAKE/OUPUT    Current Shift: No intake/output data recorded. Last three shifts: No intake/output data recorded. LAB RESULTS     Recent Labs     09/29/22  1040   WBC 22.5*   HGB 8.0*   HCT 25.5*           Recent Labs     10/01/22  0319 09/30/22  0907 09/29/22  1040    137 142   K 3.9 4.3 4.3   * 198* 76   BUN 17 27* 46*   CREA 1.02 1.17 1.58*   CA 8.1* 8.4* 8.1*   MG 1.6 1.6 1.7       RECOMMENDATIONS AND DISCHARGE PLANNING     Pending tests/procedures/ Plan of Care or Other Needs: ID consult     Discharge plan for patient and Needs/Barriers: home    Estimated Discharge Date: TBD Posted on Whiteboard in Patients Room: yes      4. The patient's care plan was reviewed with the oncoming nurse.        \"HEALS\" SAFETY CHECK      Fall Risk    Total Score: 3    Safety Measures: Safety Measures: Bed/Chair alarm on, Bed/Chair-Wheels locked, Bed in low position, Call light within reach, Gripper socks, Side rails X 3    A safety check occurred in the patient's room between off going nurse and oncoming nurse listed above. The safety check included the below items  Area Items   H  High Alert Medications Verify all high alert medication drips (heparin, PCA, etc.)   E  Equipment Suction is set up for ALL patients (with osiel)  Red plugs utilized for all equipment (IV pumps, etc.)  WOWs wiped down at end of shift. Room stocked with oxygen, suction, and other unit-specific supplies   A  Alarms Bed alarm is set for fall risk patients  Ensure chair alarm is in place and activated if patient is up in a chair   L  Lines Check IV for any infiltration  Chandler bag is empty if patient has a Chandler   Tubing and IV bags are labeled   S  Safety   Room is clean, patient is clean, and equipment is clean. Hallways are clear from equipment besides carts. Fall bracelet on for fall risk patients  Ensure room is clear and free of clutter  Suction is set up for ALL patients (with osiel)  Hallways are clear from equipment besides carts.    Isolation precautions followed, supplies available outside room, sign posted     Brianda Baker RN

## 2022-10-01 NOTE — PROGRESS NOTES
Bedside shift change report given to Joao Mcconnell RN (oncoming nurse) by Lanie Schmitz RN (offgoing nurse). Report included the following information SBAR, Kardex, Intake/Output, MAR, and Cardiac Rhythm NSR .

## 2022-10-01 NOTE — PROGRESS NOTES
Problem: Diabetes Self-Management  Goal: *Disease process and treatment process  Description: Define diabetes and identify own type of diabetes; list 3 options for treating diabetes. Outcome: Progressing Towards Goal  Goal: *Incorporating nutritional management into lifestyle  Description: Describe effect of type, amount and timing of food on blood glucose; list 3 methods for planning meals. Outcome: Progressing Towards Goal  Goal: *Incorporating physical activity into lifestyle  Description: State effect of exercise on blood glucose levels. Outcome: Progressing Towards Goal  Goal: *Developing strategies to promote health/change behavior  Description: Define the ABC's of diabetes; identify appropriate screenings, schedule and personal plan for screenings. Outcome: Progressing Towards Goal  Goal: *Using medications safely  Description: State effect of diabetes medications on diabetes; name diabetes medication taking, action and side effects. Outcome: Progressing Towards Goal  Goal: *Monitoring blood glucose, interpreting and using results  Description: Identify recommended blood glucose targets  and personal targets. Outcome: Progressing Towards Goal  Goal: *Prevention, detection, treatment of acute complications  Description: List symptoms of hyper- and hypoglycemia; describe how to treat low blood sugar and actions for lowering  high blood glucose level. Outcome: Progressing Towards Goal  Goal: *Prevention, detection and treatment of chronic complications  Description: Define the natural course of diabetes and describe the relationship of blood glucose levels to long term complications of diabetes.   Outcome: Progressing Towards Goal  Goal: *Developing strategies to address psychosocial issues  Description: Describe feelings about living with diabetes; identify support needed and support network  Outcome: Progressing Towards Goal  Goal: *Insulin pump training  Outcome: Progressing Towards Goal  Goal: *Sick day guidelines  Outcome: Progressing Towards Goal  Goal: *Patient Specific Goal (EDIT GOAL, INSERT TEXT)  Outcome: Progressing Towards Goal     Problem: Patient Education: Go to Patient Education Activity  Goal: Patient/Family Education  Outcome: Progressing Towards Goal     Problem: Falls - Risk of  Goal: *Absence of Falls  Description: Document Ghada Litter Fall Risk and appropriate interventions in the flowsheet. Outcome: Progressing Towards Goal  Note: Fall Risk Interventions:  Mobility Interventions: Bed/chair exit alarm    Mentation Interventions: Bed/chair exit alarm    Medication Interventions: Bed/chair exit alarm, Evaluate medications/consider consulting pharmacy    Elimination Interventions: Bed/chair exit alarm, Call light in reach              Problem: Falls - Risk of  Goal: *Absence of Falls  Description: Document Ghada Litter Fall Risk and appropriate interventions in the flowsheet.   Outcome: Progressing Towards Goal  Note: Fall Risk Interventions:  Mobility Interventions: Bed/chair exit alarm    Mentation Interventions: Bed/chair exit alarm    Medication Interventions: Bed/chair exit alarm, Evaluate medications/consider consulting pharmacy    Elimination Interventions: Bed/chair exit alarm, Call light in reach              Problem: Patient Education: Go to Patient Education Activity  Goal: Patient/Family Education  Outcome: Progressing Towards Goal     Problem: Pain  Goal: *Control of Pain  Outcome: Progressing Towards Goal     Problem: Pain  Goal: *Control of Pain  Outcome: Progressing Towards Goal     Problem: Patient Education: Go to Patient Education Activity  Goal: Patient/Family Education  Outcome: Progressing Towards Goal

## 2022-10-02 ENCOUNTER — APPOINTMENT (OUTPATIENT)
Dept: GENERAL RADIOLOGY | Age: 72
DRG: 638 | End: 2022-10-02
Attending: HOSPITALIST
Payer: MEDICARE

## 2022-10-02 LAB
ANION GAP SERPL CALC-SCNC: 3 MMOL/L (ref 3–18)
BASOPHILS # BLD: 0 K/UL (ref 0–0.1)
BASOPHILS NFR BLD: 0 % (ref 0–2)
BUN SERPL-MCNC: 14 MG/DL (ref 7–18)
BUN/CREAT SERPL: 15 (ref 12–20)
CALCIUM SERPL-MCNC: 8.3 MG/DL (ref 8.5–10.1)
CHLORIDE SERPL-SCNC: 107 MMOL/L (ref 100–111)
CO2 SERPL-SCNC: 27 MMOL/L (ref 21–32)
CREAT SERPL-MCNC: 0.96 MG/DL (ref 0.6–1.3)
DIFFERENTIAL METHOD BLD: ABNORMAL
EOSINOPHIL # BLD: 0.1 K/UL (ref 0–0.4)
EOSINOPHIL NFR BLD: 3 % (ref 0–5)
ERYTHROCYTE [DISTWIDTH] IN BLOOD BY AUTOMATED COUNT: 15.3 % (ref 11.6–14.5)
GLUCOSE BLD STRIP.AUTO-MCNC: 109 MG/DL (ref 70–110)
GLUCOSE BLD STRIP.AUTO-MCNC: 148 MG/DL (ref 70–110)
GLUCOSE BLD STRIP.AUTO-MCNC: 154 MG/DL (ref 70–110)
GLUCOSE BLD STRIP.AUTO-MCNC: 299 MG/DL (ref 70–110)
GLUCOSE SERPL-MCNC: 78 MG/DL (ref 74–99)
HCT VFR BLD AUTO: 28.3 % (ref 35–45)
HGB BLD-MCNC: 8.7 G/DL (ref 12–16)
IMM GRANULOCYTES # BLD AUTO: 0 K/UL (ref 0–0.04)
IMM GRANULOCYTES NFR BLD AUTO: 1 % (ref 0–0.5)
LYMPHOCYTES # BLD: 1.8 K/UL (ref 0.9–3.6)
LYMPHOCYTES NFR BLD: 35 % (ref 21–52)
MAGNESIUM SERPL-MCNC: 1.5 MG/DL (ref 1.6–2.6)
MCH RBC QN AUTO: 24.7 PG (ref 24–34)
MCHC RBC AUTO-ENTMCNC: 30.7 G/DL (ref 31–37)
MCV RBC AUTO: 80.4 FL (ref 78–100)
MONOCYTES # BLD: 0.6 K/UL (ref 0.05–1.2)
MONOCYTES NFR BLD: 12 % (ref 3–10)
NEUTS SEG # BLD: 2.5 K/UL (ref 1.8–8)
NEUTS SEG NFR BLD: 49 % (ref 40–73)
NRBC # BLD: 0 K/UL (ref 0–0.01)
NRBC BLD-RTO: 0 PER 100 WBC
PHOSPHATE SERPL-MCNC: 2.3 MG/DL (ref 2.5–4.9)
PLATELET # BLD AUTO: 228 K/UL (ref 135–420)
PMV BLD AUTO: 11.3 FL (ref 9.2–11.8)
POTASSIUM SERPL-SCNC: 3.8 MMOL/L (ref 3.5–5.5)
PROCALCITONIN SERPL-MCNC: 2.17 NG/ML
RBC # BLD AUTO: 3.52 M/UL (ref 4.2–5.3)
SODIUM SERPL-SCNC: 137 MMOL/L (ref 136–145)
WBC # BLD AUTO: 5.1 K/UL (ref 4.6–13.2)

## 2022-10-02 PROCEDURE — 65660000004 HC RM CVT STEPDOWN

## 2022-10-02 PROCEDURE — 74011250637 HC RX REV CODE- 250/637: Performed by: HOSPITALIST

## 2022-10-02 PROCEDURE — 84145 PROCALCITONIN (PCT): CPT

## 2022-10-02 PROCEDURE — 74011000250 HC RX REV CODE- 250: Performed by: PHYSICIAN ASSISTANT

## 2022-10-02 PROCEDURE — 83735 ASSAY OF MAGNESIUM: CPT

## 2022-10-02 PROCEDURE — 99232 SBSQ HOSP IP/OBS MODERATE 35: CPT | Performed by: HOSPITALIST

## 2022-10-02 PROCEDURE — 84100 ASSAY OF PHOSPHORUS: CPT

## 2022-10-02 PROCEDURE — 36415 COLL VENOUS BLD VENIPUNCTURE: CPT

## 2022-10-02 PROCEDURE — 74011000258 HC RX REV CODE- 258: Performed by: HOSPITALIST

## 2022-10-02 PROCEDURE — 74011250636 HC RX REV CODE- 250/636: Performed by: HOSPITALIST

## 2022-10-02 PROCEDURE — 80048 BASIC METABOLIC PNL TOTAL CA: CPT

## 2022-10-02 PROCEDURE — 85025 COMPLETE CBC W/AUTO DIFF WBC: CPT

## 2022-10-02 PROCEDURE — 74011636637 HC RX REV CODE- 636/637: Performed by: HOSPITALIST

## 2022-10-02 PROCEDURE — 71045 X-RAY EXAM CHEST 1 VIEW: CPT

## 2022-10-02 PROCEDURE — 82962 GLUCOSE BLOOD TEST: CPT

## 2022-10-02 PROCEDURE — 97530 THERAPEUTIC ACTIVITIES: CPT

## 2022-10-02 PROCEDURE — 77030038269 HC DRN EXT URIN PURWCK BARD -A

## 2022-10-02 PROCEDURE — 74011250637 HC RX REV CODE- 250/637: Performed by: PHYSICIAN ASSISTANT

## 2022-10-02 PROCEDURE — 97110 THERAPEUTIC EXERCISES: CPT

## 2022-10-02 RX ORDER — LANOLIN ALCOHOL/MO/W.PET/CERES
1 CREAM (GRAM) TOPICAL
Status: DISCONTINUED | OUTPATIENT
Start: 2022-10-03 | End: 2022-10-03 | Stop reason: HOSPADM

## 2022-10-02 RX ORDER — LANOLIN ALCOHOL/MO/W.PET/CERES
400 CREAM (GRAM) TOPICAL 2 TIMES DAILY
Status: DISCONTINUED | OUTPATIENT
Start: 2022-10-02 | End: 2022-10-03 | Stop reason: HOSPADM

## 2022-10-02 RX ORDER — SODIUM,POTASSIUM PHOSPHATES 280-250MG
2 POWDER IN PACKET (EA) ORAL 3 TIMES DAILY
Status: DISCONTINUED | OUTPATIENT
Start: 2022-10-02 | End: 2022-10-03 | Stop reason: HOSPADM

## 2022-10-02 RX ORDER — GUAIFENESIN 600 MG/1
600 TABLET, EXTENDED RELEASE ORAL EVERY 12 HOURS
Status: DISCONTINUED | OUTPATIENT
Start: 2022-10-02 | End: 2022-10-03 | Stop reason: HOSPADM

## 2022-10-02 RX ORDER — POLYETHYLENE GLYCOL 3350 17 G/17G
17 POWDER, FOR SOLUTION ORAL DAILY
Status: DISCONTINUED | OUTPATIENT
Start: 2022-10-02 | End: 2022-10-03 | Stop reason: HOSPADM

## 2022-10-02 RX ORDER — DOCUSATE SODIUM 100 MG/1
100 CAPSULE, LIQUID FILLED ORAL
Status: DISCONTINUED | OUTPATIENT
Start: 2022-10-03 | End: 2022-10-03 | Stop reason: HOSPADM

## 2022-10-02 RX ADMIN — SODIUM CHLORIDE, PRESERVATIVE FREE 10 ML: 5 INJECTION INTRAVENOUS at 21:30

## 2022-10-02 RX ADMIN — Medication 2 UNITS: at 21:30

## 2022-10-02 RX ADMIN — POTASSIUM & SODIUM PHOSPHATES POWDER PACK 280-160-250 MG 2 PACKET: 280-160-250 PACK at 15:50

## 2022-10-02 RX ADMIN — LEVOTHYROXINE SODIUM 150 MCG: 150 TABLET ORAL at 05:04

## 2022-10-02 RX ADMIN — SODIUM CHLORIDE, PRESERVATIVE FREE 10 ML: 5 INJECTION INTRAVENOUS at 07:49

## 2022-10-02 RX ADMIN — Medication 6 UNITS: at 16:56

## 2022-10-02 RX ADMIN — ATORVASTATIN CALCIUM 20 MG: 20 TABLET, FILM COATED ORAL at 21:30

## 2022-10-02 RX ADMIN — Medication 400 MG: at 18:00

## 2022-10-02 RX ADMIN — POLYETHYLENE GLYCOL 3350 17 G: 17 POWDER, FOR SOLUTION ORAL at 12:25

## 2022-10-02 RX ADMIN — GUAIFENESIN 600 MG: 600 TABLET, EXTENDED RELEASE ORAL at 21:30

## 2022-10-02 RX ADMIN — Medication 24 UNITS: at 16:55

## 2022-10-02 RX ADMIN — AMLODIPINE BESYLATE 5 MG: 5 TABLET ORAL at 09:05

## 2022-10-02 RX ADMIN — ASPIRIN 81 MG: 81 TABLET, COATED ORAL at 09:04

## 2022-10-02 RX ADMIN — CARVEDILOL 6.25 MG: 6.25 TABLET, FILM COATED ORAL at 16:00

## 2022-10-02 RX ADMIN — GABAPENTIN 300 MG: 300 CAPSULE ORAL at 09:04

## 2022-10-02 RX ADMIN — ENOXAPARIN SODIUM 40 MG: 40 INJECTION SUBCUTANEOUS at 09:04

## 2022-10-02 RX ADMIN — Medication 400 MG: at 09:06

## 2022-10-02 RX ADMIN — MAGNESIUM SULFATE HEPTAHYDRATE 3 G: 500 INJECTION, SOLUTION INTRAMUSCULAR; INTRAVENOUS at 12:32

## 2022-10-02 RX ADMIN — POTASSIUM & SODIUM PHOSPHATES POWDER PACK 280-160-250 MG 2 PACKET: 280-160-250 PACK at 09:04

## 2022-10-02 RX ADMIN — PANTOPRAZOLE SODIUM 40 MG: 40 TABLET, DELAYED RELEASE ORAL at 16:57

## 2022-10-02 RX ADMIN — PANTOPRAZOLE SODIUM 40 MG: 40 TABLET, DELAYED RELEASE ORAL at 09:09

## 2022-10-02 RX ADMIN — SODIUM CHLORIDE, PRESERVATIVE FREE 10 ML: 5 INJECTION INTRAVENOUS at 14:23

## 2022-10-02 RX ADMIN — POTASSIUM & SODIUM PHOSPHATES POWDER PACK 280-160-250 MG 2 PACKET: 280-160-250 PACK at 21:30

## 2022-10-02 RX ADMIN — IRON SUCROSE 200 MG: 20 INJECTION, SOLUTION INTRAVENOUS at 12:25

## 2022-10-02 RX ADMIN — CARVEDILOL 6.25 MG: 6.25 TABLET, FILM COATED ORAL at 08:00

## 2022-10-02 RX ADMIN — GUAIFENESIN 600 MG: 600 TABLET, EXTENDED RELEASE ORAL at 12:23

## 2022-10-02 NOTE — PROGRESS NOTES
Problem: Self Care Deficits Care Plan (Adult)  Goal: *Acute Goals and Plan of Care (Insert Text)  Description: Occupational Therapy Goals  Initiated 9/29/2022 within 7 day(s). 1.  Patient will perform self-feeding and grooming with modified independence. 2.  Patient will perform bathing with modified independence. 3.  Patient will perform upper body dressing and lower body dressing with modified independence. 4.  Patient will perform toilet transfers with modified independence using RW with good balance. 5.  Patient will perform all aspects of toileting with modified independence. 6.  Patient will participate in upper extremity therapeutic exercise/activities with modified independence for 8 minutes. 7.  Patient will utilize energy conservation techniques during functional activities with min verbal cues. Prior Level of Function: Mod I with ADLs and functional mobility using SPC     Outcome: Progressing Towards Goal       OCCUPATIONAL THERAPY TREATMENT    Patient: Ashley Black (51 y.o. female)  Date: 10/2/2022  Diagnosis: Hyperglycemia [S91.3]  Metabolic acidosis [X87.98]  DKA, type 2 ([address] [E11.10] DKA, type 2 ([address]    Precautions: Fall      Chart, occupational therapy assessment, plan of care, and goals were reviewed. ASSESSMENT:  Patient cleared to participate in OT treatment by RN. Upon entering the room, the patient was supine in bed, alert, and agreeable to participate in OT session. Patient stand by assist for supine > sit EOB and contact guard assist for donning R sock. Patient demos good balance EOB for BUE exercises and able to demonstrate HEP issued prior session with supervision. Patient issued a red theraband this session. Patient was able to perform 5 repetitions of bilateral shoulder horizontal abduction and bilateral shoulder flexion x 5 repetitions with rest breaks prn to increase BUE strength, in preparation for ADLs.  Patient stood this session x 2 trials with contact guard assist, LOB upon initial trial. Patient with concerns of missing green/red cane that was on vitals machine earlier this date. Informed RN Ivan Adams) that her pt in room 62 has 2 canes (colorful red and solid black) via observation and to ensure both canes belonged to patient just in case. Patient encouraged to participate in OOB activity throughout the day with staff members and sitting up in chair at least 3x/day to maximize PLOF. Patient left in bed this session with all needs met and call bell in reach. Progression toward goals:  [x]          Improving appropriately and progressing toward goals  []          Improving slowly and progressing toward goals  []          Not making progress toward goals and plan of care will be adjusted     PLAN:  Patient continues to benefit from skilled intervention to address the above impairments. Continue treatment per established plan of care. Further Equipment Recommendations for Discharge: rolling walker and shower chair      AMPAC: Based on an AM-PAC score of 16/24 and their current ADL deficits; it is recommended that the patient have 5-7 sessions per week of Occupational Therapy at d/c to increase the patient's independence. Currently, this patient demonstrates the potential endurance, and/or tolerance for 3 hours of therapy each day at d/c. This AMPAC score should be considered in conjunction with interdisciplinary team recommendations to determine the most appropriate discharge setting. Patient's social support, diagnosis, medical stability, and prior level of function should also be taken into consideration.      SUBJECTIVE:   Patient stated I dont want to do much without my cane    OBJECTIVE DATA SUMMARY:   Cognitive/Behavioral Status:  Neurologic State: Alert  Orientation Level: Oriented X4  Cognition: Follows commands  Safety/Judgement: Fall prevention    Functional Mobility and Transfers for ADLs:   Bed Mobility:  Supine to Sit: Stand-by assistance  Sit to Supine: Stand-by assistance  Scooting: Stand-by assistance     Transfers:  Sit to Stand: Contact guard assistance (x 2 trials as pt sat abruptly upon first stand after standing upright for < 4 seconds)  Stand to Sit: Contact guard assistance    Balance:  Sitting: Intact  Standing: Impaired  Standing - Static: Fair  Standing - Dynamic : Fair    ADL Intervention:  Lower Body Dressing Assistance  Dressing Assistance: Contact guard assistance  Socks: Contact guard assistance (RLE only)  Position Performed: Seated edge of bed      Cognitive Retraining  Safety/Judgement: Fall prevention      Pain:  Pain level pre-treatment: 0/10   Pain level post-treatment: 0/10  Pain Intervention(s): Medication (see MAR); Rest, Ice, Repositioning   Response to intervention: Nurse notified, See doc flow    Activity Tolerance:    Fair      Please refer to the flowsheet for vital signs taken during this treatment. After treatment:   []  Patient left in no apparent distress sitting up in chair  [x]  Patient left in no apparent distress in bed  [x]  Call bell left within reach  [x]  Nursing notified  []  Caregiver present  [x]  Bed alarm activated    COMMUNICATION/EDUCATION:   [x] Role of Occupational Therapy in the acute care setting  [x] Home safety education was provided and the patient/caregiver indicated understanding. [x] Patient/family have participated as able in working towards goals and plan of care. [x] Patient/family agree to work toward stated goals and plan of care. [] Patient understands intent and goals of therapy, but is neutral about his/her participation. [] Patient is unable to participate in goal setting and plan of care.       Thank you for this referral.  Oneyda Price OTR/L  Time Calculation: 12 mins    [facility] [address] [address] AM-PAC® Daily Activity Inpatient Short Form (6-Clicks)*    How much HELP from another person does the patient currently need    (If the patient hasn't done an activity recently, how much help from another person do you think he/she would need if he/she tried?)   Total (Total A or Dep)   A Lot  (Mod to Max A)   A Little (Sup or Min A)   None (Mod I to I)   Putting on and taking off regular lower body clothing? [] 1 [x] 2 [] 3 [] 4   2. Bathing (including washing, rinsing,      drying)? [] 1 [x] 2 [] 3 [] 4   3. Toileting, which includes using toilet, bedpan or urinal?   [] 1 [] 2 [x] 3 [] 4   4. Putting on and taking off regular upper body clothing? [] 1 [] 2 [x] 3 [] 4   5. Taking care of personal grooming such as brushing teeth? [] 1 [] 2 [x] 3 [] 4   6. Eating meals?    [] 1 [] 2 [x] 3 [] 4

## 2022-10-02 NOTE — PROGRESS NOTES
Problem: Mobility Impaired (Adult and Pediatric)  Goal: *Acute Goals and Plan of Care (Insert Text)  Description: Physical Therapy Goals  Initiated 9/29/2022 and to be accomplished within 7 day(s)  1. Patient will move from supine to sit and sit to supine , scoot up and down, and roll side to side in bed with modified independence. 2.  Patient will transfer from bed to chair and chair to bed with modified independence using the least restrictive device. 3.  Patient will perform sit to stand with modified independence. 4.  Patient will ambulate with modified independence for 100 feet with the least restrictive device. 5.  Patient will ascend/descend 12 stairs with unilateral handrail(s) with supervision/set-up. PLOF: Pt reporting she lives alone in 2 story house, reporting she did have a daughter living with her but she passed away this week. Pt has SPC, BSC and SC. Anyi with SPC, has other family in the area. Outcome: Progressing Towards Goal   PHYSICAL THERAPY TREATMENT    Patient: Kristina Kate (47 y.o. female)  Date: 10/2/2022  Diagnosis: Hyperglycemia [T67.8]  Metabolic acidosis [B25.58]  DKA, type 2 ([address] [E11.10] DKA, type 2 ([address]      Precautions: Fall    ASSESSMENT:  Patient presented to be lying down resting upon entering room. Patient agreed with discussed  planned therapy session this date. Patient was able to complete dressing with disposable underwear in room and was able to independently raise self up to sitting to edge of bed. Patient was then trained with sit to stand with supervision assistance with weight shifting lateral and anterior/posterior to improve standing balance/tolerance. Patient was also able to complete transfer training to bedside commode with VC to reach furthest hand rest to efficiently and safely perform transfer, patient was able to complete with supervision x3.  Patient was also able to complete standing exercises that included partial marches and squats x 3 sets to improve LE strength. Patient will continue to benefit from skilled physical therapy. Progression toward goals:  [x]      Improving appropriately and progressing toward goals  []      Improving slowly and progressing toward goals  []      Not making progress toward goals and plan of care will be adjusted     PLAN:  Patient continues to benefit from skilled intervention to address the above impairments. Continue treatment per established plan of care. Further Equipment Recommendations for Discharge:  N/A    AMPAC: At this time and based on an AM-PAC score of 20/24, no further PT is recommended upon discharge due to (i.e. patient at baseline functional statusetc). Recommend patient returns to prior setting with prior services. This AMPA score should be considered in conjunction with interdisciplinary team recommendations to determine the most appropriate discharge setting. Patient's social support, diagnosis, medical stability, and prior level of function should also be taken into consideration. SUBJECTIVE:   Patient stated Kyle Apa today.     OBJECTIVE DATA SUMMARY:   Critical Behavior:  Neurologic State: Alert  Orientation Level: Oriented X4  Cognition: Follows commands  Safety/Judgement: Fall prevention  Functional Mobility Training:  Bed Mobility:  Supine to Sit: Independent  Sit to Supine: Independent  Scooting: Independent    Transfers:  Sit to Stand: Supervision  Stand to Sit: Supervision  Bed to Chair: Supervision    Balance:  Sitting: Intact  Standing: Intact  Standing - Static: Good  Standing - Dynamic : Good     Therapeutic Exercises:     EXERCISE   Sets   Reps   Active Active Assist   Passive Self ROM   Comments   Ankle Pumps    [] [] [] []    Quad Sets/Glut Sets    [] [] [] [] Hold for 5 secs   Hamstring Sets   [] [] [] []    Short Arc Quads   [] [] [] []    Heel Slides   [] [] [] []    Straight Leg Raises   [] [] [] []    Hip Add   [] [] [] [] Hold for 5 secs, w/ pillow squeeze   Long Arc Quads   [] [] [] []    Seated Marching   [] [] [] []    Standing Marching 3 10 [x] [] [] []    Partial Squats 3 10 [x] [] [] []      Activity Tolerance:   Please refer to the flowsheet for vital signs taken during this treatment. After treatment:   [] Patient left in no apparent distress sitting up in chair  [x] Patient left in no apparent distress in bed  [x] Call bell left within reach  [x] Nursing notified  [] Caregiver present  [] Bed alarm activated  [] SCDs applied      COMMUNICATION/EDUCATION:   [x]         Role of Physical Therapy in the acute care setting. [x]         Fall prevention education was provided and the patient/caregiver indicated understanding. [x]         Patient/family have participated as able in working toward goals and plan of care. []         Patient/family agree to work toward stated goals and plan of care. []         Patient understands intent and goals of therapy, but is neutral about his/her participation. []         Patient is unable to participate in stated goals/plan of care: ongoing with therapy staff.  []         Other:        Kelley Priest, PT   Time Calculation: 25 mins    [facility] AM-PAC® Basic Mobility Inpatient Short Form (6-Clicks) Version 2    How much HELP from another person does the patient currently need    (If the patient hasn't done an activity recently, how much help from another person do you think he/she would need if he/she tried?)   Total (Total A or Dep)   A Lot  (Mod to Max A)   A Little (Sup or Min A)   None (Mod I to I)   Turning from your back to your side while in a flat bed without using bedrails? [] 1 [] 2 [] 3 [x] 4   2. Moving from lying on your back to sitting on the side of a flat bed without using bedrails? [] 1 [] 2 [] 3 [x] 4   3. Moving to and from a bed to a chair (including a wheelchair)? [] 1 [] 2 [x] 3 [] 4   4. Standing up from a chair using your arms (e.g., wheelchair, or bedside chair)? [] 1 [] 2 [x] 3 [] 4   5. Walking in hospital room? [] 1 [] 2 [x] 3 [] 4   6. Climbing 3-5 steps with a railing?+   [] 1 [] 2 [x] 3 [] 4   +If stair climbing cannot be assessed, skip item #6. Sum responses from items 1-5. At this time and based on an AM-PAC score of 20/24, no further PT is recommended upon discharge due to (i.e. patient at baseline functional statusetc). Recommend patient returns to prior setting with prior services.

## 2022-10-02 NOTE — PROGRESS NOTES
[facility]  [facility]    Patient: Meir Pino Age: 74 y.o. : 1950 MR#: [id number] SSN: [id number]  Date/Time: 10/2/2022    Subjective:     WBC improved to 5.1. Mag 1.5. K 3.8. mag 1.5. Seen and examined at bedside, cough is worsening, she is now congested and cannot expectorate. No BM since Monday. Belching. No chest pain or shortness of breath. No family at bedside. Assessment/Plan:     1. DKA resolved. Transitioned to Lantus, sliding scale insulin. 2. Anemia normocytic normochromic  3. Leukocytosis, likely reactive, improved. 4. Hypophosphatemia, hypomagnesemia replete as needed. 5. Iron def anemia. Venofer, then po supplements. Outpatient screening colonoscopy. 6. Grief reaction. Daughter recently . 7.  Diabetes type 2 with hyperglycemia. Diabetes educator consulted  8. High anion gap metabolic acidosis. Resolving. 9.  Hyponatremia, hypovolemic as well as pseudohyponatremia in the setting of elevated blood glucose. Resolved. 10.  Ambulatory dysfunction. 11.   blood cx flagged +. ID consult. 12. Constipation. Bowel regimen. 13. Acute bronchitis, likely viral. Sputum cx. Mucinex. RT for bronchial hygiene protocol. Ics. Monitor off abx. 14. DVT prophylaxis  15. Full code. SNF when ready.        Additional Notes:     Case discussed with:  [x]Patient  []Family  [x]Nursing  [x]Case Management  DVT Prophylaxis:  [x]Lovenox  []Hep SQ  []SCDs  []Coumadin   []On Heparin gtt    Objective:   VS: Visit Vitals  BP (!) 158/86   Pulse 86   Temp 98.1 °F (36.7 °C)   Resp 19   Ht 5' 6\" (1.676 m)   Wt 69.9 kg (154 lb 3.2 oz)   SpO2 98%   BMI 24.89 kg/m²      Tmax/24hrs: Temp (24hrs), Av.3 °F (36.8 °C), Min:98.1 °F (36.7 °C), Max:98.6 °F (37 °C)    Input/Output:   Intake/Output Summary (Last 24 hours) at 10/2/2022 1105  Last data filed at 10/2/2022 0505  Gross per 24 hour   Intake 200 ml   Output --   Net 200 ml       General: Awake alert and oriented x4. Found sitting up in bed watching TV, speaking full sentences on room air. Heent: ncat. Perrl. Oropharynx clear. Cardiovascular:  RRR  Pulmonary:  cta b.l  GI:  soft nd nt nabs  Extremities: No edema  Neuro: No focal deficit  Skin: no rash to visible skin. No wounds. Labs:    Recent Results (from the past 24 hour(s))   GLUCOSE, POC    Collection Time: 10/01/22 11:55 AM   Result Value Ref Range    Glucose (POC) 191 (H) 70 - 110 mg/dL   GLUCOSE, POC    Collection Time: 10/01/22  4:58 PM   Result Value Ref Range    Glucose (POC) 241 (H) 70 - 110 mg/dL   GLUCOSE, POC    Collection Time: 10/01/22  9:58 PM   Result Value Ref Range    Glucose (POC) 169 (H) 70 - 110 mg/dL   CBC WITH AUTOMATED DIFF    Collection Time: 10/02/22  1:20 AM   Result Value Ref Range    WBC 5.1 4.6 - 13.2 K/uL    RBC 3.52 (L) 4.20 - 5.30 M/uL    HGB 8.7 (L) 12.0 - 16.0 g/dL    HCT 28.3 (L) 35.0 - 45.0 %    MCV 80.4 78.0 - 100.0 FL    MCH 24.7 24.0 - 34.0 PG    MCHC 30.7 (L) 31.0 - 37.0 g/dL    RDW 15.3 (H) 11.6 - 14.5 %    PLATELET 493 818 - 563 K/uL    MPV 11.3 9.2 - 11.8 FL    NRBC 0.0 0  WBC    ABSOLUTE NRBC 0.00 0.00 - 0.01 K/uL    NEUTROPHILS 49 40 - 73 %    LYMPHOCYTES 35 21 - 52 %    MONOCYTES 12 (H) 3 - 10 %    EOSINOPHILS 3 0 - 5 %    BASOPHILS 0 0 - 2 %    IMMATURE GRANULOCYTES 1 (H) 0.0 - 0.5 %    ABS. NEUTROPHILS 2.5 1.8 - 8.0 K/UL    ABS. LYMPHOCYTES 1.8 0.9 - 3.6 K/UL    ABS. MONOCYTES 0.6 0.05 - 1.2 K/UL    ABS. EOSINOPHILS 0.1 0.0 - 0.4 K/UL    ABS. BASOPHILS 0.0 0.0 - 0.1 K/UL    ABS. IMM.  GRANS. 0.0 0.00 - 0.04 K/UL    DF AUTOMATED     MAGNESIUM    Collection Time: 10/02/22  1:20 AM   Result Value Ref Range    Magnesium 1.5 (L) 1.6 - 2.6 mg/dL   METABOLIC PANEL, BASIC    Collection Time: 10/02/22  1:20 AM   Result Value Ref Range    Sodium 137 136 - 145 mmol/L    Potassium 3.8 3.5 - 5.5 mmol/L    Chloride 107 100 - 111 mmol/L    CO2 27 21 - 32 mmol/L    Anion gap 3 3.0 - 18 mmol/L Glucose 78 74 - 99 mg/dL    BUN 14 7.0 - 18 MG/DL    Creatinine 0.96 0.6 - 1.3 MG/DL    BUN/Creatinine ratio 15 12 - 20      GFR est AA >60 >60 ml/min/1.73m2    GFR est non-AA 57 (L) >60 ml/min/1.73m2    Calcium 8.3 (L) 8.5 - 10.1 MG/DL   PHOSPHORUS    Collection Time: 10/02/22  1:20 AM   Result Value Ref Range    Phosphorus 2.3 (L) 2.5 - 4.9 MG/DL   PROCALCITONIN    Collection Time: 10/02/22  1:20 AM   Result Value Ref Range    Procalcitonin 2.17 ng/mL   GLUCOSE, POC    Collection Time: 10/02/22  8:53 AM   Result Value Ref Range    Glucose (POC) 109 70 - 110 mg/dL     Additional Data Reviewed:      Signed By: Cherry Cisneros MD     October 2, 2022 9:14 AM

## 2022-10-02 NOTE — PROGRESS NOTES
Problem: Falls - Risk of  Goal: *Absence of Falls  Description: Document Dawood Liu Fall Risk and appropriate interventions in the flowsheet. Outcome: Progressing Towards Goal  Note: Fall Risk Interventions:  Mobility Interventions: Assess mobility with egress test, Bed/chair exit alarm, OT consult for ADLs, PT Consult for assist device competence, PT Consult for mobility concerns, Patient to call before getting OOB, Strengthening exercises (ROM-active/passive), Utilize walker, cane, or other assistive device    Mentation Interventions: Bed/chair exit alarm, Adequate sleep, hydration, pain control, Door open when patient unattended, Toileting rounds    Medication Interventions: Assess postural VS orthostatic hypotension, Bed/chair exit alarm, Evaluate medications/consider consulting pharmacy, Patient to call before getting OOB    Elimination Interventions: Bed/chair exit alarm, Call light in reach, Elevated toilet seat, Patient to call for help with toileting needs, Stay With Me (per policy), Toilet paper/wipes in reach, Toileting schedule/hourly rounds              Problem: Diabetes Self-Management  Goal: *Developing strategies to promote health/change behavior  Description: Define the ABC's of diabetes; identify appropriate screenings, schedule and personal plan for screenings. Outcome: Progressing Towards Goal     Problem: Diabetes Self-Management  Goal: *Using medications safely  Description: State effect of diabetes medications on diabetes; name diabetes medication taking, action and side effects. Outcome: Progressing Towards Goal     Problem: Diabetes Self-Management  Goal: *Monitoring blood glucose, interpreting and using results  Description: Identify recommended blood glucose targets  and personal targets.   Outcome: Progressing Towards Goal     Problem: Diabetes Self-Management  Goal: *Prevention, detection, treatment of acute complications  Description: List symptoms of hyper- and hypoglycemia; describe how to treat low blood sugar and actions for lowering  high blood glucose level.   Outcome: Progressing Towards Goal     Problem: Hypertension  Goal: *Blood pressure within specified parameters  Outcome: Progressing Towards Goal

## 2022-10-02 NOTE — PROGRESS NOTES
Problem: Diabetes Self-Management  Goal: *Disease process and treatment process  Description: Define diabetes and identify own type of diabetes; list 3 options for treating diabetes. Outcome: Progressing Towards Goal  Goal: *Incorporating nutritional management into lifestyle  Description: Describe effect of type, amount and timing of food on blood glucose; list 3 methods for planning meals. Outcome: Progressing Towards Goal  Goal: *Incorporating physical activity into lifestyle  Description: State effect of exercise on blood glucose levels. Outcome: Progressing Towards Goal  Goal: *Developing strategies to promote health/change behavior  Description: Define the ABC's of diabetes; identify appropriate screenings, schedule and personal plan for screenings. Outcome: Progressing Towards Goal  Goal: *Using medications safely  Description: State effect of diabetes medications on diabetes; name diabetes medication taking, action and side effects. Outcome: Progressing Towards Goal  Goal: *Monitoring blood glucose, interpreting and using results  Description: Identify recommended blood glucose targets  and personal targets. Outcome: Progressing Towards Goal  Goal: *Prevention, detection, treatment of acute complications  Description: List symptoms of hyper- and hypoglycemia; describe how to treat low blood sugar and actions for lowering  high blood glucose level. Outcome: Progressing Towards Goal  Goal: *Prevention, detection and treatment of chronic complications  Description: Define the natural course of diabetes and describe the relationship of blood glucose levels to long term complications of diabetes.   Outcome: Progressing Towards Goal  Goal: *Developing strategies to address psychosocial issues  Description: Describe feelings about living with diabetes; identify support needed and support network  Outcome: Progressing Towards Goal  Goal: *Insulin pump training  Outcome: Progressing Towards Goal  Goal: *Sick day guidelines  Outcome: Progressing Towards Goal  Goal: *Patient Specific Goal (EDIT GOAL, INSERT TEXT)  Outcome: Progressing Towards Goal     Problem: Patient Education: Go to Patient Education Activity  Goal: Patient/Family Education  Outcome: Progressing Towards Goal     Problem: Falls - Risk of  Goal: *Absence of Falls  Description: Document Lynne Vick Fall Risk and appropriate interventions in the flowsheet.   Outcome: Progressing Towards Goal  Note: Fall Risk Interventions:  Mobility Interventions: Assess mobility with egress test, Bed/chair exit alarm, Communicate number of staff needed for ambulation/transfer, OT consult for ADLs, Patient to call before getting OOB, PT Consult for mobility concerns, PT Consult for assist device competence, Strengthening exercises (ROM-active/passive), Utilize walker, cane, or other assistive device    Mentation Interventions: Adequate sleep, hydration, pain control, Bed/chair exit alarm, Door open when patient unattended, Evaluate medications/consider consulting pharmacy, Increase mobility, Toileting rounds, Update white board    Medication Interventions: Assess postural VS orthostatic hypotension, Bed/chair exit alarm, Evaluate medications/consider consulting pharmacy, Patient to call before getting OOB    Elimination Interventions: Bed/chair exit alarm, Call light in reach, Elevated toilet seat, Patient to call for help with toileting needs, Stay With Me (per policy), Toilet paper/wipes in reach, Toileting schedule/hourly rounds              Problem: Patient Education: Go to Patient Education Activity  Goal: Patient/Family Education  Outcome: Progressing Towards Goal     Problem: Pain  Goal: *Control of Pain  Outcome: Progressing Towards Goal     Problem: Patient Education: Go to Patient Education Activity  Goal: Patient/Family Education  Outcome: Progressing Towards Goal     Problem: Patient Education: Go to Patient Education Activity  Goal: Patient/Family Education  Outcome: Progressing Towards Goal     Problem: Patient Education: Go to Patient Education Activity  Goal: Patient/Family Education  Outcome: Progressing Towards Goal     Problem: Patient Education: Go to Patient Education Activity  Goal: Patient/Family Education  Outcome: Progressing Towards Goal     Problem: DKA: Discharge Outcomes  Goal: *Ambulates and performs ADL's  Outcome: Progressing Towards Goal  Goal: *Describes follow-up/return visits to physicians, diabetes treatment coordinator and other resources  Outcome: Progressing Towards Goal  Goal: *Blood glucose at patient's target range  Outcome: Progressing Towards Goal  Goal: *Acidosis resolved  Outcome: Progressing Towards Goal  Goal: *Tolerating diet  Outcome: Progressing Towards Goal  Goal: *Verbalizes understanding and describes prescribed diet  Outcome: Progressing Towards Goal  Goal: *Describes blood glucose goals, monitoring, sick day rules, hypo/hyperglycemia  Outcome: Progressing Towards Goal  Goal: *Describes available resources and support systems  Outcome: Progressing Towards Goal  Goal: *Verbalizes name, dosage, time, side effects, and number of days to continue medications  Outcome: Progressing Towards Goal  Goal: *Demonstrates ability to self-administer insulin  Outcome: Progressing Towards Goal     Problem: Anemia Care Plan (Adult and Pediatric)  Goal: *Labs within defined limits  Outcome: Progressing Towards Goal  Goal: *Tolerates increased activity  Outcome: Progressing Towards Goal     Problem: Patient Education: Go to Patient Education Activity  Goal: Patient/Family Education  Outcome: Progressing Towards Goal     Problem: Hypertension  Goal: *Blood pressure within specified parameters  Outcome: Progressing Towards Goal  Goal: *Fluid volume balance  Outcome: Progressing Towards Goal  Goal: *Labs within defined limits  Outcome: Progressing Towards Goal     Problem: Patient Education: Go to Patient Education Activity  Goal: Patient/Family Education  Outcome: Progressing Towards Goal

## 2022-10-02 NOTE — CONSULTS
Consult received. Chart reviewed. Single positive blood cx on 9/28- ? Contaminant/lab error  Afebrile. Hemodynamically stable off abx. Improved leukocytosis. Rec  Agree with holding abx  Will d/w lab about blood cx results and finalize abx plan in am  Thanks.

## 2022-10-02 NOTE — PROGRESS NOTES
0715: Bedside shift change report given to Josette Mack (oncoming nurse) by Lotus Israel RN (offgoing nurse). Report included the following information SBAR, Kardex, Intake/Output, MAR, and Cardiac Rhythm NSR .      0715: Wound Prevention Checklist    Patient: Thien Connell (89 y.o. female)  Date: 10/2/2022  Diagnosis: Hyperglycemia [W52.8]  Metabolic acidosis [M90.07]  DKA, type 2 ([address] [E11.10] DKA, type 2 ([address]    Precautions: Fall       []  Heel prevention boots placed on patient    [x]  Patient turned q2h during shift    []  Lift team ordered    []  Patient on Diane bed/Specialty bed    []  Each Wound is documented during shift (Stage, Color, drainage, odor, measurements, and dressings)    []  Dual skin check done with Khadar Allen RN     8113: Bedside shift change report given to Maggie Romeo RN (oncoming nurse) by Shalini Noel RN (offgoing nurse). Report included the following information SBAR, Kardex, Intake/Output, MAR, and Cardiac Rhythm NSR .

## 2022-10-03 VITALS
HEART RATE: 83 BPM | WEIGHT: 154.2 LBS | RESPIRATION RATE: 20 BRPM | SYSTOLIC BLOOD PRESSURE: 126 MMHG | DIASTOLIC BLOOD PRESSURE: 71 MMHG | BODY MASS INDEX: 24.78 KG/M2 | HEIGHT: 66 IN | OXYGEN SATURATION: 99 % | TEMPERATURE: 97.3 F

## 2022-10-03 LAB
GLUCOSE BLD STRIP.AUTO-MCNC: 140 MG/DL (ref 70–110)
GLUCOSE BLD STRIP.AUTO-MCNC: 268 MG/DL (ref 70–110)
GLUCOSE BLD STRIP.AUTO-MCNC: 289 MG/DL (ref 70–110)
GLUCOSE BLD STRIP.AUTO-MCNC: 54 MG/DL (ref 70–110)
GLUCOSE BLD STRIP.AUTO-MCNC: 58 MG/DL (ref 70–110)
GLUCOSE BLD STRIP.AUTO-MCNC: 77 MG/DL (ref 70–110)

## 2022-10-03 PROCEDURE — 74011250636 HC RX REV CODE- 250/636: Performed by: HOSPITALIST

## 2022-10-03 PROCEDURE — 74011000250 HC RX REV CODE- 250: Performed by: PHYSICIAN ASSISTANT

## 2022-10-03 PROCEDURE — 94762 N-INVAS EAR/PLS OXIMTRY CONT: CPT

## 2022-10-03 PROCEDURE — 99239 HOSP IP/OBS DSCHRG MGMT >30: CPT | Performed by: HOSPITALIST

## 2022-10-03 PROCEDURE — 2709999900 HC NON-CHARGEABLE SUPPLY

## 2022-10-03 PROCEDURE — 74011636637 HC RX REV CODE- 636/637: Performed by: HOSPITALIST

## 2022-10-03 PROCEDURE — 74011250637 HC RX REV CODE- 250/637: Performed by: PHYSICIAN ASSISTANT

## 2022-10-03 PROCEDURE — 74011250637 HC RX REV CODE- 250/637: Performed by: HOSPITALIST

## 2022-10-03 PROCEDURE — 82962 GLUCOSE BLOOD TEST: CPT

## 2022-10-03 RX ORDER — LANOLIN ALCOHOL/MO/W.PET/CERES
325 CREAM (GRAM) TOPICAL
Qty: 30 TABLET | Refills: 0 | Status: SHIPPED
Start: 2022-10-04

## 2022-10-03 RX ORDER — DEXTROSE MONOHYDRATE 100 MG/ML
0-250 INJECTION, SOLUTION INTRAVENOUS AS NEEDED
Status: DISCONTINUED | OUTPATIENT
Start: 2022-10-03 | End: 2022-10-03 | Stop reason: HOSPADM

## 2022-10-03 RX ORDER — LANOLIN ALCOHOL/MO/W.PET/CERES
400 CREAM (GRAM) TOPICAL 2 TIMES DAILY
Qty: 14 TABLET | Refills: 0 | Status: SHIPPED
Start: 2022-10-03 | End: 2022-10-10

## 2022-10-03 RX ORDER — SODIUM,POTASSIUM PHOSPHATES 280-250MG
2 POWDER IN PACKET (EA) ORAL 3 TIMES DAILY
Qty: 6 PACKET | Refills: 0 | Status: SHIPPED
Start: 2022-10-03 | End: 2022-10-04

## 2022-10-03 RX ORDER — INSULIN GLARGINE 100 [IU]/ML
15 INJECTION, SOLUTION SUBCUTANEOUS
Status: DISCONTINUED | OUTPATIENT
Start: 2022-10-03 | End: 2022-10-03 | Stop reason: HOSPADM

## 2022-10-03 RX ORDER — GABAPENTIN 300 MG/1
300 CAPSULE ORAL DAILY
Qty: 30 CAPSULE | Refills: 0 | Status: SHIPPED | OUTPATIENT
Start: 2022-10-03

## 2022-10-03 RX ORDER — POLYETHYLENE GLYCOL 3350 17 G/17G
17 POWDER, FOR SOLUTION ORAL
Qty: 15 PACKET | Refills: 0 | Status: SHIPPED
Start: 2022-10-03

## 2022-10-03 RX ORDER — MAGNESIUM SULFATE 100 %
4 CRYSTALS MISCELLANEOUS AS NEEDED
Status: DISCONTINUED | OUTPATIENT
Start: 2022-10-03 | End: 2022-10-03 | Stop reason: HOSPADM

## 2022-10-03 RX ORDER — INSULIN GLARGINE 100 [IU]/ML
15 INJECTION, SOLUTION SUBCUTANEOUS
Qty: 10 ML | Refills: 0 | Status: SHIPPED
Start: 2022-10-03

## 2022-10-03 RX ORDER — GUAIFENESIN 600 MG/1
600 TABLET, EXTENDED RELEASE ORAL EVERY 12 HOURS
Qty: 10 TABLET | Refills: 0 | Status: SHIPPED
Start: 2022-10-03 | End: 2022-10-08

## 2022-10-03 RX ADMIN — LEVOTHYROXINE SODIUM 150 MCG: 150 TABLET ORAL at 05:26

## 2022-10-03 RX ADMIN — INSULIN GLARGINE 15 UNITS: 100 INJECTION, SOLUTION SUBCUTANEOUS at 16:42

## 2022-10-03 RX ADMIN — Medication 400 MG: at 17:41

## 2022-10-03 RX ADMIN — POTASSIUM & SODIUM PHOSPHATES POWDER PACK 280-160-250 MG 2 PACKET: 280-160-250 PACK at 08:26

## 2022-10-03 RX ADMIN — SODIUM CHLORIDE, PRESERVATIVE FREE 10 ML: 5 INJECTION INTRAVENOUS at 05:26

## 2022-10-03 RX ADMIN — Medication 6 UNITS: at 12:02

## 2022-10-03 RX ADMIN — CARVEDILOL 6.25 MG: 6.25 TABLET, FILM COATED ORAL at 17:41

## 2022-10-03 RX ADMIN — POTASSIUM & SODIUM PHOSPHATES POWDER PACK 280-160-250 MG 2 PACKET: 280-160-250 PACK at 17:41

## 2022-10-03 RX ADMIN — AMLODIPINE BESYLATE 5 MG: 5 TABLET ORAL at 08:26

## 2022-10-03 RX ADMIN — Medication 16 G: at 08:02

## 2022-10-03 RX ADMIN — ENOXAPARIN SODIUM 40 MG: 40 INJECTION SUBCUTANEOUS at 08:26

## 2022-10-03 RX ADMIN — PANTOPRAZOLE SODIUM 40 MG: 40 TABLET, DELAYED RELEASE ORAL at 08:26

## 2022-10-03 RX ADMIN — DOCUSATE SODIUM 100 MG: 100 CAPSULE, LIQUID FILLED ORAL at 08:26

## 2022-10-03 RX ADMIN — GUAIFENESIN 600 MG: 600 TABLET, EXTENDED RELEASE ORAL at 08:26

## 2022-10-03 RX ADMIN — ASPIRIN 81 MG: 81 TABLET, COATED ORAL at 08:26

## 2022-10-03 RX ADMIN — PANTOPRAZOLE SODIUM 40 MG: 40 TABLET, DELAYED RELEASE ORAL at 17:42

## 2022-10-03 RX ADMIN — Medication 6 UNITS: at 16:52

## 2022-10-03 RX ADMIN — CARVEDILOL 6.25 MG: 6.25 TABLET, FILM COATED ORAL at 08:26

## 2022-10-03 RX ADMIN — GABAPENTIN 300 MG: 300 CAPSULE ORAL at 08:26

## 2022-10-03 RX ADMIN — FERROUS SULFATE TAB 325 MG (65 MG ELEMENTAL FE) 325 MG: 325 (65 FE) TAB at 08:26

## 2022-10-03 RX ADMIN — Medication 400 MG: at 08:26

## 2022-10-03 NOTE — PROGRESS NOTES
ABHISHEK spoke with Keanu Montgomery with [facility], and was informed that the facility is awaiting insurance authorization.     Liliane Ramirez, MSW, HP

## 2022-10-03 NOTE — REHAB NOTE
ARU/IPR REFERRAL CONTACT NOTE  [address] [facility]      Thank you for the opportunity to review this patient's case for admission to [address] [facility]. Based on our pre-admission screening:     [x ] This patient does not meet criteria for admission to [facility] due to:    [ x] Too functional, per documentation, patient does not require both Physical and Occupational Therapy Services at an acute rehabilitation level of intensity. Again, Thank you for this referral. Should you have any questions please do not hesitate to call.      Sincerely,  Sonia Ramey Liaison  [facility]  [phone]

## 2022-10-03 NOTE — CONSULTS
Infectious Disease Consultation Note        Reason:bloodstream infection     Current abx Prior abx         Lines:       Assessment :    74 y.o. female with pmh of type 2 diabetes mellitus, hypertension, hyperlipidemia, hypothyroidism, and colon cancer s/p total colectomy and chemotherapy who presented to [facility]  via EMS on 9/28/2022 for evaluation of hyperglycemia     Now with 1 set of blood culture on 9/28 flagged positive per instrument, leukocytosis on admission, acute kidney injury    I agree that clinical presentation is concerning for undiagnosed bloodstream infection. However after obtaining detailed history/exam clinical probability of bloodstream infection is low. I discussed with microbiology lab. No bacterial growth noted on the blood culture which is flagged positive by the instrument. This may suggest reading error by the instrument. H/o left hip TKR- currently no evidence of infection left hip. Will monitor for this possibility     Leukocytosis on admission- likely leukemoid reaction to hyperglycemia     Acute kidney injury- likely due to  volume depletion     . improving leukocytosis off antibiotics argues against bloodstream infection    Clinically better. Episode of hypoglycemia this AM.     Recommendations:     Hold abx  Mx of hypoglycemia/hyperglycemia per primary team  D/c planning per primary team      Thank you for consultation request. Above plan was discussed in details with patient,  and dr Shade Maurice. Please call me if any further questions or concerns. Will continue to participate in the care of this patient. HPI:     74 y.o. female with a PMHx of DM, HTN, hypothyroidism, CKD3b who presented to the ED on 9/28/22  with c/o NV x 1 day. Patient is known to me from prior inpatient consultation at [facility]. I obtained history by talking to patient, review of records, talking to hospitalist.  She had multiple episodes of vomiting on day of admission . The patient states she was taking her Lantus as prescribed. she has been under significant stress recently since her daughter passed away few days ago. In the ED, she is mildly tachy with /67. WBC 13.6. Anemia and renal function near baseline. UA +ketones. BMP with na 127, K 6.4, Cl 89, Co2 14, AG 24, . Insulin gtt and IV fluids given. Blood cultures were obtained. 1 set of blood culture was flagged positive by the instrument. There was concern for bloodstream infection. I was consulted for further recommendations. Patient denies any fever, chills. She denies having any skin infection recently. She has not received any antibiotics as outpatient. She states that her blood sugar goes up and down frequently. She denies any dysuria, abdominal pain. She has not had any nausea or vomiting in the past couple days. Past Medical History:   Diagnosis Date    Anemia due to stage 3b chronic kidney disease ([address] 9/28/2022    Colon cancer ([address]     Diabetes ([address]     Hypertension     Hypothyroidism     Stage 3b chronic kidney disease ([facility] 9/28/2022       Past Surgical History:   Procedure Laterality Date    COLONOSCOPY N/A 12/30/2016    COLONOSCOPY. SURVEILLANCE /c Hot Snared Polypectomy /c EndoClip x3 performed by July Browning MD at [address]         [address] Medication List      Details   gabapentin (NEURONTIN) 300 mg capsule Take 300 mg by mouth daily. pioglitazone (ACTOS) 30 mg tablet Take 30 mg by mouth daily. levothyroxine (SYNTHROID) 150 mcg tablet Take 150 mcg by mouth Daily (before breakfast). SITagliptin (JANUVIA) 100 mg tablet Take 100 mg by mouth daily. glucose 4 gram chewable tablet Take 15 g by mouth as needed for PRN Reason (Other) (low BS). lisinopriL (PRINIVIL, ZESTRIL) 2.5 mg tablet Take 1 Tablet by mouth daily. Qty: 30 Tablet, Refills: 0      carvediloL (COREG) 6.25 mg tablet Take 1 Tablet by mouth two (2) times daily (with meals).   Qty: 60 Tablet, Refills: 0      pantoprazole (PROTONIX) 40 mg tablet Take 1 Tablet by mouth Before breakfast and dinner. Qty: 60 Tablet, Refills: 0      metFORMIN ER (GLUCOPHAGE XR) 500 mg tablet Take 500 mg by mouth daily (with dinner). Cholecalciferol, Vitamin D3, 50,000 unit cap Take 1 Capsule by mouth every seven (7) days. Takes on wednesdays      amLODIPine (NORVASC) 5 mg tablet Take 5 mg by mouth daily. insulin glargine-yfgn 100 unit/mL (3 mL) inpn INJECT 16 UNITS SUBCUTANEOUSLY IN THE MORNING AND 16 IN THE EVENING      atorvastatin (LIPITOR) 20 mg tablet Take 20 mg by mouth nightly. acetaminophen (TYLENOL) 500 mg tablet Take 500 mg by mouth every six (6) hours as needed for Pain. insulin aspart U-100 (NOVOLOG) 100 unit/mL (3 mL) inpn 2-10 Units by SubCUTAneous route Before breakfast, lunch, and dinner. BS less than 150- no units  150-199- 2 units  200-249- 4 units  250-299- 6 units  300-349- 8 units  350 10 units  over 400 call MD      insulin glargine (LANTUS) 100 unit/mL injection 24 Units by SubCUTAneous route daily. Inject 10 units SQ DAILY  Qty: 10 mL, Refills: 0      docusate sodium (COLACE) 100 mg capsule Take 1 Capsule by mouth daily. Qty: 30 Capsule, Refills: 0      insulin lispro (HUMALOG) 100 unit/mL injection For Blood Sugar (mg/dL) of:              Less than 150 =   0 units  150 -199 =   3 units  200 -249 =   6 units  250 -299 =   9 units  300 -349 =   12 units  350 and above =   15 units and Call Physician  Initiate Hypoglycemic protocol if blood glucose is <70 mg/dL. Fast Acting - Administer Immediately - or within 15 minutes of start of meal, if mealtime coverage. Qty: 1 Each, Refills: 0      polyethylene glycol (MIRALAX) 17 gram packet Take 1 Packet by mouth daily. Indications: HOLD IT IF DIARRHEA  Qty: 10 Packet, Refills: 0      aspirin delayed-release 81 mg tablet Take 1 Tablet by mouth daily.   Qty: 30 Tablet, Refills: 0      alendronate (FOSAMAX) 10 mg tablet Take 70 mg by mouth every seven (7) days. Current Facility-Administered Medications   Medication Dose Route Frequency    magnesium oxide (MAG-OX) tablet 400 mg  400 mg Oral BID    potassium, sodium phosphates (NEUTRA-PHOS) packet 2 Packet  2 Packet Oral TID    polyethylene glycol (MIRALAX) packet 17 g  17 g Oral DAILY    guaiFENesin ER (MUCINEX) tablet 600 mg  600 mg Oral Q12H    ferrous sulfate tablet 325 mg  1 Tablet Oral DAILY WITH BREAKFAST    docusate sodium (COLACE) capsule 100 mg  100 mg Oral DAILY WITH BREAKFAST    enoxaparin (LOVENOX) injection 40 mg  40 mg SubCUTAneous DAILY    insulin lispro (HUMALOG) injection   SubCUTAneous AC&HS    insulin glargine (LANTUS) injection 24 Units  24 Units SubCUTAneous ACD    amLODIPine (NORVASC) tablet 5 mg  5 mg Oral DAILY    aspirin delayed-release tablet 81 mg  81 mg Oral DAILY    atorvastatin (LIPITOR) tablet 20 mg  20 mg Oral QHS    carvediloL (COREG) tablet 6.25 mg  6.25 mg Oral BID WITH MEALS    gabapentin (NEURONTIN) capsule 300 mg  300 mg Oral DAILY    pantoprazole (PROTONIX) tablet 40 mg  40 mg Oral ACB&D    sodium chloride (NS) flush 5-40 mL  5-40 mL IntraVENous Q8H    sodium chloride (NS) flush 5-40 mL  5-40 mL IntraVENous PRN    acetaminophen (TYLENOL) tablet 650 mg  650 mg Oral Q6H PRN    Or    acetaminophen (TYLENOL) suppository 650 mg  650 mg Rectal Q6H PRN    bisacodyL (DULCOLAX) suppository 10 mg  10 mg Rectal DAILY PRN    ondansetron (ZOFRAN ODT) tablet 4 mg  4 mg Oral Q8H PRN    Or    ondansetron (ZOFRAN) injection 4 mg  4 mg IntraVENous Q6H PRN    levothyroxine (SYNTHROID) tablet 150 mcg  150 mcg Oral 6am       Allergies: Patient has no known allergies.     Family History   Problem Relation Age of Onset    Diabetes Mother     Cancer Father         colon     Social History     Socioeconomic History    Marital status: SINGLE     Spouse name: Not on file    Number of children: Not on file    Years of education: Not on file    Highest education level: Not on file   Occupational History    Not on file   Tobacco Use    Smoking status: Former    Smokeless tobacco: Never   Substance and Sexual Activity    Alcohol use: Yes     Comment: occasionally    Drug use: No    Sexual activity: Not on file   Other Topics Concern    Not on file   Social History Narrative    Not on file     Social Determinants of Health     Financial Resource Strain: Not on file   Food Insecurity: Not on file   Transportation Needs: Not on file   Physical Activity: Not on file   Stress: Not on file   Social Connections: Not on file   Intimate Partner Violence: Not on file   Housing Stability: Not on file     Social History     Tobacco Use   Smoking Status Former   Smokeless Tobacco Never        Temp (24hrs), Av.9 °F (36.6 °C), Min:97.2 °F (36.2 °C), Max:98.8 °F (37.1 °C)    Visit Vitals  BP (!) 142/77 (BP 1 Location: Left upper arm, BP Patient Position: At rest)   Pulse 80   Temp 97.9 °F (36.6 °C)   Resp 24   Ht 5' 6\" (1.676 m)   Wt 69.9 kg (154 lb 3.2 oz)   SpO2 98%   BMI 24.89 kg/m²       ROS: 12 point ROS obtained in details.  Pertinent positives as mentioned in HPI,   otherwise negative    Physical Exam:    General: NAD, appears comfortable, alert  Skin: warm, dry, no rashes  Eyes: PERRL, sclera is non-icteric  HENT: normocephalic/atraumatic  Respiratory: CTA with no signs of respiratory distress  Cardiovascular: S1S2 regular, no cyanosis or peripheral edema of extremities  GI: soft, non-tender, normal bowel sounds  Neuro: moves all extremities, no focal deficits, normal speech  Labs: Results:   Chemistry Recent Labs     10/02/22  0120 10/01/22  0319 22  0907   GLU 78 227* 198*    142 137   K 3.8 3.9 4.3    109 108   CO2 27 25 21   BUN 14 17 27*   CREA 0.96 1.02 1.17   CA 8.3* 8.1* 8.4*   AGAP 3 8 8   BUCR 15 17 23*      CBC w/Diff Recent Labs     10/02/22  0120   WBC 5.1   RBC 3.52*   HGB 8.7*   HCT 28.3*      GRANS 49   LYMPH 35   EOS 3      Microbiology No results for input(s): CULT in the last 72 hours. RADIOLOGY:    All available imaging studies/reports in [facility] care for this admission were reviewed      Disclaimer: Sections of this note are dictated utilizing voice recognition software, which may have resulted in some phonetic based errors in grammar and contents. Even though attempts were made to correct all the mistakes, some may have been missed, and remained in the body of the document. If questions arise, please contact our department.     Dr. Kev Hopkins, Infectious Disease Specialist  [phone]  October 3, 2022  7:28 AM

## 2022-10-03 NOTE — PROGRESS NOTES
Problem: Diabetes Self-Management  Goal: *Disease process and treatment process  Description: Define diabetes and identify own type of diabetes; list 3 options for treating diabetes. 10/3/2022 1715 by Jaycob Georges  Outcome: Resolved/Met  10/3/2022 1004 by Jaycob Georges  Outcome: Progressing Towards Goal  Goal: *Incorporating nutritional management into lifestyle  Description: Describe effect of type, amount and timing of food on blood glucose; list 3 methods for planning meals. 10/3/2022 1715 by Jaycob Georges  Outcome: Resolved/Met  10/3/2022 1004 by Jaycob Georges  Outcome: Progressing Towards Goal  Goal: *Incorporating physical activity into lifestyle  Description: State effect of exercise on blood glucose levels. 10/3/2022 1715 by Jaycob Georges  Outcome: Resolved/Met  10/3/2022 1004 by Jaycob Georges  Outcome: Progressing Towards Goal  Goal: *Developing strategies to promote health/change behavior  Description: Define the ABC's of diabetes; identify appropriate screenings, schedule and personal plan for screenings. 10/3/2022 1715 by Jaycob Georges  Outcome: Resolved/Met  10/3/2022 1004 by Jaycob Georges  Outcome: Progressing Towards Goal  Goal: *Using medications safely  Description: State effect of diabetes medications on diabetes; name diabetes medication taking, action and side effects. 10/3/2022 1715 by Jaycob Georges  Outcome: Resolved/Met  10/3/2022 1004 by Jaycob Georges  Outcome: Progressing Towards Goal  Goal: *Monitoring blood glucose, interpreting and using results  Description: Identify recommended blood glucose targets  and personal targets. 10/3/2022 1715 by Jaycob Georges  Outcome: Resolved/Met  10/3/2022 1004 by Jaycob Georges  Outcome: Progressing Towards Goal  Goal: *Prevention, detection, treatment of acute complications  Description: List symptoms of hyper- and hypoglycemia; describe how to treat low blood sugar and actions for lowering  high blood glucose level.   10/3/2022 1715 by Jaycob Georges  Outcome: Resolved/Met  10/3/2022 1004 by Melany Thakkar  Outcome: Progressing Towards Goal  Goal: *Prevention, detection and treatment of chronic complications  Description: Define the natural course of diabetes and describe the relationship of blood glucose levels to long term complications of diabetes. 10/3/2022 1715 by Melany Thakkar  Outcome: Resolved/Met  10/3/2022 1004 by Melany Thakkar  Outcome: Progressing Towards Goal  Goal: *Developing strategies to address psychosocial issues  Description: Describe feelings about living with diabetes; identify support needed and support network  10/3/2022 1715 by Melany Thakkar  Outcome: Resolved/Met  10/3/2022 1004 by Melany Thakkar  Outcome: Progressing Towards Goal  Goal: *Insulin pump training  10/3/2022 1715 by Melany Thakkar  Outcome: Resolved/Met  10/3/2022 1004 by Melany Thakkar  Outcome: Progressing Towards Goal  Goal: *Sick day guidelines  10/3/2022 1715 by Melany Thakkar  Outcome: Resolved/Met  10/3/2022 1004 by Melany Thakkar  Outcome: Progressing Towards Goal  Goal: *Patient Specific Goal (EDIT GOAL, INSERT TEXT)  10/3/2022 1715 by Melany Thakkar  Outcome: Resolved/Met  10/3/2022 1004 by Melany Thakkar  Outcome: Progressing Towards Goal     Problem: Patient Education: Go to Patient Education Activity  Goal: Patient/Family Education  10/3/2022 1715 by Melany Thakkar  Outcome: Resolved/Met  10/3/2022 1004 by Mleany Thakkar  Outcome: Progressing Towards Goal     Problem: Falls - Risk of  Goal: *Absence of Falls  Description: Document Lu Fall Risk and appropriate interventions in the flowsheet.   10/3/2022 1715 by Melany Thakkar  Outcome: Resolved/Met  10/3/2022 1004 by Melany Thakkar  Outcome: Progressing Towards Goal  Note: Fall Risk Interventions:  Mobility Interventions: Assess mobility with egress test, Bed/chair exit alarm, Communicate number of staff needed for ambulation/transfer, Patient to call before getting OOB    Mentation Interventions: Adequate sleep, hydration, pain control, Bed/chair exit alarm, Door open when patient unattended, Evaluate medications/consider consulting pharmacy    Medication Interventions: Bed/chair exit alarm, Evaluate medications/consider consulting pharmacy, Patient to call before getting OOB    Elimination Interventions: Bed/chair exit alarm, Call light in reach, Patient to call for help with toileting needs, Toileting schedule/hourly rounds              Problem: Patient Education: Go to Patient Education Activity  Goal: Patient/Family Education  10/3/2022 1715 by Ronn House  Outcome: Resolved/Met  10/3/2022 1004 by Ronn House  Outcome: Progressing Towards Goal     Problem: Pain  Goal: *Control of Pain  10/3/2022 1715 by Ronn House  Outcome: Resolved/Met  10/3/2022 1004 by Ronn House  Outcome: Progressing Towards Goal     Problem: Patient Education: Go to Patient Education Activity  Goal: Patient/Family Education  10/3/2022 1715 by Ronn House  Outcome: Resolved/Met  10/3/2022 1004 by Ronn House  Outcome: Progressing Towards Goal     Problem: Patient Education: Go to Patient Education Activity  Goal: Patient/Family Education  10/3/2022 1715 by Ronn House  Outcome: Resolved/Met  10/3/2022 1004 by Ronn House  Outcome: Progressing Towards Goal     Problem: Patient Education: Go to Patient Education Activity  Goal: Patient/Family Education  10/3/2022 1715 by Ronn House  Outcome: Resolved/Met  10/3/2022 1004 by Ronn House  Outcome: Progressing Towards Goal     Problem: Patient Education: Go to Patient Education Activity  Goal: Patient/Family Education  10/3/2022 1715 by Ronn House  Outcome: Resolved/Met  10/3/2022 1004 by Ronn House  Outcome: Progressing Towards Goal     Problem: DKA: Discharge Outcomes  Goal: *Ambulates and performs ADL's  10/3/2022 1715 by Ronn House  Outcome: Resolved/Met  10/3/2022 1004 by Ronn House  Outcome: Progressing Towards Goal  Goal: *Describes follow-up/return visits to physicians, diabetes treatment coordinator and other resources  10/3/2022 [id number] by Barby Hatch  Outcome: Resolved/Met  10/3/2022 1004 by Barby Hatch  Outcome: Progressing Towards Goal  Goal: *Blood glucose at patient's target range  10/3/2022 1715 by Barby Hatch  Outcome: Resolved/Met  10/3/2022 1004 by Barby Hatch  Outcome: Progressing Towards Goal  Goal: *Acidosis resolved  10/3/2022 1715 by Barby Hatch  Outcome: Resolved/Met  10/3/2022 1004 by Barby Hatch  Outcome: Progressing Towards Goal  Goal: *Tolerating diet  10/3/2022 1715 by Barby Hatch  Outcome: Resolved/Met  10/3/2022 1004 by Barby Hatch  Outcome: Progressing Towards Goal  Goal: *Verbalizes understanding and describes prescribed diet  10/3/2022 1715 by Barby Hatch  Outcome: Resolved/Met  10/3/2022 1004 by Barby Hatch  Outcome: Progressing Towards Goal  Goal: *Describes blood glucose goals, monitoring, sick day rules, hypo/hyperglycemia  10/3/2022 1715 by Barby Hatch  Outcome: Resolved/Met  10/3/2022 1004 by Barby Hatch  Outcome: Progressing Towards Goal  Goal: *Describes available resources and support systems  10/3/2022 1715 by Barby Hatch  Outcome: Resolved/Met  10/3/2022 1004 by Barby Hatch  Outcome: Progressing Towards Goal  Goal: *Verbalizes name, dosage, time, side effects, and number of days to continue medications  10/3/2022 1715 by Barby Hatch  Outcome: Resolved/Met  10/3/2022 1004 by Barby Hatch  Outcome: Progressing Towards Goal  Goal: *Demonstrates ability to self-administer insulin  10/3/2022 1715 by Barby Hatch  Outcome: Resolved/Met  10/3/2022 1004 by Barby Hatch  Outcome: Progressing Towards Goal     Problem: Anemia Care Plan (Adult and Pediatric)  Goal: *Labs within defined limits  10/3/2022 1715 by Barby Hatch  Outcome: Resolved/Met  10/3/2022 1004 by Barby Hatch  Outcome: Progressing Towards Goal  Goal: *Tolerates increased activity  10/3/2022 1715 by Barby Hatch  Outcome: Resolved/Met  10/3/2022 1004 by Barby Hatch  Outcome: Progressing Towards Goal     Problem: Patient Education: Go to Patient Education Activity  Goal: Patient/Family Education  10/3/2022 1715 by John Presume  Outcome: Resolved/Met  10/3/2022 1004 by John Presume  Outcome: Progressing Towards Goal     Problem: Hypertension  Goal: *Blood pressure within specified parameters  10/3/2022 1715 by John Presume  Outcome: Resolved/Met  10/3/2022 1004 by John Presume  Outcome: Progressing Towards Goal  Goal: *Fluid volume balance  10/3/2022 1715 by John Presume  Outcome: Resolved/Met  10/3/2022 1004 by John Presume  Outcome: Progressing Towards Goal  Goal: *Labs within defined limits  10/3/2022 1715 by John Presume  Outcome: Resolved/Met  10/3/2022 1004 by John Presume  Outcome: Progressing Towards Goal     Problem: Patient Education: Go to Patient Education Activity  Goal: Patient/Family Education  10/3/2022 1715 by John Presume  Outcome: Resolved/Met  10/3/2022 1004 by John Presume  Outcome: Progressing Towards Goal     Problem: Patient Education: Go to Patient Education Activity  Goal: Patient/Family Education  10/3/2022 1715 by John Presume  Outcome: Resolved/Met  10/3/2022 1004 by John Presume  Outcome: Progressing Towards Goal     Problem: Pressure Injury - Risk of  Goal: *Prevention of pressure injury  Description: Document Sid Scale and appropriate interventions in the flowsheet.   10/3/2022 1715 by John Presume  Outcome: Resolved/Met  10/3/2022 1004 by John Presume  Outcome: Progressing Towards Goal     Problem: Patient Education: Go to Patient Education Activity  Goal: Patient/Family Education  10/3/2022 1715 by John Presume  Outcome: Resolved/Met  10/3/2022 1004 by John Presume  Outcome: Progressing Towards Goal

## 2022-10-03 NOTE — DIABETES MGMT
Diabetes/ Glycemic Control Plan of Care  Recommendations:   Patient with hypoglycemia this am, BG 54 mg/dl. Current  mg/dl  Recommend decreasing Lantus dose to 20 units daily before dinner  Continue corrective insulin coverage as needed. Will continue inpatient monitoring. Fasting/ Morning blood glucose:   Lab Results   Component Value Date/Time    Glucose 78 10/02/2022 01:20 AM    Glucose (POC) 140 (H) 10/03/2022 08:40 AM    Glucose (POC) 90 02/06/2019 02:13 PM    Glucose,  (H) 01/19/2021 09:26 PM     IV Fluids containing dextrose: none   Steroids:  none     Blood glucose values:        Latest Reference Range & Units 10/2/22 21:17 10/3/22 07:25 10/3/22 07:54 10/3/22 08:22 10/3/22 08:40   GLUCOSE,FAST - POC 70 - 110 mg/dL 154 (H) 58 (L) 54 (LL) 77 140 (H)   (LL): Data is critically low  (H): Data is abnormally high  (L): Data is abnormally low  Within target range (70-180mg/dL):  yes    Current insulin orders:   Lantus 24 units daily before dinner  Lispro corrective insulin coverage AC&HS as needed. Total Daily Dose previous 24 hours = 32 units   Current A1c:   Lab Results   Component Value Date/Time    Hemoglobin A1c 9.7 (H) 09/28/2022 08:07 PM    Hemoglobin A1c, External 9.5 07/08/2021 12:00 AM      Equivalent to an average Blood Glucose of 232 mg/dl for 2-3 months prior to admission  Adequate glycemic control PTA:   Nutrition/Diet:   Active Orders   Diet    ADULT DIET Regular; 4 carb choices (60 gm/meal); No Concentrated Sweets      Meal Intake:  No data found. Supplement Intake:  No data found. Home diabetes medications:   Key Antihyperglycemic Medications               pioglitazone (ACTOS) 30 mg tablet (Taking) Take 30 mg by mouth daily. SITagliptin (JANUVIA) 100 mg tablet (Taking) Take 100 mg by mouth daily. metFORMIN ER (GLUCOPHAGE XR) 500 mg tablet (Taking) Take 500 mg by mouth daily (with dinner).     insulin glargine-yfgn 100 unit/mL (3 mL) inpn INJECT 16 UNITS SUBCUTANEOUSLY IN THE MORNING AND 16 IN THE EVENING    insulin aspart U-100 (NOVOLOG) 100 unit/mL (3 mL) inpn 2-10 Units by SubCUTAneous route Before breakfast, lunch, and dinner. BS less than 150- no units  150-199- 2 units  200-249- 4 units  250-299- 6 units  300-349- 8 units  350 10 units  over 400 call MD    insulin glargine (LANTUS) 100 unit/mL injection 24 Units by SubCUTAneous route daily. Inject 10 units SQ DAILY    insulin lispro (HUMALOG) 100 unit/mL injection For Blood Sugar (mg/dL) of:              Less than 150 =   0 units  150 -199 =   3 units  200 -249 =   6 units  250 -299 =   9 units  300 -349 =   12 units  350 and above =   15 units and Call Physician  Initiate Hypoglycemic protocol if blood glucose is <70 mg/dL. Fast Acting - Administer Immediately - or within 15 minutes of start of meal, if mealtime coverage. Plan/Goals:   Blood glucose will be within target of 70 - 180 mg/dl within 72 hours  Reinforce dietary and medication compliance at home.           Education:  [] Refer to Diabetes Education Record                       [] Education not indicated at this time     Al Jean, RN [address]  Ext [phone]

## 2022-10-03 NOTE — ROUTINE PROCESS
Bedside and Verbal shift change report given to [facility] (oncoming nurse) by Miguel Sommers (offgoing nurse). Report included the following information SBAR, Kardex, Intake/Output, MAR, and Recent Results. Wound Prevention Checklist    Patient: Cait Solis (70 y.o. female)  Date: 10/3/2022  Diagnosis: Hyperglycemia [Q63.7]  Metabolic acidosis [V89.94]  DKA, type 2 ([address] [E11.10] DKA, type 2 ([address]    Precautions: Fall       []  Heel prevention boots placed on patient    [x]  Patient turned q2h during shift    []  Lift team ordered    [x]  Patient on Diane bed/Specialty bed    []  Each Wound is documented during shift (Stage, Color, drainage, odor, measurements, and dressings)    [x]  Dual skin check done with Carol Cons GIOVANNA Dobbs     7376 - Blood glucose is 58. Patient finished 4oz of orange juice at this time. 0754 - 15 minute recheck is 54.     0807 - Patient finished 4 glucose tablets at this time. 7404 - 15 minute recheck is 77.    0825 - Patient finished 4 oz of orange juice at this time. 0840 - 15 minute recheck is 140.     0900 - Morning humalog coverage held.      1100 - Consulted with ABHISHEK and MD that patient will need transport to rehab

## 2022-10-03 NOTE — PROGRESS NOTES
Requested Case Management specialist to assist with transportation to [facility]  Address is [address], [address] phone number is     Patient will require BLS transport. No  Pt requires Stretcher yes, If stretcher, reason: Hx Colon cancer ([address] Diabetes ([address]     Hypertension, Hypothyroidism, Stage 3b chronic kidney disease ([address]      Patient is currently requiring oxygen No   Height:5\" 6\"  Weight: 154 lbs. Pt is on isolation: No    Is the pt ready now? no  Requested time: 3:00 p.m. PCS Faxed: yes  Insurance verified on face sheet: yes  Auth needed for transport: Unknown patient HMO will be contacted. CM completed PCS/ Envelope and placed on chart.       Garima Cheatham, MSW, QMHP

## 2022-10-03 NOTE — DISCHARGE SUMMARY
Discharge Summary    Patient: Shaheen Milan MRN: [id number]  CSN: [id number]    YOB: 1950  Age: 74 y.o. Sex: female    DOA: 9/28/2022 LOS:  LOS: 5 days   Discharge Date:      Admission Diagnoses: Hyperglycemia [N74.8]  Metabolic acidosis [N57.46]  DKA, type 2 ([place] [address].) [E11.10]    Discharge Diagnoses:    Problem List as of 10/3/2022 Date Reviewed: 6/12/2022            Codes Class Noted - Resolved    High anion gap metabolic acidosis [facility]-04-GI: E87.29  ICD-9-CM: 276.2  9/28/2022 - Present        * (Principal) DKA, type 2 ([place] [address].) ICD-10-CM: E11.10  ICD-9-CM: 250.12  9/28/2022 - Present        Stage 3b chronic kidney disease ([place] [address].) ICD-10-CM: N18.32  ICD-9-CM: 585.3  9/28/2022 - Present        SIRS (systemic inflammatory response syndrome) ([place] [address].) ICD-10-CM: R65.10  ICD-9-CM: 995.90  9/28/2022 - Present        Anemia due to stage 3b chronic kidney disease ([facility] ICD-10-CM: N18.32, D63.1  ICD-9-CM: 285.21, 585.3  9/28/2022 - Present        Elevated troponin ICD-10-CM: R77.8  ICD-9-CM: 790.6  6/12/2022 - Present        Primary hypertension ICD-10-CM: I10  ICD-9-CM: 401.9  6/12/2022 - Present        Acquired hypothyroidism ICD-10-CM: E03.9  ICD-9-CM: 244.9  6/12/2022 - Present        History of colon cancer ICD-10-CM: Z85.038  ICD-9-CM: V10.05  6/12/2022 - Present        BRENDA (acute kidney injury) ([facility] ICD-10-CM: N17.9  ICD-9-CM: 584.9  6/12/2022 - Present        DKA (diabetic ketoacidosis) ([address] ICD-10-CM: E11.10  ICD-9-CM: 250.12  6/11/2022 - Present        Hyperkalemia ICD-10-CM: E87.5  ICD-9-CM: 276.7  1/19/2021 - Present        DKA (diabetic ketoacidoses) ICD-10-CM: E11.10  ICD-9-CM: 250.12  1/19/2021 - Present           Reason for Admission  74 y.o. female with a PMHx of DM, HTN, hypothyroidism, CKD3b who presented to the ED with c/o NV. Symptoms started that day and described as moderate to severe. She had multiple episodes of vomiting, associated with malaise.  Her daughter observed the patient take 8 units of Novolin this morning and the patient reported that her BG was 136. Her daughter had her recheck it and it read \"high\". The patient stated she was taking her lantus, last took 20 units last night. Her [other] daughter  very recently, and the  was that day. She was unable to make it due to her symptoms, and when the daughter at bedside came back to check on her, she looked even worse and glucometer read \"error\" prompting her to call EMS. The patient denied fever, sob, cp, abd pain, dysuria. Medications unable to be reconciled, daughter to bring current medications to bedside the following day. Discharge Condition: Good    PHYSICAL EXAM   Visit Vitals  /74 (BP 1 Location: Left upper arm, BP Patient Position: At rest)   Pulse 84   Temp 97.8 °F (36.6 °C)   Resp 20   Ht 5' 6\" (1.676 m)   Wt 69.9 kg (154 lb 3.2 oz)   SpO2 99%   BMI 24.89 kg/m²     General: Awake alert and oriented x4. Found sitting up in bed watching TV, speaking full sentences on room air. Heent: ncat. Perrl. Oropharynx clear. Cardiovascular:  RRR  Pulmonary:  cta b.l  GI:  soft nd nt nabs  Extremities: No edema  Neuro: No focal deficit  Skin: no rash to visible skin. No wounds. Hospital Course:   1. DKA resolved. Transitioned to Lantus, sliding scale insulin. 2. ONE OF TWO BOTTLES HAS BEEN FLAGGED POSITIVE BY INSTRUMENT. BOTTLE HAS BEEN SENT TO [facility] [place] [facility] TO ASSESS FOR POSSIBLE GROWTH. No organisms seen on gram stain. Multiple subcultures are in progress. NO GROWTH THUS FAR. No need for abx at this time per ID recommendations. 3. Leukocytosis, likely reactive, resolved. See #11.   4. Hypophosphatemia, hypomagnesemia repleted in house, and discharge on supplements. 5. Iron def anemia. Venofer given in house. Discharge w/ po supplements. Outpatient screening colonoscopy -- appointment requested. 6. Grief reaction. Daughter recently . 7.  Diabetes type 2 with hyperglycemia.   Diabetes educator worked with her. Needs outpatient follow up with endocrinology. 8.  High anion gap metabolic acidosis. Resolving. 9.  Hyponatremia, hypovolemic as well as pseudohyponatremia in the setting of elevated blood glucose. Resolved. 10.  Ambulatory dysfunction. SNF for subacute rehab. 11. Constipation improving. Continue Bowel regimen. 12. Acute bronchitis, likely viral. Sputum cx. Mucinex. RT for bronchial hygiene protocol. Ics. Monitor off abx. 13. DVT prophylaxis was given in the form of lovenox subcut daily  14. Full code. SNF today for subacute rehab. Patient agrees, all questions answered to the best of my ability. Addendum 2:15 pm daughter rob Rosa updated over phone with patient's permission, all questions answered to the best of my ability.      Consults: Infectious Disease    Significant Diagnostic Studies: labs:     Lab Results   Component Value Date/Time    WBC 5.1 10/02/2022 01:20 AM    Hemoglobin, POC 9.2 (L) 01/19/2021 09:26 PM    HGB 8.7 (L) 10/02/2022 01:20 AM    Hematocrit, POC 27 (L) 01/19/2021 09:26 PM    HCT 28.3 (L) 10/02/2022 01:20 AM    PLATELET 787 75/89/1575 01:20 AM    MCV 80.4 10/02/2022 01:20 AM     Lab Results   Component Value Date/Time    Sodium 137 10/02/2022 01:20 AM    Potassium 3.8 10/02/2022 01:20 AM    Chloride 107 10/02/2022 01:20 AM    CO2 27 10/02/2022 01:20 AM    Anion gap 3 10/02/2022 01:20 AM    Glucose 78 10/02/2022 01:20 AM    BUN 14 10/02/2022 01:20 AM    Creatinine 0.96 10/02/2022 01:20 AM    BUN/Creatinine ratio 15 10/02/2022 01:20 AM    GFR est AA >60 10/02/2022 01:20 AM    GFR est non-AA 57 (L) 10/02/2022 01:20 AM    Calcium 8.3 (L) 10/02/2022 01:20 AM     Results       Procedure Component Value Units Date/Time    CULTURE, RESPIRATORY/SPUTUM/BRONCH Nuria Finner STAIN [[id number]]     Order Status: Sent Specimen: Sputum     CULTURE, URINE [[id number]]     Order Status: Canceled Specimen: Urine from [address] [facility], BLOOD [[id number]] Collected: 09/28/22 2030 Order Status: Completed Specimen: Blood Updated: 09/30/22 0106     Special Requests: NO SPECIAL REQUESTS        Culture result:       ONE OF TWO BOTTLES HAS BEEN FLAGGED POSITIVE BY INSTRUMENT. BOTTLE HAS BEEN SENT TO [facility] TO ASSESS FOR POSSIBLE GROWTH. No organisms seen on gram stain. Multiple subcultures are in progress. NO GROWTH THUS FAR                  REMAINING BOTTLE(S) HAS/HAVE NO GROWTH SO FAR          BLOOD CULTURE ID PANEL [[id number]] Collected: 09/28/22 2030    Order Status: Canceled     CULTURE, BLOOD [[id number]] Collected: 09/28/22 2026    Order Status: Completed Specimen: Blood Updated: 10/03/22 0727     Special Requests: NO SPECIAL REQUESTS        Culture result: NO GROWTH 5 DAYS             IMAGING  XR Results (most recent):  Results from [place] encounter on 09/28/22    XR CHEST PORT    Narrative  EXAM: XR CHEST PORT    INDICATION: leukocytosis, cough    COMPARISON: 9/20/2022    FINDINGS: A portable AP radiograph of the chest was obtained at 0 418 hours. The  patient is on a cardiac monitor. Streaky densities in the upper lobes, grossly  unchanged. . Stable cardiac silhouette. .  No acute bone findings. .    Impression  Minimal streaky opacities at the lung apices. This may be acute and/or chronic. Follow-up can be obtained. Discharge Medications:     Current Discharge Medication List        START taking these medications    Details   ferrous sulfate 325 mg (65 mg iron) tablet Take 1 Tablet by mouth daily (with breakfast). Qty: 30 Tablet, Refills: 0      guaiFENesin ER (MUCINEX) 600 mg ER tablet Take 1 Tablet by mouth every twelve (12) hours for 5 days. Qty: 10 Tablet, Refills: 0      magnesium oxide (MAG-OX) 400 mg tablet Take 1 Tablet by mouth two (2) times a day for 7 days. Qty: 14 Tablet, Refills: 0      potassium, sodium phosphates (NEUTRA-PHOS) 280-160-250 mg packet Take 2 Packets by mouth three (3) times daily for 3 doses.   Qty: 6 Packet, Refills: 0 CONTINUE these medications which have CHANGED    Details   insulin glargine (LANTUS) 100 unit/mL injection 15 Units by SubCUTAneous route Daily (before dinner). Inject 10 units SQ DAILY  Qty: 10 mL, Refills: 0      polyethylene glycol (MIRALAX) 17 gram packet Take 1 Packet by mouth daily as needed for Constipation. Indications: HOLD IT IF DIARRHEA  Qty: 15 Packet, Refills: 0           CONTINUE these medications which have NOT CHANGED    Details   gabapentin (NEURONTIN) 300 mg capsule Take 300 mg by mouth daily. levothyroxine (SYNTHROID) 150 mcg tablet Take 150 mcg by mouth Daily (before breakfast). SITagliptin (JANUVIA) 100 mg tablet Take 100 mg by mouth daily. glucose 4 gram chewable tablet Take 15 g by mouth as needed for PRN Reason (Other) (low BS). lisinopriL (PRINIVIL, ZESTRIL) 2.5 mg tablet Take 1 Tablet by mouth daily. Qty: 30 Tablet, Refills: 0      carvediloL (COREG) 6.25 mg tablet Take 1 Tablet by mouth two (2) times daily (with meals). Qty: 60 Tablet, Refills: 0      pantoprazole (PROTONIX) 40 mg tablet Take 1 Tablet by mouth Before breakfast and dinner. Qty: 60 Tablet, Refills: 0      metFORMIN ER (GLUCOPHAGE XR) 500 mg tablet Take 500 mg by mouth daily (with dinner). Cholecalciferol, Vitamin D3, 50,000 unit cap Take 1 Capsule by mouth every seven (7) days. Takes on wednesdays      amLODIPine (NORVASC) 5 mg tablet Take 5 mg by mouth daily. atorvastatin (LIPITOR) 20 mg tablet Take 20 mg by mouth nightly. acetaminophen (TYLENOL) 500 mg tablet Take 500 mg by mouth every six (6) hours as needed for Pain. docusate sodium (COLACE) 100 mg capsule Take 1 Capsule by mouth daily.   Qty: 30 Capsule, Refills: 0      insulin lispro (HUMALOG) 100 unit/mL injection For Blood Sugar (mg/dL) of:              Less than 150 =   0 units  150 -199 =   3 units  200 -249 =   6 units  250 -299 =   9 units  300 -349 =   12 units  350 and above =   15 units and Call Physician  Initiate Hypoglycemic protocol if blood glucose is <70 mg/dL. Fast Acting - Administer Immediately - or within 15 minutes of start of meal, if mealtime coverage. Qty: 1 Each, Refills: 0      aspirin delayed-release 81 mg tablet Take 1 Tablet by mouth daily. Qty: 30 Tablet, Refills: 0      alendronate (FOSAMAX) 10 mg tablet Take 70 mg by mouth every seven (7) days. STOP taking these medications       pioglitazone (ACTOS) 30 mg tablet Comments:   Reason for Stopping:         insulin glargine-yfgn 100 unit/mL (3 mL) inpn Comments:   Reason for Stopping:         insulin aspart U-100 (NOVOLOG) 100 unit/mL (3 mL) inpn Comments:   Reason for Stopping:               Activity: PT/OT Eval and Treat. Fall precautions. Diet: Diabetic Diet    Wound Care: None needed    Follow-up:   Follow-up Appointments   Procedures    FOLLOW UP VISIT Appointment in: Other (Specify) 1. Brigette Chen MD 3 - 5 days. 2. Dr. Cristopher Canavan. Next available appointment. Routine screening colonoscopy. 3. Dr. Alina Lockwood 4 weeks. Dx: diabetes. 1. Brigette Chen MD 3 - 5 days. 2. Dr. Cristopher Canavan. Next available appointment. Routine screening colonoscopy. 3. Dr. Alina Lockwood 4 weeks. Dx: diabetes. Standing Status:   Standing     Number of Occurrences:   1     Order Specific Question:   Appointment in     Answer:    Other (Specify)       Minutes spent on discharge: >30

## 2022-10-03 NOTE — PROGRESS NOTES
Patient has transitional care follow up with Dr. Becki Dickey on 10/03/2022 at 1:30 pm, Dr. Gabo Ordonez on 10/27/2022 at 12:00 pm.

## 2022-10-04 LAB
BACTERIA SPEC CULT: NORMAL
SERVICE CMNT-IMP: NORMAL

## 2022-10-05 LAB
BACTERIA SPEC CULT: NORMAL
SERVICE CMNT-IMP: NORMAL

## 2022-10-12 ENCOUNTER — HOME HEALTH ADMISSION (OUTPATIENT)
Dept: HOME HEALTH SERVICES | Facility: HOME HEALTH | Age: 72
End: 2022-10-12
Payer: MEDICARE

## 2022-10-15 ENCOUNTER — HOME CARE VISIT (OUTPATIENT)
Dept: SCHEDULING | Facility: HOME HEALTH | Age: 72
End: 2022-10-15
Payer: MEDICARE

## 2022-10-15 PROCEDURE — G0299 HHS/HOSPICE OF RN EA 15 MIN: HCPCS

## 2022-10-15 PROCEDURE — 400013 HH SOC

## 2022-10-15 NOTE — Clinical Note
Completed [address] 10/15/22 for disease and medication management. SN freq 1d1, 2w1, 1w4, 2 prn.

## 2022-10-17 ENCOUNTER — HOME CARE VISIT (OUTPATIENT)
Dept: HOME HEALTH SERVICES | Facility: HOME HEALTH | Age: 72
End: 2022-10-17
Payer: MEDICARE

## 2022-10-17 ENCOUNTER — HOME CARE VISIT (OUTPATIENT)
Dept: SCHEDULING | Facility: HOME HEALTH | Age: 72
End: 2022-10-17
Payer: MEDICARE

## 2022-10-17 VITALS
HEART RATE: 96 BPM | OXYGEN SATURATION: 97 % | DIASTOLIC BLOOD PRESSURE: 72 MMHG | RESPIRATION RATE: 18 BRPM | SYSTOLIC BLOOD PRESSURE: 140 MMHG | TEMPERATURE: 97 F

## 2022-10-17 PROCEDURE — G0151 HHCP-SERV OF PT,EA 15 MIN: HCPCS

## 2022-10-17 NOTE — Clinical Note
Trung Cota will be seeing this pt for treatment. Admitted with  DM - hyperglycemia      Please focus on:   1. seated and standing (when tolerating)  HEP  2. safety with transfers - improve strength to stand with no UE push off  3.  gait training in home and to mailbox and back with cane, and up and down  her platform step in and out of home . Pt will be going up and down flight of stairs daily doesn't feel she needs to work on these. 5. balance - BIGGEST GOAL to improve. ..high level challenges   6. return to PLOF or maximize indep.      Any questions give me a call    Blossom Sullivan

## 2022-10-17 NOTE — HOME HEALTH
Summary of clinical condition: 74 y.o. female with a PMHx of DTN, hypothyroidism, CKD3b who presented to the ED with c/o Symptoms started that day and described as-moderate to severe. She had multiple episodes of NV. vomiting, associated with malaise. Her daughter observed the patient take 8 units of Novolin this morning and the patient reported that her BG was 136. Her daughter had her recheck it and it read \"high\". The patient stated she was taking her  fantus, last took 20 units last night. Her [other] daughter  very recently, and the  was that day. She was unable to make it due to her symptoms, and when the daughter at bedside came back to check on her, she looked even worse and glucometer read \"error\" prompting her to call EMS. The patient denied fever, sob, cp, abd pain, dysuria. Medications unable to be reconciled daughter to bring current medications to beds lovely the following day. Medications reconciled, all listed medications are at home except: Ferrous needs to be icked up from pharmacy and  added Zofran. The following education was provided regarding medications, medication interactions, and look a like medications: N/A all meds reviewed. Discussed importance of compliance, timely taking all prescribed meds, proper dosage and freq. Teaching provided with high risk medication; Insulin Lantus S/E; headaches, weight gain, low BG and low K levels. Reviewed Metformin S/E; N/V, stomach ache, loss appetite and metallic. Medications are somewhat effective at this time. Caregiver: caregiver/gracielaer  is available to assist with daily meals, ADL's prn, assist with daily medications, run errands, groceries and MD apt. Skilled care provided: Teaching disease and medication, completed assessment. Completed [place] [id number] admission, explained POC, SNV freq and D/C plan to pt/CG with good understanding.     Patient education provided this visit to include: Discussed intervention to prevent infection; hand washing and avoiding sick person. Continue to monitor daily BG before meals and to administer Insulin 15 min's before meals. Discussed to avoid to skipping meals and snacks in bet meals including hs snacks. Ambulate q 2hrs as tolerated, safety and fall prec; clearing walkway, placing cane in easy access, avoid getting up too quickly when feeling weak, dizzy, and to call for assistance prn. Pacing activity/energy conservation; rest in bet activity and deep breathing exercises. Monitor for S/S of infection; fever 100.4, increase pain, redness, swelling,  coughing with yellow thick sputum, cloudy urine with strong odor, not feeling well 2-3 days, SOB, and to call [facility] or MD for assistance if experiencing any of these S/S. To call 911 with chest pains, facial drooping, difficulty talking, non arousable/unconscious and uncontrollable bleeding. Patient/caregiver degree of understanding: Pt/CG has good understanding of the teaching provided during visit. Home health supplies by type and quantity ordered/delivered this visit include: N/A    Pt./CG instructed on plan of care, PT, OT eval/treat, SN freq visit; 1d1, 2w1, 1w4,  2 prn and D/C plan. Home exercise program/Homework provided: Ambulation, HEP deep breathing exercises 10x when having SOB, pain and anxiety. Plan of care and admission to home health status called to attending physician: Dr. Олег Giordano MD was informed POC amd admission completed 10/15/22     Discharge planning discussed with patient and caregiver. Discharge planning as follows: Pt/CG will be able to manage disease and medication independently, and health condition stable. Pt/Caregiver did verbalize understanding of discharge planning. Patient/caregiver encouraged/instructed to keep appointment as lack of follow through with physician appointment could result in discontinuation of home care services for non-compliance.   The [address] of Rights was verbally reviewed and signed by pt on this visit. The patient or representative was given the opportunity to ask pertinent questions regarding the bill of rights. COVID - 23 Screening completed before visit:       Denies and no family member  has any of these S/S:    Fever, dry cough, sore throat diarrhea, body aches, chills, not feeling well and lost of taste.

## 2022-10-17 NOTE — Clinical Note
Theresa,     PT picking up pt 2w3.      Therapy Functional Score Assessment  Question                                  Score   Grooming 2                          Upper Dressing  2              Lower Dressing 2               Bathing   5                          Toilet Transfer  2             Transfer  1                        Ambulation  3                  Dyspnea  1                          Pain Interfering with activity   0   Est number therapy visits 6

## 2022-10-18 ENCOUNTER — HOME CARE VISIT (OUTPATIENT)
Dept: HOME HEALTH SERVICES | Facility: HOME HEALTH | Age: 72
End: 2022-10-18
Payer: MEDICARE

## 2022-10-18 VITALS
DIASTOLIC BLOOD PRESSURE: 66 MMHG | OXYGEN SATURATION: 98 % | SYSTOLIC BLOOD PRESSURE: 110 MMHG | TEMPERATURE: 97.8 F | HEART RATE: 82 BPM | RESPIRATION RATE: 17 BRPM

## 2022-10-18 NOTE — HOME HEALTH
Evaluation Summary: Ms. Va Valencia is a 74year old female being admitted to home health for PT/SN/OT services for DM event and hyperglycemia. The patient's caregiver/representative not present, & able and willing to provide care daily as needed (daughter). Patient participated in goal setting and care planning and are agreeable to the care plan. Discharge planning/training was initiated to maximize overall function and return to PLOF. PMHx:  Poorly controll ed diabetes   Hypertension. Relatively well controlled. Continue with current regimen and will monitor. Hypothyroidism   Chronic kidney disease stage IIIB. Chronic and stable. Nausea/vomiting. Vitamin/nutritional insufficiency. Multifactorial.   Constipation prophylaxis. eneralized Weakness. Multifactorial.   SUBJECTIVE:   Pt reports /10 pain today and increased to /10 in the past 24 hours. Pt denies falls. REQUIRES CAREGIVER ASSISTANCE FOR: daugther assists with meals, ADLs,  transportation, groceries, medication retrieval as needed  PLOF:  Pt lives alone in 2 level home with 1 step to enter. Pt was indep with use of cane in home and community, ADLs, and also was managing home tasks and medications. Pt's daughter, Rylee Dow, lives down the street, and helps with transportation, and laundry, and some cleaning. Pt does not drive. DME: cane, commode,  glucometer, rollator, bath chair  MEDICATIONS REVIEWED AND UPDATED: Medications reconciled and all medications are available in the home this visit. The following education was provided regarding high risk medications, (metformin, insulin, and [place] - pt educated on DM and taking BS daily, following a DM diet, and also to observe dayron feet daily) medication interactions, and look a like medications: Continue medication as prescribed by MD. Medications are effective at this time.   Clinician instructed patient/CG on proper disposal of sharps: Containers should be made of hard plastic, be puncture-resistant and leakproof,   such as a laundry detergent or bleach bottle.  When the container is ¾ full, it should be sealed with tape and labeled   DO NOT RECYCLE prior to discarding in the regular trash.    NEXT MD APPT:  TBD  ROM:  WNL  STRENGTH:  see strength section for details. WOUNDS:none reported or observed see nursing notes  BED MOBILITY: sit <> sup indep per pt report. NT as pt came downstairs for eval and did not have pt go back up for assessment. TRANSFERS: sit to stand from couch, chair, toilet with SBA but cues for proper hand placement for safety and proper technique. Pt needing to use UE to pull or push up from chair or couch and needing increased time and effort. GAIT: pt ambulated  20' x2, with cane and Close CGA/SBA. Pt crossing midline at times and needing furniture for assist or steady self as needed. STAIRS: observed coming down full flight of steps with rail and cane and SBA. Step to gait pattern and would recommend SBA for safety at this time. BALANCE: Pt scored 11/28 on [employer] Balance Assessment placing pt at high risk for falls. PATIENT EDUCATION PROVIDED THIS VISIT: safety for transfers and hand placement, walk daily and safety on steps, deep breathing/PLB,  signs and symptoms of infection,  clearing obstacles and clear pathways to prevent falls as well as proper lighting at night time   Patient Level of understanding of education provided: pt able to  verbalize and agree to POT. Patient response to treatment: Pt reported no increase in pain throughout treatment. CONTINUED NEED FOR THE FOLLOWING SKILLS: HH PT is medically necessary to address pain,  decreased strength and endurance, decreased independence with functional transfers, impaired gait, impaired stair negotiation, and impaired balance in order to improve functional independence, quality of life, return to PLOF, and reduce the risk for falls. PLAN:  2w3.    DC OT services per pt request.  Janet Westbrook in Dr. Luigi Cobb office notified the request to [facility] OT services. Scheduling made aware.    DISCHARGE PLANNING DISCUSSED: Discharge to self and family under MD supervision once all goals have been met or patient has reached max potential.

## 2022-10-19 ENCOUNTER — HOME CARE VISIT (OUTPATIENT)
Dept: SCHEDULING | Facility: HOME HEALTH | Age: 72
End: 2022-10-19
Payer: MEDICARE

## 2022-10-19 VITALS
RESPIRATION RATE: 18 BRPM | OXYGEN SATURATION: 97 % | SYSTOLIC BLOOD PRESSURE: 125 MMHG | HEART RATE: 89 BPM | DIASTOLIC BLOOD PRESSURE: 90 MMHG

## 2022-10-19 PROCEDURE — G0157 HHC PT ASSISTANT EA 15: HCPCS

## 2022-10-19 NOTE — HOME HEALTH
Patient's Subjective: I fine. Falls since last session: No  Pain/Discomfort ? No  New Meds: No  Upcoming MD appointments:Unknown  . Caregiver involvement/assistance needed for: The patient's caregiver assists with meals from her insurance company, her daughter also cooks, transportation, some ADL's, cleaning  . Home health supplies by type and quantity ordered/delivered this visit include: none  . Objective:  See interventions. Patient response to treatment:    Fait tolerance to her exercises and gait. She denies any pain or discomfort pre and post PT session. Patient level of understanding of education provided:  -The patient and her daughter have been instructed in the following medicine education:   If there are any medicines/supplements (by MD order or OTC) added or DC 'd let [facility] staff know and have bottles/packages available. It is necessary to keep an accurate record of meds to avoid any medication errors. They reported understanding.   - Eduction for safe amb, shoe wear, exercises, HEP  She reported understanding and could return demonstrate the exercises. Her daughter will assist with the HEP. Assess of progress towards goals:   She denies any pain or discomfort in > 24 hours. Pt was able to return demonstrate her exercises today in prep for her HEP. Continued need for the following skills: Home Health PT is medically necessary to address the following:  decreased B LE strength,  impaired bed mobility, decreased Ind with functional transfers, impaired gait, impaired stair negotiation and impaired stability to decrease sx, improve functional Ind, quality of life, reduce the fall risk. Plan for next visit: Assess indoor transfers and progress with exercises.     The following discharge was discussed with the patient/caregiver :     Estimated PT DC date 11/2/2022 BALTAZAR Ayers Parents, PT [facility] needed Yes

## 2022-10-20 ENCOUNTER — HOME CARE VISIT (OUTPATIENT)
Dept: SCHEDULING | Facility: HOME HEALTH | Age: 72
End: 2022-10-20
Payer: MEDICARE

## 2022-10-20 PROCEDURE — G0300 HHS/HOSPICE OF LPN EA 15 MIN: HCPCS

## 2022-10-21 VITALS
SYSTOLIC BLOOD PRESSURE: 133 MMHG | HEART RATE: 67 BPM | RESPIRATION RATE: 16 BRPM | DIASTOLIC BLOOD PRESSURE: 63 MMHG | TEMPERATURE: 97.7 F | OXYGEN SATURATION: 98 %

## 2022-10-24 ENCOUNTER — HOME CARE VISIT (OUTPATIENT)
Dept: HOME HEALTH SERVICES | Facility: HOME HEALTH | Age: 72
End: 2022-10-24
Payer: MEDICARE

## 2022-10-26 ENCOUNTER — HOME CARE VISIT (OUTPATIENT)
Dept: HOME HEALTH SERVICES | Facility: HOME HEALTH | Age: 72
End: 2022-10-26
Payer: MEDICARE

## 2022-10-28 ENCOUNTER — HOME CARE VISIT (OUTPATIENT)
Dept: HOME HEALTH SERVICES | Facility: HOME HEALTH | Age: 72
End: 2022-10-28
Payer: MEDICARE

## 2022-10-31 ENCOUNTER — HOME CARE VISIT (OUTPATIENT)
Dept: SCHEDULING | Facility: HOME HEALTH | Age: 72
End: 2022-10-31
Payer: MEDICARE

## 2022-10-31 VITALS
HEART RATE: 104 BPM | OXYGEN SATURATION: 96 % | RESPIRATION RATE: 16 BRPM | TEMPERATURE: 96.8 F | SYSTOLIC BLOOD PRESSURE: 140 MMHG | DIASTOLIC BLOOD PRESSURE: 90 MMHG

## 2022-10-31 PROCEDURE — G0157 HHC PT ASSISTANT EA 15: HCPCS

## 2022-10-31 NOTE — HOME HEALTH
Patient's Subjective: The patient reports feeling better now. She has some L knee pain only. Falls since last session: No  Pain/Discomfort ? Yes- see pain page  New Meds: No   Upcoming MD appointments: Unknown. Caregiver involvement/assistance needed for: The patient's caregiver assists with meals from her insurance company, her daughter also cooks, transportation, some ADL's, cleaning    . Home health supplies by type and quantity ordered/delivered this visit include:  none  . Objective:  See interventions. Patient response to treatment:    Poor+ tolerance today due to recent illness and no energy. She has been down for ~ 2 weeks. Patient level of understanding of education provided:  -The patient has been instructed in the following medicine education:   If there are any medicines/supplements (by MD order or OTC) added or DC 'd let [facility] staff know and have bottles/packages available. It is necessary to keep an accurate record of meds to avoid any medication errors. She reports understanding.   - Re education for correct exercises and HEP.   - Education for safe stand to sit transfers. She was unable to correct it due to fatigue.   - Cont with HEP even though she is tired, even if a small number of reps to increase her activity tolerance. She reports that she will. Assess of progress towards goals:   Pt able to return demonstrate her exercises post re education at 5-10 reps. No falls. Pt is Ind with bed mobility to achieve her bed mobility goal.     Continued need for the following skills: Home Health PT is medically necessary to address the following:  decreased B LE strength,  impaired bed mobility, decreased Ind with functional transfers, impaired gait, impaired stair negotiation and impaired stability to decrease sx, improve functional Ind, quality of life, reduce the fall risk. Plan for next visit: Assess indoor transfers and progress with exercises.     The following discharge was discussed with the patient/caregiver :   Estimated PT DC date 11/2/2022 TBD Prashanth Yang, PT, although due to recent hold due to the flu. she may benefit from cont PT. PT will reassess ans  discuss with pt.    Radha 30 needed Yes

## 2022-11-02 ENCOUNTER — HOME CARE VISIT (OUTPATIENT)
Dept: SCHEDULING | Facility: HOME HEALTH | Age: 72
End: 2022-11-02
Payer: MEDICARE

## 2022-11-02 VITALS
HEART RATE: 96 BPM | DIASTOLIC BLOOD PRESSURE: 62 MMHG | SYSTOLIC BLOOD PRESSURE: 122 MMHG | OXYGEN SATURATION: 99 % | TEMPERATURE: 98.2 F | RESPIRATION RATE: 16 BRPM

## 2022-11-02 PROCEDURE — G0151 HHCP-SERV OF PT,EA 15 MIN: HCPCS

## 2022-11-02 PROCEDURE — G0300 HHS/HOSPICE OF LPN EA 15 MIN: HCPCS

## 2022-11-03 VITALS
HEART RATE: 64 BPM | RESPIRATION RATE: 16 BRPM | TEMPERATURE: 97.7 F | SYSTOLIC BLOOD PRESSURE: 117 MMHG | OXYGEN SATURATION: 98 % | DIASTOLIC BLOOD PRESSURE: 67 MMHG

## 2022-11-04 NOTE — HOME HEALTH
SKILLED REASON for visit: teaching hyperglycemia CAREGIVER INVOLVEMENT: is available as needed for assistance with iadls, adls, meal prep, medication management, taking to md appointments. MEDICATIONS: reviewed and all medications are available in the home this visit. Patient denies any medication changes at this time of assessment. EDUCATION PROVIDED: regarding medications, medication interactions, and look alike medications (specify): reviewed side effects, purposes, dosage, frequencies. High risk medication teaching regarding anticoagulants, hyperglycemic agents or opiod narcotics performed (specify) na Medications are effective at this time. SUPPLIES: by type and quantity ordered/delivered this visit include: PATIENT EDUCATION ON THIS VISIT: PATIENT LEVEL OF UNDERSTANDING: comprehension education that was provided at this time by engaging in all education provided and is able to verbalize understanding, pt denies any questions or concerns at this time. SKILLED CARE PERFORMED THIS VISIT SN discussed dietary adjustments such as: reduce portion sizes, limit daily carbohydrate intake to not greater than 45-60 grams per meal for a total of <180 grams of carbohydrate daily, avoid concentrated carbohydrates such as soft drinks, sweet tea, and sweet treats and to increase physical activity along with strength training as tolerated to facilitate weight loss and build muscle mass PATIENT RESPONSE TO PROCEDURE PERFORMED: Agency Progress toward goals: goals/teaching reviewed. patient is progressing towards goals at this time, Patient's Progress towards personal goals: pt reporting they are progressing towards their goals at this time.  Home exercise program:hep deep breathing   Continued need for the following skills: dc next week-PT REQUESTED   Plan for next visit: routine Patient and/or caregiver notified and agrees to changes in the Plan of Care NA The following discharge planning was discussed with the pt/caregiver: Patient will be discharged once education has completed, patient is medically stable and independent with care.  Sharps used

## 2022-11-12 ENCOUNTER — HOME CARE VISIT (OUTPATIENT)
Dept: SCHEDULING | Facility: HOME HEALTH | Age: 72
End: 2022-11-12
Payer: MEDICARE

## 2022-11-12 PROCEDURE — G0299 HHS/HOSPICE OF RN EA 15 MIN: HCPCS

## 2022-11-15 VITALS
RESPIRATION RATE: 20 BRPM | SYSTOLIC BLOOD PRESSURE: 130 MMHG | TEMPERATURE: 97.9 F | DIASTOLIC BLOOD PRESSURE: 82 MMHG | OXYGEN SATURATION: 98 % | HEART RATE: 94 BPM

## 2022-11-16 NOTE — HOME HEALTH
Skilled reason for visit: 69-year old female who was being seen by home health services due to type II DM with hyperglycemia. Patient's sugar is 102 today, states that her sugars are running much better than before and she is feeling better. Edgar has follow up with her PCP Dr. Modesto Eubanks on 11/25/22. Patient discharged from home health at this time as goals have been met and is able to manage her care with the help of her daughter. Caregiver involvement: daughter-able to assist with ADL's, medications, meal preparation, transportation to MD appointments. Medications reconciled and all medications are available in the home this visit. The following education was provided regarding medications:  side effects, dosages, purposes, frequencies. MD notified of any discrepancies/look a-like medications/medication interactions: No issues found. Medications are effective at this time. Patient education provided this visit:   INSTRUCTED PATIENT AND CG THAT SHOULD ANY NEEDS OR CONCERNS ARISE TO FIRST CALL OUR OFFICE, OR THE DR'S OFFICE  OR GO TO AN URGENT CARE CENTER AND NOT TO THE ED FOR NON-LIFE THREATENING EVENTS. IF IT IS LIFE THREATENING THEN CALL 911 OR GO TO THE CLOSEST ER. Patient is a fall risk. Educated pateint to sit on the side of the chair/bed, take a slow deep breaths, have feet firmly planted before standing up, use cane/walker if available, or have someone to assist.  Pt instructed to follow with diabetic diet- monitoring sugar intake, limiting foods with high sugar content.  SN discussed dietary adjustments such as: reduce portion sizes, limit daily carbohydrate intake to not greater than 45-60 grams per meal for a total of <180 grams of carbohydrate daily, avoid concentrated carbohydrates such as soft drinks, sweet tea, and sweet treats and to increase physical activity along with strength training as tolerated to facilitate weight loss and build muscle mass  reviewed cardiac diet- monitoring sodium intake, cholesterol and fat intake. patient aware to limit sodium, no added sodium to diet. reviewed foods to avoid, how to order foods when eating out, how to read nutrition labels and measure sodium, cholesterol and fat intake. patient instructed to maintain clear pathways in home and to minimize clutter to prevent falls from occurring/minimize fall potential.  Pt/cg aware of disease process, s/s to monitor for, who to report to/when. Sharps education provided: Clinician instructed patient/CG on proper disposal of sharps: Containers should be made of hard plastic, be puncture-resistant and leakproof,   such as a laundry detergent or bleach bottle.  When the container is ¾ full, it should be sealed with tape and labeled   DO NOT RECYCLE prior to discarding in the regular trash.      Patient level of understanding of education provided: Good, verbalized understanding. Home exercise program: Take medications as prescribed, monitor blood sugars, follow diabetic/cardiac diet, keep all MD appointments.

## 2022-12-13 ENCOUNTER — TRANSCRIBE ORDER (OUTPATIENT)
Dept: SCHEDULING | Age: 72
End: 2022-12-13

## 2022-12-13 DIAGNOSIS — R63.4 LOSS OF WEIGHT: Primary | ICD-10-CM

## 2022-12-13 NOTE — PERIOP NOTES
PRE-SURGICAL INSTRUCTIONS        Patient's Name:  Chelita Barr      PNMYB'O Date:  12/13/2022            Covid Testing Date and Time:    Surgery Date:  12/16/2022                Do NOT eat or drink anything, including candy, gum, or ice chips after midnight on 12/16, unless you have specific instructions from your surgeon or anesthesia provider to do so. You may brush your teeth before coming to the hospital.  No smoking 24 hours prior to the day of surgery. No alcohol 24 hours prior to the day of surgery. No recreational drugs for one week prior to the day of surgery. Leave all valuables, including money/purse, at home. Remove all jewelry, nail polish, acrylic nails, and makeup (including mascara); no lotions powders, deodorant, or perfume/cologne/after shave on the skin. Follow instruction for Hibiclens washes and CHG wipes from surgeon's office. Glasses/contact lenses and dentures may be worn to the hospital.  They will be removed prior to surgery. Call your doctor if symptoms of a cold or illness develop within 24-48 hours prior to your surgery. 11.  If you are having an outpatient procedure, please make arrangements for a responsible ADULT TO [address] [place] [address] and stay with you for 24 hours after your surgery. 12. ONE VISITOR in the hospital at this time for outpatient procedures. Exceptions may be made for surgical admissions, per nursing unit guidelines      Special Instructions:      Bring list of CURRENT medications. Bring any pertinent legal medical records. Take these medications the morning of surgery with a sip of water:  per office  Follow physician instructions about insulin. Complete bowel prep per MD instructions. On the day of surgery, come in the main entrance of [facility]. Let the  at the desk know you are there for surgery. A staff member will come escort you to the surgical area on the second floor.     If you have any questions or concerns, please do not hesitate to call:     (Prior to the day of surgery) [facility] department:  [phone]   (Day of surgery) Pre-Op department:  [phone]    These surgical instructions were reviewed with the patient during the [facility] phone call.

## 2022-12-15 ENCOUNTER — ANESTHESIA EVENT (OUTPATIENT)
Dept: ENDOSCOPY | Age: 72
End: 2022-12-15
Payer: MEDICARE

## 2022-12-16 ENCOUNTER — ANESTHESIA (OUTPATIENT)
Dept: ENDOSCOPY | Age: 72
End: 2022-12-16
Payer: MEDICARE

## 2022-12-16 ENCOUNTER — HOSPITAL ENCOUNTER (OUTPATIENT)
Age: 72
Setting detail: OUTPATIENT SURGERY
Discharge: HOME | End: 2022-12-16
Attending: INTERNAL MEDICINE | Admitting: INTERNAL MEDICINE
Payer: MEDICARE

## 2022-12-16 VITALS
RESPIRATION RATE: 15 BRPM | BODY MASS INDEX: 22.98 KG/M2 | OXYGEN SATURATION: 100 % | HEIGHT: 66 IN | SYSTOLIC BLOOD PRESSURE: 121 MMHG | WEIGHT: 143 LBS | TEMPERATURE: 97.5 F | HEART RATE: 79 BPM | DIASTOLIC BLOOD PRESSURE: 75 MMHG

## 2022-12-16 LAB
GLUCOSE BLD STRIP.AUTO-MCNC: 146 MG/DL (ref 70–110)
GLUCOSE BLD STRIP.AUTO-MCNC: 156 MG/DL (ref 70–110)

## 2022-12-16 PROCEDURE — 77030013992 HC SNR POLYP ENDOSC BSC -B: Performed by: INTERNAL MEDICINE

## 2022-12-16 PROCEDURE — 77030021593 HC FCPS BIOP ENDOSC BSC -A: Performed by: INTERNAL MEDICINE

## 2022-12-16 PROCEDURE — 77030019988 HC FCPS ENDOSC DISP BSC -B: Performed by: INTERNAL MEDICINE

## 2022-12-16 PROCEDURE — 74011250636 HC RX REV CODE- 250/636: Performed by: NURSE ANESTHETIST, CERTIFIED REGISTERED

## 2022-12-16 PROCEDURE — 88305 TISSUE EXAM BY PATHOLOGIST: CPT

## 2022-12-16 PROCEDURE — 74011000250 HC RX REV CODE- 250: Performed by: ANESTHESIOLOGY

## 2022-12-16 PROCEDURE — 74011250636 HC RX REV CODE- 250/636: Performed by: ANESTHESIOLOGY

## 2022-12-16 PROCEDURE — 76040000007: Performed by: INTERNAL MEDICINE

## 2022-12-16 PROCEDURE — 2709999900 HC NON-CHARGEABLE SUPPLY: Performed by: INTERNAL MEDICINE

## 2022-12-16 PROCEDURE — 76060000032 HC ANESTHESIA 0.5 TO 1 HR: Performed by: INTERNAL MEDICINE

## 2022-12-16 PROCEDURE — 74011000250 HC RX REV CODE- 250: Performed by: NURSE ANESTHETIST, CERTIFIED REGISTERED

## 2022-12-16 PROCEDURE — 77030008565 HC TBNG SUC IRR ERBE -B: Performed by: INTERNAL MEDICINE

## 2022-12-16 PROCEDURE — 82962 GLUCOSE BLOOD TEST: CPT

## 2022-12-16 RX ORDER — SODIUM CHLORIDE, SODIUM LACTATE, POTASSIUM CHLORIDE, CALCIUM CHLORIDE 600; 310; 30; 20 MG/100ML; MG/100ML; MG/100ML; MG/100ML
50 INJECTION, SOLUTION INTRAVENOUS CONTINUOUS
Status: DISCONTINUED | OUTPATIENT
Start: 2022-12-16 | End: 2022-12-16 | Stop reason: HOSPADM

## 2022-12-16 RX ORDER — PROPOFOL 10 MG/ML
INJECTION, EMULSION INTRAVENOUS AS NEEDED
Status: DISCONTINUED | OUTPATIENT
Start: 2022-12-16 | End: 2022-12-16 | Stop reason: HOSPADM

## 2022-12-16 RX ORDER — LIDOCAINE HYDROCHLORIDE 10 MG/ML
0.1 INJECTION, SOLUTION EPIDURAL; INFILTRATION; INTRACAUDAL; PERINEURAL AS NEEDED
Status: DISCONTINUED | OUTPATIENT
Start: 2022-12-16 | End: 2022-12-16 | Stop reason: HOSPADM

## 2022-12-16 RX ORDER — INSULIN LISPRO 100 [IU]/ML
INJECTION, SOLUTION INTRAVENOUS; SUBCUTANEOUS ONCE
Status: DISCONTINUED | OUTPATIENT
Start: 2022-12-16 | End: 2022-12-16 | Stop reason: HOSPADM

## 2022-12-16 RX ORDER — LIDOCAINE HYDROCHLORIDE 20 MG/ML
INJECTION, SOLUTION EPIDURAL; INFILTRATION; INTRACAUDAL; PERINEURAL AS NEEDED
Status: DISCONTINUED | OUTPATIENT
Start: 2022-12-16 | End: 2022-12-16 | Stop reason: HOSPADM

## 2022-12-16 RX ORDER — SODIUM CHLORIDE 0.9 % (FLUSH) 0.9 %
5-40 SYRINGE (ML) INJECTION EVERY 8 HOURS
Status: DISCONTINUED | OUTPATIENT
Start: 2022-12-16 | End: 2022-12-16 | Stop reason: HOSPADM

## 2022-12-16 RX ORDER — MAGNESIUM SULFATE 100 %
4 CRYSTALS MISCELLANEOUS AS NEEDED
Status: DISCONTINUED | OUTPATIENT
Start: 2022-12-16 | End: 2022-12-16 | Stop reason: HOSPADM

## 2022-12-16 RX ORDER — SODIUM CHLORIDE 0.9 % (FLUSH) 0.9 %
5-40 SYRINGE (ML) INJECTION AS NEEDED
Status: DISCONTINUED | OUTPATIENT
Start: 2022-12-16 | End: 2022-12-16 | Stop reason: HOSPADM

## 2022-12-16 RX ADMIN — PROPOFOL 150 MG: 10 INJECTION, EMULSION INTRAVENOUS at 09:59

## 2022-12-16 RX ADMIN — PROPOFOL 100 MG: 10 INJECTION, EMULSION INTRAVENOUS at 10:10

## 2022-12-16 RX ADMIN — FAMOTIDINE 20 MG: 10 INJECTION, SOLUTION INTRAVENOUS at 09:12

## 2022-12-16 RX ADMIN — SODIUM CHLORIDE, SODIUM LACTATE, POTASSIUM CHLORIDE, AND CALCIUM CHLORIDE 50 ML/HR: 600; 310; 30; 20 INJECTION, SOLUTION INTRAVENOUS at 09:12

## 2022-12-16 RX ADMIN — LIDOCAINE HYDROCHLORIDE 80 MG: 20 INJECTION, SOLUTION EPIDURAL; INFILTRATION; INTRACAUDAL; PERINEURAL at 09:59

## 2022-12-16 NOTE — PROCEDURES
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 Evan Serrato MD    Assistant(s): @[facility]@    Type of Anesthesia:MAC     EBL:None, no implants.     Specimens:   ID Type Source Tests Collected by Time Destination   1 : cecal polyp Preservative Cecum  Dexter Boles MD 12/16/2022 1012 Pathology   2 : transverse polyp Preservative Colon, Transverse  Dexter Boles MD 12/16/2022 1014 Pathology   3 : rectal polyp Preservative Rectum  Dexter Boles MD 12/16/2022 1027 Pathology       Findings: See printed and scanned procedure note    Complications: None    Dr. Evan Serrato MD  12/16/2022  10:31 AM

## 2022-12-16 NOTE — H&P
History and Physical reviewed; I have examined the patient and there are no pertinent changes. Geovani Concepcion MD, MD   9:54 AM 12/16/2022  [facility], [address] [place] [address]  [email or web address] Mailed out

## 2022-12-16 NOTE — ANESTHESIA PREPROCEDURE EVALUATION
Relevant Problems   No relevant active problems       Anesthetic History   No history of anesthetic complications            Review of Systems / Medical History  Patient summary reviewed and pertinent labs reviewed    Pulmonary  Within defined limits                 Neuro/Psych   Within defined limits           Cardiovascular  Within defined limits  Hypertension              Exercise tolerance: >4 METS     GI/Hepatic/Renal         Renal disease: CRI       Endo/Other    Diabetes: poorly controlled, type 2, using insulin  Hypothyroidism  Anemia     Other Findings              Physical Exam    Airway  Mallampati: I  TM Distance: 4 - 6 cm  Neck ROM: normal range of motion   Mouth opening: Normal     Cardiovascular  Regular rate and rhythm,  S1 and S2 normal,  no murmur, click, rub, or gallop  Rhythm: regular  Rate: normal         Dental    Dentition: Lower dentition intact and Upper partial plate     Pulmonary  Breath sounds clear to auscultation               Abdominal  GI exam deferred       Other Findings            Anesthetic Plan    ASA: 3  Anesthesia type: MAC          Induction: Intravenous  Anesthetic plan and risks discussed with: Patient

## 2022-12-16 NOTE — ANESTHESIA POSTPROCEDURE EVALUATION
Procedure(s):  UPPER ENDOSCOPY  COLONOSCOPY w/ polypectomies. MAC    Anesthesia Post Evaluation      Multimodal analgesia: multimodal analgesia used between 6 hours prior to anesthesia start to PACU discharge  Patient location during evaluation: bedside  Patient participation: complete - patient participated  Level of consciousness: awake  Pain score: 0  Pain management: adequate  Airway patency: patent  Anesthetic complications: no  Cardiovascular status: stable  Respiratory status: acceptable  Hydration status: acceptable  Post anesthesia nausea and vomiting:  controlled  Final Post Anesthesia Temperature Assessment:  Normothermia (36.0-37.5 degrees C)      INITIAL Post-op Vital signs:   Vitals Value Taken Time   /69 12/16/22 1105   Temp 36.5 °C (97.7 °F) 12/16/22 1027   Pulse 76 12/16/22 1108   Resp 15 12/16/22 1108   SpO2 99 % 12/16/22 1108   Vitals shown include unvalidated device data.

## 2022-12-16 NOTE — DISCHARGE INSTRUCTIONS
Upper GI Endoscopy: What to Expect at [address] had an upper GI endoscopy. Your doctor used a thin, lighted tube that bends to look at the inside of your esophagus, your stomach, and the first part of the small intestine, called the duodenum. After you have an endoscopy, you will stay at the hospital or clinic for 1 to 2 hours. This will allow the medicine to wear off. You will be able to go home after your doctor or nurse checks to make sure that you're not having any problems. You may have to stay overnight if you had treatment during the test. You may have a sore throat for a day or two after the test.  This care sheet gives you a general idea about what to expect after the test.  How can you care for yourself at home? Activity   Rest as much as you need to after you go home. You should be able to go back to your usual activities the day after the test.  Diet   Follow your doctor's directions for eating after the test.  Drink plenty of fluids (unless your doctor has told you not to). Medications   If you have a sore throat the day after the test, use an over-the-counter spray to numb your throat. Follow-up care is a key part of your treatment and safety. Be sure to make and go to all appointments, and call your doctor if you are having problems. It's also a good idea to know your test results and keep a list of the medicines you take. When should you call for help? Call 911 anytime you think you may need emergency care. For example, call if:    You passed out (lost consciousness). You have trouble breathing. You pass maroon or bloody stools. Call your doctor now or seek immediate medical care if:    You have pain that does not get better after your take pain medicine. You have new or worse belly pain. You have blood in your stools. You are sick to your stomach and cannot keep fluids down. You have a fever. You cannot pass stools or gas.    Watch closely for changes in your health, and be sure to contact your doctor if:    Your throat still hurts after a day or two. You do not get better as expected. Where can you learn more? Go to http://www.Leversense.com/  Enter J454 in the search box to learn more about \"Upper GI Endoscopy: What to Expect at Home. \"  Current as of: September 8, 2021               Content Version: 13.2  © 2006-2022 [employer]. Care instructions adapted under license by [employer] (which disclaims liability or warranty for this information). If you have questions about a medical condition or this instruction, always ask your healthcare professional. [place] [address] any warranty or liability for your use of this information. Colonoscopy: What to Expect at [address] [place] [address]  After a colonoscopy, you'll stay at the clinic until you wake up. Then you can go home. But you'll need to arrange for a ride. Your doctor will tell you when you can eat and do your other usual activities. Your doctor will talk to you about when you'll need your next colonoscopy. Your doctor can help you decide how often you need to be checked. This will depend on the results of your test and your risk for colorectal cancer. After the test, you may be bloated or have gas pains. You may need to pass gas. If a biopsy was done or a polyp was removed, you may have streaks of blood in your stool (feces) for a few days. Problems such as heavy rectal bleeding may not occur until several weeks after the test. This isn't common. But it can happen after polyps are removed. This care sheet gives you a general idea about how long it will take for you to recover. But each person recovers at a different pace. Follow the steps below to get better as quickly as possible. How can you care for yourself at home? Activity    Rest when you feel tired. You can do your normal activities when it feels okay to do so. Diet    Follow your doctor's directions for eating. Unless your doctor has told you not to, drink plenty of fluids. This helps to replace the fluids that were lost during the colon prep. Do not drink alcohol. Medicines    Your doctor will tell you if and when you can restart your medicines. You will also be given instructions about taking any new medicines. If you take aspirin or some other blood thinner, ask your doctor if and when to start taking it again. Make sure that you understand exactly what your doctor wants you to do. If polyps were removed or a biopsy was done during the test, your doctor may tell you not to take aspirin or other anti-inflammatory medicines for a few days. These include ibuprofen (Advil, Motrin) and naproxen (Aleve). Other instructions    For your safety, do not drive or operate machinery until the medicine wears off and you can think clearly. Your doctor may tell you not to drive or operate machinery until the day after your test.     Do not sign legal documents or make major decisions until the medicine wears off and you can think clearly. The anesthesia can make it hard for you to fully understand what you are agreeing to. Follow-up care is a key part of your treatment and safety. Be sure to make and go to all appointments, and call your doctor if you are having problems. It's also a good idea to know your test results and keep a list of the medicines you take. When should you call for help? Call 911 anytime you think you may need emergency care. For example, call if:    You passed out (lost consciousness). You pass maroon or bloody stools. You have trouble breathing. Call your doctor now or seek immediate medical care if:    You have pain that does not get better after you take pain medicine. You are sick to your stomach or cannot drink fluids. You have new or worse belly pain. You have blood in your stools. You have a fever. You cannot pass stools or gas. Watch closely for changes in your health, and be sure to contact your doctor if you have any problems. Where can you learn more? Go to http://www.gray.com/  Enter E264 in the search box to learn more about \"Colonoscopy: What to Expect at Home. \"  Current as of: September 8, 2021               Content Version: 13.2  © 2006-2022 [employer]. Care instructions adapted under license by [employer] (which disclaims liability or warranty for this information). If you have questions about a medical condition or this instruction, always ask your healthcare professional. [place] [address] any warranty or liability for your use of this information. Colon Polyps: Care Instructions  Your Care Instructions     Colon polyps are growths in the colon or the rectum. The cause of most colon polyps is not known, and most people who get them do not have any problems. But a certain kind can turn into cancer. For this reason, regular testing for colon polyps is important for people as they get older. It is also important for anyone who has an increased risk for colon cancer. Polyps are usually found through routine colon cancer screening tests. Although most colon polyps are not cancerous, they are usually removed and then tested for cancer. Screening for colon cancer saves lives because the cancer can usually be cured if it is caught early. If you have a polyp that is the type that can turn into cancer, you may need more tests to examine your entire colon. The doctor will remove any other polyps that are found, and you will be tested more often. Follow-up care is a key part of your treatment and safety. Be sure to make and go to all appointments, and call your doctor if you are having problems. It's also a good idea to know your test results and keep a list of the medicines you take.   How can you care for yourself at home?  Regular exams to look for colon polyps are the best way to prevent polyps from turning into colon cancer. These can include stool tests, sigmoidoscopy, colonoscopy, and CT colonography. Talk with your doctor about a testing schedule that is right for you. To prevent polyps  There is no home treatment that can prevent colon polyps. But these steps may help lower your risk for cancer. Stay active. Being active can help you get to and stay at a healthy weight. Try to exercise on most days of the week. Walking is a good choice. Eat well. Choose a variety of vegetables, fruits, legumes (such as peas and beans), fish, poultry, and whole grains. Do not smoke. If you need help quitting, talk to your doctor about stop-smoking programs and medicines. These can increase your chances of quitting for good. If you drink alcohol, limit how much you drink. Limit alcohol to 2 drinks a day for men and 1 drink a day for women. When should you call for help? Call your doctor now or seek immediate medical care if:    You have severe belly pain. Your stools are maroon or very bloody. Watch closely for changes in your health, and be sure to contact your doctor if:    You have a fever. You have nausea or vomiting. You have a change in bowel habits (new constipation or diarrhea). Your symptoms get worse or are not improving as expected. Where can you learn more? Go to http://tae-andree.info/  Enter C571 in the search box to learn more about \"Colon Polyps: Care Instructions. \"  Current as of: June 6, 2022               Content Version: 13.4  © 2006-2022 [employer]. Care instructions adapted under license by [employer] (which disclaims liability or warranty for this information).  If you have questions about a medical condition or this instruction, always ask your healthcare professional. [place] [address] any warranty or liability for your use of this information. Colon Polyps: Care Instructions  Your Care Instructions     Colon polyps are growths in the colon or the rectum. The cause of most colon polyps is not known, and most people who get them do not have any problems. But a certain kind can turn into cancer. For this reason, regular testing for colon polyps is important for people as they get older. It is also important for anyone who has an increased risk for colon cancer. Polyps are usually found through routine colon cancer screening tests. Although most colon polyps are not cancerous, they are usually removed and then tested for cancer. Screening for colon cancer saves lives because the cancer can usually be cured if it is caught early. If you have a polyp that is the type that can turn into cancer, you may need more tests to examine your entire colon. The doctor will remove any other polyps that are found, and you will be tested more often. Follow-up care is a key part of your treatment and safety. Be sure to make and go to all appointments, and call your doctor if you are having problems. It's also a good idea to know your test results and keep a list of the medicines you take. How can you care for yourself at home? Regular exams to look for colon polyps are the best way to prevent polyps from turning into colon cancer. These can include stool tests, sigmoidoscopy, colonoscopy, and CT colonography. Talk with your doctor about a testing schedule that is right for you. To prevent polyps  There is no home treatment that can prevent colon polyps. But these steps may help lower your risk for cancer. Stay active. Being active can help you get to and stay at a healthy weight. Try to exercise on most days of the week. Walking is a good choice. Eat well. Choose a variety of vegetables, fruits, legumes (such as peas and beans), fish, poultry, and whole grains. Do not smoke.  If you need help quitting, talk to your doctor about stop-smoking programs and medicines. These can increase your chances of quitting for good. If you drink alcohol, limit how much you drink. Limit alcohol to 2 drinks a day for men and 1 drink a day for women. When should you call for help? Call your doctor now or seek immediate medical care if:    You have severe belly pain. Your stools are maroon or very bloody. Watch closely for changes in your health, and be sure to contact your doctor if:    You have a fever. You have nausea or vomiting. You have a change in bowel habits (new constipation or diarrhea). Your symptoms get worse or are not improving as expected. Where can you learn more? Go to http://www.granados.com/  Enter C571 in the search box to learn more about \"Colon Polyps: Care Instructions. \"  Current as of: June 6, 2022               Content Version: 13.4  © 3446-4581 [employer]. Care instructions adapted under license by [employer] (which disclaims liability or warranty for this information). If you have questions about a medical condition or this instruction, always ask your healthcare professional. [place] [address] any warranty or liability for your use of this information. DISCHARGE SUMMARY from Nurse     POST-PROCEDURE INSTRUCTIONS:    Call your Physician if you:  Observe any excess bleeding. Develop a temperature over 100.5o F. Experience abdominal, shoulder or chest pain. Notice any signs of decreased circulation or nerve impairment to an extremity such as a change in color, persistent numbness, tingling, coldness or increase in pain. Vomit blood or you have nausea and vomiting lasting longer than 4 hours. Are unable to take medications. Are unable to urinate within 8 hours after discharge following general anesthesia or intravenous sedation.     For the next 24 hours after receiving general anesthesia or intravenous sedation, or while taking prescription Narcotics, limit your activities:  Do NOT drive a motor vehicle, operate hazard machinery or power tools, or perform tasks that require coordination. The medication you received during your procedure may have some effect on your mental awareness. Do NOT make important personal or business decisions. The medication you received during your procedure may have some effect on your mental awareness. Do NOT drink alcoholic beverages. These drinks do not mix well with the medications that have been given to you. Upon discharge from the hospital, you must be accompanied by a responsible adult. Resume your diet as directed by your physician. Resume medications as your physician has prescribed. Please give a list of your current medications to your Primary Care Provider. Please update this list whenever your medications are discontinued, doses are changed, or new medications (including over-the-counter products) are added. Please carry medication information at all times in case of emergency situations. These are general instructions for a healthy lifestyle:    No smoking/ No tobacco products/ Avoid exposure to second hand smoke. Surgeon General's Warning:  Quitting smoking now greatly reduces serious risk to your health. Obesity, smoking, and a sedentary lifestyle greatly increase your risk for illness. A healthy diet, regular physical exercise & weight monitoring are important for maintaining a healthy lifestyle  You may be retaining fluid if you have a history of heart failure or if you experience any of the following symptoms:  Weight gain of 3 pounds or more overnight or 5 pounds in a week, increased swelling in our hands or feet or shortness of breath while lying flat in bed. Please call your doctor as soon as you notice any of these symptoms; do not wait until your next office visit.     Recognize signs and symptoms of STROKE:  F  -  Face looks uneven  A  -  Arms unable to move or move unevenly  S  -  Speech slurred or non-existent  T  -  Time to call 911 - as soon as signs and symptoms begin - DO NOT go back to bed or wait to see If you get better - TIME IS BRAIN. Colorectal Screening  Colorectal cancer almost always develops from precancerous polyps (abnormal growths) in the colon or rectum. Screening tests can find precancerous polyps, so that they can be removed before they turn into cancer. Screening tests can also find colorectal cancer early, when treatment works best.  Speak with your physician about when you should begin screening and how often you should be tested. [employer] Activation    Thank you for requesting access to [facility]. Please follow the instructions below to securely access and download your online medical record. [facility] allows you to send messages to your doctor, view your test results, renew your prescriptions, schedule appointments, and more. How Do I Sign Up? In your internet browser, go to [email or web address]. Click on the First Time User? Click Here link in the Sign In box. You will see the New Member Sign Up page. Enter your [facility] Access Code exactly as it appears below. You will not need to use this code after youve completed the sign-up process. If you do not sign up before the expiration date, you must request a new code. [facility] Access Code: Activation code not generated  Current [facility] Status: Active (This is the date your [facility] access code will )    Enter the last four digits of your Social Security Number (xxxx) and Date of Birth (mm/dd/yyyy) as indicated and click Submit. You will be taken to the next sign-up page. Create a Showcase-TVt ID. This will be your [facility] login ID and cannot be changed, so think of one that is secure and easy to remember. Create a [facility] password. You can change your password at any time. Enter your Password Reset Question and Answer.  This can be used at a later time if you forget your password. Enter your e-mail address. You will receive e-mail notification when new information is available in [address] Click Sign Up. You can now view and download portions of your medical record. Click the Chevia link to download a portable copy of your medical information. Additional Information    If you have questions, please call [phone]. Remember, [facility] is NOT to be used for urgent needs. For medical emergencies, dial 911. Educational references and/or instructions provided during this visit included:    See Attached      APPOINTMENTS:    Per MD Instruction    Discharge information has been reviewed with the patient. The patient verbalized understanding.

## 2022-12-23 ENCOUNTER — HOSPITAL ENCOUNTER (OUTPATIENT)
Dept: MAMMOGRAPHY | Age: 72
Discharge: HOME | End: 2022-12-23
Attending: FAMILY MEDICINE
Payer: MEDICARE

## 2022-12-23 DIAGNOSIS — Z12.31 VISIT FOR SCREENING MAMMOGRAM: ICD-10-CM

## 2022-12-23 PROCEDURE — 77067 SCR MAMMO BI INCL CAD: CPT

## 2022-12-26 ENCOUNTER — APPOINTMENT (OUTPATIENT)
Dept: CT IMAGING | Age: 72
End: 2022-12-26
Attending: EMERGENCY MEDICINE
Payer: MEDICARE

## 2022-12-26 ENCOUNTER — HOSPITAL ENCOUNTER (EMERGENCY)
Age: 72
Discharge: HOME | End: 2022-12-26
Attending: EMERGENCY MEDICINE
Payer: MEDICARE

## 2022-12-26 VITALS
HEART RATE: 98 BPM | RESPIRATION RATE: 23 BRPM | SYSTOLIC BLOOD PRESSURE: 139 MMHG | DIASTOLIC BLOOD PRESSURE: 87 MMHG | WEIGHT: 140 LBS | HEIGHT: 64 IN | TEMPERATURE: 98.4 F | OXYGEN SATURATION: 98 % | BODY MASS INDEX: 23.9 KG/M2

## 2022-12-26 DIAGNOSIS — E86.0 DEHYDRATION: ICD-10-CM

## 2022-12-26 DIAGNOSIS — E11.10 DIABETIC KETOACIDOSIS WITHOUT COMA ASSOCIATED WITH TYPE 2 DIABETES MELLITUS (HCC): Primary | ICD-10-CM

## 2022-12-26 LAB
ADMINISTERED INITIALS, ADMINIT: NORMAL
ALBUMIN SERPL-MCNC: 3.7 G/DL (ref 3.4–5)
ALBUMIN/GLOB SERPL: 1.1 {RATIO} (ref 0.8–1.7)
ALP SERPL-CCNC: 79 U/L (ref 45–117)
ALT SERPL-CCNC: 12 U/L (ref 13–56)
ANION GAP SERPL CALC-SCNC: 10 MMOL/L (ref 3–18)
ANION GAP SERPL CALC-SCNC: 14 MMOL/L (ref 3–18)
APPEARANCE UR: CLEAR
APTT PPP: <20 SEC (ref 23–36.4)
AST SERPL-CCNC: 8 U/L (ref 10–38)
ATRIAL RATE: 84 BPM
B PERT DNA SPEC QL NAA+PROBE: NOT DETECTED
BACTERIA URNS QL MICRO: NEGATIVE /HPF
BASOPHILS # BLD: 0 K/UL (ref 0–0.1)
BASOPHILS NFR BLD: 0 % (ref 0–2)
BILIRUB SERPL-MCNC: 0.5 MG/DL (ref 0.2–1)
BILIRUB UR QL: NEGATIVE
BORDETELLA PARAPERTUSSIS PCR, BORPAR: NOT DETECTED
BUN SERPL-MCNC: 43 MG/DL (ref 7–18)
BUN SERPL-MCNC: 52 MG/DL (ref 7–18)
BUN/CREAT SERPL: 26 (ref 12–20)
BUN/CREAT SERPL: 28 (ref 12–20)
C PNEUM DNA SPEC QL NAA+PROBE: NOT DETECTED
CALCIUM SERPL-MCNC: 9.1 MG/DL (ref 8.5–10.1)
CALCIUM SERPL-MCNC: 9.8 MG/DL (ref 8.5–10.1)
CALCULATED P AXIS, ECG09: 71 DEGREES
CALCULATED R AXIS, ECG10: 57 DEGREES
CALCULATED T AXIS, ECG11: 72 DEGREES
CHLORIDE SERPL-SCNC: 105 MMOL/L (ref 100–111)
CHLORIDE SERPL-SCNC: 99 MMOL/L (ref 100–111)
CO2 SERPL-SCNC: 19 MMOL/L (ref 21–32)
CO2 SERPL-SCNC: 22 MMOL/L (ref 21–32)
COLOR UR: YELLOW
CREAT SERPL-MCNC: 1.67 MG/DL (ref 0.6–1.3)
CREAT SERPL-MCNC: 1.87 MG/DL (ref 0.6–1.3)
D50 ADMINISTERED, D50ADM: 0 ML
D50 ORDER, D50ORD: 0 ML
DIAGNOSIS, 93000: NORMAL
DIFFERENTIAL METHOD BLD: ABNORMAL
EOSINOPHIL # BLD: 0 K/UL (ref 0–0.4)
EOSINOPHIL NFR BLD: 0 % (ref 0–5)
EPITH CASTS URNS QL MICRO: NORMAL /LPF (ref 0–5)
ERYTHROCYTE [DISTWIDTH] IN BLOOD BY AUTOMATED COUNT: 15.2 % (ref 11.6–14.5)
FLUAV SUBTYP SPEC NAA+PROBE: NOT DETECTED
FLUBV RNA SPEC QL NAA+PROBE: NOT DETECTED
GLOBULIN SER CALC-MCNC: 3.3 G/DL (ref 2–4)
GLUCOSE BLD STRIP.AUTO-MCNC: 126 MG/DL (ref 70–110)
GLUCOSE BLD STRIP.AUTO-MCNC: 183 MG/DL (ref 70–110)
GLUCOSE BLD STRIP.AUTO-MCNC: 238 MG/DL (ref 70–110)
GLUCOSE BLD STRIP.AUTO-MCNC: 260 MG/DL (ref 70–110)
GLUCOSE BLD STRIP.AUTO-MCNC: 304 MG/DL (ref 70–110)
GLUCOSE BLD STRIP.AUTO-MCNC: 334 MG/DL (ref 70–110)
GLUCOSE BLD STRIP.AUTO-MCNC: 361 MG/DL (ref 70–110)
GLUCOSE SERPL-MCNC: 239 MG/DL (ref 74–99)
GLUCOSE SERPL-MCNC: 284 MG/DL (ref 74–99)
GLUCOSE UR STRIP.AUTO-MCNC: >1000 MG/DL
GLUCOSE, GLC: 238 MG/DL
GLUCOSE, GLC: 260 MG/DL
GLUCOSE, GLC: 284 MG/DL
GLUCOSE, GLC: 334 MG/DL
HADV DNA SPEC QL NAA+PROBE: NOT DETECTED
HCOV 229E RNA SPEC QL NAA+PROBE: NOT DETECTED
HCOV HKU1 RNA SPEC QL NAA+PROBE: NOT DETECTED
HCOV NL63 RNA SPEC QL NAA+PROBE: NOT DETECTED
HCOV OC43 RNA SPEC QL NAA+PROBE: NOT DETECTED
HCT VFR BLD AUTO: 36.1 % (ref 35–45)
HGB BLD-MCNC: 11.6 G/DL (ref 12–16)
HGB UR QL STRIP: NEGATIVE
HIGH TARGET, HITG: 200 MG/DL
HMPV RNA SPEC QL NAA+PROBE: NOT DETECTED
HPIV1 RNA SPEC QL NAA+PROBE: NOT DETECTED
HPIV2 RNA SPEC QL NAA+PROBE: NOT DETECTED
HPIV3 RNA SPEC QL NAA+PROBE: NOT DETECTED
HPIV4 RNA SPEC QL NAA+PROBE: NOT DETECTED
IMM GRANULOCYTES # BLD AUTO: 0 K/UL (ref 0–0.04)
IMM GRANULOCYTES NFR BLD AUTO: 0 % (ref 0–0.5)
INR PPP: 1 (ref 0.8–1.2)
INSULIN ADMINSTERED, INSADM: 4 UNITS/HOUR
INSULIN ADMINSTERED, INSADM: 4.5 UNITS/HOUR
INSULIN ADMINSTERED, INSADM: 7.1 UNITS/HOUR
INSULIN ADMINSTERED, INSADM: 8.2 UNITS/HOUR
INSULIN ORDER, INSORD: 4 UNITS/HOUR
INSULIN ORDER, INSORD: 4.5 UNITS/HOUR
INSULIN ORDER, INSORD: 7.1 UNITS/HOUR
INSULIN ORDER, INSORD: 8.2 UNITS/HOUR
KETONES UR QL STRIP.AUTO: 80 MG/DL
LEUKOCYTE ESTERASE UR QL STRIP.AUTO: NEGATIVE
LOW TARGET, LOT: 150 MG/DL
LYMPHOCYTES # BLD: 1.5 K/UL (ref 0.9–3.6)
LYMPHOCYTES NFR BLD: 16 % (ref 21–52)
M PNEUMO DNA SPEC QL NAA+PROBE: NOT DETECTED
MAGNESIUM SERPL-MCNC: 1.7 MG/DL (ref 1.6–2.6)
MCH RBC QN AUTO: 26 PG (ref 24–34)
MCHC RBC AUTO-ENTMCNC: 32.1 G/DL (ref 31–37)
MCV RBC AUTO: 80.9 FL (ref 78–100)
MINUTES UNTIL NEXT BG, NBG: 60 MIN
MONOCYTES # BLD: 0.7 K/UL (ref 0.05–1.2)
MONOCYTES NFR BLD: 7 % (ref 3–10)
MULTIPLIER, MUL: 0.02
MULTIPLIER, MUL: 0.02
MULTIPLIER, MUL: 0.03
MULTIPLIER, MUL: 0.04
NEUTS SEG # BLD: 7.4 K/UL (ref 1.8–8)
NEUTS SEG NFR BLD: 76 % (ref 40–73)
NITRITE UR QL STRIP.AUTO: NEGATIVE
NRBC # BLD: 0 K/UL (ref 0–0.01)
NRBC BLD-RTO: 0 PER 100 WBC
ORDER INITIALS, ORDINIT: NORMAL
P-R INTERVAL, ECG05: 130 MS
PH BLDV: 7.24 [PH] (ref 7.32–7.42)
PH BLDV: 7.24 [PH] (ref 7.32–7.42)
PH UR STRIP: 5.5 [PH] (ref 5–8)
PLATELET # BLD AUTO: 288 K/UL (ref 135–420)
PMV BLD AUTO: 10.8 FL (ref 9.2–11.8)
POTASSIUM SERPL-SCNC: 4.7 MMOL/L (ref 3.5–5.5)
POTASSIUM SERPL-SCNC: 5.6 MMOL/L (ref 3.5–5.5)
PROT SERPL-MCNC: 7 G/DL (ref 6.4–8.2)
PROT UR STRIP-MCNC: ABNORMAL MG/DL
PROTHROMBIN TIME: 13.4 SEC (ref 11.5–15.2)
Q-T INTERVAL, ECG07: 366 MS
QRS DURATION, ECG06: 72 MS
QTC CALCULATION (BEZET), ECG08: 432 MS
RBC # BLD AUTO: 4.46 M/UL (ref 4.2–5.3)
RBC #/AREA URNS HPF: NEGATIVE /HPF (ref 0–5)
RSV RNA SPEC QL NAA+PROBE: NOT DETECTED
RV+EV RNA SPEC QL NAA+PROBE: NOT DETECTED
SARS-COV-2 PCR, COVPCR: NOT DETECTED
SERVICE CMNT-IMP: ABNORMAL
SODIUM SERPL-SCNC: 132 MMOL/L (ref 136–145)
SODIUM SERPL-SCNC: 137 MMOL/L (ref 136–145)
SP GR UR REFRACTOMETRY: 1.02 (ref 1–1.03)
SPECIMEN TYPE: ABNORMAL
TROPONIN-HIGH SENSITIVITY: 9 NG/L (ref 0–54)
UROBILINOGEN UR QL STRIP.AUTO: 0.2 EU/DL (ref 0.2–1)
VENTRICULAR RATE, ECG03: 84 BPM
WBC # BLD AUTO: 9.7 K/UL (ref 4.6–13.2)
WBC URNS QL MICRO: NORMAL /HPF (ref 0–4)

## 2022-12-26 PROCEDURE — 82800 BLOOD PH: CPT

## 2022-12-26 PROCEDURE — 93005 ELECTROCARDIOGRAM TRACING: CPT

## 2022-12-26 PROCEDURE — 74011636637 HC RX REV CODE- 636/637: Performed by: EMERGENCY MEDICINE

## 2022-12-26 PROCEDURE — 74011250636 HC RX REV CODE- 250/636: Performed by: EMERGENCY MEDICINE

## 2022-12-26 PROCEDURE — 85730 THROMBOPLASTIN TIME PARTIAL: CPT

## 2022-12-26 PROCEDURE — 85610 PROTHROMBIN TIME: CPT

## 2022-12-26 PROCEDURE — 82803 BLOOD GASES ANY COMBINATION: CPT

## 2022-12-26 PROCEDURE — 96361 HYDRATE IV INFUSION ADD-ON: CPT

## 2022-12-26 PROCEDURE — 74176 CT ABD & PELVIS W/O CONTRAST: CPT

## 2022-12-26 PROCEDURE — 80053 COMPREHEN METABOLIC PANEL: CPT

## 2022-12-26 PROCEDURE — 0202U NFCT DS 22 TRGT SARS-COV-2: CPT

## 2022-12-26 PROCEDURE — 99284 EMERGENCY DEPT VISIT MOD MDM: CPT

## 2022-12-26 PROCEDURE — 82962 GLUCOSE BLOOD TEST: CPT

## 2022-12-26 PROCEDURE — 85025 COMPLETE CBC W/AUTO DIFF WBC: CPT

## 2022-12-26 PROCEDURE — 81001 URINALYSIS AUTO W/SCOPE: CPT

## 2022-12-26 PROCEDURE — 74011000250 HC RX REV CODE- 250: Performed by: EMERGENCY MEDICINE

## 2022-12-26 PROCEDURE — 96374 THER/PROPH/DIAG INJ IV PUSH: CPT

## 2022-12-26 PROCEDURE — 84484 ASSAY OF TROPONIN QUANT: CPT

## 2022-12-26 PROCEDURE — 83735 ASSAY OF MAGNESIUM: CPT

## 2022-12-26 PROCEDURE — 74011000258 HC RX REV CODE- 258: Performed by: EMERGENCY MEDICINE

## 2022-12-26 RX ORDER — DEXTROSE MONOHYDRATE AND SODIUM CHLORIDE 5; .45 G/100ML; G/100ML
100 INJECTION, SOLUTION INTRAVENOUS CONTINUOUS
Status: DISCONTINUED | OUTPATIENT
Start: 2022-12-26 | End: 2022-12-26 | Stop reason: HOSPADM

## 2022-12-26 RX ORDER — INSULIN GLARGINE 100 [IU]/ML
15 INJECTION, SOLUTION SUBCUTANEOUS
Status: COMPLETED | OUTPATIENT
Start: 2022-12-26 | End: 2022-12-26

## 2022-12-26 RX ORDER — DEXTROSE MONOHYDRATE 100 MG/ML
0-250 INJECTION, SOLUTION INTRAVENOUS AS NEEDED
Status: DISCONTINUED | OUTPATIENT
Start: 2022-12-26 | End: 2022-12-26 | Stop reason: SDUPTHER

## 2022-12-26 RX ORDER — SODIUM CHLORIDE 9 MG/ML
125 INJECTION, SOLUTION INTRAVENOUS CONTINUOUS
Status: DISCONTINUED | OUTPATIENT
Start: 2022-12-26 | End: 2022-12-26 | Stop reason: HOSPADM

## 2022-12-26 RX ORDER — INSULIN LISPRO 100 [IU]/ML
INJECTION, SOLUTION INTRAVENOUS; SUBCUTANEOUS
Status: DISCONTINUED | OUTPATIENT
Start: 2022-12-26 | End: 2022-12-26 | Stop reason: SDUPTHER

## 2022-12-26 RX ORDER — DEXTROSE MONOHYDRATE 100 MG/ML
0-250 INJECTION, SOLUTION INTRAVENOUS AS NEEDED
Status: DISCONTINUED | OUTPATIENT
Start: 2022-12-26 | End: 2022-12-26 | Stop reason: HOSPADM

## 2022-12-26 RX ORDER — ONDANSETRON 2 MG/ML
4 INJECTION INTRAMUSCULAR; INTRAVENOUS
Status: COMPLETED | OUTPATIENT
Start: 2022-12-26 | End: 2022-12-26

## 2022-12-26 RX ORDER — MECLIZINE HCL 12.5 MG 12.5 MG/1
25 TABLET ORAL
Status: COMPLETED | OUTPATIENT
Start: 2022-12-26 | End: 2022-12-26

## 2022-12-26 RX ORDER — MAGNESIUM SULFATE 100 %
4 CRYSTALS MISCELLANEOUS AS NEEDED
Status: DISCONTINUED | OUTPATIENT
Start: 2022-12-26 | End: 2022-12-26 | Stop reason: HOSPADM

## 2022-12-26 RX ORDER — MAGNESIUM SULFATE 100 %
16 CRYSTALS MISCELLANEOUS AS NEEDED
Status: DISCONTINUED | OUTPATIENT
Start: 2022-12-26 | End: 2022-12-26 | Stop reason: SDUPTHER

## 2022-12-26 RX ADMIN — Medication 15 UNITS: at 17:27

## 2022-12-26 RX ADMIN — SODIUM CHLORIDE 4.5 UNITS/HR: 9 INJECTION, SOLUTION INTRAVENOUS at 11:24

## 2022-12-26 RX ADMIN — DEXTROSE MONOHYDRATE AND SODIUM CHLORIDE 100 ML/HR: 5; .45 INJECTION, SOLUTION INTRAVENOUS at 13:34

## 2022-12-26 RX ADMIN — SODIUM CHLORIDE 1000 ML: 9 INJECTION, SOLUTION INTRAVENOUS at 11:18

## 2022-12-26 RX ADMIN — ONDANSETRON 4 MG: 2 INJECTION INTRAMUSCULAR; INTRAVENOUS at 11:23

## 2022-12-26 RX ADMIN — MECLIZINE 25 MG: 12.5 TABLET ORAL at 11:23

## 2022-12-26 NOTE — ED PROVIDER NOTES
EMERGENCY DEPARTMENT HISTORY AND PHYSICAL EXAM    11:56 AM      Date: 12/26/2022  Patient Name: Adebayo Pérez    History of Presenting Illness     Chief Complaint   Patient presents with    High Blood Sugar         History Provided By: Patient  Location/Duration/Severity/Modifying factors   Patient is a 68-year-old female with a history of insulin-dependent diabetes, hypertension, colon cancer with resection of part of her bowel, anemia, CKD, that presents emergency department with complaint of nausea and vomiting that began on Friday. The patient went to get a mammogram and said during the mammogram she had some mild dizziness she began vomiting. Patient said that she has been only able to tolerate much since then has been having some abdominal cramping that better when she had a bowel movement. The patient that she had some mild constipation but that is now resolved. Patient says she still feels nauseous and has not been able to eat or drink the way she normally does. Patient yesterday would only have a little bit of food and did not enjoy the holiday with the family. Patient's not had any fevers or chills. Patient does not have any rashes or skin irritation according to patient or family. The patient has some mild right upper arm pain. Patient is occasional smoker, occasionally drinks alcohol, denies any drug use. Patient is not working at this time. Patient uses insulin pen and says that despite her use of her insulin her sugar continues to go above 300 which is unusual and she continues to cover for it.       PCP: Andra Aranda MD    Current Facility-Administered Medications   Medication Dose Route Frequency Provider Last Rate Last Admin    sodium chloride 0.9 % bolus infusion 1,000 mL  1,000 mL IntraVENous ONCE Nedra ALFORD MD 1,000 mL/hr at 12/26/22 1118 1,000 mL at 12/26/22 1118    0.9% sodium chloride infusion  125 mL/hr IntraVENous CONTINUOUS Paul Al MD glucose chewable tablet 16 g  4 Tablet Oral PRN Lester Ngo MD        glucagon (GLUCAGEN) injection 1 mg  1 mg IntraMUSCular PRN Lester Ngo MD        dextrose 10% infusion 0-250 mL  0-250 mL IntraVENous PRN Lester Ngo MD        insulin regular (Skip Fridge, HUMULIN) 100 units/100 ml NS infusion (premix)  0-50 Units/hr IntraVENous TITRATE Stephen ALFORD MD 4.5 mL/hr at 12/26/22 1124 4.5 Units/hr at 12/26/22 1124     Current Outpatient Medications   Medication Sig Dispense Refill    ondansetron (ZOFRAN ODT) 4 mg disintegrating tablet Take 4 mg by mouth every eight (8) hours as needed for Nausea or Vomiting. insulin glargine (LANTUS) 100 unit/mL injection 15 Units by SubCUTAneous route Daily (before dinner). Inject 10 units SQ DAILY 10 mL 0    polyethylene glycol (MIRALAX) 17 gram packet Take 1 Packet by mouth daily as needed for Constipation. Indications: HOLD IT IF DIARRHEA (Patient taking differently: Take 17 g by mouth daily as needed for Constipation. Dissolved in 4-8oz of water/juice/soda/coffee/tea  Indications: HOLD IT IF DIARRHEA) 15 Packet 0    ferrous sulfate 325 mg (65 mg iron) tablet Take 1 Tablet by mouth daily (with breakfast). 30 Tablet 0    gabapentin (NEURONTIN) 300 mg capsule Take 1 Capsule by mouth daily. Max Daily Amount: 300 mg. 30 Capsule 0    atorvastatin (LIPITOR) 20 mg tablet Take 20 mg by mouth nightly. acetaminophen (TYLENOL) 500 mg tablet Take 500 mg by mouth every six (6) hours as needed for Pain.      levothyroxine (SYNTHROID) 150 mcg tablet Take 150 mcg by mouth Daily (before breakfast). SITagliptin (JANUVIA) 100 mg tablet Take 100 mg by mouth daily. glucose 4 gram chewable tablet Take 15 g by mouth as needed for PRN Reason (Other) (low BS). lisinopriL (PRINIVIL, ZESTRIL) 2.5 mg tablet Take 1 Tablet by mouth daily.  30 Tablet 0    carvediloL (COREG) 6.25 mg tablet Take 1 Tablet by mouth two (2) times daily (with meals). 60 Tablet 0    insulin lispro (HUMALOG) 100 unit/mL injection For Blood Sugar (mg/dL) of:              Less than 150 =   0 units  150 -199 =   3 units  200 -249 =   6 units  250 -299 =   9 units  300 -349 =   12 units  350 and above =   15 units and Call Physician  Initiate Hypoglycemic protocol if blood glucose is <70 mg/dL. Fast Acting - Administer Immediately - or within 15 minutes of start of meal, if mealtime coverage. (Patient taking differently: by SubCUTAneous route as needed for PRN Reason (Other) (High BG readings (per sliding scale)). For Blood Sugar (mg/dL) of:              Less than 150 =   0 units  150 -199 =   3 units  200 -249 =   6 units  250 -299 =   9 units  300 -349 =   12 units  350 and above =   15 units and Call Physician  Initiate Hypoglycemic protocol if blood glucose is <70 mg/dL. Fast Acting - Administer Immediately - or within 15 minutes of start of meal, if mealtime coverage.) 1 Each 0    pantoprazole (PROTONIX) 40 mg tablet Take 1 Tablet by mouth Before breakfast and dinner. 60 Tablet 0    aspirin delayed-release 81 mg tablet Take 1 Tablet by mouth daily. 30 Tablet 0    metFORMIN ER (GLUCOPHAGE XR) 500 mg tablet Take 500 mg by mouth daily (with dinner). Cholecalciferol, Vitamin D3, 50,000 unit cap Take 1 Capsule by mouth every seven (7) days. Takes on wednesdays      amLODIPine (NORVASC) 5 mg tablet Take 5 mg by mouth daily. alendronate (FOSAMAX) 10 mg tablet Take 70 mg by mouth every seven (7) days. Past History     Past Medical History:  Past Medical History:   Diagnosis Date    Anemia due to stage 3b chronic kidney disease ([facility] 09/28/2022    Chronic kidney disease     Colon cancer ([facility]     Diabetes ([facility]     Hypertension     Hypothyroidism     Stage 3b chronic kidney disease ([facility] 09/28/2022       Past Surgical History:  Past Surgical History:   Procedure Laterality Date    COLONOSCOPY N/A 12/30/2016    COLONOSCOPY.  SURVEILLANCE /c Hot Snared Polypectomy /c EndoClip x3 performed by Arnol Watts MD at [facility] ENDOSCOPY    COLONOSCOPY N/A 12/16/2022    COLONOSCOPY w/ polypectomies performed by Hannah Ellis MD at [facility] ENDOSCOPY    HX HYSTERECTOMY      HX ORTHOPAEDIC Left     THR    HX TOTAL COLECTOMY         Family History:  Family History   Problem Relation Age of Onset    Diabetes Mother     Cancer Father         colon       Social History:  Social History     Tobacco Use    Smoking status: Some Days     Types: Cigarettes    Smokeless tobacco: Never    Tobacco comments:     Smokes 2 cigs daily   Substance Use Topics    Alcohol use: Yes     Comment: occasionally    Drug use: No       Allergies:  No Known Allergies      Review of Systems       Review of Systems   Constitutional:  Positive for appetite change and fatigue. Negative for activity change and fever. HENT:  Negative for congestion and rhinorrhea. Eyes:  Negative for visual disturbance. Respiratory:  Negative for shortness of breath. Cardiovascular:  Negative for chest pain and palpitations. Gastrointestinal:  Positive for abdominal pain, nausea and vomiting. Negative for diarrhea. Genitourinary:  Negative for dysuria and hematuria. Musculoskeletal:  Negative for back pain. Skin:  Negative for rash. Neurological:  Negative for dizziness, weakness and light-headedness. All other systems reviewed and are negative. Physical Exam   Visit Vitals  /70 (BP 1 Location: Right upper arm, BP Patient Position: At rest)   Pulse 90   Temp 98.1 °F (36.7 °C)   Resp 20   Ht 5' 4\" (1.626 m)   Wt 63.5 kg (140 lb)   SpO2 99%   BMI 24.03 kg/m²         Physical Exam  Vitals and nursing note reviewed. Constitutional:       General: She is not in acute distress. Appearance: She is well-developed. HENT:      Head: Normocephalic and atraumatic. Right Ear: External ear normal.      Left Ear: External ear normal.      Nose: Nose normal.      Mouth/Throat:      Mouth: Mucous membranes are dry. Eyes:      General: No scleral icterus. Conjunctiva/sclera: Conjunctivae normal.      Pupils: Pupils are equal, round, and reactive to light. Neck:      Thyroid: No thyromegaly. Vascular: No JVD. Trachea: No tracheal deviation. Cardiovascular:      Rate and Rhythm: Normal rate and regular rhythm. Heart sounds: Normal heart sounds. No murmur heard. No friction rub. No gallop. Pulmonary:      Effort: Pulmonary effort is normal.      Breath sounds: Normal breath sounds. Chest:      Chest wall: No tenderness. Abdominal:      General: Bowel sounds are normal. There is no distension. Palpations: Abdomen is soft. Tenderness: There is abdominal tenderness. There is no guarding or rebound. Comments: Diffuse mild pain without guarding   Musculoskeletal:         General: No tenderness. Normal range of motion. Cervical back: Normal range of motion and neck supple. Lymphadenopathy:      Cervical: No cervical adenopathy. Skin:     General: Skin is warm and dry. Neurological:      Mental Status: She is alert and oriented to person, place, and time. Cranial Nerves: No cranial nerve deficit. Coordination: Coordination normal.      Comments: No sensory loss, Gait normal, Motor 5/5   Psychiatric:         Behavior: Behavior normal.         Thought Content:  Thought content normal.         Judgment: Judgment normal.         Diagnostic Study Results     Labs -  Recent Results (from the past 12 hour(s))   GLUCOSE, POC    Collection Time: 12/26/22  9:02 AM   Result Value Ref Range    Glucose (POC) 304 (H) 70 - 110 mg/dL   EKG, 12 LEAD, INITIAL    Collection Time: 12/26/22  9:04 AM   Result Value Ref Range    Ventricular Rate 84 BPM    Atrial Rate 84 BPM    P-R Interval 130 ms    QRS Duration 72 ms    Q-T Interval 366 ms    QTC Calculation (Bezet) 432 ms    Calculated P Axis 71 degrees    Calculated R Axis 57 degrees    Calculated T Axis 72 degrees    Diagnosis Normal sinus rhythm  Nonspecific T wave abnormality  Abnormal ECG  When compared with ECG of 03-AUG-2022 13:17,  No significant change was found     CBC WITH AUTOMATED DIFF    Collection Time: 12/26/22  9:20 AM   Result Value Ref Range    WBC 9.7 4.6 - 13.2 K/uL    RBC 4.46 4.20 - 5.30 M/uL    HGB 11.6 (L) 12.0 - 16.0 g/dL    HCT 36.1 35.0 - 45.0 %    MCV 80.9 78.0 - 100.0 FL    MCH 26.0 24.0 - 34.0 PG    MCHC 32.1 31.0 - 37.0 g/dL    RDW 15.2 (H) 11.6 - 14.5 %    PLATELET 757 144 - 635 K/uL    MPV 10.8 9.2 - 11.8 FL    NRBC 0.0 0  WBC    ABSOLUTE NRBC 0.00 0.00 - 0.01 K/uL    NEUTROPHILS 76 (H) 40 - 73 %    LYMPHOCYTES 16 (L) 21 - 52 %    MONOCYTES 7 3 - 10 %    EOSINOPHILS 0 0 - 5 %    BASOPHILS 0 0 - 2 %    IMMATURE GRANULOCYTES 0 0.0 - 0.5 %    ABS. NEUTROPHILS 7.4 1.8 - 8.0 K/UL    ABS. LYMPHOCYTES 1.5 0.9 - 3.6 K/UL    ABS. MONOCYTES 0.7 0.05 - 1.2 K/UL    ABS. EOSINOPHILS 0.0 0.0 - 0.4 K/UL    ABS. BASOPHILS 0.0 0.0 - 0.1 K/UL    ABS. IMM. GRANS. 0.0 0.00 - 0.04 K/UL    DF AUTOMATED     METABOLIC PANEL, COMPREHENSIVE    Collection Time: 12/26/22  9:20 AM   Result Value Ref Range    Sodium 132 (L) 136 - 145 mmol/L    Potassium 5.6 (H) 3.5 - 5.5 mmol/L    Chloride 99 (L) 100 - 111 mmol/L    CO2 19 (L) 21 - 32 mmol/L    Anion gap 14 3.0 - 18 mmol/L    Glucose 284 (H) 74 - 99 mg/dL    BUN 52 (H) 7.0 - 18 MG/DL    Creatinine 1.87 (H) 0.6 - 1.3 MG/DL    BUN/Creatinine ratio 28 (H) 12 - 20      eGFR 28 (L) >60 ml/min/1.73m2    Calcium 9.8 8.5 - 10.1 MG/DL    Bilirubin, total 0.5 0.2 - 1.0 MG/DL    ALT (SGPT) 12 (L) 13 - 56 U/L    AST (SGOT) 8 (L) 10 - 38 U/L    Alk.  phosphatase 79 45 - 117 U/L    Protein, total 7.0 6.4 - 8.2 g/dL    Albumin 3.7 3.4 - 5.0 g/dL    Globulin 3.3 2.0 - 4.0 g/dL    A-G Ratio 1.1 0.8 - 1.7     PROTHROMBIN TIME + INR    Collection Time: 12/26/22  9:20 AM   Result Value Ref Range    Prothrombin time 13.4 11.5 - 15.2 sec    INR 1.0 0.8 - 1.2     PTT    Collection Time: 12/26/22  9:20 AM   Result Value Ref Range    aPTT <20.0 (L) 23.0 - 36.4 SEC   PH, VENOUS    Collection Time: 12/26/22  9:20 AM   Result Value Ref Range    VENOUS PH 7.24 (L) 7.32 - 7.42     TROPONIN-HIGH SENSITIVITY    Collection Time: 12/26/22  9:20 AM   Result Value Ref Range    Troponin-High Sensitivity 9 0 - 54 ng/L   RESPIRATORY VIRUS PANEL W/COVID-19, PCR    Collection Time: 12/26/22 10:27 AM    Specimen: Nasopharyngeal   Result Value Ref Range    Adenovirus Not detected NOTD      Coronavirus 229E Not detected NOTD      Coronavirus HKU1 Not detected NOTD      Coronavirus CVNL63 Not detected NOTD      Coronavirus OC43 Not detected NOTD      SARS-CoV-2, PCR Not detected NOTD      Metapneumovirus Not detected NOTD      Rhinovirus and Enterovirus Not detected NOTD      Influenza A Not detected NOTD      Influenza B Not detected NOTD      Parainfluenza 1 Not detected NOTD      Parainfluenza 2 Not detected NOTD      Parainfluenza 3 Not detected NOTD      Parainfluenza virus 4 Not detected NOTD      RSV by PCR Not detected NOTD      B. parapertussis, PCR Not detected NOTD      Bordetella pertussis - PCR Not detected NOTD      Chlamydophila pneumoniae DNA, QL, PCR Not detected NOTD      Mycoplasma pneumoniae DNA, QL, PCR Not detected NOTD     POC VENOUS BLOOD GAS    Collection Time: 12/26/22 10:39 AM   Result Value Ref Range    pH, venous (POC) 7.24 (L) 7.32 - 7.42      Specimen type (POC) VENOUS BLOOD      Performed by Rashmi Ortiz    Collection Time: 12/26/22 11:22 AM   Result Value Ref Range    Glucose 284 mg/dL    Insulin order 4.5 units/hour    Insulin adminstered 4.5 units/hour    Multiplier 0.020     Low target 150 mg/dL    High target 200 mg/dL    D50 order 0.0 ml    D50 administered 0.00 ml    Minutes until next BG 60 min    Order initials aw     Administered initials aw        Radiologic Studies -   CT ABD PELV WO CONT   Final Result      1. No acute findings. 2.  Borderline hepatomegaly.    3. Small sliding hiatal hernia. 4.  Mosaic lung attenuation as may be seen in small airway or small vessel   disease. 5.  Bilateral Bochdalek hernias. 6.  Fat-containing epigastric and umbilical hernias. 7.  Mild anasarca. Medical Decision Making   I am the first provider for this patient. I reviewed the vital signs, available nursing notes, past medical history, past surgical history, family history and social history. Vital Signs-Reviewed the patient's vital signs. EKG: Normal sinus rhythm at 84, no STEMI, interpreted by me    Records Reviewed: Nursing Notes, Old Medical Records, Previous Radiology Studies, and Previous Laboratory Studies (Time of Review: 11:56 AM)    ED Course: Progress Notes, Reevaluation, and Consults:    Patient has a mild anion gap, hyperkalemia, venous pH is 7.2 and ketonuria. Suspect the patient has diabetic ketoacidosis that is partially covered with home insulin however the patient will need IV fluids, continued IV insulin with likely conversion to dextrose as her sugar improves, and admission to the hospital.George Felton, DO 12:54 PM    I discussed the case with Dr. Emilia Interiano this hospitalist and did not feel the patient needed admission. Recommended hydration and reevaluation. Hydrated the patient well and the patient felt much better. Patient was on IV insulin, D5, and her AG closed. She was given her long acting insulin, and ate. The patient wanted to go home and felt much better and abdomen is soft. Attempted to call her PCP at [facility] and left a message on the answering service. Also, attempted to call the Medical Director at [facility] Dr. Jase Nunez without answer. Suspect the holiday has limited coverage and the patient has good family support and wants to go home. Discussed with her that her home glucose control needs to be followed closely and daughter notes she has an endocrinologist but most care is provided at [facility].   After a long discussion with patient and family will proceed with close outpatient care. The discharge instructions were reviewed with the patient and the patient verbalized understanding. The patient had no questions about the discharge instructions. The patient will follow closely with the outpatient care plan and will return if at all worsened or concerned. Natacha Hale DO        Provider Notes (Medical Decision Making):   MDM  Number of Diagnoses or Management Options  Dehydration  Diabetic ketoacidosis without coma associated with type 2 diabetes mellitus ([facility]  Diagnosis management comments: Patient is a 68-year-old female with a history of diabetes, hypertension, colon cancer, CKD, anemia, the presents emergency department complaint of abdominal pain, nausea, vomiting, decreased appetite, and some constipation. Patient has been vomiting since going to get her mammogram late last week. Patient has not been able to eat much she has been fatigued. We will follow the patient's venous pH, blood sugar, renal function, hydrate, CT abdomen pelvis given the abdominal pain evaluate for obstruction or any complications from previous surgery, urinalysis, insulin as needed, then reevaluate. Procedures        Diagnosis     Clinical Impression:   1. Diabetic ketoacidosis without coma associated with type 2 diabetes mellitus ([facility]    2. Dehydration        Disposition: DC    Follow-up Information    None          Patient's Medications   Start Taking    No medications on file   Continue Taking    ACETAMINOPHEN (TYLENOL) 500 MG TABLET    Take 500 mg by mouth every six (6) hours as needed for Pain. ALENDRONATE (FOSAMAX) 10 MG TABLET    Take 70 mg by mouth every seven (7) days. AMLODIPINE (NORVASC) 5 MG TABLET    Take 5 mg by mouth daily. ASPIRIN DELAYED-RELEASE 81 MG TABLET    Take 1 Tablet by mouth daily. ATORVASTATIN (LIPITOR) 20 MG TABLET    Take 20 mg by mouth nightly.     CARVEDILOL (COREG) 6.25 MG TABLET Take 1 Tablet by mouth two (2) times daily (with meals). CHOLECALCIFEROL, VITAMIN D3, 50,000 UNIT CAP    Take 1 Capsule by mouth every seven (7) days. Takes on wednesdays    FERROUS SULFATE 325 MG (65 MG IRON) TABLET    Take 1 Tablet by mouth daily (with breakfast). GABAPENTIN (NEURONTIN) 300 MG CAPSULE    Take 1 Capsule by mouth daily. Max Daily Amount: 300 mg. GLUCOSE 4 GRAM CHEWABLE TABLET    Take 15 g by mouth as needed for PRN Reason (Other) (low BS). INSULIN GLARGINE (LANTUS) 100 UNIT/ML INJECTION    15 Units by SubCUTAneous route Daily (before dinner). Inject 10 units SQ DAILY    INSULIN LISPRO (HUMALOG) 100 UNIT/ML INJECTION    For Blood Sugar (mg/dL) of:              Less than 150 =   0 units  150 -199 =   3 units  200 -249 =   6 units  250 -299 =   9 units  300 -349 =   12 units  350 and above =   15 units and Call Physician  Initiate Hypoglycemic protocol if blood glucose is <70 mg/dL. Fast Acting - Administer Immediately - or within 15 minutes of start of meal, if mealtime coverage. LEVOTHYROXINE (SYNTHROID) 150 MCG TABLET    Take 150 mcg by mouth Daily (before breakfast). LISINOPRIL (PRINIVIL, ZESTRIL) 2.5 MG TABLET    Take 1 Tablet by mouth daily. METFORMIN ER (GLUCOPHAGE XR) 500 MG TABLET    Take 500 mg by mouth daily (with dinner). ONDANSETRON (ZOFRAN ODT) 4 MG DISINTEGRATING TABLET    Take 4 mg by mouth every eight (8) hours as needed for Nausea or Vomiting. PANTOPRAZOLE (PROTONIX) 40 MG TABLET    Take 1 Tablet by mouth Before breakfast and dinner. POLYETHYLENE GLYCOL (MIRALAX) 17 GRAM PACKET    Take 1 Packet by mouth daily as needed for Constipation. Indications: HOLD IT IF DIARRHEA    SITAGLIPTIN (JANUVIA) 100 MG TABLET    Take 100 mg by mouth daily. These Medications have changed    No medications on file   Stop Taking    No medications on file     Disclaimer: Sections of this note are dictated using utilizing voice recognition software.   Minor typographical errors may be present. If questions arise, please do not hesitate to contact me or call our department.

## 2022-12-26 NOTE — ED NOTES
Anion gap is closed and patient's BG is 126. I have reviewed discharge and prescription instructions with the patient. The patient verbalized understanding. Denies pain, remains alert and ambulatory. No acute distress noted. Needs met.

## 2022-12-26 NOTE — ED NOTES
Initial . Pt requested immediate repeat which was 361. Insulin gtt based off of initial result. Patient remains in no acute distress. Needs met.

## 2022-12-26 NOTE — DISCHARGE INSTRUCTIONS
Patient to drink clear fluids, take your insulin, and follow tomorrow with your [facility] care doctor to discuss your current diabetes care. Make sure to check your glucose at least 3 times a day and return if you are at all worsened or concerned.

## 2022-12-26 NOTE — ED TRIAGE NOTES
Patient arrived via EMS from home complaints of hyperglycemia for the last 4 days.  Last  prior to arrival. This was without a meal

## 2023-08-21 ENCOUNTER — APPOINTMENT (OUTPATIENT)
Facility: HOSPITAL | Age: 73
End: 2023-08-21
Payer: MEDICARE

## 2023-08-21 ENCOUNTER — HOSPITAL ENCOUNTER (EMERGENCY)
Facility: HOSPITAL | Age: 73
Discharge: HOME | End: 2023-08-21
Attending: EMERGENCY MEDICINE
Payer: MEDICARE

## 2023-08-21 VITALS
OXYGEN SATURATION: 92 % | HEIGHT: 66 IN | RESPIRATION RATE: 18 BRPM | SYSTOLIC BLOOD PRESSURE: 129 MMHG | WEIGHT: 157 LBS | DIASTOLIC BLOOD PRESSURE: 67 MMHG | HEART RATE: 80 BPM | TEMPERATURE: 97 F | BODY MASS INDEX: 25.23 KG/M2

## 2023-08-21 DIAGNOSIS — E86.0 DEHYDRATION: Primary | ICD-10-CM

## 2023-08-21 DIAGNOSIS — E10.9 BRITTLE DIABETES (HCC): ICD-10-CM

## 2023-08-21 LAB
ALBUMIN SERPL-MCNC: 3.1 G/DL (ref 3.4–5)
ALBUMIN/GLOB SERPL: 1.1 (ref 0.8–1.7)
ALP SERPL-CCNC: 77 U/L (ref 45–117)
ALT SERPL-CCNC: 21 U/L (ref 13–56)
ANION GAP SERPL CALC-SCNC: 12 MMOL/L (ref 3–18)
ANION GAP SERPL CALC-SCNC: 7 MMOL/L (ref 3–18)
APPEARANCE UR: CLEAR
AST SERPL-CCNC: 12 U/L (ref 10–38)
BASOPHILS # BLD: 0 K/UL (ref 0–0.1)
BASOPHILS NFR BLD: 0 % (ref 0–2)
BILIRUB SERPL-MCNC: 0.4 MG/DL (ref 0.2–1)
BILIRUB UR QL: NEGATIVE
BUN SERPL-MCNC: 53 MG/DL (ref 7–18)
BUN SERPL-MCNC: 56 MG/DL (ref 7–18)
BUN/CREAT SERPL: 27 (ref 12–20)
BUN/CREAT SERPL: 32 (ref 12–20)
CALCIUM SERPL-MCNC: 7.9 MG/DL (ref 8.5–10.1)
CALCIUM SERPL-MCNC: 8 MG/DL (ref 8.5–10.1)
CHLORIDE SERPL-SCNC: 107 MMOL/L (ref 100–111)
CHLORIDE SERPL-SCNC: 109 MMOL/L (ref 100–111)
CHP ED QC CHECK: YES
CO2 SERPL-SCNC: 16 MMOL/L (ref 21–32)
CO2 SERPL-SCNC: 20 MMOL/L (ref 21–32)
COLOR UR: YELLOW
CREAT SERPL-MCNC: 1.66 MG/DL (ref 0.6–1.3)
CREAT SERPL-MCNC: 2.05 MG/DL (ref 0.6–1.3)
DIFFERENTIAL METHOD BLD: ABNORMAL
EKG ATRIAL RATE: 101 BPM
EKG DIAGNOSIS: NORMAL
EKG P AXIS: 75 DEGREES
EKG P-R INTERVAL: 126 MS
EKG Q-T INTERVAL: 334 MS
EKG QRS DURATION: 66 MS
EKG QTC CALCULATION (BAZETT): 433 MS
EKG R AXIS: 76 DEGREES
EKG T AXIS: 74 DEGREES
EKG VENTRICULAR RATE: 101 BPM
EOSINOPHIL # BLD: 0 K/UL (ref 0–0.4)
EOSINOPHIL NFR BLD: 0 % (ref 0–5)
ERYTHROCYTE [DISTWIDTH] IN BLOOD BY AUTOMATED COUNT: 14.5 % (ref 11.6–14.5)
EST. AVERAGE GLUCOSE BLD GHB EST-MCNC: 214 MG/DL
GLOBULIN SER CALC-MCNC: 2.9 G/DL (ref 2–4)
GLUCOSE BLD STRIP.AUTO-MCNC: 170 MG/DL (ref 70–110)
GLUCOSE BLD STRIP.AUTO-MCNC: 250 MG/DL (ref 70–110)
GLUCOSE BLD-MCNC: 250 MG/DL
GLUCOSE BLD-MCNC: 250 MG/DL
GLUCOSE SERPL-MCNC: 133 MG/DL (ref 74–99)
GLUCOSE SERPL-MCNC: 248 MG/DL (ref 74–99)
GLUCOSE UR STRIP.AUTO-MCNC: >1000 MG/DL
HBA1C MFR BLD: 9.1 % (ref 4.2–5.6)
HCT VFR BLD AUTO: 34.9 % (ref 35–45)
HGB BLD-MCNC: 11.4 G/DL (ref 12–16)
HGB UR QL STRIP: NEGATIVE
IMM GRANULOCYTES # BLD AUTO: 0.1 K/UL (ref 0–0.04)
IMM GRANULOCYTES NFR BLD AUTO: 1 % (ref 0–0.5)
KETONES UR QL STRIP.AUTO: 15 MG/DL
LEUKOCYTE ESTERASE UR QL STRIP.AUTO: NEGATIVE
LIPASE SERPL-CCNC: 53 U/L (ref 73–393)
LYMPHOCYTES # BLD: 1 K/UL (ref 0.9–3.6)
LYMPHOCYTES NFR BLD: 8 % (ref 21–52)
MAGNESIUM SERPL-MCNC: 1.5 MG/DL (ref 1.6–2.6)
MCH RBC QN AUTO: 27.3 PG (ref 24–34)
MCHC RBC AUTO-ENTMCNC: 32.7 G/DL (ref 31–37)
MCV RBC AUTO: 83.5 FL (ref 78–100)
MONOCYTES # BLD: 0.5 K/UL (ref 0.05–1.2)
MONOCYTES NFR BLD: 4 % (ref 3–10)
NEUTS SEG # BLD: 11.1 K/UL (ref 1.8–8)
NEUTS SEG NFR BLD: 87 % (ref 40–73)
NITRITE UR QL STRIP.AUTO: NEGATIVE
NRBC # BLD: 0 K/UL (ref 0–0.01)
NRBC BLD-RTO: 0 PER 100 WBC
PH BLDV: 7.35 (ref 7.32–7.42)
PH UR STRIP: 5 (ref 5–8)
PLATELET # BLD AUTO: 281 K/UL (ref 135–420)
PMV BLD AUTO: 10.2 FL (ref 9.2–11.8)
POTASSIUM SERPL-SCNC: 4.7 MMOL/L (ref 3.5–5.5)
POTASSIUM SERPL-SCNC: 5.1 MMOL/L (ref 3.5–5.5)
PROT SERPL-MCNC: 6 G/DL (ref 6.4–8.2)
PROT UR STRIP-MCNC: NEGATIVE MG/DL
RBC # BLD AUTO: 4.18 M/UL (ref 4.2–5.3)
SERVICE CMNT-IMP: NORMAL
SODIUM SERPL-SCNC: 135 MMOL/L (ref 136–145)
SODIUM SERPL-SCNC: 136 MMOL/L (ref 136–145)
SP GR UR REFRACTOMETRY: 1.02 (ref 1–1.03)
SPECIMEN TYPE: NORMAL
TROPONIN I SERPL HS-MCNC: 7 NG/L (ref 0–54)
TROPONIN I SERPL HS-MCNC: 8 NG/L (ref 0–54)
UROBILINOGEN UR QL STRIP.AUTO: 0.2 EU/DL (ref 0.2–1)
WBC # BLD AUTO: 12.8 K/UL (ref 4.6–13.2)

## 2023-08-21 PROCEDURE — 99285 EMERGENCY DEPT VISIT HI MDM: CPT

## 2023-08-21 PROCEDURE — 93005 ELECTROCARDIOGRAM TRACING: CPT | Performed by: EMERGENCY MEDICINE

## 2023-08-21 PROCEDURE — 6360000002 HC RX W HCPCS: Performed by: EMERGENCY MEDICINE

## 2023-08-21 PROCEDURE — 85025 COMPLETE CBC W/AUTO DIFF WBC: CPT

## 2023-08-21 PROCEDURE — 80053 COMPREHEN METABOLIC PANEL: CPT

## 2023-08-21 PROCEDURE — 96366 THER/PROPH/DIAG IV INF ADDON: CPT

## 2023-08-21 PROCEDURE — 83690 ASSAY OF LIPASE: CPT

## 2023-08-21 PROCEDURE — 84484 ASSAY OF TROPONIN QUANT: CPT

## 2023-08-21 PROCEDURE — 71045 X-RAY EXAM CHEST 1 VIEW: CPT

## 2023-08-21 PROCEDURE — 96365 THER/PROPH/DIAG IV INF INIT: CPT

## 2023-08-21 PROCEDURE — 82962 GLUCOSE BLOOD TEST: CPT

## 2023-08-21 PROCEDURE — 83036 HEMOGLOBIN GLYCOSYLATED A1C: CPT

## 2023-08-21 PROCEDURE — 82803 BLOOD GASES ANY COMBINATION: CPT

## 2023-08-21 PROCEDURE — 2580000003 HC RX 258: Performed by: EMERGENCY MEDICINE

## 2023-08-21 PROCEDURE — 83735 ASSAY OF MAGNESIUM: CPT

## 2023-08-21 PROCEDURE — 93010 ELECTROCARDIOGRAM REPORT: CPT | Performed by: INTERNAL MEDICINE

## 2023-08-21 PROCEDURE — 96361 HYDRATE IV INFUSION ADD-ON: CPT

## 2023-08-21 PROCEDURE — 81003 URINALYSIS AUTO W/O SCOPE: CPT

## 2023-08-21 PROCEDURE — 6370000000 HC RX 637 (ALT 250 FOR IP): Performed by: EMERGENCY MEDICINE

## 2023-08-21 RX ORDER — INSULIN LISPRO 100 [IU]/ML
0-8 INJECTION, SOLUTION INTRAVENOUS; SUBCUTANEOUS
Status: DISCONTINUED | OUTPATIENT
Start: 2023-08-21 | End: 2023-08-21 | Stop reason: HOSPADM

## 2023-08-21 RX ORDER — GLUCAGON 1 MG
1 KIT INJECTION PRN
Status: DISCONTINUED | OUTPATIENT
Start: 2023-08-21 | End: 2023-08-21 | Stop reason: HOSPADM

## 2023-08-21 RX ORDER — 0.9 % SODIUM CHLORIDE 0.9 %
1000 INTRAVENOUS SOLUTION INTRAVENOUS ONCE
Status: COMPLETED | OUTPATIENT
Start: 2023-08-21 | End: 2023-08-21

## 2023-08-21 RX ORDER — INSULIN LISPRO 100 [IU]/ML
0-4 INJECTION, SOLUTION INTRAVENOUS; SUBCUTANEOUS NIGHTLY
Status: DISCONTINUED | OUTPATIENT
Start: 2023-08-21 | End: 2023-08-21 | Stop reason: HOSPADM

## 2023-08-21 RX ORDER — SODIUM CHLORIDE 9 MG/ML
INJECTION, SOLUTION INTRAVENOUS CONTINUOUS
Status: DISCONTINUED | OUTPATIENT
Start: 2023-08-21 | End: 2023-08-21 | Stop reason: HOSPADM

## 2023-08-21 RX ORDER — DEXTROSE MONOHYDRATE 100 MG/ML
INJECTION, SOLUTION INTRAVENOUS CONTINUOUS PRN
Status: DISCONTINUED | OUTPATIENT
Start: 2023-08-21 | End: 2023-08-21 | Stop reason: HOSPADM

## 2023-08-21 RX ORDER — MAGNESIUM SULFATE IN WATER 40 MG/ML
2000 INJECTION, SOLUTION INTRAVENOUS
Status: COMPLETED | OUTPATIENT
Start: 2023-08-21 | End: 2023-08-21

## 2023-08-21 RX ADMIN — SODIUM CHLORIDE: 9 INJECTION, SOLUTION INTRAVENOUS at 09:30

## 2023-08-21 RX ADMIN — MAGNESIUM SULFATE HEPTAHYDRATE 2000 MG: 40 INJECTION, SOLUTION INTRAVENOUS at 09:45

## 2023-08-21 RX ADMIN — SODIUM CHLORIDE 1000 ML: 9 INJECTION, SOLUTION INTRAVENOUS at 09:29

## 2023-08-21 RX ADMIN — INSULIN LISPRO 2 UNITS: 100 INJECTION, SOLUTION INTRAVENOUS; SUBCUTANEOUS at 09:34

## 2023-08-21 ASSESSMENT — ENCOUNTER SYMPTOMS
CHEST TIGHTNESS: 0
ABDOMINAL PAIN: 0
EYES NEGATIVE: 1

## 2023-08-21 ASSESSMENT — LIFESTYLE VARIABLES
HOW OFTEN DO YOU HAVE A DRINK CONTAINING ALCOHOL: NEVER
HOW MANY STANDARD DRINKS CONTAINING ALCOHOL DO YOU HAVE ON A TYPICAL DAY: PATIENT DOES NOT DRINK

## 2023-08-21 ASSESSMENT — PAIN - FUNCTIONAL ASSESSMENT: PAIN_FUNCTIONAL_ASSESSMENT: NONE - DENIES PAIN

## 2023-08-21 NOTE — ED NOTES
RN called both numbers listed for daughter Aylin Manning, no answer, unable to leave message.      Judy Modi RN  08/21/23 [id number]

## 2023-08-21 NOTE — ED PROVIDER NOTES
vomiting 2 days ago. Patient started forcing her to eat so she gave her insulin however soon as she eats her sugar goes very high. Phoebe Pollard, DO 9:20 AM    The patient's labs look much better after hydration and she is feeling much better. I was able to speak to Dr. Lucila Payne at [facility] and reviewed the patient's presentation and her brittle diabetes. They said that they will see her tomorrow and work on getting her a continuous glucose monitor which she needs. I called the patient's daughter and she is returning and we will plan to discharge the patient with diagnosis of dehydration and hyperglycemia. The patient has an appointment at [phone] with Dr. Lucila Payne. Family returned and all are agreeable with the plan. The patient feels much better and will return if at all worsened or concerned. Workup and recommendations were reviewed with the patient and all questions were answered. The patient understands the plan and will proceed with close outpatient care. I have encouraged the patient to return if at all worsened or concerned.    Phoebe Pollard, DO 2:39 PM        Patient was given the following medications:  Medications   sodium chloride 0.9 % bolus 1,000 mL (has no administration in time range)   0.9 % sodium chloride infusion (has no administration in time range)   glucose chewable tablet 16 g (has no administration in time range)   dextrose bolus 10% 125 mL (has no administration in time range)     Or   dextrose bolus 10% 250 mL (has no administration in time range)   Glucagon Emergency SOLR 1 mg (has no administration in time range)   dextrose 10 % infusion (has no administration in time range)   insulin lispro (HUMALOG) injection vial 0-8 Units (has no administration in time range)   insulin lispro (HUMALOG) injection vial 0-4 Units (has no administration in time range)       CONSULTS: (Who and What was discussed)  None    Chronic Conditions: DM, DKA, Kidney dx, all impacting

## 2023-08-21 NOTE — DISCHARGE INSTRUCTIONS
You are dehydrated based on your evaluation and improved with increased fluid intake. Your sugar will need to be monitored closely and your primary provider will be arranging for your glucose monitor tomorrow. You have an appointment at 11:30 AM tomorrow with Marnie vuong. Please return if you are at all worsened or concerned.

## 2023-08-21 NOTE — ED NOTES
Called primary physician office [address] [facility] is not in the office.  Dr. Christy Kilpatrick is covering for Dr. MEIER [facility] today and she was able to consult with Dr. Lindsey Gomez

## 2023-08-21 NOTE — ED NOTES
Discharge instructions reviewed with patient. Patient verbalized understanding. Patient advised to follow up as directed on discharge instructions. Patient denies questions, needs or concerns at this time. Patient verbalized understanding. No s/sx of distress noted.         Savannah Coates RN  08/21/23 1196

## 2023-08-21 NOTE — ED TRIAGE NOTES
Patient arrived to ER with complaints of dizziness for past 3 days. Patient states her blood sugar has been high at home for past few days.

## 2023-10-12 ENCOUNTER — APPOINTMENT (OUTPATIENT)
Facility: HOSPITAL | Age: 73
DRG: 638 | End: 2023-10-12
Payer: MEDICARE

## 2023-10-12 ENCOUNTER — HOSPITAL ENCOUNTER (INPATIENT)
Facility: HOSPITAL | Age: 73
LOS: 3 days | Discharge: HOME | DRG: 638 | End: 2023-10-15
Attending: EMERGENCY MEDICINE | Admitting: INTERNAL MEDICINE
Payer: MEDICARE

## 2023-10-12 DIAGNOSIS — S32.010D COMPRESSION FRACTURE OF L1 VERTEBRA WITH ROUTINE HEALING: ICD-10-CM

## 2023-10-12 DIAGNOSIS — E11.10 DIABETIC KETOACIDOSIS WITHOUT COMA ASSOCIATED WITH TYPE 2 DIABETES MELLITUS (HCC): Primary | ICD-10-CM

## 2023-10-12 LAB
ALBUMIN SERPL-MCNC: 3.3 G/DL (ref 3.4–5)
ALBUMIN/GLOB SERPL: 1.1 (ref 0.8–1.7)
ALP SERPL-CCNC: 85 U/L (ref 45–117)
ALT SERPL-CCNC: 17 U/L (ref 13–56)
ANION GAP SERPL CALC-SCNC: 3 MMOL/L (ref 3–18)
ANION GAP SERPL CALC-SCNC: 5 MMOL/L (ref 3–18)
ANION GAP SERPL CALC-SCNC: 7 MMOL/L (ref 3–18)
APPEARANCE UR: CLEAR
AST SERPL-CCNC: 9 U/L (ref 10–38)
BASOPHILS # BLD: 0.1 K/UL (ref 0–0.1)
BASOPHILS NFR BLD: 0 % (ref 0–2)
BILIRUB SERPL-MCNC: 0.5 MG/DL (ref 0.2–1)
BILIRUB UR QL: NEGATIVE
BUN SERPL-MCNC: 25 MG/DL (ref 7–18)
BUN SERPL-MCNC: 30 MG/DL (ref 7–18)
BUN SERPL-MCNC: 35 MG/DL (ref 7–18)
BUN/CREAT SERPL: 19 (ref 12–20)
CALCIUM SERPL-MCNC: 8 MG/DL (ref 8.5–10.1)
CALCIUM SERPL-MCNC: 8.2 MG/DL (ref 8.5–10.1)
CALCIUM SERPL-MCNC: 8.4 MG/DL (ref 8.5–10.1)
CHLORIDE SERPL-SCNC: 105 MMOL/L (ref 100–111)
CHLORIDE SERPL-SCNC: 109 MMOL/L (ref 100–111)
CHLORIDE SERPL-SCNC: 112 MMOL/L (ref 100–111)
CO2 SERPL-SCNC: 19 MMOL/L (ref 21–32)
CO2 SERPL-SCNC: 22 MMOL/L (ref 21–32)
CO2 SERPL-SCNC: 23 MMOL/L (ref 21–32)
COLOR UR: YELLOW
CREAT SERPL-MCNC: 1.35 MG/DL (ref 0.6–1.3)
CREAT SERPL-MCNC: 1.6 MG/DL (ref 0.6–1.3)
CREAT SERPL-MCNC: 1.83 MG/DL (ref 0.6–1.3)
DIFFERENTIAL METHOD BLD: ABNORMAL
EKG ATRIAL RATE: 102 BPM
EKG DIAGNOSIS: NORMAL
EKG P AXIS: 70 DEGREES
EKG P-R INTERVAL: 146 MS
EKG Q-T INTERVAL: 342 MS
EKG QRS DURATION: 78 MS
EKG QTC CALCULATION (BAZETT): 445 MS
EKG R AXIS: 65 DEGREES
EKG T AXIS: 73 DEGREES
EKG VENTRICULAR RATE: 102 BPM
EOSINOPHIL # BLD: 0 K/UL (ref 0–0.4)
EOSINOPHIL NFR BLD: 0 % (ref 0–5)
ERYTHROCYTE [DISTWIDTH] IN BLOOD BY AUTOMATED COUNT: 13.9 % (ref 11.6–14.5)
EST. AVERAGE GLUCOSE BLD GHB EST-MCNC: 174 MG/DL
GLOBULIN SER CALC-MCNC: 3.1 G/DL (ref 2–4)
GLUCOSE BLD STRIP.AUTO-MCNC: 191 MG/DL (ref 70–110)
GLUCOSE BLD STRIP.AUTO-MCNC: 228 MG/DL (ref 70–110)
GLUCOSE BLD STRIP.AUTO-MCNC: 240 MG/DL (ref 70–110)
GLUCOSE BLD STRIP.AUTO-MCNC: 262 MG/DL (ref 70–110)
GLUCOSE BLD STRIP.AUTO-MCNC: 371 MG/DL (ref 70–110)
GLUCOSE BLD STRIP.AUTO-MCNC: 410 MG/DL (ref 70–110)
GLUCOSE BLD STRIP.AUTO-MCNC: 535 MG/DL (ref 70–110)
GLUCOSE BLD STRIP.AUTO-MCNC: 571 MG/DL (ref 70–110)
GLUCOSE SERPL-MCNC: 232 MG/DL (ref 74–99)
GLUCOSE SERPL-MCNC: 268 MG/DL (ref 74–99)
GLUCOSE SERPL-MCNC: 637 MG/DL (ref 74–99)
GLUCOSE UR STRIP.AUTO-MCNC: >1000 MG/DL
HBA1C MFR BLD: 7.7 % (ref 4.2–5.6)
HCT VFR BLD AUTO: 35.6 % (ref 35–45)
HGB BLD-MCNC: 10.9 G/DL (ref 12–16)
HGB UR QL STRIP: NEGATIVE
IMM GRANULOCYTES # BLD AUTO: 0 K/UL (ref 0–0.04)
IMM GRANULOCYTES NFR BLD AUTO: 0 % (ref 0–0.5)
KETONES UR QL STRIP.AUTO: 40 MG/DL
LACTATE SERPL-SCNC: 1.9 MMOL/L (ref 0.4–2)
LEUKOCYTE ESTERASE UR QL STRIP.AUTO: NEGATIVE
LIPASE SERPL-CCNC: 59 U/L (ref 73–393)
LYMPHOCYTES # BLD: 1.2 K/UL (ref 0.9–3.6)
LYMPHOCYTES NFR BLD: 10 % (ref 21–52)
MAGNESIUM SERPL-MCNC: 1.5 MG/DL (ref 1.6–2.6)
MCH RBC QN AUTO: 27 PG (ref 24–34)
MCHC RBC AUTO-ENTMCNC: 30.6 G/DL (ref 31–37)
MCV RBC AUTO: 88.3 FL (ref 78–100)
MONOCYTES # BLD: 0.3 K/UL (ref 0.05–1.2)
MONOCYTES NFR BLD: 3 % (ref 3–10)
NEUTS SEG # BLD: 9.7 K/UL (ref 1.8–8)
NEUTS SEG NFR BLD: 86 % (ref 40–73)
NITRITE UR QL STRIP.AUTO: NEGATIVE
NRBC # BLD: 0 K/UL (ref 0–0.01)
NRBC BLD-RTO: 0 PER 100 WBC
PH BLDV: 7.21 (ref 7.32–7.42)
PH UR STRIP: 5.5 (ref 5–8)
PHOSPHATE SERPL-MCNC: 2.5 MG/DL (ref 2.5–4.9)
PLATELET # BLD AUTO: 246 K/UL (ref 135–420)
PMV BLD AUTO: 10.4 FL (ref 9.2–11.8)
POTASSIUM SERPL-SCNC: 5.3 MMOL/L (ref 3.5–5.5)
POTASSIUM SERPL-SCNC: 5.8 MMOL/L (ref 3.5–5.5)
POTASSIUM SERPL-SCNC: 7.1 MMOL/L (ref 3.5–5.5)
PROT SERPL-MCNC: 6.4 G/DL (ref 6.4–8.2)
PROT UR STRIP-MCNC: NEGATIVE MG/DL
RBC # BLD AUTO: 4.03 M/UL (ref 4.2–5.3)
SODIUM SERPL-SCNC: 131 MMOL/L (ref 136–145)
SODIUM SERPL-SCNC: 136 MMOL/L (ref 136–145)
SODIUM SERPL-SCNC: 138 MMOL/L (ref 136–145)
SP GR UR REFRACTOMETRY: 1.02 (ref 1–1.03)
TROPONIN I SERPL HS-MCNC: 6 NG/L (ref 0–54)
TROPONIN I SERPL HS-MCNC: 7 NG/L (ref 0–54)
UROBILINOGEN UR QL STRIP.AUTO: 0.2 EU/DL (ref 0.2–1)
WBC # BLD AUTO: 11.3 K/UL (ref 4.6–13.2)

## 2023-10-12 PROCEDURE — 85025 COMPLETE CBC W/AUTO DIFF WBC: CPT

## 2023-10-12 PROCEDURE — 1100000003 HC PRIVATE W/ TELEMETRY

## 2023-10-12 PROCEDURE — 71045 X-RAY EXAM CHEST 1 VIEW: CPT

## 2023-10-12 PROCEDURE — 82962 GLUCOSE BLOOD TEST: CPT

## 2023-10-12 PROCEDURE — 6370000000 HC RX 637 (ALT 250 FOR IP): Performed by: INTERNAL MEDICINE

## 2023-10-12 PROCEDURE — 96366 THER/PROPH/DIAG IV INF ADDON: CPT

## 2023-10-12 PROCEDURE — 36415 COLL VENOUS BLD VENIPUNCTURE: CPT

## 2023-10-12 PROCEDURE — 81003 URINALYSIS AUTO W/O SCOPE: CPT

## 2023-10-12 PROCEDURE — 99222 1ST HOSP IP/OBS MODERATE 55: CPT | Performed by: INTERNAL MEDICINE

## 2023-10-12 PROCEDURE — 80048 BASIC METABOLIC PNL TOTAL CA: CPT

## 2023-10-12 PROCEDURE — 84484 ASSAY OF TROPONIN QUANT: CPT

## 2023-10-12 PROCEDURE — 6370000000 HC RX 637 (ALT 250 FOR IP): Performed by: EMERGENCY MEDICINE

## 2023-10-12 PROCEDURE — 74176 CT ABD & PELVIS W/O CONTRAST: CPT

## 2023-10-12 PROCEDURE — 6360000002 HC RX W HCPCS: Performed by: INTERNAL MEDICINE

## 2023-10-12 PROCEDURE — 83605 ASSAY OF LACTIC ACID: CPT

## 2023-10-12 PROCEDURE — 84100 ASSAY OF PHOSPHORUS: CPT

## 2023-10-12 PROCEDURE — 93010 ELECTROCARDIOGRAM REPORT: CPT | Performed by: INTERNAL MEDICINE

## 2023-10-12 PROCEDURE — 2580000003 HC RX 258: Performed by: EMERGENCY MEDICINE

## 2023-10-12 PROCEDURE — 80053 COMPREHEN METABOLIC PANEL: CPT

## 2023-10-12 PROCEDURE — 99285 EMERGENCY DEPT VISIT HI MDM: CPT

## 2023-10-12 PROCEDURE — 82800 BLOOD PH: CPT

## 2023-10-12 PROCEDURE — 83690 ASSAY OF LIPASE: CPT

## 2023-10-12 PROCEDURE — 96365 THER/PROPH/DIAG IV INF INIT: CPT

## 2023-10-12 PROCEDURE — 93005 ELECTROCARDIOGRAM TRACING: CPT | Performed by: EMERGENCY MEDICINE

## 2023-10-12 PROCEDURE — 2580000003 HC RX 258: Performed by: INTERNAL MEDICINE

## 2023-10-12 PROCEDURE — 83735 ASSAY OF MAGNESIUM: CPT

## 2023-10-12 PROCEDURE — 83036 HEMOGLOBIN GLYCOSYLATED A1C: CPT

## 2023-10-12 RX ORDER — ATORVASTATIN CALCIUM 20 MG/1
20 TABLET, FILM COATED ORAL NIGHTLY
Status: DISCONTINUED | OUTPATIENT
Start: 2023-10-12 | End: 2023-10-15 | Stop reason: HOSPADM

## 2023-10-12 RX ORDER — SODIUM CHLORIDE 9 MG/ML
INJECTION, SOLUTION INTRAVENOUS CONTINUOUS
Status: DISCONTINUED | OUTPATIENT
Start: 2023-10-12 | End: 2023-10-14

## 2023-10-12 RX ORDER — INSULIN LISPRO 100 [IU]/ML
0-8 INJECTION, SOLUTION INTRAVENOUS; SUBCUTANEOUS
Status: DISCONTINUED | OUTPATIENT
Start: 2023-10-12 | End: 2023-10-15 | Stop reason: HOSPADM

## 2023-10-12 RX ORDER — OXYCODONE HYDROCHLORIDE 5 MG/1
5 TABLET ORAL EVERY 4 HOURS PRN
Status: DISCONTINUED | OUTPATIENT
Start: 2023-10-12 | End: 2023-10-15 | Stop reason: HOSPADM

## 2023-10-12 RX ORDER — POLYETHYLENE GLYCOL 3350 17 G/17G
17 POWDER, FOR SOLUTION ORAL DAILY PRN
Status: DISCONTINUED | OUTPATIENT
Start: 2023-10-12 | End: 2023-10-15 | Stop reason: HOSPADM

## 2023-10-12 RX ORDER — ACETAMINOPHEN 500 MG
1000 TABLET ORAL EVERY 8 HOURS SCHEDULED
Status: DISCONTINUED | OUTPATIENT
Start: 2023-10-12 | End: 2023-10-15 | Stop reason: HOSPADM

## 2023-10-12 RX ORDER — SODIUM CHLORIDE 9 MG/ML
INJECTION, SOLUTION INTRAVENOUS PRN
Status: DISCONTINUED | OUTPATIENT
Start: 2023-10-12 | End: 2023-10-15 | Stop reason: HOSPADM

## 2023-10-12 RX ORDER — POTASSIUM CHLORIDE 7.45 MG/ML
10 INJECTION INTRAVENOUS PRN
Status: DISCONTINUED | OUTPATIENT
Start: 2023-10-12 | End: 2023-10-15 | Stop reason: HOSPADM

## 2023-10-12 RX ORDER — DEXTROSE AND SODIUM CHLORIDE 5; .45 G/100ML; G/100ML
INJECTION, SOLUTION INTRAVENOUS CONTINUOUS PRN
Status: DISCONTINUED | OUTPATIENT
Start: 2023-10-12 | End: 2023-10-15 | Stop reason: HOSPADM

## 2023-10-12 RX ORDER — ACETAMINOPHEN 325 MG/1
650 TABLET ORAL EVERY 6 HOURS PRN
Status: DISCONTINUED | OUTPATIENT
Start: 2023-10-12 | End: 2023-10-15 | Stop reason: HOSPADM

## 2023-10-12 RX ORDER — ALENDRONATE SODIUM 70 MG/1
TABLET ORAL
COMMUNITY
Start: 2023-08-10

## 2023-10-12 RX ORDER — MORPHINE SULFATE 4 MG/ML
4 INJECTION, SOLUTION INTRAMUSCULAR; INTRAVENOUS EVERY 4 HOURS PRN
Status: DISCONTINUED | OUTPATIENT
Start: 2023-10-12 | End: 2023-10-15 | Stop reason: HOSPADM

## 2023-10-12 RX ORDER — INSULIN GLARGINE 100 [IU]/ML
15 INJECTION, SOLUTION SUBCUTANEOUS DAILY
Status: DISCONTINUED | OUTPATIENT
Start: 2023-10-12 | End: 2023-10-13

## 2023-10-12 RX ORDER — ENOXAPARIN SODIUM 100 MG/ML
30 INJECTION SUBCUTANEOUS DAILY
Status: DISCONTINUED | OUTPATIENT
Start: 2023-10-13 | End: 2023-10-15 | Stop reason: HOSPADM

## 2023-10-12 RX ORDER — AMLODIPINE BESYLATE 5 MG/1
5 TABLET ORAL DAILY
Status: DISCONTINUED | OUTPATIENT
Start: 2023-10-12 | End: 2023-10-15 | Stop reason: HOSPADM

## 2023-10-12 RX ORDER — 0.9 % SODIUM CHLORIDE 0.9 %
15 INTRAVENOUS SOLUTION INTRAVENOUS ONCE
Status: COMPLETED | OUTPATIENT
Start: 2023-10-12 | End: 2023-10-12

## 2023-10-12 RX ORDER — DEXTROSE MONOHYDRATE 100 MG/ML
INJECTION, SOLUTION INTRAVENOUS CONTINUOUS PRN
Status: DISCONTINUED | OUTPATIENT
Start: 2023-10-12 | End: 2023-10-15 | Stop reason: HOSPADM

## 2023-10-12 RX ORDER — HUMAN INSULIN 100 [IU]/ML
INJECTION, SOLUTION SUBCUTANEOUS
COMMUNITY
Start: 2023-09-15

## 2023-10-12 RX ORDER — ONDANSETRON 2 MG/ML
4 INJECTION INTRAMUSCULAR; INTRAVENOUS EVERY 6 HOURS PRN
Status: DISCONTINUED | OUTPATIENT
Start: 2023-10-12 | End: 2023-10-15 | Stop reason: HOSPADM

## 2023-10-12 RX ORDER — MAGNESIUM SULFATE IN WATER 40 MG/ML
2000 INJECTION, SOLUTION INTRAVENOUS PRN
Status: DISCONTINUED | OUTPATIENT
Start: 2023-10-12 | End: 2023-10-15 | Stop reason: HOSPADM

## 2023-10-12 RX ORDER — PANTOPRAZOLE SODIUM 40 MG/1
40 TABLET, DELAYED RELEASE ORAL
Status: DISCONTINUED | OUTPATIENT
Start: 2023-10-12 | End: 2023-10-15 | Stop reason: HOSPADM

## 2023-10-12 RX ORDER — ASPIRIN 81 MG/1
81 TABLET ORAL DAILY
Status: DISCONTINUED | OUTPATIENT
Start: 2023-10-12 | End: 2023-10-15 | Stop reason: HOSPADM

## 2023-10-12 RX ORDER — ACETAMINOPHEN 650 MG/1
650 SUPPOSITORY RECTAL EVERY 6 HOURS PRN
Status: DISCONTINUED | OUTPATIENT
Start: 2023-10-12 | End: 2023-10-15 | Stop reason: HOSPADM

## 2023-10-12 RX ORDER — LANOLIN ALCOHOL/MO/W.PET/CERES
400 CREAM (GRAM) TOPICAL 2 TIMES DAILY
Status: COMPLETED | OUTPATIENT
Start: 2023-10-12 | End: 2023-10-13

## 2023-10-12 RX ORDER — SODIUM CHLORIDE 9 MG/ML
INJECTION, SOLUTION INTRAVENOUS CONTINUOUS
Status: DISCONTINUED | OUTPATIENT
Start: 2023-10-12 | End: 2023-10-12

## 2023-10-12 RX ORDER — SODIUM CHLORIDE 0.9 % (FLUSH) 0.9 %
5-40 SYRINGE (ML) INJECTION EVERY 12 HOURS SCHEDULED
Status: DISCONTINUED | OUTPATIENT
Start: 2023-10-12 | End: 2023-10-15 | Stop reason: HOSPADM

## 2023-10-12 RX ORDER — ONDANSETRON 4 MG/1
4 TABLET, ORALLY DISINTEGRATING ORAL EVERY 8 HOURS PRN
Status: DISCONTINUED | OUTPATIENT
Start: 2023-10-12 | End: 2023-10-15 | Stop reason: HOSPADM

## 2023-10-12 RX ORDER — LEVOTHYROXINE SODIUM 0.15 MG/1
150 TABLET ORAL
Status: DISCONTINUED | OUTPATIENT
Start: 2023-10-13 | End: 2023-10-15 | Stop reason: HOSPADM

## 2023-10-12 RX ORDER — INSULIN LISPRO 100 [IU]/ML
0-4 INJECTION, SOLUTION INTRAVENOUS; SUBCUTANEOUS NIGHTLY
Status: DISCONTINUED | OUTPATIENT
Start: 2023-10-12 | End: 2023-10-15 | Stop reason: HOSPADM

## 2023-10-12 RX ORDER — SODIUM CHLORIDE 0.9 % (FLUSH) 0.9 %
5-40 SYRINGE (ML) INJECTION PRN
Status: DISCONTINUED | OUTPATIENT
Start: 2023-10-12 | End: 2023-10-15 | Stop reason: HOSPADM

## 2023-10-12 RX ADMIN — SODIUM ZIRCONIUM CYCLOSILICATE 10 G: 10 POWDER, FOR SUSPENSION ORAL at 11:53

## 2023-10-12 RX ADMIN — ONDANSETRON 4 MG: 2 INJECTION INTRAMUSCULAR; INTRAVENOUS at 15:14

## 2023-10-12 RX ADMIN — ATORVASTATIN CALCIUM 20 MG: 20 TABLET, FILM COATED ORAL at 20:29

## 2023-10-12 RX ADMIN — OXYCODONE HYDROCHLORIDE 5 MG: 5 TABLET ORAL at 18:49

## 2023-10-12 RX ADMIN — SODIUM ZIRCONIUM CYCLOSILICATE 10 G: 10 POWDER, FOR SUSPENSION ORAL at 20:29

## 2023-10-12 RX ADMIN — INSULIN HUMAN 9.5 UNITS/HR: 1 INJECTION, SOLUTION INTRAVENOUS at 08:20

## 2023-10-12 RX ADMIN — ACETAMINOPHEN 1000 MG: 500 TABLET ORAL at 20:29

## 2023-10-12 RX ADMIN — INSULIN GLARGINE 15 UNITS: 100 INJECTION, SOLUTION SUBCUTANEOUS at 13:22

## 2023-10-12 RX ADMIN — SODIUM CHLORIDE, PRESERVATIVE FREE 10 ML: 5 INJECTION INTRAVENOUS at 20:35

## 2023-10-12 RX ADMIN — AMLODIPINE BESYLATE 5 MG: 5 TABLET ORAL at 13:21

## 2023-10-12 RX ADMIN — ACETAMINOPHEN 1000 MG: 500 TABLET ORAL at 17:23

## 2023-10-12 RX ADMIN — Medication 400 MG: at 17:26

## 2023-10-12 RX ADMIN — Medication 400 MG: at 20:29

## 2023-10-12 RX ADMIN — PANTOPRAZOLE SODIUM 40 MG: 40 TABLET, DELAYED RELEASE ORAL at 17:23

## 2023-10-12 RX ADMIN — OXYCODONE HYDROCHLORIDE 5 MG: 5 TABLET ORAL at 13:50

## 2023-10-12 RX ADMIN — SODIUM CHLORIDE: 9 INJECTION, SOLUTION INTRAVENOUS at 08:50

## 2023-10-12 RX ADMIN — INSULIN LISPRO 2 UNITS: 100 INJECTION, SOLUTION INTRAVENOUS; SUBCUTANEOUS at 17:15

## 2023-10-12 RX ADMIN — SODIUM ZIRCONIUM CYCLOSILICATE 10 G: 10 POWDER, FOR SUSPENSION ORAL at 17:24

## 2023-10-12 RX ADMIN — SODIUM CHLORIDE 1068 ML: 9 INJECTION, SOLUTION INTRAVENOUS at 08:00

## 2023-10-12 RX ADMIN — ASPIRIN 81 MG: 81 TABLET, COATED ORAL at 13:22

## 2023-10-12 ASSESSMENT — PAIN DESCRIPTION - DESCRIPTORS
DESCRIPTORS: DISCOMFORT
DESCRIPTORS: ACHING
DESCRIPTORS: ACHING
DESCRIPTORS: DISCOMFORT

## 2023-10-12 ASSESSMENT — PAIN DESCRIPTION - LOCATION
LOCATION: BACK

## 2023-10-12 ASSESSMENT — PAIN SCALES - GENERAL
PAINLEVEL_OUTOF10: 0
PAINLEVEL_OUTOF10: 0
PAINLEVEL_OUTOF10: 5
PAINLEVEL_OUTOF10: 8
PAINLEVEL_OUTOF10: 9

## 2023-10-12 ASSESSMENT — PAIN DESCRIPTION - FREQUENCY: FREQUENCY: INTERMITTENT

## 2023-10-12 ASSESSMENT — PAIN DESCRIPTION - ORIENTATION
ORIENTATION: LEFT;RIGHT
ORIENTATION: RIGHT;LEFT;LOWER
ORIENTATION: LEFT;RIGHT;LOWER
ORIENTATION: RIGHT;LEFT

## 2023-10-12 ASSESSMENT — PAIN DESCRIPTION - PAIN TYPE: TYPE: ACUTE PAIN

## 2023-10-12 ASSESSMENT — PAIN - FUNCTIONAL ASSESSMENT: PAIN_FUNCTIONAL_ASSESSMENT: 0-10

## 2023-10-12 NOTE — ED NOTES
Attempt to call report to [facility]. This writer was placed on hold for 5 min.       Iraj Smith RN  10/12/23 [id number]

## 2023-10-12 NOTE — ED TRIAGE NOTES
Pt has been having Bilateral flank pain, Generalized weakness, nausea since Friday. Pt is alert and oriented upon arrival. Fingerstick for EMS read high. Hx of colon cancer, Diabetes, CKD, hypothyroidism.

## 2023-10-12 NOTE — ED NOTES
Patient daughter called to check on patient. Patient daughter would like to be updated via phone if patient is admitted. Patient call back number is [phone].       Pk Gonzalez RN  10/12/23 9757

## 2023-10-12 NOTE — ED PROVIDER NOTES
EMERGENCY DEPARTMENT HISTORY AND PHYSICAL EXAM    7:08 AM EDT seen at this time in room 4        Date: 10/12/2023  Patient Name: Bryon Mcdonough    History of Presenting Illness     Chief Complaint   Patient presents with    Hyperglycemia    Flank Pain         History Provided By: patient    Additional History (Context): Bryon Mcdonough is a 74 y.o. female presents with history of type 2 diabetes on metformin, colon cancer in remission for years ago, has 2 days of steadily increasing bilateral flank pain. Constant. No trauma. No kelsey urinary symptoms. Started low-grade and has increased now rates as severe. No anterior abdominal symptoms diarrhea constipation or vomiting. No fever. She has very mild cough as well. Marcelle Uribe PCP: Pineda Orta MD    Chief Complaint:   Duration:    Timing:    Location:   Quality:   Severity:   Modifying Factors:   Associated Symptoms:       No current facility-administered medications for this encounter. Current Outpatient Medications   Medication Sig Dispense Refill    acetaminophen (TYLENOL) 500 MG tablet Take 500 mg by mouth every 6 hours as needed      alendronate (FOSAMAX) 10 MG tablet Take 70 mg by mouth every 7 days      amLODIPine (NORVASC) 5 MG tablet Take 5 mg by mouth daily      aspirin 81 MG EC tablet Take 81 mg by mouth daily      atorvastatin (LIPITOR) 20 MG tablet Take 20 mg by mouth      carvedilol (COREG) 6.25 MG tablet Take 6.25 mg by mouth 2 times daily (with meals)      vitamin D (CHOLECALCIFEROL) 94316 UNIT CAPS Take 1 capsule by mouth every 7 days      ferrous sulfate (IRON 325) 325 (65 Fe) MG tablet Take 325 mg by mouth daily (with breakfast)      gabapentin (NEURONTIN) 300 MG capsule Take 300 mg by mouth daily.       glucose-vitamin C 4-6 GM-MG CHEW chewable tablet Take 15 g by mouth as needed      insulin glargine (LANTUS) 100 UNIT/ML injection vial Inject 15 Units into the skin      insulin lispro (HUMALOG) 100 UNIT/ML SOLN injection vial Sharin Meter

## 2023-10-13 ENCOUNTER — APPOINTMENT (OUTPATIENT)
Facility: HOSPITAL | Age: 73
DRG: 638 | End: 2023-10-13
Payer: MEDICARE

## 2023-10-13 LAB
ALBUMIN SERPL-MCNC: 3 G/DL (ref 3.4–5)
ALBUMIN/GLOB SERPL: 1.1 (ref 0.8–1.7)
ALP SERPL-CCNC: 82 U/L (ref 45–117)
ALT SERPL-CCNC: 17 U/L (ref 13–56)
ANION GAP SERPL CALC-SCNC: 5 MMOL/L (ref 3–18)
ANION GAP SERPL CALC-SCNC: 5 MMOL/L (ref 3–18)
AST SERPL-CCNC: 9 U/L (ref 10–38)
BASOPHILS # BLD: 0.1 K/UL (ref 0–0.1)
BASOPHILS NFR BLD: 1 % (ref 0–2)
BILIRUB SERPL-MCNC: 0.5 MG/DL (ref 0.2–1)
BUN SERPL-MCNC: 21 MG/DL (ref 7–18)
BUN SERPL-MCNC: 23 MG/DL (ref 7–18)
BUN/CREAT SERPL: 15 (ref 12–20)
BUN/CREAT SERPL: 17 (ref 12–20)
CALCIUM SERPL-MCNC: 8.4 MG/DL (ref 8.5–10.1)
CALCIUM SERPL-MCNC: 8.5 MG/DL (ref 8.5–10.1)
CHLORIDE SERPL-SCNC: 107 MMOL/L (ref 100–111)
CHLORIDE SERPL-SCNC: 108 MMOL/L (ref 100–111)
CO2 SERPL-SCNC: 22 MMOL/L (ref 21–32)
CO2 SERPL-SCNC: 23 MMOL/L (ref 21–32)
CREAT SERPL-MCNC: 1.38 MG/DL (ref 0.6–1.3)
CREAT SERPL-MCNC: 1.4 MG/DL (ref 0.6–1.3)
DIFFERENTIAL METHOD BLD: ABNORMAL
EOSINOPHIL # BLD: 0.1 K/UL (ref 0–0.4)
EOSINOPHIL NFR BLD: 2 % (ref 0–5)
ERYTHROCYTE [DISTWIDTH] IN BLOOD BY AUTOMATED COUNT: 14 % (ref 11.6–14.5)
GLOBULIN SER CALC-MCNC: 2.8 G/DL (ref 2–4)
GLUCOSE BLD STRIP.AUTO-MCNC: 112 MG/DL (ref 70–110)
GLUCOSE BLD STRIP.AUTO-MCNC: 126 MG/DL (ref 70–110)
GLUCOSE BLD STRIP.AUTO-MCNC: 184 MG/DL (ref 70–110)
GLUCOSE BLD STRIP.AUTO-MCNC: 297 MG/DL (ref 70–110)
GLUCOSE SERPL-MCNC: 241 MG/DL (ref 74–99)
GLUCOSE SERPL-MCNC: 286 MG/DL (ref 74–99)
HCT VFR BLD AUTO: 32.3 % (ref 35–45)
HGB BLD-MCNC: 10.3 G/DL (ref 12–16)
IMM GRANULOCYTES # BLD AUTO: 0 K/UL (ref 0–0.04)
IMM GRANULOCYTES NFR BLD AUTO: 0 % (ref 0–0.5)
LYMPHOCYTES # BLD: 2 K/UL (ref 0.9–3.6)
LYMPHOCYTES NFR BLD: 26 % (ref 21–52)
MCH RBC QN AUTO: 28 PG (ref 24–34)
MCHC RBC AUTO-ENTMCNC: 31.9 G/DL (ref 31–37)
MCV RBC AUTO: 87.8 FL (ref 78–100)
MONOCYTES # BLD: 0.6 K/UL (ref 0.05–1.2)
MONOCYTES NFR BLD: 8 % (ref 3–10)
NEUTS SEG # BLD: 5 K/UL (ref 1.8–8)
NEUTS SEG NFR BLD: 64 % (ref 40–73)
NRBC # BLD: 0 K/UL (ref 0–0.01)
NRBC BLD-RTO: 0 PER 100 WBC
PHOSPHATE SERPL-MCNC: 3.2 MG/DL (ref 2.5–4.9)
PLATELET # BLD AUTO: 243 K/UL (ref 135–420)
PMV BLD AUTO: 9.9 FL (ref 9.2–11.8)
POTASSIUM SERPL-SCNC: 4.8 MMOL/L (ref 3.5–5.5)
POTASSIUM SERPL-SCNC: 5.8 MMOL/L (ref 3.5–5.5)
PROT SERPL-MCNC: 5.8 G/DL (ref 6.4–8.2)
RBC # BLD AUTO: 3.68 M/UL (ref 4.2–5.3)
SODIUM SERPL-SCNC: 134 MMOL/L (ref 136–145)
SODIUM SERPL-SCNC: 136 MMOL/L (ref 136–145)
WBC # BLD AUTO: 7.9 K/UL (ref 4.6–13.2)

## 2023-10-13 PROCEDURE — 97162 PT EVAL MOD COMPLEX 30 MIN: CPT

## 2023-10-13 PROCEDURE — 6370000000 HC RX 637 (ALT 250 FOR IP): Performed by: INTERNAL MEDICINE

## 2023-10-13 PROCEDURE — 84100 ASSAY OF PHOSPHORUS: CPT

## 2023-10-13 PROCEDURE — 99232 SBSQ HOSP IP/OBS MODERATE 35: CPT | Performed by: INTERNAL MEDICINE

## 2023-10-13 PROCEDURE — 97535 SELF CARE MNGMENT TRAINING: CPT

## 2023-10-13 PROCEDURE — 1100000003 HC PRIVATE W/ TELEMETRY

## 2023-10-13 PROCEDURE — 97165 OT EVAL LOW COMPLEX 30 MIN: CPT

## 2023-10-13 PROCEDURE — 2580000003 HC RX 258: Performed by: INTERNAL MEDICINE

## 2023-10-13 PROCEDURE — 80053 COMPREHEN METABOLIC PANEL: CPT

## 2023-10-13 PROCEDURE — 72148 MRI LUMBAR SPINE W/O DYE: CPT

## 2023-10-13 PROCEDURE — 85025 COMPLETE CBC W/AUTO DIFF WBC: CPT

## 2023-10-13 PROCEDURE — 94761 N-INVAS EAR/PLS OXIMETRY MLT: CPT

## 2023-10-13 PROCEDURE — 36415 COLL VENOUS BLD VENIPUNCTURE: CPT

## 2023-10-13 PROCEDURE — 6360000002 HC RX W HCPCS: Performed by: INTERNAL MEDICINE

## 2023-10-13 PROCEDURE — 82962 GLUCOSE BLOOD TEST: CPT

## 2023-10-13 RX ORDER — CYCLOBENZAPRINE HCL 10 MG
10 TABLET ORAL 3 TIMES DAILY
Status: COMPLETED | OUTPATIENT
Start: 2023-10-13 | End: 2023-10-14

## 2023-10-13 RX ORDER — INSULIN GLARGINE 100 [IU]/ML
25 INJECTION, SOLUTION SUBCUTANEOUS DAILY
Status: DISCONTINUED | OUTPATIENT
Start: 2023-10-13 | End: 2023-10-15 | Stop reason: HOSPADM

## 2023-10-13 RX ADMIN — SODIUM ZIRCONIUM CYCLOSILICATE 10 G: 5 POWDER, FOR SUSPENSION ORAL at 09:23

## 2023-10-13 RX ADMIN — ONDANSETRON 4 MG: 2 INJECTION INTRAMUSCULAR; INTRAVENOUS at 14:59

## 2023-10-13 RX ADMIN — ASPIRIN 81 MG: 81 TABLET, COATED ORAL at 08:59

## 2023-10-13 RX ADMIN — CYCLOBENZAPRINE HYDROCHLORIDE 10 MG: 10 TABLET, FILM COATED ORAL at 22:30

## 2023-10-13 RX ADMIN — SODIUM ZIRCONIUM CYCLOSILICATE 10 G: 5 POWDER, FOR SUSPENSION ORAL at 12:21

## 2023-10-13 RX ADMIN — INSULIN GLARGINE 25 UNITS: 100 INJECTION, SOLUTION SUBCUTANEOUS at 08:58

## 2023-10-13 RX ADMIN — Medication 400 MG: at 22:30

## 2023-10-13 RX ADMIN — ACETAMINOPHEN 1000 MG: 500 TABLET ORAL at 12:21

## 2023-10-13 RX ADMIN — ATORVASTATIN CALCIUM 20 MG: 20 TABLET, FILM COATED ORAL at 22:31

## 2023-10-13 RX ADMIN — MORPHINE SULFATE 4 MG: 4 INJECTION, SOLUTION INTRAMUSCULAR; INTRAVENOUS at 14:09

## 2023-10-13 RX ADMIN — ACETAMINOPHEN 1000 MG: 500 TABLET ORAL at 06:18

## 2023-10-13 RX ADMIN — INSULIN LISPRO 4 UNITS: 100 INJECTION, SOLUTION INTRAVENOUS; SUBCUTANEOUS at 08:59

## 2023-10-13 RX ADMIN — ENOXAPARIN SODIUM 30 MG: 100 INJECTION SUBCUTANEOUS at 09:00

## 2023-10-13 RX ADMIN — SODIUM ZIRCONIUM CYCLOSILICATE 10 G: 5 POWDER, FOR SUSPENSION ORAL at 22:33

## 2023-10-13 RX ADMIN — LEVOTHYROXINE SODIUM 150 MCG: 150 TABLET ORAL at 06:18

## 2023-10-13 RX ADMIN — MORPHINE SULFATE 4 MG: 4 INJECTION, SOLUTION INTRAMUSCULAR; INTRAVENOUS at 08:59

## 2023-10-13 RX ADMIN — CYCLOBENZAPRINE HYDROCHLORIDE 10 MG: 10 TABLET, FILM COATED ORAL at 17:29

## 2023-10-13 RX ADMIN — Medication 400 MG: at 08:58

## 2023-10-13 RX ADMIN — ACETAMINOPHEN 1000 MG: 500 TABLET ORAL at 22:31

## 2023-10-13 RX ADMIN — PANTOPRAZOLE SODIUM 40 MG: 40 TABLET, DELAYED RELEASE ORAL at 06:18

## 2023-10-13 RX ADMIN — SODIUM CHLORIDE, PRESERVATIVE FREE 10 ML: 5 INJECTION INTRAVENOUS at 09:04

## 2023-10-13 RX ADMIN — AMLODIPINE BESYLATE 5 MG: 5 TABLET ORAL at 08:58

## 2023-10-13 ASSESSMENT — PAIN DESCRIPTION - LOCATION
LOCATION: BACK;FLANK
LOCATION: BACK
LOCATION: FLANK

## 2023-10-13 ASSESSMENT — PAIN SCALES - GENERAL
PAINLEVEL_OUTOF10: 0
PAINLEVEL_OUTOF10: 0
PAINLEVEL_OUTOF10: 2
PAINLEVEL_OUTOF10: 0
PAINLEVEL_OUTOF10: 8
PAINLEVEL_OUTOF10: 8
PAINLEVEL_OUTOF10: 0

## 2023-10-13 ASSESSMENT — PAIN DESCRIPTION - ORIENTATION
ORIENTATION: INNER
ORIENTATION: INNER

## 2023-10-13 ASSESSMENT — PAIN DESCRIPTION - DESCRIPTORS: DESCRIPTORS: ACHING

## 2023-10-13 NOTE — PLAN OF CARE
No orders received yet. Call again placed to MD office. They will relay message Problem: Discharge Planning  Goal: Discharge to home or other facility with appropriate resources  Outcome: Progressing     Problem: Pain  Goal: Verbalizes/displays adequate comfort level or baseline comfort level  Outcome: Progressing     Problem: ABCDS Injury Assessment  Goal: Absence of physical injury  Outcome: Progressing     Problem: Safety - Adult  Goal: Free from fall injury  Outcome: Progressing     Problem: Chronic Conditions and Co-morbidities  Goal: Patient's chronic conditions and co-morbidity symptoms are monitored and maintained or improved  Outcome: Progressing

## 2023-10-14 LAB
ANION GAP SERPL CALC-SCNC: 2 MMOL/L (ref 3–18)
BUN SERPL-MCNC: 20 MG/DL (ref 7–18)
BUN/CREAT SERPL: 14 (ref 12–20)
CALCIUM SERPL-MCNC: 8.3 MG/DL (ref 8.5–10.1)
CHLORIDE SERPL-SCNC: 109 MMOL/L (ref 100–111)
CO2 SERPL-SCNC: 26 MMOL/L (ref 21–32)
CREAT SERPL-MCNC: 1.4 MG/DL (ref 0.6–1.3)
GLUCOSE BLD STRIP.AUTO-MCNC: 109 MG/DL (ref 70–110)
GLUCOSE BLD STRIP.AUTO-MCNC: 111 MG/DL (ref 70–110)
GLUCOSE BLD STRIP.AUTO-MCNC: 130 MG/DL (ref 70–110)
GLUCOSE BLD STRIP.AUTO-MCNC: 92 MG/DL (ref 70–110)
GLUCOSE SERPL-MCNC: 117 MG/DL (ref 74–99)
POTASSIUM SERPL-SCNC: 4.3 MMOL/L (ref 3.5–5.5)
SODIUM SERPL-SCNC: 137 MMOL/L (ref 136–145)

## 2023-10-14 PROCEDURE — 80048 BASIC METABOLIC PNL TOTAL CA: CPT

## 2023-10-14 PROCEDURE — 6370000000 HC RX 637 (ALT 250 FOR IP): Performed by: INTERNAL MEDICINE

## 2023-10-14 PROCEDURE — 2580000003 HC RX 258: Performed by: INTERNAL MEDICINE

## 2023-10-14 PROCEDURE — 6360000002 HC RX W HCPCS: Performed by: INTERNAL MEDICINE

## 2023-10-14 PROCEDURE — 82962 GLUCOSE BLOOD TEST: CPT

## 2023-10-14 PROCEDURE — 1100000003 HC PRIVATE W/ TELEMETRY

## 2023-10-14 PROCEDURE — 36415 COLL VENOUS BLD VENIPUNCTURE: CPT

## 2023-10-14 PROCEDURE — 99232 SBSQ HOSP IP/OBS MODERATE 35: CPT | Performed by: INTERNAL MEDICINE

## 2023-10-14 RX ORDER — SENNA AND DOCUSATE SODIUM 50; 8.6 MG/1; MG/1
2 TABLET, FILM COATED ORAL
Status: DISCONTINUED | OUTPATIENT
Start: 2023-10-14 | End: 2023-10-15 | Stop reason: HOSPADM

## 2023-10-14 RX ADMIN — CYCLOBENZAPRINE HYDROCHLORIDE 10 MG: 10 TABLET, FILM COATED ORAL at 14:20

## 2023-10-14 RX ADMIN — POLYETHYLENE GLYCOL 3350 17 G: 17 POWDER, FOR SOLUTION ORAL at 05:15

## 2023-10-14 RX ADMIN — ACETAMINOPHEN 1000 MG: 500 TABLET ORAL at 14:20

## 2023-10-14 RX ADMIN — OXYCODONE HYDROCHLORIDE 5 MG: 5 TABLET ORAL at 18:31

## 2023-10-14 RX ADMIN — ACETAMINOPHEN 1000 MG: 500 TABLET ORAL at 05:16

## 2023-10-14 RX ADMIN — PANTOPRAZOLE SODIUM 40 MG: 40 TABLET, DELAYED RELEASE ORAL at 05:17

## 2023-10-14 RX ADMIN — ACETAMINOPHEN 1000 MG: 500 TABLET ORAL at 22:36

## 2023-10-14 RX ADMIN — CYCLOBENZAPRINE HYDROCHLORIDE 10 MG: 10 TABLET, FILM COATED ORAL at 09:58

## 2023-10-14 RX ADMIN — ATORVASTATIN CALCIUM 20 MG: 20 TABLET, FILM COATED ORAL at 22:36

## 2023-10-14 RX ADMIN — SENNOSIDES AND DOCUSATE SODIUM 2 TABLET: 50; 8.6 TABLET ORAL at 22:39

## 2023-10-14 RX ADMIN — AMLODIPINE BESYLATE 5 MG: 5 TABLET ORAL at 09:58

## 2023-10-14 RX ADMIN — ENOXAPARIN SODIUM 30 MG: 100 INJECTION SUBCUTANEOUS at 09:57

## 2023-10-14 RX ADMIN — SODIUM CHLORIDE, PRESERVATIVE FREE 10 ML: 5 INJECTION INTRAVENOUS at 22:40

## 2023-10-14 RX ADMIN — SODIUM CHLORIDE, PRESERVATIVE FREE 10 ML: 5 INJECTION INTRAVENOUS at 10:00

## 2023-10-14 RX ADMIN — SODIUM ZIRCONIUM CYCLOSILICATE 10 G: 5 POWDER, FOR SUSPENSION ORAL at 12:05

## 2023-10-14 RX ADMIN — ASPIRIN 81 MG: 81 TABLET, COATED ORAL at 09:58

## 2023-10-14 RX ADMIN — OXYCODONE HYDROCHLORIDE 5 MG: 5 TABLET ORAL at 10:24

## 2023-10-14 RX ADMIN — OXYCODONE HYDROCHLORIDE 5 MG: 5 TABLET ORAL at 05:15

## 2023-10-14 RX ADMIN — LEVOTHYROXINE SODIUM 150 MCG: 150 TABLET ORAL at 05:17

## 2023-10-14 RX ADMIN — PANTOPRAZOLE SODIUM 40 MG: 40 TABLET, DELAYED RELEASE ORAL at 14:20

## 2023-10-14 RX ADMIN — INSULIN GLARGINE 25 UNITS: 100 INJECTION, SOLUTION SUBCUTANEOUS at 09:55

## 2023-10-14 RX ADMIN — CYCLOBENZAPRINE HYDROCHLORIDE 10 MG: 10 TABLET, FILM COATED ORAL at 22:35

## 2023-10-14 ASSESSMENT — PAIN DESCRIPTION - ORIENTATION
ORIENTATION: ANTERIOR
ORIENTATION: POSTERIOR
ORIENTATION: LOWER
ORIENTATION: LOWER

## 2023-10-14 ASSESSMENT — PAIN SCALES - GENERAL
PAINLEVEL_OUTOF10: 0
PAINLEVEL_OUTOF10: 0
PAINLEVEL_OUTOF10: 8
PAINLEVEL_OUTOF10: 0
PAINLEVEL_OUTOF10: 5
PAINLEVEL_OUTOF10: 0
PAINLEVEL_OUTOF10: 5
PAINLEVEL_OUTOF10: 0
PAINLEVEL_OUTOF10: 2
PAINLEVEL_OUTOF10: 6
PAINLEVEL_OUTOF10: 6
PAINLEVEL_OUTOF10: 5
PAINLEVEL_OUTOF10: 0

## 2023-10-14 ASSESSMENT — PAIN DESCRIPTION - LOCATION
LOCATION: ABDOMEN
LOCATION: ABDOMEN
LOCATION: BACK

## 2023-10-14 ASSESSMENT — PAIN DESCRIPTION - DESCRIPTORS
DESCRIPTORS: ACHING
DESCRIPTORS: THROBBING

## 2023-10-15 VITALS
RESPIRATION RATE: 18 BRPM | HEIGHT: 66 IN | HEART RATE: 75 BPM | DIASTOLIC BLOOD PRESSURE: 83 MMHG | TEMPERATURE: 98 F | BODY MASS INDEX: 25.91 KG/M2 | OXYGEN SATURATION: 95 % | SYSTOLIC BLOOD PRESSURE: 125 MMHG | WEIGHT: 161.25 LBS

## 2023-10-15 PROBLEM — S32.010D COMPRESSION FRACTURE OF L1 VERTEBRA WITH ROUTINE HEALING: Status: ACTIVE | Noted: 2023-10-15

## 2023-10-15 LAB
ANION GAP SERPL CALC-SCNC: 4 MMOL/L (ref 3–18)
BUN SERPL-MCNC: 25 MG/DL (ref 7–18)
BUN/CREAT SERPL: 15 (ref 12–20)
CALCIUM SERPL-MCNC: 8.8 MG/DL (ref 8.5–10.1)
CHLORIDE SERPL-SCNC: 105 MMOL/L (ref 100–111)
CO2 SERPL-SCNC: 28 MMOL/L (ref 21–32)
CREAT SERPL-MCNC: 1.65 MG/DL (ref 0.6–1.3)
GLUCOSE BLD STRIP.AUTO-MCNC: 112 MG/DL (ref 70–110)
GLUCOSE SERPL-MCNC: 85 MG/DL (ref 74–99)
POTASSIUM SERPL-SCNC: 4.7 MMOL/L (ref 3.5–5.5)
SODIUM SERPL-SCNC: 137 MMOL/L (ref 136–145)

## 2023-10-15 PROCEDURE — 6370000000 HC RX 637 (ALT 250 FOR IP): Performed by: INTERNAL MEDICINE

## 2023-10-15 PROCEDURE — 36415 COLL VENOUS BLD VENIPUNCTURE: CPT

## 2023-10-15 PROCEDURE — 82962 GLUCOSE BLOOD TEST: CPT

## 2023-10-15 PROCEDURE — 99239 HOSP IP/OBS DSCHRG MGMT >30: CPT | Performed by: INTERNAL MEDICINE

## 2023-10-15 PROCEDURE — 6360000002 HC RX W HCPCS: Performed by: INTERNAL MEDICINE

## 2023-10-15 PROCEDURE — 2580000003 HC RX 258: Performed by: INTERNAL MEDICINE

## 2023-10-15 PROCEDURE — 80048 BASIC METABOLIC PNL TOTAL CA: CPT

## 2023-10-15 RX ORDER — OXYCODONE HYDROCHLORIDE 5 MG/1
5 TABLET ORAL EVERY 8 HOURS PRN
Qty: 21 TABLET | Refills: 0 | Status: SHIPPED | OUTPATIENT
Start: 2023-10-15 | End: 2023-10-22

## 2023-10-15 RX ORDER — ACETAMINOPHEN 500 MG
1000 TABLET ORAL EVERY 8 HOURS
Qty: 120 TABLET | Refills: 3 | Status: SHIPPED | OUTPATIENT
Start: 2023-10-15

## 2023-10-15 RX ORDER — CYCLOBENZAPRINE HCL 10 MG
10 TABLET ORAL 3 TIMES DAILY PRN
Qty: 30 TABLET | Refills: 1 | Status: SHIPPED | OUTPATIENT
Start: 2023-10-15

## 2023-10-15 RX ORDER — CYCLOBENZAPRINE HCL 10 MG
10 TABLET ORAL 3 TIMES DAILY
Status: DISCONTINUED | OUTPATIENT
Start: 2023-10-15 | End: 2023-10-15 | Stop reason: HOSPADM

## 2023-10-15 RX ORDER — SENNA AND DOCUSATE SODIUM 50; 8.6 MG/1; MG/1
2 TABLET, FILM COATED ORAL
Qty: 60 TABLET | Refills: 0 | Status: SHIPPED | OUTPATIENT
Start: 2023-10-15 | End: 2023-11-14

## 2023-10-15 RX ADMIN — ENOXAPARIN SODIUM 30 MG: 100 INJECTION SUBCUTANEOUS at 08:28

## 2023-10-15 RX ADMIN — POLYETHYLENE GLYCOL 3350 17 G: 17 POWDER, FOR SOLUTION ORAL at 08:36

## 2023-10-15 RX ADMIN — INSULIN GLARGINE 25 UNITS: 100 INJECTION, SOLUTION SUBCUTANEOUS at 08:28

## 2023-10-15 RX ADMIN — LEVOTHYROXINE SODIUM 150 MCG: 150 TABLET ORAL at 06:50

## 2023-10-15 RX ADMIN — SODIUM CHLORIDE, PRESERVATIVE FREE 10 ML: 5 INJECTION INTRAVENOUS at 08:30

## 2023-10-15 RX ADMIN — AMLODIPINE BESYLATE 5 MG: 5 TABLET ORAL at 08:30

## 2023-10-15 RX ADMIN — PANTOPRAZOLE SODIUM 40 MG: 40 TABLET, DELAYED RELEASE ORAL at 06:51

## 2023-10-15 RX ADMIN — ACETAMINOPHEN 1000 MG: 500 TABLET ORAL at 06:49

## 2023-10-15 RX ADMIN — OXYCODONE HYDROCHLORIDE 5 MG: 5 TABLET ORAL at 04:07

## 2023-10-15 RX ADMIN — ASPIRIN 81 MG: 81 TABLET, COATED ORAL at 08:30

## 2023-10-15 RX ADMIN — CYCLOBENZAPRINE HYDROCHLORIDE 10 MG: 10 TABLET, FILM COATED ORAL at 09:53

## 2023-10-15 ASSESSMENT — PAIN SCALES - GENERAL
PAINLEVEL_OUTOF10: 5
PAINLEVEL_OUTOF10: 0
PAINLEVEL_OUTOF10: 3

## 2023-10-15 ASSESSMENT — PAIN DESCRIPTION - DESCRIPTORS
DESCRIPTORS: ACHING
DESCRIPTORS: ACHING

## 2023-10-15 ASSESSMENT — PAIN DESCRIPTION - ORIENTATION
ORIENTATION: LOWER
ORIENTATION: LOWER

## 2023-10-15 ASSESSMENT — PAIN DESCRIPTION - LOCATION
LOCATION: BACK
LOCATION: BACK

## 2023-10-15 NOTE — CARE COORDINATION
Discharge order noted for today. Orders reviewed. Home health order noted however patient declined. No case management needs identified at this time. CM remains available if needed. Daughter will transport home.     RONALD Ramey, RN   Pager: [phone]  Phone: [phone]

## 2023-10-15 NOTE — DISCHARGE INSTRUCTIONS
Discharge Instructions    Patient: Sabas Lane MRN: [id number]  CSN: [id number]    YOB: 1950  Age: 74 y.o. Sex: female    DOA: 10/12/2023 LOS: 3  Discharge Date: 10/15/2023     ACUTE DIAGNOSES:  Late acute to subacute L1 inferior endplate mild compression fracture   Chronic L4 superior endplate mild to moderate compression fracture   Severe hyperglycemia with type 2 DM, resolved   LIA on CKD3b, improved   Hyperkalemia, persistent   Hypomagnesemia   Hypothyroidism   GERD with hiatal hernia       DISCHARGE MEDICATIONS:       It is important that you take the medication exactly as they are prescribed. Keep your medication in the bottles provided by the pharmacist and keep a list of the medication names, dosages, and times to be taken in your wallet. Do not take other medications without consulting your doctor. DIET:  diabetic diet and low fat, low cholesterol diet    ACTIVITY: activity as tolerated    ADDITIONAL INFORMATION: If you experience any of the following symptoms then please call your primary care physician or return to the emergency room if you cannot get hold of your doctor: Fever, chills, nausea, vomiting, diarrhea, change in mentation, falling, bleeding, shortness of breath. FOLLOW UP CARE:    Primary care physician, Jorge Staley,   you are to call and set up an appointment to see them in 4 days. Information obtained by :  I understand that if any problems occur once I am at home I am to contact my physician. I understand and acknowledge receipt of the instructions indicated above.                                                                                                                                            Physician's or R.N.'s Signature                                                                  Date/Time                                                                                                                                              Patient

## 2023-10-15 NOTE — DISCHARGE SUMMARY
\"TIBCCALC\"   BNP Invalid input(s): \"BNPP\"   Cardiac Enzymes No results for input(s): \"CPK\", \"YONNY\" in the last 72 hours. Invalid input(s): \"CKRMB\", \"CKND1\", \"TROIP\"   Liver Enzymes No results for input(s): \"TP\", \"ALB\" in the last 72 hours. Invalid input(s): \"TBIL\", \"AP\", \"SGOT\", \"GPT\", \"DBIL\"   Thyroid Studies No results for input(s): \"T4\", \"T3RU\", \"TSH\" in the last 72 hours. Invalid input(s): \"T3U\"       Results       ** No results found for the last 336 hours. **                   Medications at discharge  including reasons for change and indications for new ones:      Medication List        START taking these medications      alendronate 70 MG tablet  Commonly known as: FOSAMAX     cyclobenzaprine 10 MG tablet  Commonly known as: FLEXERIL  Take 1 tablet by mouth 3 times daily as needed for Muscle spasms     oxyCODONE 5 MG immediate release tablet  Commonly known as: ROXICODONE  Take 1 tablet by mouth every 8 hours as needed for Pain for up to 7 days. Max Daily Amount: 15 mg     pantoprazole 40 MG tablet  Commonly known as: PROTONIX     polyethylene glycol 17 GM/SCOOP powder  Commonly known as: GLYCOLAX     sennosides-docusate sodium 8.6-50 MG tablet  Commonly known as: SENOKOT-S  Take 2 tablets by mouth nightly            CHANGE how you take these medications      acetaminophen 500 MG tablet  Commonly known as: TYLENOL  Take 2 tablets by mouth in the morning and 2 tablets at noon and 2 tablets in the evening.   What changed:   how much to take  when to take this  reasons to take this            CONTINUE taking these medications      amLODIPine 5 MG tablet  Commonly known as: NORVASC     aspirin 81 MG EC tablet     atorvastatin 20 MG tablet  Commonly known as: LIPITOR     carvedilol 6.25 MG tablet  Commonly known as: COREG     ferrous sulfate 325 (65 Fe) MG tablet  Commonly known as: IRON 325     gabapentin 300 MG capsule  Commonly known as: NEURONTIN     glucose-vitamin C 4-6 GM-MG Chew chewable tablet

## 2023-10-20 ENCOUNTER — HOSPITAL ENCOUNTER (EMERGENCY)
Facility: HOSPITAL | Age: 73
Discharge: HOME | End: 2023-10-20
Attending: EMERGENCY MEDICINE
Payer: MEDICARE

## 2023-10-20 VITALS
SYSTOLIC BLOOD PRESSURE: 131 MMHG | TEMPERATURE: 97.7 F | DIASTOLIC BLOOD PRESSURE: 78 MMHG | OXYGEN SATURATION: 99 % | WEIGHT: 160 LBS | HEART RATE: 87 BPM | BODY MASS INDEX: 25.82 KG/M2 | RESPIRATION RATE: 18 BRPM

## 2023-10-20 DIAGNOSIS — E16.2 HYPOGLYCEMIA: Primary | ICD-10-CM

## 2023-10-20 LAB
ALBUMIN SERPL-MCNC: 3.3 G/DL (ref 3.4–5)
ALBUMIN/GLOB SERPL: 0.9 (ref 0.8–1.7)
ALP SERPL-CCNC: 80 U/L (ref 45–117)
ALT SERPL-CCNC: 26 U/L (ref 13–56)
ANION GAP SERPL CALC-SCNC: 5 MMOL/L (ref 3–18)
APPEARANCE UR: CLEAR
AST SERPL-CCNC: 13 U/L (ref 10–38)
BASOPHILS # BLD: 0.1 K/UL (ref 0–0.1)
BASOPHILS NFR BLD: 1 % (ref 0–2)
BILIRUB SERPL-MCNC: 0.3 MG/DL (ref 0.2–1)
BILIRUB UR QL: NEGATIVE
BUN SERPL-MCNC: 44 MG/DL (ref 7–18)
BUN/CREAT SERPL: 24 (ref 12–20)
CALCIUM SERPL-MCNC: 9.3 MG/DL (ref 8.5–10.1)
CHLORIDE SERPL-SCNC: 101 MMOL/L (ref 100–111)
CO2 SERPL-SCNC: 27 MMOL/L (ref 21–32)
COLOR UR: YELLOW
CREAT SERPL-MCNC: 1.85 MG/DL (ref 0.6–1.3)
DIFFERENTIAL METHOD BLD: ABNORMAL
EOSINOPHIL # BLD: 0.2 K/UL (ref 0–0.4)
EOSINOPHIL NFR BLD: 2 % (ref 0–5)
ERYTHROCYTE [DISTWIDTH] IN BLOOD BY AUTOMATED COUNT: 13.5 % (ref 11.6–14.5)
GLOBULIN SER CALC-MCNC: 3.6 G/DL (ref 2–4)
GLUCOSE BLD STRIP.AUTO-MCNC: 118 MG/DL (ref 70–110)
GLUCOSE BLD STRIP.AUTO-MCNC: 180 MG/DL (ref 70–110)
GLUCOSE BLD STRIP.AUTO-MCNC: 74 MG/DL (ref 70–110)
GLUCOSE SERPL-MCNC: 72 MG/DL (ref 74–99)
GLUCOSE UR STRIP.AUTO-MCNC: >1000 MG/DL
HCT VFR BLD AUTO: 34 % (ref 35–45)
HGB BLD-MCNC: 10.8 G/DL (ref 12–16)
HGB UR QL STRIP: NEGATIVE
IMM GRANULOCYTES # BLD AUTO: 0 K/UL (ref 0–0.04)
IMM GRANULOCYTES NFR BLD AUTO: 1 % (ref 0–0.5)
KETONES UR QL STRIP.AUTO: 15 MG/DL
LEUKOCYTE ESTERASE UR QL STRIP.AUTO: NEGATIVE
LYMPHOCYTES # BLD: 1.7 K/UL (ref 0.9–3.6)
LYMPHOCYTES NFR BLD: 20 % (ref 21–52)
MCH RBC QN AUTO: 27.4 PG (ref 24–34)
MCHC RBC AUTO-ENTMCNC: 31.8 G/DL (ref 31–37)
MCV RBC AUTO: 86.3 FL (ref 78–100)
MONOCYTES # BLD: 0.8 K/UL (ref 0.05–1.2)
MONOCYTES NFR BLD: 10 % (ref 3–10)
NEUTS SEG # BLD: 5.7 K/UL (ref 1.8–8)
NEUTS SEG NFR BLD: 67 % (ref 40–73)
NITRITE UR QL STRIP.AUTO: NEGATIVE
NRBC # BLD: 0 K/UL (ref 0–0.01)
NRBC BLD-RTO: 0 PER 100 WBC
PH UR STRIP: 5 (ref 5–8)
PLATELET # BLD AUTO: 326 K/UL (ref 135–420)
PMV BLD AUTO: 9.3 FL (ref 9.2–11.8)
POTASSIUM SERPL-SCNC: 4.9 MMOL/L (ref 3.5–5.5)
PROT SERPL-MCNC: 6.9 G/DL (ref 6.4–8.2)
PROT UR STRIP-MCNC: NEGATIVE MG/DL
RBC # BLD AUTO: 3.94 M/UL (ref 4.2–5.3)
SODIUM SERPL-SCNC: 133 MMOL/L (ref 136–145)
SP GR UR REFRACTOMETRY: 1.02 (ref 1–1.03)
UROBILINOGEN UR QL STRIP.AUTO: 0.2 EU/DL (ref 0.2–1)
WBC # BLD AUTO: 8.4 K/UL (ref 4.6–13.2)

## 2023-10-20 PROCEDURE — 99283 EMERGENCY DEPT VISIT LOW MDM: CPT

## 2023-10-20 PROCEDURE — 81003 URINALYSIS AUTO W/O SCOPE: CPT

## 2023-10-20 PROCEDURE — 80053 COMPREHEN METABOLIC PANEL: CPT

## 2023-10-20 PROCEDURE — 85025 COMPLETE CBC W/AUTO DIFF WBC: CPT

## 2023-10-20 PROCEDURE — 82962 GLUCOSE BLOOD TEST: CPT

## 2023-10-20 NOTE — ED NOTES
Pt to ED reports woke up BG was elevated took 70 units and daughter advise her to take 40 additional units . Pt was given 250 D10 pt alert and oriented microwave meal prepared.       Daphnie Ornelas RN  10/20/23 7249

## 2023-10-20 NOTE — ED TRIAGE NOTES
pT ARRIVES VIA EMS FOR LOW bg ISSUES. pT STATES SHE TOOK HER REGULAR INSULIN DOSES TODAY THEN HER DAUGHTER GAVE HER EXTRA DOSE. PT RECEIVED BOLUS OF D10 PRIOR TO ARRIVAL. pT BG AT DOOR BY EMS . A&Ox4. NAD NOTED.

## 2023-10-21 NOTE — ED NOTES
Spoke with daughter will will  pt .  Aware pt is discharge ready      Clarita Jonas, DILCIA  10/20/23 2615

## 2023-10-21 NOTE — ED PROVIDER NOTES
interpretive errors are inadvertently transcribed by the computer software. Please disregard these errors. Please excuse any errors that have escaped final proofreading.

## 2023-10-31 ENCOUNTER — APPOINTMENT (OUTPATIENT)
Facility: HOSPITAL | Age: 73
DRG: 637 | End: 2023-10-31
Payer: MEDICARE

## 2023-10-31 ENCOUNTER — HOSPITAL ENCOUNTER (INPATIENT)
Facility: HOSPITAL | Age: 73
LOS: 6 days | Discharge: HOME | DRG: 637 | End: 2023-11-06
Attending: EMERGENCY MEDICINE | Admitting: INTERNAL MEDICINE
Payer: MEDICARE

## 2023-10-31 DIAGNOSIS — E87.5 HYPERKALEMIA: ICD-10-CM

## 2023-10-31 DIAGNOSIS — K92.0 HEMATEMESIS WITH NAUSEA: ICD-10-CM

## 2023-10-31 DIAGNOSIS — E11.10 DKA, TYPE 2, NOT AT GOAL (HCC): ICD-10-CM

## 2023-10-31 DIAGNOSIS — E87.20 LACTIC ACIDOSIS: ICD-10-CM

## 2023-10-31 DIAGNOSIS — E08.10 DIABETIC KETOACIDOSIS WITHOUT COMA ASSOCIATED WITH DIABETES MELLITUS DUE TO UNDERLYING CONDITION (HCC): Primary | ICD-10-CM

## 2023-10-31 PROBLEM — R11.2 NAUSEA AND VOMITING: Status: ACTIVE | Noted: 2023-10-31

## 2023-10-31 LAB
ABO + RH BLD: NORMAL
ALBUMIN SERPL-MCNC: 3.2 G/DL (ref 3.4–5)
ALBUMIN/GLOB SERPL: 1.1 (ref 0.8–1.7)
ALP SERPL-CCNC: 109 U/L (ref 45–117)
ALT SERPL-CCNC: 63 U/L (ref 13–56)
ANION GAP BLD CALC-SCNC: ABNORMAL MMOL/L (ref 10–20)
ANION GAP SERPL CALC-SCNC: 16 MMOL/L (ref 3–18)
ANION GAP SERPL CALC-SCNC: 18 MMOL/L (ref 3–18)
ANION GAP SERPL CALC-SCNC: 24 MMOL/L (ref 3–18)
ANION GAP SERPL CALC-SCNC: 5 MMOL/L (ref 3–18)
ANION GAP SERPL CALC-SCNC: 7 MMOL/L (ref 3–18)
APPEARANCE UR: CLEAR
AST SERPL-CCNC: 77 U/L (ref 10–38)
BASE DEFICIT BLD-SCNC: 27.2 MMOL/L
BASE DEFICIT BLDV-SCNC: 13.3 MMOL/L
BASOPHILS # BLD: 0 K/UL (ref 0–0.1)
BASOPHILS NFR BLD: 0 % (ref 0–2)
BILIRUB SERPL-MCNC: 0.3 MG/DL (ref 0.2–1)
BILIRUB UR QL: NEGATIVE
BLOOD GROUP ANTIBODIES SERPL: NORMAL
BUN SERPL-MCNC: 48 MG/DL (ref 7–18)
BUN SERPL-MCNC: 52 MG/DL (ref 7–18)
BUN SERPL-MCNC: 58 MG/DL (ref 7–18)
BUN SERPL-MCNC: 59 MG/DL (ref 7–18)
BUN SERPL-MCNC: 60 MG/DL (ref 7–18)
BUN/CREAT SERPL: 19 (ref 12–20)
BUN/CREAT SERPL: 21 (ref 12–20)
BUN/CREAT SERPL: 21 (ref 12–20)
BUN/CREAT SERPL: 24 (ref 12–20)
BUN/CREAT SERPL: 24 (ref 12–20)
CA-I BLD-MCNC: 1.12 MMOL/L (ref 1.12–1.32)
CALCIUM SERPL-MCNC: 7.4 MG/DL (ref 8.5–10.1)
CALCIUM SERPL-MCNC: 7.7 MG/DL (ref 8.5–10.1)
CALCIUM SERPL-MCNC: 7.7 MG/DL (ref 8.5–10.1)
CALCIUM SERPL-MCNC: 8 MG/DL (ref 8.5–10.1)
CALCIUM SERPL-MCNC: 8.1 MG/DL (ref 8.5–10.1)
CALLED TO: NORMAL
CHLORIDE BLD-SCNC: 103 MMOL/L (ref 98–107)
CHLORIDE SERPL-SCNC: 101 MMOL/L (ref 100–111)
CHLORIDE SERPL-SCNC: 106 MMOL/L (ref 100–111)
CHLORIDE SERPL-SCNC: 108 MMOL/L (ref 100–111)
CHLORIDE SERPL-SCNC: 110 MMOL/L (ref 100–111)
CHLORIDE SERPL-SCNC: 92 MMOL/L (ref 100–111)
CK SERPL-CCNC: 64 U/L (ref 26–192)
CO2 BLD-SCNC: 6 MMOL/L (ref 19–24)
CO2 SERPL-SCNC: 11 MMOL/L (ref 21–32)
CO2 SERPL-SCNC: 13 MMOL/L (ref 21–32)
CO2 SERPL-SCNC: 21 MMOL/L (ref 21–32)
CO2 SERPL-SCNC: 21 MMOL/L (ref 21–32)
CO2 SERPL-SCNC: 7 MMOL/L (ref 21–32)
COLOR UR: YELLOW
CREAT BLD-MCNC: 1.69 MG/DL (ref 0.6–1.3)
CREAT SERPL-MCNC: 1.97 MG/DL (ref 0.6–1.3)
CREAT SERPL-MCNC: 2.18 MG/DL (ref 0.6–1.3)
CREAT SERPL-MCNC: 2.76 MG/DL (ref 0.6–1.3)
CREAT SERPL-MCNC: 2.89 MG/DL (ref 0.6–1.3)
CREAT SERPL-MCNC: 3.07 MG/DL (ref 0.6–1.3)
DIFFERENTIAL METHOD BLD: ABNORMAL
EKG ATRIAL RATE: 119 BPM
EKG DIAGNOSIS: NORMAL
EKG P AXIS: 66 DEGREES
EKG P-R INTERVAL: 154 MS
EKG Q-T INTERVAL: 322 MS
EKG QRS DURATION: 90 MS
EKG QTC CALCULATION (BAZETT): 452 MS
EKG R AXIS: 45 DEGREES
EKG T AXIS: 48 DEGREES
EKG VENTRICULAR RATE: 119 BPM
EOSINOPHIL # BLD: 0 K/UL (ref 0–0.4)
EOSINOPHIL NFR BLD: 0 % (ref 0–5)
ERYTHROCYTE [DISTWIDTH] IN BLOOD BY AUTOMATED COUNT: 13.7 % (ref 11.6–14.5)
EST. AVERAGE GLUCOSE BLD GHB EST-MCNC: 180 MG/DL
EST. AVERAGE GLUCOSE BLD GHB EST-MCNC: 192 MG/DL
GLOBULIN SER CALC-MCNC: 3 G/DL (ref 2–4)
GLUCOSE BLD STRIP.AUTO-MCNC: 101 MG/DL (ref 70–110)
GLUCOSE BLD STRIP.AUTO-MCNC: 118 MG/DL (ref 70–110)
GLUCOSE BLD STRIP.AUTO-MCNC: 203 MG/DL (ref 70–110)
GLUCOSE BLD STRIP.AUTO-MCNC: 208 MG/DL (ref 70–110)
GLUCOSE BLD STRIP.AUTO-MCNC: 380 MG/DL (ref 70–110)
GLUCOSE BLD STRIP.AUTO-MCNC: 435 MG/DL (ref 70–110)
GLUCOSE BLD STRIP.AUTO-MCNC: >600 MG/DL (ref 70–110)
GLUCOSE BLD STRIP.AUTO-MCNC: >600 MG/DL (ref 70–110)
GLUCOSE BLD-MCNC: >700 MG/DL (ref 65–100)
GLUCOSE SERPL-MCNC: 105 MG/DL (ref 74–99)
GLUCOSE SERPL-MCNC: 107 MG/DL (ref 74–99)
GLUCOSE SERPL-MCNC: 1106 MG/DL (ref 74–99)
GLUCOSE SERPL-MCNC: 552 MG/DL (ref 74–99)
GLUCOSE SERPL-MCNC: 851 MG/DL (ref 74–99)
GLUCOSE UR STRIP.AUTO-MCNC: >1000 MG/DL
HBA1C MFR BLD: 7.9 % (ref 4.2–5.6)
HBA1C MFR BLD: 8.3 % (ref 4.2–5.6)
HCO3 BLD-SCNC: 5.1 MMOL/L (ref 22–26)
HCO3 BLDV-SCNC: 14.6 MMOL/L (ref 23–28)
HCT VFR BLD AUTO: 36.6 % (ref 35–45)
HGB BLD-MCNC: 10.1 G/DL (ref 12–16)
HGB UR QL STRIP: NEGATIVE
IMM GRANULOCYTES # BLD AUTO: 0 K/UL (ref 0–0.04)
IMM GRANULOCYTES NFR BLD AUTO: 0 % (ref 0–0.5)
KETONES UR QL STRIP.AUTO: 15 MG/DL
LACTATE BLD-SCNC: 7.57 MMOL/L (ref 0.4–2)
LACTATE SERPL-SCNC: 1.6 MMOL/L (ref 0.4–2)
LACTATE SERPL-SCNC: 7.4 MMOL/L (ref 0.4–2)
LEUKOCYTE ESTERASE UR QL STRIP.AUTO: NEGATIVE
LIPASE SERPL-CCNC: 10 U/L (ref 13–75)
LYMPHOCYTES # BLD: 2.1 K/UL (ref 0.9–3.6)
LYMPHOCYTES NFR BLD: 6 % (ref 21–52)
MAGNESIUM SERPL-MCNC: 1.6 MG/DL (ref 1.6–2.6)
MAGNESIUM SERPL-MCNC: 1.6 MG/DL (ref 1.6–2.6)
MAGNESIUM SERPL-MCNC: 2.1 MG/DL (ref 1.6–2.6)
MAGNESIUM SERPL-MCNC: 2.6 MG/DL (ref 1.6–2.6)
MAGNESIUM SERPL-MCNC: 9 MG/DL (ref 1.6–2.6)
MCH RBC QN AUTO: 27.5 PG (ref 24–34)
MCHC RBC AUTO-ENTMCNC: 27.6 G/DL (ref 31–37)
MCV RBC AUTO: 99.7 FL (ref 78–100)
METAMYELOCYTES NFR BLD MANUAL: 1 %
MONOCYTES # BLD: 1 K/UL (ref 0.05–1.2)
MONOCYTES NFR BLD: 3 % (ref 3–10)
NEUTS BAND NFR BLD MANUAL: 2 %
NEUTS SEG # BLD: 31.2 K/UL (ref 1.8–8)
NEUTS SEG NFR BLD: 88 % (ref 40–73)
NITRITE UR QL STRIP.AUTO: NEGATIVE
NRBC # BLD: 0 K/UL (ref 0–0.01)
NRBC BLD-RTO: 0 PER 100 WBC
PCO2 BLDV: 28 MMHG (ref 41–51)
PCO2 BLDV: 41.1 MMHG (ref 41–51)
PH BLDV: 6.87 (ref 7.32–7.42)
PH BLDV: 6.97 (ref 7.32–7.42)
PH BLDV: 7.16 (ref 7.32–7.42)
PH BLDV: 7.22 (ref 7.32–7.42)
PH UR STRIP: 5 (ref 5–8)
PHOSPHATE SERPL-MCNC: 1.8 MG/DL (ref 2.5–4.9)
PHOSPHATE SERPL-MCNC: 2.2 MG/DL (ref 2.5–4.9)
PHOSPHATE SERPL-MCNC: 4 MG/DL (ref 2.5–4.9)
PLATELET # BLD AUTO: 390 K/UL (ref 135–420)
PLATELET COMMENT: ABNORMAL
PMV BLD AUTO: 10.5 FL (ref 9.2–11.8)
PO2 BLDV: 26 MMHG (ref 25–40)
PO2 BLDV: 40 MMHG (ref 25–40)
POTASSIUM BLD-SCNC: 6.9 MMOL/L (ref 3.5–5.1)
POTASSIUM SERPL-SCNC: 4.6 MMOL/L (ref 3.5–5.5)
POTASSIUM SERPL-SCNC: 5 MMOL/L (ref 3.5–5.5)
POTASSIUM SERPL-SCNC: 5.6 MMOL/L (ref 3.5–5.5)
POTASSIUM SERPL-SCNC: 6.3 MMOL/L (ref 3.5–5.5)
POTASSIUM SERPL-SCNC: 7.9 MMOL/L (ref 3.5–5.5)
PROCALCITONIN SERPL-MCNC: 42.75 NG/ML
PROT SERPL-MCNC: 6.2 G/DL (ref 6.4–8.2)
PROT UR STRIP-MCNC: NEGATIVE MG/DL
RBC # BLD AUTO: 3.67 M/UL (ref 4.2–5.3)
RBC MORPH BLD: ABNORMAL
SAO2 % BLD: 41 %
SAO2 % BLDV: 34.3 % (ref 65–88)
SERVICE CMNT-IMP: ABNORMAL
SERVICE CMNT-IMP: ABNORMAL
SODIUM BLD-SCNC: 123 MMOL/L (ref 136–145)
SODIUM SERPL-SCNC: 123 MMOL/L (ref 136–145)
SODIUM SERPL-SCNC: 130 MMOL/L (ref 136–145)
SODIUM SERPL-SCNC: 135 MMOL/L (ref 136–145)
SODIUM SERPL-SCNC: 136 MMOL/L (ref 136–145)
SODIUM SERPL-SCNC: 136 MMOL/L (ref 136–145)
SP GR UR REFRACTOMETRY: 1.02 (ref 1–1.03)
SPECIMEN EXP DATE BLD: NORMAL
SPECIMEN SITE: ABNORMAL
SPECIMEN TYPE: ABNORMAL
T4 FREE SERPL-MCNC: 0.6 NG/DL (ref 0.7–1.5)
TROPONIN I SERPL HS-MCNC: 33 NG/L (ref 0–54)
TSH SERPL DL<=0.05 MIU/L-ACNC: 11.4 UIU/ML (ref 0.36–3.74)
UROBILINOGEN UR QL STRIP.AUTO: 0.2 EU/DL (ref 0.2–1)
WBC # BLD AUTO: 34.7 K/UL (ref 4.6–13.2)

## 2023-10-31 PROCEDURE — 99291 CRITICAL CARE FIRST HOUR: CPT | Performed by: INTERNAL MEDICINE

## 2023-10-31 PROCEDURE — 96366 THER/PROPH/DIAG IV INF ADDON: CPT

## 2023-10-31 PROCEDURE — 84295 ASSAY OF SERUM SODIUM: CPT

## 2023-10-31 PROCEDURE — 85014 HEMATOCRIT: CPT

## 2023-10-31 PROCEDURE — 74176 CT ABD & PELVIS W/O CONTRAST: CPT

## 2023-10-31 PROCEDURE — 82947 ASSAY GLUCOSE BLOOD QUANT: CPT

## 2023-10-31 PROCEDURE — 80048 BASIC METABOLIC PNL TOTAL CA: CPT

## 2023-10-31 PROCEDURE — 83735 ASSAY OF MAGNESIUM: CPT

## 2023-10-31 PROCEDURE — A4216 STERILE WATER/SALINE, 10 ML: HCPCS | Performed by: PHYSICIAN ASSISTANT

## 2023-10-31 PROCEDURE — 82803 BLOOD GASES ANY COMBINATION: CPT

## 2023-10-31 PROCEDURE — 83605 ASSAY OF LACTIC ACID: CPT

## 2023-10-31 PROCEDURE — 80053 COMPREHEN METABOLIC PANEL: CPT

## 2023-10-31 PROCEDURE — 84100 ASSAY OF PHOSPHORUS: CPT

## 2023-10-31 PROCEDURE — 86870 RBC ANTIBODY IDENTIFICATION: CPT

## 2023-10-31 PROCEDURE — 2580000003 HC RX 258: Performed by: PHYSICIAN ASSISTANT

## 2023-10-31 PROCEDURE — 81003 URINALYSIS AUTO W/O SCOPE: CPT

## 2023-10-31 PROCEDURE — 6370000000 HC RX 637 (ALT 250 FOR IP): Performed by: PHYSICIAN ASSISTANT

## 2023-10-31 PROCEDURE — 99285 EMERGENCY DEPT VISIT HI MDM: CPT

## 2023-10-31 PROCEDURE — 82010 KETONE BODYS QUAN: CPT

## 2023-10-31 PROCEDURE — 96375 TX/PRO/DX INJ NEW DRUG ADDON: CPT

## 2023-10-31 PROCEDURE — 2500000003 HC RX 250 WO HCPCS: Performed by: PHYSICIAN ASSISTANT

## 2023-10-31 PROCEDURE — 86901 BLOOD TYPING SEROLOGIC RH(D): CPT

## 2023-10-31 PROCEDURE — 6360000002 HC RX W HCPCS: Performed by: PHYSICIAN ASSISTANT

## 2023-10-31 PROCEDURE — 86900 BLOOD TYPING SEROLOGIC ABO: CPT

## 2023-10-31 PROCEDURE — 87040 BLOOD CULTURE FOR BACTERIA: CPT

## 2023-10-31 PROCEDURE — 83036 HEMOGLOBIN GLYCOSYLATED A1C: CPT

## 2023-10-31 PROCEDURE — 71045 X-RAY EXAM CHEST 1 VIEW: CPT

## 2023-10-31 PROCEDURE — 83690 ASSAY OF LIPASE: CPT

## 2023-10-31 PROCEDURE — 82962 GLUCOSE BLOOD TEST: CPT

## 2023-10-31 PROCEDURE — 84484 ASSAY OF TROPONIN QUANT: CPT

## 2023-10-31 PROCEDURE — 82800 BLOOD PH: CPT

## 2023-10-31 PROCEDURE — 86850 RBC ANTIBODY SCREEN: CPT

## 2023-10-31 PROCEDURE — 82330 ASSAY OF CALCIUM: CPT

## 2023-10-31 PROCEDURE — 82550 ASSAY OF CK (CPK): CPT

## 2023-10-31 PROCEDURE — 93005 ELECTROCARDIOGRAM TRACING: CPT | Performed by: PHYSICIAN ASSISTANT

## 2023-10-31 PROCEDURE — 93010 ELECTROCARDIOGRAM REPORT: CPT | Performed by: INTERNAL MEDICINE

## 2023-10-31 PROCEDURE — 84132 ASSAY OF SERUM POTASSIUM: CPT

## 2023-10-31 PROCEDURE — 84145 PROCALCITONIN (PCT): CPT

## 2023-10-31 PROCEDURE — 6370000000 HC RX 637 (ALT 250 FOR IP)

## 2023-10-31 PROCEDURE — 85025 COMPLETE CBC W/AUTO DIFF WBC: CPT

## 2023-10-31 PROCEDURE — 96365 THER/PROPH/DIAG IV INF INIT: CPT

## 2023-10-31 PROCEDURE — 1100000000 HC RM PRIVATE

## 2023-10-31 PROCEDURE — C9113 INJ PANTOPRAZOLE SODIUM, VIA: HCPCS | Performed by: PHYSICIAN ASSISTANT

## 2023-10-31 PROCEDURE — 84443 ASSAY THYROID STIM HORMONE: CPT

## 2023-10-31 PROCEDURE — 84439 ASSAY OF FREE THYROXINE: CPT

## 2023-10-31 RX ORDER — MAGNESIUM SULFATE IN WATER 40 MG/ML
2000 INJECTION, SOLUTION INTRAVENOUS
Status: DISCONTINUED | OUTPATIENT
Start: 2023-10-31 | End: 2023-10-31

## 2023-10-31 RX ORDER — DEXTROSE AND SODIUM CHLORIDE 5; .45 G/100ML; G/100ML
INJECTION, SOLUTION INTRAVENOUS CONTINUOUS
Status: DISCONTINUED | OUTPATIENT
Start: 2023-10-31 | End: 2023-11-01

## 2023-10-31 RX ORDER — HEPARIN SODIUM 5000 [USP'U]/ML
5000 INJECTION, SOLUTION INTRAVENOUS; SUBCUTANEOUS EVERY 8 HOURS SCHEDULED
Status: DISCONTINUED | OUTPATIENT
Start: 2023-10-31 | End: 2023-11-06 | Stop reason: HOSPADM

## 2023-10-31 RX ORDER — MAGNESIUM SULFATE IN WATER 40 MG/ML
4000 INJECTION, SOLUTION INTRAVENOUS ONCE
Status: DISCONTINUED | OUTPATIENT
Start: 2023-10-31 | End: 2023-10-31

## 2023-10-31 RX ORDER — 0.9 % SODIUM CHLORIDE 0.9 %
1000 INTRAVENOUS SOLUTION INTRAVENOUS ONCE
Status: COMPLETED | OUTPATIENT
Start: 2023-10-31 | End: 2023-10-31

## 2023-10-31 RX ORDER — SODIUM CHLORIDE, SODIUM LACTATE, POTASSIUM CHLORIDE, AND CALCIUM CHLORIDE .6; .31; .03; .02 G/100ML; G/100ML; G/100ML; G/100ML
1000 INJECTION, SOLUTION INTRAVENOUS ONCE
Status: COMPLETED | OUTPATIENT
Start: 2023-10-31 | End: 2023-10-31

## 2023-10-31 RX ORDER — SODIUM CHLORIDE 9 MG/ML
INJECTION, SOLUTION INTRAVENOUS CONTINUOUS
Status: DISCONTINUED | OUTPATIENT
Start: 2023-10-31 | End: 2023-10-31

## 2023-10-31 RX ORDER — INSULIN GLARGINE 100 [IU]/ML
10 INJECTION, SOLUTION SUBCUTANEOUS NIGHTLY
Status: DISCONTINUED | OUTPATIENT
Start: 2023-10-31 | End: 2023-11-01

## 2023-10-31 RX ORDER — MAGNESIUM SULFATE IN WATER 40 MG/ML
4000 INJECTION, SOLUTION INTRAVENOUS ONCE
Status: COMPLETED | OUTPATIENT
Start: 2023-10-31 | End: 2023-10-31

## 2023-10-31 RX ORDER — ONDANSETRON 2 MG/ML
4 INJECTION INTRAMUSCULAR; INTRAVENOUS
Status: COMPLETED | OUTPATIENT
Start: 2023-10-31 | End: 2023-10-31

## 2023-10-31 RX ORDER — SODIUM CHLORIDE 9 MG/ML
INJECTION, SOLUTION INTRAVENOUS CONTINUOUS
Status: DISCONTINUED | OUTPATIENT
Start: 2023-10-31 | End: 2023-11-01

## 2023-10-31 RX ORDER — 0.9 % SODIUM CHLORIDE 0.9 %
500 INTRAVENOUS SOLUTION INTRAVENOUS ONCE
Status: COMPLETED | OUTPATIENT
Start: 2023-10-31 | End: 2023-10-31

## 2023-10-31 RX ORDER — VANCOMYCIN 1.75 G/350ML
1250 INJECTION, SOLUTION INTRAVENOUS ONCE
Status: COMPLETED | OUTPATIENT
Start: 2023-10-31 | End: 2023-10-31

## 2023-10-31 RX ORDER — 0.9 % SODIUM CHLORIDE 0.9 %
15 INTRAVENOUS SOLUTION INTRAVENOUS ONCE
Status: COMPLETED | OUTPATIENT
Start: 2023-10-31 | End: 2023-10-31

## 2023-10-31 RX ORDER — SODIUM CHLORIDE 0.9 % (FLUSH) 0.9 %
5-40 SYRINGE (ML) INJECTION PRN
Status: DISCONTINUED | OUTPATIENT
Start: 2023-10-31 | End: 2023-11-06 | Stop reason: HOSPADM

## 2023-10-31 RX ORDER — SODIUM CHLORIDE 9 MG/ML
INJECTION, SOLUTION INTRAVENOUS PRN
Status: DISCONTINUED | OUTPATIENT
Start: 2023-10-31 | End: 2023-11-06 | Stop reason: HOSPADM

## 2023-10-31 RX ORDER — 0.9 % SODIUM CHLORIDE 0.9 %
30 INTRAVENOUS SOLUTION INTRAVENOUS ONCE
Status: DISCONTINUED | OUTPATIENT
Start: 2023-10-31 | End: 2023-10-31

## 2023-10-31 RX ORDER — DEXTROSE AND SODIUM CHLORIDE 5; .45 G/100ML; G/100ML
INJECTION, SOLUTION INTRAVENOUS CONTINUOUS PRN
Status: DISCONTINUED | OUTPATIENT
Start: 2023-10-31 | End: 2023-11-06 | Stop reason: HOSPADM

## 2023-10-31 RX ORDER — INSULIN LISPRO 100 [IU]/ML
0-8 INJECTION, SOLUTION INTRAVENOUS; SUBCUTANEOUS EVERY 6 HOURS
Status: DISCONTINUED | OUTPATIENT
Start: 2023-10-31 | End: 2023-11-01

## 2023-10-31 RX ORDER — MAGNESIUM SULFATE IN WATER 40 MG/ML
2000 INJECTION, SOLUTION INTRAVENOUS PRN
Status: DISCONTINUED | OUTPATIENT
Start: 2023-10-31 | End: 2023-11-06 | Stop reason: HOSPADM

## 2023-10-31 RX ORDER — POTASSIUM CHLORIDE 7.45 MG/ML
10 INJECTION INTRAVENOUS PRN
Status: DISCONTINUED | OUTPATIENT
Start: 2023-10-31 | End: 2023-11-06 | Stop reason: HOSPADM

## 2023-10-31 RX ORDER — SODIUM CHLORIDE, SODIUM LACTATE, POTASSIUM CHLORIDE, CALCIUM CHLORIDE 600; 310; 30; 20 MG/100ML; MG/100ML; MG/100ML; MG/100ML
INJECTION, SOLUTION INTRAVENOUS CONTINUOUS
Status: DISCONTINUED | OUTPATIENT
Start: 2023-10-31 | End: 2023-10-31

## 2023-10-31 RX ORDER — SODIUM CHLORIDE 0.9 % (FLUSH) 0.9 %
5-40 SYRINGE (ML) INJECTION EVERY 12 HOURS SCHEDULED
Status: DISCONTINUED | OUTPATIENT
Start: 2023-10-31 | End: 2023-11-06 | Stop reason: HOSPADM

## 2023-10-31 RX ADMIN — SODIUM CHLORIDE 500 ML: 9 INJECTION, SOLUTION INTRAVENOUS at 08:09

## 2023-10-31 RX ADMIN — PIPERACILLIN AND TAZOBACTAM 3375 MG: 3; .375 INJECTION, POWDER, LYOPHILIZED, FOR SOLUTION INTRAVENOUS at 17:08

## 2023-10-31 RX ADMIN — SODIUM CHLORIDE, SODIUM LACTATE, POTASSIUM CHLORIDE, AND CALCIUM CHLORIDE 1000 ML: 600; 310; 30; 20 INJECTION, SOLUTION INTRAVENOUS at 15:30

## 2023-10-31 RX ADMIN — ONDANSETRON 4 MG: 2 INJECTION INTRAMUSCULAR; INTRAVENOUS at 08:59

## 2023-10-31 RX ADMIN — INSULIN HUMAN 10 UNITS: 100 INJECTION, SOLUTION PARENTERAL at 09:24

## 2023-10-31 RX ADMIN — INSULIN GLARGINE 10 UNITS: 100 INJECTION, SOLUTION SUBCUTANEOUS at 21:26

## 2023-10-31 RX ADMIN — DEXTROSE AND SODIUM CHLORIDE: 5; 450 INJECTION, SOLUTION INTRAVENOUS at 18:13

## 2023-10-31 RX ADMIN — PIPERACILLIN AND TAZOBACTAM 4500 MG: 4; .5 INJECTION, POWDER, FOR SOLUTION INTRAVENOUS at 09:29

## 2023-10-31 RX ADMIN — SODIUM PHOSPHATE, MONOBASIC, MONOHYDRATE AND SODIUM PHOSPHATE, DIBASIC, ANHYDROUS 20 MMOL: 276; 142 INJECTION, SOLUTION INTRAVENOUS at 21:22

## 2023-10-31 RX ADMIN — SODIUM CHLORIDE: 9 INJECTION, SOLUTION INTRAVENOUS at 09:08

## 2023-10-31 RX ADMIN — INSULIN HUMAN 24.6 UNITS/HR: 1 INJECTION, SOLUTION INTRAVENOUS at 13:37

## 2023-10-31 RX ADMIN — LEVOTHYROXINE SODIUM ANHYDROUS 100 MCG: 100 INJECTION, POWDER, LYOPHILIZED, FOR SOLUTION INTRAVENOUS at 17:54

## 2023-10-31 RX ADMIN — SODIUM CHLORIDE, POTASSIUM CHLORIDE, SODIUM LACTATE AND CALCIUM CHLORIDE: 600; 310; 30; 20 INJECTION, SOLUTION INTRAVENOUS at 12:00

## 2023-10-31 RX ADMIN — MAGNESIUM SULFATE HEPTAHYDRATE 4000 MG: 40 INJECTION, SOLUTION INTRAVENOUS at 18:23

## 2023-10-31 RX ADMIN — SODIUM CHLORIDE 1000 ML: 9 INJECTION, SOLUTION INTRAVENOUS at 09:20

## 2023-10-31 RX ADMIN — HEPARIN SODIUM 5000 UNITS: 5000 INJECTION INTRAVENOUS; SUBCUTANEOUS at 15:10

## 2023-10-31 RX ADMIN — SODIUM BICARBONATE: 84 INJECTION INTRAVENOUS at 10:46

## 2023-10-31 RX ADMIN — FAMOTIDINE 20 MG: 10 INJECTION, SOLUTION INTRAVENOUS at 15:08

## 2023-10-31 RX ADMIN — SODIUM CHLORIDE, POTASSIUM CHLORIDE, SODIUM LACTATE AND CALCIUM CHLORIDE 1000 ML: 600; 310; 30; 20 INJECTION, SOLUTION INTRAVENOUS at 11:51

## 2023-10-31 RX ADMIN — SODIUM CHLORIDE 40 MG: 9 INJECTION INTRAMUSCULAR; INTRAVENOUS; SUBCUTANEOUS at 09:00

## 2023-10-31 RX ADMIN — INSULIN HUMAN 10.82 UNITS/HR: 1 INJECTION, SOLUTION INTRAVENOUS at 09:34

## 2023-10-31 RX ADMIN — HEPARIN SODIUM 5000 UNITS: 5000 INJECTION INTRAVENOUS; SUBCUTANEOUS at 21:27

## 2023-10-31 RX ADMIN — SODIUM CHLORIDE 1000 ML: 9 INJECTION, SOLUTION INTRAVENOUS at 09:00

## 2023-10-31 RX ADMIN — VANCOMYCIN 1250 MG: 1.75 INJECTION, SOLUTION INTRAVENOUS at 15:23

## 2023-10-31 ASSESSMENT — ENCOUNTER SYMPTOMS
ABDOMINAL PAIN: 0
SHORTNESS OF BREATH: 0
VOMITING: 1
NAUSEA: 1

## 2023-10-31 NOTE — ED PROVIDER NOTES
EMERGENCY DEPARTMENT HISTORY AND PHYSICAL EXAM    11:37 AM      Date: 10/31/2023  Patient Name: Lukas Vicente    History of Presenting Illness     Chief Complaint   Patient presents with    Hyperglycemia    Nausea    Emesis       History Provided By: Patient, EMS     Additional History (Context): Lukas Vicente is a 74 y.o. female with   Past Medical History:   Diagnosis Date    Anemia due to stage 3b chronic kidney disease ([facility]) 09/28/2022    Chronic kidney disease     Colon cancer ([facility])     Diabetes ([facility])     Hypertension     Hypothyroidism     Stage 3b chronic kidney disease ([address] [place] [address]) 09/28/2022    who presents via EMS due to hyperglycemia associated with nausea and vomiting x1 day. EMS note coffee-ground emesis en route. Patient denies fever or chills, chest pain, dyspnea, abdominal pain, diarrhea, dysuria, hematuria, melena, BRBPR. Patient notes she lives with her daughter. Patient thinks that she has been compliant with her medication. Notes she is on Aspirin, no blood thinners.      PCP: Estelle Sanchez MD    Current Facility-Administered Medications   Medication Dose Route Frequency Provider Last Rate Last Admin    0.9 % sodium chloride infusion   IntraVENous Continuous HENRY Brooks 100 mL/hr at 10/31/23 0908 New Bag at 10/31/23 0908    dextrose bolus 10% 125 mL  125 mL IntraVENous PRN HENRY Brooks        Or    dextrose bolus 10% 250 mL  250 mL IntraVENous PRN HENRY Brooks        potassium chloride 10 mEq/100 mL IVPB (Peripheral Line)  10 mEq IntraVENous PRN HENRY Brooks        magnesium sulfate 2000 mg in 50 mL IVPB premix  2,000 mg IntraVENous PRN HENRY Brooks        sodium phosphate 10 mmol in sodium chloride 0.9 % 250 mL IVPB  10 mmol IntraVENous PRN HENRY Brooks        Or    sodium phosphate 15 mmol in sodium chloride 0.9 % 250 mL IVPB  15 mmol IntraVENous PRN HENRY Brooks        Or    sodium phosphate 20 mmol in sodium chloride 0.9 % 250 mL

## 2023-10-31 NOTE — CARE COORDINATION
Writer spoke with patient who says  she lives at home with her mother. Initially patient said she didn't have any children however as our conversation progressed patient mention her daughter Farzad Centeno. Writer called and spoke with daughter Benjamin Jaquez [phone]. Daughter states: I live with my mom. My grandmother  years ago. I think my mom has dementia because there are times when she talks to me she so mean she thinks I'm my sister and when I remind her she says things to me that are so hurtful. Daughter says they have tried multiple times to get medicaid however they continue to deny. Per Daughter:  Patient owns her home, she receives  $850 in [place], she has a life insurance policy with a [facility] value payout\"  She has a small amount of money from when her son .     Plan: Patient will return home with possible HH if needed

## 2023-10-31 NOTE — CARE COORDINATION
10/31/23 1606   Service Assessment   Patient Orientation Person;Place   Cognition Dementia / Early Alzheimer's   History Provided By Child/Family   Primary [address]   PCP Verified by CM Yes   Last Visit to PCP Within last 3 months   Prior Functional Level Independent in ADLs/IADLs;Assistance with the following:;Housework; Shopping   Current Functional Level Independent in ADLs/IADLs;Assistance with the following:;Housework; Shopping   Can patient return to prior living arrangement Yes   Ability to make needs known: Fair   Family able to assist with home care needs: Yes   Would you like for me to discuss the discharge plan with any other family members/significant others, and if so, who?  No   Social/Functional History   Lives With Daughter   Type of [address] [place] [address]  [address], [place]   ADL Assistance Independent   Ambulation Assistance Independent   Transfer Assistance Independent   Discharge [address]   Patient expects to be discharged to: [facility]

## 2023-10-31 NOTE — ED NOTES
Patient appears to be less lethargic than upon arrival. Patient is smiling and answering all questions. Patient appears to be resting comfortably with the lights dimmed. Will continue to monitor.       Toby Parra RN  10/31/23 1014

## 2023-10-31 NOTE — ED TRIAGE NOTES
Patient arrived via EMS stretcher for c/o high blood sugar with N/V. Symptoms started this morning. Patient is alert but confused at this time. Confusion is not the baseline per EMS.
no

## 2023-10-31 NOTE — ED NOTES
Spoke with HENRY Corrales ok to recheck Venous PH to see if pt has improved and able to go to step-down.   Primary nurse notified     Jordan Pritchett RN  10/31/23 9643

## 2023-10-31 NOTE — ED NOTES
Pt daughter Erika Johnson updated on plan of care. Pt daughter remains- at bedside.      Britni Myers RN  10/31/23 1400

## 2023-11-01 ENCOUNTER — APPOINTMENT (OUTPATIENT)
Facility: HOSPITAL | Age: 73
DRG: 637 | End: 2023-11-01
Payer: MEDICARE

## 2023-11-01 LAB
AMPHET UR QL SCN: NEGATIVE
ANION GAP SERPL CALC-SCNC: 5 MMOL/L (ref 3–18)
ANION GAP SERPL CALC-SCNC: 6 MMOL/L (ref 3–18)
ANION GAP SERPL CALC-SCNC: 7 MMOL/L (ref 3–18)
ANION GAP SERPL CALC-SCNC: 7 MMOL/L (ref 3–18)
ANION GAP SERPL CALC-SCNC: 8 MMOL/L (ref 3–18)
B PERT DNA SPEC QL NAA+PROBE: NOT DETECTED
B-OH-BUTYR SERPL-SCNC: >4.42 MMOL/L
BARBITURATES UR QL SCN: NEGATIVE
BASOPHILS # BLD: 0 K/UL (ref 0–0.1)
BASOPHILS NFR BLD: 0 % (ref 0–2)
BENZODIAZ UR QL: NEGATIVE
BORDETELLA PARAPERTUSSIS BY PCR: NOT DETECTED
BUN SERPL-MCNC: 30 MG/DL (ref 7–18)
BUN SERPL-MCNC: 35 MG/DL (ref 7–18)
BUN SERPL-MCNC: 40 MG/DL (ref 7–18)
BUN SERPL-MCNC: 43 MG/DL (ref 7–18)
BUN SERPL-MCNC: 44 MG/DL (ref 7–18)
BUN/CREAT SERPL: 17 (ref 12–20)
BUN/CREAT SERPL: 18 (ref 12–20)
BUN/CREAT SERPL: 22 (ref 12–20)
BUN/CREAT SERPL: 23 (ref 12–20)
BUN/CREAT SERPL: 24 (ref 12–20)
C PNEUM DNA SPEC QL NAA+PROBE: NOT DETECTED
CALCIUM SERPL-MCNC: 7.5 MG/DL (ref 8.5–10.1)
CALCIUM SERPL-MCNC: 7.6 MG/DL (ref 8.5–10.1)
CALCIUM SERPL-MCNC: 7.8 MG/DL (ref 8.5–10.1)
CALCIUM SERPL-MCNC: 7.8 MG/DL (ref 8.5–10.1)
CALCIUM SERPL-MCNC: 7.9 MG/DL (ref 8.5–10.1)
CANNABINOIDS UR QL SCN: NEGATIVE
CHLORIDE SERPL-SCNC: 109 MMOL/L (ref 100–111)
CHLORIDE SERPL-SCNC: 109 MMOL/L (ref 100–111)
CHLORIDE SERPL-SCNC: 110 MMOL/L (ref 100–111)
CHLORIDE SERPL-SCNC: 110 MMOL/L (ref 100–111)
CHLORIDE SERPL-SCNC: 112 MMOL/L (ref 100–111)
CO2 SERPL-SCNC: 19 MMOL/L (ref 21–32)
CO2 SERPL-SCNC: 19 MMOL/L (ref 21–32)
CO2 SERPL-SCNC: 20 MMOL/L (ref 21–32)
CO2 SERPL-SCNC: 20 MMOL/L (ref 21–32)
CO2 SERPL-SCNC: 22 MMOL/L (ref 21–32)
COCAINE UR QL SCN: NEGATIVE
CREAT SERPL-MCNC: 1.69 MG/DL (ref 0.6–1.3)
CREAT SERPL-MCNC: 1.75 MG/DL (ref 0.6–1.3)
CREAT SERPL-MCNC: 1.83 MG/DL (ref 0.6–1.3)
CREAT SERPL-MCNC: 1.93 MG/DL (ref 0.6–1.3)
CREAT SERPL-MCNC: 1.99 MG/DL (ref 0.6–1.3)
DIFFERENTIAL METHOD BLD: ABNORMAL
EOSINOPHIL # BLD: 0 K/UL (ref 0–0.4)
EOSINOPHIL NFR BLD: 0 % (ref 0–5)
ERYTHROCYTE [DISTWIDTH] IN BLOOD BY AUTOMATED COUNT: 13.5 % (ref 11.6–14.5)
FLUAV SUBTYP SPEC NAA+PROBE: NOT DETECTED
FLUBV RNA SPEC QL NAA+PROBE: NOT DETECTED
GLUCOSE BLD STRIP.AUTO-MCNC: 104 MG/DL (ref 70–110)
GLUCOSE BLD STRIP.AUTO-MCNC: 113 MG/DL (ref 70–110)
GLUCOSE BLD STRIP.AUTO-MCNC: 159 MG/DL (ref 70–110)
GLUCOSE BLD STRIP.AUTO-MCNC: 165 MG/DL (ref 70–110)
GLUCOSE BLD STRIP.AUTO-MCNC: 179 MG/DL (ref 70–110)
GLUCOSE BLD STRIP.AUTO-MCNC: 185 MG/DL (ref 70–110)
GLUCOSE BLD STRIP.AUTO-MCNC: 217 MG/DL (ref 70–110)
GLUCOSE BLD STRIP.AUTO-MCNC: 262 MG/DL (ref 70–110)
GLUCOSE BLD STRIP.AUTO-MCNC: 270 MG/DL (ref 70–110)
GLUCOSE BLD STRIP.AUTO-MCNC: 284 MG/DL (ref 70–110)
GLUCOSE BLD STRIP.AUTO-MCNC: 295 MG/DL (ref 70–110)
GLUCOSE BLD STRIP.AUTO-MCNC: 334 MG/DL (ref 70–110)
GLUCOSE SERPL-MCNC: 133 MG/DL (ref 74–99)
GLUCOSE SERPL-MCNC: 188 MG/DL (ref 74–99)
GLUCOSE SERPL-MCNC: 228 MG/DL (ref 74–99)
GLUCOSE SERPL-MCNC: 284 MG/DL (ref 74–99)
GLUCOSE SERPL-MCNC: 300 MG/DL (ref 74–99)
HADV DNA SPEC QL NAA+PROBE: NOT DETECTED
HCOV 229E RNA SPEC QL NAA+PROBE: NOT DETECTED
HCOV HKU1 RNA SPEC QL NAA+PROBE: NOT DETECTED
HCOV NL63 RNA SPEC QL NAA+PROBE: NOT DETECTED
HCOV OC43 RNA SPEC QL NAA+PROBE: NOT DETECTED
HCT VFR BLD AUTO: 26 % (ref 35–45)
HGB BLD-MCNC: 8.5 G/DL (ref 12–16)
HMPV RNA SPEC QL NAA+PROBE: NOT DETECTED
HPIV1 RNA SPEC QL NAA+PROBE: NOT DETECTED
HPIV2 RNA SPEC QL NAA+PROBE: NOT DETECTED
HPIV3 RNA SPEC QL NAA+PROBE: NOT DETECTED
HPIV4 RNA SPEC QL NAA+PROBE: NOT DETECTED
IMM GRANULOCYTES # BLD AUTO: 0 K/UL (ref 0–0.04)
IMM GRANULOCYTES NFR BLD AUTO: 0 % (ref 0–0.5)
LACTATE SERPL-SCNC: 1.1 MMOL/L (ref 0.4–2)
LYMPHOCYTES # BLD: 1.5 K/UL (ref 0.9–3.6)
LYMPHOCYTES NFR BLD: 6 % (ref 21–52)
Lab: ABNORMAL
M PNEUMO DNA SPEC QL NAA+PROBE: NOT DETECTED
MAGNESIUM SERPL-MCNC: 1.9 MG/DL (ref 1.6–2.6)
MAGNESIUM SERPL-MCNC: 2 MG/DL (ref 1.6–2.6)
MAGNESIUM SERPL-MCNC: 2.1 MG/DL (ref 1.6–2.6)
MAGNESIUM SERPL-MCNC: 2.2 MG/DL (ref 1.6–2.6)
MAGNESIUM SERPL-MCNC: 2.4 MG/DL (ref 1.6–2.6)
MCH RBC QN AUTO: 27.8 PG (ref 24–34)
MCHC RBC AUTO-ENTMCNC: 32.7 G/DL (ref 31–37)
MCV RBC AUTO: 85 FL (ref 78–100)
METHADONE UR QL: ABNORMAL
MONOCYTES # BLD: 1 K/UL (ref 0.05–1.2)
MONOCYTES NFR BLD: 4 % (ref 3–10)
NEUTS SEG # BLD: 21.9 K/UL (ref 1.8–8)
NEUTS SEG NFR BLD: 90 % (ref 40–73)
NRBC # BLD: 0 K/UL (ref 0–0.01)
NRBC BLD-RTO: 0 PER 100 WBC
OPIATES UR QL: NEGATIVE
PCP UR QL: NEGATIVE
PHOSPHATE SERPL-MCNC: 1.9 MG/DL (ref 2.5–4.9)
PHOSPHATE SERPL-MCNC: 2.6 MG/DL (ref 2.5–4.9)
PHOSPHATE SERPL-MCNC: 3.3 MG/DL (ref 2.5–4.9)
PHOSPHATE SERPL-MCNC: 3.9 MG/DL (ref 2.5–4.9)
PHOSPHATE SERPL-MCNC: 4 MG/DL (ref 2.5–4.9)
PLATELET # BLD AUTO: 296 K/UL (ref 135–420)
PLATELET COMMENT: ABNORMAL
PMV BLD AUTO: 9.8 FL (ref 9.2–11.8)
POTASSIUM SERPL-SCNC: 4 MMOL/L (ref 3.5–5.5)
POTASSIUM SERPL-SCNC: 4.5 MMOL/L (ref 3.5–5.5)
POTASSIUM SERPL-SCNC: 4.8 MMOL/L (ref 3.5–5.5)
POTASSIUM SERPL-SCNC: 5.2 MMOL/L (ref 3.5–5.5)
POTASSIUM SERPL-SCNC: 5.3 MMOL/L (ref 3.5–5.5)
RBC # BLD AUTO: 3.06 M/UL (ref 4.2–5.3)
RBC MORPH BLD: ABNORMAL
RSV RNA SPEC QL NAA+PROBE: NOT DETECTED
RV+EV RNA SPEC QL NAA+PROBE: NOT DETECTED
SARS-COV-2 RNA RESP QL NAA+PROBE: NOT DETECTED
SODIUM SERPL-SCNC: 135 MMOL/L (ref 136–145)
SODIUM SERPL-SCNC: 136 MMOL/L (ref 136–145)
SODIUM SERPL-SCNC: 136 MMOL/L (ref 136–145)
SODIUM SERPL-SCNC: 137 MMOL/L (ref 136–145)
SODIUM SERPL-SCNC: 139 MMOL/L (ref 136–145)
VANCOMYCIN SERPL-MCNC: 12.3 UG/ML (ref 5–40)
WBC # BLD AUTO: 24.4 K/UL (ref 4.6–13.2)

## 2023-11-01 PROCEDURE — 80202 ASSAY OF VANCOMYCIN: CPT

## 2023-11-01 PROCEDURE — 82962 GLUCOSE BLOOD TEST: CPT

## 2023-11-01 PROCEDURE — 2500000003 HC RX 250 WO HCPCS: Performed by: PHYSICIAN ASSISTANT

## 2023-11-01 PROCEDURE — 6370000000 HC RX 637 (ALT 250 FOR IP)

## 2023-11-01 PROCEDURE — 36415 COLL VENOUS BLD VENIPUNCTURE: CPT

## 2023-11-01 PROCEDURE — 2580000003 HC RX 258: Performed by: PHYSICIAN ASSISTANT

## 2023-11-01 PROCEDURE — 83605 ASSAY OF LACTIC ACID: CPT

## 2023-11-01 PROCEDURE — A4216 STERILE WATER/SALINE, 10 ML: HCPCS | Performed by: PHYSICIAN ASSISTANT

## 2023-11-01 PROCEDURE — 83735 ASSAY OF MAGNESIUM: CPT

## 2023-11-01 PROCEDURE — 6370000000 HC RX 637 (ALT 250 FOR IP): Performed by: REGISTERED NURSE

## 2023-11-01 PROCEDURE — 6360000002 HC RX W HCPCS: Performed by: INTERNAL MEDICINE

## 2023-11-01 PROCEDURE — 0202U NFCT DS 22 TRGT SARS-COV-2: CPT

## 2023-11-01 PROCEDURE — 6360000002 HC RX W HCPCS: Performed by: PHYSICIAN ASSISTANT

## 2023-11-01 PROCEDURE — 2000000000 HC ICU R&B

## 2023-11-01 PROCEDURE — 80307 DRUG TEST PRSMV CHEM ANLYZR: CPT

## 2023-11-01 PROCEDURE — 2580000003 HC RX 258

## 2023-11-01 PROCEDURE — 99291 CRITICAL CARE FIRST HOUR: CPT | Performed by: INTERNAL MEDICINE

## 2023-11-01 PROCEDURE — 84100 ASSAY OF PHOSPHORUS: CPT

## 2023-11-01 PROCEDURE — 80048 BASIC METABOLIC PNL TOTAL CA: CPT

## 2023-11-01 PROCEDURE — 2580000003 HC RX 258: Performed by: INTERNAL MEDICINE

## 2023-11-01 PROCEDURE — 2580000003 HC RX 258: Performed by: REGISTERED NURSE

## 2023-11-01 PROCEDURE — APPSS30 APP SPLIT SHARED TIME 16-30 MINUTES

## 2023-11-01 PROCEDURE — 51702 INSERT TEMP BLADDER CATH: CPT

## 2023-11-01 PROCEDURE — 85025 COMPLETE CBC W/AUTO DIFF WBC: CPT

## 2023-11-01 RX ORDER — INSULIN GLARGINE 100 [IU]/ML
10 INJECTION, SOLUTION SUBCUTANEOUS DAILY
Status: DISCONTINUED | OUTPATIENT
Start: 2023-11-01 | End: 2023-11-02

## 2023-11-01 RX ORDER — INSULIN LISPRO 100 [IU]/ML
0-8 INJECTION, SOLUTION INTRAVENOUS; SUBCUTANEOUS
Status: DISCONTINUED | OUTPATIENT
Start: 2023-11-01 | End: 2023-11-06 | Stop reason: HOSPADM

## 2023-11-01 RX ORDER — DEXTROSE AND SODIUM CHLORIDE 5; .45 G/100ML; G/100ML
INJECTION, SOLUTION INTRAVENOUS CONTINUOUS
Status: DISCONTINUED | OUTPATIENT
Start: 2023-11-01 | End: 2023-11-01

## 2023-11-01 RX ORDER — INSULIN LISPRO 100 [IU]/ML
0-4 INJECTION, SOLUTION INTRAVENOUS; SUBCUTANEOUS NIGHTLY
Status: DISCONTINUED | OUTPATIENT
Start: 2023-11-01 | End: 2023-11-06 | Stop reason: HOSPADM

## 2023-11-01 RX ORDER — INSULIN GLARGINE 100 [IU]/ML
15 INJECTION, SOLUTION SUBCUTANEOUS NIGHTLY
Status: DISCONTINUED | OUTPATIENT
Start: 2023-11-01 | End: 2023-11-01

## 2023-11-01 RX ORDER — SODIUM ACETATE 164 MG/ML
100 INJECTION, SOLUTION, CONCENTRATE INTRAVENOUS ONCE
Status: DISCONTINUED | OUTPATIENT
Start: 2023-11-01 | End: 2023-11-01

## 2023-11-01 RX ORDER — SODIUM CHLORIDE, SODIUM LACTATE, POTASSIUM CHLORIDE, CALCIUM CHLORIDE 600; 310; 30; 20 MG/100ML; MG/100ML; MG/100ML; MG/100ML
INJECTION, SOLUTION INTRAVENOUS CONTINUOUS
Status: DISCONTINUED | OUTPATIENT
Start: 2023-11-01 | End: 2023-11-01

## 2023-11-01 RX ORDER — SODIUM BICARBONATE 650 MG/1
650 TABLET ORAL 2 TIMES DAILY
Status: DISCONTINUED | OUTPATIENT
Start: 2023-11-01 | End: 2023-11-06 | Stop reason: HOSPADM

## 2023-11-01 RX ORDER — INSULIN GLARGINE 100 [IU]/ML
5 INJECTION, SOLUTION SUBCUTANEOUS ONCE
Status: COMPLETED | OUTPATIENT
Start: 2023-11-01 | End: 2023-11-01

## 2023-11-01 RX ADMIN — VANCOMYCIN HYDROCHLORIDE 750 MG: 750 INJECTION, POWDER, LYOPHILIZED, FOR SOLUTION INTRAVENOUS at 11:16

## 2023-11-01 RX ADMIN — PIPERACILLIN AND TAZOBACTAM 3375 MG: 3; .375 INJECTION, POWDER, LYOPHILIZED, FOR SOLUTION INTRAVENOUS at 17:30

## 2023-11-01 RX ADMIN — HEPARIN SODIUM 5000 UNITS: 5000 INJECTION INTRAVENOUS; SUBCUTANEOUS at 21:47

## 2023-11-01 RX ADMIN — SODIUM CHLORIDE, POTASSIUM CHLORIDE, SODIUM LACTATE AND CALCIUM CHLORIDE: 600; 310; 30; 20 INJECTION, SOLUTION INTRAVENOUS at 08:11

## 2023-11-01 RX ADMIN — SODIUM CHLORIDE, PRESERVATIVE FREE 10 ML: 5 INJECTION INTRAVENOUS at 11:18

## 2023-11-01 RX ADMIN — HEPARIN SODIUM 5000 UNITS: 5000 INJECTION INTRAVENOUS; SUBCUTANEOUS at 06:40

## 2023-11-01 RX ADMIN — PIPERACILLIN AND TAZOBACTAM 3375 MG: 3; .375 INJECTION, POWDER, LYOPHILIZED, FOR SOLUTION INTRAVENOUS at 05:19

## 2023-11-01 RX ADMIN — INSULIN GLARGINE 5 UNITS: 100 INJECTION, SOLUTION SUBCUTANEOUS at 03:48

## 2023-11-01 RX ADMIN — FAMOTIDINE 20 MG: 10 INJECTION, SOLUTION INTRAVENOUS at 11:15

## 2023-11-01 RX ADMIN — INSULIN HUMAN 7.05 UNITS/HR: 1 INJECTION, SOLUTION INTRAVENOUS at 08:07

## 2023-11-01 RX ADMIN — DEXTROSE AND SODIUM CHLORIDE: 5; 450 INJECTION, SOLUTION INTRAVENOUS at 10:54

## 2023-11-01 RX ADMIN — SODIUM CHLORIDE, PRESERVATIVE FREE 10 ML: 5 INJECTION INTRAVENOUS at 21:49

## 2023-11-01 RX ADMIN — SODIUM BICARBONATE 650 MG: 650 TABLET ORAL at 21:47

## 2023-11-01 RX ADMIN — INSULIN LISPRO 4 UNITS: 100 INJECTION, SOLUTION INTRAVENOUS; SUBCUTANEOUS at 02:26

## 2023-11-01 RX ADMIN — LEVOTHYROXINE SODIUM ANHYDROUS 100 MCG: 100 INJECTION, POWDER, LYOPHILIZED, FOR SOLUTION INTRAVENOUS at 19:07

## 2023-11-01 RX ADMIN — SODIUM PHOSPHATE, MONOBASIC, MONOHYDRATE AND SODIUM PHOSPHATE, DIBASIC, ANHYDROUS 10 MMOL: 276; 142 INJECTION, SOLUTION INTRAVENOUS at 15:07

## 2023-11-01 RX ADMIN — INSULIN LISPRO 2 UNITS: 100 INJECTION, SOLUTION INTRAVENOUS; SUBCUTANEOUS at 19:08

## 2023-11-01 RX ADMIN — INSULIN GLARGINE 10 UNITS: 100 INJECTION, SOLUTION SUBCUTANEOUS at 11:59

## 2023-11-01 RX ADMIN — SODIUM BICARBONATE 650 MG: 650 TABLET ORAL at 13:14

## 2023-11-01 RX ADMIN — SODIUM CHLORIDE, PRESERVATIVE FREE 10 ML: 5 INJECTION INTRAVENOUS at 02:27

## 2023-11-01 NOTE — ED NOTES
ED Course as of 10/31/23 2214   Tue Oct 31, 2023   2213 Discussed with the on-call PA with ICU, will repeat.   Discussed with Alisha johnson at bedside we will repeat the magnesium level [CB]      ED Course User Index  [CB] MD Diann Escobar MD  10/31/23 2214

## 2023-11-01 NOTE — ED NOTES
I went to bladder scan patient per provider's orders and patient had urine output in the suction canister.  Urine sample collected and sent to lab     Suzy Kenyon RN  11/01/23 8898

## 2023-11-01 NOTE — ED NOTES
ICU provider informed this RN to restart her insulin drip     Carina Lyles, [address]  11/01/23 3483

## 2023-11-01 NOTE — ED NOTES
Reports given to DILCIA CALLES.  No further questions asked      Swapna [facility], [address] [place] [address]  11/01/23 9002

## 2023-11-01 NOTE — ED NOTES
[place] pt alarm going off in room, upon entering found patient on the edge of the bed, with one of her IV's pulled out and blood all over. Pt cleaned up and placed back in bed, Ricki tech at bedside to place new IV's. Pt appears a bit confused, thought that she had went home at some point today. Explained to patient that she has been here all day and that we are working on making sure her blood sugar is back to baseline. Pt seems to understand but thinks she went home.        Vanessa Issa, DILCIA  10/31/23 5050

## 2023-11-01 NOTE — ED NOTES
Pt moved to Rm 5 to be able to keep a closer eye on in case she gets out of bed again.      Roxy Zazueta RN  10/31/23 2669

## 2023-11-01 NOTE — ED NOTES
Medicated patient per MAR. Patient has no complaints at this time.  Care ongoing     Drew Lipscomb, [address]  11/01/23 9197

## 2023-11-01 NOTE — ACP (ADVANCE CARE PLANNING)
Advance Care Planning     Advance Care Planning Inpatient Note  [facility] Department    Today's Date: 11/1/2023  Unit: [facility] 3 INTENSIVE CARE UNIT    Received request from . Upon review of chart and communication with care team, patient's decision making abilities are not in question. . Patient was/were present in the room during visit. Goals of ACP Conversation:  Discuss advance care planning documents    Health Care Decision Makers:     No healthcare decision makers have been documented. Click here to complete [address] including selection of the Healthcare Decision Maker Relationship (ie \"Primary\")  Summary:  Updated Healthcare Decision Maker  Documented Next of Kin, per patient report  No Decision Maker named by patient at this time    Advance Care Planning Documents (Patient Wishes):  None     Assessment:  Patient seen in bed 5 of the emergency room  due to DKA. Patient will be admitted to the hospital from there in the emergency room . Patient is awake and in fair spirits she says. There is no advance directive on file for this patient. Interventions:  Patient DECLINED ACP conversation    Care Preferences Communicated:   No    Outcomes/Plan:  New advance directive completed.     Electronically signed by Karen Zayas.,  on 11/1/2023 at 11:31 AM

## 2023-11-01 NOTE — CARE COORDINATION
Case Management Assessment  Initial Evaluation    Date/Time of Evaluation: 11/1/2023 1:54 PM  Assessment Completed by: Humble Juarez    If patient is discharged prior to next notation, then this note serves as note for discharge by case management. Patient Name: Gris Bliss                   YOB: 1950  Diagnosis: Hyperkalemia [E87.5]  Lactic acidosis [E87.20]  DKA, type 2, not at goal [facility]) [E11.10]  Diabetic ketoacidosis without coma associated with diabetes mellitus due to underlying condition ([address]) [E08.10]  Hematemesis with nausea [K92.0]                   Date / Time: 10/31/2023  7:43 AM    Patient Admission Status: Inpatient   Readmission Risk (Low < 19, Mod (19-27), High > 27): Readmission Risk Score: 21.7    Current PCP: Bharat Malave MD  PCP verified by CM? (P) Yes    Chart Reviewed: Yes      History Provided by: Patient  Patient Orientation: Alert and Oriented    Patient Cognition: Alert    Hospitalization in the last 30 days (Readmission):  No    If yes, Readmission Assessment in CM Navigator will be completed.     Advance Directives:      Code Status: Full Code   Patient's Primary Decision Maker is: (P) Legal Next of Kin      Discharge Planning:    Patient lives with: (P) Children Type of Home:  House - 2 level  Primary Care Giver: (P) Family  Patient Support Systems include: (P) Children   Current Financial resources: (P) Medicare  Current community resources:    Current services prior to admission: (P) None            Current DME:  walker, shower chair, wheel chair            Type of Home Care services:  (P) None    ADLS  Prior functional level: (P) Independent in ADLs/IADLs  Current functional level: (P) Independent in ADLs/IADLs    PT AM-PAC:   /24  OT AM-PAC:   /24    Family can provide assistance at DC: (P) Yes  Would you like Case Management to discuss the discharge plan with any other family members/significant others, and if so, who? (P) Yes Yvrose Joseph

## 2023-11-01 NOTE — ED NOTES
Provider aware of patient's BGL. Per provider, insulin gtt is to be restarted.       Drew Lipscomb RN  11/01/23 2394

## 2023-11-01 NOTE — ED NOTES
Rounded on patient. Patient resting on stretcher in a position of comfort, vss and NAD. Patient has no complaints at this time. Bed in lowest position, call bell within reach. Care ongoing.       Marilee Veloz RN  10/31/23 2021

## 2023-11-02 ENCOUNTER — APPOINTMENT (OUTPATIENT)
Facility: HOSPITAL | Age: 73
DRG: 637 | End: 2023-11-02
Payer: MEDICARE

## 2023-11-02 LAB
ANION GAP SERPL CALC-SCNC: 5 MMOL/L (ref 3–18)
BASOPHILS # BLD: 0 K/UL (ref 0–0.1)
BASOPHILS NFR BLD: 0 % (ref 0–2)
BUN SERPL-MCNC: 24 MG/DL (ref 7–18)
BUN/CREAT SERPL: 18 (ref 12–20)
CALCIUM SERPL-MCNC: 8.3 MG/DL (ref 8.5–10.1)
CHLORIDE SERPL-SCNC: 106 MMOL/L (ref 100–111)
CO2 SERPL-SCNC: 26 MMOL/L (ref 21–32)
CREAT SERPL-MCNC: 1.37 MG/DL (ref 0.6–1.3)
DIFFERENTIAL METHOD BLD: ABNORMAL
ECHO AO ASC DIAM: 3.1 CM
ECHO AO ASCENDING AORTA INDEX: 1.74 CM/M2
ECHO AO ROOT DIAM: 2.9 CM
ECHO AO ROOT INDEX: 1.63 CM/M2
ECHO AV AREA PEAK VELOCITY: 1.9 CM2
ECHO AV AREA VTI: 1.8 CM2
ECHO AV AREA/BSA PEAK VELOCITY: 1.1 CM2/M2
ECHO AV AREA/BSA VTI: 1 CM2/M2
ECHO AV MEAN GRADIENT: 3 MMHG
ECHO AV MEAN VELOCITY: 0.8 M/S
ECHO AV PEAK GRADIENT: 6 MMHG
ECHO AV PEAK VELOCITY: 1.2 M/S
ECHO AV VELOCITY RATIO: 0.67
ECHO AV VTI: 20 CM
ECHO BSA: 1.8 M2
ECHO LA VOL A-L A2C: 25 ML (ref 22–52)
ECHO LA VOL A-L A4C: 56 ML (ref 22–52)
ECHO LA VOL BP: 39 ML (ref 22–52)
ECHO LA VOL/BSA BIPLANE: 22 ML/M2 (ref 16–34)
ECHO LA VOLUME AREA LENGTH: 50 ML
ECHO LA VOLUME INDEX A-L A2C: 14 ML/M2 (ref 16–34)
ECHO LA VOLUME INDEX A-L A4C: 31 ML/M2 (ref 16–34)
ECHO LA VOLUME INDEX AREA LENGTH: 28 ML/M2 (ref 16–34)
ECHO LV E' LATERAL VELOCITY: 7 CM/S
ECHO LV E' SEPTAL VELOCITY: 5 CM/S
ECHO LV FRACTIONAL SHORTENING: 29 % (ref 28–44)
ECHO LV INTERNAL DIMENSION DIASTOLE INDEX: 1.91 CM/M2
ECHO LV INTERNAL DIMENSION DIASTOLIC: 3.4 CM (ref 3.9–5.3)
ECHO LV INTERNAL DIMENSION SYSTOLIC INDEX: 1.35 CM/M2
ECHO LV INTERNAL DIMENSION SYSTOLIC: 2.4 CM
ECHO LV IVSD: 1.3 CM (ref 0.6–0.9)
ECHO LV MASS 2D: 130.2 G (ref 67–162)
ECHO LV MASS INDEX 2D: 73.2 G/M2 (ref 43–95)
ECHO LV POSTERIOR WALL DIASTOLIC: 1.1 CM (ref 0.6–0.9)
ECHO LV RELATIVE WALL THICKNESS RATIO: 0.65
ECHO LVOT AREA: 2.8 CM2
ECHO LVOT AV VTI INDEX: 0.66
ECHO LVOT DIAM: 1.9 CM
ECHO LVOT MEAN GRADIENT: 1 MMHG
ECHO LVOT PEAK GRADIENT: 3 MMHG
ECHO LVOT PEAK VELOCITY: 0.8 M/S
ECHO LVOT STROKE VOLUME INDEX: 20.9 ML/M2
ECHO LVOT SV: 37.1 ML
ECHO LVOT VTI: 13.1 CM
ECHO MV A VELOCITY: 0.64 M/S
ECHO MV AREA PHT: 3.5 CM2
ECHO MV AREA VTI: 2.4 CM2
ECHO MV E DECELERATION TIME (DT): 214.5 MS
ECHO MV E VELOCITY: 0.5 M/S
ECHO MV E/A RATIO: 0.78
ECHO MV E/E' LATERAL: 7.14
ECHO MV E/E' RATIO (AVERAGED): 8.57
ECHO MV E/E' SEPTAL: 10
ECHO MV LVOT VTI INDEX: 1.18
ECHO MV MAX VELOCITY: 0.6 M/S
ECHO MV MEAN GRADIENT: 1 MMHG
ECHO MV MEAN VELOCITY: 0.5 M/S
ECHO MV PEAK GRADIENT: 2 MMHG
ECHO MV PRESSURE HALF TIME (PHT): 63.6 MS
ECHO MV VTI: 15.5 CM
ECHO PULMONARY ARTERY END DIASTOLIC PRESSURE: 7 MMHG
ECHO PV REGURGITANT MAX VELOCITY: 1.3 M/S
ECHO RV TAPSE: 1.6 CM (ref 1.7–?)
ECHO TV REGURGITANT MAX VELOCITY: 1.83 M/S
ECHO TV REGURGITANT PEAK GRADIENT: 13 MMHG
EOSINOPHIL # BLD: 0.1 K/UL (ref 0–0.4)
EOSINOPHIL NFR BLD: 1 % (ref 0–5)
ERYTHROCYTE [DISTWIDTH] IN BLOOD BY AUTOMATED COUNT: 13.9 % (ref 11.6–14.5)
GLUCOSE BLD STRIP.AUTO-MCNC: 172 MG/DL (ref 70–110)
GLUCOSE BLD STRIP.AUTO-MCNC: 182 MG/DL (ref 70–110)
GLUCOSE BLD STRIP.AUTO-MCNC: 229 MG/DL (ref 70–110)
GLUCOSE BLD STRIP.AUTO-MCNC: 355 MG/DL (ref 70–110)
GLUCOSE SERPL-MCNC: 313 MG/DL (ref 74–99)
HCT VFR BLD AUTO: 28.4 % (ref 35–45)
HGB BLD-MCNC: 9.3 G/DL (ref 12–16)
IMM GRANULOCYTES # BLD AUTO: 0 K/UL (ref 0–0.04)
IMM GRANULOCYTES NFR BLD AUTO: 0 % (ref 0–0.5)
LYMPHOCYTES # BLD: 1.3 K/UL (ref 0.9–3.6)
LYMPHOCYTES NFR BLD: 12 % (ref 21–52)
MAGNESIUM SERPL-MCNC: 1.7 MG/DL (ref 1.6–2.6)
MCH RBC QN AUTO: 27.5 PG (ref 24–34)
MCHC RBC AUTO-ENTMCNC: 32.7 G/DL (ref 31–37)
MCV RBC AUTO: 84 FL (ref 78–100)
MONOCYTES # BLD: 0.7 K/UL (ref 0.05–1.2)
MONOCYTES NFR BLD: 7 % (ref 3–10)
NEUTS SEG # BLD: 8.9 K/UL (ref 1.8–8)
NEUTS SEG NFR BLD: 80 % (ref 40–73)
NRBC # BLD: 0 K/UL (ref 0–0.01)
NRBC BLD-RTO: 0 PER 100 WBC
PHOSPHATE SERPL-MCNC: 2.5 MG/DL (ref 2.5–4.9)
PLATELET # BLD AUTO: 286 K/UL (ref 135–420)
PMV BLD AUTO: 10 FL (ref 9.2–11.8)
POTASSIUM SERPL-SCNC: 4.3 MMOL/L (ref 3.5–5.5)
PROCALCITONIN SERPL-MCNC: 40.72 NG/ML
RBC # BLD AUTO: 3.38 M/UL (ref 4.2–5.3)
SODIUM SERPL-SCNC: 137 MMOL/L (ref 136–145)
WBC # BLD AUTO: 11.1 K/UL (ref 4.6–13.2)

## 2023-11-02 PROCEDURE — 99232 SBSQ HOSP IP/OBS MODERATE 35: CPT | Performed by: HOSPITALIST

## 2023-11-02 PROCEDURE — 2580000003 HC RX 258: Performed by: INTERNAL MEDICINE

## 2023-11-02 PROCEDURE — 36415 COLL VENOUS BLD VENIPUNCTURE: CPT

## 2023-11-02 PROCEDURE — 2500000003 HC RX 250 WO HCPCS: Performed by: PHYSICIAN ASSISTANT

## 2023-11-02 PROCEDURE — 6370000000 HC RX 637 (ALT 250 FOR IP): Performed by: REGISTERED NURSE

## 2023-11-02 PROCEDURE — 1100000003 HC PRIVATE W/ TELEMETRY

## 2023-11-02 PROCEDURE — 93306 TTE W/DOPPLER COMPLETE: CPT | Performed by: INTERNAL MEDICINE

## 2023-11-02 PROCEDURE — 6360000002 HC RX W HCPCS: Performed by: INTERNAL MEDICINE

## 2023-11-02 PROCEDURE — 97535 SELF CARE MNGMENT TRAINING: CPT

## 2023-11-02 PROCEDURE — 84145 PROCALCITONIN (PCT): CPT

## 2023-11-02 PROCEDURE — 80048 BASIC METABOLIC PNL TOTAL CA: CPT

## 2023-11-02 PROCEDURE — 84100 ASSAY OF PHOSPHORUS: CPT

## 2023-11-02 PROCEDURE — 2580000003 HC RX 258: Performed by: PHYSICIAN ASSISTANT

## 2023-11-02 PROCEDURE — 83735 ASSAY OF MAGNESIUM: CPT

## 2023-11-02 PROCEDURE — 93306 TTE W/DOPPLER COMPLETE: CPT

## 2023-11-02 PROCEDURE — 94761 N-INVAS EAR/PLS OXIMETRY MLT: CPT

## 2023-11-02 PROCEDURE — 82962 GLUCOSE BLOOD TEST: CPT

## 2023-11-02 PROCEDURE — A4216 STERILE WATER/SALINE, 10 ML: HCPCS | Performed by: PHYSICIAN ASSISTANT

## 2023-11-02 PROCEDURE — 6360000002 HC RX W HCPCS: Performed by: PHYSICIAN ASSISTANT

## 2023-11-02 PROCEDURE — 85025 COMPLETE CBC W/AUTO DIFF WBC: CPT

## 2023-11-02 PROCEDURE — 97165 OT EVAL LOW COMPLEX 30 MIN: CPT

## 2023-11-02 PROCEDURE — 6360000002 HC RX W HCPCS: Performed by: HOSPITALIST

## 2023-11-02 RX ORDER — INSULIN GLARGINE 100 [IU]/ML
15 INJECTION, SOLUTION SUBCUTANEOUS DAILY
Status: DISCONTINUED | OUTPATIENT
Start: 2023-11-03 | End: 2023-11-03

## 2023-11-02 RX ORDER — AMLODIPINE BESYLATE 5 MG/1
5 TABLET ORAL ONCE
Status: DISCONTINUED | OUTPATIENT
Start: 2023-11-02 | End: 2023-11-06 | Stop reason: HOSPADM

## 2023-11-02 RX ORDER — AMLODIPINE BESYLATE 5 MG/1
5 TABLET ORAL DAILY
Status: CANCELLED | OUTPATIENT
Start: 2023-11-02

## 2023-11-02 RX ORDER — CARVEDILOL 6.25 MG/1
6.25 TABLET ORAL ONCE
Status: DISCONTINUED | OUTPATIENT
Start: 2023-11-02 | End: 2023-11-06 | Stop reason: HOSPADM

## 2023-11-02 RX ORDER — ONDANSETRON 2 MG/ML
4 INJECTION INTRAMUSCULAR; INTRAVENOUS EVERY 6 HOURS PRN
Status: DISCONTINUED | OUTPATIENT
Start: 2023-11-02 | End: 2023-11-02

## 2023-11-02 RX ORDER — ONDANSETRON 2 MG/ML
4 INJECTION INTRAMUSCULAR; INTRAVENOUS EVERY 4 HOURS PRN
Status: DISCONTINUED | OUTPATIENT
Start: 2023-11-02 | End: 2023-11-06 | Stop reason: HOSPADM

## 2023-11-02 RX ORDER — CARVEDILOL 6.25 MG/1
6.25 TABLET ORAL 2 TIMES DAILY WITH MEALS
Status: CANCELLED | OUTPATIENT
Start: 2023-11-02

## 2023-11-02 RX ADMIN — SODIUM BICARBONATE 650 MG: 650 TABLET ORAL at 08:19

## 2023-11-02 RX ADMIN — SODIUM CHLORIDE, PRESERVATIVE FREE 10 ML: 5 INJECTION INTRAVENOUS at 08:20

## 2023-11-02 RX ADMIN — ONDANSETRON 4 MG: 2 INJECTION INTRAMUSCULAR; INTRAVENOUS at 14:29

## 2023-11-02 RX ADMIN — LEVOTHYROXINE SODIUM ANHYDROUS 100 MCG: 100 INJECTION, POWDER, LYOPHILIZED, FOR SOLUTION INTRAVENOUS at 16:19

## 2023-11-02 RX ADMIN — HEPARIN SODIUM 5000 UNITS: 5000 INJECTION INTRAVENOUS; SUBCUTANEOUS at 06:17

## 2023-11-02 RX ADMIN — INSULIN LISPRO 8 UNITS: 100 INJECTION, SOLUTION INTRAVENOUS; SUBCUTANEOUS at 08:19

## 2023-11-02 RX ADMIN — HEPARIN SODIUM 5000 UNITS: 5000 INJECTION INTRAVENOUS; SUBCUTANEOUS at 14:29

## 2023-11-02 RX ADMIN — SODIUM CHLORIDE, PRESERVATIVE FREE 10 ML: 5 INJECTION INTRAVENOUS at 21:00

## 2023-11-02 RX ADMIN — VANCOMYCIN HYDROCHLORIDE 750 MG: 750 INJECTION, POWDER, LYOPHILIZED, FOR SOLUTION INTRAVENOUS at 08:20

## 2023-11-02 RX ADMIN — PIPERACILLIN AND TAZOBACTAM 3375 MG: 3; .375 INJECTION, POWDER, LYOPHILIZED, FOR SOLUTION INTRAVENOUS at 06:17

## 2023-11-02 RX ADMIN — SODIUM BICARBONATE 650 MG: 650 TABLET ORAL at 20:36

## 2023-11-02 RX ADMIN — INSULIN GLARGINE 10 UNITS: 100 INJECTION, SOLUTION SUBCUTANEOUS at 08:20

## 2023-11-02 RX ADMIN — HEPARIN SODIUM 5000 UNITS: 5000 INJECTION INTRAVENOUS; SUBCUTANEOUS at 20:36

## 2023-11-02 RX ADMIN — FAMOTIDINE 20 MG: 10 INJECTION, SOLUTION INTRAVENOUS at 08:19

## 2023-11-02 ASSESSMENT — PAIN SCALES - GENERAL
PAINLEVEL_OUTOF10: 0

## 2023-11-02 NOTE — CARE COORDINATION
SNF list provided to the patient. CM spoke with the patient's daughter via phone. Disposition is SNF/rehab. Daughter will discuss facilities with the patient this evening and provide choices to CM tomorrow. Patient not medically ready for discharge.      Orvilla Cogan, BSN, RN  Care Management  Phone: [phone]

## 2023-11-02 NOTE — CONSULTS
Infectious Disease Consultation Note        Reason: evaluate for cystitis, infectious etiology of leukocytosis/sepsis    Current abx Prior abx   Pip/tazo, vancomycin since 10/31      Lines:       Assessment :    66 y.o. female with pmh of hypothyroidism, and colon cancer s/p total colectomy and chemotherapy, uncontrolled type 2 diabetes   (last hgb A1C 8.3 on 10/31/23), COPD, CKD who presented to the ED on 10/31/23 after worsening mental status, abdominal pain and N/V x 1 day    I agree that initial clinical presentation is concerning for sepsis. Significantly elevated lactate, procalcitonin admission concern for this. However after obtaining detailed history/exam/review of available lab/imaging studies; no definitive evidence of infection/sepsis identified. Presentation seems most consistent with SIRS response to diabetic acidosis. Risk of continuing broad-spectrum antibiotics outweigh the benefit. Hence will monitor patient off antibiotics and obtain further testing if indicated. Lack of significant pyuria on urine analysis, no urinary complaints prior to presentation argues again cystitis        H/o left hip TKR- currently no evidence of infection left hip. Acute kidney injury- likely due to volume depletion. Improving     Altered mentation- likely metabolic encephalopathy-improved     improving leukocytosis     Recommendations:     1. D/c Zosyn, vancomycin- monitor off abx  2.  add on procalcitonin to today's labs  3. Monitor cbc, temp, procalcitonin, clinically  4. Management of uncontrolled type 2 diabetes per primary team       Thank you for consultation request. Above plan was discussed in details with patient,  and dr Ana Cristina Locwkood. Please call me if any further questions or concerns. Will continue to participate in the care of this patient.     HPI:    74year old female with PMHx of uncontrolled DM2 (last hgb A1C 8.3 on 10/31/23), COPD, CKD who presented to the ED on 10/31/23 after worsening

## 2023-11-03 LAB
ANION GAP SERPL CALC-SCNC: 13 MMOL/L (ref 3–18)
BASOPHILS # BLD: 0 K/UL (ref 0–0.1)
BASOPHILS NFR BLD: 1 % (ref 0–2)
BUN SERPL-MCNC: 19 MG/DL (ref 7–18)
BUN/CREAT SERPL: 14 (ref 12–20)
CALCIUM SERPL-MCNC: 8.4 MG/DL (ref 8.5–10.1)
CHLORIDE SERPL-SCNC: 103 MMOL/L (ref 100–111)
CO2 SERPL-SCNC: 23 MMOL/L (ref 21–32)
CREAT SERPL-MCNC: 1.34 MG/DL (ref 0.6–1.3)
DIFFERENTIAL METHOD BLD: ABNORMAL
EOSINOPHIL # BLD: 0 K/UL (ref 0–0.4)
EOSINOPHIL NFR BLD: 0 % (ref 0–5)
ERYTHROCYTE [DISTWIDTH] IN BLOOD BY AUTOMATED COUNT: 14 % (ref 11.6–14.5)
GLUCOSE BLD STRIP.AUTO-MCNC: 148 MG/DL (ref 70–110)
GLUCOSE BLD STRIP.AUTO-MCNC: 159 MG/DL (ref 70–110)
GLUCOSE BLD STRIP.AUTO-MCNC: 163 MG/DL (ref 70–110)
GLUCOSE BLD STRIP.AUTO-MCNC: 304 MG/DL (ref 70–110)
GLUCOSE BLD STRIP.AUTO-MCNC: 397 MG/DL (ref 70–110)
GLUCOSE BLD STRIP.AUTO-MCNC: 482 MG/DL (ref 70–110)
GLUCOSE BLD STRIP.AUTO-MCNC: 482 MG/DL (ref 70–110)
GLUCOSE SERPL-MCNC: 409 MG/DL (ref 74–99)
HCT VFR BLD AUTO: 30.4 % (ref 35–45)
HGB BLD-MCNC: 9.6 G/DL (ref 12–16)
IMM GRANULOCYTES # BLD AUTO: 0 K/UL (ref 0–0.04)
IMM GRANULOCYTES NFR BLD AUTO: 0 % (ref 0–0.5)
LYMPHOCYTES # BLD: 1.1 K/UL (ref 0.9–3.6)
LYMPHOCYTES NFR BLD: 14 % (ref 21–52)
MAGNESIUM SERPL-MCNC: 1.5 MG/DL (ref 1.6–2.6)
MAGNESIUM SERPL-MCNC: 1.6 MG/DL (ref 1.6–2.6)
MCH RBC QN AUTO: 27.1 PG (ref 24–34)
MCHC RBC AUTO-ENTMCNC: 31.6 G/DL (ref 31–37)
MCV RBC AUTO: 85.9 FL (ref 78–100)
MONOCYTES # BLD: 0.7 K/UL (ref 0.05–1.2)
MONOCYTES NFR BLD: 9 % (ref 3–10)
NEUTS SEG # BLD: 6.2 K/UL (ref 1.8–8)
NEUTS SEG NFR BLD: 76 % (ref 40–73)
NRBC # BLD: 0 K/UL (ref 0–0.01)
NRBC BLD-RTO: 0 PER 100 WBC
PHOSPHATE SERPL-MCNC: 2.7 MG/DL (ref 2.5–4.9)
PLATELET # BLD AUTO: 282 K/UL (ref 135–420)
PMV BLD AUTO: 10.3 FL (ref 9.2–11.8)
POTASSIUM SERPL-SCNC: 4.8 MMOL/L (ref 3.5–5.5)
PROCALCITONIN SERPL-MCNC: 15.96 NG/ML
RBC # BLD AUTO: 3.54 M/UL (ref 4.2–5.3)
SODIUM SERPL-SCNC: 139 MMOL/L (ref 136–145)
WBC # BLD AUTO: 8.1 K/UL (ref 4.6–13.2)

## 2023-11-03 PROCEDURE — 92526 ORAL FUNCTION THERAPY: CPT

## 2023-11-03 PROCEDURE — 6370000000 HC RX 637 (ALT 250 FOR IP): Performed by: HOSPITALIST

## 2023-11-03 PROCEDURE — 84145 PROCALCITONIN (PCT): CPT

## 2023-11-03 PROCEDURE — 6370000000 HC RX 637 (ALT 250 FOR IP): Performed by: REGISTERED NURSE

## 2023-11-03 PROCEDURE — 92610 EVALUATE SWALLOWING FUNCTION: CPT

## 2023-11-03 PROCEDURE — 80048 BASIC METABOLIC PNL TOTAL CA: CPT

## 2023-11-03 PROCEDURE — 97530 THERAPEUTIC ACTIVITIES: CPT

## 2023-11-03 PROCEDURE — 97535 SELF CARE MNGMENT TRAINING: CPT

## 2023-11-03 PROCEDURE — 84100 ASSAY OF PHOSPHORUS: CPT

## 2023-11-03 PROCEDURE — 2580000003 HC RX 258: Performed by: PHYSICIAN ASSISTANT

## 2023-11-03 PROCEDURE — 82962 GLUCOSE BLOOD TEST: CPT

## 2023-11-03 PROCEDURE — 99232 SBSQ HOSP IP/OBS MODERATE 35: CPT | Performed by: HOSPITALIST

## 2023-11-03 PROCEDURE — 85025 COMPLETE CBC W/AUTO DIFF WBC: CPT

## 2023-11-03 PROCEDURE — 97162 PT EVAL MOD COMPLEX 30 MIN: CPT

## 2023-11-03 PROCEDURE — 83735 ASSAY OF MAGNESIUM: CPT

## 2023-11-03 PROCEDURE — 94761 N-INVAS EAR/PLS OXIMETRY MLT: CPT

## 2023-11-03 PROCEDURE — A4216 STERILE WATER/SALINE, 10 ML: HCPCS | Performed by: PHYSICIAN ASSISTANT

## 2023-11-03 PROCEDURE — 36415 COLL VENOUS BLD VENIPUNCTURE: CPT

## 2023-11-03 PROCEDURE — 1100000003 HC PRIVATE W/ TELEMETRY

## 2023-11-03 PROCEDURE — 6360000002 HC RX W HCPCS: Performed by: PHYSICIAN ASSISTANT

## 2023-11-03 PROCEDURE — 2500000003 HC RX 250 WO HCPCS: Performed by: PHYSICIAN ASSISTANT

## 2023-11-03 PROCEDURE — 6360000002 HC RX W HCPCS: Performed by: INTERNAL MEDICINE

## 2023-11-03 RX ORDER — LABETALOL HYDROCHLORIDE 5 MG/ML
10 INJECTION, SOLUTION INTRAVENOUS ONCE
Status: COMPLETED | OUTPATIENT
Start: 2023-11-03 | End: 2023-11-03

## 2023-11-03 RX ORDER — INSULIN GLARGINE 100 [IU]/ML
8 INJECTION, SOLUTION SUBCUTANEOUS ONCE
Status: DISCONTINUED | OUTPATIENT
Start: 2023-11-03 | End: 2023-11-03

## 2023-11-03 RX ORDER — INSULIN GLARGINE 100 [IU]/ML
20 INJECTION, SOLUTION SUBCUTANEOUS DAILY
Status: DISCONTINUED | OUTPATIENT
Start: 2023-11-04 | End: 2023-11-06 | Stop reason: HOSPADM

## 2023-11-03 RX ADMIN — LEVOTHYROXINE SODIUM ANHYDROUS 100 MCG: 100 INJECTION, POWDER, LYOPHILIZED, FOR SOLUTION INTRAVENOUS at 17:23

## 2023-11-03 RX ADMIN — SODIUM CHLORIDE, PRESERVATIVE FREE 10 ML: 5 INJECTION INTRAVENOUS at 21:00

## 2023-11-03 RX ADMIN — HEPARIN SODIUM 5000 UNITS: 5000 INJECTION INTRAVENOUS; SUBCUTANEOUS at 07:26

## 2023-11-03 RX ADMIN — INSULIN LISPRO 8 UNITS: 100 INJECTION, SOLUTION INTRAVENOUS; SUBCUTANEOUS at 08:41

## 2023-11-03 RX ADMIN — INSULIN GLARGINE 15 UNITS: 100 INJECTION, SOLUTION SUBCUTANEOUS at 08:40

## 2023-11-03 RX ADMIN — SODIUM CHLORIDE, PRESERVATIVE FREE 20 ML: 5 INJECTION INTRAVENOUS at 08:42

## 2023-11-03 RX ADMIN — LABETALOL HYDROCHLORIDE 10 MG: 5 INJECTION, SOLUTION INTRAVENOUS at 01:08

## 2023-11-03 RX ADMIN — HEPARIN SODIUM 5000 UNITS: 5000 INJECTION INTRAVENOUS; SUBCUTANEOUS at 21:36

## 2023-11-03 RX ADMIN — FAMOTIDINE 20 MG: 10 INJECTION, SOLUTION INTRAVENOUS at 08:42

## 2023-11-03 RX ADMIN — INSULIN LISPRO 6 UNITS: 100 INJECTION, SOLUTION INTRAVENOUS; SUBCUTANEOUS at 12:35

## 2023-11-03 RX ADMIN — HEPARIN SODIUM 5000 UNITS: 5000 INJECTION INTRAVENOUS; SUBCUTANEOUS at 14:15

## 2023-11-03 RX ADMIN — SODIUM BICARBONATE 650 MG: 650 TABLET ORAL at 21:36

## 2023-11-03 ASSESSMENT — PAIN SCALES - GENERAL
PAINLEVEL_OUTOF10: 0

## 2023-11-03 NOTE — CARE COORDINATION
1021: CM requested that patient be screened for ARU. Perfect Serve sent to Lexi Harper with ARU. She will forward request to the ARU team since she is off today. 1230: Monie Lyon patient. CM notified by Isaias Scott, stating the patient would not be authorized for inpatient rehab. CM updated the patient and her daughter. 1311: CM discussed SNF with patient and her daughter. SNF list provided again. SNF choices are [facility], [address] [place] [address], [facility]. Referrals sent in [address] and [address] [place] needed for SNF.      SAVITA JasmineN, RN  Care Management  Phone: [phone]

## 2023-11-04 LAB
ANION GAP SERPL CALC-SCNC: 6 MMOL/L (ref 3–18)
BASOPHILS # BLD: 0 K/UL (ref 0–0.1)
BASOPHILS NFR BLD: 0 % (ref 0–2)
BUN SERPL-MCNC: 24 MG/DL (ref 7–18)
BUN/CREAT SERPL: 17 (ref 12–20)
CALCIUM SERPL-MCNC: 8.9 MG/DL (ref 8.5–10.1)
CHLORIDE SERPL-SCNC: 107 MMOL/L (ref 100–111)
CO2 SERPL-SCNC: 27 MMOL/L (ref 21–32)
CREAT SERPL-MCNC: 1.41 MG/DL (ref 0.6–1.3)
DIFFERENTIAL METHOD BLD: ABNORMAL
EOSINOPHIL # BLD: 0.1 K/UL (ref 0–0.4)
EOSINOPHIL NFR BLD: 1 % (ref 0–5)
ERYTHROCYTE [DISTWIDTH] IN BLOOD BY AUTOMATED COUNT: 14 % (ref 11.6–14.5)
GLUCOSE BLD STRIP.AUTO-MCNC: 124 MG/DL (ref 70–110)
GLUCOSE BLD STRIP.AUTO-MCNC: 146 MG/DL (ref 70–110)
GLUCOSE BLD STRIP.AUTO-MCNC: 216 MG/DL (ref 70–110)
GLUCOSE BLD STRIP.AUTO-MCNC: 297 MG/DL (ref 70–110)
GLUCOSE SERPL-MCNC: 190 MG/DL (ref 74–99)
HCT VFR BLD AUTO: 30.2 % (ref 35–45)
HGB BLD-MCNC: 9.7 G/DL (ref 12–16)
IMM GRANULOCYTES # BLD AUTO: 0 K/UL (ref 0–0.04)
IMM GRANULOCYTES NFR BLD AUTO: 0 % (ref 0–0.5)
LYMPHOCYTES # BLD: 1.8 K/UL (ref 0.9–3.6)
LYMPHOCYTES NFR BLD: 26 % (ref 21–52)
MAGNESIUM SERPL-MCNC: 1.6 MG/DL (ref 1.6–2.6)
MCH RBC QN AUTO: 27.2 PG (ref 24–34)
MCHC RBC AUTO-ENTMCNC: 32.1 G/DL (ref 31–37)
MCV RBC AUTO: 84.8 FL (ref 78–100)
MONOCYTES # BLD: 0.6 K/UL (ref 0.05–1.2)
MONOCYTES NFR BLD: 9 % (ref 3–10)
NEUTS SEG # BLD: 4.3 K/UL (ref 1.8–8)
NEUTS SEG NFR BLD: 63 % (ref 40–73)
NRBC # BLD: 0 K/UL (ref 0–0.01)
NRBC BLD-RTO: 0 PER 100 WBC
PHOSPHATE SERPL-MCNC: 2.7 MG/DL (ref 2.5–4.9)
PLATELET # BLD AUTO: 273 K/UL (ref 135–420)
PMV BLD AUTO: 10.2 FL (ref 9.2–11.8)
POTASSIUM SERPL-SCNC: 4.2 MMOL/L (ref 3.5–5.5)
RBC # BLD AUTO: 3.56 M/UL (ref 4.2–5.3)
SODIUM SERPL-SCNC: 140 MMOL/L (ref 136–145)
WBC # BLD AUTO: 6.9 K/UL (ref 4.6–13.2)

## 2023-11-04 PROCEDURE — 2500000003 HC RX 250 WO HCPCS: Performed by: PHYSICIAN ASSISTANT

## 2023-11-04 PROCEDURE — 6370000000 HC RX 637 (ALT 250 FOR IP): Performed by: HOSPITALIST

## 2023-11-04 PROCEDURE — 94761 N-INVAS EAR/PLS OXIMETRY MLT: CPT

## 2023-11-04 PROCEDURE — 36415 COLL VENOUS BLD VENIPUNCTURE: CPT

## 2023-11-04 PROCEDURE — 82962 GLUCOSE BLOOD TEST: CPT

## 2023-11-04 PROCEDURE — 2580000003 HC RX 258: Performed by: PHYSICIAN ASSISTANT

## 2023-11-04 PROCEDURE — 83735 ASSAY OF MAGNESIUM: CPT

## 2023-11-04 PROCEDURE — 85025 COMPLETE CBC W/AUTO DIFF WBC: CPT

## 2023-11-04 PROCEDURE — 99232 SBSQ HOSP IP/OBS MODERATE 35: CPT | Performed by: HOSPITALIST

## 2023-11-04 PROCEDURE — 6360000002 HC RX W HCPCS: Performed by: PHYSICIAN ASSISTANT

## 2023-11-04 PROCEDURE — 6370000000 HC RX 637 (ALT 250 FOR IP): Performed by: REGISTERED NURSE

## 2023-11-04 PROCEDURE — 84100 ASSAY OF PHOSPHORUS: CPT

## 2023-11-04 PROCEDURE — 1100000003 HC PRIVATE W/ TELEMETRY

## 2023-11-04 PROCEDURE — 80048 BASIC METABOLIC PNL TOTAL CA: CPT

## 2023-11-04 PROCEDURE — A4216 STERILE WATER/SALINE, 10 ML: HCPCS | Performed by: PHYSICIAN ASSISTANT

## 2023-11-04 RX ADMIN — INSULIN GLARGINE 20 UNITS: 100 INJECTION, SOLUTION SUBCUTANEOUS at 08:09

## 2023-11-04 RX ADMIN — INSULIN LISPRO 2 UNITS: 100 INJECTION, SOLUTION INTRAVENOUS; SUBCUTANEOUS at 17:08

## 2023-11-04 RX ADMIN — SODIUM CHLORIDE, PRESERVATIVE FREE 10 ML: 5 INJECTION INTRAVENOUS at 08:10

## 2023-11-04 RX ADMIN — SODIUM CHLORIDE, PRESERVATIVE FREE 10 ML: 5 INJECTION INTRAVENOUS at 21:41

## 2023-11-04 RX ADMIN — HEPARIN SODIUM 5000 UNITS: 5000 INJECTION INTRAVENOUS; SUBCUTANEOUS at 16:01

## 2023-11-04 RX ADMIN — LEVOTHYROXINE SODIUM ANHYDROUS 100 MCG: 100 INJECTION, POWDER, LYOPHILIZED, FOR SOLUTION INTRAVENOUS at 21:39

## 2023-11-04 RX ADMIN — FAMOTIDINE 20 MG: 10 INJECTION, SOLUTION INTRAVENOUS at 08:09

## 2023-11-04 RX ADMIN — SODIUM BICARBONATE 650 MG: 650 TABLET ORAL at 21:39

## 2023-11-04 RX ADMIN — INSULIN LISPRO 4 UNITS: 100 INJECTION, SOLUTION INTRAVENOUS; SUBCUTANEOUS at 08:08

## 2023-11-04 RX ADMIN — HEPARIN SODIUM 5000 UNITS: 5000 INJECTION INTRAVENOUS; SUBCUTANEOUS at 21:38

## 2023-11-04 RX ADMIN — HEPARIN SODIUM 5000 UNITS: 5000 INJECTION INTRAVENOUS; SUBCUTANEOUS at 06:20

## 2023-11-04 RX ADMIN — SODIUM BICARBONATE 650 MG: 650 TABLET ORAL at 08:09

## 2023-11-04 ASSESSMENT — PAIN SCALES - GENERAL
PAINLEVEL_OUTOF10: 0

## 2023-11-05 LAB
BASOPHILS # BLD: 0 K/UL (ref 0–0.1)
BASOPHILS NFR BLD: 1 % (ref 0–2)
DIFFERENTIAL METHOD BLD: ABNORMAL
EOSINOPHIL # BLD: 0.2 K/UL (ref 0–0.4)
EOSINOPHIL NFR BLD: 3 % (ref 0–5)
ERYTHROCYTE [DISTWIDTH] IN BLOOD BY AUTOMATED COUNT: 13.8 % (ref 11.6–14.5)
GLUCOSE BLD STRIP.AUTO-MCNC: 224 MG/DL (ref 70–110)
GLUCOSE BLD STRIP.AUTO-MCNC: 284 MG/DL (ref 70–110)
GLUCOSE BLD STRIP.AUTO-MCNC: 378 MG/DL (ref 70–110)
GLUCOSE BLD STRIP.AUTO-MCNC: 410 MG/DL (ref 70–110)
GLUCOSE BLD STRIP.AUTO-MCNC: 79 MG/DL (ref 70–110)
HCT VFR BLD AUTO: 29 % (ref 35–45)
HGB BLD-MCNC: 9.2 G/DL (ref 12–16)
IMM GRANULOCYTES # BLD AUTO: 0 K/UL (ref 0–0.04)
IMM GRANULOCYTES NFR BLD AUTO: 1 % (ref 0–0.5)
LYMPHOCYTES # BLD: 1.7 K/UL (ref 0.9–3.6)
LYMPHOCYTES NFR BLD: 27 % (ref 21–52)
MAGNESIUM SERPL-MCNC: 1.8 MG/DL (ref 1.6–2.6)
MCH RBC QN AUTO: 27.3 PG (ref 24–34)
MCHC RBC AUTO-ENTMCNC: 31.7 G/DL (ref 31–37)
MCV RBC AUTO: 86.1 FL (ref 78–100)
MONOCYTES # BLD: 0.6 K/UL (ref 0.05–1.2)
MONOCYTES NFR BLD: 10 % (ref 3–10)
NEUTS SEG # BLD: 3.7 K/UL (ref 1.8–8)
NEUTS SEG NFR BLD: 59 % (ref 40–73)
NRBC # BLD: 0 K/UL (ref 0–0.01)
NRBC BLD-RTO: 0 PER 100 WBC
PLATELET # BLD AUTO: 251 K/UL (ref 135–420)
PMV BLD AUTO: 10.4 FL (ref 9.2–11.8)
RBC # BLD AUTO: 3.37 M/UL (ref 4.2–5.3)
WBC # BLD AUTO: 6.2 K/UL (ref 4.6–13.2)

## 2023-11-05 PROCEDURE — A4216 STERILE WATER/SALINE, 10 ML: HCPCS | Performed by: HOSPITALIST

## 2023-11-05 PROCEDURE — 36415 COLL VENOUS BLD VENIPUNCTURE: CPT

## 2023-11-05 PROCEDURE — 2580000003 HC RX 258: Performed by: HOSPITALIST

## 2023-11-05 PROCEDURE — 1100000003 HC PRIVATE W/ TELEMETRY

## 2023-11-05 PROCEDURE — 97116 GAIT TRAINING THERAPY: CPT

## 2023-11-05 PROCEDURE — 83735 ASSAY OF MAGNESIUM: CPT

## 2023-11-05 PROCEDURE — 6370000000 HC RX 637 (ALT 250 FOR IP): Performed by: HOSPITALIST

## 2023-11-05 PROCEDURE — A4216 STERILE WATER/SALINE, 10 ML: HCPCS | Performed by: PHYSICIAN ASSISTANT

## 2023-11-05 PROCEDURE — 2500000003 HC RX 250 WO HCPCS: Performed by: PHYSICIAN ASSISTANT

## 2023-11-05 PROCEDURE — 82962 GLUCOSE BLOOD TEST: CPT

## 2023-11-05 PROCEDURE — 2500000003 HC RX 250 WO HCPCS: Performed by: HOSPITALIST

## 2023-11-05 PROCEDURE — 97535 SELF CARE MNGMENT TRAINING: CPT

## 2023-11-05 PROCEDURE — 99232 SBSQ HOSP IP/OBS MODERATE 35: CPT | Performed by: HOSPITALIST

## 2023-11-05 PROCEDURE — 97112 NEUROMUSCULAR REEDUCATION: CPT

## 2023-11-05 PROCEDURE — 6360000002 HC RX W HCPCS: Performed by: PHYSICIAN ASSISTANT

## 2023-11-05 PROCEDURE — 6370000000 HC RX 637 (ALT 250 FOR IP): Performed by: REGISTERED NURSE

## 2023-11-05 PROCEDURE — 85025 COMPLETE CBC W/AUTO DIFF WBC: CPT

## 2023-11-05 PROCEDURE — 97530 THERAPEUTIC ACTIVITIES: CPT

## 2023-11-05 PROCEDURE — 94761 N-INVAS EAR/PLS OXIMETRY MLT: CPT

## 2023-11-05 PROCEDURE — 2580000003 HC RX 258: Performed by: PHYSICIAN ASSISTANT

## 2023-11-05 RX ORDER — FAMOTIDINE 20 MG/1
20 TABLET, FILM COATED ORAL DAILY
Status: DISCONTINUED | OUTPATIENT
Start: 2023-11-06 | End: 2023-11-06 | Stop reason: HOSPADM

## 2023-11-05 RX ORDER — LEVOTHYROXINE SODIUM 0.15 MG/1
150 TABLET ORAL DAILY
Status: DISCONTINUED | OUTPATIENT
Start: 2023-11-06 | End: 2023-11-06 | Stop reason: HOSPADM

## 2023-11-05 RX ORDER — GABAPENTIN 300 MG/1
300 CAPSULE ORAL NIGHTLY
Status: DISCONTINUED | OUTPATIENT
Start: 2023-11-05 | End: 2023-11-06 | Stop reason: HOSPADM

## 2023-11-05 RX ADMIN — SODIUM CHLORIDE, PRESERVATIVE FREE 10 ML: 5 INJECTION INTRAVENOUS at 21:38

## 2023-11-05 RX ADMIN — LEVOTHYROXINE SODIUM ANHYDROUS 100 MCG: 100 INJECTION, POWDER, LYOPHILIZED, FOR SOLUTION INTRAVENOUS at 16:16

## 2023-11-05 RX ADMIN — SODIUM BICARBONATE 650 MG: 650 TABLET ORAL at 08:05

## 2023-11-05 RX ADMIN — INSULIN LISPRO 2 UNITS: 100 INJECTION, SOLUTION INTRAVENOUS; SUBCUTANEOUS at 06:45

## 2023-11-05 RX ADMIN — INSULIN LISPRO 4 UNITS: 100 INJECTION, SOLUTION INTRAVENOUS; SUBCUTANEOUS at 12:50

## 2023-11-05 RX ADMIN — INSULIN LISPRO 4 UNITS: 100 INJECTION, SOLUTION INTRAVENOUS; SUBCUTANEOUS at 21:55

## 2023-11-05 RX ADMIN — INSULIN GLARGINE 20 UNITS: 100 INJECTION, SOLUTION SUBCUTANEOUS at 08:06

## 2023-11-05 RX ADMIN — HEPARIN SODIUM 5000 UNITS: 5000 INJECTION INTRAVENOUS; SUBCUTANEOUS at 21:38

## 2023-11-05 RX ADMIN — SODIUM CHLORIDE, PRESERVATIVE FREE 10 ML: 5 INJECTION INTRAVENOUS at 08:06

## 2023-11-05 RX ADMIN — HEPARIN SODIUM 5000 UNITS: 5000 INJECTION INTRAVENOUS; SUBCUTANEOUS at 06:44

## 2023-11-05 RX ADMIN — SODIUM BICARBONATE 650 MG: 650 TABLET ORAL at 21:37

## 2023-11-05 RX ADMIN — GABAPENTIN 300 MG: 300 CAPSULE ORAL at 21:37

## 2023-11-05 RX ADMIN — FAMOTIDINE 20 MG: 10 INJECTION, SOLUTION INTRAVENOUS at 08:06

## 2023-11-05 RX ADMIN — HEPARIN SODIUM 5000 UNITS: 5000 INJECTION INTRAVENOUS; SUBCUTANEOUS at 12:50

## 2023-11-05 ASSESSMENT — PAIN SCALES - GENERAL
PAINLEVEL_OUTOF10: 0
PAINLEVEL_OUTOF10: 0

## 2023-11-06 VITALS
HEART RATE: 93 BPM | BODY MASS INDEX: 25.83 KG/M2 | HEIGHT: 65 IN | RESPIRATION RATE: 18 BRPM | WEIGHT: 155 LBS | SYSTOLIC BLOOD PRESSURE: 120 MMHG | TEMPERATURE: 98.3 F | DIASTOLIC BLOOD PRESSURE: 76 MMHG | OXYGEN SATURATION: 99 %

## 2023-11-06 LAB
BACTERIA SPEC CULT: NORMAL
BACTERIA SPEC CULT: NORMAL
BASOPHILS # BLD: 0 K/UL (ref 0–0.1)
BASOPHILS NFR BLD: 1 % (ref 0–2)
DIFFERENTIAL METHOD BLD: ABNORMAL
EOSINOPHIL # BLD: 0.2 K/UL (ref 0–0.4)
EOSINOPHIL NFR BLD: 4 % (ref 0–5)
ERYTHROCYTE [DISTWIDTH] IN BLOOD BY AUTOMATED COUNT: 13.8 % (ref 11.6–14.5)
GLUCOSE BLD STRIP.AUTO-MCNC: 251 MG/DL (ref 70–110)
GLUCOSE BLD STRIP.AUTO-MCNC: 365 MG/DL (ref 70–110)
HCT VFR BLD AUTO: 29.7 % (ref 35–45)
HGB BLD-MCNC: 9.4 G/DL (ref 12–16)
IMM GRANULOCYTES # BLD AUTO: 0.1 K/UL (ref 0–0.04)
IMM GRANULOCYTES NFR BLD AUTO: 2 % (ref 0–0.5)
LYMPHOCYTES # BLD: 1.6 K/UL (ref 0.9–3.6)
LYMPHOCYTES NFR BLD: 28 % (ref 21–52)
MAGNESIUM SERPL-MCNC: 1.8 MG/DL (ref 1.6–2.6)
MCH RBC QN AUTO: 27.3 PG (ref 24–34)
MCHC RBC AUTO-ENTMCNC: 31.6 G/DL (ref 31–37)
MCV RBC AUTO: 86.3 FL (ref 78–100)
MONOCYTES # BLD: 0.6 K/UL (ref 0.05–1.2)
MONOCYTES NFR BLD: 10 % (ref 3–10)
NEUTS SEG # BLD: 3.2 K/UL (ref 1.8–8)
NEUTS SEG NFR BLD: 56 % (ref 40–73)
NRBC # BLD: 0 K/UL (ref 0–0.01)
NRBC BLD-RTO: 0 PER 100 WBC
PLATELET # BLD AUTO: 255 K/UL (ref 135–420)
PMV BLD AUTO: 10.1 FL (ref 9.2–11.8)
RBC # BLD AUTO: 3.44 M/UL (ref 4.2–5.3)
SERVICE CMNT-IMP: NORMAL
SERVICE CMNT-IMP: NORMAL
WBC # BLD AUTO: 5.7 K/UL (ref 4.6–13.2)

## 2023-11-06 PROCEDURE — 83735 ASSAY OF MAGNESIUM: CPT

## 2023-11-06 PROCEDURE — 94761 N-INVAS EAR/PLS OXIMETRY MLT: CPT

## 2023-11-06 PROCEDURE — 6360000002 HC RX W HCPCS: Performed by: PHYSICIAN ASSISTANT

## 2023-11-06 PROCEDURE — 36415 COLL VENOUS BLD VENIPUNCTURE: CPT

## 2023-11-06 PROCEDURE — 92526 ORAL FUNCTION THERAPY: CPT

## 2023-11-06 PROCEDURE — 82962 GLUCOSE BLOOD TEST: CPT

## 2023-11-06 PROCEDURE — 6370000000 HC RX 637 (ALT 250 FOR IP): Performed by: REGISTERED NURSE

## 2023-11-06 PROCEDURE — 85025 COMPLETE CBC W/AUTO DIFF WBC: CPT

## 2023-11-06 PROCEDURE — 2580000003 HC RX 258: Performed by: PHYSICIAN ASSISTANT

## 2023-11-06 PROCEDURE — 6370000000 HC RX 637 (ALT 250 FOR IP): Performed by: HOSPITALIST

## 2023-11-06 RX ORDER — INSULIN LISPRO 100 [IU]/ML
0-8 INJECTION, SOLUTION INTRAVENOUS; SUBCUTANEOUS
Qty: 8 ML | Refills: 0 | Status: SHIPPED | OUTPATIENT
Start: 2023-11-06 | End: 2023-12-09

## 2023-11-06 RX ORDER — INSULIN GLARGINE 100 [IU]/ML
15 INJECTION, SOLUTION SUBCUTANEOUS NIGHTLY
Qty: 10 ML | Refills: 3 | Status: SHIPPED | OUTPATIENT
Start: 2023-11-06

## 2023-11-06 RX ADMIN — INSULIN LISPRO 4 UNITS: 100 INJECTION, SOLUTION INTRAVENOUS; SUBCUTANEOUS at 12:31

## 2023-11-06 RX ADMIN — LEVOTHYROXINE SODIUM 150 MCG: 150 TABLET ORAL at 08:32

## 2023-11-06 RX ADMIN — INSULIN GLARGINE 20 UNITS: 100 INJECTION, SOLUTION SUBCUTANEOUS at 08:30

## 2023-11-06 RX ADMIN — SODIUM CHLORIDE, PRESERVATIVE FREE 10 ML: 5 INJECTION INTRAVENOUS at 08:41

## 2023-11-06 RX ADMIN — FAMOTIDINE 20 MG: 20 TABLET, FILM COATED ORAL at 08:32

## 2023-11-06 RX ADMIN — SODIUM BICARBONATE 650 MG: 650 TABLET ORAL at 08:32

## 2023-11-06 RX ADMIN — HEPARIN SODIUM 5000 UNITS: 5000 INJECTION INTRAVENOUS; SUBCUTANEOUS at 08:29

## 2023-11-06 RX ADMIN — INSULIN LISPRO 8 UNITS: 100 INJECTION, SOLUTION INTRAVENOUS; SUBCUTANEOUS at 08:30

## 2023-11-06 ASSESSMENT — PAIN SCALES - GENERAL
PAINLEVEL_OUTOF10: 0

## 2023-11-06 NOTE — PLAN OF CARE
Problem: Discharge Planning  Goal: Discharge to home or other facility with appropriate resources  Outcome: Completed     Problem: Safety - Adult  Goal: Free from fall injury  Outcome: Completed     Problem: Chronic Conditions and Co-morbidities  Goal: Patient's chronic conditions and co-morbidity symptoms are monitored and maintained or improved  Outcome: Completed     Problem: Skin/Tissue Integrity  Goal: Absence of new skin breakdown  Description: 1. Monitor for areas of redness and/or skin breakdown  2. Assess vascular access sites hourly  3. Every 4-6 hours minimum:  Change oxygen saturation probe site  4. Every 4-6 hours:  If on nasal continuous positive airway pressure, respiratory therapy assess nares and determine need for appliance change or resting period.   Outcome: Completed     Problem: Nutrition Deficit:  Goal: Optimize nutritional status  Outcome: Completed     Problem: Pain  Goal: Verbalizes/displays adequate comfort level or baseline comfort level  Outcome: Completed
Problem: Discharge Planning  Goal: Discharge to home or other facility with appropriate resources  Outcome: Progressing  Flowsheets (Taken 11/1/2023 2000 by Nanda Wu RN)  Discharge to home or other facility with appropriate resources:   Identify barriers to discharge with patient and caregiver   Identify discharge learning needs (meds, wound care, etc)     Problem: Safety - Adult  Goal: Free from fall injury  Outcome: Progressing     Problem: Chronic Conditions and Co-morbidities  Goal: Patient's chronic conditions and co-morbidity symptoms are monitored and maintained or improved  Outcome: Progressing  Flowsheets (Taken 11/1/2023 2000 by Nanda Wu RN)  Care Plan - Patient's Chronic Conditions and Co-Morbidity Symptoms are Monitored and Maintained or Improved:   Monitor and assess patient's chronic conditions and comorbid symptoms for stability, deterioration, or improvement   Collaborate with multidisciplinary team to address chronic and comorbid conditions and prevent exacerbation or deterioration   Update acute care plan with appropriate goals if chronic or comorbid symptoms are exacerbated and prevent overall improvement and discharge
Problem: Discharge Planning  Goal: Discharge to home or other facility with appropriate resources  Outcome: Progressing  Flowsheets (Taken 11/3/2023 0840)  Discharge to home or other facility with appropriate resources: Identify barriers to discharge with patient and caregiver     Problem: Safety - Adult  Goal: Free from fall injury  Outcome: Progressing     Problem: Chronic Conditions and Co-morbidities  Goal: Patient's chronic conditions and co-morbidity symptoms are monitored and maintained or improved  Outcome: Progressing  Flowsheets (Taken 11/3/2023 0840)  Care Plan - Patient's Chronic Conditions and Co-Morbidity Symptoms are Monitored and Maintained or Improved: Monitor and assess patient's chronic conditions and comorbid symptoms for stability, deterioration, or improvement     Problem: Skin/Tissue Integrity  Goal: Absence of new skin breakdown  Description: 1. Monitor for areas of redness and/or skin breakdown  2. Assess vascular access sites hourly  3. Every 4-6 hours minimum:  Change oxygen saturation probe site  4. Every 4-6 hours:  If on nasal continuous positive airway pressure, respiratory therapy assess nares and determine need for appliance change or resting period. Outcome: Progressing     Problem: Nutrition Deficit:  Goal: Optimize nutritional status  Outcome: Progressing     Problem: Pain  Goal: Verbalizes/displays adequate comfort level or baseline comfort level  Outcome: Progressing  Flowsheets  Taken 11/3/2023 1145  Verbalizes/displays adequate comfort level or baseline comfort level: Encourage patient to monitor pain and request assistance  Taken 11/3/2023 0804  Verbalizes/displays adequate comfort level or baseline comfort level: Encourage patient to monitor pain and request assistance     Problem: Physical Therapy - Adult  Goal: By Discharge: Performs mobility at highest level of function for planned discharge setting. See evaluation for individualized goals.   Description: Physical
Problem: Occupational Therapy - Adult  Goal: By Discharge: Performs self-care activities at highest level of function for planned discharge setting. See evaluation for individualized goals. Description: Occupational Therapy Goals:  Initiated 11/2/2023 to be met within 7-10 days. 1.  Patient will perform grooming with supervision/set-up while standing at the sink for > 2 min with Good balance. 2.  Patient will perform bathing with supervision/set-up. 3.  Patient will perform lower body dressing with supervision/set-up. 4.  Patient will perform toilet transfers with supervision/set-up. 5.  Patient will perform all aspects of toileting with supervision/set-up. 6.  Patient will participate in upper extremity therapeutic exercise/activities with supervision/set-up for 8 minutes to improve endurance and UB strength needed for ADLs    7. Patient will utilize energy conservation techniques during functional activities with verbal cues. PLOF: Pt lives with daughter in [facility] with bedroom upstairs. Pt reports not using AD for functional mobility, and completing ADLs with Mod Ind, using AE for LB dressing. Outcome: Progressing  OCCUPATIONAL THERAPY TREATMENT    Patient: Geovanny Raza (85 y.o. female)  Date: 11/3/2023  Diagnosis: Hyperkalemia [E87.5]  Lactic acidosis [E87.20]  DKA, type 2, not at goal [facility]) [E11.10]  Diabetic ketoacidosis without coma associated with diabetes mellitus due to underlying condition ([address]) [E08.10]  Hematemesis with nausea [K92.0] Diabetic ketoacidosis without coma associated with diabetes mellitus due to underlying condition [facility])      Precautions: Fall Risk    Chart, occupational therapy assessment, plan of care, and goals were reviewed. ASSESSMENT:    Pt is agreeable to participate in OT with supportive daughter present. Pt performed functional mobility o the BR with FWW with Min A, requiring frequent VCs to stand upright and to widen base of support to improve safety.  Pt
Problem: Occupational Therapy - Adult  Goal: By Discharge: Performs self-care activities at highest level of function for planned discharge setting. See evaluation for individualized goals. Description: Occupational Therapy Goals:  Initiated 11/2/2023 to be met within 7-10 days. 1.  Patient will perform grooming with supervision/set-up while standing at the sink for > 2 min with Good balance. 2.  Patient will perform bathing with supervision/set-up. 3.  Patient will perform lower body dressing with supervision/set-up. 4.  Patient will perform toilet transfers with supervision/set-up. 5.  Patient will perform all aspects of toileting with supervision/set-up. 6.  Patient will participate in upper extremity therapeutic exercise/activities with supervision/set-up for 8 minutes to improve endurance and UB strength needed for ADLs    7. Patient will utilize energy conservation techniques during functional activities with verbal cues. PLOF: Pt lives with daughter in [facility] with bedroom upstairs. Pt reports not using AD for functional mobility, and completing ADLs with Mod Ind, using AE for LB dressing. Outcome: Progressing  OCCUPATIONAL THERAPY TREATMENT    Patient: Chris Godinez (22 y.o. female)  Date: 11/5/2023  Diagnosis: Hyperkalemia [E87.5]  Lactic acidosis [E87.20]  DKA, type 2, not at goal [facility]) [E11.10]  Diabetic ketoacidosis without coma associated with diabetes mellitus due to underlying condition ([address]) [E08.10]  Hematemesis with nausea [K92.0] Diabetic ketoacidosis without coma associated with diabetes mellitus due to underlying condition [facility])      Precautions: Fall Risk    Chart, occupational therapy assessment, plan of care, and goals were reviewed. ASSESSMENT:    Pt is motivated to participate in OT this am. Pt required CGA for functional mobility and standing ADLs using FWW for support.  Pt maneuvered to the BR and performed grooming followed by bathing ADLs with min VCs for safe
Problem: Physical Therapy - Adult  Goal: By Discharge: Performs mobility at highest level of function for planned discharge setting. See evaluation for individualized goals. Description: Physical Therapy Goals:  Initiated 11/3/2023 to be met within 7-10 days. 1.  Patient will move from supine to sit and sit to supine  in bed with independence. 2.  Patient will transfer from bed to chair and chair to bed with supervision/set-up using the least restrictive device. 3.  Patient will perform sit to stand with supervision/set-up. 4.  Patient will ambulate with supervision/set-up for 150 feet with the least restrictive device. 5.  Patient will ascend/descend 10 stairs with 2 handrail(s) with minimal assistance/contact guard assist.    PLOF: pt states ambulates with cane, lives with daughter but daughter works, bedroom on 2nd floor     Outcome: Progressing   PHYSICAL THERAPY EVALUATION    Patient: Juan R Barrios (89 y.o. female)  Date: 11/3/2023  Primary Diagnosis: Hyperkalemia [E87.5]  Lactic acidosis [E87.20]  DKA, type 2, not at goal [facility]) [E11.10]  Diabetic ketoacidosis without coma associated with diabetes mellitus due to underlying condition ([address]) [E08.10]  Hematemesis with nausea [K92.0]       Precautions: Fall Risk,    ASSESSMENT :  Pt in bed and agreeable to PT evaluation. Pt performed bed mobility with supervision and demonstrated good static sitting balance. Pt stood with CGA and ambulated 20 feet with RW and min A but one episode of mod A when turning. Pt returned to supine in bed with supervision and was left in bed with needs in reach. DEFICITS/IMPAIRMENTS:    , Body Structures, Functions, Activity Limitations Requiring Skilled Therapeutic Intervention: Decreased functional mobility ; Decreased strength;Decreased endurance;Decreased safe awareness;Decreased balance    Patient will benefit from skilled intervention to address the above impairments.   Patient's rehabilitation potential/Therapy
Problem: SLP Adult - Impaired Swallowing  Goal: By Discharge: Advance to least restrictive diet without signs or symptoms of aspiration for planned discharge setting. See evaluation for individualized goals. Description: Patient will:  1. Tolerate PO trials with 0 s/s overt distress in 4/5 trials  2. Utilize compensatory swallow strategies/maneuvers (decrease bite/sip, size/rate, alt. liq/sol) with min cues in 4/5 trials  3. Perform oral-motor/laryngeal exercises to increase oropharyngeal swallow function with min cues  4. Complete an objective swallow study (i.e., MBSS) to assess swallow integrity, r/o aspiration, and determine of safest LRD, min A as indicated/ordered by MD     Rec:     Clear liquids - okay for diet upgrade per MD discretion  Aspiration precautions  HOB >45 during po intake, remain >30 for 30-45 minutes after po   Small bites/sips; alternate liquid/solid with slow feeding rate   Oral care TID  Meds per pt preference  Outcome: [id number] [facility] EVALUATION/TREATMENT    Patient: Geovanny Raza (49 y.o. female)  Date: 11/3/2023  Primary Diagnosis: Hyperkalemia [E87.5]  Lactic acidosis [E87.20]  DKA, type 2, not at goal [facility]) [E11.10]  Diabetic ketoacidosis without coma associated with diabetes mellitus due to underlying condition ([address]) [E08.10]  Hematemesis with nausea [K92.0]       Precautions: Aspiration  PLOF: As per H&P  ASSESSMENT:  Based on the objective data described below, the patient presents with min pharyngeal dysphagia. Pt reported that she has been coughing and having emesis with all PO which began yesterday. She initially tolerated three sips of gingerale without any overt s/sx of aspiration; however, ~2 min post PO, pt began to demo strong cough with eventual emesis. She refused any further PO. Laryngeal elevation was functional to palpation. Suspect emesis is not secondary to aspiration/penetration events.  Recommend to continue clear liquids,
posterior lean with pt requiring VCs to keep toes on the floor and for anterior weight shift. Pt required Min A to take 2 sidesteps, constantly leaning against the bed with LEs. Pt reports dizziness, requesting to sit down. Pt's BP assessed 115/83 in sitting, 111/76 in standing. Pt c/o nausea and requested to return to supine. RN aware. DEFICITS/IMPAIRMENTS:  Performance deficits / Impairments: Decreased functional mobility ; Decreased ADL status; Decreased strength;Decreased cognition;Decreased safe awareness;Decreased endurance;Decreased balance;Decreased posture    Patient will benefit from skilled intervention to address the above impairments. Patient's rehabilitation potential/Prognosis: Good. Factors which may influence rehabilitation potential include:   []             None noted  [x]             Mental ability/status  [x]             Medical condition  []             Home/family situation and support systems  []             Safety awareness  []             Pain tolerance/management  []             Other:      PLAN :  Recommendations and Planned Interventions:   [x]               Self Care Training                  [x]      Therapeutic Activities  [x]               Functional Mobility Training   []      Cognitive Retraining  [x]               Therapeutic Exercises           [x]      Endurance Activities  [x]               Balance Training                    []      Neuromuscular Re-Education  []               Visual/Perceptual Training     [x]      Home Safety Training  [x]               Patient Education                   [x]      Family Training/Education  []               Other (comment):    Frequency/Duration: Patient will be followed by occupational therapy to address goals, 1-2 times per day/3-5 days per week to address goals.     Further Equipment Recommendations for Discharge: bedside commode    [facility]: AM-PAC Inpatient Daily Activity Raw Score: 16  Current research shows that an AM-PAC score of
Signs/Symptoms: None  Pharyngeal Phase Characteristics: None  Effective Modifications: Alternate liquids/solids, Small sips and bites  Cues for Modifications: Minimal            DYSPHAGIA DIAGNOSIS: Dysphagia Diagnosis: oropharyngeal swallow fxn WFL    PAIN:  Start of Tx: 0  End of Tx: 0     After treatment:   []              Patient left in no apparent distress sitting up in chair  [x]              Patient left in no apparent distress in bed  [x]              Call bell left within reach  [x]              Nursing notified  []              Family present  []              Caregiver present  []              Bed alarm activated    COMMUNICATION/EDUCATION:   [x]            Aspiration precautions; swallow safety; compensatory techniques provided via demonstration, verbalization and teach back of comprehension  [x]         Patient/family have participated as able in goal setting and plan of care. []            Patient/family agree to work toward stated goals and plan of care. []            Patient understands intent and goals of therapy, neutral about participation. []            Patient unable to participate in goal setting/plan of care secondary to cognition, hearing/vision deficits; education ongoing with interdisciplinary staff   []            Handout regarding diet recommendations and thickener instructions provided. [x]         Posted safety precautions in patient's room.     Thank you for this referral.    Dominique Cutler M.S., CCC-SLP/L  Speech-Language Pathologist
balance 5 minutes  Sit to stand x2    Pain:  Pain level pre-treatment: 0/10  Pain level post-treatment: 0/10     Activity Tolerance:   Activity Tolerance: Patient tolerated treatment well    After treatment:   [x] Patient left in no apparent distress sitting up in chair  [] Patient left in no apparent distress in bed  [x] Call bell left within reach  [x] Nursing notified  [] Caregiver present  [] Bed alarm activated  [] Chair alarm activated  [] SCDs applied    COMMUNICATION/EDUCATION:   Patient Education  Education Given To: Patient  Education Provided: Role of Therapy;Plan of Care  Education Method: Demonstration;Verbal;Teach Back  Barriers to Learning: Cognition  Education Outcome: Verbalized understanding;Demonstrated understanding;Continued education needed      Cb Samaniego, PT  Minutes: 23

## 2023-11-06 NOTE — CARE COORDINATION
CM spoke to patient's daughter Yehuda Crandall. She states she will pick her mother up but it will be around 3-4 this afternoon. Notified patient's nurse.

## 2023-11-22 ENCOUNTER — HOSPITAL ENCOUNTER (EMERGENCY)
Facility: HOSPITAL | Age: 73
Discharge: HOME | End: 2023-11-22
Attending: STUDENT IN AN ORGANIZED HEALTH CARE EDUCATION/TRAINING PROGRAM
Payer: MEDICARE

## 2023-11-22 ENCOUNTER — APPOINTMENT (OUTPATIENT)
Facility: HOSPITAL | Age: 73
End: 2023-11-22
Payer: MEDICARE

## 2023-11-22 VITALS
BODY MASS INDEX: 24.99 KG/M2 | RESPIRATION RATE: 25 BRPM | WEIGHT: 150 LBS | SYSTOLIC BLOOD PRESSURE: 130 MMHG | TEMPERATURE: 98.4 F | HEIGHT: 65 IN | OXYGEN SATURATION: 99 % | HEART RATE: 100 BPM | DIASTOLIC BLOOD PRESSURE: 87 MMHG

## 2023-11-22 DIAGNOSIS — N17.9 AKI (ACUTE KIDNEY INJURY) (HCC): ICD-10-CM

## 2023-11-22 DIAGNOSIS — R73.9 HYPERGLYCEMIA: Primary | ICD-10-CM

## 2023-11-22 DIAGNOSIS — R79.89 PSEUDOHYPONATREMIA: ICD-10-CM

## 2023-11-22 DIAGNOSIS — R79.89 ELEVATED LACTIC ACID LEVEL: ICD-10-CM

## 2023-11-22 LAB
ALBUMIN SERPL-MCNC: 3.3 G/DL (ref 3.4–5)
ALBUMIN/GLOB SERPL: 0.9 (ref 0.8–1.7)
ALP SERPL-CCNC: 125 U/L (ref 45–117)
ALT SERPL-CCNC: 20 U/L (ref 13–56)
ANION GAP SERPL CALC-SCNC: 14 MMOL/L (ref 3–18)
APPEARANCE UR: CLEAR
AST SERPL-CCNC: 17 U/L (ref 10–38)
B-OH-BUTYR SERPL-SCNC: 0.91 MMOL/L
BASOPHILS # BLD: 0 K/UL (ref 0–0.1)
BASOPHILS NFR BLD: 0 % (ref 0–2)
BILIRUB SERPL-MCNC: 0.6 MG/DL (ref 0.2–1)
BILIRUB UR QL: NEGATIVE
BUN SERPL-MCNC: 42 MG/DL (ref 7–18)
BUN/CREAT SERPL: 17 (ref 12–20)
CALCIUM SERPL-MCNC: 8.8 MG/DL (ref 8.5–10.1)
CHLORIDE SERPL-SCNC: 95 MMOL/L (ref 100–111)
CO2 SERPL-SCNC: 20 MMOL/L (ref 21–32)
COLOR UR: YELLOW
CREAT SERPL-MCNC: 2.54 MG/DL (ref 0.6–1.3)
DIFFERENTIAL METHOD BLD: ABNORMAL
EKG ATRIAL RATE: 95 BPM
EKG DIAGNOSIS: NORMAL
EKG P AXIS: 82 DEGREES
EKG P-R INTERVAL: 136 MS
EKG Q-T INTERVAL: 366 MS
EKG QRS DURATION: 68 MS
EKG QTC CALCULATION (BAZETT): 459 MS
EKG R AXIS: 77 DEGREES
EKG T AXIS: 86 DEGREES
EKG VENTRICULAR RATE: 95 BPM
EOSINOPHIL # BLD: 0 K/UL (ref 0–0.4)
EOSINOPHIL NFR BLD: 0 % (ref 0–5)
ERYTHROCYTE [DISTWIDTH] IN BLOOD BY AUTOMATED COUNT: 13.7 % (ref 11.6–14.5)
FLUAV RNA SPEC QL NAA+PROBE: NOT DETECTED
FLUBV RNA SPEC QL NAA+PROBE: NOT DETECTED
GLOBULIN SER CALC-MCNC: 3.5 G/DL (ref 2–4)
GLUCOSE BLD STRIP.AUTO-MCNC: 100 MG/DL (ref 70–110)
GLUCOSE BLD STRIP.AUTO-MCNC: 206 MG/DL (ref 70–110)
GLUCOSE BLD STRIP.AUTO-MCNC: 226 MG/DL (ref 70–110)
GLUCOSE BLD STRIP.AUTO-MCNC: 337 MG/DL (ref 70–110)
GLUCOSE BLD STRIP.AUTO-MCNC: 534 MG/DL (ref 70–110)
GLUCOSE SERPL-MCNC: 557 MG/DL (ref 74–99)
GLUCOSE UR STRIP.AUTO-MCNC: >1000 MG/DL
HCT VFR BLD AUTO: 33 % (ref 35–45)
HGB BLD-MCNC: 10.6 G/DL (ref 12–16)
HGB UR QL STRIP: NEGATIVE
IMM GRANULOCYTES # BLD AUTO: 0.1 K/UL (ref 0–0.04)
IMM GRANULOCYTES NFR BLD AUTO: 1 % (ref 0–0.5)
KETONES UR QL STRIP.AUTO: 15 MG/DL
LACTATE BLD-SCNC: 0.98 MMOL/L (ref 0.4–2)
LACTATE SERPL-SCNC: 2.1 MMOL/L (ref 0.4–2)
LACTATE SERPL-SCNC: 7.4 MMOL/L (ref 0.4–2)
LEUKOCYTE ESTERASE UR QL STRIP.AUTO: NEGATIVE
LYMPHOCYTES # BLD: 0.9 K/UL (ref 0.9–3.6)
LYMPHOCYTES NFR BLD: 7 % (ref 21–52)
MAGNESIUM SERPL-MCNC: 1.9 MG/DL (ref 1.6–2.6)
MCH RBC QN AUTO: 27.6 PG (ref 24–34)
MCHC RBC AUTO-ENTMCNC: 32.1 G/DL (ref 31–37)
MCV RBC AUTO: 85.9 FL (ref 78–100)
MONOCYTES # BLD: 1.1 K/UL (ref 0.05–1.2)
MONOCYTES NFR BLD: 9 % (ref 3–10)
NEUTS SEG # BLD: 11.2 K/UL (ref 1.8–8)
NEUTS SEG NFR BLD: 84 % (ref 40–73)
NITRITE UR QL STRIP.AUTO: NEGATIVE
NRBC # BLD: 0.02 K/UL (ref 0–0.01)
NRBC BLD-RTO: 0.1 PER 100 WBC
PH UR STRIP: 5 (ref 5–8)
PLATELET # BLD AUTO: 318 K/UL (ref 135–420)
PMV BLD AUTO: 10.8 FL (ref 9.2–11.8)
POTASSIUM SERPL-SCNC: 5.4 MMOL/L (ref 3.5–5.5)
PROT SERPL-MCNC: 6.8 G/DL (ref 6.4–8.2)
PROT UR STRIP-MCNC: NEGATIVE MG/DL
RBC # BLD AUTO: 3.84 M/UL (ref 4.2–5.3)
SARS-COV-2 RNA RESP QL NAA+PROBE: NOT DETECTED
SODIUM SERPL-SCNC: 129 MMOL/L (ref 136–145)
SP GR UR REFRACTOMETRY: 1.02 (ref 1–1.03)
TROPONIN I SERPL HS-MCNC: 6 NG/L (ref 0–54)
UROBILINOGEN UR QL STRIP.AUTO: 0.2 EU/DL (ref 0.2–1)
WBC # BLD AUTO: 13.4 K/UL (ref 4.6–13.2)

## 2023-11-22 PROCEDURE — 2580000003 HC RX 258: Performed by: STUDENT IN AN ORGANIZED HEALTH CARE EDUCATION/TRAINING PROGRAM

## 2023-11-22 PROCEDURE — 87636 SARSCOV2 & INF A&B AMP PRB: CPT

## 2023-11-22 PROCEDURE — 93005 ELECTROCARDIOGRAM TRACING: CPT | Performed by: STUDENT IN AN ORGANIZED HEALTH CARE EDUCATION/TRAINING PROGRAM

## 2023-11-22 PROCEDURE — 83735 ASSAY OF MAGNESIUM: CPT

## 2023-11-22 PROCEDURE — 6360000002 HC RX W HCPCS: Performed by: STUDENT IN AN ORGANIZED HEALTH CARE EDUCATION/TRAINING PROGRAM

## 2023-11-22 PROCEDURE — 94761 N-INVAS EAR/PLS OXIMETRY MLT: CPT

## 2023-11-22 PROCEDURE — 96361 HYDRATE IV INFUSION ADD-ON: CPT

## 2023-11-22 PROCEDURE — 99285 EMERGENCY DEPT VISIT HI MDM: CPT

## 2023-11-22 PROCEDURE — 93010 ELECTROCARDIOGRAM REPORT: CPT | Performed by: INTERNAL MEDICINE

## 2023-11-22 PROCEDURE — 81003 URINALYSIS AUTO W/O SCOPE: CPT

## 2023-11-22 PROCEDURE — 6370000000 HC RX 637 (ALT 250 FOR IP): Performed by: STUDENT IN AN ORGANIZED HEALTH CARE EDUCATION/TRAINING PROGRAM

## 2023-11-22 PROCEDURE — 87040 BLOOD CULTURE FOR BACTERIA: CPT

## 2023-11-22 PROCEDURE — 82010 KETONE BODYS QUAN: CPT

## 2023-11-22 PROCEDURE — 82962 GLUCOSE BLOOD TEST: CPT

## 2023-11-22 PROCEDURE — 83605 ASSAY OF LACTIC ACID: CPT

## 2023-11-22 PROCEDURE — 80053 COMPREHEN METABOLIC PANEL: CPT

## 2023-11-22 PROCEDURE — 96374 THER/PROPH/DIAG INJ IV PUSH: CPT

## 2023-11-22 PROCEDURE — 85025 COMPLETE CBC W/AUTO DIFF WBC: CPT

## 2023-11-22 PROCEDURE — 71045 X-RAY EXAM CHEST 1 VIEW: CPT

## 2023-11-22 PROCEDURE — 84484 ASSAY OF TROPONIN QUANT: CPT

## 2023-11-22 RX ORDER — INSULIN LISPRO 100 [IU]/ML
10 INJECTION, SOLUTION INTRAVENOUS; SUBCUTANEOUS ONCE
Status: COMPLETED | OUTPATIENT
Start: 2023-11-22 | End: 2023-11-22

## 2023-11-22 RX ORDER — 0.9 % SODIUM CHLORIDE 0.9 %
1000 INTRAVENOUS SOLUTION INTRAVENOUS ONCE
Status: COMPLETED | OUTPATIENT
Start: 2023-11-22 | End: 2023-11-22

## 2023-11-22 RX ADMIN — SODIUM CHLORIDE 1000 ML: 9 INJECTION, SOLUTION INTRAVENOUS at 14:20

## 2023-11-22 RX ADMIN — SODIUM CHLORIDE 1000 ML: 9 INJECTION, SOLUTION INTRAVENOUS at 16:14

## 2023-11-22 RX ADMIN — INSULIN LISPRO 10 UNITS: 100 INJECTION, SOLUTION INTRAVENOUS; SUBCUTANEOUS at 17:47

## 2023-11-22 RX ADMIN — WATER 2000 MG: 1 INJECTION INTRAMUSCULAR; INTRAVENOUS; SUBCUTANEOUS at 16:21

## 2023-11-22 ASSESSMENT — PAIN DESCRIPTION - LOCATION: LOCATION: ABDOMEN

## 2023-11-22 ASSESSMENT — PAIN - FUNCTIONAL ASSESSMENT: PAIN_FUNCTIONAL_ASSESSMENT: 0-10

## 2023-11-22 ASSESSMENT — PAIN DESCRIPTION - ORIENTATION: ORIENTATION: LEFT

## 2023-11-22 ASSESSMENT — ENCOUNTER SYMPTOMS
SHORTNESS OF BREATH: 0
DIARRHEA: 0
ABDOMINAL PAIN: 0
CONSTIPATION: 0
NAUSEA: 0
BACK PAIN: 0

## 2023-11-22 ASSESSMENT — PAIN SCALES - GENERAL: PAINLEVEL_OUTOF10: 7

## 2023-11-22 NOTE — ED NOTES
Pt removed from bed pan and wiped clean. RN checked pt's . Pt denies any further needs at this time.      Julia Redmond RN  11/22/23 3445

## 2023-11-22 NOTE — ED TRIAGE NOTES
Per EMS, family called for near-syncopal. Lethargic, c/o generalized weakness/fatigue. BG \"high\" per family. 20U of insulin and then 10U an hour (did not specify which insulin). EMS BG \"high\". 20 LAC and NS bolus.

## 2023-11-22 NOTE — ED PROVIDER NOTES
EMERGENCY DEPARTMENT HISTORY AND PHYSICAL EXAM    4:10 PM      Date: 11/22/2023  Patient Name: Quynh Winter    History of Presenting Illness     Chief Complaint   Patient presents with    Hyperglycemia       History From: Patient  HPI  64-year-old female with history of diabetes on insulin sliding scale, hypothyroidism, CKD, hypertension, GERD who presents with generalized weakness. Patient states that today she felt more weak than usual.  Denies any changes in her meds, skipping insulin, or any other precipitating factors. No aggravating or alleviating factors. Assessing ROS she endorses polydipsia, nausea, however no chest pain, shortness of breath, changes in bowel movement, change in urination, or any other changes. Nursing Notes were all reviewed and agreed with or any disagreements were addressed in the HPI. PCP: Jeremie Angelo MD    No current facility-administered medications for this encounter. Current Outpatient Medications   Medication Sig Dispense Refill    insulin lispro (HUMALOG) 100 UNIT/ML SOLN injection vial Inject 0-8 Units into the skin 3 times daily (with meals) 8 mL 0    insulin glargine (LANTUS) 100 UNIT/ML injection vial Inject 15 Units into the skin nightly 10 mL 3    acetaminophen (TYLENOL) 500 MG tablet Take 2 tablets by mouth in the morning and 2 tablets at noon and 2 tablets in the evening.  120 tablet 3    alendronate (FOSAMAX) 70 MG tablet       amLODIPine (NORVASC) 5 MG tablet Take 1 tablet by mouth daily      aspirin 81 MG EC tablet Take 1 tablet by mouth daily      atorvastatin (LIPITOR) 20 MG tablet Take 1 tablet by mouth      carvedilol (COREG) 6.25 MG tablet Take 1 tablet by mouth 2 times daily (with meals)      vitamin D (CHOLECALCIFEROL) 72482 UNIT CAPS Take 1 capsule by mouth every 7 days      ferrous sulfate (IRON 325) 325 (65 Fe) MG tablet Take 1 tablet by mouth daily (with breakfast)      gabapentin (NEURONTIN) 300 MG capsule Take 1 capsule by mouth

## 2023-11-22 NOTE — ED NOTES
I received the patient in turnover from Dr. Elif Agrawal at the end of his shift. The patient is a 68-year-old woman with past medical history significant for diabetes, who presents to the ED today with hyperglycemia. At the time of turnover, we are waiting for a repeat lactic acid. Her initial lactic acid was 7.4. She has received antibiotics.     Recent Results (from the past 24 hour(s))   POCT Glucose    Collection Time: 11/22/23  1:56 PM   Result Value Ref Range    POC Glucose 534 (HH) 70 - 110 mg/dL   EKG 12 Lead    Collection Time: 11/22/23  1:58 PM   Result Value Ref Range    Ventricular Rate 95 BPM    Atrial Rate 95 BPM    P-R Interval 136 ms    QRS Duration 68 ms    Q-T Interval 366 ms    QTc Calculation (Bazett) 459 ms    P Axis 82 degrees    R Axis 77 degrees    T Axis 86 degrees    Diagnosis       Normal sinus rhythm  Normal ECG  When compared with ECG of 31-OCT-2023 07:55,  QRS duration has decreased  T wave amplitude has decreased in Lateral leads  Confirmed by Thuy Dukes MD, Ricardo ([id number]) on 11/22/2023 4:18:06 PM     Lactate, Sepsis    Collection Time: 11/22/23  2:12 PM   Result Value Ref Range    Lactic Acid, Sepsis 7.4 (HH) 0.4 - 2.0 MMOL/L   Troponin    Collection Time: 11/22/23  2:12 PM   Result Value Ref Range    Troponin, High Sensitivity 6 0 - 54 ng/L   Magnesium    Collection Time: 11/22/23  2:12 PM   Result Value Ref Range    Magnesium 1.9 1.6 - 2.6 mg/dL   CBC with Auto Differential    Collection Time: 11/22/23  2:12 PM   Result Value Ref Range    WBC 13.4 (H) 4.6 - 13.2 K/uL    RBC 3.84 (L) 4.20 - 5.30 M/uL    Hemoglobin 10.6 (L) 12.0 - 16.0 g/dL    Hematocrit 33.0 (L) 35.0 - 45.0 %    MCV 85.9 78.0 - 100.0 FL    MCH 27.6 24.0 - 34.0 PG    MCHC 32.1 31.0 - 37.0 g/dL    RDW 13.7 11.6 - 14.5 %    Platelets 350 681 - 297 K/uL    MPV 10.8 9.2 - 11.8 FL    Nucleated RBCs 0.1 (H) 0  WBC    nRBC 0.02 (H) 0.00 - 0.01 K/uL    Neutrophils % 84 (H) 40 - 73 %    Lymphocytes % 7 (L) 21 - 52 %    Monocytes POC Glucose 337 (H) 70 - 110 mg/dL   Lactic Acid    Collection Time: 11/22/23  6:08 PM   Result Value Ref Range    Lactic Acid, Plasma 2.1 (HH) 0.4 - 2.0 MMOL/L   POCT Glucose    Collection Time: 11/22/23  6:44 PM   Result Value Ref Range    POC Glucose 226 (H) 70 - 110 mg/dL   POCT Glucose    Collection Time: 11/22/23  7:05 PM   Result Value Ref Range    POC Glucose 206 (H) 70 - 110 mg/dL   POCT Glucose    Collection Time: 11/22/23  8:31 PM   Result Value Ref Range    POC Glucose 100 70 - 110 mg/dL   POCT lactic acid (lactate)    Collection Time: 11/22/23  8:31 PM   Result Value Ref Range    POC Lactic Acid 0.98 0.40 - 2.00 mmol/L        XR CHEST PORTABLE   Final Result   1. No acute infiltrate or effusion. A/P: The patient's repeat blood sugar was 206 and her final lactic acid was 0.98. She will be discharged home and will be advised to follow-up with her primary care physician in 2 to 3 days. Return precautions have been given.

## 2023-11-26 LAB
BACTERIA SPEC CULT: NORMAL
SERVICE CMNT-IMP: NORMAL

## 2023-11-28 LAB
BACTERIA SPEC CULT: NORMAL
SERVICE CMNT-IMP: NORMAL

## 2024-01-18 ENCOUNTER — HOSPITAL ENCOUNTER (EMERGENCY)
Facility: HOSPITAL | Age: 74
Discharge: HOME | End: 2024-01-18
Attending: STUDENT IN AN ORGANIZED HEALTH CARE EDUCATION/TRAINING PROGRAM
Payer: MEDICARE

## 2024-01-18 VITALS
HEART RATE: 91 BPM | DIASTOLIC BLOOD PRESSURE: 75 MMHG | WEIGHT: 153 LBS | OXYGEN SATURATION: 99 % | SYSTOLIC BLOOD PRESSURE: 115 MMHG | BODY MASS INDEX: 24.59 KG/M2 | RESPIRATION RATE: 18 BRPM | TEMPERATURE: 98.1 F | HEIGHT: 66 IN

## 2024-01-18 DIAGNOSIS — N89.8 VAGINAL LESION: Primary | ICD-10-CM

## 2024-01-18 LAB
SERVICE CMNT-IMP: NORMAL
WET PREP GENITAL: NORMAL

## 2024-01-18 PROCEDURE — 99283 EMERGENCY DEPT VISIT LOW MDM: CPT

## 2024-01-18 PROCEDURE — 87210 SMEAR WET MOUNT SALINE/INK: CPT

## 2024-01-18 PROCEDURE — 6370000000 HC RX 637 (ALT 250 FOR IP): Performed by: STUDENT IN AN ORGANIZED HEALTH CARE EDUCATION/TRAINING PROGRAM

## 2024-01-18 RX ORDER — LIDOCAINE HYDROCHLORIDE 20 MG/ML
15 SOLUTION OROPHARYNGEAL
Status: COMPLETED | OUTPATIENT
Start: 2024-01-18 | End: 2024-01-18

## 2024-01-18 RX ORDER — LIDOCAINE HYDROCHLORIDE 20 MG/ML
15 SOLUTION OROPHARYNGEAL
Qty: 100 ML | Refills: 0 | Status: SHIPPED | OUTPATIENT
Start: 2024-01-18

## 2024-01-18 RX ORDER — VALACYCLOVIR HYDROCHLORIDE 1 G/1
1000 TABLET, FILM COATED ORAL 2 TIMES DAILY
Qty: 20 TABLET | Refills: 0 | Status: SHIPPED | OUTPATIENT
Start: 2024-01-18 | End: 2024-01-28

## 2024-01-18 RX ADMIN — LIDOCAINE HYDROCHLORIDE 15 ML: 20 SOLUTION ORAL; TOPICAL at 10:30

## 2024-01-18 ASSESSMENT — PAIN SCALES - GENERAL: PAINLEVEL_OUTOF10: 8

## 2024-01-18 ASSESSMENT — PAIN - FUNCTIONAL ASSESSMENT: PAIN_FUNCTIONAL_ASSESSMENT: 0-10

## 2024-01-18 NOTE — DISCHARGE INSTRUCTIONS
You have abnormal vaginal lesions that require close follow-up with an OB/GYN.  This could be caused by a virus therefore you will be started on antiviral medication to see if this helps however I do think that this will require close follow-up.    Recommend taking extra strength Tylenol every 4-6 hours and ibuprofen 600 mg every 6 hours as needed for pain and discomfort.

## 2024-01-18 NOTE — ED TRIAGE NOTES
WC to 4 with c/o pelvic pain and dysuria x 9 days, states finished taking an unknown ABX 2 days ago for UTO but feels worse instead of better. Denies vaginal bleeding or N/V/D, deniesabdominal pain, pain is to outer vaginal area.

## 2024-01-18 NOTE — ED PROVIDER NOTES
[facility] EMERGENCY DEPT  EMERGENCY DEPARTMENT ENCOUNTER      Pt Name: Maria G Mayers  MRN: [id number]  Birthdate 1950  Date of evaluation: 1/18/2024  Provider: Mily Ling MD    CHIEF COMPLAINT       Chief Complaint   Patient presents with    Pelvic Pain    Dysuria         HISTORY OF PRESENT ILLNESS   (Location/Symptom, Timing/Onset, Context/Setting, Quality, Duration, Modifying Factors, Severity)  Note limiting factors.   Maria G Mayers is a 73 y.o. female who presents to the emergency department for what she describes as vaginal pain.  States has been there for over a week.  She denies any known discharge.  She was treated for a yeast infection but it did not help.  She states it burns on her vagina when she pees but does not have any actual dysuria.  Denies any increased frequency orUrgency.  Denies any changes in her odor down there.  She is no longer sexually active.  Denies any new sexual partners.     Nursing Notes were reviewed.    REVIEW OF SYSTEMS    (2-9 systems for level 4, 10 or more for level 5)     Constitutional: No fever  HENT: No ear pain  Eyes: No change in vision  Respiratory: No SOB  Cardio: No chest pain  GI: No blood in stool  : No hematuria  MSK: No back pain  Skin: No rashes  Neuro: No headache    Except as noted above the remainder of the review of systems was reviewed and negative.       PAST MEDICAL HISTORY     Past Medical History:   Diagnosis Date    Anemia due to stage 3b chronic kidney disease (HCC) 09/28/2022    Chronic kidney disease     Colon cancer (HCC)     Diabetes (HCC)     Hypertension     Hypothyroidism     Stage 3b chronic kidney disease (HCC) 09/28/2022         SURGICAL HISTORY       Past Surgical History:   Procedure Laterality Date    COLONOSCOPY N/A 12/30/2016    COLONOSCOPY. SURVEILLANCE /c Hot Snared Polypectomy /c EndoClip x3 performed by Kaleb Randolph MD at [facility] ENDOSCOPY    COLONOSCOPY N/A 12/16/2022    COLONOSCOPY w/ polypectomies performed by Jareth Madrid,  Mother     Cancer Father         colon          SOCIAL HISTORY       Social History     Socioeconomic History    Marital status: Single   Tobacco Use    Smoking status: Some Days    Smokeless tobacco: Never   Substance and Sexual Activity    Alcohol use: Yes    Drug use: No       SCREENINGS         B[place] Coma Scale  Eye Opening: Spontaneous  Best Verbal Response: Oriented  Best Motor Response: Obeys commands  Bob Coma Scale Score: 15                     CIWA Assessment  BP: 115/75  Pulse: 91                 PHYSICAL EXAM    (up to 7 for level 4, 8 or more for level 5)     ED Triage Vitals 01/18/24 0821   BP Temp Temp Source Pulse Respirations SpO2 Height Weight - Scale   109/70 98.1 °F (36.7 °C) Oral 92 17 98 % 1.676 m (5' 6\") 69.4 kg (153 lb)       Physical Exam    General: No acute distress  Head: Normocephalic, atraumatic  Psych: Cooperative and alert  ENT: Moist oral mucosa  Neck: Supple  CV: Regular rate and rhythm  Pulm: Clear breath sounds bilaterally without any wheezing or rhonchi, normal respiratory rate  GI: Normal bowel sounds, soft, non-tender  : Normal female genitalia however there is multiple large plaque like lesions noted throughout the labia majora and labia minora on the mucosal surface without ulceration or vesicles  MSK: Moves all four extremities  Skin: No rashes  Neuro: Alert and conversive      DIAGNOSTIC RESULTS     EKG: All EKG's are interpreted by the Emergency Department Physician who either signs or Co-signs this chart in the absence of a cardiologist.        RADIOLOGY:   Non-plain film images such as CT, Ultrasound and MRI are read by the radiologist. Plain radiographic images are visualized and preliminarily interpreted by the emergency physician with the below findings:        Interpretation per the Radiologist below, if available at the time of this note:    No orders to display         ED BEDSIDE ULTRASOUND:   Performed by ED Physician - none    LABS:  Labs Reviewed   WET

## 2024-02-14 ENCOUNTER — APPOINTMENT (OUTPATIENT)
Facility: HOSPITAL | Age: 74
End: 2024-02-14
Payer: MEDICARE

## 2024-02-14 ENCOUNTER — HOSPITAL ENCOUNTER (INPATIENT)
Facility: HOSPITAL | Age: 74
LOS: 2 days | Discharge: HOME | End: 2024-02-16
Attending: EMERGENCY MEDICINE | Admitting: FAMILY MEDICINE
Payer: MEDICARE

## 2024-02-14 DIAGNOSIS — R53.81 MALAISE AND FATIGUE: ICD-10-CM

## 2024-02-14 DIAGNOSIS — R05.1 ACUTE COUGH: ICD-10-CM

## 2024-02-14 DIAGNOSIS — R53.83 MALAISE AND FATIGUE: ICD-10-CM

## 2024-02-14 DIAGNOSIS — E11.10 DIABETIC KETOACIDOSIS WITHOUT COMA ASSOCIATED WITH TYPE 2 DIABETES MELLITUS (HCC): Primary | ICD-10-CM

## 2024-02-14 LAB
ALBUMIN SERPL-MCNC: 3.4 G/DL (ref 3.4–5)
ALBUMIN/GLOB SERPL: 1.1 (ref 0.8–1.7)
ALP SERPL-CCNC: 98 U/L (ref 45–117)
ALT SERPL-CCNC: 10 U/L (ref 13–56)
ANION GAP BLD CALC-SCNC: ABNORMAL MMOL/L (ref 10–20)
ANION GAP SERPL CALC-SCNC: 24 MMOL/L (ref 3–18)
ANION GAP SERPL CALC-SCNC: 27 MMOL/L (ref 3–18)
ANION GAP SERPL CALC-SCNC: 9 MMOL/L (ref 3–18)
APPEARANCE UR: CLEAR
ARTERIAL PATENCY WRIST A: POSITIVE
AST SERPL-CCNC: 7 U/L (ref 10–38)
B PERT DNA SPEC QL NAA+PROBE: NOT DETECTED
BASE DEFICIT BLD-SCNC: 12 MMOL/L
BASE DEFICIT BLD-SCNC: 22 MMOL/L
BASOPHILS # BLD: 0 K/UL (ref 0–0.1)
BASOPHILS NFR BLD: 0 % (ref 0–2)
BDY SITE: ABNORMAL
BILIRUB DIRECT SERPL-MCNC: 0.1 MG/DL (ref 0–0.2)
BILIRUB SERPL-MCNC: 0.5 MG/DL (ref 0.2–1)
BILIRUB UR QL: NEGATIVE
BODY TEMPERATURE: 98
BORDETELLA PARAPERTUSSIS BY PCR: NOT DETECTED
BUN SERPL-MCNC: 58 MG/DL (ref 7–18)
BUN SERPL-MCNC: 65 MG/DL (ref 7–18)
BUN SERPL-MCNC: 68 MG/DL (ref 7–18)
BUN/CREAT SERPL: 24 (ref 12–20)
BUN/CREAT SERPL: 25 (ref 12–20)
BUN/CREAT SERPL: 27 (ref 12–20)
C PNEUM DNA SPEC QL NAA+PROBE: NOT DETECTED
CA-I BLD-MCNC: 1.02 MMOL/L (ref 1.12–1.32)
CALCIUM SERPL-MCNC: 8.6 MG/DL (ref 8.5–10.1)
CALCIUM SERPL-MCNC: 8.6 MG/DL (ref 8.5–10.1)
CALCIUM SERPL-MCNC: 8.7 MG/DL (ref 8.5–10.1)
CHLORIDE BLD-SCNC: 98 MMOL/L (ref 98–107)
CHLORIDE SERPL-SCNC: 104 MMOL/L (ref 100–111)
CHLORIDE SERPL-SCNC: 89 MMOL/L (ref 100–111)
CHLORIDE SERPL-SCNC: 93 MMOL/L (ref 100–111)
CO2 BLD-SCNC: 7 MMOL/L (ref 19–24)
CO2 SERPL-SCNC: 11 MMOL/L (ref 21–32)
CO2 SERPL-SCNC: 21 MMOL/L (ref 21–32)
CO2 SERPL-SCNC: 7 MMOL/L (ref 21–32)
COLOR UR: YELLOW
CREAT BLD-MCNC: 2.34 MG/DL (ref 0.6–1.3)
CREAT SERPL-MCNC: 2.15 MG/DL (ref 0.6–1.3)
CREAT SERPL-MCNC: 2.65 MG/DL (ref 0.6–1.3)
CREAT SERPL-MCNC: 2.89 MG/DL (ref 0.6–1.3)
DIFFERENTIAL METHOD BLD: ABNORMAL
EKG ATRIAL RATE: 114 BPM
EKG DIAGNOSIS: NORMAL
EKG P AXIS: 66 DEGREES
EKG P-R INTERVAL: 136 MS
EKG Q-T INTERVAL: 320 MS
EKG QRS DURATION: 72 MS
EKG QTC CALCULATION (BAZETT): 441 MS
EKG R AXIS: 66 DEGREES
EKG T AXIS: 62 DEGREES
EKG VENTRICULAR RATE: 114 BPM
EOSINOPHIL # BLD: 0 K/UL (ref 0–0.4)
EOSINOPHIL NFR BLD: 0 % (ref 0–5)
ERYTHROCYTE [DISTWIDTH] IN BLOOD BY AUTOMATED COUNT: 14.9 % (ref 11.6–14.5)
EST. AVERAGE GLUCOSE BLD GHB EST-MCNC: 263 MG/DL
FLUAV SUBTYP SPEC NAA+PROBE: NOT DETECTED
FLUBV RNA SPEC QL NAA+PROBE: NOT DETECTED
GAS FLOW.O2 O2 DELIVERY SYS: ABNORMAL
GLOBULIN SER CALC-MCNC: 3.1 G/DL (ref 2–4)
GLUCOSE BLD STRIP.AUTO-MCNC: 164 MG/DL (ref 70–110)
GLUCOSE BLD STRIP.AUTO-MCNC: 165 MG/DL (ref 70–110)
GLUCOSE BLD STRIP.AUTO-MCNC: 176 MG/DL (ref 70–110)
GLUCOSE BLD STRIP.AUTO-MCNC: 206 MG/DL (ref 70–110)
GLUCOSE BLD STRIP.AUTO-MCNC: 246 MG/DL (ref 70–110)
GLUCOSE BLD STRIP.AUTO-MCNC: 342 MG/DL (ref 70–110)
GLUCOSE BLD STRIP.AUTO-MCNC: 548 MG/DL (ref 70–110)
GLUCOSE BLD STRIP.AUTO-MCNC: >600 MG/DL (ref 70–110)
GLUCOSE BLD-MCNC: >700 MG/DL (ref 65–100)
GLUCOSE SERPL-MCNC: 158 MG/DL (ref 74–99)
GLUCOSE SERPL-MCNC: 559 MG/DL (ref 74–99)
GLUCOSE SERPL-MCNC: 781 MG/DL (ref 74–99)
GLUCOSE UR STRIP.AUTO-MCNC: >1000 MG/DL
HADV DNA SPEC QL NAA+PROBE: NOT DETECTED
HBA1C MFR BLD: 10.8 % (ref 4.2–5.6)
HCO3 BLD-SCNC: 12 MMOL/L (ref 22–26)
HCO3 BLD-SCNC: 6.6 MMOL/L (ref 22–26)
HCOV 229E RNA SPEC QL NAA+PROBE: NOT DETECTED
HCOV HKU1 RNA SPEC QL NAA+PROBE: NOT DETECTED
HCOV NL63 RNA SPEC QL NAA+PROBE: NOT DETECTED
HCOV OC43 RNA SPEC QL NAA+PROBE: NOT DETECTED
HCT VFR BLD AUTO: 33.8 % (ref 35–45)
HGB BLD-MCNC: 10.3 G/DL (ref 12–16)
HGB UR QL STRIP: NEGATIVE
HMPV RNA SPEC QL NAA+PROBE: NOT DETECTED
HPIV1 RNA SPEC QL NAA+PROBE: NOT DETECTED
HPIV2 RNA SPEC QL NAA+PROBE: NOT DETECTED
HPIV3 RNA SPEC QL NAA+PROBE: NOT DETECTED
HPIV4 RNA SPEC QL NAA+PROBE: NOT DETECTED
IMM GRANULOCYTES # BLD AUTO: 0.1 K/UL (ref 0–0.04)
IMM GRANULOCYTES NFR BLD AUTO: 0 % (ref 0–0.5)
KETONES UR QL STRIP.AUTO: 80 MG/DL
LACTATE BLD-SCNC: 2.74 MMOL/L (ref 0.4–2)
LACTATE BLD-SCNC: 6.41 MMOL/L (ref 0.4–2)
LACTATE SERPL-SCNC: 2.4 MMOL/L (ref 0.4–2)
LEUKOCYTE ESTERASE UR QL STRIP.AUTO: NEGATIVE
LIPASE SERPL-CCNC: 16 U/L (ref 13–75)
LYMPHOCYTES # BLD: 0.4 K/UL (ref 0.9–3.6)
LYMPHOCYTES NFR BLD: 3 % (ref 21–52)
M PNEUMO DNA SPEC QL NAA+PROBE: NOT DETECTED
MAGNESIUM SERPL-MCNC: 1.4 MG/DL (ref 1.6–2.6)
MAGNESIUM SERPL-MCNC: 1.6 MG/DL (ref 1.6–2.6)
MAGNESIUM SERPL-MCNC: 2 MG/DL (ref 1.6–2.6)
MCH RBC QN AUTO: 26.6 PG (ref 24–34)
MCHC RBC AUTO-ENTMCNC: 30.5 G/DL (ref 31–37)
MCV RBC AUTO: 87.3 FL (ref 78–100)
MONOCYTES # BLD: 0.3 K/UL (ref 0.05–1.2)
MONOCYTES NFR BLD: 3 % (ref 3–10)
NEUTS SEG # BLD: 10.6 K/UL (ref 1.8–8)
NEUTS SEG NFR BLD: 94 % (ref 40–73)
NITRITE UR QL STRIP.AUTO: NEGATIVE
NRBC # BLD: 0 K/UL (ref 0–0.01)
NRBC BLD-RTO: 0 PER 100 WBC
O2/TOTAL GAS SETTING VFR VENT: 21 %
PCO2 BLD: 21.7 MMHG (ref 35–45)
PCO2 BLDV: 23.5 MMHG (ref 41–51)
PH BLD: 7.35 (ref 7.35–7.45)
PH BLDV: 7.06 (ref 7.32–7.42)
PH UR STRIP: 5 (ref 5–8)
PHOSPHATE SERPL-MCNC: 3.1 MG/DL (ref 2.5–4.9)
PHOSPHATE SERPL-MCNC: 3.4 MG/DL (ref 2.5–4.9)
PHOSPHATE SERPL-MCNC: 4.8 MG/DL (ref 2.5–4.9)
PLATELET # BLD AUTO: 324 K/UL (ref 135–420)
PMV BLD AUTO: 10.5 FL (ref 9.2–11.8)
PO2 BLD: 92 MMHG (ref 80–100)
PO2 BLDV: 45 MMHG (ref 25–40)
POTASSIUM BLD-SCNC: 7.7 MMOL/L (ref 3.5–5.1)
POTASSIUM SERPL-SCNC: 4.5 MMOL/L (ref 3.5–5.5)
POTASSIUM SERPL-SCNC: 4.8 MMOL/L (ref 3.5–5.5)
POTASSIUM SERPL-SCNC: 5.9 MMOL/L (ref 3.5–5.5)
PROT SERPL-MCNC: 6.5 G/DL (ref 6.4–8.2)
PROT UR STRIP-MCNC: NEGATIVE MG/DL
RBC # BLD AUTO: 3.87 M/UL (ref 4.2–5.3)
RESPIRATORY RATE, POC: 18 (ref 5–40)
RSV RNA SPEC QL NAA+PROBE: NOT DETECTED
RV+EV RNA SPEC QL NAA+PROBE: DETECTED
SAO2 % BLD: 63 %
SAO2 % BLD: 97.1 % (ref 92–97)
SARS-COV-2 RNA RESP QL NAA+PROBE: NOT DETECTED
SERVICE CMNT-IMP: ABNORMAL
SERVICE CMNT-IMP: ABNORMAL
SODIUM BLD-SCNC: 117 MMOL/L (ref 136–145)
SODIUM SERPL-SCNC: 123 MMOL/L (ref 136–145)
SODIUM SERPL-SCNC: 128 MMOL/L (ref 136–145)
SODIUM SERPL-SCNC: 134 MMOL/L (ref 136–145)
SP GR UR REFRACTOMETRY: 1.02 (ref 1–1.03)
SPECIMEN SITE: ABNORMAL
SPECIMEN TYPE: ABNORMAL
TROPONIN I SERPL HS-MCNC: 7 NG/L (ref 0–54)
TSH SERPL DL<=0.05 MIU/L-ACNC: 0.26 UIU/ML (ref 0.36–3.74)
UROBILINOGEN UR QL STRIP.AUTO: 0.2 EU/DL (ref 0.2–1)
WBC # BLD AUTO: 11.3 K/UL (ref 4.6–13.2)

## 2024-02-14 PROCEDURE — 84100 ASSAY OF PHOSPHORUS: CPT

## 2024-02-14 PROCEDURE — 83036 HEMOGLOBIN GLYCOSYLATED A1C: CPT

## 2024-02-14 PROCEDURE — 83605 ASSAY OF LACTIC ACID: CPT

## 2024-02-14 PROCEDURE — 6370000000 HC RX 637 (ALT 250 FOR IP)

## 2024-02-14 PROCEDURE — 93005 ELECTROCARDIOGRAM TRACING: CPT

## 2024-02-14 PROCEDURE — 70450 CT HEAD/BRAIN W/O DYE: CPT

## 2024-02-14 PROCEDURE — 85025 COMPLETE CBC W/AUTO DIFF WBC: CPT

## 2024-02-14 PROCEDURE — 87086 URINE CULTURE/COLONY COUNT: CPT

## 2024-02-14 PROCEDURE — 83735 ASSAY OF MAGNESIUM: CPT

## 2024-02-14 PROCEDURE — 2140000001 HC CVICU INTERMEDIATE R&B

## 2024-02-14 PROCEDURE — 82947 ASSAY GLUCOSE BLOOD QUANT: CPT

## 2024-02-14 PROCEDURE — 80076 HEPATIC FUNCTION PANEL: CPT

## 2024-02-14 PROCEDURE — 85014 HEMATOCRIT: CPT

## 2024-02-14 PROCEDURE — 2500000003 HC RX 250 WO HCPCS

## 2024-02-14 PROCEDURE — 83690 ASSAY OF LIPASE: CPT

## 2024-02-14 PROCEDURE — 84295 ASSAY OF SERUM SODIUM: CPT

## 2024-02-14 PROCEDURE — 84443 ASSAY THYROID STIM HORMONE: CPT

## 2024-02-14 PROCEDURE — 2580000003 HC RX 258

## 2024-02-14 PROCEDURE — 81003 URINALYSIS AUTO W/O SCOPE: CPT

## 2024-02-14 PROCEDURE — 82803 BLOOD GASES ANY COMBINATION: CPT

## 2024-02-14 PROCEDURE — 36600 WITHDRAWAL OF ARTERIAL BLOOD: CPT

## 2024-02-14 PROCEDURE — 96361 HYDRATE IV INFUSION ADD-ON: CPT

## 2024-02-14 PROCEDURE — 36415 COLL VENOUS BLD VENIPUNCTURE: CPT

## 2024-02-14 PROCEDURE — 2580000003 HC RX 258: Performed by: FAMILY MEDICINE

## 2024-02-14 PROCEDURE — 0202U NFCT DS 22 TRGT SARS-COV-2: CPT

## 2024-02-14 PROCEDURE — 82330 ASSAY OF CALCIUM: CPT

## 2024-02-14 PROCEDURE — 96374 THER/PROPH/DIAG INJ IV PUSH: CPT

## 2024-02-14 PROCEDURE — 71045 X-RAY EXAM CHEST 1 VIEW: CPT

## 2024-02-14 PROCEDURE — 80048 BASIC METABOLIC PNL TOTAL CA: CPT

## 2024-02-14 PROCEDURE — 6360000002 HC RX W HCPCS

## 2024-02-14 PROCEDURE — 82962 GLUCOSE BLOOD TEST: CPT

## 2024-02-14 PROCEDURE — 84484 ASSAY OF TROPONIN QUANT: CPT

## 2024-02-14 PROCEDURE — 82010 KETONE BODYS QUAN: CPT

## 2024-02-14 PROCEDURE — 93010 ELECTROCARDIOGRAM REPORT: CPT | Performed by: INTERNAL MEDICINE

## 2024-02-14 PROCEDURE — 84132 ASSAY OF SERUM POTASSIUM: CPT

## 2024-02-14 PROCEDURE — 99285 EMERGENCY DEPT VISIT HI MDM: CPT

## 2024-02-14 PROCEDURE — 87040 BLOOD CULTURE FOR BACTERIA: CPT

## 2024-02-14 PROCEDURE — 96375 TX/PRO/DX INJ NEW DRUG ADDON: CPT

## 2024-02-14 PROCEDURE — 99223 1ST HOSP IP/OBS HIGH 75: CPT | Performed by: FAMILY MEDICINE

## 2024-02-14 RX ORDER — POLYETHYLENE GLYCOL 3350 17 G/17G
17 POWDER, FOR SOLUTION ORAL DAILY PRN
Status: DISCONTINUED | OUTPATIENT
Start: 2024-02-14 | End: 2024-02-16 | Stop reason: HOSPADM

## 2024-02-14 RX ORDER — SODIUM CHLORIDE 0.9 % (FLUSH) 0.9 %
5-40 SYRINGE (ML) INJECTION PRN
Status: DISCONTINUED | OUTPATIENT
Start: 2024-02-14 | End: 2024-02-16 | Stop reason: HOSPADM

## 2024-02-14 RX ORDER — DEXTROSE AND SODIUM CHLORIDE 5; .45 G/100ML; G/100ML
INJECTION, SOLUTION INTRAVENOUS CONTINUOUS PRN
Status: DISCONTINUED | OUTPATIENT
Start: 2024-02-14 | End: 2024-02-16 | Stop reason: HOSPADM

## 2024-02-14 RX ORDER — PANTOPRAZOLE SODIUM 40 MG/1
40 TABLET, DELAYED RELEASE ORAL
Status: DISCONTINUED | OUTPATIENT
Start: 2024-02-15 | End: 2024-02-16 | Stop reason: HOSPADM

## 2024-02-14 RX ORDER — GABAPENTIN 300 MG/1
300 CAPSULE ORAL DAILY
Status: DISCONTINUED | OUTPATIENT
Start: 2024-02-15 | End: 2024-02-16 | Stop reason: HOSPADM

## 2024-02-14 RX ORDER — MAGNESIUM SULFATE IN WATER 40 MG/ML
2000 INJECTION, SOLUTION INTRAVENOUS PRN
Status: DISCONTINUED | OUTPATIENT
Start: 2024-02-14 | End: 2024-02-16 | Stop reason: HOSPADM

## 2024-02-14 RX ORDER — ACETAMINOPHEN 650 MG/1
650 SUPPOSITORY RECTAL EVERY 6 HOURS PRN
Status: DISCONTINUED | OUTPATIENT
Start: 2024-02-14 | End: 2024-02-16 | Stop reason: HOSPADM

## 2024-02-14 RX ORDER — SODIUM CHLORIDE 0.9 % (FLUSH) 0.9 %
5-40 SYRINGE (ML) INJECTION EVERY 12 HOURS SCHEDULED
Status: DISCONTINUED | OUTPATIENT
Start: 2024-02-14 | End: 2024-02-16 | Stop reason: HOSPADM

## 2024-02-14 RX ORDER — ONDANSETRON 2 MG/ML
4 INJECTION INTRAMUSCULAR; INTRAVENOUS EVERY 6 HOURS PRN
Status: DISCONTINUED | OUTPATIENT
Start: 2024-02-14 | End: 2024-02-16 | Stop reason: HOSPADM

## 2024-02-14 RX ORDER — ONDANSETRON 4 MG/1
4 TABLET, ORALLY DISINTEGRATING ORAL EVERY 8 HOURS PRN
Status: DISCONTINUED | OUTPATIENT
Start: 2024-02-14 | End: 2024-02-16 | Stop reason: HOSPADM

## 2024-02-14 RX ORDER — FERROUS SULFATE 325(65) MG
325 TABLET ORAL
Status: DISCONTINUED | OUTPATIENT
Start: 2024-02-15 | End: 2024-02-16 | Stop reason: HOSPADM

## 2024-02-14 RX ORDER — POLYETHYLENE GLYCOL 3350 17 G/17G
17 POWDER, FOR SOLUTION ORAL DAILY
Status: DISCONTINUED | OUTPATIENT
Start: 2024-02-15 | End: 2024-02-16 | Stop reason: HOSPADM

## 2024-02-14 RX ORDER — SODIUM CHLORIDE 9 MG/ML
INJECTION, SOLUTION INTRAVENOUS PRN
Status: DISCONTINUED | OUTPATIENT
Start: 2024-02-14 | End: 2024-02-16 | Stop reason: HOSPADM

## 2024-02-14 RX ORDER — CALCIUM GLUCONATE 94 MG/ML
1000 INJECTION, SOLUTION INTRAVENOUS ONCE
Status: COMPLETED | OUTPATIENT
Start: 2024-02-14 | End: 2024-02-14

## 2024-02-14 RX ORDER — ACETAMINOPHEN 325 MG/1
650 TABLET ORAL EVERY 6 HOURS PRN
Status: DISCONTINUED | OUTPATIENT
Start: 2024-02-14 | End: 2024-02-16 | Stop reason: HOSPADM

## 2024-02-14 RX ORDER — ENOXAPARIN SODIUM 100 MG/ML
30 INJECTION SUBCUTANEOUS DAILY
Status: DISCONTINUED | OUTPATIENT
Start: 2024-02-15 | End: 2024-02-16 | Stop reason: HOSPADM

## 2024-02-14 RX ORDER — SODIUM CHLORIDE 9 MG/ML
INJECTION, SOLUTION INTRAVENOUS CONTINUOUS
Status: DISCONTINUED | OUTPATIENT
Start: 2024-02-14 | End: 2024-02-16 | Stop reason: HOSPADM

## 2024-02-14 RX ORDER — 0.9 % SODIUM CHLORIDE 0.9 %
15 INTRAVENOUS SOLUTION INTRAVENOUS ONCE
Status: COMPLETED | OUTPATIENT
Start: 2024-02-14 | End: 2024-02-14

## 2024-02-14 RX ORDER — LEVOTHYROXINE SODIUM 0.15 MG/1
150 TABLET ORAL
Status: DISCONTINUED | OUTPATIENT
Start: 2024-02-15 | End: 2024-02-16 | Stop reason: HOSPADM

## 2024-02-14 RX ORDER — POTASSIUM CHLORIDE 7.45 MG/ML
10 INJECTION INTRAVENOUS PRN
Status: DISCONTINUED | OUTPATIENT
Start: 2024-02-14 | End: 2024-02-16 | Stop reason: HOSPADM

## 2024-02-14 RX ADMIN — INSULIN HUMAN 1.04 UNITS/HR: 1 INJECTION, SOLUTION INTRAVENOUS at 20:31

## 2024-02-14 RX ADMIN — SODIUM CHLORIDE, PRESERVATIVE FREE 10 ML: 5 INJECTION INTRAVENOUS at 22:45

## 2024-02-14 RX ADMIN — SODIUM BICARBONATE 50 MEQ: 84 INJECTION INTRAVENOUS at 15:26

## 2024-02-14 RX ADMIN — INSULIN HUMAN 3.72 UNITS/HR: 1 INJECTION, SOLUTION INTRAVENOUS at 19:40

## 2024-02-14 RX ADMIN — ALBUTEROL SULFATE 10 MG: 2.5 SOLUTION RESPIRATORY (INHALATION) at 15:30

## 2024-02-14 RX ADMIN — CALCIUM GLUCONATE 1000 MG: 98 INJECTION, SOLUTION INTRAVENOUS at 15:38

## 2024-02-14 RX ADMIN — MAGNESIUM SULFATE HEPTAHYDRATE 2000 MG: 40 INJECTION, SOLUTION INTRAVENOUS at 18:51

## 2024-02-14 RX ADMIN — INSULIN HUMAN 1.46 UNITS/HR: 1 INJECTION, SOLUTION INTRAVENOUS at 21:36

## 2024-02-14 RX ADMIN — DEXTROSE AND SODIUM CHLORIDE: 5; 450 INJECTION, SOLUTION INTRAVENOUS at 20:34

## 2024-02-14 RX ADMIN — SODIUM CHLORIDE 1020 ML: 9 INJECTION, SOLUTION INTRAVENOUS at 15:24

## 2024-02-14 RX ADMIN — SODIUM CHLORIDE: 9 INJECTION, SOLUTION INTRAVENOUS at 16:30

## 2024-02-14 RX ADMIN — INSULIN HUMAN 10.82 UNITS/HR: 1 INJECTION, SOLUTION INTRAVENOUS at 15:51

## 2024-02-14 ASSESSMENT — PAIN - FUNCTIONAL ASSESSMENT: PAIN_FUNCTIONAL_ASSESSMENT: NONE - DENIES PAIN

## 2024-02-14 NOTE — ED TRIAGE NOTES
Pt arrives via EMS from home c/o not feeling well for 4 days. BG read high per EMS. IV est and NS running on arrival.

## 2024-02-14 NOTE — ED PROVIDER NOTES
[facility] EMERGENCY DEPT  EMERGENCY DEPARTMENT ENCOUNTER       Pt Name: Maria G Mayers  MRN: [id number]  Birthdate 1950  Date of evaluation: 2/14/2024  Provider: Chan Beaver MD   PCP: Calvin Giles MD  Note Started: 2:11 PM EST 2/14/24     CHIEF COMPLAINT       Chief Complaint   Patient presents with    Hyperglycemia        HISTORY OF PRESENT ILLNESS: 1 or more elements      History From: Patient, History limited by: Mildly altered mental status      Maria G Mayers is a 73 y.o. female with pmhx insulin-dependent T2DM, hypothyroidism, CKD, HTN, GERD, anemia of chronic disease presents with 4 days of cough and generalized weakness, noted to have elevated blood sugars at home.  4 days ago she noticed gradual worsening of feeling \"lousy,\" with generalized fatigue, nonfocal weakness.  Also notices persistent productive dry cough.  She denies any sick contacts. She has polydipsia and dry mouth. She's had no fevers or chills, no chest pain, no shortness of breath, no abdominal pain, no nausea or vomiting, no headache, no limb weakness or numbness in her extremities.  She says she has type 1 diabetes, took 40 units of insulin today but still has elevated blood sugars. She has no history of heart failure, lung problems, kidney disease.     ECHO 11/2/23    Left Ventricle: Low normal left ventricular systolic function with a visually estimated EF of 50 - 55%. Left ventricle size is normal. Mildly increased wall thickness. Normal wall motion. Abnormal diastolic function.    Left Atrium: Left atrium is visually mildly dilated. LA Vol Index A/L is 28 mL/m2.    No significant valvular pathology noted.    Please See MDM for Additional Details of the HPI/PMH  Nursing Notes were all reviewed and agreed with or any disagreements were addressed in the HPI.     REVIEW OF SYSTEMS        Positives and Pertinent negatives as per HPI.    PAST HISTORY     Past Medical History:  Past Medical History:   Diagnosis Date    Anemia due

## 2024-02-15 LAB
ANION GAP SERPL CALC-SCNC: 10 MMOL/L (ref 3–18)
ANION GAP SERPL CALC-SCNC: 7 MMOL/L (ref 3–18)
ANION GAP SERPL CALC-SCNC: 8 MMOL/L (ref 3–18)
B-OH-BUTYR SERPL-SCNC: >4.42 MMOL/L
BACTERIA SPEC CULT: NORMAL
BASOPHILS # BLD: 0 K/UL (ref 0–0.1)
BASOPHILS NFR BLD: 0 % (ref 0–2)
BUN SERPL-MCNC: 38 MG/DL (ref 7–18)
BUN SERPL-MCNC: 42 MG/DL (ref 7–18)
BUN SERPL-MCNC: 50 MG/DL (ref 7–18)
BUN/CREAT SERPL: 23 (ref 12–20)
BUN/CREAT SERPL: 24 (ref 12–20)
BUN/CREAT SERPL: 26 (ref 12–20)
CALCIUM SERPL-MCNC: 8.2 MG/DL (ref 8.5–10.1)
CALCIUM SERPL-MCNC: 8.4 MG/DL (ref 8.5–10.1)
CALCIUM SERPL-MCNC: 8.5 MG/DL (ref 8.5–10.1)
CC UR VC: NORMAL
CHLORIDE SERPL-SCNC: 105 MMOL/L (ref 100–111)
CHLORIDE SERPL-SCNC: 106 MMOL/L (ref 100–111)
CHLORIDE SERPL-SCNC: 106 MMOL/L (ref 100–111)
CO2 SERPL-SCNC: 18 MMOL/L (ref 21–32)
CO2 SERPL-SCNC: 19 MMOL/L (ref 21–32)
CO2 SERPL-SCNC: 20 MMOL/L (ref 21–32)
CREAT SERPL-MCNC: 1.63 MG/DL (ref 0.6–1.3)
CREAT SERPL-MCNC: 1.66 MG/DL (ref 0.6–1.3)
CREAT SERPL-MCNC: 2.05 MG/DL (ref 0.6–1.3)
DIFFERENTIAL METHOD BLD: ABNORMAL
EOSINOPHIL # BLD: 0 K/UL (ref 0–0.4)
EOSINOPHIL NFR BLD: 0 % (ref 0–5)
ERYTHROCYTE [DISTWIDTH] IN BLOOD BY AUTOMATED COUNT: 14.6 % (ref 11.6–14.5)
EST. AVERAGE GLUCOSE BLD GHB EST-MCNC: 255 MG/DL
GLUCOSE BLD STRIP.AUTO-MCNC: 151 MG/DL (ref 70–110)
GLUCOSE BLD STRIP.AUTO-MCNC: 155 MG/DL (ref 70–110)
GLUCOSE BLD STRIP.AUTO-MCNC: 200 MG/DL (ref 70–110)
GLUCOSE BLD STRIP.AUTO-MCNC: 201 MG/DL (ref 70–110)
GLUCOSE BLD STRIP.AUTO-MCNC: 203 MG/DL (ref 70–110)
GLUCOSE BLD STRIP.AUTO-MCNC: 240 MG/DL (ref 70–110)
GLUCOSE BLD STRIP.AUTO-MCNC: 256 MG/DL (ref 70–110)
GLUCOSE BLD STRIP.AUTO-MCNC: 282 MG/DL (ref 70–110)
GLUCOSE BLD STRIP.AUTO-MCNC: 307 MG/DL (ref 70–110)
GLUCOSE BLD STRIP.AUTO-MCNC: 311 MG/DL (ref 70–110)
GLUCOSE BLD STRIP.AUTO-MCNC: 313 MG/DL (ref 70–110)
GLUCOSE BLD STRIP.AUTO-MCNC: 317 MG/DL (ref 70–110)
GLUCOSE BLD STRIP.AUTO-MCNC: 322 MG/DL (ref 70–110)
GLUCOSE SERPL-MCNC: 213 MG/DL (ref 74–99)
GLUCOSE SERPL-MCNC: 305 MG/DL (ref 74–99)
GLUCOSE SERPL-MCNC: 392 MG/DL (ref 74–99)
HBA1C MFR BLD: 10.5 % (ref 4.2–5.6)
HCT VFR BLD AUTO: 27.2 % (ref 35–45)
HGB BLD-MCNC: 8.8 G/DL (ref 12–16)
IMM GRANULOCYTES # BLD AUTO: 0 K/UL (ref 0–0.04)
IMM GRANULOCYTES NFR BLD AUTO: 0 % (ref 0–0.5)
LACTATE SERPL-SCNC: 1.4 MMOL/L (ref 0.4–2)
LYMPHOCYTES # BLD: 1.8 K/UL (ref 0.9–3.6)
LYMPHOCYTES NFR BLD: 19 % (ref 21–52)
MAGNESIUM SERPL-MCNC: 1.7 MG/DL (ref 1.6–2.6)
MAGNESIUM SERPL-MCNC: 1.7 MG/DL (ref 1.6–2.6)
MAGNESIUM SERPL-MCNC: 1.8 MG/DL (ref 1.6–2.6)
MCH RBC QN AUTO: 26.2 PG (ref 24–34)
MCHC RBC AUTO-ENTMCNC: 32.4 G/DL (ref 31–37)
MCV RBC AUTO: 81 FL (ref 78–100)
MONOCYTES # BLD: 0.9 K/UL (ref 0.05–1.2)
MONOCYTES NFR BLD: 9 % (ref 3–10)
NEUTS SEG # BLD: 6.9 K/UL (ref 1.8–8)
NEUTS SEG NFR BLD: 71 % (ref 40–73)
NRBC # BLD: 0 K/UL (ref 0–0.01)
NRBC BLD-RTO: 0 PER 100 WBC
PHOSPHATE SERPL-MCNC: 1.8 MG/DL (ref 2.5–4.9)
PHOSPHATE SERPL-MCNC: 2.2 MG/DL (ref 2.5–4.9)
PHOSPHATE SERPL-MCNC: 2.3 MG/DL (ref 2.5–4.9)
PLATELET # BLD AUTO: 264 K/UL (ref 135–420)
PMV BLD AUTO: 10.7 FL (ref 9.2–11.8)
POTASSIUM SERPL-SCNC: 4.2 MMOL/L (ref 3.5–5.5)
POTASSIUM SERPL-SCNC: 5 MMOL/L (ref 3.5–5.5)
POTASSIUM SERPL-SCNC: 5.3 MMOL/L (ref 3.5–5.5)
RBC # BLD AUTO: 3.36 M/UL (ref 4.2–5.3)
SERVICE CMNT-IMP: NORMAL
SODIUM SERPL-SCNC: 132 MMOL/L (ref 136–145)
SODIUM SERPL-SCNC: 132 MMOL/L (ref 136–145)
SODIUM SERPL-SCNC: 135 MMOL/L (ref 136–145)
WBC # BLD AUTO: 9.8 K/UL (ref 4.6–13.2)

## 2024-02-15 PROCEDURE — 84100 ASSAY OF PHOSPHORUS: CPT

## 2024-02-15 PROCEDURE — 2140000001 HC CVICU INTERMEDIATE R&B

## 2024-02-15 PROCEDURE — 6360000002 HC RX W HCPCS: Performed by: FAMILY MEDICINE

## 2024-02-15 PROCEDURE — 82962 GLUCOSE BLOOD TEST: CPT

## 2024-02-15 PROCEDURE — 2580000003 HC RX 258: Performed by: FAMILY MEDICINE

## 2024-02-15 PROCEDURE — 6360000002 HC RX W HCPCS

## 2024-02-15 PROCEDURE — 36415 COLL VENOUS BLD VENIPUNCTURE: CPT

## 2024-02-15 PROCEDURE — 80048 BASIC METABOLIC PNL TOTAL CA: CPT

## 2024-02-15 PROCEDURE — 6370000000 HC RX 637 (ALT 250 FOR IP): Performed by: FAMILY MEDICINE

## 2024-02-15 PROCEDURE — 6370000000 HC RX 637 (ALT 250 FOR IP): Performed by: HOSPITALIST

## 2024-02-15 PROCEDURE — 99232 SBSQ HOSP IP/OBS MODERATE 35: CPT | Performed by: HOSPITALIST

## 2024-02-15 PROCEDURE — 83605 ASSAY OF LACTIC ACID: CPT

## 2024-02-15 PROCEDURE — 2580000003 HC RX 258

## 2024-02-15 PROCEDURE — 83735 ASSAY OF MAGNESIUM: CPT

## 2024-02-15 PROCEDURE — 85025 COMPLETE CBC W/AUTO DIFF WBC: CPT

## 2024-02-15 PROCEDURE — 83036 HEMOGLOBIN GLYCOSYLATED A1C: CPT

## 2024-02-15 RX ORDER — INSULIN LISPRO 100 [IU]/ML
0-4 INJECTION, SOLUTION INTRAVENOUS; SUBCUTANEOUS NIGHTLY
Status: DISCONTINUED | OUTPATIENT
Start: 2024-02-15 | End: 2024-02-16 | Stop reason: HOSPADM

## 2024-02-15 RX ORDER — INSULIN LISPRO 100 [IU]/ML
0-8 INJECTION, SOLUTION INTRAVENOUS; SUBCUTANEOUS
Status: DISCONTINUED | OUTPATIENT
Start: 2024-02-15 | End: 2024-02-16 | Stop reason: HOSPADM

## 2024-02-15 RX ORDER — INSULIN GLARGINE 100 [IU]/ML
15 INJECTION, SOLUTION SUBCUTANEOUS NIGHTLY
Status: DISCONTINUED | OUTPATIENT
Start: 2024-02-15 | End: 2024-02-16 | Stop reason: HOSPADM

## 2024-02-15 RX ORDER — DEXTROSE MONOHYDRATE 100 MG/ML
INJECTION, SOLUTION INTRAVENOUS CONTINUOUS PRN
Status: DISCONTINUED | OUTPATIENT
Start: 2024-02-15 | End: 2024-02-16 | Stop reason: HOSPADM

## 2024-02-15 RX ADMIN — GABAPENTIN 300 MG: 300 CAPSULE ORAL at 08:41

## 2024-02-15 RX ADMIN — SODIUM CHLORIDE, PRESERVATIVE FREE 10 ML: 5 INJECTION INTRAVENOUS at 20:37

## 2024-02-15 RX ADMIN — POTASSIUM CHLORIDE 10 MEQ: 10 INJECTION, SOLUTION INTRAVENOUS at 07:34

## 2024-02-15 RX ADMIN — POTASSIUM CHLORIDE 10 MEQ: 10 INJECTION, SOLUTION INTRAVENOUS at 01:21

## 2024-02-15 RX ADMIN — LEVOTHYROXINE SODIUM 150 MCG: 150 TABLET ORAL at 05:54

## 2024-02-15 RX ADMIN — SODIUM CHLORIDE, PRESERVATIVE FREE 10 ML: 5 INJECTION INTRAVENOUS at 08:48

## 2024-02-15 RX ADMIN — PANTOPRAZOLE SODIUM 40 MG: 40 TABLET, DELAYED RELEASE ORAL at 05:54

## 2024-02-15 RX ADMIN — POTASSIUM CHLORIDE 10 MEQ: 10 INJECTION, SOLUTION INTRAVENOUS at 08:41

## 2024-02-15 RX ADMIN — Medication 325 MG: at 08:47

## 2024-02-15 RX ADMIN — POTASSIUM CHLORIDE 10 MEQ: 10 INJECTION, SOLUTION INTRAVENOUS at 00:17

## 2024-02-15 RX ADMIN — INSULIN LISPRO 6 UNITS: 100 INJECTION, SOLUTION INTRAVENOUS; SUBCUTANEOUS at 16:51

## 2024-02-15 RX ADMIN — INSULIN GLARGINE 15 UNITS: 100 INJECTION, SOLUTION SUBCUTANEOUS at 20:35

## 2024-02-15 RX ADMIN — PANTOPRAZOLE SODIUM 40 MG: 40 TABLET, DELAYED RELEASE ORAL at 16:51

## 2024-02-15 RX ADMIN — ENOXAPARIN SODIUM 30 MG: 100 INJECTION SUBCUTANEOUS at 08:41

## 2024-02-15 RX ADMIN — POLYETHYLENE GLYCOL 3350 17 G: 17 POWDER, FOR SOLUTION ORAL at 08:48

## 2024-02-15 RX ADMIN — POTASSIUM CHLORIDE 10 MEQ: 10 INJECTION, SOLUTION INTRAVENOUS at 06:00

## 2024-02-15 RX ADMIN — DEXTROSE AND SODIUM CHLORIDE: 5; 450 INJECTION, SOLUTION INTRAVENOUS at 09:19

## 2024-02-15 RX ADMIN — DEXTROSE AND SODIUM CHLORIDE: 5; 450 INJECTION, SOLUTION INTRAVENOUS at 02:35

## 2024-02-15 RX ADMIN — INSULIN LISPRO 6 UNITS: 100 INJECTION, SOLUTION INTRAVENOUS; SUBCUTANEOUS at 12:21

## 2024-02-15 ASSESSMENT — PAIN SCALES - GENERAL
PAINLEVEL_OUTOF10: 0

## 2024-02-15 NOTE — PROGRESS NOTES
TRANSFER - IN REPORT:    Verbal report received from Lelia HA on Maria G Mayers being received from ER for routine progression of care.     Report consisted of patient’s Situation, Background, Assessment and Recommendations(SBAR).     Information from the following report(s) SBAR, Intake/Output, MAR, Recent Results, and Cardiac Rhythm SR  was reviewed. Opportunity for questions and clarification was provided.        2148: Patient received to room 2308 from ER via stretcher. No SOB on RA. Frequent non productive cough noted. Denies any pain or discomfort at this time. On Insulin gtt & IVF of D5 1/@ NS at 150 ml/hr. Both infusing to Left Arm. Patient connected to monitor, v/s taken and assessment completed. Plan of care reviewed. Patient oriented to room and surroundings. Call light within reach. Patient aware to use call light to communicate any chest pain or needs.     Admission skin assessment completed with second RN and reveals the following: Intact  Wound Prevention Checklist    Patient: Maria G Mayers (73 y.o. female)  Date: 2/15/2024  Diagnosis: Malaise and fatigue [R53.81, R53.83]  DKA, type 2, not at goal (HCC) [E11.10]  Diabetic ketoacidosis without coma associated with type 2 diabetes mellitus (HCC) [E11.10]  Acute cough [R05.1] DKA, type 2, not at goal (HCC)    Precautions:         []  Heel prevention boots placed on patient    []  Patient turned q2h during shift    []  Lift team ordered    [x]  Patient on Cerulean bed/Specialty bed    []  Each Wound is documented during shift (Stage, Color, drainage, odor, measurements, and dressings)    [x]  Dual skin check done with Rainer KISER, RN       2330: Daughter, Amy alfred. Updated.     0017: K+ 4.5. Potassium IV Replacement started per protocol.     0300: No change from previous assessment.     0600: Slept good thru night. Needs attended. Incontinence care provided. Due meds given. K+ level this morning 4.2. Potassium replacement

## 2024-02-15 NOTE — ED NOTES
1920 Previous RN attempted to call report to CVT stepdown, informed receiving RN was busy and to call back.  2027 Attempted to call report to CVT stepdown, no answer.

## 2024-02-15 NOTE — PROGRESS NOTES
0700: Bedside and Verbal shift change report given to Karlee HA (oncoming nurse) by DILCIA Roman (offgoing nurse). Report included the following information Nurse Handoff Report, Index, Adult Overview, Intake/Output, Recent Results, Cardiac Rhythm NSR, and Alarm Parameters.      1050: Pt in bathroom, BM 02/15/2024, received orders to discontinue insulin gtt.      1200: , pt given 6 units lispro. Family at bedside.    1300: potassium replacement complete, new K level 5.3.      1900: Bedside and Verbal shift change report given to DILCIA Sutherland (oncoming nurse) by DILCIA Perez (offgoing nurse). Report included the following information Nurse Handoff Report, Index, Adult Overview, Intake/Output, MAR, Recent Results, Cardiac Rhythm NSR, and Alarm Parameters.

## 2024-02-15 NOTE — H&P
Hospitalist Admission Note    NAME: Maria G Mayers   :  1950   MRN:  [id number]     Date:  2024     Patient PCP: Calvin Giles MD  ________________________________________________________________________    My assessment of this patient's clinical condition and my plan of care is as follows.    Assessment / Plan:  DKA  DM with hyperglycemia  LIA on CKD 3  Pseudohyponatremia  Hypertension  Anemia of chronic disease  GERD    Admit to medical stepdown bed.  Volume resuscitation, IV insulin per protocol.  Transition to subcutaneous therapy once acidosis resolves and sugars improve.  Monitor electrolytes, renal indices.  Resume home meds as appropriate.  Mobilize as soon as able.  PT/OT evals if necessary.  Further plan pending response to current treatment and overall course.    Code Status: Full  DVT Prophylaxis: Lovenox          Subjective:   CHIEF COMPLAINT: Weakness/malaise    HISTORY OF PRESENT ILLNESS:     Maria G Mayers is a 73 y.o.   female who presented to the ED for evaluation of worsening generalized weakness and malaise.  Patient is a known diabetic and has was admitted to [facility] late last year for an episode of DKA.  On arrival to the ED today, patient was found to be acidotic with severe hyperglycemia and a serum glucose of 781.  She has been started on IV insulin and fluids per protocol and is being referred for admission for further evaluation and treatment      Past Medical History:   Diagnosis Date    Anemia due to stage 3b chronic kidney disease (HCC) 2022    Chronic kidney disease     Colon cancer (HCC)     Diabetes (HCC)     Hypertension     Hypothyroidism     Stage 3b chronic kidney disease (HCC) 2022        Past Surgical History:   Procedure Laterality Date    COLONOSCOPY N/A 2016    COLONOSCOPY. SURVEILLANCE /c Hot Snared Polypectomy /c EndoClip x3 performed by Kaleb Randolph MD at [facility] ENDOSCOPY    COLONOSCOPY N/A 2022    COLONOSCOPY  wheezes.  Effort nonlabored, no accessory muscle use.  Chest wall:  No chest wall tenderness.  Chest expansion equal and symmetrical bilaterally.  Heart:   RRR.    Abdomen:   Soft, NT/ND.  Bowel sounds normoactive.  Extremities: Warm, no edema.  No evidence for ischemia.  Skin:     Not pale.  Not Jaundiced  No rashes   Psych:  Mood normal.  Calm, no agitation.  Neurologic: Awake and alert, moves extremities spontaneously.      _______________________________________________________________________  Care Plan discussed with:    Comments   Patient X    Family      RN X    Care Manager                    Consultant:      _______________________________________________________________________  Expected  Disposition:   Home  X   HH/PT/OT/RN    SNF/LTC    ARU    ________________________________________________________________________    Tests/Procedures:   CT Of The Head Without Contrast     CPT CODE:  70057     CLINICAL HISTORY: Altered mental status.     TECHNIQUE: 5 mm helical scan obtained of the head.   All CT scans at this  facility are performed using dose optimization techniques as appropriate to a  performed exam, to include automated exposure control, adjustment of the mA  and/or kV according to patient's size (including appropriate matching for site  specific examinations), or use of iterative reconstruction technique.       COMPARISON: 8/3/2022.     FINDINGS:      No midline shift, mass effect or abnormal intra-axial or extraaxial fluid  collection or hydrocephalus is seen. There is mild atrophy.  No hemorrhage identified.  No mass lesion identified.  No acute infarction identified.  Paranasal sinuses, mastoid air cells, middle ears are all normally aerated.     IMPRESSION:     No acute abnormalities.        PORTABLE CHEST RADIOGRAPH      INDICATION: Cough.     COMPARISON: 11/22/2023     FINDINGS:     Trachea is midline. EKG leads overlie the chest. The cardiomediastinal  silhouette is within normal limits.

## 2024-02-15 NOTE — PROGRESS NOTES
[facility] Hospitalist Group  Progress Note    Patient: Maria G Mayers Age: 73 y.o. : 1950 MR#: [id number] SSN: [id number]  Date/Time: 2/15/2024     Subjective:     Patient seen and evaluated, sitting up in bed, no acute distress.    73-year-old female presented to the emergency room with complaints of generalized weakness and malaise.  Patient has a known history of type 2 diabetes, admitted for DKA.  Patient started on insulin gtt. with improved blood sugars, resuming Lantus and insulin sliding scale.        Assessment/Plan:     DKA-resolved, continue Lantus insulin sliding scale, monitor blood sugars.  History of type 2 diabetes  History of hypertension-resume home medications, continue to monitor blood pressure  Pseudohyponatremia  Anemia of chronic disease-continue to monitor hemoglobin.  History of GERD-continue Protonix  DVT prophylaxis-Lovenox  Full code                Sukh Maldonado MD  02/15/24      Case discussed with:  [x]Patient  []Family  []Nursing  []Case Management  DVT Prophylaxis:  [x]Lovenox  []Hep SQ  []SCDs  []Coumadin   []On Heparin gtt    Objective:   VS:   Vitals:    02/15/24 1100   BP: (!) 142/91   Pulse: 92   Resp: 14   Temp: 97.7 °F (36.5 °C)   SpO2: 100%        Intake/Output Summary (Last 24 hours) at 2/15/2024 1343  Last data filed at 2/15/2024 1005  Gross per 24 hour   Intake 2412.73 ml   Output 800 ml   Net 1612.73 ml       General:  Alert, cooperative, no acute distress    Pulmonary:  CTA Bilaterally. No Wheezing/Rhonchi/Rales.  Cardiovascular: Regular rate and Rhythm.  GI:  Soft, Non distended, Non tender. + Bowel sounds.  Extremities:  No edema, cyanosis, clubbing. No calf tenderness.   Neurologic: Alert and oriented X 3. No acute neuro deficits.  Additional:    Medications:   Current Facility-Administered Medications   Medication Dose Route Frequency    insulin glargine (LANTUS) injection vial 15 Units  15 Units SubCUTAneous Nightly    glucose  Range    POC Glucose 155 (H) 70 - 110 mg/dL   POCT Glucose    Collection Time: 02/15/24  7:35 AM   Result Value Ref Range    POC Glucose 203 (H) 70 - 110 mg/dL   POCT Glucose    Collection Time: 02/15/24  8:33 AM   Result Value Ref Range    POC Glucose 256 (H) 70 - 110 mg/dL   POCT Glucose    Collection Time: 02/15/24  9:51 AM   Result Value Ref Range    POC Glucose 282 (H) 70 - 110 mg/dL   Basic Metabolic Panel    Collection Time: 02/15/24 10:16 AM   Result Value Ref Range    Sodium 132 (L) 136 - 145 mmol/L    Potassium 5.3 3.5 - 5.5 mmol/L    Chloride 106 100 - 111 mmol/L    CO2 18 (L) 21 - 32 mmol/L    Anion Gap 8 3.0 - 18 mmol/L    Glucose 305 (H) 74 - 99 mg/dL    BUN 42 (H) 7.0 - 18 MG/DL    Creatinine 1.63 (H) 0.6 - 1.3 MG/DL    Bun/Cre Ratio 26 (H) 12 - 20      Est, Glom Filt Rate 33 (L) >60 ml/min/1.73m2    Calcium 8.2 (L) 8.5 - 10.1 MG/DL   Magnesium    Collection Time: 02/15/24 10:16 AM   Result Value Ref Range    Magnesium 1.7 1.6 - 2.6 mg/dL   Phosphorus    Collection Time: 02/15/24 10:16 AM   Result Value Ref Range    Phosphorus 2.2 (L) 2.5 - 4.9 MG/DL   Lactic Acid    Collection Time: 02/15/24 10:16 AM   Result Value Ref Range    Lactic Acid, Plasma 1.4 0.4 - 2.0 MMOL/L   POCT Glucose    Collection Time: 02/15/24 11:08 AM   Result Value Ref Range    POC Glucose 311 (H) 70 - 110 mg/dL       Signed By: Sukh Maldonado MD     February 15, 2024      I spent 35 minutes with the patient in face-to-face consultation, of which greater than 50% was spent in counseling and coordination of care as described above    Disclaimer: Sections of this note are dictated using utilizing voice recognition software.  Minor typographical errors may be present. If questions arise, please do not hesitate to contact me or call our department.

## 2024-02-15 NOTE — PROGRESS NOTES
Comprehensive Nutrition Assessment    Type and Reason for Visit:  Initial, Positive Nutrition Screen    Nutrition Recommendations/Plan:   Continue Current Diet.   Start Oral Nutrition Supplement- Plan to add Glucerna (each provides 220 kcal, 10g protein) BID.   Continue to monitor tolerance of PO, compliance of oral supplements, weight, labs, and plan of care during admission.         Malnutrition Assessment:  Malnutrition Status:  Insufficient data (02/15/24 1132)      Nutrition History and Allergies:   PMHx: DM2, CKD3 colon cancer, anemia, hypertension. Nutrition hx obtain via chart review. Per RD 10/3/23 note- pt reports UBW of ~200 lbs x 1 year and weight loss. Wt hx: 148 lb (02/14/24), 153 lb (01/18/24), weight loss of 5 lb (3%) x 1 month, 150 lb (11/22/23), weight stable x 3 months, 157 lb (08/21/23), weight loss of 9 lb (5.7%) x 6 months, 143 lb (12/13/22), weight stable x > 1 year. Pt with non significant weight loss. Based on reported UBW, weight loss of 52 lb (26%) x 1 year, significant weight loss, question accuracy of UBW vs. EMR. Will continue to follow weight trends. NKFA.     Nutrition Assessment:    Pt presents with complaints of worsening generalized weakness and malaise. Pt admitted for DKA, type 2. Positive nutrition screen noted, weight loss of 34 # or more and decreased appetite. Remote visit.  Attempted to contact pt via phone with no success. (1) meal documented in flowsheet, pt consumed 26-50% of breakfast today. Will add oral supplements to increase calorie/protein intake opportunity. Will attempt to obtain nutrition hx, weight hx and conduct NFPE on follow-up. Will continue to monitor intake.    Nutrition Related Findings:    Last BM (including prior to admit): 02/14/24  Edema: None. Pertinent Meds: Vit D3, lovenox, iron 325, lantus, humalog, synthroid, protonix, glycolax. Pertinent Labs: Na 132 (L), K 5.3 (wnl), Mag 1.7 (wnl), BUN 42 (H), Cr 1.63 (H), GFR 33 (L), calcium 8.2 (L), Phos 2.2  (L), POC Glucose 164->700 mg/dl x 24 hrs, trending down, A1c 10.8 (H) 2/14/24. Wound Type: None       Current Nutrition Intake & Therapies:    Average Meal Intake: 26-50%  Average Supplements Intake: None Ordered  ADULT DIET; Regular; 3 carb choices (45 gm/meal); Low Sodium (2 gm)    Anthropometric Measures:  Height: 167.6 cm (5' 5.98\")  Ideal Body Weight (IBW): 130 lbs (59 kg)    Admission Body Weight: 67.1 kg (148 lb)  Current Body Weight: 67.1 kg (148 lb), 113.8 % IBW. Weight Source: Bed Scale  Current BMI (kg/m2): 23.9  Usual Body Weight: 90.7 kg (200 lb)  % Weight Change (Calculated): -26  Weight Adjustment For: No Adjustment  BMI Categories: Normal Weight (BMI 22.0 to 24.9) age over 65    Estimated Daily Nutrient Needs:  Energy Requirements Based On: Formula  Weight Used for Energy Requirements: Current  Energy (kcal/day): 4167-9838 (MSJ x 1.2-1.4)  Weight Used for Protein Requirements: Current  Protein (g/day): 67-81 (1-1.2)  Method Used for Fluid Requirements: 1 ml/kcal  Fluid (ml/day): 5942-3592    Nutrition Diagnosis:   Predicted inadequate energy intake related to acute injury/trauma as evidenced by intake 26-50% (limited nutrition hx)  Altered nutrition-related lab values related to endocrine dysfuntion, renal dysfunction as evidenced by lab values    Nutrition Interventions:   Food and/or Nutrient Delivery: Continue Current Diet, Start Oral Nutrition Supplement  Nutrition Education/Counseling: No recommendation at this time  Coordination of Nutrition Care: Continue to monitor while inpatient       Goals:     Goals: Meet at least 75% of estimated needs, by next RD assessment       Nutrition Monitoring and Evaluation:   Behavioral-Environmental Outcomes: None Identified  Food/Nutrient Intake Outcomes: Food and Nutrient Intake, Supplement Intake  Physical Signs/Symptoms Outcomes: Biochemical Data, Chewing or Swallowing, Constipation, Diarrhea, GI Status, Hemodynamic Status, Fluid Status or Edema, Nutrition

## 2024-02-15 NOTE — PLAN OF CARE
Problem: Discharge Planning  Goal: Discharge to home or other facility with appropriate resources  2/15/2024 1227 by Ana Diggs RN  Outcome: Progressing  Flowsheets (Taken 2/15/2024 0848)  Discharge to home or other facility with appropriate resources: Identify barriers to discharge with patient and caregiver  2/15/2024 0103 by Abel Ponce RN  Outcome: Progressing     Problem: Safety - Adult  Goal: Free from fall injury  2/15/2024 1227 by Ana Diggs RN  Outcome: Progressing  2/15/2024 0103 by Abel Ponce RN  Outcome: Progressing  Flowsheets (Taken 2/14/2024 2200)  Free From Fall Injury: Instruct family/caregiver on patient safety     Problem: Skin/Tissue Integrity  Goal: Absence of new skin breakdown  Description: 1.  Monitor for areas of redness and/or skin breakdown  2.  Assess vascular access sites hourly  3.  Every 4-6 hours minimum:  Change oxygen saturation probe site  4.  Every 4-6 hours:  If on nasal continuous positive airway pressure, respiratory therapy assess nares and determine need for appliance change or resting period.  2/15/2024 1227 by Ana Diggs RN  Outcome: Progressing  2/15/2024 0103 by Abel Ponce RN  Outcome: Progressing     Problem: ABCDS Injury Assessment  Goal: Absence of physical injury  2/15/2024 1227 by Ana Diggs RN  Outcome: Progressing  2/15/2024 0103 by Abel Ponce RN  Outcome: Progressing     Problem: Chronic Conditions and Co-morbidities  Goal: Patient's chronic conditions and co-morbidity symptoms are monitored and maintained or improved  2/15/2024 1227 by Ana Diggs RN  Outcome: Progressing  Flowsheets (Taken 2/15/2024 0848)  Care Plan - Patient's Chronic Conditions and Co-Morbidity Symptoms are Monitored and Maintained or Improved: Monitor and assess patient's chronic conditions and comorbid symptoms for stability, deterioration, or improvement  2/15/2024 0103 by Abel Ponce RN  Outcome: Progressing     Problem: Pain  Goal:

## 2024-02-15 NOTE — PLAN OF CARE
Problem: Discharge Planning  Goal: Discharge to home or other facility with appropriate resources  Outcome: Progressing     Problem: Safety - Adult  Goal: Free from fall injury  Outcome: Progressing  Flowsheets (Taken 2/14/2024 2200)  Free From Fall Injury: Instruct family/caregiver on patient safety     Problem: Skin/Tissue Integrity  Goal: Absence of new skin breakdown  Description: 1.  Monitor for areas of redness and/or skin breakdown  2.  Assess vascular access sites hourly  3.  Every 4-6 hours minimum:  Change oxygen saturation probe site  4.  Every 4-6 hours:  If on nasal continuous positive airway pressure, respiratory therapy assess nares and determine need for appliance change or resting period.  Outcome: Progressing     Problem: ABCDS Injury Assessment  Goal: Absence of physical injury  Outcome: Progressing     Problem: Chronic Conditions and Co-morbidities  Goal: Patient's chronic conditions and co-morbidity symptoms are monitored and maintained or improved  Outcome: Progressing     Problem: Pain  Goal: Verbalizes/displays adequate comfort level or baseline comfort level  Outcome: Progressing

## 2024-02-16 VITALS
OXYGEN SATURATION: 100 % | WEIGHT: 148.59 LBS | TEMPERATURE: 97.5 F | RESPIRATION RATE: 23 BRPM | HEART RATE: 94 BPM | BODY MASS INDEX: 23.88 KG/M2 | SYSTOLIC BLOOD PRESSURE: 121 MMHG | DIASTOLIC BLOOD PRESSURE: 76 MMHG | HEIGHT: 66 IN

## 2024-02-16 LAB
GLUCOSE BLD STRIP.AUTO-MCNC: 153 MG/DL (ref 70–110)
GLUCOSE BLD STRIP.AUTO-MCNC: 217 MG/DL (ref 70–110)

## 2024-02-16 PROCEDURE — 99239 HOSP IP/OBS DSCHRG MGMT >30: CPT | Performed by: HOSPITALIST

## 2024-02-16 PROCEDURE — 6360000002 HC RX W HCPCS: Performed by: FAMILY MEDICINE

## 2024-02-16 PROCEDURE — 6370000000 HC RX 637 (ALT 250 FOR IP): Performed by: HOSPITALIST

## 2024-02-16 PROCEDURE — 94761 N-INVAS EAR/PLS OXIMETRY MLT: CPT

## 2024-02-16 PROCEDURE — 6370000000 HC RX 637 (ALT 250 FOR IP): Performed by: FAMILY MEDICINE

## 2024-02-16 PROCEDURE — 2580000003 HC RX 258: Performed by: FAMILY MEDICINE

## 2024-02-16 PROCEDURE — 82962 GLUCOSE BLOOD TEST: CPT

## 2024-02-16 RX ORDER — INSULIN GLARGINE 100 [IU]/ML
15 INJECTION, SOLUTION SUBCUTANEOUS EVERY MORNING
Qty: 10 ML | Refills: 3 | Status: SHIPPED | OUTPATIENT
Start: 2024-02-16

## 2024-02-16 RX ADMIN — SODIUM CHLORIDE, PRESERVATIVE FREE 10 ML: 5 INJECTION INTRAVENOUS at 08:16

## 2024-02-16 RX ADMIN — LEVOTHYROXINE SODIUM 150 MCG: 150 TABLET ORAL at 06:06

## 2024-02-16 RX ADMIN — PANTOPRAZOLE SODIUM 40 MG: 40 TABLET, DELAYED RELEASE ORAL at 06:07

## 2024-02-16 RX ADMIN — Medication 325 MG: at 08:15

## 2024-02-16 RX ADMIN — GABAPENTIN 300 MG: 300 CAPSULE ORAL at 08:15

## 2024-02-16 RX ADMIN — ENOXAPARIN SODIUM 30 MG: 100 INJECTION SUBCUTANEOUS at 08:15

## 2024-02-16 RX ADMIN — INSULIN LISPRO 2 UNITS: 100 INJECTION, SOLUTION INTRAVENOUS; SUBCUTANEOUS at 08:16

## 2024-02-16 ASSESSMENT — PAIN SCALES - GENERAL
PAINLEVEL_OUTOF10: 0

## 2024-02-16 NOTE — PROGRESS NOTES
Patient seen and evaluated, sitting up in recliner, no acute distress.  Patient feels better at this time.  Patient mentions that she forgets to take her Lantus at bedtime sometimes, will switch Lantus to a.m.  Continue 15 units daily.  Continue insulin sliding scale.  Resume chronic home medications.  Discharge plan discussed with patient who verbalized understanding.

## 2024-02-16 NOTE — DISCHARGE SUMMARY
Hospitalist Discharge Summary      Patient: Maria G Mayers MRN: [id number]  CSN: [id number]    YOB: 1950  Age: 73 y.o.  Sex: female    DOA: 2/14/2024 LOS:  LOS: 2 days   Discharge Date:     Admission Diagnoses: Malaise and fatigue [R53.81, R53.83]  DKA, type 2, not at goal (HCC) [E11.10]  Diabetic ketoacidosis without coma associated with type 2 diabetes mellitus (HCC) [E11.10]  Acute cough [R05.1]    Discharge Diagnoses:    ***    Discharge Condition: {condition:93946378}    Discharge To: {Discharge Destination:87314}    CODE STATUS: Full Code       PHYSICAL EXAM  Visit Vitals  BP (!) 115/41   Pulse 86   Temp 97.4 °F (36.3 °C) (Oral)   Resp 21   Ht 1.676 m (5' 5.98\")   Wt 67.4 kg (148 lb 9.4 oz)   SpO2 100%   BMI 23.99 kg/m²       General: Alert, cooperative, no acute distress    Lungs:  CTA Bilaterally. No Wheezing/Rhonchi/Rales.  Heart:  Regular rate and Rhythm.  Abdomen: Soft, Non distended, Non tender. + Bowel sounds.  Extremities: No edema/ cyanosis/ clubbing  Neurologic:  AA oriented X 3. Moves all extremities.                                HPI:    ***    Hospital Course:     ***    Discharge plan discussed with patient/family who verbalized understanding.    FOLLOW-UP RECOMMENDATIONS:     Calvin Giles MD  [address] [facility] [id number]  [phone]    Schedule an appointment as soon as possible for a visit in 2 week(s)           Consults: {consults:48276556}    Significant Diagnostic Studies:     Imaging:  CT Head W/O Contrast    Result Date: 2/14/2024  CT Of The Head Without Contrast CPT CODE:  79623 CLINICAL HISTORY: Altered mental status. TECHNIQUE: 5 mm helical scan obtained of the head.   All CT scans at this facility are performed using dose optimization techniques as appropriate to a performed exam, to include automated exposure control, adjustment of the mA and/or kV according to patient's size (including appropriate matching for site specific

## 2024-02-16 NOTE — PLAN OF CARE
Problem: Discharge Planning  Goal: Discharge to home or other facility with appropriate resources  Outcome: Adequate for Discharge  Flowsheets (Taken 2/16/2024 0807)  Discharge to home or other facility with appropriate resources: Identify barriers to discharge with patient and caregiver     Problem: Safety - Adult  Goal: Free from fall injury  Outcome: Adequate for Discharge  Flowsheets (Taken 2/16/2024 0807)  Free From Fall Injury: Instruct family/caregiver on patient safety     Problem: Skin/Tissue Integrity  Goal: Absence of new skin breakdown  Description: 1.  Monitor for areas of redness and/or skin breakdown  2.  Assess vascular access sites hourly  3.  Every 4-6 hours minimum:  Change oxygen saturation probe site  4.  Every 4-6 hours:  If on nasal continuous positive airway pressure, respiratory therapy assess nares and determine need for appliance change or resting period.  Outcome: Adequate for Discharge     Problem: ABCDS Injury Assessment  Goal: Absence of physical injury  Outcome: Adequate for Discharge     Problem: Chronic Conditions and Co-morbidities  Goal: Patient's chronic conditions and co-morbidity symptoms are monitored and maintained or improved  Outcome: Adequate for Discharge  Flowsheets (Taken 2/16/2024 0807)  Care Plan - Patient's Chronic Conditions and Co-Morbidity Symptoms are Monitored and Maintained or Improved: Monitor and assess patient's chronic conditions and comorbid symptoms for stability, deterioration, or improvement     Problem: Pain  Goal: Verbalizes/displays adequate comfort level or baseline comfort level  Outcome: Adequate for Discharge  Flowsheets (Taken 2/16/2024 0342 by Ena Quinteros, RN)  Verbalizes/displays adequate comfort level or baseline comfort level:   Encourage patient to monitor pain and request assistance   Assess pain using appropriate pain scale     Problem: Nutrition Deficit:  Goal: Optimize nutritional status  Outcome: Adequate for Discharge

## 2024-02-16 NOTE — CARE COORDINATION
Discharge order noted for today. Orders received. No needs identified at this time. Case management remains available as needed.

## 2024-02-16 NOTE — CARE COORDINATION
02/16/24 1125   Service Assessment   Patient Orientation Alert and Oriented   Cognition Alert   History Provided By Patient   Primary Caregiver Self   Support Systems Family Members   PCP Verified by CM Yes   Last Visit to PCP Within last 3 months   Prior Functional Level Independent in ADLs/IADLs;Mobility  (Ambulates with a cane)   Current Functional Level Independent in ADLs/IADLs;Mobility  (Ambulates with a cane)   Can patient return to prior living arrangement Yes   Ability to make needs known: Good   Family able to assist with home care needs: Yes   Community Resources None   Social/Functional History   Lives With Family   Type of Home House   Home Layout Two level   Home Access Stairs to enter without rails   Entrance Stairs - Number of Steps 13   Bathroom Shower/Tub Tub/Shower unit;Shower chair with back   Bathroom Toilet Standard   Bathroom Accessibility Accessible   Home Equipment Cane   Receives Help From Family   ADL Assistance Independent   Homemaking Assistance Independent   Homemaking Responsibilities No   Ambulation Assistance Independent   Transfer Assistance Independent   Active  No   Patient's  Info Daughter Amy [phone]   Mode of Transportation Car   Occupation Retired   Type of Occupation [employer]   Discharge Planning   Type of Residence House   Living Arrangements Family Members   Current Services Prior To Admission None   Potential Assistance Needed N/A   DME Ordered? No   Potential Assistance Purchasing Medications No   Type of Home Care Services None   Patient expects to be discharged to: House   One/Two Story Residence Two story   # of Interior Steps 13   Interior Rails None   Lift Chair Available No   Services At/After Discharge   Transition of Care Consult (CM Consult) N/A   Services At/After Discharge None   Mode of Transport at Discharge Self   Confirm Follow Up Transport Family   Condition of Participation: Discharge Planning   The Patient and/or Patient

## 2024-02-16 NOTE — DISCHARGE INSTR - DIET

## 2024-02-17 NOTE — PROGRESS NOTES
Physician Progress Note      PATIENT:               MARIANA MANCIA  CSN #:                  [id number]  :                       1950  ADMIT DATE:       2024 2:04 PM  DISCH DATE:        2024 11:46 AM  RESPONDING  PROVIDER #:        Sukh Maldonado MD          QUERY TEXT:    Pt admitted with DKA. Pt noted to have AMS on admission that is currently   resolved. If possible, please document in the progress notes and discharge   summary if you are evaluating and / or treating any of the following:      The medical record reflects the following:  Risk Factors: 74 yo female admitted with DKA    Clinical Indicators:  ED provider History limited by: Mildly altered   mental status; Initial considerations given significant hyperglycemia and   encephalopathy AO x 1  ?Glucose on admission to ED >700mg/dl    Treatment: Neurological assessment, Insulin drip, serial blood glucose levels        Thank you for your time,    Gertrudis CARDENAS, RN, CRCR  [facility] [place] [address]  C: [phone]    [email or web address]  Options provided:  -- Metabolic encephalopathy present on admission, currently resolved  -- Other - I will add my own diagnosis  -- Disagree - Not applicable / Not valid  -- Disagree - Clinically unable to determine / Unknown  -- Refer to Clinical Documentation Reviewer    PROVIDER RESPONSE TEXT:    This patient has metabolic encephalopathy currently resolved.    Query created by: Gertrudis Rodriguez on 2024 9:37 AM      Electronically signed by:  Sukh Maldonado MD 2024 10:47 PM

## 2024-02-25 ENCOUNTER — HOSPITAL ENCOUNTER (EMERGENCY)
Facility: HOSPITAL | Age: 74
Discharge: HOME | End: 2024-02-26
Attending: EMERGENCY MEDICINE
Payer: MEDICARE

## 2024-02-25 DIAGNOSIS — R73.9 HYPERGLYCEMIA: ICD-10-CM

## 2024-02-25 DIAGNOSIS — E11.65 HYPERGLYCEMIA DUE TO DIABETES MELLITUS (HCC): Primary | ICD-10-CM

## 2024-02-25 DIAGNOSIS — E11.10 DIABETIC KETOACIDOSIS WITHOUT COMA ASSOCIATED WITH TYPE 2 DIABETES MELLITUS (HCC): ICD-10-CM

## 2024-02-25 LAB
ALBUMIN SERPL-MCNC: 3.2 G/DL (ref 3.4–5)
ALBUMIN/GLOB SERPL: 0.8 (ref 0.8–1.7)
ALP SERPL-CCNC: 92 U/L (ref 45–117)
ALT SERPL-CCNC: 22 U/L (ref 13–56)
ANION GAP BLD CALC-SCNC: ABNORMAL MMOL/L (ref 10–20)
ANION GAP SERPL CALC-SCNC: 19 MMOL/L (ref 3–18)
AST SERPL-CCNC: 13 U/L (ref 10–38)
BASE DEFICIT BLD-SCNC: 11 MMOL/L
BASOPHILS # BLD: 0 K/UL (ref 0–0.1)
BASOPHILS NFR BLD: 0 % (ref 0–2)
BILIRUB SERPL-MCNC: 0.6 MG/DL (ref 0.2–1)
BUN SERPL-MCNC: 50 MG/DL (ref 7–18)
BUN/CREAT SERPL: 20 (ref 12–20)
CA-I BLD-MCNC: 1.05 MMOL/L (ref 1.12–1.32)
CALCIUM SERPL-MCNC: 9 MG/DL (ref 8.5–10.1)
CHLORIDE BLD-SCNC: 107 MMOL/L (ref 98–107)
CHLORIDE SERPL-SCNC: 96 MMOL/L (ref 100–111)
CO2 BLD-SCNC: 16 MMOL/L (ref 19–24)
CO2 SERPL-SCNC: 16 MMOL/L (ref 21–32)
CREAT BLD-MCNC: 1.68 MG/DL (ref 0.6–1.3)
CREAT SERPL-MCNC: 2.47 MG/DL (ref 0.6–1.3)
DIFFERENTIAL METHOD BLD: ABNORMAL
EOSINOPHIL # BLD: 0 K/UL (ref 0–0.4)
EOSINOPHIL NFR BLD: 0 % (ref 0–5)
ERYTHROCYTE [DISTWIDTH] IN BLOOD BY AUTOMATED COUNT: 14.8 % (ref 11.6–14.5)
GLOBULIN SER CALC-MCNC: 4.2 G/DL (ref 2–4)
GLUCOSE BLD STRIP.AUTO-MCNC: 365 MG/DL (ref 70–110)
GLUCOSE BLD-MCNC: 271 MG/DL (ref 65–100)
GLUCOSE SERPL-MCNC: 363 MG/DL (ref 74–99)
HCO3 BLD-SCNC: 15.5 MMOL/L (ref 22–26)
HCT VFR BLD AUTO: 35 % (ref 35–45)
HGB BLD-MCNC: 11.2 G/DL (ref 12–16)
IMM GRANULOCYTES # BLD AUTO: 0.1 K/UL (ref 0–0.04)
IMM GRANULOCYTES NFR BLD AUTO: 1 % (ref 0–0.5)
LACTATE BLD-SCNC: 2.53 MMOL/L (ref 0.4–2)
LYMPHOCYTES # BLD: 1.4 K/UL (ref 0.9–3.6)
LYMPHOCYTES NFR BLD: 13 % (ref 21–52)
MAGNESIUM SERPL-MCNC: 1.5 MG/DL (ref 1.6–2.6)
MCH RBC QN AUTO: 26.7 PG (ref 24–34)
MCHC RBC AUTO-ENTMCNC: 32 G/DL (ref 31–37)
MCV RBC AUTO: 83.3 FL (ref 78–100)
MONOCYTES # BLD: 1.2 K/UL (ref 0.05–1.2)
MONOCYTES NFR BLD: 11 % (ref 3–10)
NEUTS SEG # BLD: 7.6 K/UL (ref 1.8–8)
NEUTS SEG NFR BLD: 74 % (ref 40–73)
NRBC # BLD: 0 K/UL (ref 0–0.01)
NRBC BLD-RTO: 0 PER 100 WBC
PCO2 BLDV: 35.8 MMHG (ref 41–51)
PH BLDV: 7.24 (ref 7.32–7.42)
PHOSPHATE SERPL-MCNC: 4.3 MG/DL (ref 2.5–4.9)
PLATELET # BLD AUTO: 412 K/UL (ref 135–420)
PMV BLD AUTO: 9.7 FL (ref 9.2–11.8)
PO2 BLDV: 27 MMHG (ref 25–40)
POTASSIUM BLD-SCNC: 4.9 MMOL/L (ref 3.5–5.1)
POTASSIUM SERPL-SCNC: 4.9 MMOL/L (ref 3.5–5.5)
PROT SERPL-MCNC: 7.4 G/DL (ref 6.4–8.2)
RBC # BLD AUTO: 4.2 M/UL (ref 4.2–5.3)
SAO2 % BLD: 41 %
SERVICE CMNT-IMP: ABNORMAL
SERVICE CMNT-IMP: ABNORMAL
SODIUM BLD-SCNC: 133 MMOL/L (ref 136–145)
SODIUM SERPL-SCNC: 131 MMOL/L (ref 136–145)
SPECIMEN SITE: ABNORMAL
WBC # BLD AUTO: 10.3 K/UL (ref 4.6–13.2)

## 2024-02-25 PROCEDURE — 84100 ASSAY OF PHOSPHORUS: CPT

## 2024-02-25 PROCEDURE — 82962 GLUCOSE BLOOD TEST: CPT

## 2024-02-25 PROCEDURE — 80053 COMPREHEN METABOLIC PANEL: CPT

## 2024-02-25 PROCEDURE — 96361 HYDRATE IV INFUSION ADD-ON: CPT

## 2024-02-25 PROCEDURE — 85014 HEMATOCRIT: CPT

## 2024-02-25 PROCEDURE — 82330 ASSAY OF CALCIUM: CPT

## 2024-02-25 PROCEDURE — 82947 ASSAY GLUCOSE BLOOD QUANT: CPT

## 2024-02-25 PROCEDURE — 2580000003 HC RX 258

## 2024-02-25 PROCEDURE — 99284 EMERGENCY DEPT VISIT MOD MDM: CPT

## 2024-02-25 PROCEDURE — 83605 ASSAY OF LACTIC ACID: CPT

## 2024-02-25 PROCEDURE — 82010 KETONE BODYS QUAN: CPT

## 2024-02-25 PROCEDURE — 85025 COMPLETE CBC W/AUTO DIFF WBC: CPT

## 2024-02-25 PROCEDURE — 82803 BLOOD GASES ANY COMBINATION: CPT

## 2024-02-25 PROCEDURE — 83735 ASSAY OF MAGNESIUM: CPT

## 2024-02-25 PROCEDURE — 84295 ASSAY OF SERUM SODIUM: CPT

## 2024-02-25 PROCEDURE — 84132 ASSAY OF SERUM POTASSIUM: CPT

## 2024-02-25 RX ORDER — SODIUM CHLORIDE, SODIUM LACTATE, POTASSIUM CHLORIDE, AND CALCIUM CHLORIDE .6; .31; .03; .02 G/100ML; G/100ML; G/100ML; G/100ML
1000 INJECTION, SOLUTION INTRAVENOUS ONCE
Status: DISCONTINUED | OUTPATIENT
Start: 2024-02-25 | End: 2024-02-25

## 2024-02-25 RX ORDER — 0.9 % SODIUM CHLORIDE 0.9 %
1000 INTRAVENOUS SOLUTION INTRAVENOUS ONCE
Status: COMPLETED | OUTPATIENT
Start: 2024-02-25 | End: 2024-02-26

## 2024-02-25 RX ORDER — SODIUM CHLORIDE 9 MG/ML
1000 INJECTION, SOLUTION INTRAVENOUS ONCE
Status: COMPLETED | OUTPATIENT
Start: 2024-02-25 | End: 2024-02-26

## 2024-02-25 RX ADMIN — SODIUM CHLORIDE 1000 ML: 9 INJECTION, SOLUTION INTRAVENOUS at 23:45

## 2024-02-25 RX ADMIN — SODIUM CHLORIDE 1000 ML: 900 INJECTION, SOLUTION INTRAVENOUS at 22:30

## 2024-02-26 VITALS
HEIGHT: 66 IN | DIASTOLIC BLOOD PRESSURE: 59 MMHG | SYSTOLIC BLOOD PRESSURE: 116 MMHG | TEMPERATURE: 97.4 F | BODY MASS INDEX: 25.07 KG/M2 | HEART RATE: 100 BPM | RESPIRATION RATE: 23 BRPM | WEIGHT: 156 LBS | OXYGEN SATURATION: 99 %

## 2024-02-26 LAB
ANION GAP SERPL CALC-SCNC: 13 MMOL/L (ref 3–18)
ANION GAP SERPL CALC-SCNC: 8 MMOL/L (ref 3–18)
APPEARANCE UR: CLEAR
B-OH-BUTYR SERPL-SCNC: >4.42 MMOL/L
BACTERIA URNS QL MICRO: ABNORMAL /HPF
BILIRUB UR QL: NEGATIVE
BUN SERPL-MCNC: 44 MG/DL (ref 7–18)
BUN SERPL-MCNC: 44 MG/DL (ref 7–18)
BUN/CREAT SERPL: 21 (ref 12–20)
BUN/CREAT SERPL: 25 (ref 12–20)
CALCIUM SERPL-MCNC: 8 MG/DL (ref 8.5–10.1)
CALCIUM SERPL-MCNC: 8.1 MG/DL (ref 8.5–10.1)
CHLORIDE SERPL-SCNC: 105 MMOL/L (ref 100–111)
CHLORIDE SERPL-SCNC: 107 MMOL/L (ref 100–111)
CO2 SERPL-SCNC: 19 MMOL/L (ref 21–32)
CO2 SERPL-SCNC: 20 MMOL/L (ref 21–32)
COLOR UR: YELLOW
CREAT SERPL-MCNC: 1.74 MG/DL (ref 0.6–1.3)
CREAT SERPL-MCNC: 2.12 MG/DL (ref 0.6–1.3)
EPITH CASTS URNS QL MICRO: ABNORMAL /LPF (ref 0–5)
GLUCOSE SERPL-MCNC: 134 MG/DL (ref 74–99)
GLUCOSE SERPL-MCNC: 216 MG/DL (ref 74–99)
GLUCOSE UR STRIP.AUTO-MCNC: >1000 MG/DL
HGB UR QL STRIP: NEGATIVE
KETONES UR QL STRIP.AUTO: 80 MG/DL
LACTATE SERPL-SCNC: 1.9 MMOL/L (ref 0.4–2)
LEUKOCYTE ESTERASE UR QL STRIP.AUTO: NEGATIVE
NITRITE UR QL STRIP.AUTO: NEGATIVE
PH BLDV: 7.23 (ref 7.32–7.42)
PH UR STRIP: 5 (ref 5–8)
POTASSIUM SERPL-SCNC: 4.7 MMOL/L (ref 3.5–5.5)
POTASSIUM SERPL-SCNC: 5 MMOL/L (ref 3.5–5.5)
PROT UR STRIP-MCNC: ABNORMAL MG/DL
RBC #/AREA URNS HPF: NEGATIVE /HPF (ref 0–5)
SODIUM SERPL-SCNC: 135 MMOL/L (ref 136–145)
SODIUM SERPL-SCNC: 137 MMOL/L (ref 136–145)
SP GR UR REFRACTOMETRY: 1.02 (ref 1–1.03)
UROBILINOGEN UR QL STRIP.AUTO: 0.2 EU/DL (ref 0.2–1)
WBC URNS QL MICRO: NEGATIVE /HPF (ref 0–4)

## 2024-02-26 PROCEDURE — 80048 BASIC METABOLIC PNL TOTAL CA: CPT

## 2024-02-26 PROCEDURE — 2580000003 HC RX 258

## 2024-02-26 PROCEDURE — 82800 BLOOD PH: CPT

## 2024-02-26 PROCEDURE — 81001 URINALYSIS AUTO W/SCOPE: CPT

## 2024-02-26 PROCEDURE — 96375 TX/PRO/DX INJ NEW DRUG ADDON: CPT

## 2024-02-26 PROCEDURE — 83605 ASSAY OF LACTIC ACID: CPT

## 2024-02-26 PROCEDURE — 96361 HYDRATE IV INFUSION ADD-ON: CPT

## 2024-02-26 PROCEDURE — 6360000002 HC RX W HCPCS

## 2024-02-26 PROCEDURE — 96365 THER/PROPH/DIAG IV INF INIT: CPT

## 2024-02-26 PROCEDURE — 6370000000 HC RX 637 (ALT 250 FOR IP)

## 2024-02-26 RX ORDER — INSULIN LISPRO 100 [IU]/ML
5 INJECTION, SOLUTION INTRAVENOUS; SUBCUTANEOUS
Status: COMPLETED | OUTPATIENT
Start: 2024-02-26 | End: 2024-02-26

## 2024-02-26 RX ORDER — ONDANSETRON 2 MG/ML
4 INJECTION INTRAMUSCULAR; INTRAVENOUS
Status: COMPLETED | OUTPATIENT
Start: 2024-02-26 | End: 2024-02-26

## 2024-02-26 RX ORDER — MAGNESIUM SULFATE 1 G/100ML
1000 INJECTION INTRAVENOUS
Status: COMPLETED | OUTPATIENT
Start: 2024-02-26 | End: 2024-02-26

## 2024-02-26 RX ORDER — SODIUM CHLORIDE, SODIUM LACTATE, POTASSIUM CHLORIDE, AND CALCIUM CHLORIDE .6; .31; .03; .02 G/100ML; G/100ML; G/100ML; G/100ML
1000 INJECTION, SOLUTION INTRAVENOUS ONCE
Status: COMPLETED | OUTPATIENT
Start: 2024-02-26 | End: 2024-02-26

## 2024-02-26 RX ADMIN — SODIUM CHLORIDE, POTASSIUM CHLORIDE, SODIUM LACTATE AND CALCIUM CHLORIDE 1000 ML: 600; 310; 30; 20 INJECTION, SOLUTION INTRAVENOUS at 03:22

## 2024-02-26 RX ADMIN — MAGNESIUM SULFATE IN DEXTROSE 1000 MG: 10 INJECTION, SOLUTION INTRAVENOUS at 00:56

## 2024-02-26 RX ADMIN — INSULIN LISPRO 5 UNITS: 100 INJECTION, SOLUTION INTRAVENOUS; SUBCUTANEOUS at 02:23

## 2024-02-26 RX ADMIN — ONDANSETRON 4 MG: 2 INJECTION INTRAMUSCULAR; INTRAVENOUS at 01:06

## 2024-02-26 NOTE — ED NOTES
Patient's daughter reports that patient's memory has been deteriorating and patient has not been checking her blood sugar or giving herself insulin consistently.

## 2024-02-26 NOTE — ED PROVIDER NOTES
EMERGENCY DEPARTMENT HISTORY AND PHYSICAL EXAM    5:40 AM      Date: 2/25/2024  Patient Name: Maria G Mayers    History of Presenting Illness     Chief Complaint   Patient presents with    Hyperglycemia         History Provided By: the patient.     Additional History (Context): Maria G Mayers is a 73 y.o. female with   Past Medical History:   Diagnosis Date    Anemia due to stage 3b chronic kidney disease (HCC) 09/28/2022    Chronic kidney disease     Colon cancer (HCC)     Diabetes (HCC)     Hypertension     Hypothyroidism     Stage 3b chronic kidney disease (HCC) 09/28/2022    who presents for hyperglycemia.  The patient states for the past 4 days she has been hyperglycemic on home fingersticks unable to manage hyperglycemia with her home Lantus and lispro.  Patient admits to x 1 episode of diarrhea this morning.  The patient endorses a chronic cough after a COVID-19 infection 4 weeks ago.  The patient denies lightheadedness, dizziness, fever/chills, headaches, shortness of breath, wheezing, chest pressure or chest pain or palpitations.  Polydipsia and polyuria, or abdominal pain.    PCP: Calvin Giles MD    No current facility-administered medications for this encounter.     Current Outpatient Medications   Medication Sig Dispense Refill    insulin glargine (LANTUS) 100 UNIT/ML injection vial Inject 15 Units into the skin every morning 10 mL 3    lidocaine viscous hcl (XYLOCAINE) 2 % SOLN solution Place 15 mLs vaginally every 3 hours as needed for Irritation Place onto gauze and hold in place adjacent to affected tooth 100 mL 0    insulin lispro (HUMALOG) 100 UNIT/ML SOLN injection vial Inject 0-8 Units into the skin 3 times daily (with meals) 8 mL 0    alendronate (FOSAMAX) 70 MG tablet       amLODIPine (NORVASC) 5 MG tablet Take 1 tablet by mouth daily      aspirin 81 MG EC tablet Take 1 tablet by mouth daily Indications: Disease involving Lipid Deposits in the Arteries      atorvastatin (LIPITOR)

## 2024-02-26 NOTE — ED NOTES
Discharge instructions reviewed with patient and daughter Amy. Patient verbalized understanding. PIV removed. Patient transferred to wheelchair and to POV safely and in no acute distress.

## 2024-03-09 ENCOUNTER — APPOINTMENT (OUTPATIENT)
Facility: HOSPITAL | Age: 74
DRG: 871 | End: 2024-03-09
Payer: MEDICARE

## 2024-03-09 ENCOUNTER — HOSPITAL ENCOUNTER (INPATIENT)
Facility: HOSPITAL | Age: 74
LOS: 4 days | Discharge: HOME HEALTH | DRG: 871 | End: 2024-03-13
Attending: STUDENT IN AN ORGANIZED HEALTH CARE EDUCATION/TRAINING PROGRAM | Admitting: INTERNAL MEDICINE
Payer: MEDICARE

## 2024-03-09 DIAGNOSIS — E87.20 LACTIC ACIDOSIS: ICD-10-CM

## 2024-03-09 DIAGNOSIS — E87.5 HYPERKALEMIA: ICD-10-CM

## 2024-03-09 DIAGNOSIS — E87.29 HIGH ANION GAP METABOLIC ACIDOSIS: Primary | ICD-10-CM

## 2024-03-09 DIAGNOSIS — E11.10 DIABETIC KETOACIDOSIS WITHOUT COMA ASSOCIATED WITH TYPE 2 DIABETES MELLITUS (HCC): ICD-10-CM

## 2024-03-09 LAB
ALBUMIN SERPL-MCNC: 3 G/DL (ref 3.4–5)
ALBUMIN/GLOB SERPL: 0.7 (ref 0.8–1.7)
ALP SERPL-CCNC: 119 U/L (ref 45–117)
ALT SERPL-CCNC: 20 U/L (ref 13–56)
AMPHET UR QL SCN: NEGATIVE
ANION GAP SERPL CALC-SCNC: 22 MMOL/L (ref 3–18)
ANION GAP SERPL CALC-SCNC: 28 MMOL/L (ref 3–18)
ANION GAP SERPL CALC-SCNC: 32 MMOL/L (ref 3–18)
APPEARANCE UR: CLEAR
AST SERPL-CCNC: 15 U/L (ref 10–38)
B PERT DNA SPEC QL NAA+PROBE: NOT DETECTED
BACTERIA URNS QL MICRO: NEGATIVE /HPF
BARBITURATES UR QL SCN: NEGATIVE
BASOPHILS # BLD: 0.1 K/UL (ref 0–0.1)
BASOPHILS NFR BLD: 0 % (ref 0–2)
BENZODIAZ UR QL: NEGATIVE
BILIRUB SERPL-MCNC: 0.8 MG/DL (ref 0.2–1)
BILIRUB UR QL: NEGATIVE
BORDETELLA PARAPERTUSSIS BY PCR: NOT DETECTED
BUN SERPL-MCNC: 40 MG/DL (ref 7–18)
BUN SERPL-MCNC: 40 MG/DL (ref 7–18)
BUN SERPL-MCNC: 44 MG/DL (ref 7–18)
BUN/CREAT SERPL: 19 (ref 12–20)
BUN/CREAT SERPL: 19 (ref 12–20)
BUN/CREAT SERPL: 20 (ref 12–20)
C PNEUM DNA SPEC QL NAA+PROBE: NOT DETECTED
CALCIUM SERPL-MCNC: 7 MG/DL (ref 8.5–10.1)
CALCIUM SERPL-MCNC: 7.7 MG/DL (ref 8.5–10.1)
CALCIUM SERPL-MCNC: 8.8 MG/DL (ref 8.5–10.1)
CANNABINOIDS UR QL SCN: NEGATIVE
CHLORIDE SERPL-SCNC: 110 MMOL/L (ref 100–111)
CHLORIDE SERPL-SCNC: 114 MMOL/L (ref 100–111)
CHLORIDE SERPL-SCNC: 92 MMOL/L (ref 100–111)
CO2 SERPL-SCNC: 4 MMOL/L (ref 21–32)
CO2 SERPL-SCNC: 5 MMOL/L (ref 21–32)
CO2 SERPL-SCNC: 9 MMOL/L (ref 21–32)
COCAINE UR QL SCN: NEGATIVE
COLOR UR: YELLOW
CREAT SERPL-MCNC: 2.15 MG/DL (ref 0.6–1.3)
CREAT SERPL-MCNC: 2.16 MG/DL (ref 0.6–1.3)
CREAT SERPL-MCNC: 2.23 MG/DL (ref 0.6–1.3)
DIFFERENTIAL METHOD BLD: ABNORMAL
EKG ATRIAL RATE: 129 BPM
EKG DIAGNOSIS: NORMAL
EKG P AXIS: 81 DEGREES
EKG P-R INTERVAL: 128 MS
EKG Q-T INTERVAL: 322 MS
EKG QRS DURATION: 82 MS
EKG QTC CALCULATION (BAZETT): 471 MS
EKG R AXIS: 80 DEGREES
EKG T AXIS: 19 DEGREES
EKG VENTRICULAR RATE: 129 BPM
EOSINOPHIL # BLD: 0 K/UL (ref 0–0.4)
EOSINOPHIL NFR BLD: 0 % (ref 0–5)
EPITH CASTS URNS QL MICRO: NORMAL /LPF (ref 0–5)
ERYTHROCYTE [DISTWIDTH] IN BLOOD BY AUTOMATED COUNT: 16 % (ref 11.6–14.5)
EST. AVERAGE GLUCOSE BLD GHB EST-MCNC: 269 MG/DL
FLUAV SUBTYP SPEC NAA+PROBE: NOT DETECTED
FLUBV RNA SPEC QL NAA+PROBE: NOT DETECTED
GLOBULIN SER CALC-MCNC: 4.4 G/DL (ref 2–4)
GLUCOSE BLD STRIP.AUTO-MCNC: 147 MG/DL (ref 70–110)
GLUCOSE BLD STRIP.AUTO-MCNC: 148 MG/DL (ref 70–110)
GLUCOSE BLD STRIP.AUTO-MCNC: 154 MG/DL (ref 70–110)
GLUCOSE BLD STRIP.AUTO-MCNC: 182 MG/DL (ref 70–110)
GLUCOSE BLD STRIP.AUTO-MCNC: 288 MG/DL (ref 70–110)
GLUCOSE BLD STRIP.AUTO-MCNC: 344 MG/DL (ref 70–110)
GLUCOSE BLD STRIP.AUTO-MCNC: >600 MG/DL (ref 70–110)
GLUCOSE SERPL-MCNC: 263 MG/DL (ref 74–99)
GLUCOSE SERPL-MCNC: 685 MG/DL (ref 74–99)
GLUCOSE SERPL-MCNC: 939 MG/DL (ref 74–99)
GLUCOSE UR STRIP.AUTO-MCNC: >1000 MG/DL
HADV DNA SPEC QL NAA+PROBE: NOT DETECTED
HBA1C MFR BLD: 11 % (ref 4.2–5.6)
HCO3 BLDV-SCNC: 2.5 MMOL/L (ref 23–28)
HCOV 229E RNA SPEC QL NAA+PROBE: NOT DETECTED
HCOV HKU1 RNA SPEC QL NAA+PROBE: NOT DETECTED
HCOV NL63 RNA SPEC QL NAA+PROBE: NOT DETECTED
HCOV OC43 RNA SPEC QL NAA+PROBE: NOT DETECTED
HCT VFR BLD AUTO: 37.1 % (ref 35–45)
HGB BLD-MCNC: 10.8 G/DL (ref 12–16)
HGB UR QL STRIP: NEGATIVE
HMPV RNA SPEC QL NAA+PROBE: NOT DETECTED
HPIV1 RNA SPEC QL NAA+PROBE: NOT DETECTED
HPIV2 RNA SPEC QL NAA+PROBE: NOT DETECTED
HPIV3 RNA SPEC QL NAA+PROBE: NOT DETECTED
HPIV4 RNA SPEC QL NAA+PROBE: NOT DETECTED
IMM GRANULOCYTES # BLD AUTO: 0.2 K/UL (ref 0–0.04)
IMM GRANULOCYTES NFR BLD AUTO: 1 % (ref 0–0.5)
KETONES UR QL STRIP.AUTO: >160 MG/DL
L PNEUMO AG UR QL IA: NEGATIVE
LACTATE SERPL-SCNC: 4.6 MMOL/L (ref 0.4–2)
LACTATE SERPL-SCNC: 8.8 MMOL/L (ref 0.4–2)
LEUKOCYTE ESTERASE UR QL STRIP.AUTO: NEGATIVE
LYMPHOCYTES # BLD: 1.7 K/UL (ref 0.9–3.6)
LYMPHOCYTES NFR BLD: 11 % (ref 21–52)
Lab: NORMAL
M PNEUMO DNA SPEC QL NAA+PROBE: NOT DETECTED
MAGNESIUM SERPL-MCNC: 1.4 MG/DL (ref 1.6–2.6)
MAGNESIUM SERPL-MCNC: 1.7 MG/DL (ref 1.6–2.6)
MCH RBC QN AUTO: 26.5 PG (ref 24–34)
MCHC RBC AUTO-ENTMCNC: 29.1 G/DL (ref 31–37)
MCV RBC AUTO: 91.2 FL (ref 78–100)
METHADONE UR QL: NEGATIVE
MONOCYTES # BLD: 0.5 K/UL (ref 0.05–1.2)
MONOCYTES NFR BLD: 3 % (ref 3–10)
NEUTS SEG # BLD: 13.5 K/UL (ref 1.8–8)
NEUTS SEG NFR BLD: 85 % (ref 40–73)
NITRITE UR QL STRIP.AUTO: NEGATIVE
NRBC # BLD: 0 K/UL (ref 0–0.01)
NRBC BLD-RTO: 0 PER 100 WBC
OPIATES UR QL: NEGATIVE
PCO2 BLDV: 18.4 MMHG (ref 41–51)
PCP UR QL: NEGATIVE
PH BLDV: 6.75 (ref 7.32–7.42)
PH UR STRIP: 5 (ref 5–8)
PHOSPHATE SERPL-MCNC: 3.8 MG/DL (ref 2.5–4.9)
PHOSPHATE SERPL-MCNC: 5.8 MG/DL (ref 2.5–4.9)
PLATELET # BLD AUTO: 482 K/UL (ref 135–420)
PMV BLD AUTO: 10.2 FL (ref 9.2–11.8)
PO2 BLDV: 84 MMHG (ref 25–40)
POTASSIUM SERPL-SCNC: 4.3 MMOL/L (ref 3.5–5.5)
POTASSIUM SERPL-SCNC: 4.5 MMOL/L (ref 3.5–5.5)
POTASSIUM SERPL-SCNC: 6.1 MMOL/L (ref 3.5–5.5)
PROT SERPL-MCNC: 7.4 G/DL (ref 6.4–8.2)
PROT UR STRIP-MCNC: ABNORMAL MG/DL
RBC # BLD AUTO: 4.07 M/UL (ref 4.2–5.3)
RBC #/AREA URNS HPF: NORMAL /HPF (ref 0–5)
RSV RNA SPEC QL NAA+PROBE: NOT DETECTED
RV+EV RNA SPEC QL NAA+PROBE: NOT DETECTED
S PNEUM AG UR QL: NEGATIVE
SAO2 % BLDV: 79.2 % (ref 65–88)
SARS-COV-2 RNA RESP QL NAA+PROBE: NOT DETECTED
SERVICE CMNT-IMP: ABNORMAL
SODIUM SERPL-SCNC: 128 MMOL/L (ref 136–145)
SODIUM SERPL-SCNC: 143 MMOL/L (ref 136–145)
SODIUM SERPL-SCNC: 145 MMOL/L (ref 136–145)
SP GR UR REFRACTOMETRY: 1.02 (ref 1–1.03)
SPECIMEN TYPE: ABNORMAL
TROPONIN I SERPL HS-MCNC: 8 NG/L (ref 0–54)
UROBILINOGEN UR QL STRIP.AUTO: 0.2 EU/DL (ref 0.2–1)
WBC # BLD AUTO: 16 K/UL (ref 4.6–13.2)
WBC URNS QL MICRO: NEGATIVE /HPF (ref 0–4)

## 2024-03-09 PROCEDURE — 80048 BASIC METABOLIC PNL TOTAL CA: CPT

## 2024-03-09 PROCEDURE — 84295 ASSAY OF SERUM SODIUM: CPT

## 2024-03-09 PROCEDURE — 82962 GLUCOSE BLOOD TEST: CPT

## 2024-03-09 PROCEDURE — 96361 HYDRATE IV INFUSION ADD-ON: CPT

## 2024-03-09 PROCEDURE — 2580000003 HC RX 258: Performed by: STUDENT IN AN ORGANIZED HEALTH CARE EDUCATION/TRAINING PROGRAM

## 2024-03-09 PROCEDURE — 2500000003 HC RX 250 WO HCPCS: Performed by: STUDENT IN AN ORGANIZED HEALTH CARE EDUCATION/TRAINING PROGRAM

## 2024-03-09 PROCEDURE — 84100 ASSAY OF PHOSPHORUS: CPT

## 2024-03-09 PROCEDURE — 93005 ELECTROCARDIOGRAM TRACING: CPT | Performed by: STUDENT IN AN ORGANIZED HEALTH CARE EDUCATION/TRAINING PROGRAM

## 2024-03-09 PROCEDURE — 82803 BLOOD GASES ANY COMBINATION: CPT

## 2024-03-09 PROCEDURE — 6360000002 HC RX W HCPCS: Performed by: STUDENT IN AN ORGANIZED HEALTH CARE EDUCATION/TRAINING PROGRAM

## 2024-03-09 PROCEDURE — 87449 NOS EACH ORGANISM AG IA: CPT

## 2024-03-09 PROCEDURE — 80053 COMPREHEN METABOLIC PANEL: CPT

## 2024-03-09 PROCEDURE — 85025 COMPLETE CBC W/AUTO DIFF WBC: CPT

## 2024-03-09 PROCEDURE — 6370000000 HC RX 637 (ALT 250 FOR IP): Performed by: STUDENT IN AN ORGANIZED HEALTH CARE EDUCATION/TRAINING PROGRAM

## 2024-03-09 PROCEDURE — 74176 CT ABD & PELVIS W/O CONTRAST: CPT

## 2024-03-09 PROCEDURE — 96375 TX/PRO/DX INJ NEW DRUG ADDON: CPT

## 2024-03-09 PROCEDURE — 85014 HEMATOCRIT: CPT

## 2024-03-09 PROCEDURE — 71045 X-RAY EXAM CHEST 1 VIEW: CPT

## 2024-03-09 PROCEDURE — 83036 HEMOGLOBIN GLYCOSYLATED A1C: CPT

## 2024-03-09 PROCEDURE — 36415 COLL VENOUS BLD VENIPUNCTURE: CPT

## 2024-03-09 PROCEDURE — 87086 URINE CULTURE/COLONY COUNT: CPT

## 2024-03-09 PROCEDURE — APPSS45 APP SPLIT SHARED TIME 31-45 MINUTES: Performed by: PHYSICIAN ASSISTANT

## 2024-03-09 PROCEDURE — 2000000000 HC ICU R&B

## 2024-03-09 PROCEDURE — 96374 THER/PROPH/DIAG INJ IV PUSH: CPT

## 2024-03-09 PROCEDURE — 99291 CRITICAL CARE FIRST HOUR: CPT | Performed by: INTERNAL MEDICINE

## 2024-03-09 PROCEDURE — 2580000003 HC RX 258: Performed by: REGISTERED NURSE

## 2024-03-09 PROCEDURE — 87040 BLOOD CULTURE FOR BACTERIA: CPT

## 2024-03-09 PROCEDURE — 84484 ASSAY OF TROPONIN QUANT: CPT

## 2024-03-09 PROCEDURE — 0202U NFCT DS 22 TRGT SARS-COV-2: CPT

## 2024-03-09 PROCEDURE — 81001 URINALYSIS AUTO W/SCOPE: CPT

## 2024-03-09 PROCEDURE — 99285 EMERGENCY DEPT VISIT HI MDM: CPT

## 2024-03-09 PROCEDURE — 82330 ASSAY OF CALCIUM: CPT

## 2024-03-09 PROCEDURE — 93010 ELECTROCARDIOGRAM REPORT: CPT | Performed by: INTERNAL MEDICINE

## 2024-03-09 PROCEDURE — 80307 DRUG TEST PRSMV CHEM ANLYZR: CPT

## 2024-03-09 PROCEDURE — 83605 ASSAY OF LACTIC ACID: CPT

## 2024-03-09 PROCEDURE — 83735 ASSAY OF MAGNESIUM: CPT

## 2024-03-09 RX ORDER — SODIUM CHLORIDE, SODIUM LACTATE, POTASSIUM CHLORIDE, AND CALCIUM CHLORIDE .6; .31; .03; .02 G/100ML; G/100ML; G/100ML; G/100ML
1000 INJECTION, SOLUTION INTRAVENOUS ONCE
Status: COMPLETED | OUTPATIENT
Start: 2024-03-09 | End: 2024-03-09

## 2024-03-09 RX ORDER — SODIUM CHLORIDE 9 MG/ML
INJECTION, SOLUTION INTRAVENOUS CONTINUOUS
Status: DISCONTINUED | OUTPATIENT
Start: 2024-03-09 | End: 2024-03-09

## 2024-03-09 RX ORDER — DEXTROSE AND SODIUM CHLORIDE 5; .45 G/100ML; G/100ML
INJECTION, SOLUTION INTRAVENOUS CONTINUOUS PRN
Status: DISCONTINUED | OUTPATIENT
Start: 2024-03-09 | End: 2024-03-13 | Stop reason: HOSPADM

## 2024-03-09 RX ORDER — 0.9 % SODIUM CHLORIDE 0.9 %
30 INTRAVENOUS SOLUTION INTRAVENOUS ONCE
Status: COMPLETED | OUTPATIENT
Start: 2024-03-09 | End: 2024-03-09

## 2024-03-09 RX ORDER — POTASSIUM CHLORIDE 7.45 MG/ML
10 INJECTION INTRAVENOUS PRN
Status: DISCONTINUED | OUTPATIENT
Start: 2024-03-09 | End: 2024-03-10

## 2024-03-09 RX ORDER — 0.9 % SODIUM CHLORIDE 0.9 %
15 INTRAVENOUS SOLUTION INTRAVENOUS ONCE
Status: COMPLETED | OUTPATIENT
Start: 2024-03-09 | End: 2024-03-09

## 2024-03-09 RX ORDER — MAGNESIUM SULFATE IN WATER 40 MG/ML
2000 INJECTION, SOLUTION INTRAVENOUS PRN
Status: DISCONTINUED | OUTPATIENT
Start: 2024-03-09 | End: 2024-03-13 | Stop reason: HOSPADM

## 2024-03-09 RX ADMIN — MAGNESIUM SULFATE HEPTAHYDRATE 2000 MG: 40 INJECTION, SOLUTION INTRAVENOUS at 20:54

## 2024-03-09 RX ADMIN — POTASSIUM CHLORIDE 10 MEQ: 7.46 INJECTION, SOLUTION INTRAVENOUS at 22:26

## 2024-03-09 RX ADMIN — SODIUM BICARBONATE: 84 INJECTION, SOLUTION INTRAVENOUS at 13:25

## 2024-03-09 RX ADMIN — POTASSIUM CHLORIDE 10 MEQ: 7.46 INJECTION, SOLUTION INTRAVENOUS at 21:17

## 2024-03-09 RX ADMIN — SODIUM CHLORIDE, POTASSIUM CHLORIDE, SODIUM LACTATE AND CALCIUM CHLORIDE 1000 ML: 600; 310; 30; 20 INJECTION, SOLUTION INTRAVENOUS at 20:46

## 2024-03-09 RX ADMIN — INSULIN HUMAN 32.46 UNITS/HR: 1 INJECTION, SOLUTION INTRAVENOUS at 16:46

## 2024-03-09 RX ADMIN — SODIUM CHLORIDE 2313 ML: 900 INJECTION, SOLUTION INTRAVENOUS at 10:50

## 2024-03-09 RX ADMIN — WATER 2000 MG: 1 INJECTION INTRAMUSCULAR; INTRAVENOUS; SUBCUTANEOUS at 11:31

## 2024-03-09 RX ADMIN — SODIUM CHLORIDE 1157 ML: 9 INJECTION, SOLUTION INTRAVENOUS at 12:24

## 2024-03-09 RX ADMIN — CEFEPIME 2000 MG: 2 INJECTION, POWDER, FOR SOLUTION INTRAVENOUS at 23:40

## 2024-03-09 RX ADMIN — SODIUM CHLORIDE: 9 INJECTION, SOLUTION INTRAVENOUS at 13:00

## 2024-03-09 RX ADMIN — DEXTROSE AND SODIUM CHLORIDE: 5; 450 INJECTION, SOLUTION INTRAVENOUS at 20:12

## 2024-03-09 RX ADMIN — INSULIN HUMAN 10.82 UNITS/HR: 1 INJECTION, SOLUTION INTRAVENOUS at 11:47

## 2024-03-09 RX ADMIN — SODIUM BICARBONATE 50 MEQ: 84 INJECTION INTRAVENOUS at 12:24

## 2024-03-09 ASSESSMENT — PAIN - FUNCTIONAL ASSESSMENT: PAIN_FUNCTIONAL_ASSESSMENT: NONE - DENIES PAIN

## 2024-03-09 NOTE — H&P
[facility]  Pulmonary, Critical Care, and Sleep Medicine    Name: Maria G Mayers MRN: [id number]   : 1950 Hospital: [facility]   Date: 3/9/2024        Critical Care History and Physical      IMPRESSION:   Diabetic ketoacidosis - requiring insulin gtt  Acute toxic/metabolic encephalopathy - likely 2/2 above vs ?dementia  Sepsis - unknown source, leukocytosis, elevated procal 42, ?UTI   High anion gap metabolic acidosis - initial pH 6.87, s/p bicarb gtt  Acute renal failure - likely prerenal in the setting of dehydration   Lactic acidosis  Electrolyte derangements  hyper K+  Hx of DM2  Hx of HTN  Hx of GERD  Hx of CKD3  Hx of hypothyroidism  Hx of colon cancer - s/p resection 7-8 years ago  Hx of COPD - does not see a pulmonologist  Tobacco abuse     Patient Active Problem List   Diagnosis    Hyperkalemia    DKA (diabetic ketoacidosis) (HCC)    Primary hypertension    Acquired hypothyroidism    History of colon cancer    LIA (acute kidney injury) (HCC)    DKA, type 2 (HCC)    Stage 3b chronic kidney disease (HCC)    SIRS (systemic inflammatory response syndrome) (HCC)    High anion gap metabolic acidosis    Anemia due to stage 3b chronic kidney disease (HCC)    Compression fracture of L1 vertebra with routine healing    Diabetic ketoacidosis without coma associated with diabetes mellitus due to underlying condition (HCC)    DKA, type 2, not at goal (HCC)    Lactic acidosis    Nausea and vomiting        RECOMMENDATIONS:   Neuro:  Monitor neuro status closely with serial exams. Avoid sedating medications.   Pulm: Supplemental O2 via NC PRN, titrate flow for goal SPO2> 90% pulmonary hygiene care, Aspiration precautions, Keep HOB >30 degrees  CVS :Monitor Check cardiac panel, .   Fluids: s/p 3L in the ED. S/p bicarb gtt. Start LR @50cc/hr  GI: SUP, Trend LFTs, Zofran PRN for N/V, NPO  Renal:  Trend Renal indices, Strict Is/Os, RN instructed to insert high  Hem/Onc: Monitor for

## 2024-03-09 NOTE — ED NOTES
.TRANSFER - OUT REPORT:    Verbal report given to Amie on Maria G Mayers  being transferred to ICU for continuum of care.    Report consisted of patient's Situation, Background, Assessment and   Recommendations(SBAR).     Information from the following report(s) Nurse Handoff Report was reviewed with the receiving nurse.        Lines:   Peripheral IV 03/09/24 Right Antecubital (Active)       Peripheral IV 03/09/24 Left Antecubital (Active)       Peripheral IV 03/09/24 Left;Proximal Forearm (Active)        Opportunity for questions and clarification was provided.      Patient transported with:  Registered Nurse

## 2024-03-09 NOTE — ED NOTES
Straight catheter done. Drained 480ml of yellowish to clear urine sample, no obvious blood noted.

## 2024-03-09 NOTE — ED TRIAGE NOTES
Patient arrived via EMS from home with Hyperglycemia. Per ems per patient's daughter, patient is not taking care of herself and patient not taking medications.

## 2024-03-09 NOTE — ED PROVIDER NOTES
EMERGENCY DEPARTMENT HISTORY AND PHYSICAL EXAM      Date: 3/9/2024  Patient Name: Maria G Mayers    History of Presenting Illness     Chief Complaint   Patient presents with    Hyperglycemia       73-year-old female with history of diabetes, hypothyroidism, hypertension, stage III CKD presenting to the emergency department for evaluation of altered mental status and hyperglycemia.  Per patient's daughter is told that EMS, patient has been noncompliant with her medications and is likely not taking them appropriately over 1 month.  She has multiple admissions for DKA previously.          PCP: Calvin Giles MD    Current Facility-Administered Medications   Medication Dose Route Frequency Provider Last Rate Last Admin    ceFEPIme (MAXIPIME) 2,000 mg in sodium chloride 0.9 % 100 mL IVPB (mini-bag)  2,000 mg IntraVENous Q12H Calvin Del Toro DO        insulin regular (MYXREDLIN) 100 units in sodium chloride 0.9 % 100 ml infusion  0.1-50 Units/hr IntraVENous Continuous Calvin Del Toro, DO 32.5 mL/hr at 03/09/24 1646 32.46 Units/hr at 03/09/24 1646    dextrose bolus 10% 125 mL  125 mL IntraVENous PRN Calvin Del Toro, DO        Or    dextrose bolus 10% 250 mL  250 mL IntraVENous PRN Calvin Del Toro, DO        potassium chloride 10 mEq/100 mL IVPB (Peripheral Line)  10 mEq IntraVENous PRN Calvin Del Toro, DO        magnesium sulfate 2000 mg in 50 mL IVPB premix  2,000 mg IntraVENous PRN Calvin Del Toro, DO        sodium phosphate 15 mmol in sodium chloride 0.9 % 250 mL IVPB  15 mmol IntraVENous PRN Calvin Del Toro, DO        dextrose 5 % and 0.45 % sodium chloride infusion   IntraVENous Continuous PRN Calvin Del Toro DO        0.9 % sodium chloride infusion   IntraVENous Continuous Calvin Del Toro  mL/hr at 03/09/24 1300 New Bag at 03/09/24 1300    sodium bicarbonate 100 mEq in dextrose 5 % 1,000 mL infusion   IntraVENous Continuous Calvin Del Toro, DO 50 mL/hr at 03/09/24 1325 New Bag at

## 2024-03-10 LAB
ANION GAP SERPL CALC-SCNC: 10 MMOL/L (ref 3–18)
ANION GAP SERPL CALC-SCNC: 14 MMOL/L (ref 3–18)
ANION GAP SERPL CALC-SCNC: 9 MMOL/L (ref 3–18)
BACTERIA SPEC CULT: NORMAL
BASOPHILS # BLD: 0 K/UL (ref 0–0.1)
BASOPHILS NFR BLD: 0 % (ref 0–2)
BUN SERPL-MCNC: 29 MG/DL (ref 7–18)
BUN SERPL-MCNC: 35 MG/DL (ref 7–18)
BUN SERPL-MCNC: 39 MG/DL (ref 7–18)
BUN/CREAT SERPL: 21 (ref 12–20)
BUN/CREAT SERPL: 23 (ref 12–20)
BUN/CREAT SERPL: 23 (ref 12–20)
CA-I SERPL-SCNC: 0.94 MMOL/L (ref 1.15–1.33)
CALCIUM SERPL-MCNC: 7 MG/DL (ref 8.5–10.1)
CALCIUM SERPL-MCNC: 7.4 MG/DL (ref 8.5–10.1)
CALCIUM SERPL-MCNC: 7.5 MG/DL (ref 8.5–10.1)
CHLORIDE SERPL-SCNC: 109 MMOL/L (ref 100–111)
CHLORIDE SERPL-SCNC: 110 MMOL/L (ref 100–111)
CHLORIDE SERPL-SCNC: 111 MMOL/L (ref 100–111)
CO2 SERPL-SCNC: 18 MMOL/L (ref 21–32)
CO2 SERPL-SCNC: 20 MMOL/L (ref 21–32)
CO2 SERPL-SCNC: 21 MMOL/L (ref 21–32)
CREAT SERPL-MCNC: 1.37 MG/DL (ref 0.6–1.3)
CREAT SERPL-MCNC: 1.49 MG/DL (ref 0.6–1.3)
CREAT SERPL-MCNC: 1.68 MG/DL (ref 0.6–1.3)
DIFFERENTIAL METHOD BLD: ABNORMAL
EOSINOPHIL # BLD: 0 K/UL (ref 0–0.4)
EOSINOPHIL NFR BLD: 0 % (ref 0–5)
ERYTHROCYTE [DISTWIDTH] IN BLOOD BY AUTOMATED COUNT: 15.7 % (ref 11.6–14.5)
GLUCOSE BLD STRIP.AUTO-MCNC: 116 MG/DL (ref 70–110)
GLUCOSE BLD STRIP.AUTO-MCNC: 136 MG/DL (ref 70–110)
GLUCOSE BLD STRIP.AUTO-MCNC: 137 MG/DL (ref 70–110)
GLUCOSE BLD STRIP.AUTO-MCNC: 139 MG/DL (ref 70–110)
GLUCOSE BLD STRIP.AUTO-MCNC: 139 MG/DL (ref 70–110)
GLUCOSE BLD STRIP.AUTO-MCNC: 143 MG/DL (ref 70–110)
GLUCOSE BLD STRIP.AUTO-MCNC: 150 MG/DL (ref 70–110)
GLUCOSE BLD STRIP.AUTO-MCNC: 152 MG/DL (ref 70–110)
GLUCOSE BLD STRIP.AUTO-MCNC: 158 MG/DL (ref 70–110)
GLUCOSE BLD STRIP.AUTO-MCNC: 169 MG/DL (ref 70–110)
GLUCOSE BLD STRIP.AUTO-MCNC: 169 MG/DL (ref 70–110)
GLUCOSE BLD STRIP.AUTO-MCNC: 183 MG/DL (ref 70–110)
GLUCOSE BLD STRIP.AUTO-MCNC: 195 MG/DL (ref 70–110)
GLUCOSE BLD STRIP.AUTO-MCNC: 207 MG/DL (ref 70–110)
GLUCOSE BLD STRIP.AUTO-MCNC: 214 MG/DL (ref 70–110)
GLUCOSE BLD STRIP.AUTO-MCNC: 235 MG/DL (ref 70–110)
GLUCOSE BLD STRIP.AUTO-MCNC: 314 MG/DL (ref 70–110)
GLUCOSE SERPL-MCNC: 142 MG/DL (ref 74–99)
GLUCOSE SERPL-MCNC: 165 MG/DL (ref 74–99)
GLUCOSE SERPL-MCNC: 208 MG/DL (ref 74–99)
HCT VFR BLD AUTO: 27.5 % (ref 35–45)
HGB BLD-MCNC: 8.8 G/DL (ref 12–16)
IMM GRANULOCYTES # BLD AUTO: 0.3 K/UL (ref 0–0.04)
IMM GRANULOCYTES NFR BLD AUTO: 2 % (ref 0–0.5)
LACTATE SERPL-SCNC: 4.4 MMOL/L (ref 0.4–2)
LACTATE SERPL-SCNC: 5.8 MMOL/L (ref 0.4–2)
LYMPHOCYTES # BLD: 1.5 K/UL (ref 0.9–3.6)
LYMPHOCYTES NFR BLD: 8 % (ref 21–52)
MAGNESIUM SERPL-MCNC: 1.8 MG/DL (ref 1.6–2.6)
MAGNESIUM SERPL-MCNC: 1.9 MG/DL (ref 1.6–2.6)
MAGNESIUM SERPL-MCNC: 2.2 MG/DL (ref 1.6–2.6)
MCH RBC QN AUTO: 26.2 PG (ref 24–34)
MCHC RBC AUTO-ENTMCNC: 32 G/DL (ref 31–37)
MCV RBC AUTO: 81.8 FL (ref 78–100)
MONOCYTES # BLD: 1.6 K/UL (ref 0.05–1.2)
MONOCYTES NFR BLD: 8 % (ref 3–10)
NEUTS SEG # BLD: 15 K/UL (ref 1.8–8)
NEUTS SEG NFR BLD: 82 % (ref 40–73)
NRBC # BLD: 0 K/UL (ref 0–0.01)
NRBC BLD-RTO: 0 PER 100 WBC
PH BLDV: 7.35 (ref 7.32–7.42)
PHOSPHATE SERPL-MCNC: 1.3 MG/DL (ref 2.5–4.9)
PHOSPHATE SERPL-MCNC: 1.6 MG/DL (ref 2.5–4.9)
PHOSPHATE SERPL-MCNC: 1.6 MG/DL (ref 2.5–4.9)
PLATELET # BLD AUTO: 325 K/UL (ref 135–420)
PMV BLD AUTO: 10.1 FL (ref 9.2–11.8)
POTASSIUM SERPL-SCNC: 4.3 MMOL/L (ref 3.5–5.5)
POTASSIUM SERPL-SCNC: 4.6 MMOL/L (ref 3.5–5.5)
POTASSIUM SERPL-SCNC: 4.7 MMOL/L (ref 3.5–5.5)
RBC # BLD AUTO: 3.36 M/UL (ref 4.2–5.3)
SERVICE CMNT-IMP: NORMAL
SODIUM SERPL-SCNC: 139 MMOL/L (ref 136–145)
SODIUM SERPL-SCNC: 140 MMOL/L (ref 136–145)
SODIUM SERPL-SCNC: 143 MMOL/L (ref 136–145)
VANCOMYCIN SERPL-MCNC: 18.6 UG/ML (ref 5–40)
WBC # BLD AUTO: 18.4 K/UL (ref 4.6–13.2)

## 2024-03-10 PROCEDURE — 80202 ASSAY OF VANCOMYCIN: CPT

## 2024-03-10 PROCEDURE — 99291 CRITICAL CARE FIRST HOUR: CPT | Performed by: INTERNAL MEDICINE

## 2024-03-10 PROCEDURE — 2580000003 HC RX 258: Performed by: INTERNAL MEDICINE

## 2024-03-10 PROCEDURE — 6370000000 HC RX 637 (ALT 250 FOR IP)

## 2024-03-10 PROCEDURE — 6360000002 HC RX W HCPCS: Performed by: STUDENT IN AN ORGANIZED HEALTH CARE EDUCATION/TRAINING PROGRAM

## 2024-03-10 PROCEDURE — 2580000003 HC RX 258: Performed by: REGISTERED NURSE

## 2024-03-10 PROCEDURE — 2000000000 HC ICU R&B

## 2024-03-10 PROCEDURE — 85025 COMPLETE CBC W/AUTO DIFF WBC: CPT

## 2024-03-10 PROCEDURE — 6360000002 HC RX W HCPCS: Performed by: REGISTERED NURSE

## 2024-03-10 PROCEDURE — 2500000003 HC RX 250 WO HCPCS: Performed by: REGISTERED NURSE

## 2024-03-10 PROCEDURE — APPSS15 APP SPLIT SHARED TIME 0-15 MINUTES

## 2024-03-10 PROCEDURE — 2580000003 HC RX 258: Performed by: STUDENT IN AN ORGANIZED HEALTH CARE EDUCATION/TRAINING PROGRAM

## 2024-03-10 PROCEDURE — 6360000002 HC RX W HCPCS

## 2024-03-10 PROCEDURE — 82800 BLOOD PH: CPT

## 2024-03-10 PROCEDURE — 36415 COLL VENOUS BLD VENIPUNCTURE: CPT

## 2024-03-10 PROCEDURE — 83605 ASSAY OF LACTIC ACID: CPT

## 2024-03-10 PROCEDURE — 94761 N-INVAS EAR/PLS OXIMETRY MLT: CPT

## 2024-03-10 PROCEDURE — 83735 ASSAY OF MAGNESIUM: CPT

## 2024-03-10 PROCEDURE — 6370000000 HC RX 637 (ALT 250 FOR IP): Performed by: STUDENT IN AN ORGANIZED HEALTH CARE EDUCATION/TRAINING PROGRAM

## 2024-03-10 PROCEDURE — 84100 ASSAY OF PHOSPHORUS: CPT

## 2024-03-10 PROCEDURE — 6360000002 HC RX W HCPCS: Performed by: INTERNAL MEDICINE

## 2024-03-10 PROCEDURE — 6370000000 HC RX 637 (ALT 250 FOR IP): Performed by: INTERNAL MEDICINE

## 2024-03-10 PROCEDURE — 80048 BASIC METABOLIC PNL TOTAL CA: CPT

## 2024-03-10 PROCEDURE — 82962 GLUCOSE BLOOD TEST: CPT

## 2024-03-10 PROCEDURE — 82330 ASSAY OF CALCIUM: CPT

## 2024-03-10 RX ORDER — DEXTROSE MONOHYDRATE 100 MG/ML
INJECTION, SOLUTION INTRAVENOUS CONTINUOUS PRN
Status: DISCONTINUED | OUTPATIENT
Start: 2024-03-10 | End: 2024-03-13 | Stop reason: HOSPADM

## 2024-03-10 RX ORDER — MAGNESIUM SULFATE IN WATER 40 MG/ML
2000 INJECTION, SOLUTION INTRAVENOUS ONCE
Status: COMPLETED | OUTPATIENT
Start: 2024-03-10 | End: 2024-03-10

## 2024-03-10 RX ORDER — INSULIN LISPRO 100 [IU]/ML
1-10 INJECTION, SOLUTION INTRAVENOUS; SUBCUTANEOUS
Status: DISCONTINUED | OUTPATIENT
Start: 2024-03-10 | End: 2024-03-13 | Stop reason: HOSPADM

## 2024-03-10 RX ORDER — DEXTROSE AND SODIUM CHLORIDE 5; .45 G/100ML; G/100ML
INJECTION, SOLUTION INTRAVENOUS CONTINUOUS
Status: DISCONTINUED | OUTPATIENT
Start: 2024-03-10 | End: 2024-03-11

## 2024-03-10 RX ORDER — INSULIN GLARGINE 100 [IU]/ML
20 INJECTION, SOLUTION SUBCUTANEOUS DAILY
Status: DISCONTINUED | OUTPATIENT
Start: 2024-03-10 | End: 2024-03-12

## 2024-03-10 RX ORDER — HEPARIN SODIUM 5000 [USP'U]/ML
5000 INJECTION, SOLUTION INTRAVENOUS; SUBCUTANEOUS EVERY 8 HOURS SCHEDULED
Status: DISCONTINUED | OUTPATIENT
Start: 2024-03-10 | End: 2024-03-13 | Stop reason: HOSPADM

## 2024-03-10 RX ADMIN — DEXTROSE AND SODIUM CHLORIDE: 5; 450 INJECTION, SOLUTION INTRAVENOUS at 03:42

## 2024-03-10 RX ADMIN — VANCOMYCIN HYDROCHLORIDE 1000 MG: 1 INJECTION, POWDER, LYOPHILIZED, FOR SOLUTION INTRAVENOUS at 11:00

## 2024-03-10 RX ADMIN — HEPARIN SODIUM 5000 UNITS: 5000 INJECTION INTRAVENOUS; SUBCUTANEOUS at 21:49

## 2024-03-10 RX ADMIN — POTASSIUM PHOSPHATE, MONOBASIC POTASSIUM PHOSPHATE, DIBASIC 20 MMOL: 224; 236 INJECTION, SOLUTION, CONCENTRATE INTRAVENOUS at 08:57

## 2024-03-10 RX ADMIN — CEFEPIME 2000 MG: 2 INJECTION, POWDER, FOR SOLUTION INTRAVENOUS at 11:15

## 2024-03-10 RX ADMIN — INSULIN HUMAN 1.84 UNITS/HR: 1 INJECTION, SOLUTION INTRAVENOUS at 08:38

## 2024-03-10 RX ADMIN — INSULIN HUMAN 1.54 UNITS/HR: 1 INJECTION, SOLUTION INTRAVENOUS at 07:07

## 2024-03-10 RX ADMIN — INSULIN HUMAN 3.08 UNITS/HR: 1 INJECTION, SOLUTION INTRAVENOUS at 13:45

## 2024-03-10 RX ADMIN — INSULIN LISPRO 8 UNITS: 100 INJECTION, SOLUTION INTRAVENOUS; SUBCUTANEOUS at 21:48

## 2024-03-10 RX ADMIN — DEXTROSE AND SODIUM CHLORIDE: 5; 450 INJECTION, SOLUTION INTRAVENOUS at 16:42

## 2024-03-10 RX ADMIN — HEPARIN SODIUM 5000 UNITS: 5000 INJECTION INTRAVENOUS; SUBCUTANEOUS at 16:49

## 2024-03-10 RX ADMIN — POTASSIUM CHLORIDE 10 MEQ: 7.46 INJECTION, SOLUTION INTRAVENOUS at 05:51

## 2024-03-10 RX ADMIN — SODIUM BICARBONATE: 84 INJECTION, SOLUTION INTRAVENOUS at 05:11

## 2024-03-10 RX ADMIN — INSULIN GLARGINE 20 UNITS: 100 INJECTION, SOLUTION SUBCUTANEOUS at 13:49

## 2024-03-10 RX ADMIN — MAGNESIUM SULFATE HEPTAHYDRATE 2000 MG: 40 INJECTION, SOLUTION INTRAVENOUS at 05:59

## 2024-03-10 RX ADMIN — DEXTROSE AND SODIUM CHLORIDE: 5; 450 INJECTION, SOLUTION INTRAVENOUS at 09:35

## 2024-03-10 ASSESSMENT — PAIN SCALES - GENERAL
PAINLEVEL_OUTOF10: 0

## 2024-03-11 LAB
ANION GAP BLD CALC-SCNC: ABNORMAL MMOL/L (ref 10–20)
ANION GAP SERPL CALC-SCNC: 10 MMOL/L (ref 3–18)
BASE DEFICIT BLD-SCNC: 29.2 MMOL/L
BASOPHILS # BLD: 0 K/UL (ref 0–0.1)
BASOPHILS NFR BLD: 0 % (ref 0–2)
BODY TEMPERATURE: 98
BUN SERPL-MCNC: 11 MG/DL (ref 7–18)
BUN/CREAT SERPL: 9 (ref 12–20)
CA-I BLD-MCNC: 1.01 MMOL/L (ref 1.12–1.32)
CALCIUM SERPL-MCNC: 7.1 MG/DL (ref 8.5–10.1)
CHLORIDE BLD-SCNC: 117 MMOL/L (ref 98–107)
CHLORIDE SERPL-SCNC: 105 MMOL/L (ref 100–111)
CO2 BLD-SCNC: <5 MMOL/L (ref 19–24)
CO2 SERPL-SCNC: 23 MMOL/L (ref 21–32)
CREAT BLD-MCNC: 1.73 MG/DL (ref 0.6–1.3)
CREAT SERPL-MCNC: 1.21 MG/DL (ref 0.6–1.3)
DIFFERENTIAL METHOD BLD: ABNORMAL
EOSINOPHIL # BLD: 0 K/UL (ref 0–0.4)
EOSINOPHIL NFR BLD: 0 % (ref 0–5)
ERYTHROCYTE [DISTWIDTH] IN BLOOD BY AUTOMATED COUNT: 15.7 % (ref 11.6–14.5)
GLUCOSE BLD STRIP.AUTO-MCNC: 129 MG/DL (ref 70–110)
GLUCOSE BLD STRIP.AUTO-MCNC: 280 MG/DL (ref 70–110)
GLUCOSE BLD STRIP.AUTO-MCNC: 347 MG/DL (ref 70–110)
GLUCOSE BLD STRIP.AUTO-MCNC: 96 MG/DL (ref 70–110)
GLUCOSE BLD-MCNC: 522 MG/DL (ref 65–100)
GLUCOSE SERPL-MCNC: 267 MG/DL (ref 74–99)
HCO3 BLD-SCNC: 3 MMOL/L (ref 22–26)
HCT VFR BLD AUTO: 25.4 % (ref 35–45)
HGB BLD-MCNC: 8.4 G/DL (ref 12–16)
IMM GRANULOCYTES # BLD AUTO: 0 K/UL (ref 0–0.04)
IMM GRANULOCYTES NFR BLD AUTO: 1 % (ref 0–0.5)
LACTATE BLD-SCNC: 13.58 MMOL/L (ref 0.4–2)
LACTATE SERPL-SCNC: 1.4 MMOL/L (ref 0.4–2)
LYMPHOCYTES # BLD: 1.5 K/UL (ref 0.9–3.6)
LYMPHOCYTES NFR BLD: 18 % (ref 21–52)
MAGNESIUM SERPL-MCNC: 1.3 MG/DL (ref 1.6–2.6)
MCH RBC QN AUTO: 26.5 PG (ref 24–34)
MCHC RBC AUTO-ENTMCNC: 33.1 G/DL (ref 31–37)
MCV RBC AUTO: 80.1 FL (ref 78–100)
MONOCYTES # BLD: 0.6 K/UL (ref 0.05–1.2)
MONOCYTES NFR BLD: 8 % (ref 3–10)
NEUTS SEG # BLD: 6 K/UL (ref 1.8–8)
NEUTS SEG NFR BLD: 73 % (ref 40–73)
NRBC # BLD: 0 K/UL (ref 0–0.01)
NRBC BLD-RTO: 0 PER 100 WBC
PCO2 BLDV: 17 MMHG (ref 41–51)
PH BLDV: 6.85 (ref 7.32–7.42)
PHOSPHATE SERPL-MCNC: 1.4 MG/DL (ref 2.5–4.9)
PLATELET # BLD AUTO: 246 K/UL (ref 135–420)
PMV BLD AUTO: 9.8 FL (ref 9.2–11.8)
PO2 BLDV: 95 MMHG (ref 25–40)
POTASSIUM BLD-SCNC: 4.3 MMOL/L (ref 3.5–5.1)
POTASSIUM SERPL-SCNC: 2.9 MMOL/L (ref 3.5–5.5)
RBC # BLD AUTO: 3.17 M/UL (ref 4.2–5.3)
SAO2 % BLD: 89 %
SERVICE CMNT-IMP: ABNORMAL
SODIUM BLD-SCNC: 137 MMOL/L (ref 136–145)
SODIUM SERPL-SCNC: 138 MMOL/L (ref 136–145)
SPECIMEN SITE: ABNORMAL
TSH SERPL DL<=0.05 MIU/L-ACNC: 19 UIU/ML (ref 0.36–3.74)
VANCOMYCIN SERPL-MCNC: 16.6 UG/ML (ref 5–40)
WBC # BLD AUTO: 8.2 K/UL (ref 4.6–13.2)

## 2024-03-11 PROCEDURE — 2580000003 HC RX 258: Performed by: PHYSICIAN ASSISTANT

## 2024-03-11 PROCEDURE — 94761 N-INVAS EAR/PLS OXIMETRY MLT: CPT

## 2024-03-11 PROCEDURE — 6360000002 HC RX W HCPCS: Performed by: STUDENT IN AN ORGANIZED HEALTH CARE EDUCATION/TRAINING PROGRAM

## 2024-03-11 PROCEDURE — 2580000003 HC RX 258: Performed by: INTERNAL MEDICINE

## 2024-03-11 PROCEDURE — 6360000002 HC RX W HCPCS

## 2024-03-11 PROCEDURE — 6360000002 HC RX W HCPCS: Performed by: PHYSICIAN ASSISTANT

## 2024-03-11 PROCEDURE — 83605 ASSAY OF LACTIC ACID: CPT

## 2024-03-11 PROCEDURE — 83735 ASSAY OF MAGNESIUM: CPT

## 2024-03-11 PROCEDURE — 6370000000 HC RX 637 (ALT 250 FOR IP)

## 2024-03-11 PROCEDURE — 2500000003 HC RX 250 WO HCPCS: Performed by: PHYSICIAN ASSISTANT

## 2024-03-11 PROCEDURE — 82947 ASSAY GLUCOSE BLOOD QUANT: CPT

## 2024-03-11 PROCEDURE — 80048 BASIC METABOLIC PNL TOTAL CA: CPT

## 2024-03-11 PROCEDURE — 6360000002 HC RX W HCPCS: Performed by: INTERNAL MEDICINE

## 2024-03-11 PROCEDURE — 2580000003 HC RX 258: Performed by: STUDENT IN AN ORGANIZED HEALTH CARE EDUCATION/TRAINING PROGRAM

## 2024-03-11 PROCEDURE — 80202 ASSAY OF VANCOMYCIN: CPT

## 2024-03-11 PROCEDURE — 85025 COMPLETE CBC W/AUTO DIFF WBC: CPT

## 2024-03-11 PROCEDURE — 82962 GLUCOSE BLOOD TEST: CPT

## 2024-03-11 PROCEDURE — 6370000000 HC RX 637 (ALT 250 FOR IP): Performed by: INTERNAL MEDICINE

## 2024-03-11 PROCEDURE — 84132 ASSAY OF SERUM POTASSIUM: CPT

## 2024-03-11 PROCEDURE — 99291 CRITICAL CARE FIRST HOUR: CPT | Performed by: INTERNAL MEDICINE

## 2024-03-11 PROCEDURE — 84443 ASSAY THYROID STIM HORMONE: CPT

## 2024-03-11 PROCEDURE — 2000000000 HC ICU R&B

## 2024-03-11 PROCEDURE — 36415 COLL VENOUS BLD VENIPUNCTURE: CPT

## 2024-03-11 PROCEDURE — 2580000003 HC RX 258: Performed by: REGISTERED NURSE

## 2024-03-11 PROCEDURE — 84100 ASSAY OF PHOSPHORUS: CPT

## 2024-03-11 RX ORDER — POTASSIUM CHLORIDE 7.45 MG/ML
10 INJECTION INTRAVENOUS
Status: DISCONTINUED | OUTPATIENT
Start: 2024-03-11 | End: 2024-03-11

## 2024-03-11 RX ORDER — CALCIUM GLUCONATE 20 MG/ML
1000 INJECTION, SOLUTION INTRAVENOUS
Status: COMPLETED | OUTPATIENT
Start: 2024-03-11 | End: 2024-03-11

## 2024-03-11 RX ORDER — POTASSIUM CHLORIDE 7.45 MG/ML
10 INJECTION INTRAVENOUS PRN
Status: DISCONTINUED | OUTPATIENT
Start: 2024-03-11 | End: 2024-03-13 | Stop reason: HOSPADM

## 2024-03-11 RX ORDER — POTASSIUM CHLORIDE 20 MEQ/1
40 TABLET, EXTENDED RELEASE ORAL PRN
Status: DISCONTINUED | OUTPATIENT
Start: 2024-03-11 | End: 2024-03-13 | Stop reason: HOSPADM

## 2024-03-11 RX ADMIN — CALCIUM GLUCONATE 1000 MG: 20 INJECTION, SOLUTION INTRAVENOUS at 12:12

## 2024-03-11 RX ADMIN — POTASSIUM CHLORIDE 10 MEQ: 7.46 INJECTION, SOLUTION INTRAVENOUS at 16:32

## 2024-03-11 RX ADMIN — INSULIN LISPRO 6 UNITS: 100 INJECTION, SOLUTION INTRAVENOUS; SUBCUTANEOUS at 18:01

## 2024-03-11 RX ADMIN — CEFEPIME 2000 MG: 2 INJECTION, POWDER, FOR SOLUTION INTRAVENOUS at 00:03

## 2024-03-11 RX ADMIN — CALCIUM GLUCONATE 1000 MG: 20 INJECTION, SOLUTION INTRAVENOUS at 13:00

## 2024-03-11 RX ADMIN — MAGNESIUM SULFATE HEPTAHYDRATE 2000 MG: 40 INJECTION, SOLUTION INTRAVENOUS at 11:41

## 2024-03-11 RX ADMIN — POTASSIUM CHLORIDE 10 MEQ: 7.46 INJECTION, SOLUTION INTRAVENOUS at 17:32

## 2024-03-11 RX ADMIN — POTASSIUM CHLORIDE 10 MEQ: 7.46 INJECTION, SOLUTION INTRAVENOUS at 12:22

## 2024-03-11 RX ADMIN — POTASSIUM CHLORIDE 10 MEQ: 7.46 INJECTION, SOLUTION INTRAVENOUS at 13:22

## 2024-03-11 RX ADMIN — DEXTROSE AND SODIUM CHLORIDE: 5; 450 INJECTION, SOLUTION INTRAVENOUS at 08:52

## 2024-03-11 RX ADMIN — CEFEPIME 2000 MG: 2 INJECTION, POWDER, FOR SOLUTION INTRAVENOUS at 11:41

## 2024-03-11 RX ADMIN — INSULIN GLARGINE 20 UNITS: 100 INJECTION, SOLUTION SUBCUTANEOUS at 08:52

## 2024-03-11 RX ADMIN — MAGNESIUM SULFATE HEPTAHYDRATE 2000 MG: 40 INJECTION, SOLUTION INTRAVENOUS at 13:39

## 2024-03-11 RX ADMIN — DEXTROSE AND SODIUM CHLORIDE: 5; 450 INJECTION, SOLUTION INTRAVENOUS at 00:38

## 2024-03-11 RX ADMIN — POTASSIUM CHLORIDE 10 MEQ: 7.46 INJECTION, SOLUTION INTRAVENOUS at 15:32

## 2024-03-11 RX ADMIN — VANCOMYCIN HYDROCHLORIDE 1000 MG: 1 INJECTION, POWDER, LYOPHILIZED, FOR SOLUTION INTRAVENOUS at 12:15

## 2024-03-11 RX ADMIN — SODIUM PHOSPHATE, MONOBASIC, MONOHYDRATE AND SODIUM PHOSPHATE, DIBASIC, ANHYDROUS 24.66 MMOL: 142; 276 INJECTION, SOLUTION INTRAVENOUS at 17:31

## 2024-03-11 RX ADMIN — POTASSIUM CHLORIDE 10 MEQ: 7.46 INJECTION, SOLUTION INTRAVENOUS at 14:32

## 2024-03-11 RX ADMIN — HEPARIN SODIUM 5000 UNITS: 5000 INJECTION INTRAVENOUS; SUBCUTANEOUS at 22:05

## 2024-03-11 RX ADMIN — INSULIN LISPRO 8 UNITS: 100 INJECTION, SOLUTION INTRAVENOUS; SUBCUTANEOUS at 08:55

## 2024-03-11 ASSESSMENT — PAIN SCALES - WONG BAKER
WONGBAKER_NUMERICALRESPONSE: 0

## 2024-03-11 ASSESSMENT — PAIN SCALES - GENERAL
PAINLEVEL_OUTOF10: 0

## 2024-03-11 NOTE — CARE COORDINATION
03/11/24 1039   Service Assessment   Patient Orientation Alert and Oriented   Cognition Alert   History Provided By Patient   Primary Caregiver Self   Support Systems Family Members   Patient's Healthcare Decision Maker is: Named in Scanned ACP Document   PCP Verified by CM Yes   Last Visit to PCP Within last 3 months   Prior Functional Level Independent in ADLs/IADLs;Mobility   Current Functional Level Mobility   Can patient return to prior living arrangement Yes   Ability to make needs known: Good   Family able to assist with home care needs: Yes   Financial Resources Medicare   Community Resources None   Social/Functional History   Lives With Family   Type of Home House   Home Layout Two level   Home Access Stairs to enter without rails   Entrance Stairs - Number of Steps 13   Bathroom Shower/Tub Tub/Shower unit;Shower chair with back   Bathroom Toilet Standard   Bathroom Accessibility Accessible   Home Equipment Cane   Receives Help From Family   ADL Assistance Independent   Homemaking Assistance Independent   Homemaking Responsibilities No   Ambulation Assistance Independent   Transfer Assistance Independent   Active  No   Patient's  Info Daughter Amy [phone]   Mode of Transportation Car   Occupation Retired   Type of Occupation [employer]   Discharge Planning   Type of Residence House   Living Arrangements Family Members   Current Services Prior To Admission None   Potential Assistance Needed N/A   DME Ordered? No   Potential Assistance Purchasing Medications No   Type of Home Care Services None   Patient expects to be discharged to: House   One/Two Story Residence Two story   # of Interior Steps 13   Interior Rails None   Lift Chair Available No   Services At/After Discharge   Transition of Care Consult (CM Consult) Discharge Planning   Services At/After Discharge None   Confirm Follow Up Transport Self   Condition of Participation: Discharge Planning   The Plan for Transition of  16 yes

## 2024-03-11 NOTE — INTERDISCIPLINARY ROUNDS
[facility]  Pulmonary, Critical Care, and Sleep Medicine  Interdisciplinary and Ventilator Weaning Rounds    Patient discussed in morning walking rounds and interdisciplinary rounds.    ICU admission day: 2    Overnight events:   No significant events noted overnight    Assessments and best practice:  Ventilator   N/A  Glycemic control  Diet  regular diet    Stress ulcer prophylaxis.  not indicated  DVT prophylaxis.  SQH  Need for Lines, high assessed.  Yes  Restraint Reevaluation     N/A  Disposition regarding transferring out of the ICU  GREEN      Family contact/MPOA:   Family updated     Palliative consult within 3 days of admission to ICU-  Ethics Guidance: 21 days      Daily Plans:  D/C IVF  Check BMP    CRISTOPHER time 8 minutes        Nadira Gold PA-C  03/11/24  Pulmonary, Critical Care Medicine  [facility]

## 2024-03-12 PROBLEM — E87.6 HYPOKALEMIA: Status: ACTIVE | Noted: 2024-03-12

## 2024-03-12 PROBLEM — G93.40 ACUTE ENCEPHALOPATHY: Status: ACTIVE | Noted: 2024-03-12

## 2024-03-12 LAB
ANION GAP SERPL CALC-SCNC: 4 MMOL/L (ref 3–18)
BASOPHILS # BLD: 0 K/UL (ref 0–0.1)
BASOPHILS NFR BLD: 0 % (ref 0–2)
BUN SERPL-MCNC: 10 MG/DL (ref 7–18)
BUN/CREAT SERPL: 10 (ref 12–20)
CALCIUM SERPL-MCNC: 7.8 MG/DL (ref 8.5–10.1)
CHLORIDE SERPL-SCNC: 108 MMOL/L (ref 100–111)
CO2 SERPL-SCNC: 29 MMOL/L (ref 21–32)
CREAT SERPL-MCNC: 1 MG/DL (ref 0.6–1.3)
DIFFERENTIAL METHOD BLD: ABNORMAL
EOSINOPHIL # BLD: 0.1 K/UL (ref 0–0.4)
EOSINOPHIL NFR BLD: 2 % (ref 0–5)
ERYTHROCYTE [DISTWIDTH] IN BLOOD BY AUTOMATED COUNT: 15.8 % (ref 11.6–14.5)
GLUCOSE BLD STRIP.AUTO-MCNC: 144 MG/DL (ref 70–110)
GLUCOSE BLD STRIP.AUTO-MCNC: 167 MG/DL (ref 70–110)
GLUCOSE BLD STRIP.AUTO-MCNC: 196 MG/DL (ref 70–110)
GLUCOSE BLD STRIP.AUTO-MCNC: 244 MG/DL (ref 70–110)
GLUCOSE BLD STRIP.AUTO-MCNC: 244 MG/DL (ref 70–110)
GLUCOSE SERPL-MCNC: 79 MG/DL (ref 74–99)
HCT VFR BLD AUTO: 26.2 % (ref 35–45)
HGB BLD-MCNC: 8.3 G/DL (ref 12–16)
IMM GRANULOCYTES # BLD AUTO: 0.1 K/UL (ref 0–0.04)
IMM GRANULOCYTES NFR BLD AUTO: 1 % (ref 0–0.5)
LYMPHOCYTES # BLD: 1.8 K/UL (ref 0.9–3.6)
LYMPHOCYTES NFR BLD: 31 % (ref 21–52)
MAGNESIUM SERPL-MCNC: 2.2 MG/DL (ref 1.6–2.6)
MCH RBC QN AUTO: 25.7 PG (ref 24–34)
MCHC RBC AUTO-ENTMCNC: 31.7 G/DL (ref 31–37)
MCV RBC AUTO: 81.1 FL (ref 78–100)
MONOCYTES # BLD: 0.6 K/UL (ref 0.05–1.2)
MONOCYTES NFR BLD: 11 % (ref 3–10)
NEUTS SEG # BLD: 3.3 K/UL (ref 1.8–8)
NEUTS SEG NFR BLD: 56 % (ref 40–73)
NRBC # BLD: 0 K/UL (ref 0–0.01)
NRBC BLD-RTO: 0 PER 100 WBC
PHOSPHATE SERPL-MCNC: 2.7 MG/DL (ref 2.5–4.9)
PLATELET # BLD AUTO: 241 K/UL (ref 135–420)
PMV BLD AUTO: 10 FL (ref 9.2–11.8)
POTASSIUM SERPL-SCNC: 4.1 MMOL/L (ref 3.5–5.5)
RBC # BLD AUTO: 3.23 M/UL (ref 4.2–5.3)
SODIUM SERPL-SCNC: 141 MMOL/L (ref 136–145)
VANCOMYCIN SERPL-MCNC: 12.7 UG/ML (ref 5–40)
WBC # BLD AUTO: 5.9 K/UL (ref 4.6–13.2)

## 2024-03-12 PROCEDURE — 6370000000 HC RX 637 (ALT 250 FOR IP)

## 2024-03-12 PROCEDURE — 6360000002 HC RX W HCPCS: Performed by: PHYSICIAN ASSISTANT

## 2024-03-12 PROCEDURE — 83735 ASSAY OF MAGNESIUM: CPT

## 2024-03-12 PROCEDURE — 6370000000 HC RX 637 (ALT 250 FOR IP): Performed by: INTERNAL MEDICINE

## 2024-03-12 PROCEDURE — 80202 ASSAY OF VANCOMYCIN: CPT

## 2024-03-12 PROCEDURE — 6360000002 HC RX W HCPCS

## 2024-03-12 PROCEDURE — 6360000002 HC RX W HCPCS: Performed by: STUDENT IN AN ORGANIZED HEALTH CARE EDUCATION/TRAINING PROGRAM

## 2024-03-12 PROCEDURE — 85025 COMPLETE CBC W/AUTO DIFF WBC: CPT

## 2024-03-12 PROCEDURE — 82962 GLUCOSE BLOOD TEST: CPT

## 2024-03-12 PROCEDURE — 1100000003 HC PRIVATE W/ TELEMETRY

## 2024-03-12 PROCEDURE — 99233 SBSQ HOSP IP/OBS HIGH 50: CPT | Performed by: INTERNAL MEDICINE

## 2024-03-12 PROCEDURE — 84100 ASSAY OF PHOSPHORUS: CPT

## 2024-03-12 PROCEDURE — 36415 COLL VENOUS BLD VENIPUNCTURE: CPT

## 2024-03-12 PROCEDURE — 2580000003 HC RX 258: Performed by: PHYSICIAN ASSISTANT

## 2024-03-12 PROCEDURE — 99232 SBSQ HOSP IP/OBS MODERATE 35: CPT

## 2024-03-12 PROCEDURE — 80048 BASIC METABOLIC PNL TOTAL CA: CPT

## 2024-03-12 PROCEDURE — 94761 N-INVAS EAR/PLS OXIMETRY MLT: CPT

## 2024-03-12 PROCEDURE — 2580000003 HC RX 258: Performed by: STUDENT IN AN ORGANIZED HEALTH CARE EDUCATION/TRAINING PROGRAM

## 2024-03-12 RX ORDER — INSULIN GLARGINE 100 [IU]/ML
15 INJECTION, SOLUTION SUBCUTANEOUS DAILY
Status: DISCONTINUED | OUTPATIENT
Start: 2024-03-13 | End: 2024-03-13 | Stop reason: HOSPADM

## 2024-03-12 RX ORDER — ASPIRIN 81 MG/1
81 TABLET ORAL DAILY
Status: DISCONTINUED | OUTPATIENT
Start: 2024-03-13 | End: 2024-03-13 | Stop reason: HOSPADM

## 2024-03-12 RX ORDER — LEVOTHYROXINE SODIUM 0.15 MG/1
150 TABLET ORAL
Status: DISCONTINUED | OUTPATIENT
Start: 2024-03-13 | End: 2024-03-13 | Stop reason: HOSPADM

## 2024-03-12 RX ORDER — PANTOPRAZOLE SODIUM 40 MG/1
40 TABLET, DELAYED RELEASE ORAL
Status: DISCONTINUED | OUTPATIENT
Start: 2024-03-12 | End: 2024-03-13 | Stop reason: HOSPADM

## 2024-03-12 RX ORDER — GABAPENTIN 300 MG/1
300 CAPSULE ORAL DAILY
Status: DISCONTINUED | OUTPATIENT
Start: 2024-03-13 | End: 2024-03-13 | Stop reason: HOSPADM

## 2024-03-12 RX ADMIN — WATER 1000 MG: 1 INJECTION INTRAMUSCULAR; INTRAVENOUS; SUBCUTANEOUS at 11:00

## 2024-03-12 RX ADMIN — INSULIN LISPRO 4 UNITS: 100 INJECTION, SOLUTION INTRAVENOUS; SUBCUTANEOUS at 16:42

## 2024-03-12 RX ADMIN — INSULIN LISPRO 4 UNITS: 100 INJECTION, SOLUTION INTRAVENOUS; SUBCUTANEOUS at 12:06

## 2024-03-12 RX ADMIN — CEFEPIME 2000 MG: 2 INJECTION, POWDER, FOR SOLUTION INTRAVENOUS at 00:08

## 2024-03-12 RX ADMIN — HEPARIN SODIUM 5000 UNITS: 5000 INJECTION INTRAVENOUS; SUBCUTANEOUS at 06:24

## 2024-03-12 RX ADMIN — INSULIN LISPRO 2 UNITS: 100 INJECTION, SOLUTION INTRAVENOUS; SUBCUTANEOUS at 08:00

## 2024-03-12 RX ADMIN — PANTOPRAZOLE SODIUM 40 MG: 40 TABLET, DELAYED RELEASE ORAL at 16:42

## 2024-03-12 RX ADMIN — INSULIN GLARGINE 20 UNITS: 100 INJECTION, SOLUTION SUBCUTANEOUS at 08:00

## 2024-03-12 RX ADMIN — HEPARIN SODIUM 5000 UNITS: 5000 INJECTION INTRAVENOUS; SUBCUTANEOUS at 21:51

## 2024-03-12 ASSESSMENT — PAIN SCALES - GENERAL
PAINLEVEL_OUTOF10: 0

## 2024-03-12 ASSESSMENT — PAIN SCALES - WONG BAKER
WONGBAKER_NUMERICALRESPONSE: 0
WONGBAKER_NUMERICALRESPONSE: 0

## 2024-03-12 NOTE — INTERDISCIPLINARY ROUNDS
[facility]  Pulmonary, Critical Care, and Sleep Medicine  Interdisciplinary and Ventilator Weaning Rounds    Patient discussed in morning walking rounds and interdisciplinary rounds.    ICU admission day: 2    Overnight events:   No significant events noted overnight    Assessments and best practice:  Ventilator   N/A  Glycemic control  Diet  regular diet    Stress ulcer prophylaxis.  not indicated  DVT prophylaxis.  SQH  Need for Lines, high assessed.  Yes  Restraint Reevaluation     N/A  Disposition regarding transferring out of the ICU  GREEN      Family contact/MPOA:   Family updated     Palliative consult within 3 days of admission to ICU-  Ethics Guidance: 21 days      Daily Plans:  Downgrade, tele  D/C lennox  PT/OT    CRISTOPHER time 8 minutes        Kalani Chavez RN  03/12/24  Pulmonary, Critical Care Medicine  [facility]

## 2024-03-12 NOTE — CARE COORDINATION
03/12/24 1242   /Social Work Whiteboard Notes   /Social Work Whiteboard YELLOW-3/12/24. Patient medically cleared to downgrade from ICU. Regular diet to start. Pending PT/OT eval/treat. Dispo: home with family. Daughter Amy to provide discharge ride. VH.     Rubi Hair  Case Management

## 2024-03-13 VITALS
OXYGEN SATURATION: 98 % | RESPIRATION RATE: 18 BRPM | SYSTOLIC BLOOD PRESSURE: 100 MMHG | HEIGHT: 66 IN | HEART RATE: 100 BPM | BODY MASS INDEX: 27.32 KG/M2 | TEMPERATURE: 98.1 F | DIASTOLIC BLOOD PRESSURE: 65 MMHG | WEIGHT: 170 LBS

## 2024-03-13 LAB
ANION GAP SERPL CALC-SCNC: 6 MMOL/L (ref 3–18)
BASOPHILS # BLD: 0 K/UL (ref 0–0.1)
BASOPHILS NFR BLD: 1 % (ref 0–2)
BUN SERPL-MCNC: 17 MG/DL (ref 7–18)
BUN/CREAT SERPL: 16 (ref 12–20)
CALCIUM SERPL-MCNC: 7.9 MG/DL (ref 8.5–10.1)
CHLORIDE SERPL-SCNC: 106 MMOL/L (ref 100–111)
CO2 SERPL-SCNC: 28 MMOL/L (ref 21–32)
CREAT SERPL-MCNC: 1.06 MG/DL (ref 0.6–1.3)
DIFFERENTIAL METHOD BLD: ABNORMAL
EOSINOPHIL # BLD: 0.1 K/UL (ref 0–0.4)
EOSINOPHIL NFR BLD: 2 % (ref 0–5)
ERYTHROCYTE [DISTWIDTH] IN BLOOD BY AUTOMATED COUNT: 16.1 % (ref 11.6–14.5)
GLUCOSE BLD STRIP.AUTO-MCNC: 178 MG/DL (ref 70–110)
GLUCOSE BLD STRIP.AUTO-MCNC: 191 MG/DL (ref 70–110)
GLUCOSE SERPL-MCNC: 138 MG/DL (ref 74–99)
HCT VFR BLD AUTO: 25.6 % (ref 35–45)
HGB BLD-MCNC: 8.2 G/DL (ref 12–16)
IMM GRANULOCYTES # BLD AUTO: 0.1 K/UL (ref 0–0.04)
IMM GRANULOCYTES NFR BLD AUTO: 1 % (ref 0–0.5)
LYMPHOCYTES # BLD: 1.7 K/UL (ref 0.9–3.6)
LYMPHOCYTES NFR BLD: 31 % (ref 21–52)
MAGNESIUM SERPL-MCNC: 1.8 MG/DL (ref 1.6–2.6)
MCH RBC QN AUTO: 26.2 PG (ref 24–34)
MCHC RBC AUTO-ENTMCNC: 32 G/DL (ref 31–37)
MCV RBC AUTO: 81.8 FL (ref 78–100)
MONOCYTES # BLD: 0.5 K/UL (ref 0.05–1.2)
MONOCYTES NFR BLD: 9 % (ref 3–10)
NEUTS SEG # BLD: 3 K/UL (ref 1.8–8)
NEUTS SEG NFR BLD: 56 % (ref 40–73)
NRBC # BLD: 0 K/UL (ref 0–0.01)
NRBC BLD-RTO: 0 PER 100 WBC
PHOSPHATE SERPL-MCNC: 2.7 MG/DL (ref 2.5–4.9)
PLATELET # BLD AUTO: 211 K/UL (ref 135–420)
PMV BLD AUTO: 10.4 FL (ref 9.2–11.8)
POTASSIUM SERPL-SCNC: 4.6 MMOL/L (ref 3.5–5.5)
RBC # BLD AUTO: 3.13 M/UL (ref 4.2–5.3)
SODIUM SERPL-SCNC: 140 MMOL/L (ref 136–145)
WBC # BLD AUTO: 5.4 K/UL (ref 4.6–13.2)

## 2024-03-13 PROCEDURE — 6370000000 HC RX 637 (ALT 250 FOR IP): Performed by: INTERNAL MEDICINE

## 2024-03-13 PROCEDURE — 83735 ASSAY OF MAGNESIUM: CPT

## 2024-03-13 PROCEDURE — 6360000002 HC RX W HCPCS: Performed by: INTERNAL MEDICINE

## 2024-03-13 PROCEDURE — 80048 BASIC METABOLIC PNL TOTAL CA: CPT

## 2024-03-13 PROCEDURE — 97116 GAIT TRAINING THERAPY: CPT

## 2024-03-13 PROCEDURE — 94761 N-INVAS EAR/PLS OXIMETRY MLT: CPT

## 2024-03-13 PROCEDURE — 6370000000 HC RX 637 (ALT 250 FOR IP)

## 2024-03-13 PROCEDURE — 2580000003 HC RX 258: Performed by: PHYSICIAN ASSISTANT

## 2024-03-13 PROCEDURE — 85025 COMPLETE CBC W/AUTO DIFF WBC: CPT

## 2024-03-13 PROCEDURE — 6360000002 HC RX W HCPCS: Performed by: PHYSICIAN ASSISTANT

## 2024-03-13 PROCEDURE — 6360000002 HC RX W HCPCS

## 2024-03-13 PROCEDURE — 6370000000 HC RX 637 (ALT 250 FOR IP): Performed by: PHYSICIAN ASSISTANT

## 2024-03-13 PROCEDURE — 97162 PT EVAL MOD COMPLEX 30 MIN: CPT

## 2024-03-13 PROCEDURE — 82962 GLUCOSE BLOOD TEST: CPT

## 2024-03-13 PROCEDURE — 36415 COLL VENOUS BLD VENIPUNCTURE: CPT

## 2024-03-13 PROCEDURE — 84100 ASSAY OF PHOSPHORUS: CPT

## 2024-03-13 RX ORDER — MAGNESIUM SULFATE 1 G/100ML
1000 INJECTION INTRAVENOUS ONCE
Status: COMPLETED | OUTPATIENT
Start: 2024-03-13 | End: 2024-03-13

## 2024-03-13 RX ORDER — INSULIN GLARGINE 100 [IU]/ML
15 INJECTION, SOLUTION SUBCUTANEOUS EVERY MORNING
Qty: 10 ML | Refills: 3 | Status: SHIPPED | OUTPATIENT
Start: 2024-03-13

## 2024-03-13 RX ORDER — CEFDINIR 300 MG/1
300 CAPSULE ORAL 2 TIMES DAILY
Qty: 14 CAPSULE | Refills: 0 | Status: SHIPPED | OUTPATIENT
Start: 2024-03-13 | End: 2024-03-20

## 2024-03-13 RX ORDER — INSULIN LISPRO 100 [IU]/ML
0-8 INJECTION, SOLUTION INTRAVENOUS; SUBCUTANEOUS
Qty: 8 ML | Refills: 0 | Status: SHIPPED | OUTPATIENT
Start: 2024-03-13 | End: 2024-04-15

## 2024-03-13 RX ADMIN — INSULIN GLARGINE 15 UNITS: 100 INJECTION, SOLUTION SUBCUTANEOUS at 09:05

## 2024-03-13 RX ADMIN — LEVOTHYROXINE SODIUM 150 MCG: 150 TABLET ORAL at 09:04

## 2024-03-13 RX ADMIN — PANTOPRAZOLE SODIUM 40 MG: 40 TABLET, DELAYED RELEASE ORAL at 09:04

## 2024-03-13 RX ADMIN — INSULIN LISPRO 2 UNITS: 100 INJECTION, SOLUTION INTRAVENOUS; SUBCUTANEOUS at 12:06

## 2024-03-13 RX ADMIN — HEPARIN SODIUM 5000 UNITS: 5000 INJECTION INTRAVENOUS; SUBCUTANEOUS at 06:36

## 2024-03-13 RX ADMIN — MAGNESIUM SULFATE IN DEXTROSE 1000 MG: 10 INJECTION, SOLUTION INTRAVENOUS at 12:00

## 2024-03-13 RX ADMIN — WATER 1000 MG: 1 INJECTION INTRAMUSCULAR; INTRAVENOUS; SUBCUTANEOUS at 11:00

## 2024-03-13 RX ADMIN — ASPIRIN 81 MG: 81 TABLET, COATED ORAL at 09:04

## 2024-03-13 RX ADMIN — GABAPENTIN 300 MG: 300 CAPSULE ORAL at 09:04

## 2024-03-13 RX ADMIN — INSULIN LISPRO 2 UNITS: 100 INJECTION, SOLUTION INTRAVENOUS; SUBCUTANEOUS at 09:05

## 2024-03-13 ASSESSMENT — PAIN SCALES - GENERAL
PAINLEVEL_OUTOF10: 0

## 2024-03-13 ASSESSMENT — PAIN SCALES - WONG BAKER
WONGBAKER_NUMERICALRESPONSE: 0

## 2024-03-13 NOTE — CARE COORDINATION
Discharge order noted for today. Star rated home health choice list provided to the patient. Patient selected [facility]. Referral for home health sent to [facility]. Met with patient and she is agreeable to the transition plan today. Transport has been arranged through her daughter. Patient's discharge summary is not available. Home health  orders have been forwarded to [facility] agency via [facility]. Updated bedside RN, Kermit,  to the transition plan.  Discharge information has been documented on the AVS.       RONALD Pro, RN  Care Management  Phone: [phone]

## 2024-03-13 NOTE — PLAN OF CARE
Problem: Physical Therapy - Adult  Goal: By Discharge: Performs mobility at highest level of function for planned discharge setting.  See evaluation for individualized goals.  Description: Initiated  3/13/24  to be met within 7-10 days.    1.  Patient will move from supine to sit and sit to supine  in bed with independence.    2.  Patient will transfer from bed to chair and chair to bed with modified independence using the least restrictive device.  4.  Patient will ambulate with modified independence for 50 feet with the least restrictive device.   5.  Patient will ascend/descend 13 stairs with no handrail(s) with modified independence.    PLOF: Lives with family with 3 REBECCA into home and 13 stairs to bedroom. Mod I with SPC prior to admission.     3/13/2024 0935 by Radha Galicia, PT  Outcome: Progressing

## 2024-03-13 NOTE — CARE COORDINATION
Medicare pt has received, reviewed, and signed 2nd IM letter informing them of their right to appeal the discharge.  Signed copy has been placed on pt bedside chart.

## 2024-03-13 NOTE — PLAN OF CARE
Problem: Physical Therapy - Adult  Goal: By Discharge: Performs mobility at highest level of function for planned discharge setting.  See evaluation for individualized goals.  Description: Initiated  3/13/24  to be met within 7-10 days.    1.  Patient will move from supine to sit and sit to supine  in bed with independence.    2.  Patient will transfer from bed to chair and chair to bed with modified independence using the least restrictive device.  4.  Patient will ambulate with modified independence for 50 feet with the least restrictive device.   5.  Patient will ascend/descend 13 stairs with no handrail(s) with modified independence.    PLOF: Lives with family with 3 REBECCA into home and 13 stairs to bedroom. Mod I with SPC prior to admission.     Outcome: Progressing   PHYSICAL THERAPY EVALUATION    Patient: Maria G Mayers (73 y.o. female)  Date: 3/13/2024  Primary Diagnosis: Hyperkalemia [E87.5]  Lactic acidosis [E87.20]  DKA, type 2, not at goal (HCC) [E11.10]  High anion gap metabolic acidosis [E87.29]  Diabetic ketoacidosis without coma associated with type 2 diabetes mellitus (HCC) [E11.10]       Precautions: General Precautions,  ,  ,  ,  ,  ,  ,      ASSESSMENT :  Pt received in sitting EOB eating breakfast. Pt agreeable to PT evaluation. Pt Aox4 however was unaware that she urinated in the bed and also unaware for whether her food was breakfast or lunch, was able to identify items on tray table. Demosntrates general deficits in strength grossly 4-/5 BLE for knee extension, hip flexion, and ankle DF/PF with intact sensation. Sit to stand mod I into RW and completes standing marches with SBA demonstrating good LE clearance for stair negotiation carry-over. She is able to ambulate to recliner with SBA and intermittent lateral LOB with pivoting. Stand to sit mod I with good UE reach back to control descent. Educated pt on importance of mobility and utilizing call bell when she needs to use the bathroom. All

## 2024-03-13 NOTE — PROGRESS NOTES
Pharmacy Note     Cefepime 2 gm Q8h ordered for treatment of Sepsis. Per [facility] Policy, Cefepime will be changed to 2 grams IV x 1 followed by 2 gm Q12hr over 240 minutes.     Estimated Creatinine Clearance: Estimated Creatinine Clearance: 29 mL/min (A) (based on SCr of 1.74 mg/dL (H)).  Dialysis Status, LIA, CKD: 29 ml/min    BMI:  Body mass index is 27.44 kg/m².    Rationale for Adjustment:  [facility] B-Lactam extended infusion policy    Pharmacy will continue to monitor and adjust dose as necessary.      Please call with any questions.    Thank you,  BALDO CRISOSTOMO, [facility]  
 attended the interdisciplinary rounds for Maria G Mayers, who is a 73 y.o.,female. Patient's Primary Language is: English.   According to the patient's EMR L[employer] day Affiliation is: Congregational.     The reason the Patient came to the hospital is:   Patient Active Problem List    Diagnosis Date Noted    Diabetic ketoacidosis without coma associated with diabetes mellitus due to underlying condition (HCC) 10/31/2023    DKA, type 2, not at goal ([facility]) 10/31/2023    Lactic acidosis 10/31/2023    Nausea and vomiting 10/31/2023    Compression fracture of L1 vertebra with routine healing 10/15/2023    DKA, type 2 ([facility]) 09/28/2022    Stage 3b chronic kidney disease ([facility]) 09/28/2022    SIRS (systemic inflammatory response syndrome) ([facility]) 09/28/2022    High anion gap metabolic acidosis 09/28/2022    Anemia due to stage 3b chronic kidney disease ([facility]) 09/28/2022    Primary hypertension 06/12/2022    Acquired hypothyroidism 06/12/2022    History of colon cancer 06/12/2022    LIA (acute kidney injury) ([facility]) 06/12/2022    DKA (diabetic ketoacidosis) ([facility]) 06/11/2022    Hyperkalemia 01/19/2021          Plan:   participated and listen to the recommendations of the IDR team.    Patient appears to be moving in the right direction. Patient is able to eat. Alert and follow commands. May be transferred to [facility] soon.     Chaplains will continue to follow and will provide pastoral care on an as needed/requested basis.     recommends bedside caregivers page  on duty if patient shows signs of acute spiritual or emotional distress.     Johnny Hazleton  Staff   Spiritual Health   [phone]       
Comprehensive Nutrition Assessment    Type and Reason for Visit:  Initial, Positive Nutrition Screen    Nutrition Recommendations/Plan:   Modify PO diet to CCHO4 portions.   Continue to monitor tolerance of PO, weight, labs, and plan of care during admission.         Malnutrition Assessment:  Malnutrition Status:  Insufficient data (03/12/24 1200)      Nutrition History and Allergies:   PMHx: colon cancer, DM2 - noncomplaint, COPD, CKD, tobacco abuse . Wt hx: 161 lb (10/13/23), 153 lb (1/18/24), 148 lb (2/16/24), 170 lb (3/9/24), question accuracy of current charted wt. If accurate, wt gain of 12 lb x ~3 weeks per EMR hx. Unable to obtain bed scale at this time. Will continue to monitor wt trends in-house. NKFA per chart.    Nutrition Assessment:    Admitted for AMS, DKA. Positive nutrition screen noted, MST. Discussed care during interdisciplinary rounds. Per RN, pt tolerating diet well, ate well for breakfast this morning, daughter brings lunch/dinner in for pt. Plan to downgrade pt. Attempted to visit pt, curtain closed with RN at time of visit. Will attempt to obtain subjective info as able, obtain updated wt.    Nutrition Related Findings:    Last BM (including prior to admit): 03/10/24  Pertinent Meds: rocephin, lantus, humalog Pertinent Labs: POC Glucose  mg/dl x 24 hrs, 3/9/24: HgA1C 11 Wound Type: None       Current Nutrition Intake & Therapies:    Average Meal Intake: %  Average Supplements Intake: None Ordered  ADULT DIET; Regular    Anthropometric Measures:  Height: 167.6 cm (5' 6\")  Ideal Body Weight (IBW): 130 lbs (59 kg)    Admission Body Weight: 77.1 kg (169 lb 15.6 oz)  Current Body Weight: 77.1 kg (169 lb 15.6 oz), 130.7 % IBW. Weight Source: Stated  Current BMI (kg/m2): 27.4  BMI Categories: Overweight (BMI 25.0-29.9)    Estimated Daily Nutrient Needs:  Energy Requirements Based On: Kcal/kg  Weight Used for Energy Requirements: Admission  Energy (kcal/day): 5434-3486 (25-30)  Weight 
OT orders received and medical chart review completed.   Pt reports she is at baseline as independent with ADLs and functional mobility w/o AD. Formal OT evaluation not indicated at this time. OT to sign off.     
Patient discharged. Patient provided with AVS and discussed thoroughly. Patient verbalizes understanding. Patient escorted to front entrance via wheelchair.   
[facility]   Pharmacy Pharmacokinetic Monitoring Service - Vancomycin      Maria G Mayers is a 73 y.o. female starting on vancomycin therapy for Sepsis of Unknown Etiology. Pharmacy consulted for monitoring and adjustment.    Target Concentration: Dosing based on anticipated concentration <15 mg/L due to renal impairment/insufficiency    Additional Antimicrobials: Cefepime    Pertinent Laboratory Values:   Temp: 98.7 °F (37.1 °C), Weight - Scale: 77.1 kg (170 lb)  Recent Labs     03/09/24  1005   WBC 16.0*     Estimated Creatinine Clearance: 29 mL/min (A) (based on SCr of 1.74 mg/dL (H)).    Pertinent Cultures:  Culture Date Source Results   3/9/24 Blood pending   MRSA Nasal Swab: N/A. Non-respiratory infection    Plan:  Concentration-guided dosing due to renal impairment/insufficiency  Start vancomycin 1500 mg IV x 1 dose   Will dose according to vancomycin concentration levels at this time due to renal insufficiency  Renal labs as indicated   Vancomycin concentration ordered for AM labs tomorrow just in case a consult is entered  5.   Pharmacy will continue to monitor patient and adjust therapy as indicated    Thank you for the consult,  BALDO CRISOSTOMO [facility]  3/9/2024  
[facility]   Pharmacy Pharmacokinetic Monitoring Service - Vancomycin    Indication: sepsis  Goal level: 10-20 mg/L  Day of Therapy: 2  Additional Antimicrobials: cefepime    Pertinent Laboratory Values:   Wt Readings from Last 1 Encounters:   03/09/24 77.1 kg (170 lb)     Temp Readings from Last 1 Encounters:   03/10/24 98 °F (36.7 °C) (Oral)     Estimated Creatinine Clearance: 34 mL/min (A) (based on SCr of 1.49 mg/dL (H)).    Recent Labs     03/09/24  1005 03/09/24  1517 03/10/24  0150 03/10/24  0529   CREATININE 2.23*   < > 1.68* 1.49*   BUN 44*   < > 39* 35*   WBC 16.0*  --  18.4*  --     < > = values in this interval not displayed.     Pertinent Cultures:  Date Source Results   3/9 blood NGTD   3/9 urine pending   MRSA Nasal Swab: NA    Assessment:  Date Current Dose Level (mg/L) Timing of Level (h)   3/9 1,500 mg x1 - -   3/10 1,000 mg x1 18.6 13     Plan:  Dose by level  Dose: 1,000 mg x1  Ordered a level for 3/11 with AM labs  Pharmacy will continue to monitor patient and adjust therapy as indicated    Thank you for the consult,  David Schmidt RPH  3/10/2024   
[facility]  Progress Note    Patient: Maria G Mayers Age: 73 y.o. : 1950 MR#: [id number] SSN: [id number]  Date: 3/12/2024        Assessment/Plan:     Hospital Problems             Last Modified POA    * (Principal) DKA, type 2, not at goal (HCC) 3/9/2024 Yes    Hyperkalemia 3/12/2024 Yes    Primary hypertension 3/12/2024 Yes    Acquired hypothyroidism 3/12/2024 Yes    History of colon cancer 3/12/2024 Yes    LIA (acute kidney injury) (HCC) 3/12/2024 Yes    Stage 3b chronic kidney disease (HCC) 3/12/2024 Yes    High anion gap metabolic acidosis 3/12/2024 Yes    Lactic acidosis 3/12/2024 Yes        Assessment:  Diabetic ketoacidosis d/t non compliance with history of DMT2- hba1c 11.0   Acute on chronic encephalopathy, toxic versus metabolic in nature-resolved  Sepsis with questionable acute cystitis  HAGMA- resolved  Acute renal failure-likely prerenal in the setting of dehydration  Lactic acidosis  Electrolyte abnormality  Hypertension  GERD  History of CKD 3  Hypothyroidism  History of colon cancer-status post resection 7 to 8 years ago  History of COPD-does not see pulmonologist outpatient  Tobacco abuse      Plan:  - Currently off insulin gtt and transitioned to Lantus 15 U QHS with Humalog QAC with SSI   - Con't Ceftriaxone until 3/15/24   - Follow cultures, NGTD   - Encourage PO intake   - Cardiac monitoring  - Appropriate home meds restarted- ASA, Neurontin, Synthroid, Protonix   - BP meds (amlodipine and Coreg) not restarted as currently upon my interview /76- restart as appropriate     PT/OT/IS    DVT Prophylaxis:  []Lovenox  [x]Hep SQ  []SCDs  []Coumadin   []On Heparin gtt []PO anticoagulant    Anticipated discharge: 1-2 days     Time spent reviewing records, independently interpreting results, obtaining history from patient or caregiver, performing physical exam, ordering tests and medications, communicating with specialists, documenting in the 
Rec'd from ER awake, c/o thirst mouth swabbed with water, connected to monitor, chg bath given, made comfortable in bed, mepilex to sacrum, IV's to each arm infusing well   
caregivers page  on duty if patient shows signs of acute spiritual or emotional distress.     Briana Martínez, [facility]  Spiritual Care   [phone]  
Avoid sedating medications.   Pulm: Supplemental O2 via NC PRN, titrate flow for goal SPO2> 90% pulmonary hygiene care, Aspiration precautions, Keep HOB >30 degrees  CVS :Monitor  GI: SUP, Trend LFTs, Zofran PRN for N/V. Encourage PO intake at this time  Renal:  Trend Renal indices, Strict Is/Os  Hem/Onc: Monitor for s/o active bleeding.   I/D: Continue broad-spectrum antibiotics with vancomycin and cefepime for total of 7 days.  Endocrine:   Patient taken off of insulin drip this morning.  Transitioned to Lantus QHS with Humalog QAC with SSIP.  Titrate as tolearted  Metabolic:   Trend lytes, replace as needed.   Musc/Skin: no acute issues, wound care    Full Code No additional code details  Discussed in interdisciplinary rounds     Best practice :    Glycemic control  IHI ICU bundles:   High Bundle Followed  [facility] Vent patients-    N/a  Stress ulcer prophylaxis.   Pepcid  DVT prophylaxis.   SQH  Need for Lines, high assessed.  Palliative care evaluation.  Restraints need.  Attending Non-violent Restraint Reevaluation     No restraints needed         34 minutes were spent with the patient at the bedside. This care involved high complexity decision making to assess, manipulate, and support vital system functions, to treat this degreee vital organ system failure and to prevent further life threatening deterioration of the patient’s condition  The services I provided to this patient were to treat and/or prevent clinically significant deterioration that could result in the failure of one or more body systems and/or organ systems due to respiratory distress, hypoxia, cardiac dysrhythmia.         Boby Figueroa MD/MPH     Pulmonary, Critical Care Medicine  [facility]      
status closely with serial exams. Avoid sedating medications.   Pulm: Supplemental O2 via NC PRN, titrate flow for goal SPO2> 90% pulmonary hygiene care, Aspiration precautions, Keep HOB >30 degrees  CVS :Monitor  GI: SUP, Trend LFTs, Zofran PRN for N/V. Encourage PO intake at this time  Renal:  Trend Renal indices, Strict Is/Os  Hem/Onc: Monitor for s/o active bleeding.   I/D: Continue broad-spectrum antibiotics with vancomycin and cefepime for total of 7 days.  Endocrine:   Patient taken off of insulin drip yesterday  Transitioned to Lantus QHS with Humalog QAC with SSIP.  Titrate as tolerated, had to lower lantus dose due to one hypoglycemic event overnight  Metabolic:   Trend lytes, replace as needed.   Musc/Skin: no acute issues, wound care      Downgraded to telemetry.  Discussed with Hospitalist - Dr. Moscoso, advised that since lantus needs adjustment, I advise ongoing monitoring for insulin adjustments  Full Code No additional code details  Discussed in interdisciplinary rounds     Best practice :    Glycemic control  IHI ICU bundles:   High Bundle Followed  [facility] Vent patients-    N/a  Stress ulcer prophylaxis.   Pepcid  DVT prophylaxis.   SQH  Need for Lines, high assessed.  Palliative care evaluation.  Restraints need.  Attending Non-violent Restraint Reevaluation     No restraints needed           Boby Figueroa MD/MPH     Pulmonary, Critical Care Medicine  [facility]      
or more body systems and/or organ systems due to respiratory distress, hypoxia, cardiac dysrhythmia.       Silvio Rivera PA-C  03/10/24  Pulmonary, Critical Care Medicine  [facility]

## 2024-03-15 LAB
BACTERIA SPEC CULT: NORMAL
BACTERIA SPEC CULT: NORMAL
SERVICE CMNT-IMP: NORMAL
SERVICE CMNT-IMP: NORMAL

## (undated) DEVICE — FCPS RAD JAW 4LC 240CM W/NDL -- BX/40

## (undated) DEVICE — BASIN EMSIS 16OZ GRAPHITE PLAS KID SHP MOLD GRAD FOR ORAL

## (undated) DEVICE — ENDOSCOPY PUMP TUBING/ CAP SET: Brand: ERBE

## (undated) DEVICE — GAUZE,SPONGE,4"X4",16PLY,STRL,LF,10/TRAY: Brand: MEDLINE

## (undated) DEVICE — SOLUTION IRRIG 1000ML H2O STRL BLT

## (undated) DEVICE — SYR 10ML LUER LOK 1/5ML GRAD --

## (undated) DEVICE — CLIP HEMO ENDOSCP 235CM BX/20 -- RESOLUTION 360

## (undated) DEVICE — FORCEPS BX L240CM JAW DIA2.4MM ORNG L CAP W/ NDL DISP RAD

## (undated) DEVICE — SNARE POLYP M W27MMXL240CM OVL STIFF DISP CAPTIVATOR

## (undated) DEVICE — YANKAUER,SMOOTH HANDLE,HIGH CAPACITY: Brand: MEDLINE INDUSTRIES, INC.

## (undated) DEVICE — CANNULA NSL AD TBNG L14FT STD PVC O2 CRV CONN NONFLARED NSL

## (undated) DEVICE — FLUFF AND POLYMER UNDERPAD,EXTRA HEAVY: Brand: WINGS

## (undated) DEVICE — CATHETER SUCT TR FL TIP 14FR W/ O CTRL

## (undated) DEVICE — MEDI-VAC NON-CONDUCTIVE SUCTION TUBING: Brand: CARDINAL HEALTH

## (undated) DEVICE — SYRINGE MED 50ML LUERSLIP TIP

## (undated) DEVICE — LINER SUCT CANSTR 3000CC PLAS SFT PRE ASSEMB W/OUT TBNG W/

## (undated) DEVICE — GOWN ISOL IMPERV UNIV, DISP, OPEN BACK, BLUE --

## (undated) DEVICE — SYR 50ML SLIP TIP NSAF LF STRL --

## (undated) DEVICE — BITE BLOCK ENDOSCP UNIV AD 6 TO 9.4 MM

## (undated) DEVICE — SYRINGE MED 25GA 3ML L5/8IN SUBQ PLAS W/ DETACH NDL SFTY

## (undated) DEVICE — CANNULA ORIG TL CLR W FOAM CUSHIONS AND 14FT SUPL TB 3 CHN

## (undated) DEVICE — STERILE POLYISOPRENE POWDER-FREE SURGICAL GLOVES: Brand: PROTEXIS

## (undated) DEVICE — TRAP SPEC POLYP REM STRNR CLN DSGN MAGNIFYING WIND DISP

## (undated) DEVICE — FCPS RAD JAW 4LC 240CM W/NDL -- BX/20 RADIAL JAW 4

## (undated) DEVICE — SYR 20ML LL STRL LF --